# Patient Record
Sex: FEMALE | Race: OTHER | HISPANIC OR LATINO | Employment: OTHER | URBAN - METROPOLITAN AREA
[De-identification: names, ages, dates, MRNs, and addresses within clinical notes are randomized per-mention and may not be internally consistent; named-entity substitution may affect disease eponyms.]

---

## 2017-01-03 ENCOUNTER — APPOINTMENT (EMERGENCY)
Dept: RADIOLOGY | Facility: HOSPITAL | Age: 52
End: 2017-01-03
Payer: COMMERCIAL

## 2017-01-03 ENCOUNTER — HOSPITAL ENCOUNTER (EMERGENCY)
Facility: HOSPITAL | Age: 52
Discharge: HOME | End: 2017-01-03
Attending: EMERGENCY MEDICINE | Admitting: EMERGENCY MEDICINE
Payer: COMMERCIAL

## 2017-01-03 VITALS
DIASTOLIC BLOOD PRESSURE: 100 MMHG | SYSTOLIC BLOOD PRESSURE: 159 MMHG | OXYGEN SATURATION: 94 % | BODY MASS INDEX: 35.03 KG/M2 | HEART RATE: 90 BPM | TEMPERATURE: 97.8 F | RESPIRATION RATE: 18 BRPM | WEIGHT: 197.75 LBS

## 2017-01-03 DIAGNOSIS — S93.402A LEFT ANKLE SPRAIN: Primary | ICD-10-CM

## 2017-01-03 PROCEDURE — 73610 X-RAY EXAM OF ANKLE: CPT

## 2017-01-03 PROCEDURE — 99283 EMERGENCY DEPT VISIT LOW MDM: CPT

## 2017-01-03 RX ORDER — NAPROXEN 500 MG/1
500 TABLET ORAL 2 TIMES DAILY WITH MEALS
Qty: 60 TABLET | Refills: 0 | Status: SHIPPED | OUTPATIENT
Start: 2017-01-03 | End: 2017-10-03

## 2017-01-03 RX ORDER — ACETAMINOPHEN 325 MG/1
650 TABLET ORAL ONCE
Status: COMPLETED | OUTPATIENT
Start: 2017-01-03 | End: 2017-01-03

## 2017-01-03 RX ADMIN — ACETAMINOPHEN 650 MG: 325 TABLET ORAL at 07:29

## 2017-01-24 ENCOUNTER — GENERIC CONVERSION - ENCOUNTER (OUTPATIENT)
Dept: OTHER | Facility: OTHER | Age: 52
End: 2017-01-24

## 2017-07-25 ENCOUNTER — DOCTOR'S OFFICE (OUTPATIENT)
Dept: URBAN - METROPOLITAN AREA CLINIC 137 | Facility: CLINIC | Age: 52
Setting detail: OPHTHALMOLOGY
End: 2017-07-25
Payer: MEDICARE

## 2017-07-25 DIAGNOSIS — E11.3293: ICD-10-CM

## 2017-07-25 DIAGNOSIS — H52.4: ICD-10-CM

## 2017-07-25 DIAGNOSIS — H25.13: ICD-10-CM

## 2017-07-25 DIAGNOSIS — H04.123: ICD-10-CM

## 2017-07-25 PROCEDURE — 92004 COMPRE OPH EXAM NEW PT 1/>: CPT | Performed by: OPHTHALMOLOGY

## 2017-07-25 PROCEDURE — 92015 DETERMINE REFRACTIVE STATE: CPT | Performed by: OPHTHALMOLOGY

## 2017-07-25 ASSESSMENT — REFRACTION_OUTSIDERX
OD_VA2: 20/20(J1+)
OS_VA3: 20/
OD_CYLINDER: +1.25
OD_VA3: 20/
OU_VA: 20/
OD_SPHERE: +1.25
OD_AXIS: 002
OS_ADD: +2.25
OS_VA2: 20/20(J1+)
OS_VA1: 20/40-2
OS_CYLINDER: +0.50
OS_AXIS: 175
OS_SPHERE: +1.50
OD_ADD: +2.25
OD_VA1: 20/40

## 2017-07-25 ASSESSMENT — REFRACTION_MANIFEST
OU_VA: 20/
OS_VA1: 20/
OD_VA1: 20/
OS_VA2: 20/
OD_VA3: 20/
OD_VA1: 20/
OS_VA1: 20/
OD_VA3: 20/
OU_VA: 20/
OS_VA3: 20/
OS_VA3: 20/
OD_VA2: 20/
OD_VA2: 20/
OS_VA2: 20/

## 2017-07-25 ASSESSMENT — REFRACTION_AUTOREFRACTION
OD_SPHERE: +1.25
OD_CYLINDER: +1.25
OS_SPHERE: +1.75
OS_CYLINDER: +0.75
OD_AXIS: 004
OS_AXIS: 172

## 2017-07-25 ASSESSMENT — KERATOMETRY
OD_AXISANGLE_DEGREES: 015
OS_K2POWER_DIOPTERS: 44.50
OD_K1POWER_DIOPTERS: 43.75
OD_K2POWER_DIOPTERS: 44.00
OS_AXISANGLE_DEGREES: 161
OS_K1POWER_DIOPTERS: 44.25

## 2017-07-25 ASSESSMENT — REFRACTION_CURRENTRX
OD_VPRISM_DIRECTION: SV
OS_OVR_VA: 20/
OD_OVR_VA: 20/
OD_SPHERE: +3.25
OD_OVR_VA: 20/
OS_OVR_VA: 20/
OS_SPHERE: +3.25
OD_OVR_VA: 20/
OS_VPRISM_DIRECTION: SV
OS_OVR_VA: 20/
OS_CYLINDER: SPH
OD_CYLINDER: SPH

## 2017-07-25 ASSESSMENT — VISUAL ACUITY
OD_BCVA: 20/80
OS_BCVA: 20/70

## 2017-07-25 ASSESSMENT — SPHEQUIV_DERIVED
OD_SPHEQUIV: 1.875
OS_SPHEQUIV: 2.125

## 2017-07-25 ASSESSMENT — CONFRONTATIONAL VISUAL FIELD TEST (CVF)
OD_FINDINGS: FULL
OS_FINDINGS: FULL

## 2017-07-25 ASSESSMENT — AXIALLENGTH_DERIVED
OD_AL: 22.7545
OS_AL: 22.496

## 2017-08-11 ENCOUNTER — DOCTOR'S OFFICE (OUTPATIENT)
Dept: URBAN - METROPOLITAN AREA CLINIC 137 | Facility: CLINIC | Age: 52
Setting detail: OPHTHALMOLOGY
End: 2017-08-11
Payer: MEDICARE

## 2017-08-11 DIAGNOSIS — H04.123: ICD-10-CM

## 2017-08-11 DIAGNOSIS — H25.13: ICD-10-CM

## 2017-08-11 DIAGNOSIS — E11.3293: ICD-10-CM

## 2017-08-11 PROBLEM — H52.4 PRESBYOPIA: Status: ACTIVE | Noted: 2017-07-25

## 2017-08-11 PROBLEM — H01.004 BLEPHARITIS; LEFT UPPER LID: Status: ACTIVE | Noted: 2017-08-11

## 2017-08-11 PROCEDURE — 92134 CPTRZ OPH DX IMG PST SGM RTA: CPT | Performed by: OPHTHALMOLOGY

## 2017-08-11 PROCEDURE — 92014 COMPRE OPH EXAM EST PT 1/>: CPT | Performed by: OPHTHALMOLOGY

## 2017-08-11 ASSESSMENT — REFRACTION_CURRENTRX
OS_CYLINDER: SPH
OD_OVR_VA: 20/
OS_SPHERE: +3.25
OS_OVR_VA: 20/
OS_OVR_VA: 20/
OD_OVR_VA: 20/
OD_OVR_VA: 20/
OD_VPRISM_DIRECTION: SV
OD_CYLINDER: SPH
OS_VPRISM_DIRECTION: SV
OD_SPHERE: +3.25
OS_OVR_VA: 20/

## 2017-08-11 ASSESSMENT — REFRACTION_MANIFEST
OD_VA1: 20/
OS_VA3: 20/
OD_VA1: 20/
OU_VA: 20/
OU_VA: 20/
OS_VA2: 20/
OS_VA3: 20/
OD_VA3: 20/
OD_VA3: 20/
OS_VA1: 20/
OS_VA2: 20/
OS_VA1: 20/
OD_VA2: 20/
OD_VA2: 20/

## 2017-08-11 ASSESSMENT — REFRACTION_OUTSIDERX
OS_ADD: +2.25
OS_VA2: 20/20(J1+)
OS_CYLINDER: +0.50
OD_VA1: 20/40
OD_VA3: 20/
OD_AXIS: 002
OU_VA: 20/
OS_VA3: 20/
OD_CYLINDER: +1.25
OD_SPHERE: +1.25
OS_SPHERE: +1.50
OD_ADD: +2.25
OS_VA1: 20/40-2
OS_AXIS: 175
OD_VA2: 20/20(J1+)

## 2017-08-11 ASSESSMENT — LID EXAM ASSESSMENTS
OS_BLEPHARITIS: LUL 2+
OD_BLEPHARITIS: 1+

## 2017-08-11 ASSESSMENT — CONFRONTATIONAL VISUAL FIELD TEST (CVF)
OS_FINDINGS: FULL
OD_FINDINGS: FULL

## 2017-08-11 ASSESSMENT — REFRACTION_AUTOREFRACTION
OS_AXIS: 172
OD_SPHERE: +1.25
OS_CYLINDER: +0.75
OD_AXIS: 004
OS_SPHERE: +1.75
OD_CYLINDER: +1.25

## 2017-08-11 ASSESSMENT — SPHEQUIV_DERIVED
OS_SPHEQUIV: 2.125
OD_SPHEQUIV: 1.875

## 2017-08-11 ASSESSMENT — VISUAL ACUITY
OD_BCVA: 20/80
OS_BCVA: 20/70

## 2017-10-03 ENCOUNTER — APPOINTMENT (EMERGENCY)
Dept: RADIOLOGY | Facility: HOSPITAL | Age: 52
DRG: 092 | End: 2017-10-03
Payer: COMMERCIAL

## 2017-10-03 ENCOUNTER — HOSPITAL ENCOUNTER (INPATIENT)
Facility: HOSPITAL | Age: 52
LOS: 6 days | Discharge: NURSING, REHAB OR LONG-TERM CARE | DRG: 092 | End: 2017-10-09
Attending: EMERGENCY MEDICINE | Admitting: ANESTHESIOLOGY
Payer: COMMERCIAL

## 2017-10-03 ENCOUNTER — APPOINTMENT (INPATIENT)
Dept: RADIOLOGY | Facility: HOSPITAL | Age: 52
DRG: 092 | End: 2017-10-03
Payer: COMMERCIAL

## 2017-10-03 ENCOUNTER — APPOINTMENT (INPATIENT)
Dept: NON INVASIVE DIAGNOSTICS | Facility: HOSPITAL | Age: 52
DRG: 092 | End: 2017-10-03
Payer: COMMERCIAL

## 2017-10-03 DIAGNOSIS — I63.9 STROKE (HCC): Primary | ICD-10-CM

## 2017-10-03 PROBLEM — E78.5 HYPERLIPIDEMIA: Chronic | Status: ACTIVE | Noted: 2017-10-03

## 2017-10-03 PROBLEM — R29.810 FACIAL DROOP: Status: ACTIVE | Noted: 2017-10-03

## 2017-10-03 PROBLEM — E11.9 DIABETES MELLITUS (HCC): Chronic | Status: ACTIVE | Noted: 2017-10-03

## 2017-10-03 PROBLEM — R29.898 LEFT LEG WEAKNESS: Status: ACTIVE | Noted: 2017-10-03

## 2017-10-03 PROBLEM — I10 HYPERTENSION: Chronic | Status: ACTIVE | Noted: 2017-10-03

## 2017-10-03 PROBLEM — N17.9 AKI (ACUTE KIDNEY INJURY) (HCC): Status: ACTIVE | Noted: 2017-10-03

## 2017-10-03 PROBLEM — R29.898 LEFT ARM WEAKNESS: Status: ACTIVE | Noted: 2017-10-03

## 2017-10-03 PROBLEM — I16.0 HYPERTENSIVE URGENCY: Status: ACTIVE | Noted: 2017-10-03

## 2017-10-03 LAB
ALBUMIN SERPL BCP-MCNC: 3.2 G/DL (ref 3.5–5)
ALP SERPL-CCNC: 140 U/L (ref 46–116)
ALT SERPL W P-5'-P-CCNC: 63 U/L (ref 12–78)
ANION GAP SERPL CALCULATED.3IONS-SCNC: 11 MMOL/L (ref 4–13)
APTT PPP: 24 SECONDS (ref 24–33)
AST SERPL W P-5'-P-CCNC: 39 U/L (ref 5–45)
BACTERIA UR QL AUTO: ABNORMAL /HPF
BASOPHILS # BLD AUTO: 0 THOUSANDS/ΜL (ref 0–0.1)
BASOPHILS NFR BLD AUTO: 0 % (ref 0–1)
BILIRUB SERPL-MCNC: 0.3 MG/DL (ref 0.2–1)
BILIRUB UR QL STRIP: NEGATIVE
BUN SERPL-MCNC: 24 MG/DL (ref 5–25)
CALCIUM SERPL-MCNC: 9.7 MG/DL (ref 8.3–10.1)
CHLORIDE SERPL-SCNC: 100 MMOL/L (ref 100–108)
CLARITY UR: CLEAR
CO2 SERPL-SCNC: 24 MMOL/L (ref 21–32)
COLOR UR: YELLOW
CREAT SERPL-MCNC: 1.45 MG/DL (ref 0.6–1.3)
EOSINOPHIL # BLD AUTO: 0.1 THOUSAND/ΜL (ref 0–0.61)
EOSINOPHIL NFR BLD AUTO: 1 % (ref 0–6)
ERYTHROCYTE [DISTWIDTH] IN BLOOD BY AUTOMATED COUNT: 13.2 % (ref 11.6–15.1)
GFR SERPL CREATININE-BSD FRML MDRD: 41 ML/MIN/1.73SQ M
GLUCOSE SERPL-MCNC: 153 MG/DL (ref 65–140)
GLUCOSE SERPL-MCNC: 163 MG/DL (ref 65–140)
GLUCOSE SERPL-MCNC: 182 MG/DL (ref 65–140)
GLUCOSE SERPL-MCNC: 307 MG/DL (ref 65–140)
GLUCOSE SERPL-MCNC: 361 MG/DL (ref 65–140)
GLUCOSE UR STRIP-MCNC: ABNORMAL MG/DL
HCT VFR BLD AUTO: 41.9 % (ref 37–47)
HGB BLD-MCNC: 14 G/DL (ref 12–16)
HGB UR QL STRIP.AUTO: ABNORMAL
INR PPP: 0.9 (ref 0.86–1.16)
KETONES UR STRIP-MCNC: NEGATIVE MG/DL
LEUKOCYTE ESTERASE UR QL STRIP: ABNORMAL
LYMPHOCYTES # BLD AUTO: 2.2 THOUSANDS/ΜL (ref 0.6–4.47)
LYMPHOCYTES NFR BLD AUTO: 28 % (ref 14–44)
MAGNESIUM SERPL-MCNC: 1.7 MG/DL (ref 1.6–2.6)
MCH RBC QN AUTO: 29.4 PG (ref 27–31)
MCHC RBC AUTO-ENTMCNC: 33.4 G/DL (ref 31.4–37.4)
MCV RBC AUTO: 88 FL (ref 82–98)
MONOCYTES # BLD AUTO: 0.5 THOUSAND/ΜL (ref 0.17–1.22)
MONOCYTES NFR BLD AUTO: 6 % (ref 4–12)
NEUTROPHILS # BLD AUTO: 5 THOUSANDS/ΜL (ref 1.85–7.62)
NEUTS SEG NFR BLD AUTO: 64 % (ref 43–75)
NITRITE UR QL STRIP: NEGATIVE
NON-SQ EPI CELLS URNS QL MICRO: ABNORMAL /HPF
NRBC BLD AUTO-RTO: 0 /100 WBCS
PH UR STRIP.AUTO: 5 [PH] (ref 5–9)
PLATELET # BLD AUTO: 229 THOUSANDS/UL (ref 130–400)
PMV BLD AUTO: 8.3 FL (ref 8.9–12.7)
POTASSIUM SERPL-SCNC: 4.3 MMOL/L (ref 3.5–5.3)
PROT SERPL-MCNC: 7.2 G/DL (ref 6.4–8.2)
PROT UR STRIP-MCNC: ABNORMAL MG/DL
PROTHROMBIN TIME: 9.4 SECONDS (ref 9.4–11.7)
RBC # BLD AUTO: 4.78 MILLION/UL (ref 4.2–5.4)
RBC #/AREA URNS AUTO: ABNORMAL /HPF
SODIUM SERPL-SCNC: 135 MMOL/L (ref 136–145)
SP GR UR STRIP.AUTO: 1.01 (ref 1–1.03)
TROPONIN I SERPL-MCNC: <0.02 NG/ML
UROBILINOGEN UR QL STRIP.AUTO: 0.2 E.U./DL
WBC # BLD AUTO: 7.8 THOUSAND/UL (ref 4.8–10.8)
WBC #/AREA URNS AUTO: ABNORMAL /HPF

## 2017-10-03 PROCEDURE — 84484 ASSAY OF TROPONIN QUANT: CPT | Performed by: EMERGENCY MEDICINE

## 2017-10-03 PROCEDURE — 71010 HB CHEST X-RAY 1 VIEW FRONTAL (PORTABLE): CPT

## 2017-10-03 PROCEDURE — 96375 TX/PRO/DX INJ NEW DRUG ADDON: CPT

## 2017-10-03 PROCEDURE — 80053 COMPREHEN METABOLIC PANEL: CPT | Performed by: EMERGENCY MEDICINE

## 2017-10-03 PROCEDURE — 70498 CT ANGIOGRAPHY NECK: CPT

## 2017-10-03 PROCEDURE — 36415 COLL VENOUS BLD VENIPUNCTURE: CPT | Performed by: EMERGENCY MEDICINE

## 2017-10-03 PROCEDURE — 87081 CULTURE SCREEN ONLY: CPT | Performed by: ANESTHESIOLOGY

## 2017-10-03 PROCEDURE — 85610 PROTHROMBIN TIME: CPT | Performed by: EMERGENCY MEDICINE

## 2017-10-03 PROCEDURE — 85025 COMPLETE CBC W/AUTO DIFF WBC: CPT | Performed by: EMERGENCY MEDICINE

## 2017-10-03 PROCEDURE — 70450 CT HEAD/BRAIN W/O DYE: CPT

## 2017-10-03 PROCEDURE — 93306 TTE W/DOPPLER COMPLETE: CPT

## 2017-10-03 PROCEDURE — 70496 CT ANGIOGRAPHY HEAD: CPT

## 2017-10-03 PROCEDURE — 81001 URINALYSIS AUTO W/SCOPE: CPT | Performed by: EMERGENCY MEDICINE

## 2017-10-03 PROCEDURE — 3E03317 INTRODUCTION OF OTHER THROMBOLYTIC INTO PERIPHERAL VEIN, PERCUTANEOUS APPROACH: ICD-10-PCS | Performed by: EMERGENCY MEDICINE

## 2017-10-03 PROCEDURE — 96374 THER/PROPH/DIAG INJ IV PUSH: CPT

## 2017-10-03 PROCEDURE — 82948 REAGENT STRIP/BLOOD GLUCOSE: CPT

## 2017-10-03 PROCEDURE — 85730 THROMBOPLASTIN TIME PARTIAL: CPT | Performed by: EMERGENCY MEDICINE

## 2017-10-03 PROCEDURE — 83735 ASSAY OF MAGNESIUM: CPT | Performed by: EMERGENCY MEDICINE

## 2017-10-03 PROCEDURE — 93005 ELECTROCARDIOGRAM TRACING: CPT | Performed by: EMERGENCY MEDICINE

## 2017-10-03 PROCEDURE — 99291 CRITICAL CARE FIRST HOUR: CPT

## 2017-10-03 RX ORDER — PROMETHAZINE HYDROCHLORIDE 25 MG/ML
25 INJECTION, SOLUTION INTRAMUSCULAR; INTRAVENOUS EVERY 6 HOURS PRN
Status: DISCONTINUED | OUTPATIENT
Start: 2017-10-03 | End: 2017-10-09 | Stop reason: HOSPADM

## 2017-10-03 RX ORDER — BUTALBITAL, ACETAMINOPHEN AND CAFFEINE 50; 325; 40 MG/1; MG/1; MG/1
1 TABLET ORAL ONCE
Status: DISCONTINUED | OUTPATIENT
Start: 2017-10-03 | End: 2017-10-03

## 2017-10-03 RX ORDER — MORPHINE SULFATE 2 MG/ML
1 INJECTION, SOLUTION INTRAMUSCULAR; INTRAVENOUS EVERY 4 HOURS PRN
Status: DISCONTINUED | OUTPATIENT
Start: 2017-10-03 | End: 2017-10-04

## 2017-10-03 RX ORDER — VALSARTAN AND HYDROCHLOROTHIAZIDE 320; 25 MG/1; MG/1
1 TABLET, FILM COATED ORAL DAILY
COMMUNITY
End: 2018-05-14

## 2017-10-03 RX ORDER — LABETALOL HYDROCHLORIDE 5 MG/ML
10 INJECTION, SOLUTION INTRAVENOUS ONCE
Status: COMPLETED | OUTPATIENT
Start: 2017-10-03 | End: 2017-10-03

## 2017-10-03 RX ORDER — TOPIRAMATE 25 MG/1
25 TABLET ORAL
Status: DISCONTINUED | OUTPATIENT
Start: 2017-10-03 | End: 2017-10-07

## 2017-10-03 RX ORDER — LABETALOL HYDROCHLORIDE 5 MG/ML
10 INJECTION, SOLUTION INTRAVENOUS EVERY 4 HOURS PRN
Status: DISCONTINUED | OUTPATIENT
Start: 2017-10-03 | End: 2017-10-09 | Stop reason: HOSPADM

## 2017-10-03 RX ORDER — DIPHENHYDRAMINE HYDROCHLORIDE 50 MG/ML
25 INJECTION INTRAMUSCULAR; INTRAVENOUS ONCE
Status: COMPLETED | OUTPATIENT
Start: 2017-10-03 | End: 2017-10-03

## 2017-10-03 RX ORDER — ATORVASTATIN CALCIUM 80 MG/1
80 TABLET, FILM COATED ORAL EVERY EVENING
Status: DISCONTINUED | OUTPATIENT
Start: 2017-10-04 | End: 2017-10-09 | Stop reason: HOSPADM

## 2017-10-03 RX ORDER — SODIUM CHLORIDE 9 MG/ML
100 INJECTION, SOLUTION INTRAVENOUS CONTINUOUS
Status: DISCONTINUED | OUTPATIENT
Start: 2017-10-03 | End: 2017-10-05

## 2017-10-03 RX ORDER — MAGNESIUM SULFATE 1 G/100ML
1 INJECTION INTRAVENOUS ONCE
Status: COMPLETED | OUTPATIENT
Start: 2017-10-03 | End: 2017-10-03

## 2017-10-03 RX ORDER — SODIUM CHLORIDE 9 MG/ML
100 INJECTION, SOLUTION INTRAVENOUS ONCE
Status: DISCONTINUED | OUTPATIENT
Start: 2017-10-03 | End: 2017-10-09 | Stop reason: HOSPADM

## 2017-10-03 RX ORDER — LANOLIN ALCOHOL/MO/W.PET/CERES
3 CREAM (GRAM) TOPICAL
Status: DISCONTINUED | OUTPATIENT
Start: 2017-10-03 | End: 2017-10-09 | Stop reason: HOSPADM

## 2017-10-03 RX ORDER — ONDANSETRON 2 MG/ML
4 INJECTION INTRAMUSCULAR; INTRAVENOUS ONCE
Status: COMPLETED | OUTPATIENT
Start: 2017-10-03 | End: 2017-10-03

## 2017-10-03 RX ORDER — BUTALBITAL, ACETAMINOPHEN AND CAFFEINE 50; 325; 40 MG/1; MG/1; MG/1
1 TABLET ORAL EVERY 6 HOURS PRN
Status: DISCONTINUED | OUTPATIENT
Start: 2017-10-03 | End: 2017-10-09 | Stop reason: HOSPADM

## 2017-10-03 RX ORDER — HYDRALAZINE HYDROCHLORIDE 20 MG/ML
10 INJECTION INTRAMUSCULAR; INTRAVENOUS ONCE
Status: DISCONTINUED | OUTPATIENT
Start: 2017-10-03 | End: 2017-10-03

## 2017-10-03 RX ORDER — CHLORHEXIDINE GLUCONATE 0.12 MG/ML
15 RINSE ORAL EVERY 12 HOURS SCHEDULED
Status: DISCONTINUED | OUTPATIENT
Start: 2017-10-03 | End: 2017-10-09 | Stop reason: HOSPADM

## 2017-10-03 RX ADMIN — MAGNESIUM SULFATE HEPTAHYDRATE 1 G: 1 INJECTION, SOLUTION INTRAVENOUS at 13:09

## 2017-10-03 RX ADMIN — CHLORHEXIDINE GLUCONATE 15 ML: 1.2 RINSE ORAL at 21:13

## 2017-10-03 RX ADMIN — LABETALOL 20 MG/4 ML (5 MG/ML) INTRAVENOUS SYRINGE 10 MG: at 10:40

## 2017-10-03 RX ADMIN — INSULIN LISPRO 10 UNITS: 100 INJECTION, SOLUTION INTRAVENOUS; SUBCUTANEOUS at 11:49

## 2017-10-03 RX ADMIN — IOHEXOL 85 ML: 350 INJECTION, SOLUTION INTRAVENOUS at 10:31

## 2017-10-03 RX ADMIN — CHLORHEXIDINE GLUCONATE 15 ML: 1.2 RINSE ORAL at 15:00

## 2017-10-03 RX ADMIN — INSULIN LISPRO 1 UNITS: 100 INJECTION, SOLUTION INTRAVENOUS; SUBCUTANEOUS at 23:23

## 2017-10-03 RX ADMIN — ALTEPLASE 7.7 MG: KIT at 11:28

## 2017-10-03 RX ADMIN — INSULIN LISPRO 1 UNITS: 100 INJECTION, SOLUTION INTRAVENOUS; SUBCUTANEOUS at 18:19

## 2017-10-03 RX ADMIN — BUTALBITAL, ACETAMINOPHEN, AND CAFFEINE 1 TABLET: 50; 325; 40 TABLET ORAL at 21:13

## 2017-10-03 RX ADMIN — SODIUM CHLORIDE 5 MG/HR: 0.9 INJECTION, SOLUTION INTRAVENOUS at 11:17

## 2017-10-03 RX ADMIN — LABETALOL 20 MG/4 ML (5 MG/ML) INTRAVENOUS SYRINGE 10 MG: at 10:53

## 2017-10-03 RX ADMIN — SODIUM CHLORIDE 100 ML/HR: 0.9 INJECTION, SOLUTION INTRAVENOUS at 14:54

## 2017-10-03 RX ADMIN — MORPHINE SULFATE 1 MG: 2 INJECTION, SOLUTION INTRAMUSCULAR; INTRAVENOUS at 20:36

## 2017-10-03 RX ADMIN — SODIUM CHLORIDE 1000 ML: 0.9 INJECTION, SOLUTION INTRAVENOUS at 10:50

## 2017-10-03 RX ADMIN — TOPIRAMATE 25 MG: 25 TABLET, FILM COATED ORAL at 21:12

## 2017-10-03 RX ADMIN — INSULIN LISPRO 1 UNITS: 100 INJECTION, SOLUTION INTRAVENOUS; SUBCUTANEOUS at 15:39

## 2017-10-03 RX ADMIN — ONDANSETRON 4 MG: 2 INJECTION INTRAMUSCULAR; INTRAVENOUS at 10:40

## 2017-10-03 RX ADMIN — ALTEPLASE 69.1 MG: KIT at 11:31

## 2017-10-03 RX ADMIN — DIPHENHYDRAMINE HYDROCHLORIDE 25 MG: 50 INJECTION, SOLUTION INTRAMUSCULAR; INTRAVENOUS at 13:12

## 2017-10-03 RX ADMIN — MORPHINE SULFATE 1 MG: 2 INJECTION, SOLUTION INTRAMUSCULAR; INTRAVENOUS at 16:35

## 2017-10-03 RX ADMIN — MELATONIN TAB 3 MG 3 MG: 3 TAB at 21:58

## 2017-10-03 RX ADMIN — LABETALOL 20 MG/4 ML (5 MG/ML) INTRAVENOUS SYRINGE 10 MG: at 21:48

## 2017-10-03 NOTE — ED PROVIDER NOTES
History  Chief Complaint   Patient presents with    CVA/TIA-like Symptoms     c/o headache that started last night   states at 0800 this morning began with numbness on her left face and tongue and left sided extremity weakness  50-year-old female presents with multiple neurological complaints involving a headache that started last night gradual onset  This morning prior to arrival 2 hours ago she started with left facial numbness and left upper and arm and lower leg weakness  She also has a facial droop on the left side  Denies any head trauma  Remote history of stroke  History of migraine headaches  Not on anticoagulation  History provided by:  Patient   used: No        Prior to Admission Medications   Prescriptions Last Dose Informant Patient Reported? Taking? Canagliflozin (INVOKANA PO)   Yes No   Sig: Take by mouth   atorvastatin (LIPITOR) 10 mg tablet   Yes Yes   Sig: Take 40 mg by mouth daily at bedtime     clonazePAM (KlonoPIN) 0 5 mg tablet   Yes Yes   Sig: Take 0 5 mg by mouth daily at bedtime as needed for anxiety or seizures     fenofibrate (TRICOR) 145 mg tablet   Yes No   Sig: Take 145 mg by mouth daily   insulin aspart (NovoLOG) 100 units/mL injection   Yes No   Sig: Inject 75 Units under the skin daily   metoprolol tartrate (LOPRESSOR) 50 mg tablet   Yes No   Sig: Take 25 mg by mouth     pioglitazone (ACTOS) 30 mg tablet   Yes No   Sig: Take 30 mg by mouth daily   valsartan-hydrochlorothiazide (DIOVAN-HCT) 320-25 MG per tablet   Yes Yes   Sig: Take 1 tablet by mouth daily      Facility-Administered Medications: None       Past Medical History:   Diagnosis Date    Diabetes mellitus     Hyperlipidemia     Hypertension     Stroke        Past Surgical History:   Procedure Laterality Date    HYSTERECTOMY         History reviewed  No pertinent family history  I have reviewed and agree with the history as documented      Social History   Substance Use Topics    Smoking status: Never Smoker    Smokeless tobacco: Not on file    Alcohol use No        Review of Systems   All other systems reviewed and are negative  Physical Exam  ED Triage Vitals   Temperature Pulse Respirations Blood Pressure SpO2   10/03/17 1200 10/03/17 1012 10/03/17 1012 10/03/17 1012 10/03/17 1012   98 5 °F (36 9 °C) (!) 108 (!) 28 (!) 212/112 95 %      Temp Source Heart Rate Source Patient Position - Orthostatic VS BP Location FiO2 (%)   10/03/17 1200 10/03/17 1012 10/03/17 1012 10/03/17 1012 --   Oral Monitor Lying Right arm       Pain Score       10/03/17 1012       4           Physical Exam   Constitutional: She is oriented to person, place, and time  She appears well-developed and well-nourished  HENT:   Head: Normocephalic and atraumatic  Eyes: EOM are normal  Pupils are equal, round, and reactive to light  Neck: Normal range of motion  Neck supple  Cardiovascular: Normal rate and regular rhythm  Pulmonary/Chest: Effort normal and breath sounds normal    Abdominal: Soft  Bowel sounds are normal    Musculoskeletal: Normal range of motion  Neurological: She is alert and oriented to person, place, and time  She displays normal reflexes  No cranial nerve deficit or sensory deficit  She exhibits abnormal muscle tone  Coordination normal    Patient has decreased  strength in the left upper extremity compared to right upper extremity    motor strength 3/5 left compared to the right lower extremity on the right side  Skin: Skin is warm and dry  Psychiatric: She has a normal mood and affect  Nursing note and vitals reviewed        ED Medications  Medications   alteplase (ACTIVASE) bolus 7 7 mg (7 7 mg Intravenous Given 10/3/17 1128)     Followed by   alteplase (ACTIVASE) infusion 69 1 mg (0 mg/kg × 85 3 kg Intravenous Stopped 10/3/17 1232)     Followed by   sodium chloride 0 9 % infusion (not administered)   niCARdipine (CARDENE) 25 mg (STANDARD CONCENTRATION) in sodium chloride 0 9% 250 mL (0 mg/hr Intravenous Stopped 10/3/17 1221)   chlorhexidine (PERIDEX) 0 12 % oral rinse 15 mL (15 mL Swish & Spit Given 10/3/17 1500)   atorvastatin (LIPITOR) tablet 80 mg (not administered)   promethazine (PHENERGAN) injection 25 mg (not administered)   insulin lispro (HumaLOG) 100 units/mL subcutaneous injection 1-6 Units (1 Units Subcutaneous Given 10/3/17 1539)   sodium chloride 0 9 % infusion (100 mL/hr Intravenous New Bag 10/3/17 1454)   influenza inactivated quadrivalent vaccine (FLULAVAL) IM injection 0 5 mL (not administered)   sodium chloride 0 9 % bolus 1,000 mL (0 mL Intravenous Stopped 10/3/17 1225)   labetalol (NORMODYNE) injection 10 mg (10 mg Intravenous Given 10/3/17 1040)   ondansetron (ZOFRAN) injection 4 mg (4 mg Intravenous Given 10/3/17 1040)   iohexol (OMNIPAQUE) 350 MG/ML injection (MULTI-DOSE) 85 mL (85 mL Intravenous Given 10/3/17 1031)   labetalol (NORMODYNE) injection 10 mg (10 mg Intravenous Given 10/3/17 1053)   insulin lispro (HumaLOG) 100 units/mL subcutaneous injection 10 Units (10 Units Subcutaneous Given 10/3/17 1149)   diphenhydrAMINE (BENADRYL) injection 25 mg (25 mg Intravenous Given 10/3/17 1312)   magnesium sulfate IVPB (premix) SOLN 1 g (0 g Intravenous Stopped 10/3/17 1400)       Diagnostic Studies  Labs Reviewed   CBC AND DIFFERENTIAL - Abnormal        Result Value Ref Range Status    MPV 8 3 (*) 8 9 - 12 7 fL Final    WBC 7 80  4 80 - 10 80 Thousand/uL Final    RBC 4 78  4 20 - 5 40 Million/uL Final    Hemoglobin 14 0  12 0 - 16 0 g/dL Final    Hematocrit 41 9  37 0 - 47 0 % Final    MCV 88  82 - 98 fL Final    MCH 29 4  27 0 - 31 0 pg Final    MCHC 33 4  31 4 - 37 4 g/dL Final    RDW 13 2  11 6 - 15 1 % Final    Platelets 243  358 - 400 Thousands/uL Final    nRBC 0  /100 WBCs Final    Neutrophils Relative 64  43 - 75 % Final    Lymphocytes Relative 28  14 - 44 % Final    Monocytes Relative 6  4 - 12 % Final    Eosinophils Relative 1  0 - 6 % Final    Basophils Relative 0  0 - 1 % Final    Neutrophils Absolute 5 00  1 85 - 7 62 Thousands/µL Final    Lymphocytes Absolute 2 20  0 60 - 4 47 Thousands/µL Final    Monocytes Absolute 0 50  0 17 - 1 22 Thousand/µL Final    Eosinophils Absolute 0 10  0 00 - 0 61 Thousand/µL Final    Basophils Absolute 0 00  0 00 - 0 10 Thousands/µL Final   COMPREHENSIVE METABOLIC PANEL - Abnormal     Sodium 135 (*) 136 - 145 mmol/L Final    Creatinine 1 45 (*) 0 60 - 1 30 mg/dL Final    Comment: Standardized to IDMS reference method    Glucose 361 (*) 65 - 140 mg/dL Final    Comment:   If the patient is fasting, the ADA then defines impaired fasting glucose as > 100 mg/dL and diabetes as > or equal to 123 mg/dL  Specimen collection should occur prior to Sulfasalazine administration due to the potential for falsely depressed results  Specimen collection should occur prior to Sulfapyridine administration due to the potential for falsely elevated results  Alkaline Phosphatase 140 (*) 46 - 116 U/L Final    Albumin 3 2 (*) 3 5 - 5 0 g/dL Final    Potassium 4 3  3 5 - 5 3 mmol/L Final    Chloride 100  100 - 108 mmol/L Final    CO2 24  21 - 32 mmol/L Final    Anion Gap 11  4 - 13 mmol/L Final    BUN 24  5 - 25 mg/dL Final    Calcium 9 7  8 3 - 10 1 mg/dL Final    AST 39  5 - 45 U/L Final    Comment:   Specimen collection should occur prior to Sulfasalazine administration due to the potential for falsely depressed results  ALT 63  12 - 78 U/L Final    Comment:   Specimen collection should occur prior to Sulfasalazine administration due to the potential for falsely depressed results       Total Protein 7 2  6 4 - 8 2 g/dL Final    Total Bilirubin 0 30  0 20 - 1 00 mg/dL Final    eGFR 41  ml/min/1 73sq m Final    Narrative:     National Kidney Disease Education Program recommendations are as follows:  GFR calculation is accurate only with a steady state creatinine  Chronic Kidney disease less than 60 ml/min/1 73 sq  meters  Kidney failure less than 15 ml/min/1 73 sq  meters  URINALYSIS WITH REFLEX TO MICROSCOPIC - Abnormal     Leukocytes, UA   (*) Negative Final    Value: Elevated glucose may cause decreased leukocyte values  See urine microscopic for [facility] result/    Protein,  (2+) (*) Negative mg/dl Final    Glucose, UA >=1000 (1%) (*) Negative mg/dl Final    Blood, UA Small (*) Negative Final    Color, UA Yellow   Final    Clarity, UA Clear   Final    Specific Savery, UA 1 010  1 000 - 1 030 Final    pH, UA 5 0  5 0 - 9 0 Final    Nitrite, UA Negative  Negative Final    Ketones, UA Negative  Negative mg/dl Final    Urobilinogen, UA 0 2  0 2, 1 0 E U /dl E U /dl Final    Bilirubin, UA Negative  Negative Final   URINE MICROSCOPIC - Abnormal     RBC, UA 1-2 (*) None Seen, 0-5 /hpf Final    WBC, UA 1-2 (*) None Seen, 0-5, 5-55, 5-65 /hpf Final    Epithelial Cells Moderate (*) None Seen, Occasional /hpf Final    Bacteria, UA None Seen  None Seen, Occasional /hpf Final   POCT GLUCOSE - Abnormal     POC Glucose 307 (*) 65 - 140 mg/dl Final    Comment: RN NotifiedThe result was performed at [address]: [facility] [place] [facility] [id number]   TROPONIN I - Normal    Troponin I <0 02  <=0 04 ng/mL Final    Comment: 3Autovalidation override    Narrative:     [employer] Chemistry analyzer 99% cutoff is > 0 04 ng/mL in network labs    o cTnI 99% cutoff is useful only when applied to patients in the clinical setting of myocardial ischemia  o cTnI 99% cutoff should be interpreted in the context of clinical history, ECG findings and possibly cardiac imaging to establish correct diagnosis  o cTnI 99% cutoff may be suggestive but clearly not indicative of a coronary event without the clinical setting of myocardial ischemia  MAGNESIUM - Normal    Magnesium 1 7  1 6 - 2 6 mg/dL Final   APTT - Normal    PTT 24  24 - 33 seconds Final    Narrative:      Therapeutic Heparin Range = 60-90 seconds   PROTIME-INR - Normal    Protime 9 4  9 4 - 11 7 seconds Final    INR 0 90  0 86 - 1 16 Final       XR chest 1 view portable   Final Result      Minimal atelectasis at the right lung base  Workstation performed: [id number]         CTA head and neck with and without contrast   Final Result      No large vessel flow restrictive disease within the head or neck  No arterial thrombosis  Possible 1 8 mm aneurysm/infundibulum arising from the supraclinoid left ICA  Nonemergent neurovascular consultation and follow-up CTA of the brain only, suggested in 6 months  ##neurovascularslh##neurovascularslh    ##sigslh##sigslh         Workstation performed: [id number]         CT stroke alert brain   Final Result      Normal study  No acute hemorrhage  No acute infarction  Findings were directly discussed with Nicolás Boles RN the head nurse in the emergency department will convey the information to Dr Segovia directly   on 10/3/2017 10:27 AM          Workstation performed: [id number]         MRI brain wo contrast    (Results Pending)       Procedures  Procedures      Phone Contacts  ED Phone Contact    ED Course  ED Course              NIH Stroke Scale    Flowsheet Row Most Recent Value   Level of Consciousness (1a )  0 Filed at: 10/03/2017 1330   LOC Questions (1b )  0 Filed at: 10/03/2017 1330   LOC Commands (1c )  0 Filed at: 10/03/2017 1330   Best Gaze (2 )  0 Filed at: 10/03/2017 1330   Visual (3 )  0 Filed at: 10/03/2017 1330   Facial Palsy (4 )  1 Filed at: 10/03/2017 1330   Motor Arm, Left (5a )  2 Filed at: 10/03/2017 1330   Motor Arm, Right (5b )  0 Filed at: 10/03/2017 1330   Motor Leg, Left (6a )  2 Filed at: 10/03/2017 1330   Motor Leg, Right (6b )  0 Filed at: 10/03/2017 1330   Limb Ataxia (7 )  0 Filed at: 10/03/2017 1330   Sensory (8 )  0 Filed at: 10/03/2017 1330   Best Language (9 )  0 Filed at: 10/03/2017 1330   Dysarthria (10 )  0 Filed at: 10/03/2017 1330   Extinction and Inattention (11 ) (Formerly Neglect)  0 Filed at: 10/03/2017 1330 Total  5 Filed at: 10/03/2017 1330                        MDM  Number of Diagnoses or Management Options  Stroke:   Diagnosis management comments: Acute CVA, intracranial hemorrhage, subdural hemorrhage, medical screening, atypical migraine    Tele neuro consulted  Stroke alert initiated  TPA administered  Patient admitted to the ICU  The Dr Mandi Menon also consulted the in-house neurologist who came and evaluated the patient  Her NIH stroke scale was 6  Amount and/or Complexity of Data Reviewed  Clinical lab tests: ordered and reviewed  Tests in the radiology section of CPT®: ordered and reviewed  Tests in the medicine section of CPT®: ordered and reviewed    Patient Progress  Patient progress: stable    The patient presented with a condition in which there was a high probability of imminent or life-threatening deterioration, and critical care services (excluding separately billable procedures) totalled  minutes  Disposition  Final diagnoses:   Stroke     ED Disposition     ED Disposition Condition Comment    Admit         Follow-up Information    None       Current Discharge Medication List      CONTINUE these medications which have NOT CHANGED    Details   atorvastatin (LIPITOR) 10 mg tablet Take 40 mg by mouth daily at bedtime        clonazePAM (KlonoPIN) 0 5 mg tablet Take 0 5 mg by mouth daily at bedtime as needed for anxiety or seizures        valsartan-hydrochlorothiazide (DIOVAN-HCT) 320-25 MG per tablet Take 1 tablet by mouth daily      Canagliflozin (INVOKANA PO) Take by mouth      fenofibrate (TRICOR) 145 mg tablet Take 145 mg by mouth daily      insulin aspart (NovoLOG) 100 units/mL injection Inject 75 Units under the skin daily      metoprolol tartrate (LOPRESSOR) 50 mg tablet Take 25 mg by mouth        pioglitazone (ACTOS) 30 mg tablet Take 30 mg by mouth daily           No discharge procedures on file      ED Provider  Electronically Signed by       Jon Rivera DO  10/03/17 [address]

## 2017-10-03 NOTE — CONSULTS
[facility]   Neurology Initial Consult    Bronwyn Guzman is a 47 y o  female  ICU 03/ICU 03          Information obtained from:   Chief Complaint   Patient presents with    CVA/TIA-like Symptoms     c/o headache that started last night   states at 0800 this morning began with numbness on her left face and tongue and left sided extremity weakness  Assessment/Plan:    TPA Decision: TPA given this admission    Reason for Consult / Principal Problem: left sided weakness   Hx and PE limited by: none   Patient last known well: 8am  Stroke alert called: 10:15am  Neurology time of arrival: tele Neuro per records at 10:50am  Consult seen by me at 12:30pm      1  Left sided weakness, s/p IV tPA at 11:29am on 10/3/17  2  Most probable complicated migraine  3  Hypertensive urgency  4  Uncontrolled DM  5  Hyperlipidemia  6  H/o migraines   7  Incidental left ICA 1 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 aneurysm    Patient was given tPA in ED after tele neuro consultation  Due to her multiple comorbidity, it was reasonable as stroke can't be excluded  The clinical history however does favor complex migraine  Admitted to ICU for close monitoring  Cont tele  Recommend BP control with systolic <958 to reduce risk of post tPA bleeding, use nicardipine drip and prn labetalol   lipitor 80mg  No antiplatelet, anticoagulation, iv, fowler, sub q dvt prophylaxis for next 24 hrs  Repeat CT brain in 24 hrs, if MRI brain is done tomm mroning, she doesn't need repeat CT brain  TTE w/ shunt  Recommend strict BG control  Speech and swallow evaluation  PT/OT  Please provide outpatient vascular neurosurgeon referral for left ica aneurysm    Will start her on topamax for migraine prevention     Discussed plan          HPI:  Bronwyn Guzman is a 48 yo F with PMH of DM II, HTN, HLD presents with headache and left sided weakness  Patient was getting migraine like headache last night  She woke up at 2 am took advil and went back to sleep   At 8am, she noticed left sided numbness, weakness and left facial droop  Patient's BP upon arrival was 227/105  Advised ED to use tele neuro because I was in clinic seeing patients  Patient was given multiple doses of labetalol, placed on nicardipine drip and with tele neuro guidance, she was given tPA bolus at 11:29  During encounter, patient and daughter  reported h/o migraines for many years and recently increase in frequency  She reports left sided throbbing headache with nausea and photo/phonophobia  She is not on any preventive medication           Past Medical History:   Diagnosis Date    Diabetes mellitus     Hyperlipidemia     Hypertension     Stroke        Past Surgical History:   Procedure Laterality Date    HYSTERECTOMY         No Known Allergies      Current Facility-Administered Medications:     [START ON 10/4/2017] atorvastatin (LIPITOR) tablet 80 mg, 80 mg, Oral, QPM, Mamie Barrier, CRNP    chlorhexidine (PERIDEX) 0 12 % oral rinse 15 mL, 15 mL, Swish & Spit, Q12H [address], Mamie Barrier, CRNP, 15 mL at 10/03/17 1500    influenza inactivated quadrivalent vaccine (FLULAVAL) IM injection 0 5 mL, 0 5 mL, Intramuscular, Prior to discharge, Moise Beltrán, DO    insulin lispro (HumaLOG) 100 units/mL subcutaneous injection 1-6 Units, 1-6 Units, Subcutaneous, Q6H [address], 1 Units at 10/03/17 1819 **AND** Fingerstick Glucose (POCT), , , Q6H, Mamie Barrier, CRNP    morphine injection 1 mg, 1 mg, Intravenous, Q4H PRN, Mamie Barrier, CRNP, 1 mg at 10/03/17 1635    niCARdipine (CARDENE) 25 mg (STANDARD CONCENTRATION) in sodium chloride 0 9% 250 mL, 1-15 mg/hr, Intravenous, Titrated, Nicole Anepu, DO, Stopped at 10/03/17 1221    promethazine (PHENERGAN) injection 25 mg, 25 mg, Intravenous, Q6H PRN, Nicole Anepu, DO    [COMPLETED] alteplase (ACTIVASE) bolus 7 7 mg, 0 09 mg/kg, Intravenous, Once, 7 7 mg at 10/03/17 1128 **FOLLOWED BY** [COMPLETED] alteplase (ACTIVASE) infusion 69 1 mg, 0 81 mg/kg, Intravenous, Once, Stopped at 10/03/17 1232 **FOLLOWED BY** sodium chloride 0 9 % infusion, 100 mL/hr, Intravenous, Once, Nicole Aminpu, DO    sodium chloride 0 9 % infusion, 100 mL/hr, Intravenous, Continuous, GONZALO Chino, Last Rate: 100 mL/hr at 10/03/17 1454, 100 mL/hr at 10/03/17 1454    Social History     Social History    Marital status: Single     Spouse name: N/A    Number of children: N/A    Years of education: N/A     Occupational History    Not on file  Social History Main Topics    Smoking status: Never Smoker    Smokeless tobacco: Not on file    Alcohol use No    Drug use: No    Sexual activity: Not on file     Other Topics Concern    Not on file     Social History Narrative    No narrative on file       History reviewed  No pertinent family history  Review of systems:  Please see HPI for positive symptoms  No fever, no chills, no weight change  Ocular: No drainage, no blurred vision  HEENT:  No sore throat, earache, or congestion  No neck pain  Headache+ COR:  No chest pain  No palpitations  Lungs:  no sob, wheezing,  GI:  no  nausea, no vomiting, no diarrhea, no constipation, no anorexia  :  No dysuria, frequency, or urgency  No hematuria  Musculoskeletal:  No joint pain or swelling or edema  Skin:  No rash or itching  Psychiatric:  no anxiety, no depression  Endocrine:  No polyuria or polydipsia  Physical examination:  Vitals:    10/03/17 1804   BP: (!) 172/107   Pulse: 90   Resp: (!) 32   Temp:    SpO2: 96%       GENERAL APPEARANCE:  The patient is alert, oriented  He is in no acute distress  HEENT:  Head is normocephalic  The sinuses are otherwise nontender  Pupils are equal and reactive  NECK:  Supple without lymphadenopathy  HEART:  Regular rate and rhythm  LUNGS:  clear to auscultation  No crackles or wheezes are heard  ABDOMEN:  Soft, nontender, nondistended with good bowel sounds heard  EXTREMITIES:  Without cyanosis, clubbing or edema  Mental status:   The patient is alert, attentive, and oriented  Speech is clear and fluent, good repetition, comprehension, and naming  she recalls 3/3 objects at 5 minutes  Cranial nerves:  CN II: Visual fields are full to confrontation  Fundoscopic exam is normal with sharp discs and no vascular changes    Pupils are 3 mm and reactive to light  CN III, IV, VI: At primary gaze, there is no eye deviation  CN V: Facial sensation is decreased to light touch on left side  Corneal responses are intact  CN VII:  Left side of face is asymmetric  CN VIII: Hearing is normal to rubbing fingers  CN IX, X: Palate elevates symmetrically  Phonation is normal   CN XI: Head turning and shoulder shrug are intact  CN XII: Tongue is midline with normal movements and no atrophy  Motor: There is no pronator drift of out-stretched arms  Muscle bulk and tone are normal      Muscle exam  Arm Right Left Leg Right Left   Deltoid 5/5 4/5 Iliopsoas 5/5 3/5   Biceps 5/5 4/5 Quads 5/5 4/5   Triceps 5/5 4/5 Hamstrings 5/5 4/5   Wrist Extension 5/5 5/5 Ankle Dorsi Flexion 5/5 4/5   Wrist Flexion 5/5 5/5 Ankle Plantar Flexion 5/5 4/5   Interossei 5/5 4/5 Ankle Eversion 5/5 4/5   APB 5/5 5/5 Ankle Inversion 5/5 4/5       Reflexes   RJ BJ TJ KJ AJ Plantars Herbert's   Right 2+ 2+ 2+ 2+ 2+ Downgoing Not present   Left 2+ 2+ 2+ 2+ 2+ Downgoing Not present     Sensory:  Decreased to light touch, pinprick over left arm and leg  Intact position sense, and vibration sense are intact in fingers and toes  Coordination:  Rapid alternating movements and fine finger movements are intact  There is no dysmetria on finger-to-nose and heel-knee-shin  There are no abnormal or extraneous movements  Romberg mariana  Gait/Stance:  Langley Mu due to weakness       NIH Stroke Scale    Flowsheet Row Most Recent Value   Level of Consciousness (1a )  0 Filed at: 10/03/2017 1330   LOC Questions (1b )  0 Filed at: 10/03/2017 1330   LOC Commands (1c )  0 Filed at: 10/03/2017 1330   Best Gaze (2 )  0 Filed at: 10/03/2017 1330   Visual (3 )  0 Filed at: 10/03/2017 1330   Facial Palsy (4 )  1 Filed at: 10/03/2017 1330   Motor Arm, Left (5a )  2 Filed at: 10/03/2017 1330   Motor Arm, Right (5b )  0 Filed at: 10/03/2017 1330   Motor Leg, Left (6a )  2 Filed at: 10/03/2017 1330   Motor Leg, Right (6b )  0 Filed at: 10/03/2017 1330   Limb Ataxia (7 )  0 Filed at: 10/03/2017 1330   Sensory (8 )  0 Filed at: 10/03/2017 1330   Best Language (9 )  0 Filed at: 10/03/2017 1330   Dysarthria (10 )  0 Filed at: 10/03/2017 1330   Extinction and Inattention (11 ) (Formerly Neglect)  0 Filed at: 10/03/2017 1330   Total  5 Filed at: 10/03/2017 1330            Lab Results   Component Value Date    WBC 7 80 10/03/2017    HGB 14 0 10/03/2017    HCT 41 9 10/03/2017    MCV 88 10/03/2017     10/03/2017     Lab Results   Component Value Date    HGBA1C 12 3 (H) 01/08/2014     Lab Results   Component Value Date    ALT 63 10/03/2017    AST 39 10/03/2017    ALKPHOS 140 (H) 10/03/2017    BILITOT 0 30 10/03/2017     Lab Results   Component Value Date    GLUCOSE 361 (H) 10/03/2017    CALCIUM 9 7 10/03/2017     (L) 10/03/2017    K 4 3 10/03/2017    CO2 24 10/03/2017     10/03/2017    BUN 24 10/03/2017    CREATININE 1 45 (H) 10/03/2017         Radiology          Review of reports and Independent Interpretation of images or specimens:  Cta Head And Neck With And Without Contrast    Result Date: 10/3/2017  No large vessel flow restrictive disease within the head or neck  No arterial thrombosis  Possible 1 8 mm aneurysm/infundibulum arising from the supraclinoid left ICA  Nonemergent neurovascular consultation and follow-up CTA of the brain only, suggested in 6 months  Xr Chest 1 View Portable    Result Date: 10/3/2017  Minimal atelectasis at the right lung base  Workstation performed: [id number]     Ct Stroke Alert Brain    Result Date: 10/3/2017  Normal study  No acute hemorrhage  No acute infarction  Thank you for this consult  Total time of encounter: 60 min   More than 50% of time was spent in counseling and coordination of care of patient  JOSE [facility]  [facility] [id number]  [address]

## 2017-10-03 NOTE — H&P
History and Physical - Critical Care   Joslyn Rinne 47 y o  female MRN: [id number]  Unit/Bed#: ED CT1 Encounter: [id number]    Reason for Admission / Chief Complaint: Left sided facial droop, left sided weakness, left arm and left leg weakness     History of Present Illness:  Joslyn Rinne is a 47 y o  female who presents to the ED this morning with c/o left sided facial droop, leg, and arm weakness  She c/o having a migraine-like headache last night, she woke up @ 0200 and took some Advil and went back to sleep  She then woke up at 0800 with the presenting symptoms of left facial droop and facial numbness, and left arm and left leg weakness  Patient presented to the ED with hypertensive urgency, /105  She received a total of 20mg of Labetalol and was started on a Cardene gtt  Tele neurologist was consulted and ordered tPA to be administered after BP was under 180/110  tPA bolus given @ 1129, followed by gtt  History obtained from the patient and patient's daughter who is at bedside  Past Medical History:  Past Medical History:   Diagnosis Date    Diabetes mellitus     Hyperlipidemia     Hypertension     Stroke        Past Surgical History:  Past Surgical History:   Procedure Laterality Date    HYSTERECTOMY         Past Family History:  History reviewed  No pertinent family history      Social History:  History   Smoking Status    Never Smoker   Smokeless Tobacco    Not on file      History   Alcohol Use No     History   Drug Use No     Marital Status: Single    Medications:  Current Facility-Administered Medications   Medication Dose Route Frequency    butalbital-acetaminophen-caffeine (FIORICET,ESGIC) -40 mg per tablet 1 tablet  1 tablet Oral Once    diphenhydrAMINE (BENADRYL) injection 25 mg  25 mg Intravenous Once    magnesium sulfate IVPB (premix) SOLN 1 g  1 g Intravenous Once    niCARdipine (CARDENE) 25 mg (STANDARD CONCENTRATION) in sodium chloride 0 9% 250 mL  1-15 mg/hr Intravenous Titrated    promethazine (PHENERGAN) injection 25 mg  25 mg Intravenous Q6H PRN    sodium chloride 0 9 % infusion  100 mL/hr Intravenous Once     Home medications:  Prior to Admission medications    Medication Sig Start Date End Date Taking?  Authorizing Provider   atorvastatin (LIPITOR) 10 mg tablet Take 40 mg by mouth daily at bedtime     Yes Historical Provider, MD   clonazePAM (KlonoPIN) 0 5 mg tablet Take 0 5 mg by mouth daily at bedtime as needed for anxiety or seizures     Yes Historical Provider, MD   valsartan-hydrochlorothiazide (DIOVAN-HCT) 320-25 MG per tablet Take 1 tablet by mouth daily   Yes Historical Provider, MD   Canagliflozin (INVOKANA PO) Take by mouth    Historical Provider, MD   fenofibrate (TRICOR) 145 mg tablet Take 145 mg by mouth daily    Historical Provider, MD   insulin aspart (NovoLOG) 100 units/mL injection Inject 75 Units under the skin daily    Historical Provider, MD   metoprolol tartrate (LOPRESSOR) 50 mg tablet Take 25 mg by mouth      Historical Provider, MD   pioglitazone (ACTOS) 30 mg tablet Take 30 mg by mouth daily    Historical Provider, MD   amLODIPine (NORVASC) 5 mg tablet Take 10 mg by mouth daily  10/3/17  Historical Provider, MD   cyclobenzaprine (FLEXERIL) 10 mg tablet Take 1 tablet by mouth 3 (three) times a day as needed for muscle spasms for up to 4 days 10/24/16 10/3/17  Lolis Pickard MD   HYDROcodone-acetaminophen ([address]) 5-325 mg per tablet Take 1 tablet by mouth every 6 (six) hours as needed for pain Max Daily Amount: 4 tablets 11/22/16 10/3/17  Shoshana Giordano DO   naproxen (NAPROSYN) 500 mg tablet Take 1 tablet by mouth 2 (two) times a day with meals for 4 days 10/24/16 10/3/17  Lolis Pickard MD   naproxen (NAPROSYN) 500 mg tablet Take 1 tablet by mouth 2 (two) times a day with meals for 30 days 1/3/17 10/3/17  Cas Gallardo DO   VALSARTAN PO Take by mouth  10/3/17  Historical Provider, MD     Allergies:  No Known Allergies    ROS: Review of Systems   HENT:        Facial numbness   Eyes: Negative  Respiratory: Negative  Cardiovascular: Negative  Gastrointestinal: Negative  Endocrine: Negative  Genitourinary: Negative  Musculoskeletal:        LUE and LLE weakness   Skin: Negative  Allergic/Immunologic: Negative  Neurological: Positive for tremors, facial asymmetry, weakness, numbness and headaches  Hematological: Negative  Psychiatric/Behavioral: Negative  Vitals:  Vitals:    10/03/17 1209 10/03/17 1215 10/03/17 1230 10/03/17 1244   BP: 144/76 140/73 139/79 137/76   Pulse:  88 88 88   Resp:  20 18 20   Temp:  98 °F (36 7 °C) 98 3 °F (36 8 °C) 97 9 °F (36 6 °C)   TempSrc:  Tympanic Tympanic Tympanic   SpO2:  95% 95% 94%   Weight:         Temperature:   Temp (24hrs), Av 2 °F (36 8 °C), Min:97 9 °F (36 6 °C), Max:98 5 °F (36 9 °C)    Current Temperature: 97 9 °F (36 6 °C)    Weights:   IBW: -92 5 kg  Body mass index is 33 3 kg/m²  Non-Invasive/Invasive Ventilation Settings:  Respiratory    Lab Data (Last 4 hours)    None         O2/Vent Data (Last 4 hours)    None              No results found for: PHART, GKT2KOQ, PO2ART, MXA7WWB, J3VRLUHI, BEART, SOURCE  SpO2: SpO2: 94 %, SpO2 Activity: SpO2 Activity: At Rest, SpO2 Device: O2 Device: None (Room air)     Physical Exam:  Physical Exam   Constitutional: She is oriented to person, place, and time  She appears well-developed and well-nourished  She appears distressed  HENT:   Head: Normocephalic and atraumatic  Left facial droop, left mouth droop    Eyes: Conjunctivae and EOM are normal  Pupils are equal, round, and reactive to light  Neck: Normal range of motion  Neck supple  Cardiovascular: Normal rate, regular rhythm, normal heart sounds and intact distal pulses  Pulmonary/Chest: Effort normal and breath sounds normal    Abdominal: Soft  Bowel sounds are normal    Neurological: She is alert and oriented to person, place, and time   She displays tremor  GCS eye subscore is 4  GCS verbal subscore is 5  GCS motor subscore is 6  Tremor LUE  Unable to lift left arm off the bed, very weak hand grasp  Unable to lift left leg off the bed, weak plantar flexion  Unable to bend left leg  Skin: Skin is warm and dry  Capillary refill takes less than 2 seconds  Psychiatric: She has a normal mood and affect  Her behavior is normal  Judgment and thought content normal        Labs:    Results from last 7 days  Lab Units 10/03/17  1025   WBC Thousand/uL 7 80   HEMOGLOBIN g/dL 14 0   HEMATOCRIT % 41 9   PLATELETS Thousands/uL 229   NEUTROS PCT % 64   MONOS PCT % 6        Results from last 7 days  Lab Units 10/03/17  1025   SODIUM mmol/L 135*   POTASSIUM mmol/L 4 3   CHLORIDE mmol/L 100   CO2 mmol/L 24   BUN mg/dL 24   CREATININE mg/dL 1 45*   CALCIUM mg/dL 9 7   TOTAL PROTEIN g/dL 7 2   BILIRUBIN TOTAL mg/dL 0 30   ALK PHOS U/L 140*   ALT U/L 63   AST U/L 39   GLUCOSE RANDOM mg/dL 361*       Results from last 7 days  Lab Units 10/03/17  1025   MAGNESIUM mg/dL 1 7            Results from last 7 days  Lab Units 10/03/17  1105   INR  0 90   PTT seconds 24           0  Lab Value Date/Time   TROPONINI <0 02 10/03/2017 1025   TROPONINI <0 02 11/28/2016 0650   TROPONINI <0 02 11/22/2016 1953   TROPONINI <0 02 10/24/2016 1125   TROPONINI <0 04 01/08/2014 0942   TROPONINI <0 04 01/08/2014 0035       Imaging: CXR: pending   CT: Normal study  No acute hemorrhage  No acute infarction  EKG: This was personally reviewed by myself      __________________________________________________________________    Assessment:   Principal Problem:    Acute CVA (cerebrovascular accident)  Active Problems:    Hypertensive urgency    CHRISTIANA (acute kidney injury)    Diabetes mellitus    Hypertension    Hyperlipidemia  Resolved Problems:    * No resolved hospital problems  *      Plan:    Neuro:   · Acute ischemic CVA s/p tPA administration  Neuro checks per procotol   Initiate stroke pathway  Obtain NIH stroke scale score  Will schedule MRI tomorrow  Consult neurology  CV:   · Hypertensive urgency on admission  Titrate Cardene gtt to keep sys > 160 less then <480, diastolic < 446  Prn Labetalol  Will check Echo and EKG  · Hyperlipidemia, check lipid panel in AM  Will start Lipitor 80mg daily  · Monitor rhythm on telemetry  Pulm:   · Continue nasal cannula to maintain O2 sat > 92%  · CXR ordered    GI:   · Prn Zofran for nausea  · NPO until bedside dysphagia performed     :  · Monitor strict I&O  · CHRISTIANA, trend creat, IVF hydration     FEN:   · NSS @ 100mL/hr   · Replete Mg to keep > 2    ID:   · No signs of infectious process    Heme:   · Monitor for signs and symptoms of bleeding post tPA   · No anticoagulation x 24 hours  · Coags WNL, trend   · Non-pharmacologic VTE prophylaxis: SCDs     Endo:   · Uncontrolled type II DM, monitor glucose q6h, initiate SSI Alg #1, check HgbA1c in AM     MSK/Skin:   · Monitor for changes in left sided weakness  · Consult PT/OT  · Frequent turn and repo to offload pressure points     Family:  · Family updated: yes, daughter at bedside  Disposition: Admit to ICU    Counseling / Coordination of Care  Total time spent today 45 minutes  Greater than 50% of total time was spent with the patient and / or family counseling and / or coordination of care  A description of the counseling / coordination of care: assessing patient, reviewing and ordering medication, reviewing labs, past medicial history, and hospital course    __________________________________________________________________    VTE Pharmacologic Prophylaxis: Reason for no pharmacologic prophylaxis s/p tPA  VTE Mechanical Prophylaxis: sequential compression device    Invasive lines and devices:   Invasive Devices     Peripheral Intravenous Line            Peripheral IV 10/03/17 Left Antecubital less than 1 day    Peripheral IV 10/03/17 Left Hand less than 1 day    Peripheral IV 10/03/17 Right Antecubital less than 1 day                Code Status: Level I Full Code  POA:  none  POLST:  none    Given critical illness, patient length of stay will require greater than two midnights      Signature: GONZALO Hamlin  Date: 10/3/2017

## 2017-10-04 ENCOUNTER — APPOINTMENT (INPATIENT)
Dept: PHYSICAL THERAPY | Facility: HOSPITAL | Age: 52
DRG: 092 | End: 2017-10-04
Payer: COMMERCIAL

## 2017-10-04 ENCOUNTER — APPOINTMENT (INPATIENT)
Dept: OCCUPATIONAL THERAPY | Facility: HOSPITAL | Age: 52
DRG: 092 | End: 2017-10-04
Payer: COMMERCIAL

## 2017-10-04 ENCOUNTER — APPOINTMENT (INPATIENT)
Dept: RADIOLOGY | Facility: HOSPITAL | Age: 52
DRG: 092 | End: 2017-10-04
Payer: COMMERCIAL

## 2017-10-04 PROBLEM — G43.909 MIGRAINE: Status: ACTIVE | Noted: 2017-10-04

## 2017-10-04 LAB
ANION GAP SERPL CALCULATED.3IONS-SCNC: 8 MMOL/L (ref 4–13)
BASOPHILS # BLD AUTO: 0 THOUSANDS/ΜL (ref 0–0.1)
BASOPHILS NFR BLD AUTO: 1 % (ref 0–1)
BUN SERPL-MCNC: 13 MG/DL (ref 5–25)
CALCIUM SERPL-MCNC: 8.9 MG/DL (ref 8.3–10.1)
CHLORIDE SERPL-SCNC: 105 MMOL/L (ref 100–108)
CHOLEST SERPL-MCNC: 275 MG/DL (ref 50–200)
CO2 SERPL-SCNC: 24 MMOL/L (ref 21–32)
CREAT SERPL-MCNC: 0.89 MG/DL (ref 0.6–1.3)
EOSINOPHIL # BLD AUTO: 0.1 THOUSAND/ΜL (ref 0–0.61)
EOSINOPHIL NFR BLD AUTO: 2 % (ref 0–6)
ERYTHROCYTE [DISTWIDTH] IN BLOOD BY AUTOMATED COUNT: 13.1 % (ref 11.6–15.1)
EST. AVERAGE GLUCOSE BLD GHB EST-MCNC: 209 MG/DL
GFR SERPL CREATININE-BSD FRML MDRD: 74 ML/MIN/1.73SQ M
GLUCOSE SERPL-MCNC: 115 MG/DL (ref 65–140)
GLUCOSE SERPL-MCNC: 145 MG/DL (ref 65–140)
GLUCOSE SERPL-MCNC: 178 MG/DL (ref 65–140)
GLUCOSE SERPL-MCNC: 184 MG/DL (ref 65–140)
GLUCOSE SERPL-MCNC: 191 MG/DL (ref 65–140)
HBA1C MFR BLD: 8.9 % (ref 4.2–6.3)
HCT VFR BLD AUTO: 37.6 % (ref 37–47)
HDLC SERPL-MCNC: 37 MG/DL (ref 40–60)
HGB BLD-MCNC: 12.5 G/DL (ref 12–16)
LDLC SERPL DIRECT ASSAY-MCNC: 122 MG/DL (ref 0–100)
LYMPHOCYTES # BLD AUTO: 2.1 THOUSANDS/ΜL (ref 0.6–4.47)
LYMPHOCYTES NFR BLD AUTO: 42 % (ref 14–44)
MAGNESIUM SERPL-MCNC: 1.9 MG/DL (ref 1.6–2.6)
MCH RBC QN AUTO: 29.3 PG (ref 27–31)
MCHC RBC AUTO-ENTMCNC: 33.1 G/DL (ref 31.4–37.4)
MCV RBC AUTO: 88 FL (ref 82–98)
MONOCYTES # BLD AUTO: 0.4 THOUSAND/ΜL (ref 0.17–1.22)
MONOCYTES NFR BLD AUTO: 8 % (ref 4–12)
NEUTROPHILS # BLD AUTO: 2.4 THOUSANDS/ΜL (ref 1.85–7.62)
NEUTS SEG NFR BLD AUTO: 49 % (ref 43–75)
NRBC BLD AUTO-RTO: 0 /100 WBCS
PHOSPHATE SERPL-MCNC: 2.7 MG/DL (ref 2.7–4.5)
PLATELET # BLD AUTO: 209 THOUSANDS/UL (ref 130–400)
PMV BLD AUTO: 8.3 FL (ref 8.9–12.7)
POTASSIUM SERPL-SCNC: 3.8 MMOL/L (ref 3.5–5.3)
RBC # BLD AUTO: 4.26 MILLION/UL (ref 4.2–5.4)
SODIUM SERPL-SCNC: 137 MMOL/L (ref 136–145)
TRIGL SERPL-MCNC: 571 MG/DL
WBC # BLD AUTO: 5 THOUSAND/UL (ref 4.8–10.8)

## 2017-10-04 PROCEDURE — G8987 SELF CARE CURRENT STATUS: HCPCS

## 2017-10-04 PROCEDURE — G8996 SWALLOW CURRENT STATUS: HCPCS

## 2017-10-04 PROCEDURE — 97167 OT EVAL HIGH COMPLEX 60 MIN: CPT

## 2017-10-04 PROCEDURE — 83036 HEMOGLOBIN GLYCOSYLATED A1C: CPT | Performed by: NURSE PRACTITIONER

## 2017-10-04 PROCEDURE — G8979 MOBILITY GOAL STATUS: HCPCS

## 2017-10-04 PROCEDURE — 80048 BASIC METABOLIC PNL TOTAL CA: CPT | Performed by: NURSE PRACTITIONER

## 2017-10-04 PROCEDURE — 80061 LIPID PANEL: CPT | Performed by: NURSE PRACTITIONER

## 2017-10-04 PROCEDURE — 85025 COMPLETE CBC W/AUTO DIFF WBC: CPT | Performed by: NURSE PRACTITIONER

## 2017-10-04 PROCEDURE — 83721 ASSAY OF BLOOD LIPOPROTEIN: CPT | Performed by: NURSE PRACTITIONER

## 2017-10-04 PROCEDURE — 92610 EVALUATE SWALLOWING FUNCTION: CPT

## 2017-10-04 PROCEDURE — 83735 ASSAY OF MAGNESIUM: CPT | Performed by: NURSE PRACTITIONER

## 2017-10-04 PROCEDURE — 97163 PT EVAL HIGH COMPLEX 45 MIN: CPT

## 2017-10-04 PROCEDURE — 82948 REAGENT STRIP/BLOOD GLUCOSE: CPT

## 2017-10-04 PROCEDURE — 84100 ASSAY OF PHOSPHORUS: CPT | Performed by: NURSE PRACTITIONER

## 2017-10-04 PROCEDURE — 97110 THERAPEUTIC EXERCISES: CPT

## 2017-10-04 PROCEDURE — G9163 LANG EXPRESS GOAL STATUS: HCPCS

## 2017-10-04 PROCEDURE — 92523 SPEECH SOUND LANG COMPREHEN: CPT

## 2017-10-04 PROCEDURE — 70551 MRI BRAIN STEM W/O DYE: CPT

## 2017-10-04 PROCEDURE — G8978 MOBILITY CURRENT STATUS: HCPCS

## 2017-10-04 PROCEDURE — G9162 LANG EXPRESS CURRENT STATUS: HCPCS

## 2017-10-04 PROCEDURE — G8997 SWALLOW GOAL STATUS: HCPCS

## 2017-10-04 PROCEDURE — G8988 SELF CARE GOAL STATUS: HCPCS

## 2017-10-04 RX ORDER — VALSARTAN 160 MG/1
320 TABLET ORAL DAILY
Status: DISCONTINUED | OUTPATIENT
Start: 2017-10-05 | End: 2017-10-09 | Stop reason: HOSPADM

## 2017-10-04 RX ORDER — HYDROCHLOROTHIAZIDE 25 MG/1
25 TABLET ORAL DAILY
Status: DISCONTINUED | OUTPATIENT
Start: 2017-10-05 | End: 2017-10-09 | Stop reason: HOSPADM

## 2017-10-04 RX ADMIN — CHLORHEXIDINE GLUCONATE 15 ML: 1.2 RINSE ORAL at 20:18

## 2017-10-04 RX ADMIN — SODIUM CHLORIDE 100 ML/HR: 0.9 INJECTION, SOLUTION INTRAVENOUS at 10:19

## 2017-10-04 RX ADMIN — TOPIRAMATE 25 MG: 25 TABLET, FILM COATED ORAL at 21:49

## 2017-10-04 RX ADMIN — INSULIN LISPRO 1 UNITS: 100 INJECTION, SOLUTION INTRAVENOUS; SUBCUTANEOUS at 11:58

## 2017-10-04 RX ADMIN — PROMETHAZINE HYDROCHLORIDE 25 MG: 25 INJECTION INTRAMUSCULAR; INTRAVENOUS at 12:45

## 2017-10-04 RX ADMIN — ATORVASTATIN CALCIUM 80 MG: 80 TABLET, FILM COATED ORAL at 18:02

## 2017-10-04 RX ADMIN — CHLORHEXIDINE GLUCONATE 15 ML: 1.2 RINSE ORAL at 09:44

## 2017-10-04 RX ADMIN — MELATONIN TAB 3 MG 3 MG: 3 TAB at 21:49

## 2017-10-04 RX ADMIN — METOPROLOL TARTRATE 25 MG: 25 TABLET ORAL at 20:18

## 2017-10-04 RX ADMIN — INSULIN LISPRO 1 UNITS: 100 INJECTION, SOLUTION INTRAVENOUS; SUBCUTANEOUS at 05:50

## 2017-10-04 RX ADMIN — MORPHINE SULFATE 1 MG: 2 INJECTION, SOLUTION INTRAMUSCULAR; INTRAVENOUS at 12:50

## 2017-10-04 RX ADMIN — LABETALOL 20 MG/4 ML (5 MG/ML) INTRAVENOUS SYRINGE 10 MG: at 03:46

## 2017-10-04 RX ADMIN — BUTALBITAL, ACETAMINOPHEN, AND CAFFEINE 1 TABLET: 50; 325; 40 TABLET ORAL at 09:43

## 2017-10-04 RX ADMIN — SODIUM CHLORIDE 100 ML/HR: 0.9 INJECTION, SOLUTION INTRAVENOUS at 00:24

## 2017-10-04 NOTE — PROGRESS NOTES
Daily Progress Note - Critical Care   Anh Sanchez 47 y o  female MRN: [id number]  Unit/Bed#: ICU 03 Encounter: [id number]    ______________________________________________________________________  Assessment:   Principal Problem:    Acute CVA (cerebrovascular accident)  Active Problems:    Hypertensive urgency    Diabetes mellitus ([facility] )    Hypertension    Hyperlipidemia    CHRISTIANA (acute kidney injury) ([facility] )  Resolved Problems:    * No resolved hospital problems  *    Plan:    Neuro:   · Acute Ischemic CVA s/p TPA @ 11:29 AM on 10/3/17  · Will get MRI today   · TTE w/ shunt   · Will get lipid panel and hemoglobin A1c   · Continue with Lipitor 80 mg   · Speech and swallow   · Neurology consulted   · Complex Migraine   · Continue with Topamax and Fioricet prn  · Incidental left ICA 1 8mm aneurysm   · 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   · Delirium: CAM ICU positive no   · Pain controlled with: Morphine   · Pain score: mild  · Regulate sleep/wake cycle    CV:   · Hypertensive urgency:  · Continue with labetalol prn to keep UAJ<165 and Diastolic <677  · Hyperlipidemia   · Continue with lipitor 80 mg  · Will get Lipid panel   · Rhythm: NSR  · Follow rhythm on telemetry    Pulm:   · No respiratory distress    GI:   · Phenergan prn for nausea   · Nutrition/diet plan: NPO until speech and swallow   · Stress ulcer prophylaxis: No prophylaxis needed  · Bowel regimen: none  · Last BM: yesterday     FEN:   · NS @ 100 cc/hr     · Electrolytes repleted: not applicable  · Goal: K >9 8, Mag >2 0, and Phos >3 0    :   · None     ID:   · No infectious source  · Trend temps and WBC count    Heme:   · Trend hgb and plts    Endo:   · Uncontrolled Type 2 DM  · Hgb A1c pending   · Continue with ISS algorithm 3   · Monitor glucose levels     Msk/Skin:  · Sensation intact in all extremities, movement of all four limbs, left hand grasp weak  · Mobility goal:   · PT consult: yes  · OT consult: yes  · Frequent turning and off-loading    Family:  · Family updated within 24 hours: yes   · Family meeting planned today: no     Lines:  · Peripheral Line     VTE Prophylaxis:  · Pharmacologic Prophylaxis: Reason for no pharmacologic prophylaxis Recent TPA  · Mechanical Prophylaxis: sequential compression device    Disposition: Transfer to general medical floor after MRI     Code Status: Level 1 - Full Code    Counseling / Coordination of Care  Total Critical Care time spent 31 minutes excluding procedures, teaching and family updates  ______________________________________________________________________    HPI/24hr events: Patient complained of a headache for which she received Fioricet and Topamax was added by neurology  Patient had elevated blood pressure twice with 207/105 and 223/113 that required IV labetalol  Improvement on movement and strength in the past 24 hours  ______________________________________________________________________    Physical Exam:   Physical Exam   Constitutional: She is oriented to person, place, and time  She appears well-developed and well-nourished  HENT:   Head: Normocephalic and atraumatic  Right Ear: External ear normal    Left Ear: External ear normal    Nose: Nose normal    Mouth/Throat: Oropharynx is clear and moist  No oropharyngeal exudate  Eyes: EOM are normal  Pupils are equal, round, and reactive to light  Right eye exhibits no discharge  Left eye exhibits no discharge  Neck: Normal range of motion  Cardiovascular: Normal rate, regular rhythm and normal heart sounds  Pulmonary/Chest: Effort normal and breath sounds normal  No respiratory distress  She has no wheezes  Abdominal: Soft  Bowel sounds are normal  She exhibits no distension  There is no tenderness  Musculoskeletal: She exhibits no edema  Passive movement intact all four extremities  Weak hand grasp on the left    Neurological: She is alert and oriented to person, place, and time     Unable to puff cheek on the left side   Droop of the corner of left mouth      Skin: Skin is warm and dry  Psychiatric: She has a normal mood and affect        ______________________________________________________________________  Vitals:    10/04/17 0430 10/04/17 0500 10/04/17 0530 10/04/17 0600   BP: (!) 179/95 170/93 160/93 168/93   Pulse: 77 77 75 76   Resp: (!) 23 20 18 18   Temp:       TempSrc:       SpO2: 98% 97% 97% 96%   Weight:       Height:                  Temperature:   Temp (24hrs), Av 2 °F (36 8 °C), Min:97 7 °F (36 5 °C), Max:98 8 °F (37 1 °C)    Current Temperature: 98 5 °F (36 9 °C)    Weights:   IBW: 50 1 kg    Body mass index is 34 11 kg/m²  Weight (last 2 days)     Date/Time   Weight    10/03/17 1330  84 6 (186 51)    10/03/17 1101  85 3 (188)              Hemodynamic Monitoring:  N/A       Non-Invasive/Invasive Ventilation Settings:  Respiratory    Lab Data (Last 4 hours)    None         O2/Vent Data (Last 4 hours)    None              No results found for: PHART, KJA5ALC, PO2ART, TLE1TMT, C8SBBHCF, BEART, SOURCE  SpO2: SpO2: 97 %, SpO2 Activity: SpO2 Activity: At Rest, SpO2 Device: O2 Device: Nasal cannula, Capnography: Capnography: No    Intake and Outputs:  I/O       10/02 0701 - 10/03 0700 10/03 0701 - 10/04 07    IV Piggyback  1169 1    Total Intake(mL/kg)  1169 1 (13 8)    Urine (mL/kg/hr)  2000    Total Output   2000    Net   -830 9                  Nutrition:        Diet Orders            Start     Ordered    10/03/17 2109  Diet NPO; Sips with meds  Diet effective now     Question Answer Comment   Diet Type NPO    NPO Except: Sips with meds    RD to adjust diet per protocol?  No        10/03/17 2108            Labs:     Results from last 7 days  Lab Units 10/03/17  1025   WBC Thousand/uL 7 80   HEMOGLOBIN g/dL 14 0   HEMATOCRIT % 41 9   PLATELETS Thousands/uL 229   NEUTROS PCT % 64   MONOS PCT % 6       Results from last 7 days  Lab Units 10/03/17  1025   SODIUM mmol/L 135*   POTASSIUM mmol/L 4 3 CHLORIDE mmol/L 100   CO2 mmol/L 24   BUN mg/dL 24   CREATININE mg/dL 1 45*   CALCIUM mg/dL 9 7   TOTAL PROTEIN g/dL 7 2   BILIRUBIN TOTAL mg/dL 0 30   ALK PHOS U/L 140*   ALT U/L 63   AST U/L 39   GLUCOSE RANDOM mg/dL 361*       Results from last 7 days  Lab Units 10/03/17  1025   MAGNESIUM mg/dL 1 7     No results found for: PHOS     Results from last 7 days  Lab Units 10/03/17  1105   INR  0 90   PTT seconds 24       0  Lab Value Date/Time   TROPONINI <0 02 10/03/2017 1025   TROPONINI <0 02 11/28/2016 0650   TROPONINI <0 02 11/22/2016 1953   TROPONINI <0 02 10/24/2016 1125   TROPONINI <0 04 01/08/2014 0942   TROPONINI <0 04 01/08/2014 0035         ABG:No results found for: PHART, RLQ4MZA, PO2ART, VUI3CDA, M2WTXPVD, BEART, SOURCE    Imaging: CXR: from yesterday I have personally reviewed pertinent reports  ECHO: pending  CT: from yesterday I have personally reviewed pertinent reports  EKG: reviewed    Micro:  Lab Results   Component Value Date    URINECX <10,000 cfu/ml Mixed Contaminants X2 11/22/2016    URINECX 10,000-19,000 cfu/ml Mixed Contaminants X4 10/24/2016    URINECX No Growth <1000 cfu/mL 04/09/2016       Allergies: No Known Allergies  Medications:   Scheduled Meds:  atorvastatin 80 mg Oral QPM   chlorhexidine 15 mL Swish [employer] Spit Q12H [address]   insulin lispro 1-6 Units Subcutaneous Q6H [address]   melatonin 3 mg Oral HS   sodium chloride 100 mL/hr Intravenous Once   topiramate 25 mg Oral HS     Continuous Infusions:  niCARdipine 1-15 mg/hr Last Rate: Stopped (10/03/17 1221)   sodium chloride 100 mL/hr Last Rate: 100 mL/hr (10/04/17 0024)     PRN Meds:    butalbital-acetaminophen-caffeine 1 tablet Q6H PRN   influenza vaccine 0 5 mL Prior to discharge   labetalol 10 mg Q4H PRN   morphine injection 1 mg Q4H PRN   promethazine 25 mg Q6H PRN       Invasive lines and devices:   Invasive Devices     Peripheral Intravenous Line            Peripheral IV 10/03/17 Left Antecubital less than 1 day    Peripheral IV 10/03/17 Right Antecubital less than 1 day                   SIGNATURE: Ivonne Morgan MD  DATE: October 4, 2017

## 2017-10-04 NOTE — NUTRITION
10/04/17 1507   Assessment   Timepoint Initial  (speech list)   Labs   List Completed Labs (-307, creat 1 45, , alk phos 140; meds: humalog, IVF)   Feeding Route   Swallow (ST following, recommending regular/thin liquids)   Adequacy of Intake   Nutrition Modality PO  (CCD2, Cardiac TLC 2 3g Na)   Estimated calorie intake compared to estimated need Diet advanced from NPO, no intakes to assess  Will monitor  Nutrition Prognosis   Potential Guarded   Nutrition Concerns (acute CVA, DM, CHRISTIANA)   Nutrition Precautions None   Nutrition Considerations (Education inappropriate at present, will provide as needed)   PES Statement   Problem Clinical   Biochemical (2) Altered nutrition-related laboratory values (specify) NC-2 2   Related to (DM)   As evidenced by: Abnormal lab (specify)  (elevated POCs)   Patient Nutrition Goals   Goal weight maintained;glucose in range;meet PO needs   Goal Status initiated   Timeframe to complete goal by next f/u   Recommendations/Interventions   Summary Diet advanced from NPO this morning, will monitor po intakes and provide diet education as needed at review dates     Interventions Diet: continued as ordered   Nutrition Recommendations Continue diet as ordered   Nutrition Complexity Risk   Nutrition complexity level Moderate risk   Nutrition review: 10/07/17  (po intakes)   Follow up date 10/11/17

## 2017-10-04 NOTE — SOCIAL WORK
SW met with pt to assess needs and discuss plans  Brief DASH discussion completed  Discussed goals of making sure pt's needs are met upon discharge, pt's preferences are taken into account, pt understands her health condition, medications and symptoms to watch for after returning home and pt is aware of any follow up appointments recommended by hospital physician  Pt lives at home with her daughter  Her daughter works nights  Prior to admission pt was independent and working  Acute rehab is being recommended  SW provided information about [id number]  Pt is agreeable with rehab placement and referral being made to [facility]  SW attempted to reach pt's daughter to discuss plans as well  No answer on cell and no voice mail set up  Will continue to try  Referral will be made to [facility]  Authorization will need to be obtained prior to transfer  SW will continue to follow to assist with planning as needed

## 2017-10-04 NOTE — CASE MANAGEMENT
Initial Clinical Review    Admission: Date/Time/Statement: 10/3/17 @ 1109     Orders Placed This Encounter   Procedures    Inpatient Admission (expected length of stay for this patient is greater than two midnights)     Standing Status:   Standing     Number of Occurrences:   1     Order Specific Question:   Admitting Physician     Answer:   Paulette Peacock     Order Specific Question:   Level of Care     Answer:   Critical Care [15]     Order Specific Question:   Estimated length of stay     Answer:   More than 2 Midnights     Order Specific Question:   Certification     Answer:   I certify that inpatient services are medically necessary for this patient for a duration of greater than two midnights  See H&P and MD Progress Notes for additional information about the patient's course of treatment  ED: Date/Time/Mode of Arrival:   ED Arrival Information     Expected Arrival Acuity Means of Arrival Escorted By Service Admission Type    - 10/3/2017 10:06 Immediate [facility]  [address] Emergency    Arrival Complaint    HIGH BP/HEADACHE          Chief Complaint:   Chief Complaint   Patient presents with    CVA/TIA-like Symptoms     c/o headache that started last night   states at 0800 this morning began with numbness on her left face and tongue and left sided extremity weakness  History of Illness: 68-year-old female who presents to the emergency department with left-sided facial droop leg and arm weakness  It appears that her symptoms began around 0800 this morning  Her ED department course was remarkable for hypertension for which she received labetalol and Cardene infusion was initiated  Non contrasted head scan reveals no hemorrhage  CT angiogram showed no arterial thrombosis or large vessel flow restrictive disease  It should be noted that an incidental finding of 1 8 mm aneurysm from the left supraclinoid ICA was identified    In consultation with telemetry based Neurology, the decision was made to initiate tPA after blood pressure was better controlled  It is tPA was initiated at 11:29 a m  [facility] ED Vital Signs:   ED Triage Vitals   Temperature Pulse Respirations Blood Pressure SpO2   10/03/17 1200 10/03/17 1012 10/03/17 1012 10/03/17 1012 10/03/17 1012   98 5 °F (36 9 °C) (!) 108 (!) 28 (!) 212/112 95 %      Temp Source Heart Rate Source Patient Position - Orthostatic VS BP Location FiO2 (%)   10/03/17 1200 10/03/17 1012 10/03/17 1012 10/03/17 1012 --   Oral Monitor Lying Right arm       Pain Score       10/03/17 1012       4        Wt Readings from Last 1 Encounters:   10/03/17 84 6 kg (186 lb 8 2 oz)       Vital Signs (abnormal): Abnormal Labs/Diagnostic Test Results:   SODIUM mmol/L 135*     CREATININE mg/dL 1 45*     ALK PHOS U/L 140*     GLUCOSE RANDOM mg/dL 361*     CTA HEAD NECK  No large vessel flow restrictive disease within the head or neck  No arterial thrombosis    Possible 1 8 mm aneurysm/infundibulum arising from the supraclinoid left ICA  Nonemergent neurovascular consultation and follow-up CTA of the brain only, suggested in 6 months  CT STROKE ALERT BRAIN  Normal study  No acute hemorrhage  No acute infarction   [facility]  LEFT VENTRICLE: Size was normal  Systolic function was normal by visual assessment  Ejection fraction was estimated in the range of 55 % to 60 %  There were no regional wall motion abnormalities  There was mild concentric hypertrophy  RIGHT VENTRICLE: The size was normal  Systolic function was normal  DOPPLER: Systolic pressure was within the normal range  LEFT ATRIUM: Size was normal  No thrombus was identified    RIGHT ATRIUM: Size was normal    AORTIC VALVE: The valve was trileaflet  Leaflets exhibited normal cuspal separation and sclerosis  DOPPLER: Transaortic velocity was within the normal range  There was no evidence for stenosis  There was no regurgitation    TRICUSPID VALVE: The valve structure was normal  There was normal leaflet separation  DOPPLER: The transtricuspid velocity was within the normal range  There was mild regurgitation  Pulmonary artery systolic pressure was within the normal  range  Estimated peak PA pressure was 33 mmHg    CXR  Minimal atelectasis at the right lung base   ED Treatment:   Medication Administration from 10/03/2017 1006 to 10/03/2017 1353       Date/Time Order Dose Route Action Action by Comments     10/03/2017 1225 sodium chloride 0 9 % bolus 1,000 mL 0 mL Intravenous Stopped Onelia Lance, BERENICE      10/03/2017 1050 sodium chloride 0 9 % bolus 1,000 mL 1,000 mL Intravenous New [facility], RN      10/03/2017 1040 labetalol (NORMODYNE) injection 10 mg 10 mg Intravenous Given Sonja Biswas, BERENICE      10/03/2017 1040 ondansetron (ZOFRAN) injection 4 mg 4 mg Intravenous Given Sonja Biswas, BERENICE      10/03/2017 1031 iohexol (OMNIPAQUE) 350 MG/ML injection (MULTI-DOSE) 85 mL 85 mL Intravenous Given Merlin Marnancy Sipel      10/03/2017 1053 labetalol (NORMODYNE) injection 10 mg 10 mg Intravenous Given Sonja Biswas, RN      10/03/2017 1128 alteplase (ACTIVASE) bolus 7 7 mg 7 7 mg Intravenous Given Onelia Lance, RN      10/03/2017 1232 alteplase (ACTIVASE) infusion 69 1 mg 0 mg/kg Intravenous Stopped Onelia Lance, RN      10/03/2017 1131 alteplase (ACTIVASE) infusion 69 1 mg 69 1 mg Intravenous New Bag Onelia Lance, RN      10/03/2017 1221 niCARdipine (CARDENE) 25 mg (STANDARD CONCENTRATION) in sodium chloride 0 9% 250 mL 0 mg/hr Intravenous Stopped Onelia Lance, RN      10/03/2017 1210 niCARdipine (CARDENE) 25 mg (STANDARD CONCENTRATION) in sodium chloride 0 9% 250 mL 1 5 mg/hr Intravenous Rate/Dose Change Onelia Lance, RN      10/03/2017 1200 niCARdipine (CARDENE) 25 mg (STANDARD CONCENTRATION) in sodium chloride 0 9% 250 mL 3 mg/hr Intravenous Rate/Dose Change Onelia Lance, RN      10/03/2017 1155 niCARdipine (CARDENE) 25 mg (STANDARD CONCENTRATION) in sodium chloride 0 9% 250 mL 6 mg/hr Intravenous Rate/Dose Change Onelia Lance, BERENICE      10/03/2017 1145 niCARdipine (CARDENE) 25 mg (STANDARD CONCENTRATION) in sodium chloride 0 9% 250 mL 8 mg/hr Intravenous Rate/Dose Change Onelia Lance, BERENICE      10/03/2017 1139 niCARdipine (CARDENE) 25 mg (STANDARD CONCENTRATION) in sodium chloride 0 9% 250 mL 10 mg/hr Intravenous Rate/Dose Change Onelia Lance, BERENICE      10/03/2017 1129 niCARdipine (CARDENE) 25 mg (STANDARD CONCENTRATION) in sodium chloride 0 9% 250 mL 12 mg/hr Intravenous Rate/Dose Change Onelia Lance, BERENICE      10/03/2017 1126 niCARdipine (CARDENE) 25 mg (STANDARD CONCENTRATION) in sodium chloride 0 9% 250 mL 10 mg/hr Intravenous Rate/Dose Change Onelia Lance, BERENICE      10/03/2017 1123 niCARdipine (CARDENE) 25 mg (STANDARD CONCENTRATION) in sodium chloride 0 9% 250 mL 8 mg/hr Intravenous Rate/Dose Change Onelia Lance, BERENICE      10/03/2017 1117 niCARdipine (CARDENE) 25 mg (STANDARD CONCENTRATION) in sodium chloride 0 9% 250 mL 5 mg/hr Intravenous New Bag Onelia Lance, BERENICE      10/03/2017 1151 hydrALAZINE (APRESOLINE) injection 10 mg 10 mg Intravenous Not Given Onelia Lance, BERENICE      10/03/2017 1149 insulin lispro (HumaLOG) 100 units/mL subcutaneous injection 10 Units 10 Units Subcutaneous Given Flores Cruz RN      10/03/2017 1312 diphenhydrAMINE (BENADRYL) injection 25 mg 25 mg Intravenous Given Onelia Lance, BERENICE      10/03/2017 1309 magnesium sulfate IVPB (premix) SOLN 1 g 1 g Intravenous New Bag Onelia Lance RN           Past Medical/Surgical History:    Active Ambulatory Problems     Diagnosis Date Noted    No Active Ambulatory Problems     Resolved Ambulatory Problems     Diagnosis Date Noted    No Resolved Ambulatory Problems     Past Medical History:   Diagnosis Date    [facility]     Hyperlipidemia     Hypertension     Stroke [facility])        Admitting Diagnosis: High blood pressure [I10]  Stroke ([facility] ) [I63 9]    Age/Sex: 47 y o  female    Assessment/Plan:   Acute CVA (cerebrovascular accident)  Active Problems:    Hypertensive urgency    CHRISTIANA (acute kidney injury)    Diabetes mellitus    Hypertension    Hyperlipidemia   Plan:  Neuro:   · Acute ischemic CVA s/p tPA administration  Neuro checks per procotol  Initiate stroke pathway  Obtain NIH stroke scale score  Will schedule MRI  Consult neurology  CV:   · Hypertensive urgency on admission  Titrate Cardene gtt to keep sys > 160 less then <700, diastolic < 384  Prn Labetalol  Will check Echo and EKG  · Hyperlipidemia, check lipid panel in AM  Will start Lipitor 80mg daily  · Monitor rhythm on telemetry     Pulm:   · Continue nasal cannula to maintain O2 sat > 92%  · CXR ordered   GI:   · Prn Zofran for nausea  · NPO until bedside dysphagia performed    :  · Monitor strict I&O  · CHRISTIANA, trend creat, IVF hydration   FEN:   · NSS @ 100mL/hr   · Replete Mg to keep > 2  ID:   · No signs of infectious process  Heme:   · Monitor for signs and symptoms of bleeding post tPA   · No anticoagulation x 24 hours  · Coags WNL, trend   · Non-pharmacologic VTE prophylaxis: SCDs    Endo:   · Uncontrolled type II DM, monitor glucose q6h, initiate SSI Alg #1, check HgbA1c in AM    MSK/Skin:   · Monitor for changes in left sided weakness  · Consult PT/OT  · Frequent turn and repo to offload pressure points   Family:  · Family updated: yes, daughter at bedside  Disposition: Admit to ICU    PER NEUROLOGY  1  Left sided weakness, s/p IV tPA at 11:29am on 10/3/17  2  Most probable complicated migraine  3  Hypertensive urgency  4  Uncontrolled DM  5  Hyperlipidemia  6  H/o migraines   7  Incidental left ICA 1 8mm aneurysm    Patient was given tPA in ED after tele neuro consultation  Due to her multiple comorbidity, it was reasonable as stroke can't be excluded  The clinical history however does favor complex migraine     Admitted to ICU for close monitoring  Cont tele  Recommend BP control with systolic <737 to reduce risk of post tPA bleeding, use nicardipine drip and prn labetalol   lipitor 80mg  No antiplatelet, anticoagulation, iv, fowler, sub q dvt prophylaxis for next 24 hrs  Repeat CT brain in 24 hrs, if MRI brain is done tomm davina, she doesn't need repeat CT brain  TTE w/ shunt  Recommend strict BG control  Speech and swallow evaluation  PT/OT  Please provide outpatient vascular neurosurgeon referral for left ica aneurysm   Will start her on topamax for migraine prevention         Admission Orders:  ICU  NEURO CHECKS Q4  RESPIRATORY PROTOCOL  OXYGEN NC  PTOT SPEECH  PHOS MG CBC DIFF BMP HGBA1C  MRI BRAIN  IS  DAILY WEIGHT I/O  CONSULT NEUROLOGY  Scheduled Meds:   atorvastatin 80 mg Oral QPM   chlorhexidine 15 mL Swish [employer] Spit Q12H [address]   insulin lispro 1-6 Units Subcutaneous Q6H [address]   melatonin 3 mg Oral HS   sodium chloride 100 mL/hr Intravenous Once   topiramate 25 mg Oral HS     Continuous Infusions:   niCARdipine 1-15 mg/hr Last Rate: Stopped (10/03/17 1221)   sodium chloride 100 mL/hr Last Rate: 100 mL/hr (10/04/17 0024)     PRN Meds: butalbital-acetaminophen-caffeine    influenza vaccine    labetalol    morphine injection    promethazine

## 2017-10-04 NOTE — SPEECH THERAPY NOTE
SLP  Speech Language Evaluation       10/04/17 1000   Patient Information   Current Medical 47year-old female admitted secondary to Left facial droop, Left arm and leg weakness  Cognition   Overall Cognitive Status [facility]   Arousal/ Participation Alert; Cooperative   Attention Within functional limits   Orientation Level Oriented x 4   Memory Within functional limits   Following Commands Follows all commands and directions without difficulty   Pain Assessment   Pain Assessment No/ denies pain   Pain Score No Pain   Speech/Swallow Mechanism Exam   Labial Symmetry Abnormal symmetry left (mild)   Labial Strength Reduced   Labial ROM Reduced Left   Labial Sensation Reduced   Facial Symmetry Left droop   Facial Strength Reduced   Facial ROM Reduced Left   Facial Sensation Reduced   Lingual Symmetry WFL   Lingual Strength WFL   Lingual ROM WFL   Lingual Sensation WFL   Velum WFL   Mandible WFL   Dentition Adequate   Volitional Cough Strong   Vocal Quality Clear   Volitional Swallow WFL   Respratory Status Nasal Cannula   Tracheostomy No   SpO2 98 %   Motor Speech Evaluation   Respiration/  Phonation WFL   Vocal Quality WFL   Dysarthria No   Intelligibility Intelligible   Apraxia None present   Auditory Comprehension   Word Level Comprehension WFL   Yes/No Questions WFL   Commands WFL   Comprehends Conversation Complex   Verbal Expression   Repetition No impairment   Automatic Speech WFL   Naming WFL   Narrative Speech WFL   Sentence Level No Impairment   Pragmatics [facility]   Summary   Speech/ Language Summary Speech Language skills are within normal limits  Mild Left facial weakness/ droop is present  Communication   Expression (FIM) 7 - Expresses complex/abstract ideas in a reasonable time w/o devices or helper  Goals   Goal 1 Increase Left facial strength and ROM via oral motor exercise program   1 week  Goal 2 LTG:  Oral motor skills to norm  1 week

## 2017-10-04 NOTE — OCCUPATIONAL THERAPY NOTE
OT EVALUATION     10/04/17 0925   Restrictions/Precautions   Other Precautions Fall Risk   Pain Assessment   Pain Assessment 0-10   Pain Score 5   Pain Location Head   Home Living   Type of Home Apartment   Home Layout Two level  ([address])   Home Equipment (none)   Additional Comments pt works as an    Prior Function   Level of [place] Independent with ADLs and functional mobility   Lives With Daughter   Receives Help From Family   Comments pts daugther present for session and providing home setup   (Simultaneous filing  User may not have seen previous data )   ADL   Eating Assistance 5  Supervision/Setup   Grooming Assistance 4  Minimal Assistance   UB Bathing Assistance 4  Minimal Assistance   LB Bathing Assistance 2  Maximal Assistance   UB Dressing Assistance 4  Minimal Assistance   LB Dressing Assistance 2  Maximal [address]  2  Maximal Assistance   Bed Mobility   Supine to Sit 4  Minimal assistance   Sit to Supine 4  Minimal assistance   Transfers   Sit to Stand 4  Minimal assistance   Additional items Assist x 2   Stand to Sit 4  Minimal assistance   Additional items Assist x 2   Functional Mobility   Functional Mobility 3  Moderate assistance   Additional Comments assist of 2 few steps to head of bed    Balance   Static Sitting Fair   Dynamic Sitting Fair   Static Standing Poor   Dynamic Standing Poor   Activity Tolerance   Activity Tolerance Patient limited by fatigue;Patient limited by pain   RUE Assessment   RUE Assessment WFL  (4/5 Simultaneous filing   User may not have seen previous data )   LUE Overall AROM   L Shoulder Flexion 90 degrees AROM, MMT 3+/5   L Elbow Flexion WFL, MMT 3+/5   L Mass Grasp WFL, MMT 3+/5   Hand Function   Gross Motor Coordination Impaired  (LUE/LLE)   Fine Motor Coordination Impaired  (LUE)   Sensation   Light Touch Severe deficits in the LUE  (facial numbness with facial droop on L)   Cognition   Overall Cognitive Status [facility] Arousal/Participation Cooperative; Alert   Attention Within functional limits   Orientation Level Oriented X4   Following Commands Follows all commands and directions without difficulty   Comments pt is very motivated    Assessment   Limitation Decreased ADL status; Decreased UE strength;Decreased UE ROM; Decreased Safe judgement during ADL;Decreased endurance;Decreased self-care trans;Decreased high-level ADLs; Decreased fine motor control  (decreased balance and mobility )   Prognosis Good   Assessment Patient evaluated by Occupational Therapy  Patient admitted with Acute CVA (cerebrovascular accident)  Patient presents with left sided facial droop, left UE/LE weakness, decreased left fine motor coordination and left UE numbness  The patients occupational profile, medical and therapy history includes a extensive additional review of physical, cognitive, or psychosocial history related to current functional performance  Comorbidities affecting functional mobility and ADLS include: CVA, diabetes and hypertension  Prior to admission, patient was independent with functional mobility without assistive device, independent with ADLS and independent with IADLS  The evaluation identifies the following performance deficits: weakness, decreased ROM, impaired balance, decreased endurance, decreased coordination, increased fall risk, new onset of impairment of functional mobility, decreased ADLS, decreased IADLS, pain, decreased activity tolerance, decreased safety awareness, impaired judgement and decreased strength, that result in activity limitations and/or participation restrictions  This evaluation requires clinical decision making of high complexity, because the patient presents with comorbidites that affect occupational performance and required significant modification of tasks or assistance with consideration of multiple treatment options    The Barthel Index was used as a functional outcome tool presenting with a score of 35, indicating marked limitations of functional mobility and ADLS  Patient will benefit from skilled Occupational Therapy services to address above deficits and facilitate a safe return to prior level of function  Goals   Patient Goals go home   STG Time Frame (1-7 days)   Short Term Goal  Patient will increase standing tolerance to 3 minutes during functional activity; Patient will increase bed mobility to supervision; Patient will increase functional mobility to and from bathroom with rolling walker with mod assist to increase performance with ADLS; Patient will tolerate 8 minutes of UE ROM/strengthening/fine motor coordination activities to increase general activity tolerance and performance in ADLS/IADLS; Patient will improve functional activity tolerance to 10 minutes of sustained functional tasks to increase participation in basic self-care and decrease assistance level  LTG Time Frame (8-14 days)   Long Term Goal Patient will increase standing tolerance to 6 minutes during functional activity; Patient will increase bed mobility to independent; Patient will increase functional mobility to and from bathroom with rolling walker with min assist to increase performance with ADLS; Patient will tolerate 12 minutes of UE ROM/strengthening/fine motor coordination activities to increase general activity tolerance and performance in ADLS/IADLS; Patient will improve functional activity tolerance to 20 minutes of sustained functional tasks to increase participation in basic self-care and decrease assistance level     Functional Transfer Goals   Pt Will Perform All Functional Transfers (STG mod assist LTG min assist )   ADL Goals   Pt Will Perform Eating (STG independent )   Pt Will Perform Grooming (STG supervision LTG independent )   Pt Will Perform Bathing (STG mod assist LTG Min assist)   Pt Will Perform UE Dressing (STG supervision LTG independent )   Pt Will Perform LE Dressing (STG mod assist LTG Min assist)   Pt Will Perform Toileting (STG mod assist LTG Min assist)   Plan   Treatment Interventions ADL retraining;Functional transfer training;UE strengthening/ROM; Endurance training;Patient/family training;Equipment evaluation/education; Activityengagement   OT Frequency 2-3x/wk   Recommendation   Discharge Recommendation Short Term Rehab  (acute rehab)   Barthel Index   Feeding 5   Bathing 0   Grooming Score 0   Dressing Score 0   Bladder Score 10   Bowels Score 10   Toilet Use Score 5   Transfers (Bed/Chair) Score 5   Mobility (Level Surface) Score 0   Stairs Score 0   Barthel Index Score 35   Modified [place] Scale   Modified [place] Scale 4

## 2017-10-04 NOTE — PLAN OF CARE
Problem: DISCHARGE PLANNING - CARE MANAGEMENT  Goal: Discharge to post-acute care or home with appropriate resources  INTERVENTIONS:  - Conduct assessment to determine patient/family and health care team treatment goals, and need for post-acute services based on payer coverage, community resources, and patient preferences, and barriers to discharge  - Address psychosocial, clinical, and financial barriers to discharge as identified in assessment in conjunction with the patient/family and health care team  - Arrange appropriate level of post-acute services according to patient's   needs and preference and payer coverage in collaboration with the physician and health care team  - Communicate with and update the patient/family, physician, and health care team regarding progress on the discharge plan  - Arrange short term rehab placement  - Arrange appropriate transportation to post-acute venues  Outcome: Progressing  Acute rehab referral made to [address] [place] [address]

## 2017-10-04 NOTE — SPEECH THERAPY NOTE
SLP  Bedside Swallow Evaluation       10/04/17 1000   Patient Information   Current Medical 47year-old female admitted secondary to Left facial droop, Left arm and leg weakness  Swallow Information   Current Risks for Dysphagia & Aspiration New Neuro event   Current Diet NPO x medications   Baseline Diet Regular; Thin liquids   Baseline Assessment   Behavior/Cognition Alert; Cooperative; Interactive   Speech/Language Status WFL   Patient Positioning Upright in bed   Swallow Mechanism Exam   Labial Symmetry Abnormal symmetry left (mild)   Labial Strength Reduced   Labial ROM Reduced left   Labial Sensation Reduced   Facial Symmetry Left droop (mild)   Facial Strength Reduced   Facial ROM Reduced left   Facial Sensation Reduced   Lingual Symmetry WFL   Lingual Strength WFL   Lingual ROM WFL   Lingual Sensation WFL   Velum WFL   Mandible WFL   Dentition Adequate   Volitional Cough Strong   Consistencies Assessed and Performance   Materials Administered Puree/Level 1; Mechanical Soft/Level 2; Soft/Level 3;Regular/Solid; Thin liquid   Oral Stage WFL   Pharyngeal Stage WFL   Pharyngeal Stage Comment No s/s of aspiration seen following all swallows of puree, solids, and thin liquid  Swallow Mechanics WFL   Esophageal Concerns No s/s reported   Strategies and Efficacy Small bites/ sips, slow pace   Summary   Swallow Summary Oral pharyngeal swallow skills are safe/ [facility] for regular solids and thin/ all liquids  Recommendations   Risk for Aspiration None   Recommendations Oral diet; Dysphagia treatment   Diet Solid Recommendation Regular consistency   Diet Liquid Recommendation Thin liquid   Recommended Form of Medications Whole with thin liquid   General Precautions Aspiration precautions; Upright as possible for all oral intakes; Remain upright for 45 minutes after meals; Assist with feeding   Compensatory Swallowing Strategies Alternate solids and liquids;  External pacing; Place food/ straw in on Right side  Small bites/ sips, slow pace   Further Evaluations Dietitian   Results Reviewed with Patient/ Family; RN; MD   Treatment Recommendations   Duration of treatment 1-3 times a week  Follow up treatments Oral motor exercises; Assure diet tolerance; Patient/ family education   Dysphagia Goals Patient will tolerate recommended diet without s/s of oral/ pharyngeal dysphagia or aspiration  LTG:  Safe swallow skills for nutrition needs  1 week     Speech Therapy Prognosis   Prognosis Good

## 2017-10-04 NOTE — PHYSICAL THERAPY NOTE
PT EVALUATION     10/04/17 0900   Pain Assessment   Pain Assessment No/denies pain   Home Living   Type of [address] Two level;Stairs to enter with rails  (30 stairs to enter)   Home Equipment (none)   Additional Comments patient independent and working prior to admission   Prior Function   Level of [place] Independent with ADLs and functional mobility   Lives With Daughter   ADL Assistance Independent   IADLs Independent   Vocational Full time employment  (patient works as )   Restrictions/Precautions   Other Precautions Fall Risk;Multiple lines; Bed Alarm; Chair Alarm  (ICU)   General   Additional Pertinent History chart reviewed,patient admitted with stroke, L weakness and facial droop and migraine   Family/Caregiver Present Yes  (daughter present)   Cognition   Overall Cognitive Status WFL   Arousal/Participation Cooperative   Attention Within functional limits   Following Commands Follows all commands and directions without difficulty   RLE Assessment   RLE Assessment (ROM WFL , strength 4+/5)   LLE Assessment   LLE Assessment (ROM WFL, strength hip/knee 3-/2+, ankle 2-/5dorsiflexion)   Coordination   Movements are Fluid and Coordinated 0   Light Touch   LLE Light Touch Impaired   Bed Mobility   Supine to Sit 4  Minimal assistance   Sit to Supine 4  Minimal assistance   Transfers   Sit to Stand 4  Minimal assistance   Additional items Assist x 2   Stand to Sit 4  Minimal assistance   Additional items Assist x 2   Ambulation/Elevation   Gait Assistance 3  Moderate assist   Additional items Assist x 2   Assistive Device (hand hold)   Distance 3 steps with assist to maintain L knee extension to prevent buckling, gait antalgic   Balance   Static Sitting Fair +   Dynamic Sitting Fair   Static Standing Poor   Dynamic Standing Poor   Ambulatory Poor   Activity Tolerance   Activity Tolerance Patient limited by fatigue;Treatment limited secondary to medical complications (Comment)  (limited by ICU constraints)   Nurse Made Aware yes   Assessment   Prognosis Good   Problem List Decreased strength;Decreased endurance; Impaired balance;Decreased mobility; Decreased coordination;Pain; Impaired tone   Assessment Patient seen for Physical Therapy evaluation  Patient admitted with Acute CVA (cerebrovascular accident)  Comorbidities affecting patient's physical performance include: HTN, ICA aneurysm, HTN  Personal factors affecting patient at time of initial evaluation include: lives in two story house, stairs to enter home, inability to ambulate household distances, inability to navigate community distances, inability to navigate level surfaces without external assistance, inability to perform dynamic tasks in community, inability to perform current job functions, inability to perform physical activity, inability to perform ADLS and inability to perform IADLS   Prior to admission, patient was independent with functional mobility without assistive device, independent with ADLS, independent with IADLS, living in a multi-level home, ambulating household distance, ambulating community distances and home with family assist   Please find objective findings from Physical Therapy assessment regarding body systems outlined above with impairments and limitations including weakness, impaired balance, decreased endurance, impaired coordination, gait deviations, pain, decreased activity tolerance, decreased functional mobility tolerance, altered sensation and fall risk  The Barthel Index was used as a functional outcome tool presenting with a score of 35 today indicating marked limitations of functional mobility and ADLS  Patient's clinical presentation is currently unstable/unpredictable as seen in patient's presentation of vital sign response, changing level of pain, increased fall risk, new onset of impairment of functional mobility, decreased endurance and new onset of weakness   Pt would benefit from continued Physical Therapy treatment to address deficits as defined above and maximize level of functional mobility  As demonstrated by objective findings, the assigned level of complexity for this evaluation is high  Goals   Patient Goals get stronger, be normal again   STG Expiration Date (1 to 7 days)   Short Term Goal #1 transfers and gait with hemiwalker/cane with mod assist of 1   Short Term Goal #2 gait endurance to 50 feet, strength LLE 3/3+   LTG Expiration Date (8 to 14 days)   Long Term Goal #1 transfers and gait with cane with min assist    Long Term Goal #2 gait endurance to 100 feet, reeducation of L extremities   Plan   Treatment/Interventions ADL retraining;Functional transfer training;LE strengthening/ROM; Elevations; Therapeutic exercise; Endurance training;Patient/family training;Equipment eval/education; Bed mobility;Gait training   PT Frequency Once a day   Recommendation   Recommendation (acute rehab)   Modified [place] Scale   Modified Molina Scale 4   Barthel Index   Feeding 5   Bathing 0   Grooming Score 0   Dressing Score 0   Bladder Score 10   Bowels Score 10   Toilet Use Score 5   Transfers (Bed/Chair) Score 5   Mobility (Level Surface) Score 0   Stairs Score 0   Barthel Index Score 35

## 2017-10-04 NOTE — PHYSICAL THERAPY NOTE
PT TREATMENT     10/04/17 0910   Pain Assessment   Pain Assessment No/denies pain   Restrictions/Precautions   Other Precautions Fall Risk; Chair Alarm; Bed Alarm   General   Chart Reviewed Yes   Family/Caregiver Present Yes   Cognition   Arousal/Participation Cooperative   Orientation Level Oriented X4   Subjective   Subjective patient tearful and concerned about weakness/medical issues   Transfers   Sit to Stand 4  Minimal assistance   Additional items Assist x 2   Stand to Sit 4  Minimal assistance   Additional items Assist x 2   Ambulation/Elevation   Gait Assistance 3  Moderate assist   Additional items Assist x 2   Assistive Device (hand hold)   Distance 5 feet at bedside due to ICU constraints, weakness  Patient with antalagic  gait and requiring assist to maintain knee extension on LLE to prevent knee buckling  Exercises   Heelslides Supine;5 reps;Bilateral   Hip Flexion Sitting;5 reps;Bilateral   Knee AROM Long Arc Quad Sitting;5 reps;Bilateral   Assessment   Assessment patient motivated and cooperative and will benefit from continued PT   Plan   Treatment/Interventions ADL retraining;Functional transfer training;LE strengthening/ROM; Therapeutic exercise; Endurance training;Patient/family training;Equipment eval/education; Bed mobility;Gait training;Elevations   PT Frequency Once a day   Recommendation   Recommendation (acute rehab)

## 2017-10-04 NOTE — PLAN OF CARE
Problem: SLP ADULT - SWALLOWING, IMPAIRED  Goal: Initial SLP swallow eval performed  Outcome: Completed Date Met: 10/04/17

## 2017-10-04 NOTE — PROGRESS NOTES
Progress Note - ICU Transfer to [place]/[facility]   [facility] 47 [facility]: [id number]  [facility]   Unit[facility]Bed#: [facility] 03 Encounter: [id number]    Code Status: Level 1 - Full Code  POA:    POLST:      Reason for ICU adm: stroke s/p tPa    Active problems:   Principal Problem:    Acute CVA (cerebrovascular accident)  Active Problems:    Hypertensive urgency    Diabetes mellitus ([facility] )    Hypertension    Hyperlipidemia    CHRISTIANA (acute kidney injury) ([facility] [address] )  Resolved Problems:    * No resolved hospital problems  *      Consultants: neurology    History of Present Illness: "62-year-old female who presents to the emergency department with left-sided facial droop leg and arm weakness  It appears that her symptoms began around 0800 this morning  Her ED department course was remarkable for hypertension for which she received labetalol and Cardene infusion was initiated  Non contrasted head scan reveals no hemorrhage  CT angiogram showed no arterial thrombosis or large vessel flow restrictive disease  It should be noted that an incidental finding of 1 8 mm aneurysm from the left supraclinoid ICA was identified  In consultation with telemetry based Neurology, the decision was made to initiate tPA after blood pressure was better controlled  It is tPA was initiated at 11:29 a m "3    Summary of clinical course: Admitted to ICU for observation post tPA  Did not have any complications, symptoms resolved  Seen by neurology who felt this may be a complex migraine       Recent or scheduled procedures: none    Outstanding/pending diagnostics: MRI brain pending       Mobilization Plan: Pt/OT    Nutrition Plan: regular diet       Specific Diagnosis Plan:    Acute left-sided stroke symptoms - MRI negative, likely Complex migraine  -normotension  -evaluate transthoracic echo and EKG  -increase statin from atorvastatin 10 mg to 80 mg   -neurology following - thought to likely be a complex migraine  - MRI report- no evidence of ischemia     Insulin requiring type 2 diabetes mellitus  -check glycated hemoglobin   -sliding scale insulin   -holding oral antidiabetic agents at this time which include Invokana, Actos  Home insulin includes Novolog  Incidentally identified 1 8 mm supraclinoid left ICA aneurysm  -outpatient referral to Neurosurgery    Spoke with Dr Angel Solis  regarding transfer  Portions of the record may have been created with voice recognition software  Occasional wrong word or "sound a like" substitutions may have occurred due to the inherent limitations of voice recognition software  Read the chart carefully and recognize, using context, where substitutions have occurred      Supa Mcleod PA-C

## 2017-10-04 NOTE — CONSULTS
Neurology Consult Follow Up      Flash Patel is a 47 y o  female  [facility]-*    [id number]        Assessment/Recommendations:    1  Left sided weakness, s/p IV tPA at 11:29am on 10/3/17  2  Most likely complicated migraine  3  Hypertensive urgency  4  Uncontrolled DM  5  Hyperlipidemia  6  H/o migraines   7  Incidental left ICA 1 8mm aneurysm     Patient is doing better  She developed no complications of tPA  There was no evidence of acute ischemia on MRI brain  Her sxs are most likely from complex migraine  Recommend BP control  Restart all of her antihypertensives  lipitor 80mg , normal LDL but elevated total and TG  TTE w/ shunt- no clear source of emboli   Recommend strict BG control - most recent HgbA1c 8 9  Speech and swallow evaluation as needed  PT/OT  **Please provide outpatient vascular neurosurgeon referral for left ica aneurysm follow up      Started  her on topamax for migraine prevention  D/c with 25mg nightly for 1 week, 50mg qhs for 2nd week, 75mg qhs for 3rd week and then 100mg nightly  F/u in clinic     Discussed plan with patient         Chief Complaint:  Headache, weakness   Subjective:   Patient feels a lot better today  She is still feeling weakness of left side  Her headache is better  Objective:  BP (!) 175/99   Pulse 80   Temp 98 5 °F (36 9 °C) (Tympanic)   Resp 18   Ht 5' 2" (1 575 m)   Wt 84 6 kg (186 lb 8 2 oz)   SpO2 94%   BMI 34 11 kg/m²     General: alert   Mental status: oriented x3  Attention: normal  Knowledge: fair  Language and Speech: normal  Cranial nerves: II-XII intact except asymmetry on left likely chronic, doesn't appear related to central process   Muscle tone: normal  Motor strength:  5/5 in RUE, RLE, 4+/5 in LUE, 4/5 in LLE  Sensory: normal to light touch, pin prick, vibration   Proprioception intact  Gait: unsteady  Coordination: finger to nose and heel to toe normal  Reflexes: 2+ throughout  except as noted      Labs:      Lab Results   Component Value Date    WBC 5 00 10/04/2017    HGB 12 5 10/04/2017    HCT 37 6 10/04/2017    MCV 88 10/04/2017     10/04/2017     Lab Results   Component Value Date    HGBA1C 8 9 (H) 10/04/2017     Lab Results   Component Value Date    ALT 63 10/03/2017    AST 39 10/03/2017    ALKPHOS 140 (H) 10/03/2017    BILITOT 0 30 10/03/2017     Lab Results   Component Value Date    GLUCOSE 145 (H) 10/04/2017    CALCIUM 8 9 10/04/2017     10/04/2017    K 3 8 10/04/2017    CO2 24 10/04/2017     10/04/2017    BUN 13 10/04/2017    CREATININE 0 89 10/04/2017         Review of reports and notes reveal:       Cta Head And Neck With And Without Contrast    Result Date: 10/3/2017  No large vessel flow restrictive disease within the head or neck  No arterial thrombosis  Possible 1 8 mm aneurysm/infundibulum arising from the supraclinoid left ICA  Nonemergent neurovascular consultation and follow-up CTA of the brain only, suggested in 6 months  Xr Chest 1 View Portable    Result Date: 10/3/2017  Minimal atelectasis at the right lung base  Workstation performed: [id number]     Mri Brain Wo Contrast    Result Date: 10/4/2017  Normal MRI of the brain, no acute disease specifically, no indication of acute ischemia  Workstation performed: [id number]     Ct Stroke Alert Brain    Result Date: 10/3/2017  Normal study  No acute hemorrhage  No acute infarction  Findings were directly discussed with Nikky Harper RN the head nurse in the emergency department will convey the information to Dr Segovia directly  on 10/3/2017 10:27 AM  Workstation performed: [id number]             Thank you for this consult      Total time of encounter:  30 min  More than 50% of the time was used in counseling and/or coordination of care  Extent of couseling and/or coordination of care        MD [facility]  [address] [place] [address],[address]  [place] [address]  [phone]

## 2017-10-05 ENCOUNTER — APPOINTMENT (INPATIENT)
Dept: PHYSICAL THERAPY | Facility: HOSPITAL | Age: 52
DRG: 092 | End: 2017-10-05
Payer: COMMERCIAL

## 2017-10-05 ENCOUNTER — APPOINTMENT (INPATIENT)
Dept: OCCUPATIONAL THERAPY | Facility: HOSPITAL | Age: 52
DRG: 092 | End: 2017-10-05
Payer: COMMERCIAL

## 2017-10-05 LAB
ALBUMIN SERPL BCP-MCNC: 2.7 G/DL (ref 3.5–5)
ALP SERPL-CCNC: 97 U/L (ref 46–116)
ALT SERPL W P-5'-P-CCNC: 46 U/L (ref 12–78)
ANION GAP SERPL CALCULATED.3IONS-SCNC: 9 MMOL/L (ref 4–13)
AST SERPL W P-5'-P-CCNC: 29 U/L (ref 5–45)
ATRIAL RATE: 94 BPM
BASOPHILS # BLD AUTO: 0 THOUSANDS/ΜL (ref 0–0.1)
BASOPHILS NFR BLD AUTO: 1 % (ref 0–1)
BILIRUB SERPL-MCNC: 0.3 MG/DL (ref 0.2–1)
BUN SERPL-MCNC: 14 MG/DL (ref 5–25)
CALCIUM SERPL-MCNC: 9.2 MG/DL (ref 8.3–10.1)
CHLORIDE SERPL-SCNC: 102 MMOL/L (ref 100–108)
CO2 SERPL-SCNC: 24 MMOL/L (ref 21–32)
CREAT SERPL-MCNC: 0.89 MG/DL (ref 0.6–1.3)
EOSINOPHIL # BLD AUTO: 0.1 THOUSAND/ΜL (ref 0–0.61)
EOSINOPHIL NFR BLD AUTO: 2 % (ref 0–6)
ERYTHROCYTE [DISTWIDTH] IN BLOOD BY AUTOMATED COUNT: 13.3 % (ref 11.6–15.1)
GFR SERPL CREATININE-BSD FRML MDRD: 74 ML/MIN/1.73SQ M
GLUCOSE SERPL-MCNC: 142 MG/DL (ref 65–140)
GLUCOSE SERPL-MCNC: 155 MG/DL (ref 65–140)
GLUCOSE SERPL-MCNC: 166 MG/DL (ref 65–140)
GLUCOSE SERPL-MCNC: 233 MG/DL (ref 65–140)
GLUCOSE SERPL-MCNC: 314 MG/DL (ref 65–140)
HCT VFR BLD AUTO: 38.3 % (ref 37–47)
HGB BLD-MCNC: 12.9 G/DL (ref 12–16)
LYMPHOCYTES # BLD AUTO: 2.1 THOUSANDS/ΜL (ref 0.6–4.47)
LYMPHOCYTES NFR BLD AUTO: 41 % (ref 14–44)
MAGNESIUM SERPL-MCNC: 2 MG/DL (ref 1.6–2.6)
MCH RBC QN AUTO: 29.6 PG (ref 27–31)
MCHC RBC AUTO-ENTMCNC: 33.6 G/DL (ref 31.4–37.4)
MCV RBC AUTO: 88 FL (ref 82–98)
MONOCYTES # BLD AUTO: 0.4 THOUSAND/ΜL (ref 0.17–1.22)
MONOCYTES NFR BLD AUTO: 8 % (ref 4–12)
MRSA NOSE QL CULT: NORMAL
NEUTROPHILS # BLD AUTO: 2.5 THOUSANDS/ΜL (ref 1.85–7.62)
NEUTS SEG NFR BLD AUTO: 49 % (ref 43–75)
NRBC BLD AUTO-RTO: 0 /100 WBCS
P AXIS: 41 DEGREES
PLATELET # BLD AUTO: 216 THOUSANDS/UL (ref 130–400)
PMV BLD AUTO: 8.3 FL (ref 8.9–12.7)
POTASSIUM SERPL-SCNC: 3.8 MMOL/L (ref 3.5–5.3)
PR INTERVAL: 172 MS
PROT SERPL-MCNC: 6.4 G/DL (ref 6.4–8.2)
QRS AXIS: -5 DEGREES
QRSD INTERVAL: 74 MS
QT INTERVAL: 386 MS
QTC INTERVAL: 482 MS
RBC # BLD AUTO: 4.35 MILLION/UL (ref 4.2–5.4)
SODIUM SERPL-SCNC: 135 MMOL/L (ref 136–145)
T WAVE AXIS: 4 DEGREES
VENTRICULAR RATE: 94 BPM
WBC # BLD AUTO: 5.1 THOUSAND/UL (ref 4.8–10.8)

## 2017-10-05 PROCEDURE — 97110 THERAPEUTIC EXERCISES: CPT

## 2017-10-05 PROCEDURE — 90686 IIV4 VACC NO PRSV 0.5 ML IM: CPT | Performed by: INTERNAL MEDICINE

## 2017-10-05 PROCEDURE — 83735 ASSAY OF MAGNESIUM: CPT | Performed by: INTERNAL MEDICINE

## 2017-10-05 PROCEDURE — 85025 COMPLETE CBC W/AUTO DIFF WBC: CPT | Performed by: INTERNAL MEDICINE

## 2017-10-05 PROCEDURE — 82948 REAGENT STRIP/BLOOD GLUCOSE: CPT

## 2017-10-05 PROCEDURE — 97535 SELF CARE MNGMENT TRAINING: CPT

## 2017-10-05 PROCEDURE — 80053 COMPREHEN METABOLIC PANEL: CPT | Performed by: INTERNAL MEDICINE

## 2017-10-05 RX ORDER — PIOGLITAZONEHYDROCHLORIDE 30 MG/1
30 TABLET ORAL DAILY
Status: DISCONTINUED | OUTPATIENT
Start: 2017-10-06 | End: 2017-10-09 | Stop reason: HOSPADM

## 2017-10-05 RX ADMIN — CHLORHEXIDINE GLUCONATE 15 ML: 1.2 RINSE ORAL at 09:12

## 2017-10-05 RX ADMIN — TOPIRAMATE 25 MG: 25 TABLET, FILM COATED ORAL at 21:07

## 2017-10-05 RX ADMIN — METOPROLOL TARTRATE 25 MG: 25 TABLET ORAL at 09:12

## 2017-10-05 RX ADMIN — VALSARTAN 320 MG: 160 TABLET ORAL at 09:12

## 2017-10-05 RX ADMIN — INSULIN LISPRO 1 UNITS: 100 INJECTION, SOLUTION INTRAVENOUS; SUBCUTANEOUS at 00:20

## 2017-10-05 RX ADMIN — MELATONIN TAB 3 MG 3 MG: 3 TAB at 21:07

## 2017-10-05 RX ADMIN — INSULIN LISPRO 3 UNITS: 100 INJECTION, SOLUTION INTRAVENOUS; SUBCUTANEOUS at 12:08

## 2017-10-05 RX ADMIN — INSULIN LISPRO 3 UNITS: 100 INJECTION, SOLUTION INTRAVENOUS; SUBCUTANEOUS at 18:14

## 2017-10-05 RX ADMIN — BUTALBITAL, ACETAMINOPHEN, AND CAFFEINE 1 TABLET: 50; 325; 40 TABLET ORAL at 21:12

## 2017-10-05 RX ADMIN — HYDROCHLOROTHIAZIDE 25 MG: 25 TABLET ORAL at 09:12

## 2017-10-05 RX ADMIN — METOPROLOL TARTRATE 25 MG: 25 TABLET ORAL at 21:07

## 2017-10-05 RX ADMIN — INFLUENZA VIRUS VACCINE 0.5 ML: 15; 15; 15; 15 SUSPENSION INTRAMUSCULAR at 13:14

## 2017-10-05 RX ADMIN — CHLORHEXIDINE GLUCONATE 15 ML: 1.2 RINSE ORAL at 21:07

## 2017-10-05 RX ADMIN — ATORVASTATIN CALCIUM 80 MG: 80 TABLET, FILM COATED ORAL at 18:12

## 2017-10-05 NOTE — OCCUPATIONAL THERAPY NOTE
OT TREATMENT       10/05/17 0955   Restrictions/Precautions   Other Precautions Chair Alarm; Fall Risk   Pain Assessment   Pain Assessment Roman-Baker FACES   Roman-Baker FACES Pain Rating 4   Pain Location Shoulder   Pain Orientation Bilateral  (pt had shoulder problems prior to CVA)   ADL   Eating Assistance 5  Supervision/Setup   Grooming Assistance 4  Minimal Assistance   UB Bathing Assistance 4  Minimal Assistance   LB Bathing Assistance 3  Moderate Assistance   UB Dressing Assistance 4  Minimal Assistance   LB Dressing Assistance (mod/min assist )   LB Dressing Comments pt doffed and donned right sock seated on edge of bed with min assist    Toileting Assistance  3  Moderate Assistance   Functional Standing Tolerance   Activity standing at sink to wash hands (mod assist of 1 and stand by assist of another)  pt is unsteady and fatigues easily   Bed Mobility   Supine to Sit 5  Supervision   Transfers   Sit to Stand 3  Moderate assistance   Stand to Sit 3  Moderate assistance   Functional Mobility   Functional Mobility 3  Moderate assistance   Additional Comments assist of 1; 10 feet with cane in right hand   ROM- Right Upper Extremities   R Shoulder AROM; Flexion; Horizontal ABduction  (chest press)   R Elbow AROM;Elbow flexion;Elbow extension   R Hand AROM; Thumb; Index finger; Long finger;Ring finger;Little finger   R Weight/Reps/Sets 2 x 10 reps seated in chair    ROM - Left Upper Extremities    L Shoulder AROM; Flexion; Horizontal ABduction  (chest press)   L Elbow AROM;Elbow flexion;Elbow extension   L Hand AROM; Thumb; Index finger; Long finger;Ring finger;Little finger   L Weight/Reps/Sets 2 x 10 reps seated in chair    Assessment   Assessment Patients function is improving  Patient fatigued with activity requiring several rest breaks  Patient requires mod assist for transfers and functional mobility  Patient requires mod assist for LE ADLS and min assist for UE ADLS    Patient states feeling much better today and is motivated to return home independent  Plan   Treatment Interventions ADL retraining;Functional transfer training;UE strengthening/ROM; Endurance training;Patient/family training;Equipment evaluation/education; Activityengagement   OT Frequency 2-3x/wk   Recommendation   Discharge Recommendation Short Term Rehab  (acute rehab)

## 2017-10-05 NOTE — PLAN OF CARE
Problem: Prexisting or High Potential for Compromised Skin Integrity  Goal: Skin integrity is maintained or improved  INTERVENTIONS:  - Identify patients at risk for skin breakdown  - Assess and monitor skin integrity  - Assess and monitor nutrition and hydration status  - Monitor labs (i e  albumin)  - Assess for incontinence   - Turn and reposition patient  - Assist with mobility/ambulation  - Relieve pressure over bony prominences  - Avoid friction and shearing  - Provide appropriate hygiene as needed including keeping skin clean and dry  - Evaluate need for skin moisturizer/barrier cream  - Collaborate with interdisciplinary team (i e  Nutrition, Rehabilitation, etc )   - Patient/family teaching   Outcome: Progressing      Problem: Potential for Falls  Goal: Patient will remain free of falls  INTERVENTIONS:  - Assess patient frequently for physical needs  -  Identify cognitive and physical deficits and behaviors that affect risk of falls  -  Gem fall precautions as indicated by assessment   - Educate patient/family on patient safety including physical limitations  - Instruct patient to call for assistance with activity based on assessment  - Modify environment to reduce risk of injury  - Consider OT/PT consult to assist with strengthening/mobility   Outcome: Progressing      Problem: CARDIOVASCULAR - ADULT  Goal: Maintains optimal cardiac output and hemodynamic stability  INTERVENTIONS:  - Monitor I/O, vital signs and rhythm  - Monitor for S/S and trends of decreased cardiac output i e  bleeding, hypotension  - Administer and titrate ordered vasoactive medications to optimize hemodynamic stability  - Assess quality of pulses, skin color and temperature  - Assess for signs of decreased coronary artery perfusion - ex   Angina  - Instruct patient to report change in severity of symptoms   Outcome: Progressing    Goal: Absence of cardiac dysrhythmias or at baseline rhythm  INTERVENTIONS:  - Continuous cardiac monitoring, monitor vital signs, obtain 12 lead EKG if indicated  - Administer antiarrhythmic and heart rate control medications as ordered  - Monitor electrolytes and administer replacement therapy as ordered   Outcome: Progressing      Problem: GASTROINTESTINAL - ADULT  Goal: Minimal or absence of nausea and/or vomiting  INTERVENTIONS:  - Administer IV fluids as ordered to ensure adequate hydration  - Maintain NPO status until nausea and vomiting are resolved  - Nasogastric tube as ordered  - Administer ordered antiemetic medications as needed  - Provide nonpharmacologic comfort measures as appropriate  - Advance diet as tolerated, if ordered  - Nutrition services referral to assist patient with adequate nutrition and appropriate food choices   Outcome: Progressing    Goal: Maintains or returns to baseline bowel function  INTERVENTIONS:  - Assess bowel function  - Encourage oral fluids to ensure adequate hydration  - Administer IV fluids as ordered to ensure adequate hydration  - Administer ordered medications as needed  - Encourage mobilization and activity  - Nutrition services referral to assist patient with appropriate food choices   Outcome: Progressing    Goal: Maintains adequate nutritional intake  INTERVENTIONS:  - Monitor percentage of each meal consumed  - Identify factors contributing to decreased intake, treat as appropriate  - Assist with meals as needed  - Monitor I&O, WT and lab values  - Obtain nutrition services referral as needed   Outcome: Progressing    Goal: Establish and maintain optimal ostomy function  INTERVENTIONS:  - Assess bowel function  - Encourage oral fluids to ensure adequate hydration  - Administer IV fluids as ordered to ensure adequate hydration  - Administer ordered medications as needed  - Encourage mobilization and activity  - Nutrition services referral to assist patient with appropriate food choices  - Assess stoma site   Outcome: Progressing      Problem: METABOLIC, FLUID AND ELECTROLYTES - ADULT  Goal: Electrolytes maintained within normal limits  INTERVENTIONS:  - Monitor labs and assess patient for signs and symptoms of electrolyte imbalances  - Administer electrolyte replacement as ordered  - Monitor response to electrolyte replacements, including repeat lab results as appropriate  - Instruct patient on fluid and nutrition as appropriate   Outcome: Progressing    Goal: Fluid balance maintained  INTERVENTIONS:  - Monitor labs and assess for signs and symptoms of volume excess or deficit  - Monitor I/O and WT  - Instruct patient on fluid and nutrition as appropriate   Outcome: Progressing    Goal: Glucose maintained within target range  INTERVENTIONS:  - Monitor Blood Glucose as ordered  - Assess for signs and symptoms of hyperglycemia and hypoglycemia  - Administer ordered medications to maintain glucose within target range  - Assess nutritional intake and initiate nutrition service referral as needed   Outcome: Progressing      Problem: Nutrition/Hydration-ADULT  Goal: Nutrient/Hydration intake appropriate for improving, restoring or maintaining nutritional needs  Monitor and assess patient's nutrition/hydration status for malnutrition (ex- brittle hair, bruises, dry skin, pale skin and conjunctiva, muscle wasting, smooth red tongue, and disorientation)  Collaborate with interdisciplinary team and initiate plan and interventions as ordered  Monitor patient's weight and dietary intake as ordered or per policy  Utilize nutrition screening tool and intervene per policy  Determine patient's food preferences and provide high-protein, high-caloric foods as appropriate       INTERVENTIONS:  - Monitor oral intake, urinary output, labs, and treatment plans  - Assess nutrition and hydration status and recommend course of action  - Evaluate amount of meals eaten  - Assist patient with eating if necessary   - Allow adequate time for meals  - Recommend/ encourage appropriate diets, oral nutritional supplements, and vitamin/mineral supplements  - Assess need for intravenous fluids  - Provide specific nutrition/hydration education as appropriate  - Include patient/family/caregiver in decisions related to nutrition   Outcome: Progressing      Problem: DISCHARGE PLANNING - CARE MANAGEMENT  Goal: Discharge to post-acute care or home with appropriate resources  INTERVENTIONS:  - Conduct assessment to determine patient/family and health care team treatment goals, and need for post-acute services based on payer coverage, community resources, and patient preferences, and barriers to discharge  - Address psychosocial, clinical, and financial barriers to discharge as identified in assessment in conjunction with the patient/family and health care team  - Arrange appropriate level of post-acute services according to patient's   needs and preference and payer coverage in collaboration with the physician and health care team  - Communicate with and update the patient/family, physician, and health care team regarding progress on the discharge plan  - Arrange short term rehab placement  - Arrange appropriate transportation to post-acute venues   Outcome: Progressing

## 2017-10-05 NOTE — PLAN OF CARE
Problem: OCCUPATIONAL THERAPY ADULT  Goal: Performs self-care activities at highest level of function for planned discharge setting  See evaluation for individualized goals  Outcome: Progressing  Limitation: Decreased ADL status, Decreased UE strength, Decreased UE ROM, Decreased Safe judgement during ADL, Decreased endurance, Decreased self-care trans, Decreased high-level ADLs, Decreased fine motor control (decreased balance and mobility )  Prognosis: Good  Assessment: Patients function is improving  Patient fatigued with activity requiring several rest breaks  Patient requires mod assist for transfers and functional mobility  Patient requires mod assist for LE ADLS and min assist for UE ADLS  Patient states feeling much better today and is motivated to return home independent         Discharge Recommendation: Short Term Rehab

## 2017-10-05 NOTE — SOCIAL WORK
SW following to assist with DCP  Acute rehab is being recommended  Referral was made to [facility]  Pt has been accepted by Graciela Ordaz  However insurance authorization is needed prior to transfer  Lydia Brandt has started the the insurance authorization request process  Lydia Brandt will call SW when authorization is obtained and transfer can occur  SW met with pt and daughter, Macho Rivera, to review plan  Also reviewed transportation options with daughter  Daughter planning to transport pt by car to rehab when discharged  Dr Neha Hoffmann also aware of plan  SW will call daughter when discharge and transfer is planned  SW will continue to follow to coordinate transfer when ready

## 2017-10-05 NOTE — PROGRESS NOTES
[facility] Progress Note  Patient: Robbie Ford 47 y o  female   MRN: [id number]  PCP: Robbie Ford MD  Unit/Bed#: [address] [place] [address] Encounter: [id number]  Date Of Visit: 10/05/17    Problem List:    Principal Problem:    Acute CVA (cerebrovascular accident) [facility])  Active Problems:    Hypertensive urgency    Diabetes mellitus ([facility] [address] )    Hypertension    Hyperlipidemia    CHRISTIANA (acute kidney injury) ([facility] )    Migraine      Assessment & Plan:  58-year-old female present to the emergency room with left-sided facial droop and left leg are numb weakness  Patient noted to have elevated blood pressure and received IV labetalol and Cardene infusion was started  Patient was also given IV tPA for possible CVA  Patient had MRI of the brain and CTA of the head and neck which were negative  Patient is being treated for complicated migraine  1  Complicated migraine-patient started on Topamax 25 milligram weekly which can be increased by 25 milligram every week to a total dose of 100 milligram nightly  2  Left-sided weakness status post IV tPA-patient has significant improvement of symptoms  Continue PT/OT  Await placement in rehabilitation  Continue Lipitor 80 milligram p o  daily  MRI of the brain was normal   CT of the head and neck showed no arterial thrombus or restrictive disease  3  Left ICA of 1 8 mm [id number] with Neurosurgery as outpatient  4  Dyslipidemia-continue Lipitor  5  Hypertension-blood pressure better controlled with restarting hydrochlorothiazide 25 milligram p  o  daily and metoprolol 5 milligram p o  b i d  and Diovan 320 milligram p o  Daily  6  Diabetes mellitus type 2-patient's hemoglobin A1c is 8 9  Continue Humalog sliding scale with Accu-Cheks q a c  and HS  Patient was restarted back on actos  Patient needs better diabetes control        VTE Pharmacologic Prophylaxis:   Pharmacologic: Pharmacologic VTE Prophylaxis contraindicated due to recent tPA  Mechanical VTE Prophylaxis in Place: Yes    Patient Centered Rounds: I have performed bedside rounds with nursing staff today  Discussions with Specialists or Other Care Team Provider: Yes    Education and Discussions with Family / Patient:Yes    Time Spent for Care: 30 minutes  More than 50% of total time spent on counseling and coordination of care as described above  Current Length of Stay: 2 day(s)    Current Patient Status: Inpatient     Discharge Plan: Acute rehab    Code Status: Level 1 - Full Code      Subjective:   Patient is feeling much better  Improving weakness on the left side  Patient also reports pleuritic pain in the upper retrosternal region especially with deep breathing  Objective:         Negative for Chest Pain, Palpitations, Shortness of Breath, Abdominal Pain, Nausea, Vomiting, Constipation, Diarrhea, Dizziness  All other 10 review of systems negative and without drastic interval changes from yesterday  Vitals:   Temp (24hrs), Av 4 °F (36 9 °C), Min:97 6 °F (36 4 °C), Max:99 °F (37 2 °C)    HR:  [71-80] 74  Resp:  [18] 18  BP: (139-177)/() 143/89  SpO2:  [93 %-96 %] 93 %  Body mass index is 34 11 kg/m²  Input and Output Summary (last 24 hours): Intake/Output Summary (Last 24 hours) at 10/05/17 1740  Last data filed at 10/05/17 1401   Gross per 24 hour   Intake                0 ml   Output              750 ml   Net             -750 ml       Physical Exam:     Physical Exam   Constitutional: No distress  HENT:   Head: Normocephalic and atraumatic  Nose: Nose normal    Eyes: Conjunctivae and EOM are normal  Pupils are equal, round, and reactive to light  Neck: Normal range of motion  Neck supple  No JVD present  Cardiovascular: Normal rate, regular rhythm and normal heart sounds  Exam reveals no gallop and no friction rub  No murmur heard  Pulmonary/Chest: Effort normal and breath sounds normal  No respiratory distress  She has no wheezes  She has no rales  She exhibits no tenderness  Abdominal: Soft  Bowel sounds are normal  She exhibits no distension  There is no tenderness  There is no rebound and no guarding  Musculoskeletal: She exhibits no edema  Neurological: She is alert  No cranial nerve deficit  Left facial droop  Left upper extremity strength is 5/5  Left lower extremity strength is 4/5   Skin: Skin is warm and dry  No rash noted  Psychiatric: She has a normal mood and affect  Additional Data:     Labs:      Results from last 7 days  Lab Units 10/05/17  0457   WBC Thousand/uL 5 10   HEMOGLOBIN g/dL 12 9   HEMATOCRIT % 38 3   PLATELETS Thousands/uL 216   NEUTROS PCT % 49   LYMPHS PCT % 41   MONOS PCT % 8   EOS PCT % 2       Results from last 7 days  Lab Units 10/05/17  0457   SODIUM mmol/L 135*   POTASSIUM mmol/L 3 8   CHLORIDE mmol/L 102   CO2 mmol/L 24   BUN mg/dL 14   CREATININE mg/dL 0 89   CALCIUM mg/dL 9 2   TOTAL PROTEIN g/dL 6 4   BILIRUBIN TOTAL mg/dL 0 30   ALK PHOS U/L 97   ALT U/L 46   AST U/L 29   GLUCOSE RANDOM mg/dL 155*       Results from last 7 days  Lab Units 10/03/17  1105   INR  0 90       * I Have Reviewed All Lab Data Listed Above  * Additional Pertinent Lab Tests Reviewed: [facility]    Imaging:  Cta Head And Neck With And Without Contrast    Result Date: 10/3/2017  Narrative: CTA NECK AND BRAIN WITH AND WITHOUT CONTRAST INDICATION:      History taken directly from the electronic ordering system  COMPARISON:   None  TECHNIQUE:  Routine CT imaging of the Brain without contrast   Post contrast imaging was performed after administration of iodinated contrast through the neck and brain  Post contrast axial 0 625 mm images timed to opacify the arterial system  3D rendering was performed on an independent workstation  MIP reconstructions performed  Coronal reconstructions were performed of the noncontrast portion of the brain    Radiation dose length product (DLP) for this visit:  370 88 mGy-cm   This examination, like all CT scans performed in the [facility], was performed utilizing techniques to minimize radiation dose exposure, including the use of iterative  reconstruction and automated exposure control  IV Contrast:  85 mL of iohexol (OMNIPAQUE)     IMAGE QUALITY:   Diagnostic FINDINGS: CERVICAL VASCULATURE AORTIC ARCH AND GREAT VESSELS:  Normal aortic arch and great vessel origins  Normal visualized subclavian vessels  RIGHT VERTEBRAL ARTERY CERVICAL SEGMENT:  Normal origin  The vessel is normal in caliber throughout the neck  LEFT VERTEBRAL ARTERY CERVICAL SEGMENT:  Normal origin  The vessel is normal in caliber throughout the neck  RIGHT EXTRACRANIAL CAROTID SEGMENT:  Normal caliber common carotid artery  Normal bifurcation and cervical internal carotid artery  No stenosis or dissection  LEFT EXTRACRANIAL CAROTID SEGMENT:  Normal caliber common carotid artery  Normal bifurcation and cervical internal carotid artery  No stenosis or dissection  NASCET criteria was used to determine the degree of internal carotid artery diameter stenosis  INTRACRANIAL VASCULATURE INTERNAL CAROTID ARTERIES:  Normal enhancement of the intracranial portions of the internal carotid arteries  Normal ophthalmic artery origins  Normal ICA terminus  1 8 mm windsock extension of contrast extending posterolaterally from the posterior wall of the supraclinoid ICA, near origin of posterior communicating and anterior coronal arteries  This could represent a tiny infundibulum or aneurysm  Repeat CTA of the brain only is suggested in 6 months to confirm this  Nonurgent Neurovascular consultation also suggested  ANTERIOR CIRCULATION:  Symmetric A1 segments and anterior cerebral arteries with normal enhancement  Normal anterior communicating artery   MIDDLE CEREBRAL ARTERY CIRCULATION:  M1 segment and middle cerebral artery branches demonstrate normal enhancement bilaterally  DISTAL VERTEBRAL ARTERIES:  Normal distal vertebral arteries  Posterior inferior cerebellar artery origins are normal  Normal vertebral basilar junction  BASILAR ARTERY:  Basilar artery is normal in caliber  Normal superior cerebellar arteries  POSTERIOR CEREBRAL ARTERIES: Both posterior cerebral arteries arises from the basilar tip  Both arteries demonstrate normal flow-related enhancement  Right P-comm is patent  DURAL VENOUS SINUSES:  Normal  NON VASCULAR ANATOMY BONY STRUCTURES:  No acute osseous abnormality  SOFT TISSUES OF THE NECK:  Normal         THORACIC INLET:  Unremarkable  Impression: No large vessel flow restrictive disease within the head or neck  No arterial thrombosis  Possible 1 8 mm aneurysm/infundibulum arising from the supraclinoid left ICA  Nonemergent neurovascular consultation and follow-up CTA of the brain only, suggested in 6 months  ##neurovascularslh##neurovascularslh ##sigslh##sigslh Workstation performed: [id number]     Xr Chest 1 View Portable    Result Date: 10/3/2017  Narrative: CHEST INDICATION:  Stroke alert COMPARISON:  11/22/2016 VIEWS:   AP frontal IMAGES:  1 FINDINGS:     Cardiomediastinal silhouette appears unremarkable  Suboptimal inspiration  Intimal atelectasis at the right lung base  No pneumothorax or pleural effusion  No evidence of heart failure  Visualized osseous structures appear within normal limits for the patient's age  Impression: Minimal atelectasis at the right lung base  Workstation performed: [id number]     Mri Brain Wo Contrast    Result Date: 10/4/2017  Narrative: MRI BRAIN WITHOUT CONTRAST INDICATION:  CVA COMPARISON:   CTA performed one day earlier TECHNIQUE:  Sagittal T1, axial T2, axial FLAIR, axial T1, axial [place] and axial diffusion imaging  IMAGE QUALITY:  Diagnostic  FINDINGS: BRAIN PARENCHYMA:  There is no discrete mass, mass effect or midline shift  No abnormal white matter signal identified   Brainstem and cerebellum demonstrate normal signal  There is no intracranial hemorrhage  There is no evidence of acute infarction and  diffusion imaging is unremarkable  VENTRICLES:  The ventricles are normal in size and contour  SELLA AND PITUITARY GLAND:  Partially empty sella ORBITS:  Normal  PARANASAL SINUSES:  Minor right mastoid fluid VASCULATURE:  Evaluation of the major intracranial vasculature demonstrates appropriate flow voids  CALVARIUM AND SKULL BASE:  Normal  EXTRACRANIAL SOFT TISSUES:  Normal      Impression: Normal MRI of the brain, no acute disease specifically, no indication of acute ischemia  Workstation performed: [id number]     Ct Stroke Alert Brain    Result Date: 10/3/2017  Narrative: CT BRAIN - STROKE ALERT PROTOCOL INDICATION: Stroke alert COMPARISON:  None  TECHNIQUE:  CT examination of the brain was performed  In addition to axial images, coronal reformatted images were created and submitted for interpretation  Radiation dose length product (DLP) for this visit:  1087 3 mGy-cm   This examination, like all CT scans performed in the [facility], was performed utilizing techniques to minimize radiation dose exposure, including the use of iterative  reconstruction and automated exposure control  IMAGE QUALITY:  Diagnostic  FINDINGS:  PARENCHYMA:  No intracranial mass, mass effect or midline shift  No acute intracranial hemorrhage  No CT signs of acute infarction  VENTRICLES AND EXTRA-AXIAL SPACES:  Normal for patient's age  VISUALIZED ORBITS AND PARANASAL SINUSES:  Unremarkable  CALVARIUM AND EXTRACRANIAL SOFT TISSUES:   Normal      Impression: Normal study  No acute hemorrhage  No acute infarction  Findings were directly discussed with Marguerite Martinez RN the head nurse in the emergency department will convey the information to Dr Segovia directly   on 10/3/2017 10:27 AM  Workstation performed: [id number]     Imaging Reports Reviewed by myself    Cultures:   Blood Culture: No results found for: BLOODCX  Urine Culture:   Lab Results   Component Value Date    URINECX <10,000 cfu/ml Mixed Contaminants X2 11/22/2016    URINECX 10,000-19,000 cfu/ml Mixed Contaminants X4 10/24/2016    URINECX No Growth <1000 cfu/mL 04/09/2016     Sputum Culture: No components found for: SPUTUMCX  Wound Culture: No results found for: WOUNDCULT    Last 24 Hours Medication List:     atorvastatin 80 mg Oral QPM   chlorhexidine 15 mL Swish [employer] Spit Q12H [address]   hydrochlorothiazide 25 mg Oral Daily   insulin lispro 1-6 Units Subcutaneous Q6H [address]   melatonin 3 mg Oral HS   metoprolol tartrate 25 mg Oral Q12H [address]   sodium chloride 100 mL/hr Intravenous Once   topiramate 25 mg Oral HS   valsartan 320 mg Oral Daily        Today, Patient Was Seen By: Félix Allen MD    ** Please Note: Dragon 360 Dictation voice to text software may have been used in the creation of this document   **

## 2017-10-05 NOTE — PHYSICAL THERAPY NOTE
PT TREATMENT     10/05/17 9289   Pain Assessment   Pain Assessment Roman-Baker FACES   Roman-Baker FACES Pain Rating 6  (LS area at times and chronic as per patient)   Restrictions/Precautions   Other Precautions Chair Alarm; Bed Alarm; Fall Risk   General   Chart Reviewed Yes   Family/Caregiver Present Yes  (daughter present)   Cognition   Arousal/Participation Cooperative   Subjective   Subjective patient reports feeling stronger today but with continued numbness   Transfers   Sit to Stand 4  Minimal assistance   Additional items Assist x 1   Stand to Sit 4  Minimal assistance   Additional items Assist x 1   Ambulation/Elevation   Gait Assistance 3  Moderate assist   Additional items Assist x 1   Assistive Device Straight cane   Distance 25 feet with change in direction, verbal and tactile cuing for weight shifting and proper cane use and gait patterning  Exercises   Hip Flexion Sitting;20 reps;Bilateral   Knee AROM Long Arc Quad Sitting;20 reps;Bilateral   Ankle Pumps Sitting;20 reps;Bilateral   Balance training  standing and sitting on bed edge with manual resistance for strengthening/reeducation of trunk musculature as well as standing balance activity with mod assist of one and standby of another for marching heel/toe raises  All exercise completed with alternating activity for coordination   Assessment   Assessment patient cooperative and motivated and demonstrating improving functional mobility  Patient continues to require mod assist for safe gait and will benefit from acute rehab setting  Plan   Treatment/Interventions ADL retraining;Functional transfer training;LE strengthening/ROM; Elevations; Therapeutic exercise; Endurance training;Patient/family training;Equipment eval/education; Bed mobility;Gait training   PT Frequency (3-5x/week)   Recommendation   Recommendation (acute rehab)

## 2017-10-06 ENCOUNTER — APPOINTMENT (INPATIENT)
Dept: OCCUPATIONAL THERAPY | Facility: HOSPITAL | Age: 52
DRG: 092 | End: 2017-10-06
Payer: COMMERCIAL

## 2017-10-06 ENCOUNTER — APPOINTMENT (INPATIENT)
Dept: PHYSICAL THERAPY | Facility: HOSPITAL | Age: 52
DRG: 092 | End: 2017-10-06
Payer: COMMERCIAL

## 2017-10-06 ENCOUNTER — APPOINTMENT (INPATIENT)
Dept: RADIOLOGY | Facility: HOSPITAL | Age: 52
DRG: 092 | End: 2017-10-06
Payer: COMMERCIAL

## 2017-10-06 LAB
ANION GAP SERPL CALCULATED.3IONS-SCNC: 8 MMOL/L (ref 4–13)
BUN SERPL-MCNC: 24 MG/DL (ref 5–25)
CALCIUM SERPL-MCNC: 9.1 MG/DL (ref 8.3–10.1)
CHLORIDE SERPL-SCNC: 99 MMOL/L (ref 100–108)
CO2 SERPL-SCNC: 25 MMOL/L (ref 21–32)
CREAT SERPL-MCNC: 1.1 MG/DL (ref 0.6–1.3)
GFR SERPL CREATININE-BSD FRML MDRD: 57 ML/MIN/1.73SQ M
GLUCOSE SERPL-MCNC: 168 MG/DL (ref 65–140)
GLUCOSE SERPL-MCNC: 213 MG/DL (ref 65–140)
GLUCOSE SERPL-MCNC: 243 MG/DL (ref 65–140)
GLUCOSE SERPL-MCNC: 284 MG/DL (ref 65–140)
GLUCOSE SERPL-MCNC: 328 MG/DL (ref 65–140)
POTASSIUM SERPL-SCNC: 3.8 MMOL/L (ref 3.5–5.3)
SODIUM SERPL-SCNC: 132 MMOL/L (ref 136–145)

## 2017-10-06 PROCEDURE — 80048 BASIC METABOLIC PNL TOTAL CA: CPT | Performed by: INTERNAL MEDICINE

## 2017-10-06 PROCEDURE — 97110 THERAPEUTIC EXERCISES: CPT

## 2017-10-06 PROCEDURE — 70450 CT HEAD/BRAIN W/O DYE: CPT

## 2017-10-06 PROCEDURE — 82948 REAGENT STRIP/BLOOD GLUCOSE: CPT

## 2017-10-06 PROCEDURE — 97535 SELF CARE MNGMENT TRAINING: CPT

## 2017-10-06 RX ORDER — KETOROLAC TROMETHAMINE 30 MG/ML
15 INJECTION, SOLUTION INTRAMUSCULAR; INTRAVENOUS EVERY 6 HOURS PRN
Status: DISPENSED | OUTPATIENT
Start: 2017-10-06 | End: 2017-10-08

## 2017-10-06 RX ORDER — CYCLOBENZAPRINE HCL 10 MG
10 TABLET ORAL 3 TIMES DAILY PRN
Status: DISCONTINUED | OUTPATIENT
Start: 2017-10-06 | End: 2017-10-09 | Stop reason: HOSPADM

## 2017-10-06 RX ADMIN — METOPROLOL TARTRATE 25 MG: 25 TABLET ORAL at 09:00

## 2017-10-06 RX ADMIN — VALSARTAN 320 MG: 160 TABLET ORAL at 09:00

## 2017-10-06 RX ADMIN — INSULIN LISPRO 1 UNITS: 100 INJECTION, SOLUTION INTRAVENOUS; SUBCUTANEOUS at 17:41

## 2017-10-06 RX ADMIN — INSULIN LISPRO 10 UNITS: 100 INJECTION, SOLUTION INTRAVENOUS; SUBCUTANEOUS at 17:43

## 2017-10-06 RX ADMIN — KETOROLAC TROMETHAMINE 15 MG: 30 INJECTION, SOLUTION INTRAMUSCULAR at 20:53

## 2017-10-06 RX ADMIN — INSULIN LISPRO 2 UNITS: 100 INJECTION, SOLUTION INTRAVENOUS; SUBCUTANEOUS at 06:29

## 2017-10-06 RX ADMIN — MELATONIN TAB 3 MG 3 MG: 3 TAB at 21:35

## 2017-10-06 RX ADMIN — METOPROLOL TARTRATE 25 MG: 25 TABLET ORAL at 21:35

## 2017-10-06 RX ADMIN — HYDROCHLOROTHIAZIDE 25 MG: 25 TABLET ORAL at 09:00

## 2017-10-06 RX ADMIN — KETOROLAC TROMETHAMINE 15 MG: 30 INJECTION, SOLUTION INTRAMUSCULAR at 12:21

## 2017-10-06 RX ADMIN — CYCLOBENZAPRINE HYDROCHLORIDE 10 MG: 10 TABLET, FILM COATED ORAL at 12:20

## 2017-10-06 RX ADMIN — CHLORHEXIDINE GLUCONATE 15 ML: 1.2 RINSE ORAL at 21:35

## 2017-10-06 RX ADMIN — INSULIN LISPRO 10 UNITS: 100 INJECTION, SOLUTION INTRAVENOUS; SUBCUTANEOUS at 14:32

## 2017-10-06 RX ADMIN — CHLORHEXIDINE GLUCONATE 15 ML: 1.2 RINSE ORAL at 09:00

## 2017-10-06 RX ADMIN — INSULIN LISPRO 4 UNITS: 100 INJECTION, SOLUTION INTRAVENOUS; SUBCUTANEOUS at 12:23

## 2017-10-06 RX ADMIN — TOPIRAMATE 25 MG: 25 TABLET, FILM COATED ORAL at 21:35

## 2017-10-06 RX ADMIN — PIOGLITAZONE 30 MG: 30 TABLET ORAL at 08:58

## 2017-10-06 RX ADMIN — INSULIN LISPRO 3 UNITS: 100 INJECTION, SOLUTION INTRAVENOUS; SUBCUTANEOUS at 00:18

## 2017-10-06 RX ADMIN — ATORVASTATIN CALCIUM 80 MG: 80 TABLET, FILM COATED ORAL at 17:40

## 2017-10-06 NOTE — PLAN OF CARE
Problem: PHYSICAL THERAPY ADULT  Goal: Performs mobility at highest level of function for planned discharge setting  See evaluation for individualized goals  Outcome: Progressing  Prognosis: Good  Problem List: Decreased strength, Decreased endurance, Impaired balance, Decreased mobility, Decreased coordination, Pain, Impaired tone  Assessment: PT spoke with Dr Sanchez Perez prior to PT treatment and ok for PT to see pt  When amb with SPC, pt requiring mod A, holding cane in right hand, and pt gripping PT's arm with left UE - pt very anxious/fearful with amb, hence holding PT's arm  Pt then amb a 2nd time with RW with min A  Pt felt more comfortable with RW vs SPC for amb  Pt will cont to benefit from skilled PT services  Cont gait training with both RW and SPC  Recommendation:  (Acute rehab)          See flowsheet documentation for full assessment

## 2017-10-06 NOTE — SOCIAL WORK
SW following to assist with DCP  Spoke with [facility] about potential bed availability tomorrow when Peer to Peer Review is completed  Per [facility] admissions there is no guarantee that there will be a bed available over the weekend  Acute rehab referral was then placed to [facility]  However pt was not accepted  SW spoke with pt's daughter about situation  Pt has been accepted by two subacute rehab facilities, [facility] and [address] [place] [address]  Daughter agreeable with having pt transferred to subacute rehab once authorized  At this time time daughter is leaning towards [facility]  Contacted [facility] number ([phone]) to begin process for subacute authorization request   Once [facility]  calls SW clinical will need to be faxed  SW will continue to follow to assist with planning as needed

## 2017-10-06 NOTE — PLAN OF CARE
Problem: Nutrition/Hydration-ADULT  Goal: Nutrient/Hydration intake appropriate for improving, restoring or maintaining nutritional needs  Monitor and assess patient's nutrition/hydration status for malnutrition (ex- brittle hair, bruises, dry skin, pale skin and conjunctiva, muscle wasting, smooth red tongue, and disorientation)  Collaborate with interdisciplinary team and initiate plan and interventions as ordered  Monitor patient's weight and dietary intake as ordered or per policy  Utilize nutrition screening tool and intervene per policy  Determine patient's food preferences and provide high-protein, high-caloric foods as appropriate       INTERVENTIONS:  - Monitor oral intake, urinary output, labs, and treatment plans  - Assess nutrition and hydration status and recommend course of action  - Evaluate amount of meals eaten  - Assist patient with eating if necessary   - Allow adequate time for meals  - Recommend/ encourage appropriate diets, oral nutritional supplements, and vitamin/mineral supplements  - Assess need for intravenous fluids  - Provide specific nutrition/hydration education as appropriate  - Include patient/family/caregiver in decisions related to nutrition   Outcome: Progressing

## 2017-10-06 NOTE — SOCIAL WORK
SW following to assist with DCP  Acute rehab is being recommended  [facility]  Early today [employer] denied acute rehab but offered Peer to Peer Review  Dr Neha Hoffmann has called number given for Peer to Peer Review and left message  SW updated pt's daughter on situation  Assured her staff was still advocating for acute rehab with [employer]  However if denial upheld subacute rehab could be arranged  Discussed area subacute facilities  Family lives in [place] area  Daughter requested referrals be made to facilities in [place]  Referrals have been made to [address], [address] at [place]  SW will continue to follow and assist with planning as needed

## 2017-10-06 NOTE — PROGRESS NOTES
[facility] Progress Note  Patient: Michelle Morris 47 y o  female   MRN: [id number]  PCP: Michelle Morris MD  Unit/Bed#: [address] [place] [address] Encounter: [id number]  Date Of Visit: 10/06/17    Problem List:    Principal Problem:    Acute CVA (cerebrovascular accident) [facility])  Active Problems:    Hypertensive urgency    Diabetes mellitus ([facility] [address] )    Hypertension    Hyperlipidemia    [facility]ST[facility]NA (acute kidney [facility]) ([facility] )    Migraine      Assessment & Plan:  50-year-old female present to the emergency room with left-sided facial droop and left leg are numb weakness  Patient noted to have elevated blood pressure and received IV labetalol and Cardene infusion was started  Patient was also given IV tPA for possible CVA  Patient had MRI of the brain and CTA of the head and neck which were negative  Patient is being treated for complicated migraine  1  Complicated migraine-patient started on Topamax 25 milligram weekly, which will be increased to 50 milligrams in week 2, start 5 milligrams in week 3, 100 milligrams in week for and continue 100 milligram every night  2  Left-sided weakness status post IV tPA-patient has significant improvement of symptoms  Continue PT/OT  Await placement in rehabilitation  Continue Lipitor 80 milligram p o  daily  MRI of the brain was normal   CT of the head and neck showed no arterial thrombus or restrictive disease  Echo showed EF of 20 60%, no regional wall motion abnormalities  3  Left ICA of 1 8 mm [id number] with Neurosurgery as outpatient  4  Dyslipidemia-continue Lipitor  5  Hypertension-blood pressure better controlled with restarting hydrochlorothiazide 25 milligram p  o  daily and metoprolol 5 milligram p o  b i d  and Diovan 320 milligram p o  Daily  6  Diabetes mellitus type 2-patient's hemoglobin A1c is 8 9  Continue Humalog sliding scale with Accu-Cheks q a c  and HS  Patient's blood sugars still running high    Will start NovoLog 10 units subcutaneously with each meal   7  Left-sided body pain and stiffness-neurological exam is not focal   Will get CT scan of the brain  8  Near syncope-likely vasovagal due to pain  VTE Pharmacologic Prophylaxis:   Pharmacologic: Pharmacologic VTE Prophylaxis contraindicated due to recent tPA  Mechanical VTE Prophylaxis in Place: Yes    Patient Centered Rounds: I have performed bedside rounds with nursing staff today  Discussions with Specialists or Other Care Team Provider: Yes    Education and Discussions with Family / Patient:Yes    Time Spent for Care: 30 minutes  More than 50% of total time spent on counseling and coordination of care as described above  Current Length of Stay: 3 day(s)    Current Patient Status: Inpatient     Discharge Plan: Acute rehab    Code Status: Level 1 - Full Code      Subjective:   Patient had an episode of dizziness while she was sitting in the bathroom on the toilet seat  Patient also complains of pain in her left upper extremity and left leg since last night which is constant in nature and throbbing  Patient reported her weakness is slightly better and patient tolerated physical therapy well  Objective:         Negative for Chest Pain, Palpitations, Shortness of Breath, Abdominal Pain, Nausea, Vomiting, Constipation, Diarrhea, Dizziness  All other 10 review of systems negative and without drastic interval changes from yesterday  Vitals:   Temp (24hrs), Av 4 °F (36 9 °C), Min:98 3 °F (36 8 °C), Max:98 9 °F (37 2 °C)    HR:  [67-80] 69  Resp:  [18] 18  BP: (132-156)/(77-89) 147/77  SpO2:  [93 %-97 %] 95 %  Body mass index is 32 78 kg/m²  Input and Output Summary (last 24 hours): Intake/Output Summary (Last 24 hours) at 10/06/17 1320  Last data filed at 10/06/17 1000   Gross per 24 hour   Intake                0 ml   Output              600 ml   Net             -600 ml       Physical Exam:     Physical Exam   Constitutional: No distress     HENT: Head: Normocephalic and atraumatic  Nose: Nose normal    Eyes: Conjunctivae and EOM are normal  Pupils are equal, round, and reactive to light  Neck: Normal range of motion  Neck supple  No JVD present  Cardiovascular: Normal rate, regular rhythm and normal heart sounds  Exam reveals no gallop and no friction rub  No murmur heard  Pulmonary/Chest: Effort normal and breath sounds normal  No respiratory distress  She has no wheezes  She has no rales  She exhibits no tenderness  Abdominal: Soft  Bowel sounds are normal  She exhibits no distension  There is no tenderness  There is no rebound and no guarding  Musculoskeletal: She exhibits no edema  Neurological: She is alert  No cranial nerve deficit  Left facial droop  Left upper extremity strength is 5/5  Left lower extremity strength is 4/5  Patient also noted to have stiffness with flexion extension of the left lower extremity and left upper extremity    Skin: Skin is warm and dry  No rash noted  Psychiatric: She has a normal mood and affect  Additional Data:     Labs:      Results from last 7 days  Lab Units 10/05/17  0457   WBC Thousand/uL 5 10   HEMOGLOBIN g/dL 12 9   HEMATOCRIT % 38 3   PLATELETS Thousands/uL 216   NEUTROS PCT % 49   LYMPHS PCT % 41   MONOS PCT % 8   EOS PCT % 2       Results from last 7 days  Lab Units 10/06/17  1050 10/05/17  0457   SODIUM mmol/L 132* 135*   POTASSIUM mmol/L 3 8 3 8   CHLORIDE mmol/L 99* 102   CO2 mmol/L 25 24   BUN mg/dL 24 14   CREATININE mg/dL 1 10 0 89   CALCIUM mg/dL 9 1 9 2   TOTAL PROTEIN g/dL  --  6 4   BILIRUBIN TOTAL mg/dL  --  0 30   ALK PHOS U/L  --  97   ALT U/L  --  46   AST U/L  --  29   GLUCOSE RANDOM mg/dL 328* 155*       Results from last 7 days  Lab Units 10/03/17  1105   INR  0 90       * I Have Reviewed All Lab Data Listed Above  * Additional Pertinent Lab Tests Reviewed:  [facility] Admission Reviewed    Imaging:  Cta Head And Neck With And Without Contrast    Result Date: 10/3/2017  Narrative: CTA NECK AND BRAIN WITH AND WITHOUT CONTRAST INDICATION:      History taken directly from the electronic ordering system  COMPARISON:   None  TECHNIQUE:  Routine CT imaging of the Brain without contrast   Post contrast imaging was performed after administration of iodinated contrast through the neck and brain  Post contrast axial 0 625 mm images timed to opacify the arterial system  3D rendering was performed on an independent workstation  MIP reconstructions performed  Coronal reconstructions were performed of the noncontrast portion of the brain  Radiation dose length product (DLP) for this visit:  370 88 mGy-cm   This examination, like all CT scans performed in the [facility], was performed utilizing techniques to minimize radiation dose exposure, including the use of iterative  reconstruction and automated exposure control  IV Contrast:  85 mL of iohexol (OMNIPAQUE)     IMAGE QUALITY:   Diagnostic FINDINGS: CERVICAL VASCULATURE AORTIC ARCH AND GREAT VESSELS:  Normal aortic arch and great vessel origins  Normal visualized subclavian vessels  RIGHT VERTEBRAL ARTERY CERVICAL SEGMENT:  Normal origin  The vessel is normal in caliber throughout the neck  LEFT VERTEBRAL ARTERY CERVICAL SEGMENT:  Normal origin  The vessel is normal in caliber throughout the neck  RIGHT EXTRACRANIAL CAROTID SEGMENT:  Normal caliber common carotid artery  Normal bifurcation and cervical internal carotid artery  No stenosis or dissection  LEFT EXTRACRANIAL CAROTID SEGMENT:  Normal caliber common carotid artery  Normal bifurcation and cervical internal carotid artery  No stenosis or dissection  NASCET criteria was used to determine the degree of internal carotid artery diameter stenosis  INTRACRANIAL VASCULATURE INTERNAL CAROTID ARTERIES:  Normal enhancement of the intracranial portions of the internal carotid arteries    Normal ophthalmic artery origins  Normal ICA terminus  1 8 mm windsock extension of contrast extending posterolaterally from the posterior wall of the supraclinoid ICA, near origin of posterior communicating and anterior coronal arteries  This could represent a tiny infundibulum or aneurysm  Repeat CTA of the brain only is suggested in 6 months to confirm this  Nonurgent Neurovascular consultation also suggested  ANTERIOR CIRCULATION:  Symmetric A1 segments and anterior cerebral arteries with normal enhancement  Normal anterior communicating artery  MIDDLE CEREBRAL ARTERY CIRCULATION:  M1 segment and middle cerebral artery branches demonstrate normal enhancement bilaterally  DISTAL VERTEBRAL ARTERIES:  Normal distal vertebral arteries  Posterior inferior cerebellar artery origins are normal  Normal vertebral basilar junction  BASILAR ARTERY:  Basilar artery is normal in caliber  Normal superior cerebellar arteries  POSTERIOR CEREBRAL ARTERIES: Both posterior cerebral arteries arises from the basilar tip  Both arteries demonstrate normal flow-related enhancement  Right P-comm is patent  DURAL VENOUS SINUSES:  Normal  NON VASCULAR ANATOMY BONY STRUCTURES:  No acute osseous abnormality  SOFT TISSUES OF THE NECK:  Normal         THORACIC INLET:  Unremarkable  Impression: No large vessel flow restrictive disease within the head or neck  No arterial thrombosis  Possible 1 8 mm aneurysm/infundibulum arising from the supraclinoid left ICA  Nonemergent neurovascular consultation and follow-up CTA of the brain only, suggested in 6 months  ##neurovascularslh##neurovascularslh ##sigslh##sigslh Workstation performed: [id number]     Xr Chest 1 View Portable    Result Date: 10/3/2017  Narrative: CHEST INDICATION:  Stroke alert COMPARISON:  11/22/2016 VIEWS:   AP frontal IMAGES:  1 FINDINGS:     Cardiomediastinal silhouette appears unremarkable  Suboptimal inspiration  Intimal atelectasis at the right lung base    No pneumothorax or pleural effusion  No evidence of heart failure  Visualized osseous structures appear within normal limits for the patient's age  Impression: Minimal atelectasis at the right lung base  Workstation performed: [id number]     Mri Brain Wo Contrast    Result Date: 10/4/2017  Narrative: MRI BRAIN WITHOUT CONTRAST INDICATION:  CVA COMPARISON:   CTA performed one day earlier TECHNIQUE:  Sagittal T1, axial T2, axial FLAIR, axial T1, axial [place] and axial diffusion imaging  IMAGE QUALITY:  Diagnostic  FINDINGS: BRAIN PARENCHYMA:  There is no discrete mass, mass effect or midline shift  No abnormal white matter signal identified  Brainstem and cerebellum demonstrate normal signal  There is no intracranial hemorrhage  There is no evidence of acute infarction and  diffusion imaging is unremarkable  VENTRICLES:  The ventricles are normal in size and contour  SELLA AND PITUITARY GLAND:  Partially empty sella ORBITS:  Normal  PARANASAL SINUSES:  Minor right mastoid fluid VASCULATURE:  Evaluation of the major intracranial vasculature demonstrates appropriate flow voids  CALVARIUM AND SKULL BASE:  Normal  EXTRACRANIAL SOFT TISSUES:  Normal      Impression: Normal MRI of the brain, no acute disease specifically, no indication of acute ischemia  Workstation performed: [id number]     Ct Stroke Alert Brain    Result Date: 10/3/2017  Narrative: CT BRAIN - STROKE ALERT PROTOCOL INDICATION: Stroke alert COMPARISON:  None  TECHNIQUE:  CT examination of the brain was performed  In addition to axial images, coronal reformatted images were created and submitted for interpretation  Radiation dose length product (DLP) for this visit:  1087 3 mGy-cm   This examination, like all CT scans performed in the [facility], was performed utilizing techniques to minimize radiation dose exposure, including the use of iterative  reconstruction and automated exposure control  IMAGE QUALITY:  Diagnostic   FINDINGS:  PARENCHYMA: No intracranial mass, mass effect or midline shift  No acute intracranial hemorrhage  No CT signs of acute infarction  VENTRICLES AND EXTRA-AXIAL SPACES:  Normal for patient's age  VISUALIZED ORBITS AND PARANASAL SINUSES:  Unremarkable  CALVARIUM AND EXTRACRANIAL SOFT TISSUES:   Normal      Impression: Normal study  No acute hemorrhage  No acute infarction  Findings were directly discussed with Kayley Bah RN the head nurse in the emergency department will convey the information to Dr Segovia directly  on 10/3/2017 10:27 AM  Workstation performed: [id number]     Imaging Reports Reviewed by myself    Cultures:   Blood Culture: No results found for: BLOODCX  Urine Culture:   Lab Results   Component Value Date    URINECX <10,000 cfu/ml Mixed Contaminants X2 11/22/2016    URINECX 10,000-19,000 cfu/ml Mixed Contaminants X4 10/24/2016    URINECX No Growth <1000 cfu/mL 04/09/2016     Sputum Culture: No components found for: SPUTUMCX  Wound Culture: No results found for: WOUNDCULT    Last 24 Hours Medication List:     atorvastatin 80 mg Oral QPM   chlorhexidine 15 mL Swish [employer] Spit Q12H [address]   hydrochlorothiazide 25 mg Oral Daily   insulin lispro 1-6 Units Subcutaneous Q6H [address]   insulin lispro 10 Units Subcutaneous TID With Meals   melatonin 3 mg Oral HS   metoprolol tartrate 25 mg Oral Q12H BRINA   pioglitazone 30 mg Oral Daily   sodium chloride 100 mL/hr Intravenous Once   topiramate 25 mg Oral HS   valsartan 320 mg Oral Daily        Today, Patient Was Seen By: Rufina George MD    ** Please Note: Dragon 360 Dictation voice to text software may have been used in the creation of this document   **

## 2017-10-06 NOTE — PLAN OF CARE
CARDIOVASCULAR - ADULT     Maintains optimal cardiac output and hemodynamic stability Progressing     Absence of cardiac dysrhythmias or at baseline rhythm Progressing        DISCHARGE PLANNING - CARE MANAGEMENT     Discharge to post-acute care or home with appropriate resources Progressing        GASTROINTESTINAL - ADULT     Minimal or absence of nausea and/or vomiting Progressing     Maintains or returns to baseline bowel function Progressing     Maintains adequate nutritional intake Progressing     Establish and maintain optimal ostomy function Progressing        METABOLIC, FLUID AND ELECTROLYTES - ADULT     Electrolytes maintained within normal limits Progressing     Fluid balance maintained Progressing     Glucose maintained within target range Progressing        Nutrition/Hydration-ADULT     Nutrient/Hydration intake appropriate for improving, restoring or maintaining nutritional needs Progressing        Potential for Falls     Patient will remain free of falls Progressing        Prexisting or High Potential for Compromised Skin Integrity     Skin integrity is maintained or improved Progressing

## 2017-10-06 NOTE — SOCIAL WORK
SW following to assist with DCP  Acute rehab placement planned at [facility]  SW spoke with Lulú in [facility] admissions  Quiñonez shaw still has not called with authorization yet  [facility] will call once authorization is received  Pt's daughter planning to transport pt by car when discharged  SW will continue to follow to coordinate transfer when ready

## 2017-10-06 NOTE — PHYSICAL THERAPY NOTE
PT TREATMENT     10/06/17 1200   Pain Assessment   Pain Assessment 0-10   Pain Score 7   Pain Type Acute pain  (with mov't only)   Pain Location Shoulder;Hip;Knee   Pain Orientation Left   Restrictions/Precautions   Other Precautions Fall Risk;Bed Alarm; Chair Alarm  (left sided weakness)   General   Chart Reviewed Yes   Family/Caregiver Present Yes  (pt's dtr)   Subjective   Subjective "I'm not comfortable with my left side"   Bed Mobility   Supine to Sit 5  Supervision   Transfers   Sit to Stand 4  Minimal assistance   Stand to Sit 4  Minimal assistance   Ambulation/Elevation   Gait Assistance 3  Moderate assist   Assistive Device SPC   Distance 25 feet   Additional items (min A x 25 feet with RW)   Assessment   Assessment PT spoke with Dr Sanchez Perez prior to PT treatment and ok for PT to see pt  When amb with SPC, pt requiring mod A, holding cane in right hand, and pt gripping PT's arm with left UE - pt very anxious/fearful with amb, hence holding PT's arm  Pt then amb a 2nd time with RW with min A  Pt felt more comfortable with RW vs SPC for amb  Pt will cont to benefit from skilled PT services  Cont gait training with both RW and SPC  Plan   Treatment/Interventions ADL retraining;Functional transfer training;LE strengthening/ROM; Elevations; Therapeutic exercise; Endurance training;Patient/family training;Bed mobility;Gait training   Recommendation   Recommendation (Acute rehab)   Equipment Recommended (SPC vs RW)

## 2017-10-06 NOTE — OCCUPATIONAL THERAPY NOTE
OT TREATMENT       10/06/17 1015   Restrictions/Precautions   Other Precautions Fall Risk;Pain  (left sided weakness)   Pain Assessment   Pain Assessment 0-10   Pain Score 7   Pain Location Arm;Back;Leg   Pain Orientation Left   ADL   Grooming Comments pt stood at sink to brush teeth with min assist (pt became lightheaded, OT lifted pt to a chair with max assist, pt then returned to bed with stand pivot transfer with max assist, sit to supine mod assist)  Pts daughter assisted with getting a chair and nurse to assist)   Toileting Comments toilet transfer min assist, hygiene independent seated for urination    Functional Standing Tolerance   Activity standing at sink to wash hands and brush teeth min assist    Bed Mobility   Supine to Sit 5  Supervision   Sit to Supine 3  Moderate assistance   Transfers   Sit to Stand 4  Minimal assistance   Stand to Sit 4  Minimal assistance   Functional Mobility   Functional Mobility 4  Minimal assistance   Additional Comments 10 feet to bathroom with verbal cues and SPC   Assessment   Assessment Nurse, aide present when transferring pt back to bed and Dr Enma Castelan present when pt returned to bed  All aware of episode  Pt awake throughout and stating she was feeling lightheaded  Pt reports feeling much better when returned to bed  Plan   Treatment Interventions ADL retraining;Functional transfer training;UE strengthening/ROM; Endurance training;Patient/family training;Equipment evaluation/education; Activityengagement; Fine motor coordination activities   Recommendation   Discharge Recommendation Short Term Rehab  (acute rehab)

## 2017-10-07 LAB
25(OH)D3 SERPL-MCNC: 9.3 NG/ML (ref 30–100)
GLUCOSE SERPL-MCNC: 117 MG/DL (ref 65–140)
GLUCOSE SERPL-MCNC: 141 MG/DL (ref 65–140)
GLUCOSE SERPL-MCNC: 182 MG/DL (ref 65–140)
GLUCOSE SERPL-MCNC: 239 MG/DL (ref 65–140)
GLUCOSE SERPL-MCNC: 277 MG/DL (ref 65–140)
HCYS SERPL-SCNC: 8.5 UMOL/L (ref 3.7–11.2)
MAGNESIUM SERPL-MCNC: 2 MG/DL (ref 1.6–2.6)
VIT B12 SERPL-MCNC: 1214 PG/ML (ref 100–900)

## 2017-10-07 PROCEDURE — 82306 VITAMIN D 25 HYDROXY: CPT | Performed by: PSYCHIATRY & NEUROLOGY

## 2017-10-07 PROCEDURE — 83735 ASSAY OF MAGNESIUM: CPT | Performed by: INTERNAL MEDICINE

## 2017-10-07 PROCEDURE — 85670 THROMBIN TIME PLASMA: CPT | Performed by: PSYCHIATRY & NEUROLOGY

## 2017-10-07 PROCEDURE — 85732 THROMBOPLASTIN TIME PARTIAL: CPT | Performed by: PSYCHIATRY & NEUROLOGY

## 2017-10-07 PROCEDURE — 82948 REAGENT STRIP/BLOOD GLUCOSE: CPT

## 2017-10-07 PROCEDURE — 85300 ANTITHROMBIN III ACTIVITY: CPT | Performed by: PSYCHIATRY & NEUROLOGY

## 2017-10-07 PROCEDURE — 85613 RUSSELL VIPER VENOM DILUTED: CPT | Performed by: PSYCHIATRY & NEUROLOGY

## 2017-10-07 PROCEDURE — 83090 ASSAY OF HOMOCYSTEINE: CPT | Performed by: PSYCHIATRY & NEUROLOGY

## 2017-10-07 PROCEDURE — 86147 CARDIOLIPIN ANTIBODY EA IG: CPT | Performed by: PSYCHIATRY & NEUROLOGY

## 2017-10-07 PROCEDURE — 85705 THROMBOPLASTIN INHIBITION: CPT | Performed by: PSYCHIATRY & NEUROLOGY

## 2017-10-07 PROCEDURE — 86618 LYME DISEASE ANTIBODY: CPT | Performed by: PSYCHIATRY & NEUROLOGY

## 2017-10-07 PROCEDURE — 82607 VITAMIN B-12: CPT | Performed by: PSYCHIATRY & NEUROLOGY

## 2017-10-07 PROCEDURE — 86038 ANTINUCLEAR ANTIBODIES: CPT | Performed by: PSYCHIATRY & NEUROLOGY

## 2017-10-07 RX ORDER — DIPHENHYDRAMINE HYDROCHLORIDE 50 MG/ML
25 INJECTION INTRAMUSCULAR; INTRAVENOUS EVERY 8 HOURS SCHEDULED
Status: COMPLETED | OUTPATIENT
Start: 2017-10-07 | End: 2017-10-09

## 2017-10-07 RX ORDER — MAGNESIUM SULFATE 1 G/100ML
1 INJECTION INTRAVENOUS 2 TIMES DAILY
Status: COMPLETED | OUTPATIENT
Start: 2017-10-07 | End: 2017-10-09

## 2017-10-07 RX ORDER — TOPIRAMATE 25 MG/1
50 TABLET ORAL
Status: DISCONTINUED | OUTPATIENT
Start: 2017-10-07 | End: 2017-10-09 | Stop reason: HOSPADM

## 2017-10-07 RX ORDER — PROCHLORPERAZINE MALEATE 5 MG/1
10 TABLET ORAL EVERY 8 HOURS SCHEDULED
Status: COMPLETED | OUTPATIENT
Start: 2017-10-07 | End: 2017-10-09

## 2017-10-07 RX ORDER — KETOROLAC TROMETHAMINE 30 MG/ML
30 INJECTION, SOLUTION INTRAMUSCULAR; INTRAVENOUS EVERY 8 HOURS SCHEDULED
Status: COMPLETED | OUTPATIENT
Start: 2017-10-07 | End: 2017-10-09

## 2017-10-07 RX ORDER — GABAPENTIN 300 MG/1
300 CAPSULE ORAL
Status: DISCONTINUED | OUTPATIENT
Start: 2017-10-07 | End: 2017-10-09 | Stop reason: HOSPADM

## 2017-10-07 RX ADMIN — KETOROLAC TROMETHAMINE 30 MG: 30 INJECTION, SOLUTION INTRAMUSCULAR at 21:25

## 2017-10-07 RX ADMIN — INSULIN LISPRO 10 UNITS: 100 INJECTION, SOLUTION INTRAVENOUS; SUBCUTANEOUS at 12:30

## 2017-10-07 RX ADMIN — ATORVASTATIN CALCIUM 80 MG: 80 TABLET, FILM COATED ORAL at 18:15

## 2017-10-07 RX ADMIN — MAGNESIUM SULFATE HEPTAHYDRATE 1 G: 1 INJECTION, SOLUTION INTRAVENOUS at 18:51

## 2017-10-07 RX ADMIN — MELATONIN TAB 3 MG 3 MG: 3 TAB at 21:25

## 2017-10-07 RX ADMIN — INSULIN LISPRO 10 UNITS: 100 INJECTION, SOLUTION INTRAVENOUS; SUBCUTANEOUS at 09:20

## 2017-10-07 RX ADMIN — BUTALBITAL, ACETAMINOPHEN, AND CAFFEINE 1 TABLET: 50; 325; 40 TABLET ORAL at 07:14

## 2017-10-07 RX ADMIN — KETOROLAC TROMETHAMINE 15 MG: 30 INJECTION, SOLUTION INTRAMUSCULAR at 09:40

## 2017-10-07 RX ADMIN — TOPIRAMATE 50 MG: 25 TABLET, FILM COATED ORAL at 21:23

## 2017-10-07 RX ADMIN — VALSARTAN 320 MG: 160 TABLET ORAL at 09:19

## 2017-10-07 RX ADMIN — CHLORHEXIDINE GLUCONATE 15 ML: 1.2 RINSE ORAL at 09:19

## 2017-10-07 RX ADMIN — PROCHLORPERAZINE MALEATE 10 MG: 5 TABLET, FILM COATED ORAL at 14:50

## 2017-10-07 RX ADMIN — DIPHENHYDRAMINE HYDROCHLORIDE 25 MG: 50 INJECTION, SOLUTION INTRAMUSCULAR; INTRAVENOUS at 14:50

## 2017-10-07 RX ADMIN — HYDROCHLOROTHIAZIDE 25 MG: 25 TABLET ORAL at 09:19

## 2017-10-07 RX ADMIN — INSULIN LISPRO 10 UNITS: 100 INJECTION, SOLUTION INTRAVENOUS; SUBCUTANEOUS at 18:16

## 2017-10-07 RX ADMIN — KETOROLAC TROMETHAMINE 30 MG: 30 INJECTION, SOLUTION INTRAMUSCULAR at 14:50

## 2017-10-07 RX ADMIN — INSULIN LISPRO 4 UNITS: 100 INJECTION, SOLUTION INTRAVENOUS; SUBCUTANEOUS at 12:31

## 2017-10-07 RX ADMIN — GABAPENTIN 300 MG: 300 CAPSULE ORAL at 21:26

## 2017-10-07 RX ADMIN — METOPROLOL TARTRATE 25 MG: 25 TABLET ORAL at 09:19

## 2017-10-07 RX ADMIN — PROCHLORPERAZINE MALEATE 10 MG: 5 TABLET, FILM COATED ORAL at 21:26

## 2017-10-07 RX ADMIN — DIPHENHYDRAMINE HYDROCHLORIDE 25 MG: 50 INJECTION, SOLUTION INTRAMUSCULAR; INTRAVENOUS at 21:25

## 2017-10-07 RX ADMIN — PIOGLITAZONE 30 MG: 30 TABLET ORAL at 09:39

## 2017-10-07 RX ADMIN — METOPROLOL TARTRATE 25 MG: 25 TABLET ORAL at 21:25

## 2017-10-07 RX ADMIN — CYCLOBENZAPRINE HYDROCHLORIDE 10 MG: 10 TABLET, FILM COATED ORAL at 09:40

## 2017-10-07 RX ADMIN — CHLORHEXIDINE GLUCONATE 15 ML: 1.2 RINSE ORAL at 21:22

## 2017-10-07 NOTE — SOCIAL WORK
CM SPOKE WITH KAREN THIS AM   NO RETURN CALL RECEIVED FROM [employer]  SW TO CONTINUE TO ATTEMPT OFF HOURS AUTHORIZATION THROUGH [employer] TODAY  CM SPOKE WITH GEOVANNA, CHRISTINER   CONFIRMED THAT THEY DO WANT PT TO GO TO Critical access hospital  ADVISED THAT WE CONTINUE TO WORK WITH INSURANCE RE: Carol Pearson INFORMATION  CM WILL CONTACT GEOVANNA WHEN MORE INFORMATION IS NOTED  CM SPOKE WITH KAREN THIS EVENING, NOTE THAT NO RESPONSES FROM [employer] AT ALL TODAY, KAREN MAKING A COUPLE OF CALLS THROUGHOUT THE DAY  WILL CONTINUE TO WORK TOWARD AUTHORIZATION, HOWEVER, AT THIS POINT DOES NOT SEEM LIKELY OVER THE WEEKEND  CM CALLED AND UPDATED GEOVANNA TO THE STATUS  ADVISED WILL CALL WHEN MORE INFORMATION IS RECEIVED

## 2017-10-07 NOTE — CONSULTS
Neurology Consult Follow Up      Casie Tello is a 47 y o  female  Jos[place] 34-*    [id number]        Assessment/Recommendations:    1  Left sided paresthesia and weakness, s/p IV tPA at 11:29am on 10/3/17  2  Probable complex migraine vs conversion disorder   3  Hemisensory syndrome  4  Hypertension  5  DM  6  Hyperlipidemia  7  H/o migraines   8  Incidental left ICA 1 8mm aneurysm     Patient has persistent symptoms since arrival  For past 2 days, she reports pain on her left side  Her symptoms are described as splint in the middle of body  With involvement of face,pathology has to be in brain  Spinal cord pathology wouldn't present this way  Studies done on similar patient presentations in literature have failed to find structural pathology in these patients except  in 1-3%  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 however she had no evidence of stroke on MRI  Will exclude any autoimmune/infectious etiologies by checking KENYATTA, Lyme, hypercoag labs  Will place her on scheduled migraine cocktail to treat her headache  Will place her on neurontin 300mg qhs  Psychological factors may be contributing to her symptoms as well  Ordered consult for psych  Will monitor until Monday and decide whether we need to repeat image her with MRI  Increasing topamax to 50mg nightly for headache prevention  Increase it weekly by 25mg to goal dose of 100mg nightly  lipitor 80mg for now , normal LDL but elevated total and TG  TTE w/ shunt- no clear source of emboli   Recommend strict BG control - most recent HgbA1c 8 9  Speech and swallow evaluation as needed  PT/OT  **Please provide outpatient vascular neurosurgeon referral for left ica aneurysm follow up   Discussed plan with patient and primary team at length           Chief Complaint:  Left sided pain and weakness   Subjective:   Patient says she's feeling better but still feels pain on her left side as if split in middle from face down all the way to foot  2 days ago she started feeling pain  She says she feels achy pain on left side as if bone pain  She has been under a lot of stress at home  Her headache still persists  Whenever she get pain shot, her sxs are better  Objective:  /91   Pulse 71   Temp 98 4 °F (36 9 °C) (Oral)   Resp 18   Ht 5' 2" (1 575 m)   Wt 82 kg (180 lb 12 4 oz)   SpO2 96%   BMI 33 06 kg/m²     General: alert   Mental status: oriented x3  Attention: normal  Knowledge: fair  Language and Speech: normal  Cranial nerves: II-XII intact except left facial droop is still persistent  She has left eyelid droop but thinks it's chronic  Left facial numbness  Able to make wrinkles on left side of forehead  Muscle tone: normal  Motor strength:  5/5 on right side, 4/5 on left side   Sensory: decreased to light touch, pin prick over left arm, leg, whole left side of body  Pain in left side of body  Gait: needs walker   Coordination: finger to nose and heel to toe normal  Reflexes: 2+ throughout  except as noted    NIHSS:    1a Level of Consciousness: 0 = Alert   1b  LOC Questions: 0 = Answers both correctly   1c  LOC Commands: 0 = Obeys both correctly   2  Best Gaze: 0 = Normal   3  Visual: 0 = No visual field loss   4  Facial Palsy: 1=Minor paralysis (flattened nasolabial fold, asymmetric on smiling)   5a  Motor Right Arm: 0=No drift, limb holds 90 (or 45) degrees for full 10 seconds   5b  Motor Left Arm: 1=Drift, limb holds 90 (or 45) degrees but drifts down before full 10 seconds: does not hit bed   6a  Motor Right Le=No drift, limb holds 90 (or 45) degrees for full 10 seconds   6b  Motor Left Le=No drift, limb holds 90 (or 45) degrees for full 10 seconds   7  Limb Ataxia:  0=Absent   8  Sensory: 1=Mild to moderate sensory loss; patient feels pinprick is less sharp or is dull on the affected side; there is a loss of superficial pain with pinprick but patient is aware She is being touched   9   Best Language:  0=No aphasia, normal   10  Dysarthria: 0=Normal articulation   11  Extinction and Inattention (formerly Neglect): 0=No abnormality   Total Score: 3                       Labs:    Lab Results   Component Value Date    WBC 5 10 10/05/2017    HGB 12 9 10/05/2017    HCT 38 3 10/05/2017    MCV 88 10/05/2017     10/05/2017     Lab Results   Component Value Date    HGBA1C 8 9 (H) 10/04/2017     Lab Results   Component Value Date    ALT 46 10/05/2017    AST 29 10/05/2017    ALKPHOS 97 10/05/2017    BILITOT 0 30 10/05/2017     Lab Results   Component Value Date    GLUCOSE 328 (H) 10/06/2017    CALCIUM 9 1 10/06/2017     (L) 10/06/2017    K 3 8 10/06/2017    CO2 25 10/06/2017    CL 99 (L) 10/06/2017    BUN 24 10/06/2017    CREATININE 1 10 10/06/2017         Review of reports and notes reveal:         Cta Head And Neck With And Without Contrast    Result Date: 10/3/2017  No large vessel flow restrictive disease within the head or neck  No arterial thrombosis  Possible 1 8 mm aneurysm/infundibulum arising from the supraclinoid left ICA  Nonemergent neurovascular consultation and follow-up CTA of the brain only, suggested in 6 months  ##neurovascularslh##neurovascularslh ##sigslh##sigslh Workstation performed: [id number]     Xr Chest 1 View Portable    Result Date: 10/3/2017  Minimal atelectasis at the right lung base  Workstation performed: [id number]     Ct Head Wo Contrast    Result Date: 10/6/2017  No acute intracranial abnormality  Workstation performed: [id number]     Mri Brain Wo Contrast    Result Date: 10/4/2017  Normal MRI of the brain, no acute disease specifically, no indication of acute ischemia  Workstation performed: [id number]     Ct Stroke Alert Brain    Result Date: 10/3/2017  Normal study  No acute hemorrhage  No acute infarction  Findings were directly discussed with Kaylyn Brady RN the head nurse in the emergency department will convey the information to Dr Segovia directly   on 10/3/2017 10:27 AM  Workstation performed: [id number]           Thank you for this consult      Total time of encounter:  40 min  More than 50% of the time was used in counseling and/or coordination of care  Extent of couseling and/or coordination of care        Tolu Gray MD  [facility]  [address]  [place] [address]  [phone]

## 2017-10-07 NOTE — PROGRESS NOTES
[facility] Progress Note  Patient: Delvin Flores 47 y o  female   MRN: [id number]  PCP: Delvin Flores MD  Unit/Bed#: [address] [place] [address] Encounter: [id number]  Date Of Visit: 10/07/17    Problem List:    Principal Problem:    Acute CVA (cerebrovascular accident) [facility])  Active Problems:    Hypertensive urgency    Diabetes mellitus ([facility] [address] )    Hypertension    Hyperlipidemia    [facility]) [facility]    Migraine      Assessment & Plan:  71-year-old female present to the emergency room with left-sided facial droop and left leg are numb weakness  Patient noted to have elevated blood pressure and received IV labetalol and Cardene infusion was started  Patient was also given IV tPA for possible CVA  Patient had MRI of the brain and CTA of the head and neck which were negative  Patient is being treated for complicated migraine  1  Complicated migraine-patient started on Topamax 25 milligram weekly, which will be increased to 50 milligrams in week 2, start 5 milligrams in week 3, 100 milligrams in week for and continue 100 milligram every night  The patient will also be started on migraine cocktail with Toradol, Benadryl and Reglan 8 hours  2  Left-sided weakness status post IV tPA-patient has significant improvement of symptoms  Continue PT/OT  Await placement in rehabilitation  Continue Lipitor 80 milligram p o  daily  MRI of the brain was normal   CT of the head and neck showed no arterial thrombus or restrictive disease  Echo showed EF of 20 60%, no regional wall motion abnormalities  3  Left-sided body pain likely secondary to fred sensory syndrome-rule out autoimmune/infectious etiology is by checking KENYATTA, hypercoagulable panel and Lyme titers  Patient will be placed on scheduled migraine cocktail with Toradol, Reglan and Benadryl  Patient was also started on Neurontin 300 milligram p o  q h s     Patient's repeat CT scan of the brain was normal   If patient has persistent symptoms we will consider MRI of the brain on 2017   4  Left ICA of 1 8 mm [id number]-rhafbe-[id number] with Neurosurgery as outpatient  5  Dyslipidemia-continue Lipitor  6  Hypertension-blood pressure controlled with  hydrochlorothiazide 25 milligram p  o  daily and metoprolol 5 milligram p o  b i d  and Diovan 320 milligram p o  Daily  7  Diabetes mellitus type 2-patient's hemoglobin A1c is 8 9  Continue Humalog sliding scale with Accu-Cheks q a c  and HS  Patient's blood sugars better with NovoLog 10 units subcutaneously with each meal   8  Near syncope-likely vasovagal due to pain  VTE Pharmacologic Prophylaxis:   Pharmacologic: Pharmacologic VTE Prophylaxis contraindicated due to recent tPA  Mechanical VTE Prophylaxis in Place: Yes    Patient Centered Rounds: I have performed bedside rounds with nursing staff today  Discussions with Specialists or Other Care Team Provider: Yes Dr Bruna Torres    Education and Discussions with Family / Patient:Yes    Time Spent for Care: 30 minutes  More than 50% of total time spent on counseling and coordination of care as described above  Current Length of Stay: 4 day(s)    Current Patient Status: Inpatient     Discharge Plan: Acute rehab    Code Status: Level 1 - Full Code      Subjective:   Patient still complaining of pain on the left side of her body  Patient's reports the pain as achy pain as if it is in the bone  Objective:         Negative for Chest Pain, Palpitations, Shortness of Breath, Abdominal Pain, Nausea, Vomiting, Constipation, Diarrhea, Dizziness  All other 10 review of systems negative and without drastic interval changes from yesterday  Vitals:   Temp (24hrs), Av 4 °F (36 9 °C), Min:98 °F (36 7 °C), Max:98 6 °F (37 °C)    HR:  [65-82] 68  Resp:  [18] 18  BP: (134-160)/(85-91) 160/89  SpO2:  [96 %-98 %] 98 %  Body mass index is 33 06 kg/m²  Input and Output Summary (last 24 hours):        Intake/Output Summary (Last 24 hours) at 10/07/17 1647  Last data filed at 10/06/17 2155   Gross per 24 hour   Intake              360 ml   Output              300 ml   Net               60 ml       Physical Exam:     Physical Exam   Constitutional: No distress  HENT:   Head: Normocephalic and atraumatic  Nose: Nose normal    Eyes: Conjunctivae and EOM are normal  Pupils are equal, round, and reactive to light  Neck: Normal range of motion  Neck supple  No JVD present  Cardiovascular: Normal rate, regular rhythm and normal heart sounds  Exam reveals no gallop and no friction rub  No murmur heard  Pulmonary/Chest: Effort normal and breath sounds normal  No respiratory distress  She has no wheezes  She has no rales  She exhibits no tenderness  Abdominal: Soft  Bowel sounds are normal  She exhibits no distension  There is no tenderness  There is no rebound and no guarding  Musculoskeletal: She exhibits no edema  Neurological: She is alert  No cranial nerve deficit  Left facial droop  Strength is 5/ 5 in all extremity   Skin: Skin is warm and dry  No rash noted  Psychiatric: She has a normal mood and affect  Additional Data:     Labs:      Results from last 7 days  Lab Units 10/05/17  0457   WBC Thousand/uL 5 10   HEMOGLOBIN g/dL 12 9   HEMATOCRIT % 38 3   PLATELETS Thousands/uL 216   NEUTROS PCT % 49   LYMPHS PCT % 41   MONOS PCT % 8   EOS PCT % 2       Results from last 7 days  Lab Units 10/06/17  1050 10/05/17  0457   SODIUM mmol/L 132* 135*   POTASSIUM mmol/L 3 8 3 8   CHLORIDE mmol/L 99* 102   CO2 mmol/L 25 24   BUN mg/dL 24 14   CREATININE mg/dL 1 10 0 89   CALCIUM mg/dL 9 1 9 2   TOTAL PROTEIN g/dL  --  6 4   BILIRUBIN TOTAL mg/dL  --  0 30   ALK PHOS U/L  --  97   ALT U/L  --  46   AST U/L  --  29   GLUCOSE RANDOM mg/dL 328* 155*       Results from last 7 days  Lab Units 10/03/17  1105   INR  0 90       * I Have Reviewed All Lab Data Listed Above  * Additional Pertinent Lab Tests Reviewed:  Daniele Andrade Admission Reviewed    Imaging:  Cta Head And Neck With And Without Contrast    Result Date: 10/3/2017  Narrative: CTA NECK AND BRAIN WITH AND WITHOUT CONTRAST INDICATION:      History taken directly from the electronic ordering system  COMPARISON:   None  TECHNIQUE:  Routine CT imaging of the Brain without contrast   Post contrast imaging was performed after administration of iodinated contrast through the neck and brain  Post contrast axial 0 625 mm images timed to opacify the arterial system  3D rendering was performed on an independent workstation  MIP reconstructions performed  Coronal reconstructions were performed of the noncontrast portion of the brain  Radiation dose length product (DLP) for this visit:  370 88 mGy-cm   This examination, like all CT scans performed in the [facility], was performed utilizing techniques to minimize radiation dose exposure, including the use of iterative  reconstruction and automated exposure control  IV Contrast:  85 mL of iohexol (OMNIPAQUE)     IMAGE QUALITY:   Diagnostic FINDINGS: CERVICAL VASCULATURE AORTIC ARCH AND GREAT VESSELS:  Normal aortic arch and great vessel origins  Normal visualized subclavian vessels  RIGHT VERTEBRAL ARTERY CERVICAL SEGMENT:  Normal origin  The vessel is normal in caliber throughout the neck  LEFT VERTEBRAL ARTERY CERVICAL SEGMENT:  Normal origin  The vessel is normal in caliber throughout the neck  RIGHT EXTRACRANIAL CAROTID SEGMENT:  Normal caliber common carotid artery  Normal bifurcation and cervical internal carotid artery  No stenosis or dissection  LEFT EXTRACRANIAL CAROTID SEGMENT:  Normal caliber common carotid artery  Normal bifurcation and cervical internal carotid artery  No stenosis or dissection  NASCET criteria was used to determine the degree of internal carotid artery diameter stenosis   INTRACRANIAL VASCULATURE INTERNAL CAROTID ARTERIES:  Normal enhancement of the intracranial portions of the internal carotid arteries  Normal ophthalmic artery origins  Normal ICA terminus  1 8 mm windsock extension of contrast extending posterolaterally from the posterior wall of the supraclinoid ICA, near origin of posterior communicating and anterior coronal arteries  This could represent a tiny infundibulum or aneurysm  Repeat CTA of the brain only is suggested in 6 months to confirm this  Nonurgent Neurovascular consultation also suggested  ANTERIOR CIRCULATION:  Symmetric A1 segments and anterior cerebral arteries with normal enhancement  Normal anterior communicating artery  MIDDLE CEREBRAL ARTERY CIRCULATION:  M1 segment and middle cerebral artery branches demonstrate normal enhancement bilaterally  DISTAL VERTEBRAL ARTERIES:  Normal distal vertebral arteries  Posterior inferior cerebellar artery origins are normal  Normal vertebral basilar junction  BASILAR ARTERY:  Basilar artery is normal in caliber  Normal superior cerebellar arteries  POSTERIOR CEREBRAL ARTERIES: Both posterior cerebral arteries arises from the basilar tip  Both arteries demonstrate normal flow-related enhancement  Right P-comm is patent  DURAL VENOUS SINUSES:  Normal  NON VASCULAR ANATOMY BONY STRUCTURES:  No acute osseous abnormality  SOFT TISSUES OF THE NECK:  Normal         THORACIC INLET:  Unremarkable  Impression: No large vessel flow restrictive disease within the head or neck  No arterial thrombosis  Possible 1 8 mm aneurysm/infundibulum arising from the supraclinoid left ICA  Nonemergent neurovascular consultation and follow-up CTA of the brain only, suggested in 6 months  ##neurovascularslh##neurovascularslh ##sigslh##sigslh Workstation performed: [id number]     Xr Chest 1 View Portable    Result Date: 10/3/2017  Narrative: CHEST INDICATION:  Stroke alert COMPARISON:  11/22/2016 VIEWS:   AP frontal IMAGES:  1 FINDINGS:     Cardiomediastinal silhouette appears unremarkable  Suboptimal inspiration  Intimal atelectasis at the right lung base  No pneumothorax or pleural effusion  No evidence of heart failure  Visualized osseous structures appear within normal limits for the patient's age  Impression: Minimal atelectasis at the right lung base  Workstation performed: [id number]     Mri Brain Wo Contrast    Result Date: 10/4/2017  Narrative: MRI BRAIN WITHOUT CONTRAST INDICATION:  CVA COMPARISON:   CTA performed one day earlier TECHNIQUE:  Sagittal T1, axial T2, axial FLAIR, axial T1, axial [place] and axial diffusion imaging  IMAGE QUALITY:  Diagnostic  FINDINGS: BRAIN PARENCHYMA:  There is no discrete mass, mass effect or midline shift  No abnormal white matter signal identified  Brainstem and cerebellum demonstrate normal signal  There is no intracranial hemorrhage  There is no evidence of acute infarction and  diffusion imaging is unremarkable  VENTRICLES:  The ventricles are normal in size and contour  SELLA AND PITUITARY GLAND:  Partially empty sella ORBITS:  Normal  PARANASAL SINUSES:  Minor right mastoid fluid VASCULATURE:  Evaluation of the major intracranial vasculature demonstrates appropriate flow voids  CALVARIUM AND SKULL BASE:  Normal  EXTRACRANIAL SOFT TISSUES:  Normal      Impression: Normal MRI of the brain, no acute disease specifically, no indication of acute ischemia  Workstation performed: [id number]     Ct Stroke Alert Brain    Result Date: 10/3/2017  Narrative: CT BRAIN - STROKE ALERT PROTOCOL INDICATION: Stroke alert COMPARISON:  None  TECHNIQUE:  CT examination of the brain was performed  In addition to axial images, coronal reformatted images were created and submitted for interpretation  Radiation dose length product (DLP) for this visit:  1087 3 mGy-cm     This examination, like all CT scans performed in the [facility], was performed utilizing techniques to minimize radiation dose exposure, including the use of iterative  reconstruction and automated exposure control  IMAGE QUALITY:  Diagnostic  FINDINGS:  PARENCHYMA:  No intracranial mass, mass effect or midline shift  No acute intracranial hemorrhage  No CT signs of acute infarction  VENTRICLES AND EXTRA-AXIAL SPACES:  Normal for patient's age  VISUALIZED ORBITS AND PARANASAL SINUSES:  Unremarkable  CALVARIUM AND EXTRACRANIAL SOFT TISSUES:   Normal      Impression: Normal study  No acute hemorrhage  No acute infarction  Findings were directly discussed with Xavier Mehta RN the head nurse in the emergency department will convey the information to Dr Segovia directly  on 10/3/2017 10:27 AM  Workstation performed: [id number]     Imaging Reports Reviewed by myself    Cultures:   Blood Culture: No results found for: BLOODCX  Urine Culture:   Lab Results   Component Value Date    URINECX <10,000 cfu/ml Mixed Contaminants X2 11/22/2016    URINECX 10,000-19,000 cfu/ml Mixed Contaminants X4 10/24/2016    URINECX No Growth <1000 cfu/mL 04/09/2016     Sputum Culture: No components found for: SPUTUMCX  Wound Culture: No results found for: WOUNDCULT    Last 24 Hours Medication List:     atorvastatin 80 mg Oral QPM   chlorhexidine 15 mL Swish & Spit Q12H [address]   diphenhydrAMINE 25 mg Intravenous Q8H [address]   gabapentin 300 mg Oral HS   hydrochlorothiazide 25 mg Oral Daily   insulin lispro 1-6 Units Subcutaneous Q6H [address]   insulin lispro 10 Units Subcutaneous TID With Meals   ketorolac 30 mg Intravenous Q8H [address]   magnesium sulfate 1 g Intravenous BID   melatonin 3 mg Oral HS   metoprolol tartrate 25 mg Oral Q12H BRINA   pioglitazone 30 mg Oral Daily   prochlorperazine 10 mg Oral Q8H [address]   sodium chloride 100 mL/hr Intravenous Once   topiramate 50 mg Oral HS   valsartan 320 mg Oral Daily        Today, Patient Was Seen By: Johanny Mcginnis MD    ** Please Note: Dragon 360 Dictation voice to text software may have been used in the creation of this document   **

## 2017-10-08 LAB
CRP SERPL QL: <3 MG/L
DEPRECATED AT III PPP: 110 % OF NORMAL (ref 92–136)
ERYTHROCYTE [SEDIMENTATION RATE] IN BLOOD: 22 MM/HOUR (ref 2–25)
GLUCOSE SERPL-MCNC: 185 MG/DL (ref 65–140)
GLUCOSE SERPL-MCNC: 240 MG/DL (ref 65–140)
GLUCOSE SERPL-MCNC: 246 MG/DL (ref 65–140)
GLUCOSE SERPL-MCNC: 248 MG/DL (ref 65–140)

## 2017-10-08 PROCEDURE — 85652 RBC SED RATE AUTOMATED: CPT | Performed by: INTERNAL MEDICINE

## 2017-10-08 PROCEDURE — 86140 C-REACTIVE PROTEIN: CPT | Performed by: INTERNAL MEDICINE

## 2017-10-08 PROCEDURE — 82948 REAGENT STRIP/BLOOD GLUCOSE: CPT

## 2017-10-08 RX ORDER — CLONAZEPAM 0.5 MG/1
0.5 TABLET ORAL 3 TIMES DAILY
Status: DISCONTINUED | OUTPATIENT
Start: 2017-10-08 | End: 2017-10-09 | Stop reason: HOSPADM

## 2017-10-08 RX ORDER — ESCITALOPRAM OXALATE 10 MG/1
10 TABLET ORAL DAILY
Status: DISCONTINUED | OUTPATIENT
Start: 2017-10-08 | End: 2017-10-09 | Stop reason: HOSPADM

## 2017-10-08 RX ORDER — CLONAZEPAM 0.5 MG/1
0.5 TABLET ORAL 2 TIMES DAILY PRN
Status: DISCONTINUED | OUTPATIENT
Start: 2017-10-08 | End: 2017-10-09 | Stop reason: HOSPADM

## 2017-10-08 RX ADMIN — CLONAZEPAM 0.5 MG: 0.5 TABLET ORAL at 16:36

## 2017-10-08 RX ADMIN — PROCHLORPERAZINE MALEATE 10 MG: 5 TABLET, FILM COATED ORAL at 21:05

## 2017-10-08 RX ADMIN — CLONAZEPAM 0.5 MG: 0.5 TABLET ORAL at 21:02

## 2017-10-08 RX ADMIN — INSULIN LISPRO 10 UNITS: 100 INJECTION, SOLUTION INTRAVENOUS; SUBCUTANEOUS at 08:46

## 2017-10-08 RX ADMIN — INSULIN LISPRO 2 UNITS: 100 INJECTION, SOLUTION INTRAVENOUS; SUBCUTANEOUS at 21:14

## 2017-10-08 RX ADMIN — CHLORHEXIDINE GLUCONATE 15 ML: 1.2 RINSE ORAL at 21:01

## 2017-10-08 RX ADMIN — METOPROLOL TARTRATE 25 MG: 25 TABLET ORAL at 08:40

## 2017-10-08 RX ADMIN — HYDROCHLOROTHIAZIDE 25 MG: 25 TABLET ORAL at 08:41

## 2017-10-08 RX ADMIN — DIPHENHYDRAMINE HYDROCHLORIDE 25 MG: 50 INJECTION, SOLUTION INTRAMUSCULAR; INTRAVENOUS at 21:04

## 2017-10-08 RX ADMIN — MAGNESIUM SULFATE HEPTAHYDRATE 1 G: 1 INJECTION, SOLUTION INTRAVENOUS at 17:46

## 2017-10-08 RX ADMIN — INSULIN LISPRO 10 UNITS: 100 INJECTION, SOLUTION INTRAVENOUS; SUBCUTANEOUS at 12:05

## 2017-10-08 RX ADMIN — PIOGLITAZONE 30 MG: 30 TABLET ORAL at 08:41

## 2017-10-08 RX ADMIN — KETOROLAC TROMETHAMINE 30 MG: 30 INJECTION, SOLUTION INTRAMUSCULAR at 14:40

## 2017-10-08 RX ADMIN — INSULIN LISPRO 10 UNITS: 100 INJECTION, SOLUTION INTRAVENOUS; SUBCUTANEOUS at 16:37

## 2017-10-08 RX ADMIN — TOPIRAMATE 50 MG: 25 TABLET, FILM COATED ORAL at 21:04

## 2017-10-08 RX ADMIN — ATORVASTATIN CALCIUM 80 MG: 80 TABLET, FILM COATED ORAL at 17:46

## 2017-10-08 RX ADMIN — DIPHENHYDRAMINE HYDROCHLORIDE 25 MG: 50 INJECTION, SOLUTION INTRAMUSCULAR; INTRAVENOUS at 05:31

## 2017-10-08 RX ADMIN — PROCHLORPERAZINE MALEATE 10 MG: 5 TABLET, FILM COATED ORAL at 05:31

## 2017-10-08 RX ADMIN — PROCHLORPERAZINE MALEATE 10 MG: 5 TABLET, FILM COATED ORAL at 14:24

## 2017-10-08 RX ADMIN — KETOROLAC TROMETHAMINE 30 MG: 30 INJECTION, SOLUTION INTRAMUSCULAR at 21:10

## 2017-10-08 RX ADMIN — INSULIN LISPRO 2 UNITS: 100 INJECTION, SOLUTION INTRAVENOUS; SUBCUTANEOUS at 16:38

## 2017-10-08 RX ADMIN — METOPROLOL TARTRATE 25 MG: 25 TABLET ORAL at 21:03

## 2017-10-08 RX ADMIN — MELATONIN TAB 3 MG 3 MG: 3 TAB at 21:04

## 2017-10-08 RX ADMIN — CHLORHEXIDINE GLUCONATE 15 ML: 1.2 RINSE ORAL at 08:40

## 2017-10-08 RX ADMIN — GABAPENTIN 300 MG: 300 CAPSULE ORAL at 21:03

## 2017-10-08 RX ADMIN — DIPHENHYDRAMINE HYDROCHLORIDE 25 MG: 50 INJECTION, SOLUTION INTRAMUSCULAR; INTRAVENOUS at 14:24

## 2017-10-08 RX ADMIN — VALSARTAN 320 MG: 160 TABLET ORAL at 08:40

## 2017-10-08 RX ADMIN — ESCITALOPRAM OXALATE 10 MG: 10 TABLET ORAL at 16:35

## 2017-10-08 RX ADMIN — BUTALBITAL, ACETAMINOPHEN, AND CAFFEINE 1 TABLET: 50; 325; 40 TABLET ORAL at 08:44

## 2017-10-08 RX ADMIN — KETOROLAC TROMETHAMINE 30 MG: 30 INJECTION, SOLUTION INTRAMUSCULAR at 05:30

## 2017-10-08 RX ADMIN — MAGNESIUM SULFATE HEPTAHYDRATE 1 G: 1 INJECTION, SOLUTION INTRAVENOUS at 08:47

## 2017-10-08 RX ADMIN — INSULIN LISPRO 2 UNITS: 100 INJECTION, SOLUTION INTRAVENOUS; SUBCUTANEOUS at 12:05

## 2017-10-08 RX ADMIN — INSULIN LISPRO 1 UNITS: 100 INJECTION, SOLUTION INTRAVENOUS; SUBCUTANEOUS at 08:45

## 2017-10-08 NOTE — PROGRESS NOTES
[facility] Internal Medicine Progress Note  Patient: Flash Patel 47 y o  female   MRN: [id number]  PCP: Flash Patel MD  Unit/Bed#: [address] [place] [address] Encounter: [id number]  Date Of Visit: 10/08/17    Problem List:    Principal Problem:    Acute CVA (cerebrovascular accident) [facility])  Active Problems:    Hypertensive urgency    Diabetes mellitus ([facility] [address] )    Hypertension    Hyperlipidemia    CHRISTIANA (acute kidney injury) ([facility] )    Migraine      Assessment & Plan:  25-year-old female present to the emergency room with left-sided facial droop and left leg are numb weakness  Patient noted to have elevated blood pressure and received IV labetalol and Cardene infusion was started  Patient was also given IV tPA for possible CVA  Patient had MRI of the brain and CTA of the head and neck which were negative  Patient is being treated for complicated migraine  1  Complicated migraine-patient started on Topamax 25 milligram weekly, which will be increased to 50 milligrams in week 2, 75 milligrams in week 3, 100 milligrams in week 4 and continue 100 milligram every night  The patient will also be started on migraine cocktail with Toradol, Benadryl and Reglan 8 hours  2  Left-sided weakness status post IV tPA-patient has significant improvement of symptoms  Continue PT/OT  Await placement in rehabilitation  Continue Lipitor 80 milligram p o  daily  MRI of the brain was normal   CT of the head and neck showed no arterial thrombus or restrictive disease  Echo showed EF of 20 60%, no regional wall motion abnormalities  3  Left-sided body pain likely secondary to fred sensory syndrome-rule out autoimmune/infectious etiology is by checking KENYATTA, hypercoagulable panel and Lyme titers  Patient is on migraine cocktail with Toradol, Reglan and Benadryl  Patient was also started on Neurontin 300 milligram p o  q h s     Patient's repeat CT scan of the brain was normal   If patient has persistent symptoms we will consider MRI of the brain on 2017   4  Left ICA of 1 8 mm [id number]-[id number] with Neurosurgery as outpatient  5  Dyslipidemia-continue Lipitor  6  Hypertension-blood pressure controlled with  hydrochlorothiazide 25 milligram p  o  daily and metoprolol 5 milligram p o  b i d  and Diovan 320 milligram p o  Daily  7  Diabetes mellitus type 2-patient's hemoglobin A1c is 8 9  Continue Humalog sliding scale with Accu-Cheks q a c  and HS  Patient's blood sugars better with NovoLog 10 units subcutaneously with each meal   8  Near syncope-likely vasovagal due to pain  VTE Pharmacologic Prophylaxis:   Pharmacologic: Pharmacologic VTE Prophylaxis contraindicated due to recent tPA  Mechanical VTE Prophylaxis in Place: Yes    Patient Centered Rounds: I have performed bedside rounds with nursing staff today  Discussions with Specialists or Other Care Team Provider: Yes Dr Owen Eye    Education and Discussions with Family / Patient:Yes    Time Spent for Care: 30 minutes  More than 50% of total time spent on counseling and coordination of care as described above  Current Length of Stay: 5 day(s)    Current Patient Status: Inpatient     Discharge Plan: STR    Code Status: Level 1 - Full Code      Subjective:   Patient has improved pain with a continuous pain medication  The patient does complain of headache on and off especially on the left side  Objective:         Negative for Chest Pain, Palpitations, Shortness of Breath, Abdominal Pain, Nausea, Vomiting, Constipation, Diarrhea, Dizziness  All other 10 review of systems negative and without drastic interval changes from yesterday  Vitals:   Temp (24hrs), Av 9 °F (36 6 °C), Min:97 3 °F (36 3 °C), Max:98 6 °F (37 °C)    HR:  [60-68] 60  Resp:  [18-20] 20  BP: (134-165)/(82-89) 140/87  SpO2:  [94 %-98 %] 97 %  Body mass index is 33 23 kg/m²  Input and Output Summary (last 24 hours):        Intake/Output Summary (Last 24 hours) at 10/08/17 0719  Last data filed at 10/07/17 2050   Gross per 24 hour   Intake              360 ml   Output                0 ml   Net              360 ml       Physical Exam:     Physical Exam   Constitutional: No distress  HENT:   Head: Normocephalic and atraumatic  Nose: Nose normal    Eyes: Conjunctivae and EOM are normal  Pupils are equal, round, and reactive to light  Neck: Normal range of motion  Neck supple  No JVD present  Cardiovascular: Normal rate, regular rhythm and normal heart sounds  Exam reveals no gallop and no friction rub  No murmur heard  Pulmonary/Chest: Effort normal and breath sounds normal  No respiratory distress  She has no wheezes  She has no rales  She exhibits no tenderness  Abdominal: Soft  Bowel sounds are normal  She exhibits no distension  There is no tenderness  There is no rebound and no guarding  Musculoskeletal: She exhibits no edema  Neurological: She is alert  No cranial nerve deficit  Mild left facial droop  Skin: Skin is warm and dry  No rash noted  Psychiatric: She has a normal mood and affect  Additional Data:     Labs:      Results from last 7 days  Lab Units 10/05/17  0457   WBC Thousand/uL 5 10   HEMOGLOBIN g/dL 12 9   HEMATOCRIT % 38 3   PLATELETS Thousands/uL 216   NEUTROS PCT % 49   LYMPHS PCT % 41   MONOS PCT % 8   EOS PCT % 2       Results from last 7 days  Lab Units 10/06/17  1050 10/05/17  0457   SODIUM mmol/L 132* 135*   POTASSIUM mmol/L 3 8 3 8   CHLORIDE mmol/L 99* 102   CO2 mmol/L 25 24   BUN mg/dL 24 14   CREATININE mg/dL 1 10 0 89   CALCIUM mg/dL 9 1 9 2   TOTAL PROTEIN g/dL  --  6 4   BILIRUBIN TOTAL mg/dL  --  0 30   ALK PHOS U/L  --  97   ALT U/L  --  46   AST U/L  --  29   GLUCOSE RANDOM mg/dL 328* 155*       Results from last 7 days  Lab Units 10/03/17  1105   INR  0 90       * I Have Reviewed All Lab Data Listed Above  * Additional Pertinent Lab Tests Reviewed:  Daniele 65 Admission Reviewed    Imaging:  Cta Head And Neck With And Without Contrast    Result Date: 10/3/2017  Narrative: CTA NECK AND BRAIN WITH AND WITHOUT CONTRAST INDICATION:      History taken directly from the electronic ordering system  COMPARISON:   None  TECHNIQUE:  Routine CT imaging of the Brain without contrast   Post contrast imaging was performed after administration of iodinated contrast through the neck and brain  Post contrast axial 0 625 mm images timed to opacify the arterial system  3D rendering was performed on an independent workstation  MIP reconstructions performed  Coronal reconstructions were performed of the noncontrast portion of the brain  Radiation dose length product (DLP) for this visit:  370 88 mGy-cm   This examination, like all CT scans performed in the [facility], was performed utilizing techniques to minimize radiation dose exposure, including the use of iterative  reconstruction and automated exposure control  IV Contrast:  85 mL of iohexol (OMNIPAQUE)     IMAGE QUALITY:   Diagnostic FINDINGS: CERVICAL VASCULATURE AORTIC ARCH AND GREAT VESSELS:  Normal aortic arch and great vessel origins  Normal visualized subclavian vessels  RIGHT VERTEBRAL ARTERY CERVICAL SEGMENT:  Normal origin  The vessel is normal in caliber throughout the neck  LEFT VERTEBRAL ARTERY CERVICAL SEGMENT:  Normal origin  The vessel is normal in caliber throughout the neck  RIGHT EXTRACRANIAL CAROTID SEGMENT:  Normal caliber common carotid artery  Normal bifurcation and cervical internal carotid artery  No stenosis or dissection  LEFT EXTRACRANIAL CAROTID SEGMENT:  Normal caliber common carotid artery  Normal bifurcation and cervical internal carotid artery  No stenosis or dissection  NASCET criteria was used to determine the degree of internal carotid artery diameter stenosis  INTRACRANIAL VASCULATURE INTERNAL CAROTID ARTERIES:  Normal enhancement of the intracranial portions of the internal carotid arteries    Normal ophthalmic artery origins  Normal ICA terminus  1 8 mm windsock extension of contrast extending posterolaterally from the posterior wall of the supraclinoid ICA, near origin of posterior communicating and anterior coronal arteries  This could represent a tiny infundibulum or aneurysm  Repeat CTA of the brain only is suggested in 6 months to confirm this  Nonurgent Neurovascular consultation also suggested  ANTERIOR CIRCULATION:  Symmetric A1 segments and anterior cerebral arteries with normal enhancement  Normal anterior communicating artery  MIDDLE CEREBRAL ARTERY CIRCULATION:  M1 segment and middle cerebral artery branches demonstrate normal enhancement bilaterally  DISTAL VERTEBRAL ARTERIES:  Normal distal vertebral arteries  Posterior inferior cerebellar artery origins are normal  Normal vertebral basilar junction  BASILAR ARTERY:  Basilar artery is normal in caliber  Normal superior cerebellar arteries  POSTERIOR CEREBRAL ARTERIES: Both posterior cerebral arteries arises from the basilar tip  Both arteries demonstrate normal flow-related enhancement  Right P-comm is patent  DURAL VENOUS SINUSES:  Normal  NON VASCULAR ANATOMY BONY STRUCTURES:  No acute osseous abnormality  SOFT TISSUES OF THE NECK:  Normal         THORACIC INLET:  Unremarkable  Impression: No large vessel flow restrictive disease within the head or neck  No arterial thrombosis  Possible 1 8 mm aneurysm/infundibulum arising from the supraclinoid left ICA  Nonemergent neurovascular consultation and follow-up CTA of the brain only, suggested in 6 months  ##neurovascularslh##neurovascularslh ##sigslh##sigslh Workstation performed: [id number]     Xr Chest 1 View Portable    Result Date: 10/3/2017  Narrative: CHEST INDICATION:  Stroke alert COMPARISON:  11/22/2016 VIEWS:   AP frontal IMAGES:  1 FINDINGS:     Cardiomediastinal silhouette appears unremarkable  Suboptimal inspiration  Intimal atelectasis at the right lung base    No pneumothorax or pleural effusion  No evidence of heart failure  Visualized osseous structures appear within normal limits for the patient's age  Impression: Minimal atelectasis at the right lung base  Workstation performed: [id number]     Mri Brain Wo Contrast    Result Date: 10/4/2017  Narrative: MRI BRAIN WITHOUT CONTRAST INDICATION:  CVA COMPARISON:   CTA performed one day earlier TECHNIQUE:  Sagittal T1, axial T2, axial FLAIR, axial T1, axial Marlow and axial diffusion imaging  IMAGE QUALITY:  Diagnostic  FINDINGS: BRAIN PARENCHYMA:  There is no discrete mass, mass effect or midline shift  No abnormal white matter signal identified  Brainstem and cerebellum demonstrate normal signal  There is no intracranial hemorrhage  There is no evidence of acute infarction and  diffusion imaging is unremarkable  VENTRICLES:  The ventricles are normal in size and contour  SELLA AND PITUITARY GLAND:  Partially empty sella ORBITS:  Normal  PARANASAL SINUSES:  Minor right mastoid fluid VASCULATURE:  Evaluation of the major intracranial vasculature demonstrates appropriate flow voids  CALVARIUM AND SKULL BASE:  Normal  EXTRACRANIAL SOFT TISSUES:  Normal      Impression: Normal MRI of the brain, no acute disease specifically, no indication of acute ischemia  Workstation performed: [id number]     Ct Stroke Alert Brain    Result Date: 10/3/2017  Narrative: CT BRAIN - STROKE ALERT PROTOCOL INDICATION: Stroke alert COMPARISON:  None  TECHNIQUE:  CT examination of the brain was performed  In addition to axial images, coronal reformatted images were created and submitted for interpretation  Radiation dose length product (DLP) for this visit:  1087 3 mGy-cm   This examination, like all CT scans performed in the [facility], was performed utilizing techniques to minimize radiation dose exposure, including the use of iterative  reconstruction and automated exposure control  IMAGE QUALITY:  Diagnostic   FINDINGS:  PARENCHYMA: No intracranial mass, mass effect or midline shift  No acute intracranial hemorrhage  No CT signs of acute infarction  VENTRICLES AND EXTRA-AXIAL SPACES:  Normal for patient's age  VISUALIZED ORBITS AND PARANASAL SINUSES:  Unremarkable  CALVARIUM AND EXTRACRANIAL SOFT TISSUES:   Normal      Impression: Normal study  No acute hemorrhage  No acute infarction  Findings were directly discussed with J Luis Ferreira RN the head nurse in the emergency department will convey the information to Dr Segovia directly  on 10/3/2017 10:27 AM  Workstation performed: [id number]     Imaging Reports Reviewed by myself    Cultures:   Blood Culture: No results found for: BLOODCX  Urine Culture:   Lab Results   Component Value Date    URINECX <10,000 cfu/ml Mixed Contaminants X2 11/22/2016    URINECX 10,000-19,000 cfu/ml Mixed Contaminants X4 10/24/2016    URINECX No Growth <1000 cfu/mL 04/09/2016     Sputum Culture: No components found for: SPUTUMCX  Wound Culture: No results found for: WOUNDCULT    Last 24 Hours Medication List:     atorvastatin 80 mg Oral QPM   chlorhexidine 15 mL Swish & Spit Q12H [address]   diphenhydrAMINE 25 mg Intravenous Q8H [address]   gabapentin 300 mg Oral HS   hydrochlorothiazide 25 mg Oral Daily   insulin lispro 1-5 Units Subcutaneous TID AC   insulin lispro 1-5 Units Subcutaneous HS   insulin lispro 10 Units Subcutaneous TID With Meals   ketorolac 30 mg Intravenous Q8H [address]   magnesium sulfate 1 g Intravenous BID   melatonin 3 mg Oral HS   metoprolol tartrate 25 mg Oral Q12H BRINA   pioglitazone 30 mg Oral Daily   prochlorperazine 10 mg Oral Q8H [address]   sodium chloride 100 mL/hr Intravenous Once   topiramate 50 mg Oral HS   valsartan 320 mg Oral Daily        Today, Patient Was Seen By: Kalyn Ibarra MD    ** Please Note: Dragon 360 Dictation voice to text software may have been used in the creation of this document   **

## 2017-10-08 NOTE — CONSULTS
Consultation - Ivy Savery 47 y o  female MRN: [id number]    Unit/Bed#: [address] [place] [address] Encounter: [id number]      Identifying Data:  47years old  female is admitted at this hospital on October 3, 2017 after the stroke  Psychiatric consultation is asked for depression anxiety and insomnia  Patient examined spoke with the nurse and spoke to his daughter at bedside who lives with the patient  Patient is a poor [place] speaking and she is from [facility] [place] this [place] since 25 years  Patient admits being anxious nervous ongoing chronic insomnia and depression she is stressed about not getting over time at work anymore she is facing financial problems and she worries a lot about it she admits being severely depressed and she has been taking Klonopin from the family physician patient and daughter both agreed to take anti depression and anxiety medications patient is also requesting to give her something for sleep  Patient is suffering from multiple chronic medical problems acute CVA hypertension diabetes mellitus high cholesterol acute kidney injury hysterectomy  Patient is  daughter lives with her patient denies smoking denies abusing alcohol or drugs patient has no history of drug and alcohol abuse patient is working as a  since 11 years at the same company  Currently patient looks anxious uptight and depressed patient is  she has no boyfriend patient has 3 sons and 2 daughters    Chief Complaints:  Depression anxiety and insomnia  Family History: History reviewed  No pertinent family history  Mom had depression    Legal History:  None      Mental Status Exam:  47years old  female flat withdrawn anxious uptight resting in the bed left-sided weakness she gets overwhelmed and emotional during the session she admits being severely anxious depressed and stressed she worries about everything mainly not getting over time and financial problems she worries about the children  Memory intact  The poor English but she is able to express herself and daughter helps during the session  Patient denies auditory or visual hallucinations  Patient denies suicidal or homicidal ideations  No paranoia and no delusion elucidated not agitated poor concentration poor sleep insight and judgments are adequate        History of Present Illness     HPI: Callie Harper is a 47y o  year old female who presents with stroke    Consults      Historical Information   Past Psychiatric History:  Patient gives a long history of anxiety and chronic insomnia she has been getting Klonopin from the family physician but she has not seen a psychiatrist no psychiatric admissions no suicide attempts currently patient is taking Klonopin at home but she is not on any antidepressant medications patient has no history of drug and alcohol abuse no legal problems in the past   Past Medical History:   Diagnosis Date    [facility]     Hyperlipidemia     Hypertension     Stroke [facility])      Past Surgical History:   Procedure Laterality Date    HYSTERECTOMY       Social History   History   Alcohol Use No     History   Drug Use No     History   Smoking Status    Never Smoker   Smokeless Tobacco    Not on file       Meds/Allergies   current meds:   Current Facility-Administered Medications   Medication Dose Route Frequency    atorvastatin (LIPITOR) tablet 80 mg  80 mg Oral QPM    butalbital-acetaminophen-caffeine (FIORICET,ESGIC) -40 mg per tablet 1 tablet  1 tablet Oral Q6H PRN    chlorhexidine (PERIDEX) 0 12 % oral rinse 15 mL  15 mL Swish & Spit Q12H [facility]    cyclobenzaprine (FLEXERIL) tablet 10 mg  10 mg Oral TID PRN    diphenhydrAMINE (BENADRYL) injection 25 mg  25 mg Intravenous Q8H [facility]    gabapentin (NEURONTIN) capsule 300 mg  300 mg Oral HS    hydrochlorothiazide (HYDRODIURIL) tablet 25 mg  25 mg Oral Daily    insulin lispro (HumaLOG) 100 units/mL subcutaneous injection 1-5 Units  1-5 Units Subcutaneous TID AC    insulin lispro (HumaLOG) 100 units/mL subcutaneous injection 1-5 Units  1-5 Units Subcutaneous HS    insulin lispro (HumaLOG) 100 units/mL subcutaneous injection 10 Units  10 Units Subcutaneous TID With Meals    ketorolac (TORADOL) 30 mg/mL injection 30 mg  30 mg Intravenous Q8H [address]    labetalol (NORMODYNE) injection 10 mg  10 mg Intravenous Q4H PRN    magnesium sulfate IVPB (premix) SOLN 1 g  1 g Intravenous BID    melatonin tablet 3 mg  3 mg Oral HS    metoprolol tartrate (LOPRESSOR) tablet 25 mg  25 mg Oral Q12H [address]    pioglitazone (ACTOS) tablet 30 mg  30 mg Oral Daily    prochlorperazine (COMPAZINE) tablet 10 mg  10 mg Oral Q8H [address]    promethazine (PHENERGAN) injection 25 mg  25 mg Intravenous Q6H PRN    sodium chloride 0 9 % infusion  100 mL/hr Intravenous Once    topiramate (TOPAMAX) tablet 50 mg  50 mg Oral HS    valsartan (DIOVAN) tablet 320 mg  320 mg Oral Daily    and PTA meds:    Prescriptions Prior to Admission   Medication    atorvastatin (LIPITOR) 10 mg tablet    clonazePAM (KlonoPIN) 0 5 mg tablet    valsartan-hydrochlorothiazide (DIOVAN-HCT) 320-25 MG per tablet    Canagliflozin (INVOKANA PO)    fenofibrate (TRICOR) 145 mg tablet    insulin aspart (NovoLOG) 100 units/mL injection    metoprolol tartrate (LOPRESSOR) 50 mg tablet    pioglitazone (ACTOS) 30 mg tablet     No Known Allergies    Objective   Vitals: Blood pressure 140/87, pulse 60, temperature 98 °F (36 7 °C), temperature source Tympanic, resp  rate 20, height 5' 2" (1 575 m), weight 82 4 kg (181 lb 10 5 oz), SpO2 97 %, not currently breastfeeding        Routine Lab Results:   Admission on 10/03/2017   Component Date Value Ref Range Status    WBC 10/03/2017 7 80  4 80 - 10 80 Thousand/uL Final    RBC 10/03/2017 4 78  4 20 - 5 40 Million/uL Final    Hemoglobin 10/03/2017 14 0  12 0 - 16 0 g/dL Final    Hematocrit 10/03/2017 41 9  37 0 - 47 0 % Final    MCV 10/03/2017 88  82 - 98 fL Final    MCH 10/03/2017 29 4  27 0 - 31 0 pg Final    MCHC 10/03/2017 33 4  31 4 - 37 4 g/dL Final    RDW 10/03/2017 13 2  11 6 - 15 1 % Final    MPV 10/03/2017 8 3* 8 9 - 12 7 fL Final    Platelets 20/91/0126 229  130 - 400 Thousands/uL Final    nRBC 10/03/2017 0  /100 WBCs Final    Neutrophils Relative 10/03/2017 64  43 - 75 % Final    Lymphocytes Relative 10/03/2017 28  14 - 44 % Final    Monocytes Relative 10/03/2017 6  4 - 12 % Final    Eosinophils Relative 10/03/2017 1  0 - 6 % Final    Basophils Relative 10/03/2017 0  0 - 1 % Final    Neutrophils Absolute 10/03/2017 5 00  1 85 - 7 62 Thousands/µL Final    Lymphocytes Absolute 10/03/2017 2 20  0 60 - 4 47 Thousands/µL Final    Monocytes Absolute 10/03/2017 0 50  0 17 - 1 22 Thousand/µL Final    Eosinophils Absolute 10/03/2017 0 10  0 00 - 0 61 Thousand/µL Final    Basophils Absolute 10/03/2017 0 00  0 00 - 0 10 Thousands/µL Final    Sodium 10/03/2017 135* 136 - 145 mmol/L Final    Potassium 10/03/2017 4 3  3 5 - 5 3 mmol/L Final    Chloride 10/03/2017 100  100 - 108 mmol/L Final    CO2 10/03/2017 24  21 - 32 mmol/L Final    Anion Gap 10/03/2017 11  4 - 13 mmol/L Final    BUN 10/03/2017 24  5 - 25 mg/dL Final    Creatinine 10/03/2017 1 45* 0 60 - 1 30 mg/dL Final    Glucose 10/03/2017 361* 65 - 140 mg/dL Final    Calcium 10/03/2017 9 7  8 3 - 10 1 mg/dL Final    AST 10/03/2017 39  5 - 45 U/L Final    ALT 10/03/2017 63  12 - 78 U/L Final    Alkaline Phosphatase 10/03/2017 140* 46 - 116 U/L Final    Total Protein 10/03/2017 7 2  6 4 - 8 2 g/dL Final    Albumin 10/03/2017 3 2* 3 5 - 5 0 g/dL Final    Total Bilirubin 10/03/2017 0 30  0 20 - 1 00 mg/dL Final    eGFR 10/03/2017 41  ml/min/1 73sq m Final    Ventricular Rate 10/05/2017 94  BPM Final    Atrial Rate 10/05/2017 94  BPM Final    UT Interval 10/05/2017 172  ms Final    QRSD Interval 10/05/2017 74  ms Final    QT Interval 10/05/2017 386  ms Final    QTC Interval 10/05/2017 482  ms Final    P Axis 10/05/2017 41  degrees Final    QRS Axis 10/05/2017 -5  degrees Final    T Wave Angelica 10/05/2017 4  degrees Final    Troponin I 10/03/2017 <0 02  <=0 04 ng/mL Final    Magnesium 10/03/2017 1 7  1 6 - 2 6 mg/dL Final    Color, UA 10/03/2017 Yellow   Final    Clarity, UA 10/03/2017 Clear   Final    Specific Gravity, UA 10/03/2017 1 010  1 000 - 1 030 Final    pH, UA 10/03/2017 5 0  5 0 - 9 0 Final    Leukocytes, UA 10/03/2017 Elevated glucose may cause decreased leukocyte values   See urine microscopic for [facility] result/* Negative Final    Nitrite, UA 10/03/2017 Negative  Negative Final    Protein, UA 10/03/2017 100 (2+)* Negative mg/dl Final    Glucose, UA 10/03/2017 >=1000 (1%)* Negative mg/dl Final    Ketones, UA 10/03/2017 Negative  Negative mg/dl Final    Urobilinogen, UA 10/03/2017 0 2  0 2, 1 0 E U /dl E U /dl Final    Bilirubin, UA 10/03/2017 Negative  Negative Final    Blood, UA 10/03/2017 Small* Negative Final    POC Glucose 10/03/2017 307* 65 - 140 mg/dl Final    PTT 10/03/2017 24  24 - 33 seconds Final    Protime 10/03/2017 9 4  9 4 - 11 7 seconds Final    INR 10/03/2017 0 90  0 86 - 1 16 Final    RBC, UA 10/03/2017 1-2* None Seen, 0-5 /hpf Final    WBC, UA 10/03/2017 1-2* None Seen, 0-5, 5-55, 5-65 /hpf Final    Epithelial Cells 10/03/2017 Moderate* None Seen, Occasional /hpf Final    Bacteria, UA 10/03/2017 None Seen  None Seen, Occasional /hpf Final    MRSA Culture Only 10/05/2017 No Methicillin Resistant Staphlyococcus aureus (MRSA) isolated   Final    POC Glucose 10/03/2017 153* 65 - 140 mg/dl Final    POC Glucose 10/03/2017 182* 65 - 140 mg/dl Final    POC Glucose 10/03/2017 163* 65 - 140 mg/dl Final    Hemoglobin A1C 10/04/2017 8 9* 4 2 - 6 3 % Final    EAG 10/04/2017 209  mg/dl Final    WBC 10/04/2017 5 00  4 80 - 10 80 Thousand/uL Final    RBC 10/04/2017 4 26  4 20 - 5 40 Million/uL Final    Hemoglobin 10/04/2017 12 5  12 0 - 16 0 g/dL Final    Hematocrit 10/04/2017 37 6  37 0 - 47 0 % Final    MCV 10/04/2017 88  82 - 98 fL Final    MCH 10/04/2017 29 3  27 0 - 31 0 pg Final    MCHC 10/04/2017 33 1  31 4 - 37 4 g/dL Final    RDW 10/04/2017 13 1  11 6 - 15 1 % Final    MPV 10/04/2017 8 3* 8 9 - 12 7 fL Final    Platelets 90/47/0876 209  130 - 400 Thousands/uL Final    nRBC 10/04/2017 0  /100 WBCs Final    Neutrophils Relative 10/04/2017 49  43 - 75 % Final    Lymphocytes Relative 10/04/2017 42  14 - 44 % Final    Monocytes Relative 10/04/2017 8  4 - 12 % Final    Eosinophils Relative 10/04/2017 2  0 - 6 % Final    Basophils Relative 10/04/2017 1  0 - 1 % Final    Neutrophils Absolute 10/04/2017 2 40  1 85 - 7 62 Thousands/µL Final    Lymphocytes Absolute 10/04/2017 2 10  0 60 - 4 47 Thousands/µL Final    Monocytes Absolute 10/04/2017 0 40  0 17 - 1 22 Thousand/µL Final    Eosinophils Absolute 10/04/2017 0 10  0 00 - 0 61 Thousand/µL Final    Basophils Absolute 10/04/2017 0 00  0 00 - 0 10 Thousands/µL Final    Sodium 10/04/2017 137  136 - 145 mmol/L Final    Potassium 10/04/2017 3 8  3 5 - 5 3 mmol/L Final    Chloride 10/04/2017 105  100 - 108 mmol/L Final    CO2 10/04/2017 24  21 - 32 mmol/L Final    Anion Gap 10/04/2017 8  4 - 13 mmol/L Final    BUN 10/04/2017 13  5 - 25 mg/dL Final    Creatinine 10/04/2017 0 89  0 60 - 1 30 mg/dL Final    Glucose 10/04/2017 145* 65 - 140 mg/dL Final    Calcium 10/04/2017 8 9  8 3 - 10 1 mg/dL Final    eGFR 10/04/2017 74  ml/min/1 73sq m Final    Magnesium 10/04/2017 1 9  1 6 - 2 6 mg/dL Final    Phosphorus 10/04/2017 2 7  2 7 - 4 5 mg/dL Final    Cholesterol 10/04/2017 275* 50 - 200 mg/dL Final    Triglycerides 10/04/2017 571* <=150 mg/dL Final    HDL, Direct 10/04/2017 37* 40 - 60 mg/dL Final    LDL Calculated 10/04/2017   0 - 100 mg/dL Final    Calculated LDL invalid, triglycerides >400 mg/dl    POC Glucose 10/04/2017 178* 65 - 140 mg/dl Final    POC Glucose 10/04/2017 184* 65 - 140 mg/dl Final    LDL Direct 10/04/2017 122* 0 - 100 mg/dl Final    POC Glucose 10/04/2017 115  65 - 140 mg/dl Final    POC Glucose 10/04/2017 191* 65 - 140 mg/dl Final    POC Glucose 10/05/2017 166* 65 - 140 mg/dl Final    WBC 10/05/2017 5 10  4 80 - 10 80 Thousand/uL Final    RBC 10/05/2017 4 35  4 20 - 5 40 Million/uL Final    Hemoglobin 10/05/2017 12 9  12 0 - 16 0 g/dL Final    Hematocrit 10/05/2017 38 3  37 0 - 47 0 % Final    MCV 10/05/2017 88  82 - 98 fL Final    MCH 10/05/2017 29 6  27 0 - 31 0 pg Final    MCHC 10/05/2017 33 6  31 4 - 37 4 g/dL Final    RDW 10/05/2017 13 3  11 6 - 15 1 % Final    MPV 10/05/2017 8 3* 8 9 - 12 7 fL Final    Platelets 48/13/2506 216  130 - 400 Thousands/uL Final    nRBC 10/05/2017 0  /100 WBCs Final    Neutrophils Relative 10/05/2017 49  43 - 75 % Final    Lymphocytes Relative 10/05/2017 41  14 - 44 % Final    Monocytes Relative 10/05/2017 8  4 - 12 % Final    Eosinophils Relative 10/05/2017 2  0 - 6 % Final    Basophils Relative 10/05/2017 1  0 - 1 % Final    Neutrophils Absolute 10/05/2017 2 50  1 85 - 7 62 Thousands/µL Final    Lymphocytes Absolute 10/05/2017 2 10  0 60 - 4 47 Thousands/µL Final    Monocytes Absolute 10/05/2017 0 40  0 17 - 1 22 Thousand/µL Final    Eosinophils Absolute 10/05/2017 0 10  0 00 - 0 61 Thousand/µL Final    Basophils Absolute 10/05/2017 0 00  0 00 - 0 10 Thousands/µL Final    Sodium 10/05/2017 135* 136 - 145 mmol/L Final    Potassium 10/05/2017 3 8  3 5 - 5 3 mmol/L Final    Chloride 10/05/2017 102  100 - 108 mmol/L Final    CO2 10/05/2017 24  21 - 32 mmol/L Final    Anion Gap 10/05/2017 9  4 - 13 mmol/L Final    BUN 10/05/2017 14  5 - 25 mg/dL Final    Creatinine 10/05/2017 0 89  0 60 - 1 30 mg/dL Final    Glucose 10/05/2017 155* 65 - 140 mg/dL Final    Calcium 10/05/2017 9 2  8 3 - 10 1 mg/dL Final    AST 10/05/2017 29  5 - 45 U/L Final    ALT 10/05/2017 46  12 - 78 U/L Final    Alkaline Phosphatase 10/05/2017 97  46 - 116 U/L Final    Total Protein 10/05/2017 6 4  6 4 - 8 2 g/dL Final    Albumin 10/05/2017 2 7* 3 5 - 5 0 g/dL Final    Total Bilirubin 10/05/2017 0 30  0 20 - 1 00 mg/dL Final    eGFR 10/05/2017 74  ml/min/1 73sq m Final    Magnesium 10/05/2017 2 0  1 6 - 2 6 mg/dL Final    POC Glucose 10/05/2017 142* 65 - 140 mg/dl Final    POC Glucose 10/05/2017 233* 65 - 140 mg/dl Final    POC Glucose 10/05/2017 314* 65 - 140 mg/dl Final    POC Glucose 10/06/2017 243* 65 - 140 mg/dl Final    POC Glucose 10/06/2017 213* 65 - 140 mg/dl Final    Sodium 10/06/2017 132* 136 - 145 mmol/L Final    Potassium 10/06/2017 3 8  3 5 - 5 3 mmol/L Final    Chloride 10/06/2017 99* 100 - 108 mmol/L Final    CO2 10/06/2017 25  21 - 32 mmol/L Final    Anion Gap 10/06/2017 8  4 - 13 mmol/L Final    BUN 10/06/2017 24  5 - 25 mg/dL Final    Creatinine 10/06/2017 1 10  0 60 - 1 30 mg/dL Final    Glucose 10/06/2017 328* 65 - 140 mg/dL Final    Calcium 10/06/2017 9 1  8 3 - 10 1 mg/dL Final    eGFR 10/06/2017 57  ml/min/1 73sq m Final    POC Glucose 10/06/2017 284* 65 - 140 mg/dl Final    POC Glucose 10/06/2017 168* 65 - 140 mg/dl Final    POC Glucose 10/07/2017 182* 65 - 140 mg/dl Final    Magnesium 10/07/2017 2 0  1 6 - 2 6 mg/dL Final    POC Glucose 10/07/2017 239* 65 - 140 mg/dl Final    POC Glucose 10/07/2017 277* 65 - 140 mg/dl Final    Homocyst(e)ine, P/S 10/07/2017 8 5  3 7 - 11 2 umol/L Final    AntiThrombIN III Activity 10/08/2017 110  92 - 136 % of Normal Final    Vitamin B-12 10/07/2017 1214* 100 - 900 pg/mL Final    Vit D, 25-Hydroxy 10/07/2017 9 3* 30 0 - 100 0 ng/mL Final    POC Glucose 10/07/2017 141* 65 - 140 mg/dl Final    POC Glucose 10/07/2017 117  65 - 140 mg/dl Final    POC Glucose 10/08/2017 185* 65 - 140 mg/dl Final    POC Glucose 10/08/2017 240* 65 - 140 mg/dl Final         Diagnosis:  Major depression recurrent    Adjustment disorder with emotional features  Anxiety  Insomnia    Plan:  Lexapro 10 mg daily  Klonopin 0 5 mg p  o  t i d   Klonopin 0 5 mg p o  q 12 hours p r n  for anxiety  Psychotherapy  Psychiatric follow-up recommended after the discharge  I will follow up during the hospital stay  Thank you very much      Carlo Rubio MD

## 2017-10-08 NOTE — PLAN OF CARE
GASTROINTESTINAL - ADULT     Establish and maintain optimal ostomy function Completed          CARDIOVASCULAR - ADULT     Maintains optimal cardiac output and hemodynamic stability Progressing     Absence of cardiac dysrhythmias or at baseline rhythm Progressing        DISCHARGE PLANNING - CARE MANAGEMENT     Discharge to post-acute care or home with appropriate resources Progressing        GASTROINTESTINAL - ADULT     Minimal or absence of nausea and/or vomiting Progressing     Maintains or returns to baseline bowel function Progressing     Maintains adequate nutritional intake Progressing        METABOLIC, FLUID AND ELECTROLYTES - ADULT     Electrolytes maintained within normal limits Progressing     Fluid balance maintained Progressing     Glucose maintained within target range Progressing        Nutrition/Hydration-ADULT     Nutrient/Hydration intake appropriate for improving, restoring or maintaining nutritional needs Progressing        Potential for Falls     Patient will remain free of falls Progressing        Prexisting or High Potential for Compromised Skin Integrity     Skin integrity is maintained or improved Progressing

## 2017-10-08 NOTE — PLAN OF CARE
CARDIOVASCULAR - ADULT     Maintains optimal cardiac output and hemodynamic stability Progressing     Absence of cardiac dysrhythmias or at baseline rhythm Progressing        DISCHARGE PLANNING - CARE MANAGEMENT     Discharge to post-acute care or home with appropriate resources Progressing        GASTROINTESTINAL - ADULT     Minimal or absence of nausea and/or vomiting Progressing     Maintains or returns to baseline bowel function Progressing     Maintains adequate nutritional intake Progressing        METABOLIC, FLUID AND ELECTROLYTES - ADULT     Electrolytes maintained within normal limits Progressing     Fluid balance maintained Progressing     Glucose maintained within target range Progressing        Nutrition/Hydration-ADULT     Nutrient/Hydration intake appropriate for improving, restoring or maintaining nutritional needs Progressing        Potential for Falls     Patient will remain free of falls Progressing        Prexisting or High Potential for Compromised Skin Integrity     Skin integrity is maintained or improved Progressing

## 2017-10-09 ENCOUNTER — APPOINTMENT (INPATIENT)
Dept: PHYSICAL THERAPY | Facility: HOSPITAL | Age: 52
DRG: 092 | End: 2017-10-09
Payer: COMMERCIAL

## 2017-10-09 ENCOUNTER — APPOINTMENT (INPATIENT)
Dept: OCCUPATIONAL THERAPY | Facility: HOSPITAL | Age: 52
DRG: 092 | End: 2017-10-09
Payer: COMMERCIAL

## 2017-10-09 VITALS
BODY MASS INDEX: 33.05 KG/M2 | DIASTOLIC BLOOD PRESSURE: 90 MMHG | HEART RATE: 63 BPM | HEIGHT: 62 IN | WEIGHT: 179.6 LBS | SYSTOLIC BLOOD PRESSURE: 146 MMHG | TEMPERATURE: 97.1 F | RESPIRATION RATE: 16 BRPM | OXYGEN SATURATION: 97 %

## 2017-10-09 PROBLEM — I63.9 ACUTE CVA (CEREBROVASCULAR ACCIDENT) (HCC): Status: RESOLVED | Noted: 2017-10-03 | Resolved: 2017-10-09

## 2017-10-09 LAB
B BURGDOR IGG SER IA-ACNC: 0.08
B BURGDOR IGM SER IA-ACNC: 0.19
GLUCOSE SERPL-MCNC: 228 MG/DL (ref 65–140)
GLUCOSE SERPL-MCNC: 280 MG/DL (ref 65–140)
RYE IGE QN: NEGATIVE

## 2017-10-09 PROCEDURE — 97110 THERAPEUTIC EXERCISES: CPT

## 2017-10-09 PROCEDURE — 97535 SELF CARE MNGMENT TRAINING: CPT

## 2017-10-09 PROCEDURE — 82948 REAGENT STRIP/BLOOD GLUCOSE: CPT

## 2017-10-09 RX ORDER — GABAPENTIN 300 MG/1
300 CAPSULE ORAL
Refills: 0
Start: 2017-10-09 | End: 2018-05-14

## 2017-10-09 RX ORDER — MELATONIN
1000 DAILY
Refills: 0
Start: 2017-10-09 | End: 2019-06-26 | Stop reason: HOSPADM

## 2017-10-09 RX ORDER — BUTALBITAL, ACETAMINOPHEN AND CAFFEINE 50; 325; 40 MG/1; MG/1; MG/1
1 TABLET ORAL EVERY 6 HOURS PRN
Qty: 30 TABLET | Refills: 0 | Status: SHIPPED | OUTPATIENT
Start: 2017-10-09 | End: 2018-09-24 | Stop reason: HOSPADM

## 2017-10-09 RX ORDER — ATORVASTATIN CALCIUM 80 MG/1
80 TABLET, FILM COATED ORAL EVERY EVENING
Refills: 0 | Status: ON HOLD
Start: 2017-10-09 | End: 2018-09-24

## 2017-10-09 RX ORDER — TOPIRAMATE 50 MG/1
50 TABLET, FILM COATED ORAL
Refills: 0 | Status: ON HOLD
Start: 2017-10-09 | End: 2018-09-24

## 2017-10-09 RX ORDER — ESCITALOPRAM OXALATE 10 MG/1
10 TABLET ORAL DAILY
Refills: 0 | Status: ON HOLD
Start: 2017-10-10 | End: 2018-09-24

## 2017-10-09 RX ADMIN — CLONAZEPAM 0.5 MG: 0.5 TABLET ORAL at 08:47

## 2017-10-09 RX ADMIN — DIPHENHYDRAMINE HYDROCHLORIDE 25 MG: 50 INJECTION, SOLUTION INTRAMUSCULAR; INTRAVENOUS at 05:19

## 2017-10-09 RX ADMIN — VALSARTAN 320 MG: 160 TABLET ORAL at 08:47

## 2017-10-09 RX ADMIN — INSULIN LISPRO 2 UNITS: 100 INJECTION, SOLUTION INTRAVENOUS; SUBCUTANEOUS at 12:12

## 2017-10-09 RX ADMIN — INSULIN LISPRO 10 UNITS: 100 INJECTION, SOLUTION INTRAVENOUS; SUBCUTANEOUS at 12:13

## 2017-10-09 RX ADMIN — METOPROLOL TARTRATE 25 MG: 25 TABLET ORAL at 08:47

## 2017-10-09 RX ADMIN — PIOGLITAZONE 30 MG: 30 TABLET ORAL at 08:48

## 2017-10-09 RX ADMIN — KETOROLAC TROMETHAMINE 30 MG: 30 INJECTION, SOLUTION INTRAMUSCULAR at 05:18

## 2017-10-09 RX ADMIN — ESCITALOPRAM OXALATE 10 MG: 10 TABLET ORAL at 08:55

## 2017-10-09 RX ADMIN — MAGNESIUM SULFATE HEPTAHYDRATE 1 G: 1 INJECTION, SOLUTION INTRAVENOUS at 08:43

## 2017-10-09 RX ADMIN — INSULIN LISPRO 2 UNITS: 100 INJECTION, SOLUTION INTRAVENOUS; SUBCUTANEOUS at 08:44

## 2017-10-09 RX ADMIN — PROCHLORPERAZINE MALEATE 10 MG: 5 TABLET, FILM COATED ORAL at 05:18

## 2017-10-09 RX ADMIN — INSULIN LISPRO 10 UNITS: 100 INJECTION, SOLUTION INTRAVENOUS; SUBCUTANEOUS at 08:44

## 2017-10-09 RX ADMIN — CHLORHEXIDINE GLUCONATE 15 ML: 1.2 RINSE ORAL at 08:48

## 2017-10-09 RX ADMIN — HYDROCHLOROTHIAZIDE 25 MG: 25 TABLET ORAL at 08:47

## 2017-10-09 NOTE — PROGRESS NOTES
Patient is alert awake cooperative sitting in a chair communicates in poor English but she was able to express her feelings and she is happy with the medications she is much calmer with anxiety as I started her on Lexapro and Klonopin  Patient worries about a lot she is not getting over time she has a financial issues and she worries about her children and now she worries about her health etc   Patient is depressed but not suicidal   No evidence of psychosis no hallucinations  Nurse reports that she is doing all right  No behavior problems reported or noted  Patient denies feeling suicidal   I will continue her medications and follow-up  Therapy done with good response  Thank you very much

## 2017-10-09 NOTE — SPEECH THERAPY NOTE
SLP  Missed Visit Note    9:50 AM  Swallow Therapy attempted  Patient sleeping soundly, did not rouse  Will continue to follow

## 2017-10-09 NOTE — NURSING NOTE
IV access discontinued  Report called into [facility] facility, given to Rd, all questions answered  Pt  Left 4N stable, by w/c, all personal belongings at side

## 2017-10-09 NOTE — PLAN OF CARE
Problem: PHYSICAL THERAPY ADULT  Goal: Performs mobility at highest level of function for planned discharge setting  See evaluation for individualized goals  Outcome: Progressing  Prognosis: Good  Problem List: Decreased strength, Decreased endurance, Impaired balance, Decreased mobility, Decreased coordination, Pain, Impaired tone  Assessment: Pt is showing steady improvement with amb with both the RW and SPC  Pt prefers the RW, "it makes me comfortable " With both devices, pt is noted with left knee buckling with stepping RLE;at times, pt has difficulty following cues to maintain left knee straight with stepping of RLE and becomes anxious  Pt will cont to benefit from skilled PT services  Recommendation:  (Acute Rehab recommended)          See flowsheet documentation for full assessment

## 2017-10-09 NOTE — DISCHARGE SUMMARY
Discharge Summary - [facility] Internal Medicine    Patient Information: Amber Roche 47 y o  female MRN: [id number]  Unit/Bed#: [address] [place] [address] Encounter: [id number]    Discharging Physician / Practitioner: Goldie Gustafson MD  PCP: Amber Roche MD  Admission Date: 10/3/2017  Discharge Date: 10/09/17    Reason for Admission: CVA/TIA-like Symptoms (c/o headache that started last night   states at 0800 this morning began with numbness on her left face and tongue and left sided extremity weakness )      Discharge Diagnoses:     Principal Problem (Resolved):    Acute CVA (cerebrovascular accident) [facility])  Active Problems:    Hypertensive urgency    Diabetes mellitus ([facility] )    Hypertension    Hyperlipidemia    CHRISTIANA (acute kidney injury) ([facility] )    Migraine  1  Complicated migraine  2  Possible left fred sensory syndrome  3  Left-sided weakness status post IV tPA-Acute CVA ruled out  4  Left ICA aneurysm  5  Hypertension  6  Diabetes mellitus type 2  7  Hyperlipidemia  8  Major depression  9  Adjustment disorder with emotional features  10  Anxiety  11  Insomnia    Consultations During Hospital Stay:  Onelia Delgado, Dr Radha Mansfield  Procedures Performed:     ·     Imaging while in hospital:    Cta Head And Neck With And Without Contrast    Result Date: 10/3/2017  Narrative: CTA NECK AND BRAIN WITH AND WITHOUT CONTRAST INDICATION:      History taken directly from the electronic ordering system  COMPARISON:   None  TECHNIQUE:  Routine CT imaging of the Brain without contrast   Post contrast imaging was performed after administration of iodinated contrast through the neck and brain  Post contrast axial 0 625 mm images timed to opacify the arterial system  3D rendering was performed on an independent workstation  MIP reconstructions performed   Coronal reconstructions were performed of the noncontrast portion of the brain  Radiation dose length product (DLP) for this visit:  370 88 mGy-cm   This examination, like all CT scans performed in the [facility], was performed utilizing techniques to minimize radiation dose exposure, including the use of iterative  reconstruction and automated exposure control  IV Contrast:  85 mL of iohexol (OMNIPAQUE)     IMAGE QUALITY:   Diagnostic FINDINGS: CERVICAL VASCULATURE AORTIC ARCH AND GREAT VESSELS:  Normal aortic arch and great vessel origins  Normal visualized subclavian vessels  RIGHT VERTEBRAL ARTERY CERVICAL SEGMENT:  Normal origin  The vessel is normal in caliber throughout the neck  LEFT VERTEBRAL ARTERY CERVICAL SEGMENT:  Normal origin  The vessel is normal in caliber throughout the neck  RIGHT EXTRACRANIAL CAROTID SEGMENT:  Normal caliber common carotid artery  Normal bifurcation and cervical internal carotid artery  No stenosis or dissection  LEFT EXTRACRANIAL CAROTID SEGMENT:  Normal caliber common carotid artery  Normal bifurcation and cervical internal carotid artery  No stenosis or dissection  NASCET criteria was used to determine the degree of internal carotid artery diameter stenosis  INTRACRANIAL VASCULATURE INTERNAL CAROTID ARTERIES:  Normal enhancement of the intracranial portions of the internal carotid arteries  Normal ophthalmic artery origins  Normal ICA terminus  1 8 mm windsock extension of contrast extending posterolaterally from the posterior wall of the supraclinoid ICA, near origin of posterior communicating and anterior coronal arteries  This could represent a tiny infundibulum or aneurysm  Repeat CTA of the brain only is suggested in 6 months to confirm this  Nonurgent Neurovascular consultation also suggested  ANTERIOR CIRCULATION:  Symmetric A1 segments and anterior cerebral arteries with normal enhancement  Normal anterior communicating artery   MIDDLE CEREBRAL ARTERY CIRCULATION:  M1 segment and middle cerebral artery branches demonstrate normal enhancement bilaterally  DISTAL VERTEBRAL ARTERIES:  Normal distal vertebral arteries  Posterior inferior cerebellar artery origins are normal  Normal vertebral basilar junction  BASILAR ARTERY:  Basilar artery is normal in caliber  Normal superior cerebellar arteries  POSTERIOR CEREBRAL ARTERIES: Both posterior cerebral arteries arises from the basilar tip  Both arteries demonstrate normal flow-related enhancement  Right P-comm is patent  DURAL VENOUS SINUSES:  Normal  NON VASCULAR ANATOMY BONY STRUCTURES:  No acute osseous abnormality  SOFT TISSUES OF THE NECK:  Normal         THORACIC INLET:  Unremarkable  Impression: No large vessel flow restrictive disease within the head or neck  No arterial thrombosis  Possible 1 8 mm aneurysm/infundibulum arising from the supraclinoid left ICA  Nonemergent neurovascular consultation and follow-up CTA of the brain only, suggested in 6 months  ##neurovascularslh##neurovascularslh ##sigslh##sigslh Workstation performed: [id number]     Xr Chest 1 View Portable    Result Date: 10/3/2017  Narrative: CHEST INDICATION:  Stroke alert COMPARISON:  11/22/2016 VIEWS:   AP frontal IMAGES:  1 FINDINGS:     Cardiomediastinal silhouette appears unremarkable  Suboptimal inspiration  Intimal atelectasis at the right lung base  No pneumothorax or pleural effusion  No evidence of heart failure  Visualized osseous structures appear within normal limits for the patient's age  Impression: Minimal atelectasis at the right lung base  Workstation performed: [id number]     Ct Head Wo Contrast    Result Date: 10/6/2017  Narrative: CT BRAIN - WITHOUT CONTRAST INDICATION:  Headache COMPARISON:  October 4, 2017 TECHNIQUE:  CT examination of the brain was performed  In addition to axial images, coronal reformatted images were created and submitted for interpretation  Radiation dose length product (DLP) for this visit:  1160 44 mGy-cm     This examination, like all CT scans performed in the [facility], was performed utilizing techniques to minimize radiation dose exposure, including the use of iterative reconstruction and automated exposure control  IMAGE QUALITY:  Diagnostic  FINDINGS:  PARENCHYMA:  No intracranial mass, mass effect or midline shift  No CT signs of acute infarction  There is no parenchymal hemorrhage  Basal ganglia calcifications  VENTRICLES AND EXTRA-AXIAL SPACES:  Normal for patient's age  VISUALIZED ORBITS AND PARANASAL SINUSES:  Unremarkable  CALVARIUM AND EXTRACRANIAL SOFT TISSUES:   Normal      Impression: No acute intracranial abnormality  Workstation performed: [id number]     Mri Brain Wo Contrast    Result Date: 10/4/2017  Narrative: MRI BRAIN WITHOUT CONTRAST INDICATION:  CVA COMPARISON:   CTA performed one day earlier TECHNIQUE:  Sagittal T1, axial T2, axial FLAIR, axial T1, axial [place] and axial diffusion imaging  IMAGE QUALITY:  Diagnostic  FINDINGS: BRAIN PARENCHYMA:  There is no discrete mass, mass effect or midline shift  No abnormal white matter signal identified  Brainstem and cerebellum demonstrate normal signal  There is no intracranial hemorrhage  There is no evidence of acute infarction and  diffusion imaging is unremarkable  VENTRICLES:  The ventricles are normal in size and contour  SELLA AND PITUITARY GLAND:  Partially empty sella ORBITS:  Normal  PARANASAL SINUSES:  Minor right mastoid fluid VASCULATURE:  Evaluation of the major intracranial vasculature demonstrates appropriate flow voids  CALVARIUM AND SKULL BASE:  Normal  EXTRACRANIAL SOFT TISSUES:  Normal      Impression: Normal MRI of the brain, no acute disease specifically, no indication of acute ischemia  Workstation performed: [id number]     Ct Stroke Alert Brain    Result Date: 10/3/2017  Narrative: CT BRAIN - STROKE ALERT PROTOCOL INDICATION: Stroke alert COMPARISON:  None   TECHNIQUE:  CT examination of the brain was performed  In addition to axial images, coronal reformatted images were created and submitted for interpretation  Radiation dose length product (DLP) for this visit:  1087 3 mGy-cm   This examination, like all CT scans performed in the [facility], was performed utilizing techniques to minimize radiation dose exposure, including the use of iterative  reconstruction and automated exposure control  IMAGE QUALITY:  Diagnostic  FINDINGS:  PARENCHYMA:  No intracranial mass, mass effect or midline shift  No acute intracranial hemorrhage  No CT signs of acute infarction  VENTRICLES AND EXTRA-AXIAL SPACES:  Normal for patient's age  VISUALIZED ORBITS AND PARANASAL SINUSES:  Unremarkable  CALVARIUM AND EXTRACRANIAL SOFT TISSUES:   Normal      Impression: Normal study  No acute hemorrhage  No acute infarction  Findings were directly discussed with Caitlin Hutchins RN the head nurse in the emergency department will convey the information to Dr Segovia directly  on 10/3/2017 10:27 AM  Workstation performed: [id number]       Test Results Pending at Discharge (will require follow up):   · As per After Visit Summary     Outpatient Tests Requested:  · Follow-up with Neurosurgery in 2 weeks  Follow up with Dr Sherman Sharpe in 2 weeks  Complications:  None    Hospital Course:     Trudy Johnson is a 47 y o  female patient who originally presented to the hospital on 10/3/2017 due to left facial droop and left upper extremity and lower extremity weakness  Patient was noted to have elevated blood pressure of 227/105 in the emergency room  After blood pressure was controlled patient was given IV tPA and was admitted to ICU for observation  Later patient underwent MRI of the brain which showed no evidence of acute stroke  CT of the head and neck showed no large vessel restrictive disease or arterial thrombus, 1 8 millimeter aneurysm arising from the supraclinoid left ICA  Patient was started on Topamax  The patient also an echo with bubble study which did not show any left right shunt  Patient continued to have physical therapy during the hospital stay and was improving slowly  Patient continued to complain of headache and also complained of right-sided body pain for which she was started on migraine cocktail with Toradol, Benadryl and Compazine  The patient had hypercoagulable profile, Lyme status and KENYATTA ordered which are pending  The patient had resolution of symptoms and patient remained in a stable condition  Patient was also seen by Psychiatry during the hospital stay and was started on Lexapro and Klonopin  Patient will be discharged to short-term rehabilitation and aspirin, Lipitor and Topamax dose of which will be increased by 25 milligrams every week to a total dose of 100 milligram q h s  Condition at Discharge: stable     Discharge Day Visit / Exam:     Subjective:      Vitals: Blood Pressure: 146/90 (10/09/17 0743)  Pulse: 63 (10/09/17 0743)  Temperature: (!) 97 1 °F (36 2 °C) (10/09/17 0743)  Temp Source: Tympanic (10/09/17 0743)  Respirations: 16 (10/09/17 0743)  Height: 5' 2" (157 5 cm) (10/03/17 1330)  Weight - Scale: 81 5 kg (179 lb 9 6 oz) (without the SCD machine) (10/09/17 0531)  SpO2: 97 % (10/09/17 0743)  Exam:   Physical Exam   Constitutional: No distress  HENT:   Head: Normocephalic and atraumatic  Nose: Nose normal    Eyes: Conjunctivae and EOM are normal  Pupils are equal, round, and reactive to light  Neck: Normal range of motion  Neck supple  No JVD present  Cardiovascular: Normal rate, regular rhythm and normal heart sounds  Exam reveals no gallop and no friction rub  No murmur heard  Pulmonary/Chest: Effort normal and breath sounds normal  No respiratory distress  She has no wheezes  She has no rales  She exhibits no tenderness  Abdominal: Soft  Bowel sounds are normal  She exhibits no distension  There is no tenderness  There is no rebound and no guarding  Musculoskeletal: She exhibits no edema  Neurological: She is alert  No cranial nerve deficit  Left facial droop   Skin: Skin is warm and dry  No rash noted  Psychiatric: She has a normal mood and affect  Discharge instructions/Information to patient and family:(Discharge Medications and Follow up):   See after visit summary for information provided to patient and family  Provisions for Follow-Up Care:  See after visit summary for information related to follow-up care and any pertinent home health orders  Disposition: Short-term rehab at [place] [address] Readmission:  No     Discharge Statement:  I spent 40 minutes discharging the patient  This time was spent on the day of discharge  I had direct contact with the patient on the day of discharge  Greater than 50% of the total time was spent examining patient, answering all patient questions, arranging and discussing plan of care with patient as well as directly providing post-discharge instructions  Additional time then spent on discharge activities  Discharge Medications:  See after visit summary for reconciled discharge medications provided to patient and family  ** Please Note: Dragon 360 Dictation voice to text software may have been used in the creation of this document   **

## 2017-10-09 NOTE — SOCIAL WORK
Discharge ordered  Pt will be transferred to [facility] for subacute rehab  Authorization for 3 days subacute rehab ([id number]) was received from [employer]  Pt's daughter will be transporting pt by car  Unit, [facility] and pt's daughter Riki ) aware of plan

## 2017-10-09 NOTE — SOCIAL WORK
SW following to assist with DCP  Subacute rehab at [facility] is planned once authorization is received from [employer]ring  SW placed spoke with [employer] representatives twice over weekend inquiring about authorization request   Each call ended with expectation of Akin  would be following up with SW with fax number for clinical to be faxed to for review  No follow up call from [employer] Herring was received over weekend  SW placed calls to [employer] number this morning to inquire about request   Per [employer] representative the case is still pending  SW then left message for Akin Hoover  on file to [facility], inquiring about same  SW will continue to follow to assist with transfer once authorization is received

## 2017-10-09 NOTE — CASE MANAGEMENT
Continued Stay Review    Date: 10/7/17    Vitals:   Temp (24hrs), Av 4 °F (36 9 °C), Min:98 °F (36 7 °C), Max:98 6 °F (37 °C)     HR:  [65-82] 68  Resp:  [18] 18  BP: (134-160)/(85-91) 160/89  SpO2:  [96 %-98 %] 98 %  Body mass index is 33 06 kg/m²  TELE MON  NEURO CHECKS  LYME ANTIBODY PROFILE WITH WB KENYATTA SCREEN  CARDIOLIPIN ANTIBODY LUPUS ANTICOAGULATION   HOMOCYSTEINE SERUM ANTITHROMBIN III VIT B12  VIT [id number]  Last 24 Hours Medication List:      atorvastatin 80 mg Oral QPM   chlorhexidine 15 mL Swish & Spit Q12H [address]   diphenhydrAMINE 25 mg Intravenous Q8H [address]   gabapentin 300 mg Oral HS   hydrochlorothiazide 25 mg Oral Daily   insulin lispro 1-6 Units Subcutaneous Q6H [address]   insulin lispro 10 Units Subcutaneous TID With Meals   ketorolac 30 mg Intravenous Q8H [address]   magnesium sulfate 1 g Intravenous BID   melatonin 3 mg Oral HS   metoprolol tartrate 25 mg Oral Q12H BRINA   pioglitazone 30 mg Oral Daily   prochlorperazine 10 mg Oral Q8H [address]   sodium chloride 100 mL/hr Intravenous Once   topiramate 50 mg Oral HS   valsartan 320 mg Oral Daily      Abnormal Labs/Diagnostic Results:      Age/Sex: 47 y o  female     PER NEUROLOGY  Assessment/Recommendations:     1  Left sided paresthesia and weakness, s/p IV tPA at 11:29am on 10/3/17  2  Probable complex migraine vs conversion disorder   3  Hemisensory syndrome  4  Hypertension  5  DM  6  Hyperlipidemia  7  H/o migraines   8  Incidental left ICA 1 8mm aneurysm     Patient has persistent symptoms since arrival  For past 2 days, she reports pain on her left side  Her symptoms are described as splint in the middle of body  With involvement of face,pathology has to be in brain  Spinal cord pathology wouldn't present this way  Studies done on similar patient presentations in literature have failed to find structural pathology in these patients except  in 1-3%  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 however she had no evidence of stroke on MRI   Will exclude any autoimmune/infectious etiologies by checking KENYATTA, Lyme, hypercoag labs  Will place her on scheduled migraine cocktail to treat her headache  Will place her on neurontin 300mg qhs  Psychological factors may be contributing to her symptoms as well  Ordered consult for psych  Will monitor until Monday and decide whether we need to repeat image her with MRI  Increasing topamax to 50mg nightly for headache prevention  Increase it weekly by 25mg to goal dose of 100mg nightly  lipitor 80mg for now , normal LDL but elevated total and TG  TTE w/ shunt- no clear source of emboli   Recommend strict BG control - most recent HgbA1c 8 9  Speech and swallow evaluation as needed  PT/OT  **Please provide outpatient vascular neurosurgeon referral for left ica aneurysm follow up   Discussed plan with patient and primary team at length        PER MD  Assessment & Plan:  34-year-old female present to the emergency room with left-sided facial droop and left leg are numb weakness  Patient noted to have elevated blood pressure and received IV labetalol and Cardene infusion was started  Patient was also given IV tPA for possible CVA  Patient had MRI of the brain and CTA of the head and neck which were negative  Patient is being treated for complicated migraine       1  Complicated migraine-patient started on Topamax 25 milligram weekly, which will be increased to 50 milligrams in week 2, start 5 milligrams in week 3, 100 milligrams in week for and continue 100 milligram every night  The patient will also be started on migraine cocktail with Toradol, Benadryl and Reglan 8 hours  2  Left-sided weakness status post IV tPA-patient has significant improvement of symptoms  Continue PT/OT  Await placement in rehabilitation  Continue Lipitor 80 milligram p o  daily  MRI of the brain was normal   CT of the head and neck showed no arterial thrombus or restrictive disease   Echo showed EF of 20 60%, no regional wall motion abnormalities  3  Left-sided body pain likely secondary to fred sensory syndrome-rule out autoimmune/infectious etiology is by checking KENYATTA, hypercoagulable panel and Lyme titers  Patient will be placed on scheduled migraine cocktail with Toradol, Reglan and Benadryl  Patient was also started on Neurontin 300 milligram p o  q h s     Patient's repeat CT scan of the brain was normal   If patient has persistent symptoms we will consider MRI of the brain on October 9, 2017   4  Left ICA of 1 8 mm cyxjiryo-pztklz-sk with Neurosurgery as outpatient  5  Dyslipidemia-continue Lipitor  6  Hypertension-blood pressure controlled with  hydrochlorothiazide 25 milligram p  o  daily and metoprolol 5 milligram p o  b i d  and Diovan 320 milligram p o  Daily  7  Diabetes mellitus type 2-patient's hemoglobin A1c is 8 9  Continue Humalog sliding scale with Accu-Cheks q a c  and HS  Patient's blood sugars better with NovoLog 10 units subcutaneously with each meal   8  Near syncope-likely vasovagal due to pain

## 2017-10-09 NOTE — CONSULTS
Neurology Consult Follow Up      Flash Patel is a 47 y o  female  [address] 34-*    [id number]        Assessment/Recommendations:    1  Left sided paresthesia and weakness, s/p IV tPA at 11:29am on 10/3/17  2  Probable complex migraine vs conversion disorder   3  Hemisensory syndrome  4  Hypertension  5  DM  6  Hyperlipidemia  7  H/o migraines   8  Incidental left ICA 1 8mm aneurysm     Clinically stable  Her symptoms are described as split in the middle of body  With involvement of face,pathology has to be in brain  Her MRI brain was negative for any acute process  Spinal cord pathology wouldn't present this way  Studies done on similar patient presentations in literature have failed to find structural pathology in these patients except  in 1-3%  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 however she had no evidence of stroke on MRI  We had ordered some labs to r/o  autoimmune/infectious etiologies  Her Lyme, Vit B12, KENYATTA are normal  Vit D level is low at 9  Started on supplements  Cardiolipin, lupus anticoagulant ab pending  Cont  neurontin 300mg qhs for pain pain management  Psychological factors may be contributing to her symptoms as well  Ordered consult for psych  She doesn't need any further imaging at this time  Cont topamax 50mg nightly for 2 weeks  Goal is 100mg nightly  lipitor 80mg for now , normal LDL but elevated total and TG  TTE w/ shunt- no clear source of emboli   Recommend strict BG control - most recent HgbA1c 8 9  Speech and swallow evaluation as needed  PT/OT- rehab   placed outpatient vascular neurosurgeon referral for left ica aneurysm follow up   Discussed plan with patient and primary team at length                Chief Complaint:  Weakness   Subjective:   Patient says her headache is much better but she still feels weak on left side  No development of new sx       Objective:  /90   Pulse 63   Temp (!) 97 1 °F (36 2 °C) (Tympanic)   Resp 16   Ht 5' 2" (1 575 m)   Wt 81 5 kg (179 lb 9 6 oz) Comment: without the SCD machine  SpO2 97%   BMI 32 85 kg/m²     General: alert   Mental status: oriented x3  Attention: normal  Knowledge: fair  Language and Speech: normal  Cranial nerves: II-XII intact except left facial droop is still persistent  She has left eyelid droop but thinks it's chronic  Patient has h/o left bell's palsy from few years ago   Muscle tone: normal  Motor strength:  5/5 on right side, 4/5 on left side   Sensory: decreased to light touch, pin prick over left arm, leg, whole left side of body  Pain in left side of body  Gait: needs walker   Coordination: finger to nose and heel to toe normal  Reflexes: 2+ throughout  except as noted    Labs:      Lab Results   Component Value Date    WBC 5 10 10/05/2017    HGB 12 9 10/05/2017    HCT 38 3 10/05/2017    MCV 88 10/05/2017     10/05/2017     Lab Results   Component Value Date    HGBA1C 8 9 (H) 10/04/2017     Lab Results   Component Value Date    ALT 46 10/05/2017    AST 29 10/05/2017    ALKPHOS 97 10/05/2017    BILITOT 0 30 10/05/2017     Lab Results   Component Value Date    GLUCOSE 328 (H) 10/06/2017    CALCIUM 9 1 10/06/2017     (L) 10/06/2017    K 3 8 10/06/2017    CO2 25 10/06/2017    CL 99 (L) 10/06/2017    BUN 24 10/06/2017    CREATININE 1 10 10/06/2017         Review of reports and notes reveal:       Cta Head And Neck With And Without Contrast    Result Date: 10/3/2017  No large vessel flow restrictive disease within the head or neck  No arterial thrombosis  Possible 1 8 mm aneurysm/infundibulum arising from the supraclinoid left ICA  Nonemergent neurovascular consultation and follow-up CTA of the brain only, suggested in 6 months  ##neurovascularslh##neurovascularslh ##sigslh##sigslh Workstation performed: [id number]     Xr Chest 1 View Portable    Result Date: 10/3/2017  Minimal atelectasis at the right lung base   Workstation performed: [id number]     Ct Head Wo Contrast    Result Date: 10/6/2017  No acute intracranial abnormality  Workstation performed: [id number]     Mri Brain Wo Contrast    Result Date: 10/4/2017  Normal MRI of the brain, no acute disease specifically, no indication of acute ischemia  Workstation performed: [id number]     Ct Stroke Alert Brain    Result Date: 10/3/2017  Normal study  No acute hemorrhage  No acute infarction  Findings were directly discussed with Irving Urbina RN the head nurse in the emergency department will convey the information to Dr Segovia directly  on 10/3/2017 10:27 AM  Workstation performed: [id number]             Thank you for this consult      Total time of encounter:  35 min  More than 50% of the time was used in counseling and/or coordination of care  Extent of couseling and/or coordination of care        Marti Power MD  [facility]  [facility] [address]  [phone]

## 2017-10-09 NOTE — OCCUPATIONAL THERAPY NOTE
OT TREATMENT       10/09/17 1120   Restrictions/Precautions   Other Precautions Fall Risk; Chair Alarm; Bed Alarm;Cognitive   Pain Assessment   Pain Assessment No/denies pain   ADL   Where Assessed Chair   Grooming Assistance 4  Minimal Assistance   UB Bathing Assistance 4  Minimal Assistance   ROM - Left Upper Extremities    L Shoulder AAROM; Flexion; Extension;ABduction;Horizontal ABduction   L Elbow AAROM;Elbow flexion;Elbow extension   L Wrist AAROM;Wrist flexion;Wrist extension   L Hand AAROM; Thumb; Index finger; Long finger;Ring finger;Little finger   L Position Seated   Equipment (Manual resistance)   L Weight/Reps/Sets 10 reps x 2    Neuromuscular Education   Comments PNF contract/relax technique and NDT facilitation techniques to LUE   Coordination   Gross Motor mod impaired   Fine Motor mod impaired    Cognition   Arousal/Participation Alert; Cooperative   Attention Attends with cues to redirect   Orientation Level Oriented X4   Memory Within functional limits   Following Commands Follows multistep commands with increased time or repetition   Comments Pt anxious and tearful at times when discussing her functional return thus far  Emotional support provided  Perception   Perception Yes   Position in Space impaired   Spatial Orientation impaired   Activity Tolerance   Activity Tolerance Patient limited by fatigue   Medical Staff Made Aware yes   Assessment   Assessment Pt motivated to participate in all therapeutic activities  Expressed frustration at lack of functional abilities, making her easily tearful  At times she presents with motor planning/spatial relations deficits and also difficulty following multi-step directions without cues  At end of session, pt set-up in chair with functional grooming activity to be completed on her own to facilitate improved LUE GMC/FMC and strength  Nurse made aware  Pt is an excellent rehab candidate     Plan   Treatment Interventions ADL retraining;Visual perceptual retraining;Functional transfer training;UE strengthening/ROM; Endurance training;Cognitive reorientation;Patient/family training;Equipment evaluation/education; Neuromuscular reeducation; Fine motor coordination activities; Compensatory technique education; Activityengagement   OT Frequency 2-3x/wk   Recommendation   Discharge Recommendation Short Term Rehab     Patient left OOB in chair with all needs within reach, tab alarm in place

## 2017-10-10 LAB
CARDIOLIPIN IGA SER IA-ACNC: <9 APL U/ML (ref 0–11)
CARDIOLIPIN IGG SER IA-ACNC: <9 GPL U/ML (ref 0–14)
CARDIOLIPIN IGM SER IA-ACNC: <9 MPL U/ML (ref 0–12)

## 2017-10-10 NOTE — CASE MANAGEMENT
Shameka Wahl MD Physician Signed Internal Medicine  Discharge Summaries Date of Service: 10/9/2017  2:48 PM         []Hide copied text  []Hover for attribution information  Discharge Summary - [facility] Internal Medicine     Patient Information: Emanuel Vázquez 47 y o  female MRN: [id number]  Unit/Bed#: [address] [place] [address] Encounter: [id number]     Discharging Physician / Practitioner: Shameka Wahl MD  PCP: Emanuel Vázquez MD  Admission Date: 10/3/2017  Discharge Date: 10/09/17     Reason for Admission: CVA/TIA-like Symptoms (c/o headache that started last night   states at 0800 this morning began with numbness on her left face and tongue and left sided extremity weakness )        Discharge Diagnoses:      Principal Problem (Resolved):    Acute CVA (cerebrovascular accident) [facility])  Active Problems:    Hypertensive urgency    Diabetes mellitus ([facility] [address] )    Hypertension    Hyperlipidemia    CHRISTIANA (acute kidney injury) ([facility] [address] )    Migraine  1  Complicated migraine  2  Possible left fred sensory syndrome  3  Left-sided weakness status post IV tPA  4  Left ICA aneurysm  5  Hypertension  6  Diabetes mellitus type 2  7  Hyperlipidemia  8  Major depression  9  Adjustment disorder with emotional features  10  Anxiety  11  Insomnia     Consultations During Hospital Stay:  Yas Mace, Dr Yesica Stahl  Procedures Performed:      ·       Imaging while in hospital:     Cta Head And Neck With And Without Contrast     Result Date: 10/3/2017  Narrative: CTA NECK AND BRAIN WITH AND WITHOUT CONTRAST INDICATION:      History taken directly from the electronic ordering system  COMPARISON:   None  TECHNIQUE:  Routine CT imaging of the Brain without contrast   Post contrast imaging was performed after administration of iodinated contrast through the neck and brain   Post contrast axial 0 625 mm images timed to opacify the arterial system  3D rendering was performed on an independent workstation  MIP reconstructions performed  Coronal reconstructions were performed of the noncontrast portion of the brain  Radiation dose length product (DLP) for this visit:  370 88 mGy-cm   This examination, like all CT scans performed in the [facility], was performed utilizing techniques to minimize radiation dose exposure, including the use of iterative  reconstruction and automated exposure control  IV Contrast:  85 mL of iohexol (OMNIPAQUE)     IMAGE QUALITY:   Diagnostic FINDINGS: CERVICAL VASCULATURE AORTIC ARCH AND GREAT VESSELS:  Normal aortic arch and great vessel origins  Normal visualized subclavian vessels  RIGHT VERTEBRAL ARTERY CERVICAL SEGMENT:  Normal origin  The vessel is normal in caliber throughout the neck  LEFT VERTEBRAL ARTERY CERVICAL SEGMENT:  Normal origin  The vessel is normal in caliber throughout the neck  RIGHT EXTRACRANIAL CAROTID SEGMENT:  Normal caliber common carotid artery  Normal bifurcation and cervical internal carotid artery  No stenosis or dissection  LEFT EXTRACRANIAL CAROTID SEGMENT:  Normal caliber common carotid artery  Normal bifurcation and cervical internal carotid artery  No stenosis or dissection  NASCET criteria was used to determine the degree of internal carotid artery diameter stenosis  INTRACRANIAL VASCULATURE INTERNAL CAROTID ARTERIES:  Normal enhancement of the intracranial portions of the internal carotid arteries  Normal ophthalmic artery origins  Normal ICA terminus  1 8 mm windsock extension of contrast extending posterolaterally from the posterior wall of the supraclinoid ICA, near origin of posterior communicating and anterior coronal arteries  This could represent a tiny infundibulum or aneurysm  Repeat CTA of the brain only is suggested in 6 months to confirm this  Nonurgent Neurovascular consultation also suggested   ANTERIOR CIRCULATION:  Symmetric A1 segments and anterior cerebral arteries with normal enhancement  Normal anterior communicating artery  MIDDLE CEREBRAL ARTERY CIRCULATION:  M1 segment and middle cerebral artery branches demonstrate normal enhancement bilaterally  DISTAL VERTEBRAL ARTERIES:  Normal distal vertebral arteries  Posterior inferior cerebellar artery origins are normal  Normal vertebral basilar junction  BASILAR ARTERY:  Basilar artery is normal in caliber  Normal superior cerebellar arteries  POSTERIOR CEREBRAL ARTERIES: Both posterior cerebral arteries arises from the basilar tip  Both arteries demonstrate normal flow-related enhancement  Right P-comm is patent  DURAL VENOUS SINUSES:  Normal  NON VASCULAR ANATOMY BONY STRUCTURES:  No acute osseous abnormality  SOFT TISSUES OF THE NECK:  Normal         THORACIC INLET:  Unremarkable       Impression: No large vessel flow restrictive disease within the head or neck  No arterial thrombosis  Possible 1 8 mm aneurysm/infundibulum arising from the supraclinoid left ICA  Nonemergent neurovascular consultation and follow-up CTA of the brain only, suggested in 6 months  ##neurovascularslh##neurovascularslh ##sigslh##sigslh Workstation performed: [id number]      Xr Chest 1 View Portable     Result Date: 10/3/2017  Narrative: CHEST INDICATION:  Stroke alert COMPARISON:  11/22/2016 VIEWS:   AP frontal IMAGES:  1 FINDINGS:     Cardiomediastinal silhouette appears unremarkable  Suboptimal inspiration  Intimal atelectasis at the right lung base  No pneumothorax or pleural effusion  No evidence of heart failure  Visualized osseous structures appear within normal limits for the patient's age       Impression: Minimal atelectasis at the right lung base  Workstation performed: [id number]      Ct Head Wo Contrast     Result Date: 10/6/2017  Narrative: CT BRAIN - WITHOUT CONTRAST INDICATION:  Headache COMPARISON:  October 4, 2017 TECHNIQUE:  CT examination of the brain was performed    In addition to axial images, coronal reformatted images were created and submitted for interpretation  Radiation dose length product (DLP) for this visit:  1160 44 mGy-cm   This examination, like all CT scans performed in the [facility], was performed utilizing techniques to minimize radiation dose exposure, including the use of iterative reconstruction and automated exposure control  IMAGE QUALITY:  Diagnostic  FINDINGS:  PARENCHYMA:  No intracranial mass, mass effect or midline shift  No CT signs of acute infarction  There is no parenchymal hemorrhage  Basal ganglia calcifications  VENTRICLES AND EXTRA-AXIAL SPACES:  Normal for patient's age  VISUALIZED ORBITS AND PARANASAL SINUSES:  Unremarkable  CALVARIUM AND EXTRACRANIAL SOFT TISSUES:   Normal       Impression: No acute intracranial abnormality  Workstation performed: [id number]      Mri Brain Wo Contrast     Result Date: 10/4/2017  Narrative: MRI BRAIN WITHOUT CONTRAST INDICATION:  CVA COMPARISON:   CTA performed one day earlier TECHNIQUE:  Sagittal T1, axial T2, axial FLAIR, axial T1, axial [place] and axial diffusion imaging  IMAGE QUALITY:  Diagnostic  FINDINGS: BRAIN PARENCHYMA:  There is no discrete mass, mass effect or midline shift  No abnormal white matter signal identified  Brainstem and cerebellum demonstrate normal signal  There is no intracranial hemorrhage  There is no evidence of acute infarction and  diffusion imaging is unremarkable  VENTRICLES:  The ventricles are normal in size and contour  SELLA AND PITUITARY GLAND:  Partially empty sella ORBITS:  Normal  PARANASAL SINUSES:  Minor right mastoid fluid VASCULATURE:  Evaluation of the major intracranial vasculature demonstrates appropriate flow voids  CALVARIUM AND SKULL BASE:  Normal  EXTRACRANIAL SOFT TISSUES:  Normal       Impression: Normal MRI of the brain, no acute disease specifically, no indication of acute ischemia   Workstation performed: [id number]      Ct Stroke Alert Brain     Result Date: 10/3/2017  Narrative: CT BRAIN - STROKE ALERT PROTOCOL INDICATION: Stroke alert COMPARISON:  None  TECHNIQUE:  CT examination of the brain was performed  In addition to axial images, coronal reformatted images were created and submitted for interpretation  Radiation dose length product (DLP) for this visit:  1087 3 mGy-cm   This examination, like all CT scans performed in the [facility], was performed utilizing techniques to minimize radiation dose exposure, including the use of iterative  reconstruction and automated exposure control  IMAGE QUALITY:  Diagnostic  FINDINGS:  PARENCHYMA:  No intracranial mass, mass effect or midline shift  No acute intracranial hemorrhage  No CT signs of acute infarction  VENTRICLES AND EXTRA-AXIAL SPACES:  Normal for patient's age  VISUALIZED ORBITS AND PARANASAL SINUSES:  Unremarkable  CALVARIUM AND EXTRACRANIAL SOFT TISSUES:   Normal       Impression: Normal study  No acute hemorrhage  No acute infarction  Findings were directly discussed with Felipa Boudreaux RN the head nurse in the emergency department will convey the information to Dr Segovia directly  on 10/3/2017 10:27 AM  Workstation performed: [id number]         Test Results Pending at Discharge (will require follow up):   · As per After Visit Summary     Outpatient Tests Requested:  · Follow-up with Neurosurgery in 2 weeks  Follow up with Dr Akin Vazquez in 2 weeks      Complications:  None     Hospital Course:      Cathy Smith is a 47 y o  female patient who originally presented to the hospital on 10/3/2017 due to left facial droop and left upper extremity and lower extremity weakness  Patient was noted to have elevated blood pressure of 227/105 in the emergency room  After blood pressure was controlled patient was given IV tPA and was admitted to ICU for observation  Later patient underwent MRI of the brain which showed no evidence of acute stroke    CT of the head and neck showed no large vessel restrictive disease or arterial thrombus, 1 8 millimeter aneurysm arising from the supraclinoid left ICA  Patient was started on Topamax  The patient also an echo with bubble study which did not show any left right shunt  Patient continued to have physical therapy during the hospital stay and was improving slowly  Patient continued to complain of headache and also complained of right-sided body pain for which she was started on migraine cocktail with Toradol, Benadryl and Compazine  The patient had hypercoagulable profile, Lyme status and KENYATTA ordered which are pending  The patient had resolution of symptoms and patient remained in a stable condition  Patient was also seen by Psychiatry during the hospital stay and was started on Lexapro and Klonopin  Patient will be discharged to short-term rehabilitation and aspirin, Lipitor and Topamax dose of which will be increased by 25 milligrams every week to a total dose of 100 milligram q h s      Condition at Discharge: stable      Discharge Day Visit / Exam:      Subjective:       Vitals: Blood Pressure: 146/90 (10/09/17 0743)  Pulse: 63 (10/09/17 0743)  Temperature: (!) 97 1 °F (36 2 °C) (10/09/17 0743)  Temp Source: Tympanic (10/09/17 0743)  Respirations: 16 (10/09/17 0743)  Height: 5' 2" (157 5 cm) (10/03/17 1330)  Weight - Scale: 81 5 kg (179 lb 9 6 oz) (without the SCD machine) (10/09/17 0531)  SpO2: 97 % (10/09/17 0743)  Exam:   Physical Exam   Constitutional: No distress  HENT:   Head: Normocephalic and atraumatic  Nose: Nose normal    Eyes: Conjunctivae and EOM are normal  Pupils are equal, round, and reactive to light  Neck: Normal range of motion  Neck supple  No JVD present  Cardiovascular: Normal rate, regular rhythm and normal heart sounds  Exam reveals no gallop and no friction rub  No murmur heard  Pulmonary/Chest: Effort normal and breath sounds normal  No respiratory distress  She has no wheezes   She has no rales  She exhibits no tenderness  Abdominal: Soft  Bowel sounds are normal  She exhibits no distension  There is no tenderness  There is no rebound and no guarding  Musculoskeletal: She exhibits no edema  Neurological: She is alert  No cranial nerve deficit  Left facial droop   Skin: Skin is warm and dry  No rash noted  Psychiatric: She has a normal mood and affect          Discharge instructions/Information to patient and family:(Discharge Medications and Follow up):   See after visit summary for information provided to patient and family        Provisions for Follow-Up Care:  See after visit summary for information related to follow-up care and any pertinent home health orders        Disposition: Short-term rehab at [address] [place] [address] Readmission:  No     Discharge Statement:  I spent 40 minutes discharging the patient  This time was spent on the day of discharge  I had direct contact with the patient on the day of discharge  Greater than 50% of the total time was spent examining patient, answering all patient questions, arranging and discussing plan of care with patient as well as directly providing post-discharge instructions  Additional time then spent on discharge activities      Discharge Medications:  See after visit summary for reconciled discharge medications provided to patient and family        ** Please Note: Dragon 360 Dictation voice to text software may have been used in the creation of this document   **

## 2017-10-12 LAB
APTT SCREEN TO CONFIRM RATIO: 1.15 RATIO (ref 0–1.4)
CONFIRM APTT/NORMAL: 40.3 SEC (ref 0–55)
DRVVT IMM 1:2 NP PPP: 39.8 SEC (ref 0–47)
LA PPP-IMP: ABNORMAL
SCREEN APTT: 40.5 SEC (ref 0–51.9)
SCREEN DRVVT: 47.9 SEC (ref 0–47)
THROMBIN TIME: 20 SEC (ref 0–23)

## 2017-10-25 ENCOUNTER — TRANSCRIBE ORDERS (OUTPATIENT)
Dept: ADMINISTRATIVE | Facility: HOSPITAL | Age: 52
End: 2017-10-25

## 2017-10-25 ENCOUNTER — ALLSCRIPTS OFFICE VISIT (OUTPATIENT)
Dept: OTHER | Facility: OTHER | Age: 52
End: 2017-10-25

## 2017-10-25 DIAGNOSIS — I72.0: Primary | ICD-10-CM

## 2017-10-25 DIAGNOSIS — I72.0 CAROTID ARTERY ANEURYSM (HCC): ICD-10-CM

## 2017-10-25 DIAGNOSIS — R53.1 WEAKNESS: ICD-10-CM

## 2017-10-26 NOTE — CONSULTS
Assessment  1  History of stroke (V12 54) (Z86 73)   2  Carotid aneurysm, left (442 81) (I72 0)    Plan  Carotid aneurysm, left, Diabetes Mellitus    · Follow-up visit in 6 months Evaluation and Treatment  Follow-up  Status: Hold For -  Scheduling  Requested for: [id number]   Ordered; For: Carotid aneurysm, left, Diabetes Mellitus; Ordered By: Vanessa Gold Performed:  Due: 98RVR8629   · CTA HEAD W WO CONTRAST; Status:Need Information - Financial Authorization; Requested for:Before next appointment;    Perform:[facility]; Ordered; For:Carotid aneurysm, left, Diabetes Mellitus; Ordered By:Mikhail Kowalski;   · *1 - [facility] Co-Management  treated for stroke as inpatient, needs fu  no  findings on mri, left ica infindibulum vs aneurysm <2mm  Status: Active  Requested for:  [id number]   Ordered; For: Carotid aneurysm, left, Diabetes Mellitus; Ordered By: Vanessa Gold Performed:  Due: 32GSZ3953  Care Summary provided  : Yes   · 2 Roel Cosme MD, Stephania Hernadez (Psychiatry) Co-Management  seen as inpt  has  hallucinations  Status: Hold For - Scheduling  Requested for: 52HID5903   Ordered; For: Carotid aneurysm, left, Diabetes Mellitus; Ordered By: Vanessa Gold Performed:  Due: 24FCS0341  Care Summary provided  : Yes    Discussion/Summary    This is a 69-year-old female who originally presented to [facility] as a stroke  MRI was non revealing for a causative lesion  However he CTA was performed that revealed a 2 mm left ICA infundibulum versus aneurysm  Unfortunately in rehab the patient has continued to have a decline both and function and with her depression  She endorses hallucinations at this time  She denies any suicidal ideation or homicidal ideation  I have asked her to follow up with her PCP  She will also require follow up with Neurology and Psychiatry for these reasons  Regarding her aneurysm versus infundibulum, we discussed the natural history of aneurysms and aneurysmal subarachnoid hemorrhage   We discussed the difference between aneurysm infundibulum  We discussed the natural history of aneurysms related to size and location  Given her size of her lesion I will follow this up with a CTA in 6 months  If at that time she has improved and would like to rule out any aneurysm we can consider a diagnostic cerebral arteriogram at that time  Her and her daughter in agreement with this plan  They agree to follow up with Psychiatry and Neurology  They will schedule follow-up appointment with her PCP in the very near future  Chief Complaint  Patient presents in office today for consult for poss 1 8mm aneurysm found @ [facility] 10/3/17  History of Present Illness  The patient is being seen for a consultation regarding a cerebral aneurysm  Symptoms:  diplopia,-- visual disturbance,-- facial pain,-- facial weakness,-- dizziness-- and-- losing focus, memory loss, but-- no syncope--    The patient presents with complaints of headache (Patient states almost everyday she has a headache, it radiates from the side to the back,)  The patient presents with complaints of extraocular weakness (on left side)   The patient presents with complaints of left upper and left lower extremitiy extremity weakness  Ms Sonny Martin A 58-year-old [facility] [place] and [place] speaking female who presented in early October to the hospital with symptoms concerning for stroke  She was given tPA, however, MRI and CT findings did not reveal any significant stroke  During her hospitalization here there was concern for significant min of anxiety and depression and she was seen by Psychiatry  He CTA was done which was revealing for a left ICA 1 8 mm aneurysm versus infundibulum  For this reason she presents to clinic  In clinic the patient endorses in inability to think clearly  She states when she is doing crossword puzzles she often has hallucinations of a different place intermittently hears voices   She is unable to tell me what the voices say  She states that this has happened on more than 1 occasion  She denies any suicidal ideation  She denies any intent of hurting others  She becomes very tearful when discussing this  addition to these symptoms, she continues to endorse left fred numbness including her face arm torso and leg  She continues to have left-sided weakness in her face and arm as well  She walks with a walker  She is in a rehab facility at this time  In addition she complains of headache, and dizziness  Her past medical history is significant for anxiety, depression, stroke, seizure, hypertension, and hyperlipidemia  She denies any past surgical history  She denies a history of smoking, drug use, or alcohol use  She has no family history of aneurysm or subarachnoid hemorrhage  She has no family history of unexplained death  She currently lives in a in a rehab facility  Review of Systems    Constitutional: feeling poorly  Eyes: eye pain-- and-- eyesight problems, but-- as noted in HPI    ENT: earache,-- hearing loss-- and-- left side, but-- as noted in HPI  Cardiovascular: No complaints of slow heart rate, no fast heart rate, no chest pain, no palpitations, no leg claudication, no lower extremity edema  Respiratory: No complaints of shortness of breath, no wheezing, no cough, no SOB on exertion, no orthopnea, no PND  Gastrointestinal: No complaints of abdominal pain, no constipation, no nausea or vomiting, no diarrhea, no bloody stools  Genitourinary: No complaints of dysuria, no incontinence, no pelvic pain, no dysmenorrhea, no vaginal discharge or bleeding  Musculoskeletal: No complaints of arthralgias, no myalgias, no joint swelling or stiffness, no limb pain or swelling  Neurological: headache,-- confusion,-- dizziness,-- limb weakness-- and-- difficulty walking, but-- as noted in HPI     Psychiatric: anxiety,-- personality change,-- depression-- and-- patient is having trouble with focusing, she states she is hearing voices and her daughter notices a change in personality  she states she was more active before and is having trouble with everyday activities  she states that when she hears voices it is almost as if she is in a different place, she loses focus and feels like she isn't where she is   , but-- as noted in HPI  Endocrine: no proptosis,-- no muscle weakness,-- no hot flashes-- and-- no deepening of the voice  feelings of weakness   Hematologic/Lymphatic: No complaints of swollen glands, no swollen glands in the neck, does not bleed easily, does not bruise easily  Active Problems  1  Carotid aneurysm, left (442 81) (I72 0)   2  Diabetes Mellitus (250 00)   3  Need for revaccination (V05 9) (Z23)    Past Medical History  1  History of Anxiety (300 00) (F41 9)   2  History of depression (V11 8) (Z86 59)   3  History of hyperlipidemia (V12 29) (Z86 39)   4  History of hypertension (V12 59) (Z86 79)   5  History of neuropathy (V12 49) (Z86 69)   6  History of seizure (V12 49) (Z87 898)   7  History of stroke (V12 54) (Z86 73)   8  History of syncope (V15 89) (Z87 898)   9  History of Migraines (346 90) (G43 909)    The active problems and past medical history were reviewed and updated today  Surgical History    The surgical history was reviewed and updated today  Family History  Mother    1  No pertinent family history    The family history was reviewed and updated today  Social History   ·    · Employed in , ,  position   · Never a smoker   · No alcohol use  The social history was reviewed and updated today  Current Meds   1  Acetaminophen 325 MG CAPS; TAKE 2 CAPSULE Every 6 hours; Therapy: (Recorded:25Oct2017) to Recorded   2  Atorvastatin Calcium 80 MG Oral Tablet; TAKE 1 TABLET AT BEDTIME; Therapy: (Recorded:25Oct2017) to Recorded   3  Dulcolax 10 MG Rectal Suppository; Therapy: (RIEQFHCY:86BLA3753) to Recorded   4   Escitalopram Oxalate 10 MG Oral Tablet; Therapy: (LQFIRFCO:74AEL2183) to Recorded   5  Fenofibrate 145 MG Oral Tablet; Therapy: ([id number]) to Recorded   6  Fioricet -40 MG TABS; Therapy: (IOMDBVKR:21CFF6401) to Recorded   7  Fleet Enema 7-19 GM/118ML Rectal Enema; Therapy: (YTYHSYUF:07MNN2100) to Recorded   8  Gabapentin 300 MG TABS; TAKE 1 TABLET Bedtime; Therapy: (ZHLQGJJQ:02AWH9923) to Recorded   9  HumaLOG 100 UNIT/ML Subcutaneous Solution; INJECT 12 UNIT Before meals; Therapy: (Recorded:2017) to Recorded   10  Invokana 100 MG Oral Tablet; Take 1 tablet daily; Therapy: (RHAYFSKE:94ERP6393) to Recorded   11  Levemir FlexPen 100 UNIT/ML SOLN;    Therapy: (Recorded:12Zgr0771) to Recorded   12  Lopressor 50 MG Oral Tablet; take 0 5 tablet daily; Therapy: (IKJJTFAM:97PWJ1254) to Recorded   13  MetFORMIN HCl - 1000 MG Oral Tablet; Therapy: (Ivonne Buck) to Recorded   14  Milk of Magnesia 400 MG/5ML Oral Suspension; Therapy: (WFSVJMSP:71JFY7547) to Recorded   15  Pioglitazone HCl - 30 MG Oral Tablet; TAKE 1 TABLET ONCE DAILY; Therapy: (HRFALFTJ:84EWD8174) to Recorded   16  Topiramate 100 MG Oral Tablet; TAKE 1 TABLET AT BEDTIME; Therapy: (Recorded:2017) to Recorded   17  Valsartan-Hydrochlorothiazide 320-12 5 MG Oral Tablet; TAKE 1 TABLET ONCE DAILY; Therapy: (Recorded:2017) to Recorded    The medication list was reviewed and updated today  Vitals  Vital Signs    Recorded: 45EDD3537 03:17PM   Temperature 97 5 F, Tympanic   Heart Rate 69   Respiration 16   Systolic 973, RUE, Sitting   Diastolic 80, RUE, Sitting   Height 5 ft 2 in   Weight 183 lb 6 oz   BMI Calculated 33 54   BSA Calculated 1 84     Physical Exam   (The patient is well appearing  She appears tearful at times  She is in no acute distress  Her pupils are equal round and reactive to light  She has some left with mild incomplete lateral gaze   Otherwise her pupils are equal round and reactive to light  She has some slight left-sided ptosis in facial droop  She complains of left facial numbness  And diminished sensation to light touch  She has significant drift with her left arm  She is unable to move her finger to her nose with her eyes closed  This is only on the left-hand side  She is able to ambulate with a walker  Her left lower extremity is diffusely 4 hours  Her abdomen is soft and nontender  She has 2+ radial pulses)  Future Appointments    Date/Time Provider Specialty Site   11/16/2017 04:15 PM JOSE Wen   Neurology NEUROLOGY [facility]   Electronically signed by : JOSE Mancia ; Oct 25 2017  3:41PM EST                       (Author)

## 2017-10-27 PROBLEM — I63.9 STROKE (HCC): Status: RESOLVED | Noted: 2017-10-03 | Resolved: 2017-10-27

## 2017-10-27 PROBLEM — R53.1 LEFT-SIDED WEAKNESS: Status: ACTIVE | Noted: 2017-10-27

## 2017-11-15 ENCOUNTER — ALLSCRIPTS OFFICE VISIT (OUTPATIENT)
Dept: OTHER | Facility: OTHER | Age: 52
End: 2017-11-15

## 2017-11-15 ENCOUNTER — HOSPITAL ENCOUNTER (EMERGENCY)
Facility: HOSPITAL | Age: 52
Discharge: HOME | End: 2017-11-15
Attending: EMERGENCY MEDICINE | Admitting: EMERGENCY MEDICINE
Payer: COMMERCIAL

## 2017-11-15 ENCOUNTER — APPOINTMENT (EMERGENCY)
Dept: RADIOLOGY | Facility: HOSPITAL | Age: 52
End: 2017-11-15
Payer: COMMERCIAL

## 2017-11-15 VITALS
DIASTOLIC BLOOD PRESSURE: 88 MMHG | WEIGHT: 184 LBS | OXYGEN SATURATION: 95 % | TEMPERATURE: 98.4 F | SYSTOLIC BLOOD PRESSURE: 168 MMHG | BODY MASS INDEX: 33.86 KG/M2 | HEART RATE: 84 BPM | HEIGHT: 62 IN | RESPIRATION RATE: 18 BRPM

## 2017-11-15 DIAGNOSIS — R51.9 HEADACHE: Primary | ICD-10-CM

## 2017-11-15 LAB
ANION GAP SERPL CALCULATED.3IONS-SCNC: 10 MMOL/L (ref 4–13)
BUN SERPL-MCNC: 27 MG/DL (ref 5–25)
CALCIUM SERPL-MCNC: 9.7 MG/DL (ref 8.3–10.1)
CHLORIDE SERPL-SCNC: 106 MMOL/L (ref 100–108)
CO2 SERPL-SCNC: 24 MMOL/L (ref 21–32)
CREAT SERPL-MCNC: 1.21 MG/DL (ref 0.6–1.3)
GFR SERPL CREATININE-BSD FRML MDRD: 51 ML/MIN/1.73SQ M
GLUCOSE SERPL-MCNC: 164 MG/DL (ref 65–140)
POTASSIUM SERPL-SCNC: 3.8 MMOL/L (ref 3.5–5.3)
SODIUM SERPL-SCNC: 140 MMOL/L (ref 136–145)

## 2017-11-15 PROCEDURE — 70450 CT HEAD/BRAIN W/O DYE: CPT

## 2017-11-15 PROCEDURE — 99284 EMERGENCY DEPT VISIT MOD MDM: CPT

## 2017-11-15 PROCEDURE — 80048 BASIC METABOLIC PNL TOTAL CA: CPT | Performed by: EMERGENCY MEDICINE

## 2017-11-15 PROCEDURE — 36415 COLL VENOUS BLD VENIPUNCTURE: CPT | Performed by: EMERGENCY MEDICINE

## 2017-11-15 RX ORDER — ACETAMINOPHEN 325 MG/1
650 TABLET ORAL ONCE
Status: COMPLETED | OUTPATIENT
Start: 2017-11-15 | End: 2017-11-15

## 2017-11-15 RX ADMIN — ACETAMINOPHEN 650 MG: 325 TABLET, FILM COATED ORAL at 18:48

## 2017-11-15 NOTE — ED PROVIDER NOTES
History  Chief Complaint   Patient presents with    Headache     Pt reports a "burning" frontal headache  Pt has a hx of diabetes and HTN  47year old female comes to the ED with chief complaint of headache  Patient states that she was at her neurologist office and then had onset of her headache  Patient also had elevated blood pressure at that time to which the neurologist told her to come to the ED for further management  Patient hasn't taken any medication for pain  Patient states that the headache is bifrontal, constant, and burning in nature  Of note, patient does have residual neurological deficits from his previous two stroke, those being left facial droop and left upper and left lower extremity weakness  Patient otherwise denies fever, chest pain, shortness of breath, nausea, vomiting, abdominal pain, worsening weakness in her extremities, dysuria, constipation, diarrhea  MDM: I will order ct head to rule out SAH, I will also order bmp to assess renal function as possible cause of patient's blood pressure  I will treat patient's pain with tylenol  Prior to Admission Medications   Prescriptions Last Dose Informant Patient Reported? Taking?    Canagliflozin (INVOKANA PO)   Yes No   Sig: Take by mouth   atorvastatin (LIPITOR) 80 mg tablet   No No   Sig: Take 1 tablet by mouth every evening   butalbital-acetaminophen-caffeine (FIORICET,ESGIC) -40 mg per tablet   No No   Sig: Take 1 tablet by mouth every 6 (six) hours as needed for headaches   cholecalciferol (VITAMIN D3) 1,000 units tablet   No No   Sig: Take 1 tablet by mouth daily   clonazePAM (KlonoPIN) 0 5 mg tablet   Yes No   Sig: Take 0 5 mg by mouth daily at bedtime as needed for anxiety or seizures     escitalopram (LEXAPRO) 10 mg tablet   No No   Sig: Take 1 tablet by mouth daily   fenofibrate (TRICOR) 145 mg tablet   Yes No   Sig: Take 145 mg by mouth daily   gabapentin (NEURONTIN) 300 mg capsule   No No   Sig: Take 1 capsule by mouth daily at bedtime   insulin lispro (HumaLOG) 100 units/mL injection   No No   Sig: Inject 12 Units under the skin 3 (three) times a day with meals   metoprolol tartrate (LOPRESSOR) 50 mg tablet   Yes No   Sig: Take 25 mg by mouth     pioglitazone (ACTOS) 30 mg tablet   Yes No   Sig: Take 30 mg by mouth daily   topiramate (TOPAMAX) 50 MG tablet   No No   Sig: Take 1 tablet by mouth daily at bedtime   valsartan-hydrochlorothiazide (DIOVAN-HCT) 320-25 MG per tablet   Yes No   Sig: Take 1 tablet by mouth daily      Facility-Administered Medications: None       Past Medical History:   Diagnosis Date    Diabetes mellitus ([facility] [address] )     Hyperlipidemia     Hypertension     Stroke [facility])        Past Surgical History:   Procedure Laterality Date    HYSTERECTOMY         History reviewed  No pertinent family history  I have reviewed and agree with the history as documented  Social History   Substance Use Topics    Smoking status: Never Smoker    Smokeless tobacco: Never Used    Alcohol use No        Review of Systems   Constitutional: Negative for appetite change, chills, diaphoresis, fatigue and fever  HENT: Negative for congestion, ear discharge, ear pain, hearing loss, postnasal drip, rhinorrhea, sneezing and sore throat  Eyes: Negative for pain, discharge and redness  Respiratory: Negative for choking, chest tightness, shortness of breath, wheezing and stridor  Cardiovascular: Negative for chest pain and palpitations  Gastrointestinal: Negative for abdominal distention, abdominal pain, blood in stool, constipation, diarrhea, nausea and vomiting  Genitourinary: Negative for decreased urine volume, difficulty urinating, dysuria, flank pain, frequency and hematuria  Musculoskeletal: Negative for arthralgias, gait problem, joint swelling and neck pain  Skin: Negative for color change, pallor and rash     Allergic/Immunologic: Negative for environmental allergies, food allergies and immunocompromised state    Neurological: Positive for headaches  Negative for dizziness, seizures, weakness, light-headedness and numbness  Hematological: Negative for adenopathy  Does not bruise/bleed easily  Psychiatric/Behavioral: Negative for agitation and behavioral problems  Physical Exam  ED Triage Vitals   Temperature Pulse Respirations Blood Pressure SpO2   11/15/17 1743 11/15/17 1743 11/15/17 1743 11/15/17 1743 11/15/17 1743   98 4 °F (36 9 °C) 80 18 (!) 182/99 95 %      Temp Source Heart Rate Source Patient Position - Orthostatic VS BP Location FiO2 (%)   11/15/17 1743 -- -- -- --   Oral          Pain Score       11/15/17 1740       8           Orthostatic Vital Signs  Vitals:    11/15/17 1743 11/15/17 1849   BP: (!) 182/99 168/88   Pulse: 80 84       Physical Exam   Constitutional: She is oriented to person, place, and time  She appears well-developed and well-nourished  HENT:   Head: Normocephalic and atraumatic  Nose: Nose normal    Mouth/Throat: Oropharynx is clear and moist    Eyes: Conjunctivae and EOM are normal  Pupils are equal, round, and reactive to light  Neck: Normal range of motion  Neck supple  Cardiovascular: Normal rate, regular rhythm and normal heart sounds  Exam reveals no gallop and no friction rub  No murmur heard  Pulmonary/Chest: Effort normal and breath sounds normal    Abdominal: Soft  Bowel sounds are normal  She exhibits no distension  There is no tenderness  There is no rebound and no guarding  Musculoskeletal: Normal range of motion  Neurological: She is alert and oriented to person, place, and time  She has normal reflexes  A sensory deficit is present  No cranial nerve deficit  She displays a negative Romberg sign  Left sided sensory deficit   Residual left sided weakness    Skin: Skin is warm and dry  No erythema  No pallor  Psychiatric: She has a normal mood and affect  Her behavior is normal    Nursing note and vitals reviewed        ED Medications  Medications   acetaminophen (TYLENOL) tablet 650 mg (650 mg Oral Given 11/15/17 1848)       Diagnostic Studies  Results Reviewed     Procedure Component Value Units Date/Time    Basic metabolic panel [[id number]]  (Abnormal) Collected:  11/15/17 1848    Lab Status:  Final result Specimen:  Blood from Arm, Right Updated:  11/15/17 1922     Sodium 140 mmol/L      Potassium 3 8 mmol/L      Chloride 106 mmol/L      CO2 24 mmol/L      Anion Gap 10 mmol/L      BUN 27 (H) mg/dL      Creatinine 1 21 mg/dL      Glucose 164 (H) mg/dL      Calcium 9 7 mg/dL      eGFR 51 ml/min/1 73sq m     Narrative:         National Kidney Disease Education Program recommendations are as follows:  GFR calculation is accurate only with a steady state creatinine  Chronic Kidney disease less than 60 ml/min/1 73 sq  meters  Kidney failure less than 15 ml/min/1 73 sq  meters  CT head without contrast   Final Result by Carlos Rinaldi MD (11/15 2003)      No acute intracranial abnormality  Workstation performed: [id number]               Procedures  Procedures      Phone Consults  ED Phone Contact    ED Course  ED Course as of Nov 16 1406   Wed Nov 15, 2017   1954 Patient states headache feels better                                MDM  CritCare Time    Disposition  Final diagnoses:   Headache     Time reflects when diagnosis was documented in both MDM as applicable and the Disposition within this note     Time User Action Codes Description Comment    11/15/2017  8:06 PM Elkin Roberson Add [R51] Headache       ED Disposition     ED Disposition Condition Comment    Discharge  Sherron Solomon Wei discharge to home/self care      Condition at discharge: Stable        Follow-up Information     Follow up With Specialties Details Why Contact Info    Lali Burroughs MD  In 3 days  1946 [facility]  [address]  [phone]          Discharge Medication List as of 11/15/2017  8:07 PM      CONTINUE these medications which have NOT CHANGED    Details   atorvastatin (LIPITOR) 80 mg tablet Take 1 tablet by mouth every evening, Starting Mon 10/9/2017, No Print      butalbital-acetaminophen-caffeine (FIORICET,ESGIC) -40 mg per tablet Take 1 tablet by mouth every 6 (six) hours as needed for headaches, Starting Mon 10/9/2017, Print      Canagliflozin (INVOKANA PO) Take by mouth, Until Discontinued, Historical Med      cholecalciferol (VITAMIN D3) 1,000 units tablet Take 1 tablet by mouth daily, Starting Mon 10/9/2017, No Print      clonazePAM (KlonoPIN) 0 5 mg tablet Take 0 5 mg by mouth daily at bedtime as needed for anxiety or seizures  , Historical Med      escitalopram (LEXAPRO) 10 mg tablet Take 1 tablet by mouth daily, Starting Tue 10/10/2017, No Print      fenofibrate (TRICOR) 145 mg tablet Take 145 mg by mouth daily, Until Discontinued, Historical Med      gabapentin (NEURONTIN) 300 mg capsule Take 1 capsule by mouth daily at bedtime, Starting Mon 10/9/2017, No Print      insulin lispro (HumaLOG) 100 units/mL injection Inject 12 Units under the skin 3 (three) times a day with meals, Starting Mon 10/9/2017, No Print      metoprolol tartrate (LOPRESSOR) 50 mg tablet Take 25 mg by mouth  , Until Discontinued, Historical Med      pioglitazone (ACTOS) 30 mg tablet Take 30 mg by mouth daily, Until Discontinued, Historical Med      topiramate (TOPAMAX) 50 MG tablet Take 1 tablet by mouth daily at bedtime, Starting Mon 10/9/2017, No Print      valsartan-hydrochlorothiazide (DIOVAN-HCT) 320-25 MG per tablet Take 1 tablet by mouth daily, Historical Med           No discharge procedures on file  ED Provider  Attending physically available and evaluated [address]  I managed the patient along with the ED Attending      Electronically Signed by         Aniceto Lopez MD  Resident  11/16/17 [id number]

## 2017-11-16 NOTE — DISCHARGE INSTRUCTIONS
Sigue con tu doctor primario en 1-3 chavez  Si, los sintomas se pongan peor, regrese al departmento de emergencia de inmediato  Dolor de funmilayo severo, cuidados ambulatorios   INFORMACIÓN GENERAL:   Un dolor de funmilayo severo  es un dolor o incomodidad que empieza de repente y empeora rápidamente  Se desconoce la causa de un dolor de funmilayo severo o isabella  Es posible que sea provocado por estrés, fatiga, hormonas, alimentos o traumas  Los siguientes son los síntomas más comunes de un isabella dolor de funmilayo:   · Fiebre    · Presión en los senos paranasales (sinusitis)    · Pérdida de la memoria    · Náuseas o vómito    · Problemas con la visión, rosales ojos rojos, lagrimeo, pérdida de la visión o dolor con la yahaira brillante    · Rigidez del isreal    · Sensibilidad del área de la funmilayo y el isreal    · Mayor dificultad de la acostumbrada para permanecer despierto o para estar alerta que     · Debilidad o falta de energía  Busque atención inmediata al presentar los siguientes síntomas:   · Dolor isabella    · Un dolor de funmilayo que ocurre después de un golpe a la funmilayo, chai caída o un trauma     · Confusión o estar olvidadizo    · Entumecimiento a un costado de la hamzah o del cuerpo  El tratamiento para un dolor de funmilayo isabella  puede incluir medicamento para tratar el dolor  También puede necesitar terapia de retro-alimentiación (biofeedback) o terapia de comportamiento cognitivo  Consulte con means proveedor de [employer] y otros tratamientos para un dolor de funmilayo isabella  La forma de lidiar con los síntomas:   · Aplique calor  en means funmilayo stefanie 20 a 30 minutos cada 2 horas por los [employer] indicaron  El calor ayuda a disminuir el dolor y los espasmos musculares  Se puede alternar entre calor y frío  · Aplique hielo  en means funmilayo por 15 a 20 minutos cada hora según las indicaciones  Use chai compresa de hielo o coloque hielo triturado en chai bolsa plástica y cúbrala con chai toalla   El hielo disminuye el dolor  · Relaje james músculos  Acuéstese en chai posición cómoda y cierre los ojos  Relaje james músculos despacio  Empiece con los dedos de james pies y vaya subiendo por kelsey cuerpo  · Mantenga un registro de los elisabet de [place]  Imelda Fraise y terminó kelsey dolor de [place]  Incluya los síntomas y lo que estaba haciendo cuando el dolor de [place]  Escriba en el registro lo que usted comió o bebió stefanie las 24 horas antes de que empezara kelsey dolor de [place]  Marcine Levy dolor y [employer]  Registre lo que hizo para tratar kelsey dolor de funmilayo y si funciono o no  Acuda a kelsey gilberto de control con kelsey proveedor de [place] según le indicaron:  Lleve kelsey registro de los elisabet de funmilayo cuando acuda a la gilberto con kelsey proveedor de [place]  Escriba las preguntas que tenga para que no se le olvide hacerlas stefanie las citas médicas  ACUERDOS SOBRE KELSEY CUIDADO:   Usted tiene el derecho de participar en la planificación de kelsey cuidado  Aprenda todo lo que pueda sobre kelsey condición y rosales darle tratamiento  Discuta con james médicos james opciones de tratamiento para juntos decidir el cuidado que usted quiere recibir  Usted siempre tiene el derecho a rechazar kelsey tratamiento  Esta información es sólo para uso en educación  Kelsey intención no es darle un consejo médico sobre enfermedades o tratamientos  Colsulte con kelsey Tarun Parul farmacéutico antes de seguir cualquier régimen médico para saber si es seguro y efectivo para usted  © 2014 [address] is for End User's use only and may not be sold, redistributed or otherwise used for commercial purposes  All illustrations and images included in CareNotes® are the copyrighted property of A D A M , Inc  or [employer]

## 2017-11-16 NOTE — ED ATTENDING ATTESTATION
Brynn Callahan MD, saw and evaluated the patient  I have discussed the patient with the resident/non-physician practitioner and agree with the resident's/non-physician practitioner's findings, Plan of Care, and MDM as documented in the resident's/non-physician practitioner's note, except where noted  All available labs and Radiology studies were reviewed  At this point I agree with the current assessment done in the Emergency Department  I have conducted an independent evaluation of this patient including a focused history and a physical exam     13-year-old female, history of either stroke without any radiologic evidence of pathology versus complex migraine with residual deficit, presenting to the emergency department from the neurologist's office for evaluation of headache and hypertension  Patient has a history of known ICA aneurysm  Patient reports that she had a scheduled appointment with her neurologist, had developed a small headache on the way there, and the headache got worse while she was at the neurologist's office  The patient rates the headache as an 8/10, reports that there has of flushing/burning sensation in her face  No change in vision, difficulty speaking or swallowing, nausea or vomiting  Ten systems are reviewed and are negative except as noted  The patient is resting comfortably on a stretcher in no acute respiratory distress  The patient appears nontoxic  HEENT reveals moist mucous membranes  Head is normocephalic and atraumatic  Conjunctiva and sclera are normal  Neck is nontender and supple with full range of motion to flexion, extension, lateral rotation  No meningismus appreciated  No masses are appreciated  Lungs are clear to auscultation bilaterally without any wheezes, rales or rhonchi  Heart is regular rate and rhythm without any murmurs, rubs or gallops  Abdomen is soft and nontender without any rebound or guarding   Extremities appear grossly normal without any significant arthropathy  Patient is awake, alert, and oriented x3  The patient has normal interaction  Cranial nerves 2-12 are intact  No nystagmus is appreciated on extraocular muscle exam   Motor is 5 out of 5 right upper lower extremity  4+ out of 5 weakness on the left upper extremity, however the exam is not consistent when it is repeated on multiple occasions  4/5 weakness on the left lower extremity  Patient is unable to cooperate with tests of finger-to-nose or heel to shin  Assessment and plan:  58-year-old female, presenting to the emergency department for evaluation of headache  Given the history of ICA aneurysm, patient will undergo CT for subarachnoid hemorrhage  Patient is presenting within the 1st 6 hours, and I have a low pretest probability that this is a subarachnoid hemorrhage given the description of the headache as gradual in onset and not the worst headache of her life  CT will still be performed given the history of ICA aneurysm  Patient will be medicated with Tylenol for headache  During the time that the patient was being evaluated in the emergency department initial presenting hypertension started to slowly resolve  Labs Reviewed   BASIC METABOLIC PANEL - Abnormal        Result Value Ref Range Status    BUN 27 (*) 5 - 25 mg/dL Final    Glucose 164 (*) 65 - 140 mg/dL Final    Comment:   If the patient is fasting, the ADA then defines impaired fasting glucose as > 100 mg/dL and diabetes as > or equal to 123 mg/dL  Specimen collection should occur prior to Sulfasalazine administration due to the potential for falsely depressed results  Specimen collection should occur prior to Sulfapyridine administration due to the potential for falsely elevated results  Sodium 140  136 - 145 mmol/L Final    Potassium 3 8  3 5 - 5 3 mmol/L Final    Comment: Slightly Hemolyzed;  Results May be Affected    Chloride 106  100 - 108 mmol/L Final    CO2 24  21 - 32 mmol/L Final    Anion Gap 10  4 - 13 mmol/L Final    Creatinine 1 21  0 60 - 1 30 mg/dL Final    Comment: Standardized to IDMS reference method    Calcium 9 7  8 3 - 10 1 mg/dL Final    eGFR 51  ml/min/1 73sq m Final    Narrative:     National Kidney Disease Education Program recommendations are as follows:  GFR calculation is accurate only with a steady state creatinine  Chronic Kidney disease less than 60 ml/min/1 73 sq  meters  Kidney failure less than 15 ml/min/1 73 sq  meters  CT head without contrast   Final Result      No acute intracranial abnormality           Workstation performed: [id number]

## 2017-11-16 NOTE — PROGRESS NOTES
Assessment    1  Carotid aneurysm, left (442 81) (I72 0)   2  Weakness of left side of body (728 87) (R53 1)   3  Chronic fatigue syndrome (780 71) (R53 82)    Plan  Chronic fatigue syndrome    · *1 -  CLINIC RHEUMATOLOGY Co-Management  *  Status: Hold For - Scheduling Requested for: [id number]   Ordered;Chronic fatigue syndrome; Ordered By: Mark Abbott Performed:  Due: [id number]  Care Summary provided  : Yes  Weakness of left side of body    · * MRI CERVICAL SPINE W WO CONTRAST; Status:Need Information - FinancialAuthorization; Requested for:[id number];    Perform:[facility] Radiology; [facility]:[id number]; Ordered;of left side of body; Ordered By:Katja Juan;   · Follow-up visit in 2 months Evaluation and Treatment  Follow-up  Status: Hold For -Scheduling  Requested for: [id number]   Ordered;Weakness of left side of body; Ordered By: Mark Abbott Performed:  Due: [id number]    Discussion/Summary  21-year-old  female who is here today in Neurology Clinic as a hospital discharge follow-up  He presented to [place] [address] with left-sided symptoms and received tPA although MRI was negative  Rest of the workup was unremarkable  She was found to have a ICA aneurysm for which she saw a neuro surgery outpatient for an evaluation  -Obtain MRI C-spine with and without contrast to see if she has left sided weakness as a result of possible cervical radiculopathy For secondary stroke prevention, she cannot tolerate aspirin due to ulcer, since no evidence of stroke on examination with an inconsistent examination, will not treat with plavix since suspicion for stroke is low  Continue with other medications  Continue  with BP control, goal < 130/80, her BP is 190/108, and repeat BP is 237/110, i will send her to the ED and she has a headache, and feeling lightheadeness, and she will need to [facility] in the hospital for possible hypertensive urgency/emergency   Continue to monitor DM and appropriate Euglycemic control  Patient/Guardian does understand that if they have any new stroke like symptoms such as facial droop on one side, weakness/paralysis on either side, speech trouble, numbness on one side, balance issues, any vision changes, or any new headache, to call 9-1-1 immediately or to proceed to the nearest ER immediately  Patient/guardian was counseled regarding instructions for management  Possible side effects of new medications were reviewed with the patient/guardian today  Treatment plan was reviewed with the patient/guardian  The patient/guardian understands and agrees with the plan  Chief Complaint  Chief Complaint Free Text Note Form: Patient present for hospital follow-up regarding carotid aneurysm      History of Present Illness  HPI:    This is a 47year old  female who presented to the the clinic for a recent hospital discharge follow-up  He was admitted at the hospital on 10/03 for left-sided weakness and numbness  Within the window for tPA surgery she received tPA as part of the workup she had an MRI brain which did not show any evidence of an acute infarct  CTA head and neck showed a ICA aneurysm  Echo was done which showed a normal EF without any abnormalities  LINDA was done which was also negative  Hypercoagulable workup was also done which essentially was negative  Vitamin-D was 9 and she was started on supplements for that  SHe was seen in our neurosurgery clinic for the ICA aneurysm which will be observed her Neurosurgery  She is here today in clinic  She has been feeling depressed, and feels like she wants to work and she is still having left sided weakness, and was getting threapy for 1 month but had to stop due to insurance reasons and is looking for cheaper more affordable insurance  She says that her memory has been declining  She was prescribed aspirin at the time of discharge but had to stop taking it due to ulcer in her stomach    She has diabetes, hypercholesterolemia, Hypertension  She is seeing pychiatrist       Review of Systems  Neurological ROS:  Constitutional: no fever, no chills, no recent weight gain, no recent weight loss, no complaints of feeling tired, no changes in appetite  HEENT: blurred vision,-- dryness of the eyes,-- sore throat-- and-- dysphagia  Cardiovascular: chest pain or pressure  Respiratory: unusual or persistant cough  Gastrointestinal:  no nausea, no vomiting, no diarrhea, no abdominal pain, no changes in bowel habits, no melena, no loss of bowel control  Genitourinary:  no incontinence, no feelings of urinary urgency, no increase in frequency, no urinary hesitancy, no dysuria, no hematuria  Musculoskeletal: arthralgias,-- head/neck/back pain-- and-- pain while walking  Integumentary  no masses, no rash, no skin lesions, no livedo reticularis  Psychiatric: anxiety,-- depression-- and-- mood swings  Endocrine  no unusual weight loss or gain, no excessive urination, no excessive thirst, no hair loss or gain, no hot or cold intolerance, no menstrual period change or irregularity, no loss of sexual ability or drive, no erection difficulty, no nipple discharge  Hematologic/Lymphatic:  no unusual bleeding, no tendency for easy bruising, no clotting skin or lumps  Neurological General: headache-- and-- trouble falling asleep  Neurological Mental Status: confusion-- and-- memory problems  Neurological Cranial Nerves: vertigo or dizziness  Neurological Motor findings include:  no tremor, no twitching, no cramping(pre/post exercise), no atrophy  Neurological Coordination: balance difficulties  Neurological Sensory: numbness  Neurological Gait: difficulty walking  ROS Reviewed:   ROS reviewed  Active Problems  1  Carotid aneurysm, left (442 81) (I72 0)   2  Diabetes Mellitus (250 00)   3  Need for revaccination (V05 9) (Z23)    Past Medical History  1  History of Anxiety (300 00) (F41 9)   2   History of depression (V11 8) (Z86 59)   3  History of hyperlipidemia (V12 29) (Z86 39)   4  History of hypertension (V12 59) (Z86 79)   5  History of neuropathy (V12 49) (Z86 69)   6  History of seizure (V12 49) (Z87 898)   7  History of stroke (V12 54) (Z86 73)   8  History of syncope (V15 89) (Z87 898)   9  History of Migraines (346 90) (G43 909)  Active Problems And Past Medical History Reviewed: The active problems and past medical history were reviewed and updated today  Surgical History  Surgical History Reviewed: The surgical history was reviewed and updated today  Family History  Mother    1  No pertinent family history  Family History Reviewed: The family history was reviewed and updated today  Social History     ·    · Employed in , ,  position   · Never a smoker   · No alcohol use  Social History Reviewed: The social history was reviewed and updated today  The social history was reviewed and is unchanged  Current Meds   1  Acetaminophen 325 MG CAPS; TAKE 2 CAPSULE Every 6 hours; Therapy: (Recorded:2017) to Recorded   2  Atorvastatin Calcium 80 MG Oral Tablet; TAKE 1 TABLET AT BEDTIME; Therapy: (Recorded:2017) to Recorded   3  Dulcolax 10 MG Rectal Suppository; Therapy: (ZPHCJYSA:[id number]) to Recorded   4  Escitalopram Oxalate 10 MG Oral Tablet; Take 1 tablet daily; Therapy: (Recorded:77Jdg8711) to Recorded   5  Fenofibrate 145 MG Oral Tablet; TAKE 1 TABLET DAILY; Therapy: (Recorded:42Uav3326) to Recorded   6  Fioricet -40 MG TABS; TAKE 1 TABLET 3 TIMES DAILY AS NEEDED FOR HEADACHE; Therapy: (Recorded:96Czz5702) to Recorded   7  Fleet Enema 7-19 GM/118ML Rectal Enema; Therapy: (JTVYQXIS:22EYN0725) to Recorded   8  Gabapentin 300 MG TABS; TAKE 1 TABLET Bedtime; Therapy: (LORENA:25ICS5791) to Recorded   9  HumaLOG 100 UNIT/ML Subcutaneous Solution; INJECT 12 UNIT Before meals; Therapy: (Recorded:64Wjy5228) to Recorded   10   Invokana 100 MG Oral Tablet; Take 1 tablet daily; Therapy: (BHYTCJHA:20YQW0417) to Recorded   11  Levemir FlexPen 100 UNIT/ML SOLN;  Therapy: (Recorded:35Dnf1771) to Recorded   12  Lopressor 50 MG Oral Tablet; take 0 5 tablet daily; Therapy: (QUTXLQRK:72OKZ6072) to Recorded   13  MetFORMIN HCl - 1000 MG Oral Tablet; TAKE 1 TABLET DAILY WITH FOOD; Therapy: (Recorded:15Nov2017) to Recorded   14  Milk of Magnesia 400 MG/5ML Oral Suspension; USE AS DIRECTED; Therapy: (Recorded:15Nov2017) to Recorded   15  Pioglitazone HCl - 30 MG Oral Tablet; TAKE 1 TABLET ONCE DAILY; Therapy: (IOXDEMAI:65UJZ2952) to Recorded   16  Topiramate 100 MG Oral Tablet; TAKE 1 TABLET AT BEDTIME; Therapy: (Recorded:25Oct2017) to Recorded   17  Valsartan-Hydrochlorothiazide 320-12 5 MG Oral Tablet; TAKE 1 TABLET ONCE DAILY; Therapy: (Recorded:25Oct2017) to Recorded    Allergies    1  No Known Drug Allergies    Vitals  Signs   Recorded: 05EYY2136 04:38PM   Heart Rate: 97  Respiration: 16  Systolic: 859, RUE, Sitting  Diastolic: 342, RUE, Sitting  Height: 5 ft 2 in  Weight: 184 lb 3 oz  BMI Calculated: 33 69  BSA Calculated: 1 85    Physical Exam  General: Patient is not in any acute distress  Mental status: Alert, awake oriented x 3 and follows commands Skin: no lesions Neurologic Examination:  Speech: Speech is fluent, reading, writing, naming, repetition and comprehension intact  Cranial Nerves: Pupils equal round reactive to light and extraocular movements intact  Visual fields full to confrontation  Facial sensation intact to soft touch in V1, V2 and V3 distributions  left facial droop noted, but got worse during examination of her motor examination  Tongue midline, able to move side to side  Shoulder shrug strong  Motor: decreased strength on the left, but poor effort, but was seeing her hold her cane with full strength using her left hand Sensory: mildine splitting to pinprick, vibration throughout the body    Cerebellar: mildly ataxic on the left Reflexes: 2+ in all 4 extremities, downgoing toes bilaterally Gait: inconsistent gait, bearing weight on the left at times but was limping      Results/Data  Diagnostic Studies Reviewed:  CT Scan Review CTA Head 10/3/17large vessel flow restrictive disease within the head or neck  No arterial thrombosis  1 8 mm aneurysm/infundibulum arising from the supraclinoid left ICA  Nonemergent neurovascular consultation and follow-up CTA of the brain only, suggested in 6 months  MRI Review MRI Brain 10/4/17MRI of the brain, no acute disease specifically, no indication of acute ischemia  Diagnostic Review Lipid Panel 10/4/[id number] A1C 8 9  Future Appointments    Date/Time Provider Specialty Site   04/25/2018 03:00 PM JOSE Hardin  Neurosurgery [facility] NEUROSURGICAL [facility]   11/16/2017 04:15 PM JOSE Geiger   Neurology NEUROLOGY [facility]   Electronically signed by : Parker Batres MD; Nov 15 2017  5:01PM EST                       (Author)

## 2017-11-27 ENCOUNTER — TRANSCRIBE ORDERS (OUTPATIENT)
Dept: ADMINISTRATIVE | Facility: HOSPITAL | Age: 52
End: 2017-11-27

## 2017-11-27 DIAGNOSIS — R53.1 ASTHENIA: Primary | ICD-10-CM

## 2017-11-28 ENCOUNTER — HOSPITAL ENCOUNTER (EMERGENCY)
Facility: HOSPITAL | Age: 52
Discharge: HOME | End: 2017-11-28
Attending: EMERGENCY MEDICINE | Admitting: EMERGENCY MEDICINE
Payer: COMMERCIAL

## 2017-11-28 VITALS
DIASTOLIC BLOOD PRESSURE: 104 MMHG | HEART RATE: 82 BPM | OXYGEN SATURATION: 91 % | TEMPERATURE: 98.3 F | BODY MASS INDEX: 33.95 KG/M2 | SYSTOLIC BLOOD PRESSURE: 181 MMHG | WEIGHT: 185.63 LBS | RESPIRATION RATE: 16 BRPM

## 2017-11-28 DIAGNOSIS — H66.90 ACUTE OTITIS MEDIA: Primary | ICD-10-CM

## 2017-11-28 DIAGNOSIS — R51.9 HEADACHE: ICD-10-CM

## 2017-11-28 PROCEDURE — 99283 EMERGENCY DEPT VISIT LOW MDM: CPT

## 2017-11-28 RX ORDER — AMOXICILLIN AND CLAVULANATE POTASSIUM 875; 125 MG/1; MG/1
1 TABLET, FILM COATED ORAL ONCE
Status: COMPLETED | OUTPATIENT
Start: 2017-11-28 | End: 2017-11-28

## 2017-11-28 RX ORDER — AMOXICILLIN AND CLAVULANATE POTASSIUM 875; 125 MG/1; MG/1
1 TABLET, FILM COATED ORAL EVERY 12 HOURS
Qty: 14 TABLET | Refills: 0 | Status: SHIPPED | OUTPATIENT
Start: 2017-11-28 | End: 2017-12-05

## 2017-11-28 RX ORDER — ACETAMINOPHEN 325 MG/1
975 TABLET ORAL ONCE
Status: COMPLETED | OUTPATIENT
Start: 2017-11-28 | End: 2017-11-28

## 2017-11-28 RX ADMIN — AMOXICILLIN AND CLAVULANATE POTASSIUM 1 TABLET: 875; 125 TABLET, FILM COATED ORAL at 18:01

## 2017-11-28 RX ADMIN — ACETAMINOPHEN 975 MG: 325 TABLET ORAL at 18:01

## 2017-11-28 NOTE — DISCHARGE INSTRUCTIONS
Dolor de funmilayo severo, cuidados ambulatorios   INFORMACIÓN GENERAL:   Un dolor de funmilayo severo  es un dolor o incomodidad que empieza de repente y empeora rápidamente  Se desconoce la causa de un dolor de funmilayo severo o isabella  Es posible que sea provocado por estrés, fatiga, hormonas, alimentos o traumas  Los siguientes son los síntomas más comunes de un isabella dolor de funmilayo:   · Fiebre    · Presión en los senos paranasales (sinusitis)    · Pérdida de la memoria    · Náuseas o vómito    · Problemas con la visión, rosales ojos rojos, lagrimeo, pérdida de la visión o dolor con la yahaira brillante    · Rigidez del isreal    · Sensibilidad del área de la funmilayo y el isreal    · Mayor dificultad de la acostumbrada para permanecer despierto o para estar alerta que     · Debilidad o falta de energía  Busque atención inmediata al presentar los siguientes síntomas:   · Dolor isabella    · Un dolor de funmilayo que ocurre después de un golpe a la funmilayo, chai caída o un trauma     · Confusión o estar olvidadizo    · Entumecimiento a un costado de la hamzah o del cuerpo  El tratamiento para un dolor de funmilayo isabella  puede incluir medicamento para tratar el dolor  También puede necesitar terapia de retro-alimentiación (biofeedback) o terapia de comportamiento cognitivo  Consulte con means proveedor de [employer] y otros tratamientos para un dolor de funmilayo isabella  La forma de lidiar con los síntomas:   · Aplique calor  en means funmilayo stefanie 20 a 30 minutos cada 2 horas por los [employer] indicaron  El calor ayuda a disminuir el dolor y los espasmos musculares  Se puede alternar entre calor y frío  · Aplique hielo  en means funmilayo por 15 a 20 minutos cada hora según las indicaciones  Use chai compresa de hielo o coloque hielo triturado en chai bolsa plástica y cúbrala con chai toalla  El hielo disminuye el dolor  · Relaje james músculos  Acuéstese en chai posición cómoda y cierre los ojos  Relaje james músculos despacio   Empiece con los dedos de james pies y [place]a Soda subiendo por means cuerpo  · Mantenga un registro de los elisabet de [place]  Larey Narrow y terminó means dolor de [place]  Incluya los síntomas y lo que estaba haciendo cuando el dolor de [place]  Escriba en el registro lo que usted comió o bebió stefanie las 24 horas antes de que empezara means dolor de [place]  Alveda Murtaza dolor y [employer]  Registre lo que hizo para tratar means dolor de funmilayo y si funciono o no  Acuda a means gilberto de control con means proveedor de [place] según le indicaron:  Lleve means registro de los elisabet de funmilayo cuando acuda a la gilberto con means proveedor de [place]  Escriba las preguntas que tenga para que no se le olvide hacerlas stefanie las citas médicas  ACUERDOS SOBRE MEANS CUIDADO:   Usted tiene el derecho de participar en la planificación de means cuidado  Aprenda todo lo que pueda sobre means condición y rosales darle tratamiento  Discuta con james médicos james opciones de tratamiento para juntos decidir el cuidado que usted quiere recibir  Usted siempre tiene el derecho a rechazar means tratamiento  Esta información es sólo para uso en educación  Means intención no es darle un consejo médico sobre enfermedades o tratamientos  Colsulte con means [place] farmacéutico antes de seguir cualquier régimen médico para saber si es seguro y efectivo para usted  © 2014 [address] is for End User's use only and may not be sold, redistributed or otherwise used for commercial purposes  All illustrations and images included in CareNotes® are the copyrighted property of A D A M , Inc  or [facility]  Otitis Media   LO QUE NECESITA SABER:   La otitis media es chai infección en el oído  INSTRUCCIONES SOBRE EL CLAY HOSPITALARIA:   Medicamentos:  · El ibuprofeno o el acetaminofeno  ayuda a disminuir el dolor y la Celeocłsin  Están disponibles sin receta médica  Consulte con means médico cuál medicamento es el adecuado para usted   Pregunte la cantidad y la frecuencia con que debe tomarlos  Estos medicamentos pueden provocar sangrado estomacal si no se tiffani correctamente  El ibuprofeno puede provocar daño al [place] Natter  No tome ibuprofeno si usted tiene enfermedad de los riñones, Stefan o si es alérgico a la aspirina  El acetaminofeno puede dañar el hígado  No ingiera alcohol si christina acetaminofeno  · Gotas para los oídos  ayudan a tratar el dolor de oído  · Antibióticos  ayudan a tratar la infección bacterial que provocó la infección de oído  · [place] james medicamentos rosales se le haya indicado  Consulte con means médico si usted ann que means medicamento no le está ayudando o si presenta efectos secundarios  Infórmele si es alérgico a cualquier medicamento  Mantenga chai lista actualizada de los [employer], las vitaminas y los productos herbales que christina  Incluya los siguientes datos de los medicamentos: cantidad, frecuencia y motivo de administración  Traiga con usted la lista o los envases de la píldoras a james citas de seguimiento  Lleve la lista de los medicamentos con usted en paddy de chai emergencia  Calor o hielo:   · El calor  puede usarse para disminuir means dolor  Coloque un pañuelo húmedo y tibio en el oído  Aplíquelo por 15 a 20 minutos, de 3 a 4 veces al día  · El hielo  ayuda a disminuir la inflamación y el dolor  Use chai bolsa con hielo o ponga hielo triturado en chai bolsa de plástico  [place] la bolsa con chai toalla y colóquela en el oído por 15 a 20 minutos, de 3 a 4 veces por 2 días  Prevenga la otitis media:   · Lávese las lisa frecuentemente  Utilice agua y Eduar  [employer] las lisa después de usar el baño, cambiarle el pañal a un leticia o estornudar  Lávese las lisa antes de comer o preparar alimentos  · Aléjese de las personas enfermas  Algunos microbios se propagan fácil y rápidamente con el contacto  Regreso a la escuela o al [place]:  Charlette Roche podría regresar al [place] o a la escuela cuando desaparezca la fikvngre     Diane Ward a james consultas de control con murillo médico según le indicaron  Anote james preguntas para que se acuerde de hacerlas stefanie james visitas  Pregúntele a murillo Mila Dawn vitaminas y minerales son adecuados para usted  · El dolor del oído empeora o no desaparece, incluso después del [place]  · La pare exterior del oído está brandon o inflamada  · Tiene vómitos o diarrea  · Tiene líquido saliendo del oído  · Usted tiene preguntas o inquietudes acerca de murillo condición o cuidado  Regrese a la tierney de emergencias si:   · Usted sufre chai convulsión  · Tiene fiebre y rigidez en el isreal  © 2017 [address] Information is for End User's use only and may not be sold, redistributed or otherwise used for commercial purposes  All illustrations and images included in CareNotes® are the copyrighted property of A D A M , Inc  or [place]  Esta información es sólo para uso en educación  Murillo intención no es darle un consejo médico sobre enfermedades o tratamientos  Colsulte con murillo Liliana Every farmacéutico antes de seguir cualquier régimen médico para saber si es seguro y efectivo para usted

## 2017-11-29 ENCOUNTER — TRANSCRIBE ORDERS (OUTPATIENT)
Dept: ADMINISTRATIVE | Facility: HOSPITAL | Age: 52
End: 2017-11-29

## 2017-11-29 DIAGNOSIS — R53.1 WEAKNESS: Primary | ICD-10-CM

## 2017-12-21 DIAGNOSIS — R53.1 WEAKNESS: ICD-10-CM

## 2017-12-21 DIAGNOSIS — M54.2 CERVICALGIA: ICD-10-CM

## 2017-12-24 ENCOUNTER — HOSPITAL ENCOUNTER (OUTPATIENT)
Dept: MRI IMAGING | Facility: HOSPITAL | Age: 52
Discharge: HOME | End: 2017-12-26
Attending: PSYCHIATRY & NEUROLOGY
Payer: COMMERCIAL

## 2018-01-09 ENCOUNTER — TRANSCRIBE ORDERS (OUTPATIENT)
Dept: ADMINISTRATIVE | Facility: HOSPITAL | Age: 53
End: 2018-01-09

## 2018-01-09 ENCOUNTER — GENERIC CONVERSION - ENCOUNTER (OUTPATIENT)
Dept: OTHER | Facility: OTHER | Age: 53
End: 2018-01-09

## 2018-01-09 DIAGNOSIS — I72.0 ANEURYSM OF ARTERY OF NECK (HCC): ICD-10-CM

## 2018-01-09 DIAGNOSIS — R53.1 ASTHENIA: Primary | ICD-10-CM

## 2018-01-10 ENCOUNTER — APPOINTMENT (OUTPATIENT)
Dept: LAB | Facility: CLINIC | Age: 53
End: 2018-01-10
Payer: COMMERCIAL

## 2018-01-10 DIAGNOSIS — I72.0 ANEURYSM OF ARTERY OF NECK (HCC): ICD-10-CM

## 2018-01-10 LAB
BUN SERPL-MCNC: 42 MG/DL (ref 5–25)
CREAT SERPL-MCNC: 1.7 MG/DL (ref 0.6–1.3)
GFR SERPL CREATININE-BSD FRML MDRD: 34 ML/MIN/1.73SQ M

## 2018-01-10 PROCEDURE — 82565 ASSAY OF CREATININE: CPT

## 2018-01-10 PROCEDURE — 36415 COLL VENOUS BLD VENIPUNCTURE: CPT

## 2018-01-10 PROCEDURE — 84520 ASSAY OF UREA NITROGEN: CPT

## 2018-01-13 VITALS
TEMPERATURE: 97.5 F | SYSTOLIC BLOOD PRESSURE: 147 MMHG | DIASTOLIC BLOOD PRESSURE: 80 MMHG | RESPIRATION RATE: 16 BRPM | WEIGHT: 183.38 LBS | HEIGHT: 62 IN | HEART RATE: 69 BPM | BODY MASS INDEX: 33.75 KG/M2

## 2018-01-14 VITALS
SYSTOLIC BLOOD PRESSURE: 237 MMHG | WEIGHT: 184.19 LBS | BODY MASS INDEX: 33.9 KG/M2 | HEART RATE: 90 BPM | DIASTOLIC BLOOD PRESSURE: 109 MMHG | RESPIRATION RATE: 16 BRPM | HEIGHT: 62 IN

## 2018-01-15 ENCOUNTER — HOSPITAL ENCOUNTER (OUTPATIENT)
Dept: RADIOLOGY | Facility: HOSPITAL | Age: 53
Discharge: HOME | End: 2018-01-15
Payer: COMMERCIAL

## 2018-01-15 ENCOUNTER — TRANSCRIBE ORDERS (OUTPATIENT)
Dept: ADMINISTRATIVE | Facility: HOSPITAL | Age: 53
End: 2018-01-15

## 2018-01-15 DIAGNOSIS — M54.2 CERVICALGIA: ICD-10-CM

## 2018-01-15 DIAGNOSIS — R53.1 WEAKNESS: ICD-10-CM

## 2018-01-15 PROCEDURE — 72040 X-RAY EXAM NECK SPINE 2-3 VW: CPT

## 2018-02-15 ENCOUNTER — HOSPITAL ENCOUNTER (OUTPATIENT)
Dept: NEUROLOGY | Facility: AMBULATORY SURGERY CENTER | Age: 53
Discharge: HOME | End: 2018-02-15
Payer: COMMERCIAL

## 2018-02-15 DIAGNOSIS — R53.1 LEFT-SIDED WEAKNESS: ICD-10-CM

## 2018-02-15 DIAGNOSIS — R53.1 ASTHENIA: ICD-10-CM

## 2018-02-15 PROCEDURE — 95909 NRV CNDJ TST 5-6 STUDIES: CPT | Performed by: PSYCHIATRY & NEUROLOGY

## 2018-02-15 PROCEDURE — 95886 MUSC TEST DONE W/N TEST COMP: CPT | Performed by: PSYCHIATRY & NEUROLOGY

## 2018-03-07 NOTE — PROGRESS NOTES
History of Present Illness    Revaccination   Vaccine Information: Vaccine(s) Given (names): Tenivac  Vaccine(s) Given (names): Hep B  Unable to reach by phone  Pt called (attempt 1): [id number] JT  Pt called (attempt 2): [id number] JT  Pt called (attempt 3): [id number] JT  Letter Sent (Regular and Certified): [id number]   Revaccination Completed: [id number]  Active Problems    1  Diabetes Mellitus (250 00)    Immunizations  Hepatitis B --- Isabel Grow: Permanently Deferred: Parent/Guardian Refuses, pt did not call back for  rvac, [id number]   Tdap --- Isabel Grow: Permanently Deferred: Parent/Guardian Refuses, pt did not call office back for  rvac, [id number]   Tubersol 5 UNIT/0 1ML Intradermal Solution --- Isabel Grow: 03-Aug-2015     Current Meds   1  Atorvastatin Calcium 20 MG Oral Tablet   2   Levemir FlexPen 100 UNIT/ML SOLN   3  MetFORMIN HCl - 1000 MG Oral Tablet    Signatures   Electronically signed by : Hali Sanchez OM; Feb 13 2017  5:14PM EST                       (Author)

## 2018-04-25 ENCOUNTER — TELEPHONE (OUTPATIENT)
Dept: NEUROSURGERY | Facility: CLINIC | Age: 53
End: 2018-04-25

## 2018-04-25 NOTE — TELEPHONE ENCOUNTER
04/25/2018-CALLED PT (Silver Santos HELPED TRANSLATE SINCE PRIMARY LANGUAGE IS Vincentian), PT THOUGHT WE DO NOT PAR WITH HER INSURANCE  PT GIVEN TAX ID#/[employer]# AND WAS TOLD TO CALL HER INSURANCE COMPANY AND THEN CALL OUR OFFICE TO ADVISE

## 2018-05-14 ENCOUNTER — HOSPITAL ENCOUNTER (EMERGENCY)
Facility: HOSPITAL | Age: 53
Discharge: HOME | End: 2018-05-14
Attending: EMERGENCY MEDICINE | Admitting: EMERGENCY MEDICINE
Payer: MEDICARE

## 2018-05-14 VITALS
HEART RATE: 94 BPM | RESPIRATION RATE: 16 BRPM | WEIGHT: 185.63 LBS | DIASTOLIC BLOOD PRESSURE: 89 MMHG | BODY MASS INDEX: 33.95 KG/M2 | SYSTOLIC BLOOD PRESSURE: 174 MMHG | OXYGEN SATURATION: 94 % | TEMPERATURE: 98.5 F

## 2018-05-14 DIAGNOSIS — S05.02XA ABRASION OF LEFT CORNEA, INITIAL ENCOUNTER: Primary | ICD-10-CM

## 2018-05-14 DIAGNOSIS — H01.006 BLEPHARITIS OF LEFT EYELID: ICD-10-CM

## 2018-05-14 DIAGNOSIS — I10 HYPERTENSION: Chronic | ICD-10-CM

## 2018-05-14 DIAGNOSIS — G47.9 SLEEP DISORDER: ICD-10-CM

## 2018-05-14 DIAGNOSIS — E11.9 DIABETES MELLITUS (HCC): Chronic | ICD-10-CM

## 2018-05-14 PROCEDURE — 99283 EMERGENCY DEPT VISIT LOW MDM: CPT

## 2018-05-14 RX ORDER — VALSARTAN AND HYDROCHLOROTHIAZIDE 320; 25 MG/1; MG/1
1 TABLET, FILM COATED ORAL DAILY
Qty: 30 TABLET | Refills: 0 | Status: SHIPPED | OUTPATIENT
Start: 2018-05-14 | End: 2018-09-24 | Stop reason: HOSPADM

## 2018-05-14 RX ORDER — PROPARACAINE HYDROCHLORIDE 5 MG/ML
2 SOLUTION/ DROPS OPHTHALMIC ONCE
Status: COMPLETED | OUTPATIENT
Start: 2018-05-14 | End: 2018-05-14

## 2018-05-14 RX ORDER — GABAPENTIN 300 MG/1
300 CAPSULE ORAL
Qty: 30 CAPSULE | Refills: 0 | Status: ON HOLD | OUTPATIENT
Start: 2018-05-14 | End: 2018-09-24

## 2018-05-14 RX ORDER — CLONAZEPAM 0.5 MG/1
0.5 TABLET ORAL
Qty: 30 TABLET | Refills: 0 | Status: SHIPPED | OUTPATIENT
Start: 2018-05-14 | End: 2018-11-22 | Stop reason: ALTCHOICE

## 2018-05-14 RX ADMIN — PROPARACAINE HYDROCHLORIDE 2 DROP: 5 SOLUTION/ DROPS OPHTHALMIC at 19:35

## 2018-05-14 RX ADMIN — FLUORESCEIN SODIUM 1 STRIP: 0.6 STRIP OPHTHALMIC at 19:35

## 2018-05-14 RX ADMIN — BACITRACIN ZINC AND POLYMYXIN B SULFATE: 500; 10000 OINTMENT OPHTHALMIC at 20:52

## 2018-05-14 NOTE — ED PROVIDER NOTES
History  Chief Complaint   Patient presents with    Eye Problem     Pt has been having eye pain and swelling  Pt states that she also had a headache  History provided by:  Patient and relative   used: Yes     79-year-old female with a history of migraine headaches presents with typical left-sided headache associated with some swelling of the orbital soft tissues which is also typical   She has been rubbing her eye lot and has developed inflammation along the lower lid, some pain  Notes that his been tearing but she denies any visual changes  States that her headache has been present for 3 days  Reports that no medications ever help and she usually just waits it out  Had previously seen an eye doctor and was told she had glaucoma but had not been able to follow-up due to change in insurance  She has some swelling of the upper and lower lids on the left with inflammation along the lower lid  No discharge  Reactive pupils  Plan fluorescein stain and intra-ocular pressure  Prior to Admission Medications   Prescriptions Last Dose Informant Patient Reported? Taking?    Canagliflozin (INVOKANA PO)   Yes Yes   Sig: Take 300 mg by mouth daily     acetaminophen (TYLENOL) 500 MG chewable tablet   No Yes   Sig: Chew 1 tablet every 6 (six) hours as needed for mild pain   atorvastatin (LIPITOR) 80 mg tablet   No Yes   Sig: Take 1 tablet by mouth every evening   butalbital-acetaminophen-caffeine (FIORICET,ESGIC) -40 mg per tablet   No Yes   Sig: Take 1 tablet by mouth every 6 (six) hours as needed for headaches   cholecalciferol (VITAMIN D3) 1,000 units tablet   No Yes   Sig: Take 1 tablet by mouth daily   clonazePAM (KlonoPIN) 0 5 mg tablet   Yes Yes   Sig: Take 0 5 mg by mouth daily at bedtime as needed for anxiety or seizures     escitalopram (LEXAPRO) 10 mg tablet   No Yes   Sig: Take 1 tablet by mouth daily   fenofibrate (TRICOR) 145 mg tablet   Yes Yes   Sig: Take 145 mg by mouth daily   gabapentin (NEURONTIN) 300 mg capsule   No Yes   Sig: Take 1 capsule by mouth daily at bedtime   insulin lispro (HumaLOG) 100 units/mL injection   No Yes   Sig: Inject 12 Units under the skin 3 (three) times a day with meals   metoprolol tartrate (LOPRESSOR) 50 mg tablet   Yes Yes   Sig: Take 25 mg by mouth     pioglitazone (ACTOS) 30 mg tablet   Yes Yes   Sig: Take 30 mg by mouth daily   topiramate (TOPAMAX) 50 MG tablet   No Yes   Sig: Take 1 tablet by mouth daily at bedtime   valsartan-hydrochlorothiazide (DIOVAN-HCT) 320-25 MG per tablet   Yes Yes   Sig: Take 1 tablet by mouth daily      Facility-Administered Medications: None       Past Medical History:   Diagnosis Date    Diabetes mellitus ([facility] [address] )     Hyperlipidemia     Hypertension     Stroke [facility])        Past Surgical History:   Procedure Laterality Date    HYSTERECTOMY         History reviewed  No pertinent family history  I have reviewed and agree with the history as documented  Social History   Substance Use Topics    Smoking status: Never Smoker    Smokeless tobacco: Never Used    Alcohol use No        Review of Systems   Constitutional: Negative for activity change and appetite change  Eyes: Positive for pain  Negative for photophobia, redness and visual disturbance  Respiratory: Negative for chest tightness and shortness of breath  Gastrointestinal: Negative for nausea and vomiting  Musculoskeletal: Negative for back pain and neck pain  Skin: Negative for color change and wound  Neurological: Negative for dizziness, weakness and headaches  All other systems reviewed and are negative        Physical Exam  ED Triage Vitals   Temperature Pulse Respirations Blood Pressure SpO2   05/14/18 1844 05/14/18 1844 05/14/18 1844 05/14/18 1844 05/14/18 1844   98 5 °F (36 9 °C) 94 16 (!) 174/89 94 %      Temp Source Heart Rate Source Patient Position - Orthostatic VS BP Location FiO2 (%)   05/14/18 1844 05/14/18 1844 05/14/18 1844 05/14/18 1844 --   Oral Monitor Sitting Left arm       Pain Score       05/14/18 1934       8           Orthostatic Vital Signs  Vitals:    05/14/18 1844   BP: (!) 174/89   Pulse: 94   Patient Position - Orthostatic VS: Sitting       Physical Exam   Constitutional: She is oriented to person, place, and time  She appears well-developed and well-nourished  No distress  HENT:   Head: Normocephalic and atraumatic  Eyes: EOM are normal  Pupils are equal, round, and reactive to light  Edema of the upper and lower lids on the left eye  Inflammation along the left lower lid  Cardiovascular: Normal rate and regular rhythm  Pulmonary/Chest: Effort normal    Neurological: She is alert and oriented to person, place, and time  No cranial nerve deficit  Coordination normal    Skin: Skin is warm and dry  No rash noted  Psychiatric: She has a normal mood and affect  Her behavior is normal    Nursing note and vitals reviewed  ED Medications  Medications   fluorescein sodium sterile ophthalmic strip 1 strip (1 strip Left Eye Given 5/14/18 1935)   proparacaine (ALCAINE) 0 5 % ophthalmic solution 2 drop (2 drops Left Eye Given 5/14/18 1935)   bacitracin-polymyxin b (POLYSPORIN) ophthalmic ointment ( Left Eye Given 5/14/18 2052)       Diagnostic Studies  Results Reviewed     None                 No orders to display              Procedures  Procedures       Phone Contacts  ED Phone Contact    ED Course                               MDM  Number of Diagnoses or Management Options  Abrasion of left cornea, initial encounter:   Blepharitis of left eyelid:   Diabetes mellitus ([address] ): Hypertension:   Sleep disorder:   Diagnosis management comments: 50-year-old female presented with typical migraine headache with left orbital edema which is usual   Had been rubbing her eye a lot with some discomfort with left lower lid inflammation and small corneal abrasion on fluorescein stain  Given antibiotic ointment    Follow up with eye doctor  Also refilled patient's chronic medications as requested  Patient Progress  Patient progress: improved    CritCare Time    Disposition  Final diagnoses:   Abrasion of left cornea, initial encounter   Blepharitis of left eyelid   Sleep disorder   Diabetes mellitus ([address] )   Hypertension     Time reflects when diagnosis was documented in both MDM as applicable and the Disposition within this note     Time User Action Codes Description Comment    5/14/2018  8:15 PM Bertin Sessions Add [S05  02XA] Abrasion of left cornea, initial encounter     5/14/2018  8:15 PM Antwan Schmitz Add [T50 167] Blepharitis of left eyelid     5/14/2018  8:20 PM Bertin Sessions Add [G47 9] Sleep disorder     5/14/2018  8:20 PM Bertin Sessions Add [E11 9] Diabetes mellitus ([facility] [address] )     5/14/2018  8:20 PM Nadir, [address] Hypertension       ED Disposition     ED Disposition Condition Comment    Discharge  Sherron Carbajal Wei discharge to home/self care      Condition at discharge: Good        Follow-up Information     Follow up With Specialties Details Why Contact Info Additional Information    Your eye doctor         [facility] Emergency [facility]  If symptoms worsen [address] [facility] [address],  [address], [place], [place], [address]        Discharge Medication List as of 5/14/2018  8:17 PM      CONTINUE these medications which have NOT CHANGED    Details   acetaminophen (TYLENOL) 500 MG chewable tablet Chew 1 tablet every 6 (six) hours as needed for mild pain, Starting Wed 11/15/2017, Print      atorvastatin (LIPITOR) 80 mg tablet Take 1 tablet by mouth every evening, Starting Mon 10/9/2017, No Print      butalbital-acetaminophen-caffeine (FIORICET,ESGIC) -40 mg per tablet Take 1 tablet by mouth every 6 (six) hours as needed for headaches, Starting Mon 10/9/2017, Print      cholecalciferol (VITAMIN D3) 1,000 units tablet Take 1 tablet by mouth daily, Starting Mon 10/9/2017, No Print      escitalopram (LEXAPRO) 10 mg tablet Take 1 tablet by mouth daily, Starting Tue 10/10/2017, No Print      fenofibrate (TRICOR) 145 mg tablet Take 145 mg by mouth daily, Until Discontinued, Historical Med      insulin lispro (HumaLOG) 100 units/mL injection Inject 12 Units under the skin 3 (three) times a day with meals, Starting Mon 10/9/2017, No Print      pioglitazone (ACTOS) 30 mg tablet Take 30 mg by mouth daily, Until Discontinued, Historical Med      topiramate (TOPAMAX) 50 MG tablet Take 1 tablet by mouth daily at bedtime, Starting Mon 10/9/2017, No Print      Canagliflozin (INVOKANA PO) Take 300 mg by mouth daily  , Historical Med      clonazePAM (KlonoPIN) 0 5 mg tablet Take 0 5 mg by mouth daily at bedtime as needed for anxiety or seizures  , Historical Med      gabapentin (NEURONTIN) 300 mg capsule Take 1 capsule by mouth daily at bedtime, Starting Mon 10/9/2017, No Print      metoprolol tartrate (LOPRESSOR) 50 mg tablet Take 25 mg by mouth  , Until Discontinued, Historical Med      valsartan-hydrochlorothiazide (DIOVAN-HCT) 320-25 MG per tablet Take 1 tablet by mouth daily, Historical Med           No discharge procedures on file      ED Provider  Electronically Signed by           Suzie Plata MD  05/21/18 1599

## 2018-05-15 NOTE — DISCHARGE INSTRUCTIONS
Abrasión corneal   LO QUE NECESITA SABER:   Chai abrasión corneal es chai lesión, rosales un rasguño, en la córnea del albert  La córnea es la capa transparente que cubre la parte frontal de means albert  Si el rasguño es pequeño, puede sanar en 1 a 2 días  Es posible que si el rasguño es más aneesh demore más tiempo en sanar  INSTRUCCIONES SOBRE EL CLAY HOSPITALARIA:   Pregúntele a means [employer] vitaminas y minerales son adecuados para usted  · Means dolor [address]  · Usted tiene secreción amarilla o shree saliendo de means albert  · Usted tiene preguntas o inquietudes acerca de means condición o cuidado  Medicamentos:   · Medicamentos,  podrían administrarse en gotas o ungüento para ayudar a prevenir chai infección en el albert  También es probable que le den gotas para los ojos con el propósito de disminuir el dolor  Pregunte cómo pushpa estos medicamentos de chai forma carson  · [place] james medicamentos rosales se le haya indicado  Consulte con means médico si usted ann que means medicamento no le está ayudando o si presenta efectos secundarios  Infórmele si es alérgico a cualquier medicamento  Mantenga chai lista actualizada de los [place], las vitaminas y los productos herbales que christina  Incluya los siguientes datos de los medicamentos: cantidad, frecuencia y motivo de administración  Traiga con usted la lista o los envases de la píldoras a james citas de seguimiento  Lleve la lista de los medicamentos con usted en paddy de chai emergencia  Acuda a james consultas de control con means médico según le indicaron  Anote james preguntas para que se acuerde de hacerlas stefanie james visitas  Cuidados personales:   · No se toque ni frote means albert  · Pregúntele a means médico cuándo puede volver a james [employer]  · Pregúntele a means médico cuándo puede usar james lentes de contacto  · Use anteojos cuando esté expuesto a la yahaira brillante y [employer] james ojos se sientan mejor    Ayude a [address] corneales:   · Si james ojos se sienten secos o irritados, retire los lentes de contacto de james ojos  · [employer] las lisa si necesita tocarse los ojos o el dimitri  · Córtele las uñas a murillo leticia para que no se pueda rasguñar el albert  · Use anteojos protectores cuando trabaja con químicos, cline, polvo o metal      · Use anteojos protectores cuando practica deportes  · No use james lentes de contacto por más tiempo del debido  · No  use lentes de contacto de color o lentes con formas  Estos lentes pueden causarle lesiones en james ojos y [employer] pérdida de la vista  · No use maquillaje con escarcha  La escarcha se puede meter en los ojos y por debajo de los lentes de [place]  · No duerma con james lentes de contacto puestos  © 2017 [facility] Information is for End User's use only and may not be sold, redistributed or otherwise used for commercial purposes  All illustrations and images included in CareNotes® are the copyrighted property of A D A M , Inc  or [place]  Esta información es sólo para uso en educación  Murillo intención no es darle un consejo médico sobre enfermedades o tratamientos  Colsulte con murillo [employer] farmacéutico antes de seguir cualquier régimen médico para saber si es seguro y efectivo para usted  Blefaritis   LO QUE NECESITA SABER:   La blefaritis es chai inflamación en martha o ambos párpados  Es posible que afecte james párpados, pestañas, las glándulas sebáceas del párpado o la parte pamela de james ojos  INSTRUCCIONES SOBRE EL CLAY HOSPITALARIA:   Medicamentos:   · Medicamentos,  pueden contribuir a aliviar el dolor y la inflamación o tratar chai infección  · [place] james medicamentos rosales se le haya indicado  Consulte con murillo médico si usted ann que murillo medicamento no le está ayudando o si presenta efectos secundarios  Infórmele si es alérgico a cualquier medicamento   Mantenga chai lista actualizada de los JOSE VAZQUEZ  [employer], las vitaminas y los productos herbales que christina  Incluya los siguientes datos de los medicamentos: cantidad, frecuencia y motivo de administración  Traiga con usted la lista o los envases de la píldoras a annetta citas de seguimiento  Lleve la lista de los medicamentos con usted en paddy de chai emergencia  Acuda a annetta consultas de control con means médico según le indicaron  Anote annetta preguntas para que se acuerde de hacerlas stefanie annetta visitas  Cuidado del párpado:  Lávese siempre las lisa con agua y jabón antes y después de atender annetta ojos:  · Use lágrimas artificiales  dos veces por día si siente sequedad en los ojos  · Aplique chai compresa tibia  stefanie 10 minutos chai vez al día para desprender las costras y [address] y el ardor  · [place] cuidadosamente el párpado superior e inferior  con 2 o 3 gotas de champú de beramírez disuelto en ½ taza de agua tibia 2 veces al día  [place] ayudará a destapar las glándulas sebáceas y eliminar la pus u otro material que esté adherido al párpado  · De un masaje haciendo pequeños círculos en means párpado superior e inferior stefanie 5 segundos  para destapar las glándulas sebáceas y aliviar la inflamación  · No use lentes de contacto ni maquillaje de ojos  hasta que means albert se sane  Pregúntele a means [place] vitaminas y minerales son adecuados para usted  · Means visión cambia  · Annetta signos y [address], [address]   · Annetta signos y síntomas regresan  · Usted tiene un bulto en el párpado  · Usted tiene chai llaga llena de pus en el párpado  · Usted tiene preguntas o inquietudes acerca de means condición o cuidado  Regrese a la tierney de emergencias si:   · Usted tiene dolor intenso  · Usted tiene pérdida de visión  © 2017 [facility] Information is for End User's use only and may not be sold, redistributed or otherwise used for commercial purposes   All illustrations and images included in CareNotes® are the copyrighted property of A D A JOSE , [place] An  Esta información es sólo para uso en educación  Means intención no es darle un consejo médico sobre enfermedades o tratamientos  Colsulte con means [place] farmacéutico antes de seguir cualquier régimen médico para saber si es seguro y efectivo para usted

## 2018-05-17 ENCOUNTER — APPOINTMENT (EMERGENCY)
Dept: CT IMAGING | Facility: HOSPITAL | Age: 53
End: 2018-05-17
Payer: MEDICARE

## 2018-05-17 ENCOUNTER — HOSPITAL ENCOUNTER (EMERGENCY)
Facility: HOSPITAL | Age: 53
Discharge: HOME | End: 2018-05-17
Attending: EMERGENCY MEDICINE
Payer: MEDICARE

## 2018-05-17 ENCOUNTER — APPOINTMENT (EMERGENCY)
Dept: RADIOLOGY | Facility: HOSPITAL | Age: 53
End: 2018-05-17
Payer: MEDICARE

## 2018-05-17 VITALS
WEIGHT: 191.36 LBS | HEART RATE: 76 BPM | TEMPERATURE: 98.6 F | DIASTOLIC BLOOD PRESSURE: 73 MMHG | BODY MASS INDEX: 35 KG/M2 | OXYGEN SATURATION: 91 % | RESPIRATION RATE: 20 BRPM | SYSTOLIC BLOOD PRESSURE: 164 MMHG

## 2018-05-17 DIAGNOSIS — R11.2 NAUSEA AND VOMITING: ICD-10-CM

## 2018-05-17 DIAGNOSIS — I10 HYPERTENSION: ICD-10-CM

## 2018-05-17 DIAGNOSIS — G43.909 MIGRAINE HEADACHE: Primary | ICD-10-CM

## 2018-05-17 LAB
ALBUMIN SERPL BCP-MCNC: 3.1 G/DL (ref 3.5–5)
ALP SERPL-CCNC: 122 U/L (ref 46–116)
ALT SERPL W P-5'-P-CCNC: 52 U/L (ref 12–78)
ANION GAP SERPL CALCULATED.3IONS-SCNC: 10 MMOL/L (ref 4–13)
AST SERPL W P-5'-P-CCNC: 32 U/L (ref 5–45)
ATRIAL RATE: 78 BPM
BASOPHILS # BLD AUTO: 0.05 THOUSANDS/ΜL (ref 0–0.1)
BASOPHILS NFR BLD AUTO: 1 % (ref 0–1)
BILIRUB SERPL-MCNC: 0.3 MG/DL (ref 0.2–1)
BUN SERPL-MCNC: 18 MG/DL (ref 5–25)
CALCIUM SERPL-MCNC: 8.9 MG/DL (ref 8.3–10.1)
CHLORIDE SERPL-SCNC: 107 MMOL/L (ref 100–108)
CO2 SERPL-SCNC: 24 MMOL/L (ref 21–32)
CREAT SERPL-MCNC: 1 MG/DL (ref 0.6–1.3)
EOSINOPHIL # BLD AUTO: 0.17 THOUSAND/ΜL (ref 0–0.61)
EOSINOPHIL NFR BLD AUTO: 2 % (ref 0–6)
ERYTHROCYTE [DISTWIDTH] IN BLOOD BY AUTOMATED COUNT: 12.6 % (ref 11.6–15.1)
GFR SERPL CREATININE-BSD FRML MDRD: 64 ML/MIN/1.73SQ M
GLUCOSE SERPL-MCNC: 158 MG/DL (ref 65–140)
HCT VFR BLD AUTO: 37.6 % (ref 34.8–46.1)
HGB BLD-MCNC: 13.2 G/DL (ref 11.5–15.4)
LYMPHOCYTES # BLD AUTO: 2.8 THOUSANDS/ΜL (ref 0.6–4.47)
LYMPHOCYTES NFR BLD AUTO: 40 % (ref 14–44)
MCH RBC QN AUTO: 29.3 PG (ref 26.8–34.3)
MCHC RBC AUTO-ENTMCNC: 35.1 G/DL (ref 31.4–37.4)
MCV RBC AUTO: 84 FL (ref 82–98)
MONOCYTES # BLD AUTO: 0.45 THOUSAND/ΜL (ref 0.17–1.22)
MONOCYTES NFR BLD AUTO: 6 % (ref 4–12)
NEUTROPHILS # BLD AUTO: 3.53 THOUSANDS/ΜL (ref 1.85–7.62)
NEUTS SEG NFR BLD AUTO: 51 % (ref 43–75)
P AXIS: 59 DEGREES
PLATELET # BLD AUTO: 249 THOUSANDS/UL (ref 149–390)
PMV BLD AUTO: 9.9 FL (ref 8.9–12.7)
POTASSIUM SERPL-SCNC: 3.5 MMOL/L (ref 3.5–5.3)
PR INTERVAL: 166 MS
PROT SERPL-MCNC: 7 G/DL (ref 6.4–8.2)
QRS AXIS: -24 DEGREES
QRSD INTERVAL: 82 MS
QT INTERVAL: 408 MS
QTC INTERVAL: 465 MS
RBC # BLD AUTO: 4.5 MILLION/UL (ref 3.81–5.12)
SODIUM SERPL-SCNC: 141 MMOL/L (ref 136–145)
T WAVE AXIS: 30 DEGREES
VENTRICULAR RATE: 78 BPM
WBC # BLD AUTO: 7 THOUSAND/UL (ref 4.31–10.16)

## 2018-05-17 PROCEDURE — 71046 X-RAY EXAM CHEST 2 VIEWS: CPT

## 2018-05-17 PROCEDURE — 80053 COMPREHEN METABOLIC PANEL: CPT | Performed by: EMERGENCY MEDICINE

## 2018-05-17 PROCEDURE — 99284 EMERGENCY DEPT VISIT MOD MDM: CPT

## 2018-05-17 PROCEDURE — 93010 ELECTROCARDIOGRAM REPORT: CPT | Performed by: INTERNAL MEDICINE

## 2018-05-17 PROCEDURE — 96365 THER/PROPH/DIAG IV INF INIT: CPT

## 2018-05-17 PROCEDURE — 96375 TX/PRO/DX INJ NEW DRUG ADDON: CPT

## 2018-05-17 PROCEDURE — 36415 COLL VENOUS BLD VENIPUNCTURE: CPT | Performed by: EMERGENCY MEDICINE

## 2018-05-17 PROCEDURE — 96367 TX/PROPH/DG ADDL SEQ IV INF: CPT

## 2018-05-17 PROCEDURE — 70450 CT HEAD/BRAIN W/O DYE: CPT

## 2018-05-17 PROCEDURE — 85025 COMPLETE CBC W/AUTO DIFF WBC: CPT | Performed by: EMERGENCY MEDICINE

## 2018-05-17 PROCEDURE — 93005 ELECTROCARDIOGRAM TRACING: CPT

## 2018-05-17 RX ORDER — MAGNESIUM SULFATE HEPTAHYDRATE 40 MG/ML
2 INJECTION, SOLUTION INTRAVENOUS ONCE
Status: COMPLETED | OUTPATIENT
Start: 2018-05-17 | End: 2018-05-17

## 2018-05-17 RX ORDER — LABETALOL HYDROCHLORIDE 5 MG/ML
10 INJECTION, SOLUTION INTRAVENOUS ONCE
Status: COMPLETED | OUTPATIENT
Start: 2018-05-17 | End: 2018-05-17

## 2018-05-17 RX ORDER — DIPHENHYDRAMINE HYDROCHLORIDE 50 MG/ML
25 INJECTION INTRAMUSCULAR; INTRAVENOUS ONCE
Status: COMPLETED | OUTPATIENT
Start: 2018-05-17 | End: 2018-05-17

## 2018-05-17 RX ORDER — METOCLOPRAMIDE HYDROCHLORIDE 5 MG/ML
10 INJECTION INTRAMUSCULAR; INTRAVENOUS ONCE
Status: COMPLETED | OUTPATIENT
Start: 2018-05-17 | End: 2018-05-17

## 2018-05-17 RX ORDER — METOCLOPRAMIDE 10 MG/1
10 TABLET ORAL 4 TIMES DAILY
Qty: 28 TABLET | Refills: 0 | Status: SHIPPED | OUTPATIENT
Start: 2018-05-17 | End: 2018-05-24

## 2018-05-17 RX ADMIN — SODIUM CHLORIDE 500 MG: 0.9 INJECTION, SOLUTION INTRAVENOUS at 04:45

## 2018-05-17 RX ADMIN — LABETALOL 20 MG/4 ML (5 MG/ML) INTRAVENOUS SYRINGE 10 MG: at 04:29

## 2018-05-17 RX ADMIN — DIPHENHYDRAMINE HYDROCHLORIDE 25 MG: 50 INJECTION, SOLUTION INTRAMUSCULAR; INTRAVENOUS at 04:32

## 2018-05-17 RX ADMIN — MAGNESIUM SULFATE HEPTAHYDRATE 2 G: 40 INJECTION, SOLUTION INTRAVENOUS at 05:18

## 2018-05-17 RX ADMIN — METOCLOPRAMIDE 10 MG: 5 INJECTION, SOLUTION INTRAMUSCULAR; INTRAVENOUS at 04:36

## 2018-05-17 NOTE — ED NOTES
Discharged with instructions  Verbalized understanding  No distress at this time       Karl Bennett RN  05/17/18 9853

## 2018-05-17 NOTE — DISCHARGE INSTRUCTIONS
Náuseas y vómitos agudos   LO QUE NECESITA SABER:   Las náuseas y los vómitos agudos comienzan de forma repentina, [place] rápidamente y matthews un período breve de Sarah  INSTRUCCIONES SOBRE EL CLAY HOSPITALARIA:   Regrese a la tierney de emergencias si:   · Usted nota elder en murillo vómito o en annetta evacuaciones  · Usted siente un dolor súbito e intenso en el pecho y la parte superior de murillo abdomen después de tener vómitos deann o tratar de vomitar  · Usted tiene el isreal y el pecho inflamados  · [employer], tiene frío, sed y sequedad en los ojos y la boca  · Usted está orinando muy poco o nada en absoluto  · Usted tiene debilidad muscular, calambres en las piernas y dificultad para respirar  · Murillo corazón late más rápido que de costumbre  · Usted continúa vomitando por más de 48 horas  Pregúntele a murillo Claryce Levans vitaminas y minerales son adecuados para usted  · Usted tiene arcadas secas (vómitos sin que salga nada) frecuentes  · Annetta náusea y vómitos no mejoran ni desaparecen después de usar el medicamento  · Usted tiene preguntas o inquietudes acerca de murillo condición o tratamiento  Medicamentos:  Es posible que usted necesite alguno de los siguientes:  · Medicamentos,  se puede administrar para calmarle el estómago y detener annetta vómitos  Usted también puede necesitar medicamentos para ayudarlo a sentirse más relajado o para detener las náuseas y los vómitos causados por el mareo por movimiento  · Se usan los estimulantes gastrointestinales  para ayudar a vaciar murillo estómago y los intestinos  [place] puede ayudar para reducir las náuseas y el vómito  · [place] annetta medicamentos rosales se le haya indicado  Consulte con murillo médico si usted ann que murillo medicamento no le está ayudando o si presenta efectos secundarios  Infórmele si es alérgico a cualquier medicamento  Mantenga chai lista actualizada de los [employer], las vitaminas y los productos herbales que christina   [place] siguientes datos de los medicamentos: cantidad, frecuencia y motivo de administración  Traiga con usted la lista o los envases de la píldoras a annetta citas de seguimiento  Lleve la lista de los medicamentos con usted en paddy de moriah emergencia  Evite o controle las náuseas y los vómitos agudos:   · No consuma alcohol  El alcohol podría causarle malestar o irritación estomacal  Moriah cantidad elevada de alcohol también puede causar náuseas y vómitos agudos  · Controle el estrés  Los elisabet de [place] que son el resultado de tensión nerviosa pueden causar náuseas y vómitos  Busque la manera de relajarse y controlar el estrés  Descanse y [employer]  · [employer] líquidos rosales se le indique  Los vómitos pueden llevar a la deshidratación  Es importante beber más líquidos para ayudar a reemplazar los fluidos corporales perdidos  Pregunte a means médico sobre la cantidad de líquido que necesita pushpa todos los días y cuáles le recomienda  Means médico podría recomendarle que tome moriah solución de rehidratación oral (SRO)  Las soluciones de rehidratación oral contienen la cantidad [facility] de [place], sales y azúcar que necesita para restituir los líquidos que perdió means organismo  Pregunte qué tipo de solución de rehidratación oral debe usar, qué cantidad debe pushpa y dónde puede obtenerla  · Ingiera comidas más pequeñas, más a menudo  Coma pequeñas cantidades de comida cada 2 o 3 horas, incluso si no tiene hambre  Annetta náuseas podrían disminuir si tiene comida en el estómago  · Hable con means médico antes de pushpa medicamentos de [facility]  Estos medicamentos pueden causar problemas serios si los [place] junto con ciertos medicamentos o si tiene determinadas condiciones médicas  Podrían tener problemas si [place] moriah dosis demasiado mónica o los [place] stefanie más tiempo de lo indicado  Siga las indicaciones de la etiqueta al pie de la [place]  Acuda a annetta consultas de control con means médico según le indicaron    Anote las preguntas que tenga para no olvidarse de Medialets las visitas de seguimiento  © 2017 [facility] [employer] Information is for End User's use only and may not be sold, redistributed or otherwise used for commercial purposes  All illustrations and images included in CareNotes® are the copyrighted property of A D A M , Inc  or Brant An  Esta información es sólo para uso en educación  Murillo intención no es darle un consejo médico sobre enfermedades o tratamientos  Colsulte con murillo [place] farmacéutico antes de seguir cualquier régimen médico para saber si es seguro y efectivo para usted  Hipertensión   LO QUE NECESITA SABER:   La hipertensión es la presión arterial clay  La presión arterial es la fuerza que ejerce la elder contra las mercer de las arterias  La presión arterial normal debería estar a menos de 120/80  La pre-hipertensión estaría entre 120/80 y 139/ 80  La presión arterial clay estaría a 140/90 o más clay  La hipertensión causa que murillo presión arterial se eleve tanto que murillo corazón se ve forzado a trabajar [employer] de lo normal  [place] puede dañar murillo corazón  Puede controlar la hipertensión con un estilo de ady saludable o con medicamentos  La presión [place] a proteger james órganos rosales murillo corazón, pulmones, cerebro, y riñones  INSTRUCCIONES SOBRE EL CLAY HOSPITALARIA:   Llame al 911 en paddy de presentar lo siguiente:   · Usted tiene malestar en el pecho que se siente rosales estrujamiento, presión, [place] o dolor  · Usted se siente confundido o tiene dificultad para hablar  · Repentinamente se siente aturdido o con dificultad para respirar  · Usted tiene dolor o [employer] espalda, [place], [place], abdomen o Rhonda Gonzales  Regrese a la tierney de emergencias si:   · Usted tiene un isabella dolor de funmilayo o pérdida de la visión  · Usted tiene debilidad en un brazo o en chai pierna  Pregúntele a murillo Shabnam Byes vitaminas y minerales son adecuados para usted  · Usted se siente mareado, confundido, somnoliento o rosales si se fuera a desmayar  · Usted se ha tomado means medicamento para la presión arterial sushil means presión arterial todavía está más mónica de lo que le indicó means médico     · Usted tiene preguntas o inquietudes acerca de means condición o cuidado  Medicamentos:  Es posible que usted necesite alguno de los siguientes:  · Medicamento  podría usarse para ayudar a disminuir la presión arterial  Es posible que necesite más de un tipo de [place]  [place] el medicamento exactamente rosales indicado  · Diuréticos  ayudan a eliminar el exceso de líquido que se acumula en el organismo  [place] contribuirá a bajar means presión arterial  Es posible que orine más seguido mientras christina owen medicamento  · Los medicamentos para el colesterol  ayudan a bajar los niveles de [place]  Un nivel bajo de colesterol ayuda a prevenir enfermedades cardíacas y facilita el control de la presión arterial      · [place] james medicamentos rosales se le haya indicado  Consulte con means médico si usted ann que means medicamento no le está ayudando o si presenta efectos secundarios  Infórmele si es alérgico a cualquier medicamento  Mantenga chai lista actualizada de los [place], las vitaminas y los productos herbales que christina  Incluya los siguientes datos de los medicamentos: cantidad, frecuencia y motivo de administración  Traiga con usted la lista o los envases de la píldoras a james citas de seguimiento  Lleve la lista de los medicamentos con usted en paddy de chai emergencia  Acuda a james consultas de control con means médico según le indicaron  Usted tendrá que regresar para que le revisen la presión arterial y para que le tim otras pruebas de laboratorio  Anote james preguntas para que se acuerde de hacerlas stefanie james visitas  M[place] means hipertensión:  Hable con means médico sobre las siguientes recomendaciones y otras formas de controlar la hipertensión:  · Revise means presión arterial en casa  Siéntese y descanse por 5 minutos antes de tomarse la presión arterial  Extienda means brazo y apóyelo en chai superficie plana  Means brazo debe estar a la misma altura que means corazón  Siga las instrucciones que vienen con el monitor para la presión arterial o tensiómetro  Si es posible tome por lo menos 2 lecturas de la presión cada vez  Tómese la presión arterial por lo [place] al día a la misma hora todos los días, chai en la mañana y la otra en la noche  Mantenga un registro de las lecturas de means presión arterial y llévelo consigo a james consultas  Pregúntele a means médico cuál debe ser means presión arterial            · Limite el sodio (la sal) rosales se le haya indicado  Demasiado sodio puede afectar el equilibrio de líquidos  Revise las etiquetas para buscar alimentos bajos en sodio o sin sal agregada  Algunos alimentos bajos en sodio utilizan sales de potasio para añadir sabor  Demasiado potasio también puede causar problemas de [place]  Means médico le dirá qué cantidad de sodio y potasio es [place]on para el consumo en un día  Él puede recomendarle que limite el sodio a 2,300 mg al día  · Siga el plan de comidas recomendado por means médico   Un dietista o médico puede darle más información sobre planes de bajo contenido de sodio o el plan de alimentación DASH (enfoques dietéticos para detener la hipertensión)  El plan DASH es bajo en sodio, grasas saturadas y grasa total  Es alto en potasio, calcio y [place]  · Ejercítese para mantener un peso saludable  Realice actividad física por lo menos 30 minutos al día, la mayoría de los días de la [place] ayudará a bajar means presión arterial  Pregunte a means médico acerca del mejor plan de ejercicio para usted  · [address] [place] [address] estrés  [place] podría ayudarlo a bajar means presión arterial  Aprenda sobre formas de relajarse, rosales respiración profunda o escuchar música  · Limite el consumo de alcohol    Las mujeres deberían limitar el consumo de alcohol a 1 bebida por día  Los hombres deberían limitar el consumo de alcohol a 2 tragos al día  Un trago equivale a 12 onzas de cerveza, 5 onzas de vino o 1 onza y ½ de licor  · No fume  La nicotina y otros químicos en los cigarrillos y cigarros pueden aumentar murillo presión arterial y también pueden provocar daño al pulmón  Pida información a murillo médico si usted actualmente fuma y necesita ayuda para dejar de fumar  Los cigarrillos electrónicos o tabaco sin humo todavía contienen nicotina  Consulte con murillo médico antes de [employer]  · Controle cualquier otra condición médica que usted tenga  Algunas condiciones médicas rosales la diabetes pueden aumentar murillo riesgo de hipertensión  [address] murillo médico y tómese james medicamentos según dichas instrucciones  © 2017 [facility] Information is for End User's use only and may not be sold, redistributed or otherwise used for commercial purposes  All illustrations and images included in CareNotes® are the copyrighted property of A D A M , Inc  or Brant An  Esta información es sólo para uso en educación  Murillo intención no es darle un consejo médico sobre enfermedades o tratamientos  Colsulte con murillo Bary Katerina farmacéutico antes de seguir cualquier régimen médico para saber si es seguro y efectivo para usted  Migraña   LO QUE NECESITA SABER:   [place] es un dolor de funmilayo intenso  El dolor puede ser tan severo que interfiere con james actividades cotidianas  Per[place] puede durar desde pocas horas hasta varios días  La causa exacta de la migraña no es conocida  INSTRUCCIONES SOBRE EL CLAY HOSPITALARIA:   Regrese a la tierney de emergencias si:   · Usted tiene dolor de funmilayo que parece ser diferente o mucho peor que murillo migraña habitual     · Usted tiene un dolor de funmilayo severo con fiebre o rigidez en el isreal       · Usted tiene nuevos problemas con el habla, la visión, el equilibrio o el movimiento  · Usted siente que se va a desmayar, se siente confundido o sufre chai convulsión  Comuníquese con means médico o neurólogo si:   · Means migraña interfiere con james actividades cotidianas  · James medicamentos o tratamientos giuseppe de funcionar  · Usted tiene preguntas o inquietudes acerca de means condición o cuidado  Medicamentos:  Usted podría  necesitar alguno de los siguientes  [place] medicamento tan pronto rosales sienta que le comienza Julio  · Un medicamento con receta para el dolor  podrían ser Griselda Angst  No espere a que el dolor sea muy intenso para pushpa el medicamento  · Medicamentos para la migraña  se [place] para evitar chai migraña o detenerla chai vez que comience  · Los medicamentos contra las náuseas  pueden darse para calmar means estómago y ayudarle a prevenir los vómitos  [place] medicamento también puede aliviar el dolor  · [place] james medicamentos rosales se le haya indicado  Consulte con means médico si usted ann que means medicamento no le está ayudando o si presenta efectos secundarios  Infórmele si es alérgico a cualquier medicamento  Mantenga chai lista actualizada de los [place], las vitaminas y los productos herbales que christina  Incluya los siguientes datos de los medicamentos: cantidad, frecuencia y motivo de administración  Traiga con usted la lista o los envases de la píldoras a james citas de seguimiento  Lleve la lista de los medicamentos con usted en paddy de chai emergencia  [place] de means síntomas:   · Repose en chai habitación oscura y [place]  [place] ayudará a disminuir el dolor  El dormir también podría ayudarlo a aliviar means dolor  · Aplique hielo para reducir el dolor  Use un paquete de hielo o ponga hielo molido dentro de [employer]  Cubra el paquete de hielo con chai toalla y colóqueselo en la funmilayo  Aplique hielo stefanie 15 a 20 minutos cada hora  · Aplique calor para disminuir el dolor y los espasmos musculares    Utilice chai toalla Ferd Vick con [place], chai almohada térmica o tome un baño de marcus con agua tibia  Aplique la compresa caliente sobre el área por 20 a 30 minutos cada 2 horas  Usted puede alternar el calor y el hielo  · Mantenga un registro de las migrañas  Escriba cuándo comienzan y terminan james migrañas  Osiel Velez y [place] haciendo cuando comenzó Julio  Registre lo que comió y lo que tomó las 24 horas antes de que comenzó means migraña  Mantenga un registro de lo que hizo para tratar means migraña y si funcionó  Traiga el registro de las migrañas con usted a las citas con means médico   Programe chai gilberto con means médico o means neurólogo rosales se le indique:  Lleve means registro de migrañas con usted  Anote james preguntas para que se acuerde de hacerlas stefanie james visitas  Evite otra migraña:   · No fume  La nicotina y otras sustancias químicas en los cigarrillos y puros pueden desencadenar chai [place]  Pida información a means médico si usted actualmente fuma y necesita ayuda para dejar de fumar  Los cigarrillos electrónicos o tabaco sin humo todavía contienen nicotina  Consulte con means médico antes de [employer]  · No consuma alcohol  El alcohol puede provocar migraña  También puede impedir que [employer] medicamentos para la migraña  · Ejercítese regularmente  El ejercicio puede ayudar a evitar migrañas  Consulte con means médico acerca de cuál es el mejor régimen de ejercicio para usted  Trate de hacer unos 30 minutos de ejercicio todos los días que pueda  · Controle el estrés  El estrés podría provocar migraña  Aprenda nuevas maneras de relajarse rosales la respiración profunda  · Establezca un horario para dormir  Acuéstese y levántese a la misma hora cada día  No pravin televisión inmediatamente antes de acostarse  · Coma james comidas regularmente    Incluya alimentos saludables rosales la fruta, verduras, panes de grano entero, productos lácteos bajos en grasa, frijoles, carne [place] y pescado  No consuma alimentos o bebidas que puedan desencadenar james migrañas  © 2017 [address] Information is for End User's use only and may not be sold, redistributed or otherwise used for commercial purposes  All illustrations and images included in CareNotes® are the copyrighted property of A ALEC A M , Inc  or Brant An  Esta información es sólo para uso en educación  Murillo intención no es darle un consejo médico sobre enfermedades o tratamientos  Colsulte con murillo Mana Lechuga farmacéutico antes de seguir cualquier régimen médico para saber si es seguro y efectivo para usted

## 2018-05-17 NOTE — ED PROVIDER NOTES
History  Chief Complaint   Patient presents with    Headache     Pt presents to ED c/o headache x 2 days  Pt has hx of migraines  Pt states she took her migraine medication with no relief  Patient is a 47year old female with worsening diffuse posterior headache since yesterday  (+) N/V  (+) photophobia  Took her BP meds and migraine medications without relief  No trauma  No fever  No SAH in family  Was last seen in this ED on 5/14/18 for headache and corneal abrasion  [facility] website checked on this patient and last Rx filled was on 4/17/18 for clonazepam for 30 day supply  Did not drive here  Has a h/o migraine headaches  Has had hysterectomy  Patient required my urgent attention  History provided by:  Patient and relative (daughter)  Headache   Associated symptoms: nausea, photophobia and vomiting    Associated symptoms: no fever        Prior to Admission Medications   Prescriptions Last Dose Informant Patient Reported? Taking?    Canagliflozin (INVOKANA) 300 MG TABS   No No   Sig: Take 1 tablet (300 mg total) by mouth daily   acetaminophen (TYLENOL) 500 MG chewable tablet   No No   Sig: Chew 1 tablet every 6 (six) hours as needed for mild pain   atorvastatin (LIPITOR) 80 mg tablet   No No   Sig: Take 1 tablet by mouth every evening   butalbital-acetaminophen-caffeine (FIORICET,ESGIC) -40 mg per tablet   No No   Sig: Take 1 tablet by mouth every 6 (six) hours as needed for headaches   cholecalciferol (VITAMIN D3) 1,000 units tablet   No No   Sig: Take 1 tablet by mouth daily   clonazePAM (KlonoPIN) 0 5 mg tablet   No No   Sig: Take 1 tablet (0 5 mg total) by mouth daily at bedtime as needed for anxiety   escitalopram (LEXAPRO) 10 mg tablet   No No   Sig: Take 1 tablet by mouth daily   fenofibrate (TRICOR) 145 mg tablet   Yes No   Sig: Take 145 mg by mouth daily   gabapentin (NEURONTIN) 300 mg capsule   No No   Sig: Take 1 capsule (300 mg total) by mouth daily at bedtime   insulin lispro (HumaLOG) 100 units/mL injection   No No   Sig: Inject 12 Units under the skin 3 (three) times a day with meals   metoprolol tartrate (LOPRESSOR) 25 mg tablet   No No   Sig: Take 1 tablet (25 mg total) by mouth daily   pioglitazone (ACTOS) 30 mg tablet   Yes No   Sig: Take 30 mg by mouth daily   topiramate (TOPAMAX) 50 MG tablet   No No   Sig: Take 1 tablet by mouth daily at bedtime   valsartan-hydrochlorothiazide (DIOVAN-HCT) 320-25 MG per tablet   No No   Sig: Take 1 tablet by mouth daily      Facility-Administered Medications: None       Past Medical History:   Diagnosis Date    Diabetes mellitus ([facility] [address] )     Hyperlipidemia     Hypertension     Stroke [facility])        Past Surgical History:   Procedure Laterality Date    HYSTERECTOMY         History reviewed  No pertinent family history  I have reviewed and agree with the history as documented  Social History   Substance Use Topics    Smoking status: Never Smoker    Smokeless tobacco: Never Used    Alcohol use No        Review of Systems   Constitutional: Negative for fever  Eyes: Positive for photophobia  Gastrointestinal: Positive for nausea and vomiting  Neurological: Positive for headaches  All other systems reviewed and are negative  Physical Exam  ED Triage Vitals [05/17/18 0335]   Temperature Pulse Respirations Blood Pressure SpO2   98 6 °F (37 °C) 83 20 (!) 198/114 96 %      Temp Source Heart Rate Source Patient Position - Orthostatic VS BP Location FiO2 (%)   Oral Monitor Sitting Right arm --      Pain Score       Worst Possible Pain           Orthostatic Vital Signs  Vitals:    05/17/18 0445 05/17/18 0500 05/17/18 0515 05/17/18 0627   BP: 163/81 167/80 165/80 169/82   Pulse: 72 74 74 77   Patient Position - Orthostatic VS:    Lying       Physical Exam   Constitutional: She is oriented to person, place, and time  She appears well-developed and well-nourished  She appears distressed (moderate)  Tearful      HENT:   Head: Normocephalic and atraumatic  Mouth/Throat: Oropharynx is clear and moist    Eyes: EOM are normal  Pupils are equal, round, and reactive to light  No scleral icterus  Neck: Normal range of motion  Neck supple  Cardiovascular: Normal rate, regular rhythm and normal heart sounds  No murmur heard  Pulmonary/Chest: Effort normal and breath sounds normal  No stridor  No respiratory distress  Abdominal: Soft  Bowel sounds are normal  There is no tenderness  Musculoskeletal: She exhibits no deformity  Neurological: She is alert and oriented to person, place, and time  No cranial nerve deficit  No focal deficits  Skin: Skin is warm and dry  No rash noted  Psychiatric:   Anxious and tearful  Nursing note and vitals reviewed        ED Medications  Medications   diphenhydrAMINE (BENADRYL) injection 25 mg (25 mg Intravenous Given 5/17/18 0432)   metoclopramide (REGLAN) injection 10 mg (10 mg Intravenous Given 5/17/18 0436)   methylPREDNISolone sodium succinate (Solu-MEDROL) 500 mg in sodium chloride 0 9 % 250 mL IVPB (0 mg Intravenous Stopped 5/17/18 0515)   magnesium sulfate 2 g/50 mL IVPB (premix) 2 g (0 g Intravenous Stopped 5/17/18 0627)   labetalol (NORMODYNE) injection 10 mg (10 mg Intravenous Given 5/17/18 0429)       Diagnostic Studies  Results Reviewed     Procedure Component Value Units Date/Time    Comprehensive metabolic panel [[id number]]  (Abnormal) Collected:  05/17/18 0420    Lab Status:  Final result Specimen:  Blood from Hand, Right Updated:  05/17/18 0450     Sodium 141 mmol/L      Potassium 3 5 mmol/L      Chloride 107 mmol/L      CO2 24 mmol/L      Anion Gap 10 mmol/L      BUN 18 mg/dL      Creatinine 1 00 mg/dL      Glucose 158 (H) mg/dL      Calcium 8 9 mg/dL      AST 32 U/L      ALT 52 U/L      Alkaline Phosphatase 122 (H) U/L      Total Protein 7 0 g/dL      Albumin 3 1 (L) g/dL      Total Bilirubin 0 30 mg/dL      eGFR 64 ml/min/1 73sq m     Narrative:         National Kidney Disease Education Program recommendations are as follows:  GFR calculation is accurate only with a steady state creatinine  Chronic Kidney disease less than 60 ml/min/1 73 sq  meters  Kidney failure less than 15 ml/min/1 73 sq  meters  CBC and differential [[id number]]  (Normal) Collected:  05/17/18 0420    Lab Status:  Final result Specimen:  Blood from Hand, Right Updated:  05/17/18 0431     WBC 7 00 Thousand/uL      RBC 4 50 Million/uL      Hemoglobin 13 2 g/dL      Hematocrit 37 6 %      MCV 84 fL      MCH 29 3 pg      MCHC 35 1 g/dL      RDW 12 6 %      MPV 9 9 fL      Platelets 142 Thousands/uL      Neutrophils Relative 51 %      Lymphocytes Relative 40 %      Monocytes Relative 6 %      Eosinophils Relative 2 %      Basophils Relative 1 %      Neutrophils Absolute 3 53 Thousands/µL      Lymphocytes Absolute 2 80 Thousands/µL      Monocytes Absolute 0 45 Thousand/µL      Eosinophils Absolute 0 17 Thousand/µL      Basophils Absolute 0 05 Thousands/µL                  CT head wo contrast   ED Interpretation by Isaiah Cheatham MD (05/17 4652)   COMPARISON:   CT HEAD WO CONTRAST 2017-11-15 19:15   FINDINGS:   Brain: Mild age-related chronic microvascular changes are noted within the white matter  No   hemorrhage  Ventricles: The ventricles and sulci are mildly prominent compatible with age-appropriate atrophy  Bones/joints: Unremarkable  No acute fracture  Soft tissues: Unremarkable  Sinuses: Unremarkable as visualized  No acute sinusitis  Mastoid air cells: Unremarkable as visualized  No mastoid effusion  IMPRESSION:   Mild age-appropriate atrophy and chronic microvascular change  Thank you for allowing us to participate in the care of your patient  Dictated and Authenticated by: Cheli Kraft MD   05/17/2018 4:53 AM [place] [facility] Time ([address])      Final Result by Audley Najjar, MD (05/17 3073)      Normal examination          Findings are consistent with the preliminary report from [facility] which was provided shortly after completion of the exam                      Workstation performed: [id number]         XR chest 2 views   ED Interpretation by Yogi Abreu MD (05/17 0502)   RLL atx, poor inspiratory effort, elevated R hemidiaphragm read by me  Procedures  ECG 12 Lead Documentation  Date/Time: 5/17/2018 4:31 AM  Performed by: Oliverio Blas  Authorized by: Oliverio Blas     Indications / Diagnosis:  HTN  ECG reviewed by me, the ED Provider: yes    Patient location:  ED  Previous ECG:     Previous ECG:  Compared to current    Comparison ECG info:  10/3/17    Similarity:  Changes noted (No prolonged QT  now)  Rate:     ECG rate:  78    ECG rate assessment: normal    Rhythm:     Rhythm: sinus rhythm    Ectopy:     Ectopy: none    QRS:     QRS axis:  Normal    QRS intervals:  Normal  Conduction:     Conduction: normal    ST segments:     ST segments:  Normal  T waves:     T waves: normal    Other findings:     Other findings: LVH             Phone Contacts  ED Phone Contact    ED Course  ED Course as of May 17 0647   Thu May 17, 2018   0510 Labs and CT d/w patient and daughter  Patient feeling better  2997 Patient resting comfortably prior to discharge  MDM  Number of Diagnoses or Management Options  Diagnosis management comments: DDx including but not limited to: tension headache, cluster headache, migraine; doubt ICH, SAH, tumor, meningitis, temporal arteritis, carbon monoxide poisoning, zoster, sinusitis  Differential diagnosis including but not limited to: hypertension, hypertensive urgency, hypertensive crisis, end organ damage, renal failure, metabolic abnormality; doubt ACS, MI; doubt pheochromocytoma          Amount and/or Complexity of Data Reviewed  Clinical lab tests: ordered and reviewed  Tests in the radiology section of CPT®: ordered and reviewed  Decide to obtain previous medical records or to obtain history from someone other than the patient: yes  Obtain history from someone other than the patient: yes  Review and summarize past medical records: yes  Independent visualization of images, tracings, or specimens: yes      The patient presented with a condition in which there was a high probability of imminent or life-threatening deterioration, and critical care services (excluding separately billable procedures) totalled 30-74 minutes  Disposition  Final diagnoses:   Migraine headache   Nausea and vomiting   Hypertension     Time reflects when diagnosis was documented in both MDM as applicable and the Disposition within this note     Time User Action Codes Description Comment    5/17/2018  6:45 AM [facility] Add [G43 909] Migraine headache     5/17/2018  6:45 AM [place] Add [R11 2] Nausea and vomiting     5/17/2018  6:45 AM Patel, Serena [address]  Hypertension       ED Disposition     ED Disposition Condition Comment    Discharge  [facility] Wei discharge to home/self care  Condition at discharge: Stable        Follow-up Information     Follow up With Specialties Details Why Linda Santana MD  Call in 1 day Return sooner if increased pain, fever, vomiting, difficulty breathing  [place] [address]  [phone]          Patient's Medications   Discharge Prescriptions    METOCLOPRAMIDE (REGLAN) 10 MG TABLET    Take 1 tablet (10 mg total) by mouth 4 (four) times a day for 7 days As needed for nausea       Start Date: 5/17/2018 End Date: 5/24/2018       Order Dose: 10 mg       Quantity: 28 tablet    Refills: 0     No discharge procedures on file      ED Provider  Electronically Signed by           Fabrice Olmos MD  05/17/18 5674

## 2018-07-31 ENCOUNTER — HOSPITAL ENCOUNTER (EMERGENCY)
Facility: HOSPITAL | Age: 53
Discharge: HOME | End: 2018-07-31
Attending: EMERGENCY MEDICINE | Admitting: EMERGENCY MEDICINE
Payer: COMMERCIAL

## 2018-07-31 VITALS
SYSTOLIC BLOOD PRESSURE: 181 MMHG | TEMPERATURE: 98.7 F | OXYGEN SATURATION: 91 % | HEART RATE: 74 BPM | RESPIRATION RATE: 22 BRPM | WEIGHT: 192.8 LBS | BODY MASS INDEX: 35.26 KG/M2 | DIASTOLIC BLOOD PRESSURE: 87 MMHG

## 2018-07-31 DIAGNOSIS — G43.909 MIGRAINE HEADACHE: Primary | ICD-10-CM

## 2018-07-31 PROCEDURE — 99283 EMERGENCY DEPT VISIT LOW MDM: CPT

## 2018-07-31 PROCEDURE — 96375 TX/PRO/DX INJ NEW DRUG ADDON: CPT

## 2018-07-31 PROCEDURE — 96365 THER/PROPH/DIAG IV INF INIT: CPT

## 2018-07-31 RX ORDER — METOCLOPRAMIDE HYDROCHLORIDE 5 MG/ML
10 INJECTION INTRAMUSCULAR; INTRAVENOUS ONCE
Status: COMPLETED | OUTPATIENT
Start: 2018-07-31 | End: 2018-07-31

## 2018-07-31 RX ORDER — DIPHENHYDRAMINE HYDROCHLORIDE 50 MG/ML
25 INJECTION INTRAMUSCULAR; INTRAVENOUS ONCE
Status: COMPLETED | OUTPATIENT
Start: 2018-07-31 | End: 2018-07-31

## 2018-07-31 RX ORDER — MAGNESIUM SULFATE HEPTAHYDRATE 40 MG/ML
2 INJECTION, SOLUTION INTRAVENOUS ONCE
Status: COMPLETED | OUTPATIENT
Start: 2018-07-31 | End: 2018-07-31

## 2018-07-31 RX ORDER — BUTALBITAL, ACETAMINOPHEN AND CAFFEINE 50; 325; 40 MG/1; MG/1; MG/1
1 TABLET ORAL EVERY 4 HOURS PRN
Qty: 15 TABLET | Refills: 0 | Status: SHIPPED | OUTPATIENT
Start: 2018-07-31 | End: 2018-09-21

## 2018-07-31 RX ORDER — KETOROLAC TROMETHAMINE 30 MG/ML
30 INJECTION, SOLUTION INTRAMUSCULAR; INTRAVENOUS ONCE
Status: COMPLETED | OUTPATIENT
Start: 2018-07-31 | End: 2018-07-31

## 2018-07-31 RX ADMIN — METOCLOPRAMIDE 10 MG: 5 INJECTION, SOLUTION INTRAMUSCULAR; INTRAVENOUS at 14:40

## 2018-07-31 RX ADMIN — KETOROLAC TROMETHAMINE 30 MG: 30 INJECTION, SOLUTION INTRAMUSCULAR at 14:40

## 2018-07-31 RX ADMIN — MAGNESIUM SULFATE HEPTAHYDRATE 2 G: 40 INJECTION, SOLUTION INTRAVENOUS at 14:41

## 2018-07-31 RX ADMIN — DIPHENHYDRAMINE HYDROCHLORIDE 25 MG: 50 INJECTION, SOLUTION INTRAMUSCULAR; INTRAVENOUS at 14:40

## 2018-07-31 NOTE — ED PROVIDER NOTES
History  Chief Complaint   Patient presents with    Headache     Headache everyday for the past week  Pt complains of swelling in her face  58-year-old female with past medical history significant for chronic migraine headaches presents with chief complaint of a migraine headache for every day this past week which has been interfering with her sleep  Patient reports that she has taken several over-the-counter medications but that she does not have any medications for her migraines at her prescription  Review of patient's medication list does reveal Topamax but it sounds like she does not actually have that  No fevers or chills  Patient also reports that she has some eye pain and swelling that started on the left and now ball the right  On exam there may be some very mild edema around her eyes but there is no erythema, conjunctival injection or discharge  History provided by:  Patient   used: No    Headache   Pain location:  Generalized  Quality:  Dull  Severity currently:  6/10  Severity at highest:  10/10  Onset quality:  Gradual  Duration:  1 week  Timing:  Constant  Chronicity:  Recurrent  Similar to prior headaches: yes    Relieved by:  Nothing  Worsened by:  Light and activity  Ineffective treatments:  Acetaminophen, aspirin, NSAIDs and resting in a darkened room  Associated symptoms: eye pain and nausea    Associated symptoms: no abdominal pain, no diarrhea, no facial pain, no fever, no neck pain, no swollen glands, no visual change, no vomiting and no weakness        Prior to Admission Medications   Prescriptions Last Dose Informant Patient Reported? Taking?    Canagliflozin (INVOKANA) 300 MG TABS   No Yes   Sig: Take 1 tablet (300 mg total) by mouth daily   acetaminophen (TYLENOL) 500 MG chewable tablet   No Yes   Sig: Chew 1 tablet every 6 (six) hours as needed for mild pain   atorvastatin (LIPITOR) 80 mg tablet   No Yes   Sig: Take 1 tablet by mouth every evening butalbital-acetaminophen-caffeine (FIORICET,ESGIC) -40 mg per tablet   No Yes   Sig: Take 1 tablet by mouth every 6 (six) hours as needed for headaches   cholecalciferol (VITAMIN D3) 1,000 units tablet   No Yes   Sig: Take 1 tablet by mouth daily   clonazePAM (KlonoPIN) 0 5 mg tablet   No Yes   Sig: Take 1 tablet (0 5 mg total) by mouth daily at bedtime as needed for anxiety   escitalopram (LEXAPRO) 10 mg tablet   No Yes   Sig: Take 1 tablet by mouth daily   fenofibrate (TRICOR) 145 mg tablet   Yes Yes   Sig: Take 145 mg by mouth daily   gabapentin (NEURONTIN) 300 mg capsule   No Yes   Sig: Take 1 capsule (300 mg total) by mouth daily at bedtime   insulin lispro (HumaLOG) 100 units/mL injection   No Yes   Sig: Inject 12 Units under the skin 3 (three) times a day with meals   metoprolol tartrate (LOPRESSOR) 25 mg tablet   No Yes   Sig: Take 1 tablet (25 mg total) by mouth daily   pioglitazone (ACTOS) 30 mg tablet   Yes Yes   Sig: Take 30 mg by mouth daily   topiramate (TOPAMAX) 50 MG tablet   No Yes   Sig: Take 1 tablet by mouth daily at bedtime   valsartan-hydrochlorothiazide (DIOVAN-HCT) 320-25 MG per tablet   No Yes   Sig: Take 1 tablet by mouth daily      Facility-Administered Medications: None       Past Medical History:   Diagnosis Date    [facility] mellitus ([facility] [address] )     Hyperlipidemia     Hypertension     [facility])        Past Surgical History:   Procedure Laterality Date    CHOLECYSTECTOMY      HYSTERECTOMY         History reviewed  No pertinent family history  I have reviewed and agree with the history as documented  Social History   Substance Use Topics    Smoking status: Never Smoker    Smokeless tobacco: Never Used    Alcohol use No        Review of Systems   Constitutional: Negative for chills, diaphoresis and fever  HENT: Positive for facial swelling  Eyes: Positive for pain  Respiratory: Negative for shortness of breath      Cardiovascular: Negative for chest pain and palpitations  Gastrointestinal: Positive for nausea  Negative for abdominal pain, diarrhea and vomiting  Genitourinary: Negative for dysuria and frequency  Musculoskeletal: Negative for neck pain  Skin: Negative for rash  Neurological: Positive for headaches  Negative for weakness  All other systems reviewed and are negative  Physical Exam  Physical Exam   Constitutional: She is oriented to person, place, and time  She appears well-developed and well-nourished  She appears distressed  HENT:   Head: Normocephalic and atraumatic  Eyes: Conjunctivae and EOM are normal  Pupils are equal, round, and reactive to light  Right eye exhibits no discharge  Left eye exhibits no discharge  Very mild facial edema below bilateral eyes, no conjunctival injection, no discharge, no erythema    Neck: Normal range of motion  No JVD present  Cardiovascular: Normal rate, regular rhythm, normal heart sounds and intact distal pulses  Exam reveals no gallop and no friction rub  No murmur heard  Pulmonary/Chest: Effort normal and breath sounds normal  No respiratory distress  She has no wheezes  She has no rales  She exhibits no tenderness  Musculoskeletal: Normal range of motion  She exhibits no tenderness  Neurological: She is alert and oriented to person, place, and time  No cranial nerve deficit  She exhibits normal muscle tone  Skin: Skin is warm and dry  Psychiatric: She has a normal mood and affect  Her behavior is normal  Judgment and thought content normal    Nursing note and vitals reviewed        Vital Signs  ED Triage Vitals [07/31/18 1328]   Temperature Pulse Respirations Blood Pressure SpO2   98 7 °F (37 1 °C) 86 22 116/63 96 %      Temp Source Heart Rate Source Patient Position - Orthostatic VS BP Location FiO2 (%)   Oral Monitor Sitting Left arm --      Pain Score       8           Vitals:    07/31/18 1328 07/31/18 1545   BP: 116/63 (!) 181/87   Pulse: 86 75   Patient Position - Orthostatic VS: Sitting Lying       Visual Acuity      ED Medications  Medications   diphenhydrAMINE (BENADRYL) injection 25 mg (25 mg Intravenous Given 7/31/18 1440)   metoclopramide (REGLAN) injection 10 mg (10 mg Intravenous Given 7/31/18 1440)   ketorolac (TORADOL) injection 30 mg (30 mg Intravenous Given 7/31/18 1440)   magnesium sulfate 2 g/50 mL IVPB (premix) 2 g (0 g Intravenous Stopped 7/31/18 1544)       Diagnostic Studies  Results Reviewed     None                 No orders to display              Procedures  Procedures       Phone Contacts  ED Phone Contact    ED Course                               MDM  Number of Diagnoses or Management Options  Migraine headache: new and does not require workup  Diagnosis management comments: 46 y o  female presents with chief complaint of a migraine headache  This headache is similar to prior headaches and was gradual in onset  No fevers, chills, meningismus  No confusion or focal neurological deficits  Will treat with migraine cocktail - adjusted for patient's allergies and prior effective treatments  Risk of Complications, Morbidity, and/or Mortality  Management options: high    Patient Progress  Patient progress: stable    CritCare Time    Disposition  Final diagnoses:   Migraine headache - acute     Time reflects when diagnosis was documented in both MDM as applicable and the Disposition within this note     Time User Action Codes Description Comment    7/31/2018  4:05 PM Agnieszka Hurley Add [G43 909] Migraine headache     7/31/2018  4:05 PM Jean Carlos LOPEZ Modify [G43 909] Migraine headache acute      ED Disposition     ED Disposition Condition Comment    Discharge  [place] [facility] Wei discharge to home/self care      Condition at discharge: Good        Follow-up Information     Follow up With Specialties Details Why Contact Info    Senait Victor MD Internal Medicine Schedule an appointment as soon as possible for a visit As needed [address]  [facility]  [phone]            Patient's Medications   Discharge Prescriptions    BUTALBITAL-ACETAMINOPHEN-CAFFEINE (FIORICET,ESGIC) -40 MG PER TABLET    Take 1 tablet by mouth every 4 (four) hours as needed for headaches       Start Date: 7/31/2018 End Date: --       Order Dose: 1 tablet       Quantity: 15 tablet    Refills: 0     No discharge procedures on file      ED Provider  Electronically Signed by           Richard Luz MD  07/31/18 0129

## 2018-07-31 NOTE — DISCHARGE INSTRUCTIONS
Migraña   LO QUE NECESITA SABER:   Michae Neigh es un dolor de funmilayo intenso  El dolor puede ser tan severo que interfiere con annetta actividades cotidianas  Michae Neigh puede durar desde pocas horas hasta varios días  La causa exacta de la migraña no es conocida  INSTRUCCIONES SOBRE EL [facility]:   Busque atención médica de inmediato si:   · Usted tiene dolor de funmilayo que parece ser diferente o mucho peor que means migraña habitual     · Usted tiene un dolor de funmilayo severo con fiebre o rigidez en el isreal  · Usted tiene nuevos problemas con el habla, la visión, el equilibrio o el [place]  · Usted siente que se va a desmayar, se siente confundido o sufre chai convulsión  Comuníquese con means médico o neurólogo si:   · Means migraña interfiere con annetta actividades cotidianas  · Annetta medicamentos o tratamientos giuseppe de funcionar  · Usted tiene preguntas o inquietudes acerca de means condición o cuidado  Medicamentos:  Odell medicamento tan pronto rosales sienta que le comienza Michae Neigh  · Un medicamento con receta para el dolor  podrían ser Beata Ramandeep  No espere a que el dolor sea muy intenso para pushpa el medicamento  · Medicamentos para la migraña  se [place] para evitar chai migraña o detenerla chai vez que comience  · Los medicamentos contra las náuseas  pueden darse para calmar means estómago y ayudarle a prevenir los vómitos  Marilyn medicamento también puede aliviar el dolor  · Odell annetta medicamentos rosales se le haya indicado  Llame a means médico si usted piensa que el medicamento no está ayudando o si tiene efectos secundarios  Infórmele si es alérgico a cualquier medicamento  Mantenga chai lista actualizada de los [place], las vitaminas y los productos herbales que christina  Incluya los siguientes datos de los medicamentos: cantidad, frecuencia y motivo de administración  Traiga con usted la lista o los envases de la píldoras a annetta citas de seguimiento   Lleve la lista de los medicamentos con usted en paddy de chai emergencia  El [place] de means síntomas:   · Repose en chai habitación oscura y Eduar  [place] ayudará a disminuir el dolor  El dormir también podría ayudarlo a aliviar means dolor  · Aplique hielo para reducir el dolor  Use un paquete de hielo o ponga hielo molido dentro de [employer]  Cubra el paquete con hielo con chai toalla y colóquela en means funmilayo donde siente el dolor por 15 a 20 minutos cada hora  · Aplique calor para disminuir el dolor y los espasmos musculares  Utilice chai toalla pequeña empapada con [place], chai almohada térmica o tome un baño de marcus con agua tibia  Aplique la compresa caliente sobre el área por 20 a 30 minutos cada 2 horas  Usted puede alternar el calor y el hielo  · Mantenga un registro de las migrañas  Escriba cuándo comienzan y terminan james migrañas  Anayeli Grise y [place] haciendo cuando comenzó Julio  Registre lo que comió y lo que tomó las 24 horas antes de que comenzó means migraña  Mantenga un registro de lo que hizo para tratar means migraña y si funcionó  Programe chai gilberto con means médico o means neurólogo rosales se le indique:  Lleve james registros de migrañas con usted cada vez que visite a means médico  Anote james preguntas para que se acuerde de hacerlas stefanie james visitas  Evite otra migraña:   · No fume  La nicotina y otros químicos en los cigarrillos y cigarros pueden desencadenar chai [place] y [place] provocar daño al pulmón  Pida información a means médico si usted actualmente fuma y necesita ayuda para dejar de fumar  Los cigarrillos electrónicos o tabaco sin humo todavía contienen nicotina  Consulte con means médico antes de [employer]  · No tome alcohol  El alcohol puede provocar migraña  También es posible que interfiera con los medicamentos utilizados para tratar means migraña  · Ejercítese regularmente  El ejercicio puede ayudar a evitar migrañas   Consulte con means médico acerca de cuál es el mejor Rupali Loots de ejercicio para usted  · Controle el estrés  El estrés podría provocar migraña  Aprenda nuevas maneras de relajarse rosales la respiración profunda  · Establezca un horario para dormir  Acuéstese y levántese a la misma hora cada día  · Coma james comidas regularmente  Incluya alimentos [employer] fruta, verduras, panes de grano entero, productos lácteos bajos en grasa, frijoles, carne magra y pescado  No consuma alimentos o bebidas que puedan desencadenar james migrañas  © 2016 [address] is for End User's use only and may not be sold, redistributed or otherwise used for commercial purposes  All illustrations and images included in CareNotes® are the copyrighted property of A D A M , Inc  or Brant An  Esta información es sólo para uso en educación  Means intención no es darle un consejo médico sobre enfermedades o tratamientos  Colsulte con means Edy [place] farmacéutico antes de seguir cualquier régimen médico para saber si es seguro y efectivo para usted

## 2018-09-21 ENCOUNTER — HOSPITAL ENCOUNTER (INPATIENT)
Facility: HOSPITAL | Age: 53
LOS: 3 days | Discharge: HOME | DRG: 113 | End: 2018-09-24
Attending: EMERGENCY MEDICINE | Admitting: INTERNAL MEDICINE
Payer: COMMERCIAL

## 2018-09-21 ENCOUNTER — APPOINTMENT (EMERGENCY)
Dept: CT IMAGING | Facility: HOSPITAL | Age: 53
DRG: 113 | End: 2018-09-21
Payer: COMMERCIAL

## 2018-09-21 ENCOUNTER — APPOINTMENT (INPATIENT)
Dept: RADIOLOGY | Facility: HOSPITAL | Age: 53
DRG: 113 | End: 2018-09-21
Payer: COMMERCIAL

## 2018-09-21 DIAGNOSIS — I10 HYPERTENSION: ICD-10-CM

## 2018-09-21 DIAGNOSIS — E78.5 HYPERLIPIDEMIA, UNSPECIFIED HYPERLIPIDEMIA TYPE: Chronic | ICD-10-CM

## 2018-09-21 DIAGNOSIS — E11.65 TYPE 2 DIABETES MELLITUS WITH HYPERGLYCEMIA, WITH LONG-TERM CURRENT USE OF INSULIN (HCC): Chronic | ICD-10-CM

## 2018-09-21 DIAGNOSIS — J01.00 ACUTE NON-RECURRENT MAXILLARY SINUSITIS: ICD-10-CM

## 2018-09-21 DIAGNOSIS — G47.9 SLEEP DISORDER: ICD-10-CM

## 2018-09-21 DIAGNOSIS — Z79.4 TYPE 2 DIABETES MELLITUS WITH HYPERGLYCEMIA, WITH LONG-TERM CURRENT USE OF INSULIN (HCC): Chronic | ICD-10-CM

## 2018-09-21 DIAGNOSIS — G43.909 MIGRAINE: ICD-10-CM

## 2018-09-21 DIAGNOSIS — R73.9 HYPERGLYCEMIA: Primary | ICD-10-CM

## 2018-09-21 DIAGNOSIS — R51.9 HEADACHE: ICD-10-CM

## 2018-09-21 DIAGNOSIS — F32.A DEPRESSION, UNSPECIFIED DEPRESSION TYPE: ICD-10-CM

## 2018-09-21 PROBLEM — E66.09 OBESITY DUE TO EXCESS CALORIES: Status: ACTIVE | Noted: 2018-09-21

## 2018-09-21 PROBLEM — Z86.73 H/O: CVA (CEREBROVASCULAR ACCIDENT): Status: ACTIVE | Noted: 2018-09-21

## 2018-09-21 PROBLEM — J06.9 VIRAL URI: Status: ACTIVE | Noted: 2018-09-21

## 2018-09-21 LAB
ACETONE SERPL-MCNC: NEGATIVE MG/DL
ANION GAP SERPL CALCULATED.3IONS-SCNC: 10 MMOL/L (ref 4–13)
ATRIAL RATE: 81 BPM
BACTERIA UR QL AUTO: ABNORMAL /HPF
BASE EX.OXY STD BLDV CALC-SCNC: 85.6 % (ref 60–80)
BASE EXCESS BLDV CALC-SCNC: -2.6 MMOL/L
BASOPHILS # BLD AUTO: 0.05 THOUSANDS/ΜL (ref 0–0.1)
BASOPHILS NFR BLD AUTO: 1 % (ref 0–1)
BILIRUB UR QL STRIP: NEGATIVE
BUN SERPL-MCNC: 11 MG/DL (ref 5–25)
CALCIUM SERPL-MCNC: 8 MG/DL (ref 8.3–10.1)
CHLORIDE SERPL-SCNC: 101 MMOL/L (ref 100–108)
CLARITY UR: CLEAR
CO2 SERPL-SCNC: 25 MMOL/L (ref 21–32)
COLOR UR: YELLOW
CREAT SERPL-MCNC: 1.08 MG/DL (ref 0.6–1.3)
EOSINOPHIL # BLD AUTO: 0.1 THOUSAND/ΜL (ref 0–0.61)
EOSINOPHIL NFR BLD AUTO: 2 % (ref 0–6)
ERYTHROCYTE [DISTWIDTH] IN BLOOD BY AUTOMATED COUNT: 13 % (ref 11.6–15.1)
GFR SERPL CREATININE-BSD FRML MDRD: 58 ML/MIN/1.73SQ M
GLUCOSE SERPL-MCNC: 118 MG/DL (ref 65–140)
GLUCOSE SERPL-MCNC: 186 MG/DL (ref 65–140)
GLUCOSE SERPL-MCNC: 227 MG/DL (ref 65–140)
GLUCOSE SERPL-MCNC: 309 MG/DL (ref 65–140)
GLUCOSE SERPL-MCNC: 419 MG/DL (ref 65–140)
GLUCOSE SERPL-MCNC: 462 MG/DL
GLUCOSE SERPL-MCNC: 462 MG/DL (ref 65–140)
GLUCOSE SERPL-MCNC: 510 MG/DL (ref 65–140)
GLUCOSE SERPL-MCNC: >500 MG/DL (ref 65–140)
GLUCOSE UR STRIP-MCNC: ABNORMAL MG/DL
HCO3 BLDV-SCNC: 21.8 MMOL/L (ref 24–30)
HCT VFR BLD AUTO: 39.4 % (ref 34.8–46.1)
HGB BLD-MCNC: 13.8 G/DL (ref 11.5–15.4)
HGB UR QL STRIP.AUTO: ABNORMAL
IMM GRANULOCYTES # BLD AUTO: 0.03 THOUSAND/UL (ref 0–0.2)
IMM GRANULOCYTES NFR BLD AUTO: 1 % (ref 0–2)
KETONES UR STRIP-MCNC: NEGATIVE MG/DL
LEUKOCYTE ESTERASE UR QL STRIP: ABNORMAL
LYMPHOCYTES # BLD AUTO: 1.7 THOUSANDS/ΜL (ref 0.6–4.47)
LYMPHOCYTES NFR BLD AUTO: 27 % (ref 14–44)
MCH RBC QN AUTO: 29.8 PG (ref 26.8–34.3)
MCHC RBC AUTO-ENTMCNC: 35 G/DL (ref 31.4–37.4)
MCV RBC AUTO: 85 FL (ref 82–98)
MONOCYTES # BLD AUTO: 0.49 THOUSAND/ΜL (ref 0.17–1.22)
MONOCYTES NFR BLD AUTO: 8 % (ref 4–12)
NEUTROPHILS # BLD AUTO: 3.9 THOUSANDS/ΜL (ref 1.85–7.62)
NEUTS SEG NFR BLD AUTO: 61 % (ref 43–75)
NITRITE UR QL STRIP: NEGATIVE
NON-SQ EPI CELLS URNS QL MICRO: ABNORMAL /HPF
NRBC BLD AUTO-RTO: 0 /100 WBCS
NT-PROBNP SERPL-MCNC: 286 PG/ML
O2 CT BLDV-SCNC: 15.4 ML/DL
P AXIS: 51 DEGREES
PCO2 BLDV: 36.2 MM HG (ref 42–50)
PH BLDV: 7.4 [PH] (ref 7.3–7.4)
PH UR STRIP.AUTO: 6 [PH] (ref 4.5–8)
PLATELET # BLD AUTO: 226 THOUSANDS/UL (ref 149–390)
PLATELET # BLD AUTO: 236 THOUSANDS/UL (ref 149–390)
PMV BLD AUTO: 10 FL (ref 8.9–12.7)
PMV BLD AUTO: 10.3 FL (ref 8.9–12.7)
PO2 BLDV: 50.6 MM HG (ref 35–45)
POTASSIUM SERPL-SCNC: 3.9 MMOL/L (ref 3.5–5.3)
POTASSIUM SERPL-SCNC: 3.9 MMOL/L (ref 3.5–5.3)
PR INTERVAL: 158 MS
PROT UR STRIP-MCNC: >=300 MG/DL
QRS AXIS: -17 DEGREES
QRSD INTERVAL: 74 MS
QT INTERVAL: 386 MS
QTC INTERVAL: 448 MS
RBC # BLD AUTO: 4.63 MILLION/UL (ref 3.81–5.12)
RBC #/AREA URNS AUTO: ABNORMAL /HPF
SODIUM SERPL-SCNC: 136 MMOL/L (ref 136–145)
SP GR UR STRIP.AUTO: 1.02 (ref 1–1.03)
T WAVE AXIS: 16 DEGREES
TSH SERPL DL<=0.05 MIU/L-ACNC: 3.24 UIU/ML (ref 0.36–3.74)
UROBILINOGEN UR QL STRIP.AUTO: 0.2 E.U./DL
VENTRICULAR RATE: 81 BPM
WBC # BLD AUTO: 6.27 THOUSAND/UL (ref 4.31–10.16)
WBC #/AREA URNS AUTO: ABNORMAL /HPF

## 2018-09-21 PROCEDURE — 84132 ASSAY OF SERUM POTASSIUM: CPT | Performed by: PHYSICIAN ASSISTANT

## 2018-09-21 PROCEDURE — 93005 ELECTROCARDIOGRAM TRACING: CPT

## 2018-09-21 PROCEDURE — 71046 X-RAY EXAM CHEST 2 VIEWS: CPT

## 2018-09-21 PROCEDURE — 82009 KETONE BODYS QUAL: CPT | Performed by: PHYSICIAN ASSISTANT

## 2018-09-21 PROCEDURE — 80048 BASIC METABOLIC PNL TOTAL CA: CPT | Performed by: PHYSICIAN ASSISTANT

## 2018-09-21 PROCEDURE — 85049 AUTOMATED PLATELET COUNT: CPT | Performed by: INTERNAL MEDICINE

## 2018-09-21 PROCEDURE — 83880 ASSAY OF NATRIURETIC PEPTIDE: CPT | Performed by: PHYSICIAN ASSISTANT

## 2018-09-21 PROCEDURE — 85025 COMPLETE CBC W/AUTO DIFF WBC: CPT | Performed by: PHYSICIAN ASSISTANT

## 2018-09-21 PROCEDURE — 87631 RESP VIRUS 3-5 TARGETS: CPT | Performed by: INTERNAL MEDICINE

## 2018-09-21 PROCEDURE — 81001 URINALYSIS AUTO W/SCOPE: CPT | Performed by: PHYSICIAN ASSISTANT

## 2018-09-21 PROCEDURE — 99285 EMERGENCY DEPT VISIT HI MDM: CPT

## 2018-09-21 PROCEDURE — 96360 HYDRATION IV INFUSION INIT: CPT

## 2018-09-21 PROCEDURE — 82805 BLOOD GASES W/O2 SATURATION: CPT | Performed by: PHYSICIAN ASSISTANT

## 2018-09-21 PROCEDURE — 96361 HYDRATE IV INFUSION ADD-ON: CPT

## 2018-09-21 PROCEDURE — 82948 REAGENT STRIP/BLOOD GLUCOSE: CPT

## 2018-09-21 PROCEDURE — 70450 CT HEAD/BRAIN W/O DYE: CPT

## 2018-09-21 PROCEDURE — 36415 COLL VENOUS BLD VENIPUNCTURE: CPT | Performed by: PHYSICIAN ASSISTANT

## 2018-09-21 PROCEDURE — 93010 ELECTROCARDIOGRAM REPORT: CPT | Performed by: INTERNAL MEDICINE

## 2018-09-21 PROCEDURE — 99223 1ST HOSP IP/OBS HIGH 75: CPT | Performed by: INTERNAL MEDICINE

## 2018-09-21 PROCEDURE — 84443 ASSAY THYROID STIM HORMONE: CPT | Performed by: INTERNAL MEDICINE

## 2018-09-21 RX ORDER — CLONAZEPAM 0.5 MG/1
0.5 TABLET ORAL
Status: DISCONTINUED | OUTPATIENT
Start: 2018-09-21 | End: 2018-09-25 | Stop reason: HOSPADM

## 2018-09-21 RX ORDER — LORATADINE 10 MG/1
10 TABLET ORAL DAILY
Status: DISCONTINUED | OUTPATIENT
Start: 2018-09-22 | End: 2018-09-25 | Stop reason: HOSPADM

## 2018-09-21 RX ORDER — SODIUM CHLORIDE 9 MG/ML
1000 INJECTION, SOLUTION INTRAVENOUS ONCE
Status: DISCONTINUED | OUTPATIENT
Start: 2018-09-21 | End: 2018-09-21

## 2018-09-21 RX ORDER — TOPIRAMATE 25 MG/1
50 TABLET ORAL
Status: DISCONTINUED | OUTPATIENT
Start: 2018-09-21 | End: 2018-09-25 | Stop reason: HOSPADM

## 2018-09-21 RX ORDER — FENOFIBRATE 145 MG/1
145 TABLET, COATED ORAL DAILY
Status: DISCONTINUED | OUTPATIENT
Start: 2018-09-22 | End: 2018-09-25 | Stop reason: HOSPADM

## 2018-09-21 RX ORDER — ACETAMINOPHEN 325 MG/1
650 TABLET ORAL EVERY 6 HOURS PRN
Status: DISCONTINUED | OUTPATIENT
Start: 2018-09-21 | End: 2018-09-25 | Stop reason: HOSPADM

## 2018-09-21 RX ORDER — LABETALOL HYDROCHLORIDE 5 MG/ML
10 INJECTION, SOLUTION INTRAVENOUS EVERY 6 HOURS PRN
Status: DISCONTINUED | OUTPATIENT
Start: 2018-09-21 | End: 2018-09-25 | Stop reason: HOSPADM

## 2018-09-21 RX ORDER — SODIUM CHLORIDE 9 MG/ML
1000 INJECTION, SOLUTION INTRAVENOUS CONTINUOUS
Status: DISCONTINUED | OUTPATIENT
Start: 2018-09-21 | End: 2018-09-21

## 2018-09-21 RX ORDER — ACETAMINOPHEN 325 MG/1
975 TABLET ORAL ONCE
Status: COMPLETED | OUTPATIENT
Start: 2018-09-21 | End: 2018-09-21

## 2018-09-21 RX ORDER — AMOXICILLIN AND CLAVULANATE POTASSIUM 875; 125 MG/1; MG/1
1 TABLET, FILM COATED ORAL ONCE
Status: COMPLETED | OUTPATIENT
Start: 2018-09-21 | End: 2018-09-21

## 2018-09-21 RX ORDER — GABAPENTIN 300 MG/1
300 CAPSULE ORAL
Status: DISCONTINUED | OUTPATIENT
Start: 2018-09-21 | End: 2018-09-25 | Stop reason: HOSPADM

## 2018-09-21 RX ORDER — ASPIRIN 81 MG/1
81 TABLET ORAL DAILY
Status: DISCONTINUED | OUTPATIENT
Start: 2018-09-22 | End: 2018-09-23

## 2018-09-21 RX ORDER — ATORVASTATIN CALCIUM 40 MG/1
80 TABLET, FILM COATED ORAL EVERY EVENING
Status: DISCONTINUED | OUTPATIENT
Start: 2018-09-21 | End: 2018-09-25 | Stop reason: HOSPADM

## 2018-09-21 RX ORDER — ONDANSETRON 2 MG/ML
4 INJECTION INTRAMUSCULAR; INTRAVENOUS EVERY 6 HOURS PRN
Status: DISCONTINUED | OUTPATIENT
Start: 2018-09-21 | End: 2018-09-25 | Stop reason: HOSPADM

## 2018-09-21 RX ORDER — MELATONIN
1000 DAILY
Status: DISCONTINUED | OUTPATIENT
Start: 2018-09-22 | End: 2018-09-25 | Stop reason: HOSPADM

## 2018-09-21 RX ORDER — ESCITALOPRAM OXALATE 10 MG/1
10 TABLET ORAL DAILY
Status: DISCONTINUED | OUTPATIENT
Start: 2018-09-22 | End: 2018-09-25 | Stop reason: HOSPADM

## 2018-09-21 RX ORDER — BUTALBITAL, ACETAMINOPHEN AND CAFFEINE 50; 325; 40 MG/1; MG/1; MG/1
1 TABLET ORAL EVERY 6 HOURS PRN
Status: DISCONTINUED | OUTPATIENT
Start: 2018-09-21 | End: 2018-09-25 | Stop reason: HOSPADM

## 2018-09-21 RX ADMIN — GABAPENTIN 300 MG: 300 CAPSULE ORAL at 22:18

## 2018-09-21 RX ADMIN — SODIUM CHLORIDE 1000 ML/HR: 0.9 INJECTION, SOLUTION INTRAVENOUS at 12:15

## 2018-09-21 RX ADMIN — ACETAMINOPHEN 650 MG: 325 TABLET ORAL at 16:43

## 2018-09-21 RX ADMIN — SODIUM CHLORIDE 1.5 UNITS/HR: 9 INJECTION, SOLUTION INTRAVENOUS at 20:23

## 2018-09-21 RX ADMIN — DESMOPRESSIN ACETATE 80 MG: 0.2 TABLET ORAL at 18:23

## 2018-09-21 RX ADMIN — ACETAMINOPHEN 975 MG: 325 TABLET, FILM COATED ORAL at 10:31

## 2018-09-21 RX ADMIN — TOPIRAMATE 50 MG: 25 TABLET, FILM COATED ORAL at 22:19

## 2018-09-21 RX ADMIN — SODIUM CHLORIDE 1000 ML: 0.9 INJECTION, SOLUTION INTRAVENOUS at 10:41

## 2018-09-21 RX ADMIN — AMOXICILLIN AND CLAVULANATE POTASSIUM 1 TABLET: 875; 125 TABLET, FILM COATED ORAL at 13:52

## 2018-09-21 RX ADMIN — BUTALBITAL, ACETAMINOPHEN AND CAFFEINE 1 TABLET: 50; 325; 40 TABLET ORAL at 22:28

## 2018-09-21 RX ADMIN — SODIUM CHLORIDE 9 UNITS/HR: 9 INJECTION, SOLUTION INTRAVENOUS at 14:07

## 2018-09-21 RX ADMIN — METOPROLOL TARTRATE 25 MG: 25 TABLET ORAL at 12:45

## 2018-09-21 RX ADMIN — CLONAZEPAM 0.5 MG: 0.5 TABLET ORAL at 22:29

## 2018-09-21 NOTE — ED PROVIDER NOTES
History  Chief Complaint   Patient presents with    Flu Symptoms     Pt reports beginning two days ago with body aches, sore throat, HA and fever  Pt reports highest fever of 102  Pt reports last dose of tylenol at 0600 today  Patient is a 58-year-old female past medical history of hypertension, diabetes, stroke presents emergency department for evaluation of flu-like symptoms  Patient states that for last 2 days she has been experiencing fully symptoms of body aches, facial pain and fullness  Patient states that she has been off of her medication for last 2 weeks secondary to insurance difficulties  States that he has a history of stroke and left-sided remain deficits  She states that her symptoms of facial fullness have been going on for last 2 days as well as body aches and fevers  She states she has a fever of 102  She states that her diabetes poorly controlled as she has been off her insulin for last 2 weeks  She also reports be off of her hypertension medications for last 2 weeks as well  She states that she is unable to obtain her medication secondary to having no insurance  No difficulty breathing  No nausea vomiting  No constipation or bloody stools  Prior to Admission Medications   Prescriptions Last Dose Informant Patient Reported? Taking?    Insulin Glargine (TOUJEO SOLOSTAR) 300 units/mL CONCETRATED U-300 injection pen   Yes Yes   Si Units daily at bedtime   acetaminophen (TYLENOL) 500 MG chewable tablet   No Yes   Sig: Chew 1 tablet every 6 (six) hours as needed for mild pain   atorvastatin (LIPITOR) 80 mg tablet   No Yes   Sig: Take 1 tablet by mouth every evening   butalbital-acetaminophen-caffeine (FIORICET,ESGIC) -40 mg per tablet   No Yes   Sig: Take 1 tablet by mouth every 6 (six) hours as needed for headaches   cholecalciferol (VITAMIN D3) 1,000 units tablet   No Yes   Sig: Take 1 tablet by mouth daily   clonazePAM (KlonoPIN) 0 5 mg tablet   No Yes   Sig: Take 1 tablet (0 5 mg total) by mouth daily at bedtime as needed for anxiety   escitalopram (LEXAPRO) 10 mg tablet   No Yes   Sig: Take 1 tablet by mouth daily   fenofibrate (TRICOR) 145 mg tablet   Yes Yes   Sig: Take 145 mg by mouth daily   gabapentin (NEURONTIN) 300 mg capsule   No Yes   Sig: Take 1 capsule (300 mg total) by mouth daily at bedtime   insulin lispro (HumaLOG) 100 units/mL injection   No Yes   Sig: Inject 12 Units under the skin 3 (three) times a day with meals   Patient taking differently: Inject 38 Units under the skin 3 (three) times a day with meals     metoprolol tartrate (LOPRESSOR) 25 mg tablet   No Yes   Sig: Take 1 tablet (25 mg total) by mouth daily   pioglitazone (ACTOS) 30 mg tablet   Yes Yes   Sig: Take 30 mg by mouth daily   topiramate (TOPAMAX) 50 MG tablet   No Yes   Sig: Take 1 tablet by mouth daily at bedtime   valsartan-hydrochlorothiazide (DIOVAN-HCT) 320-25 MG per tablet   No Yes   Sig: Take 1 tablet by mouth daily      Facility-Administered Medications: None       Past Medical History:   Diagnosis Date    Diabetes mellitus ([facility] [address] )     Hyperlipidemia     Hypertension        Past Surgical History:   Procedure Laterality Date    CHOLECYSTECTOMY      HYSTERECTOMY         History reviewed  No pertinent family history  I have reviewed and agree with the history as documented  Social History   Substance Use Topics    Smoking status: Never Smoker    Smokeless tobacco: Never Used    Alcohol use No        Review of Systems   Constitutional: Positive for activity change and fatigue  Negative for appetite change, chills and fever  HENT: Positive for congestion, sinus pain, sinus pressure and sore throat  Negative for ear pain, mouth sores and trouble swallowing  Eyes: Negative for photophobia and visual disturbance  Respiratory: Negative for cough, chest tightness, shortness of breath and wheezing  Cardiovascular: Positive for leg swelling (Chronic)   Negative for chest pain and palpitations  Gastrointestinal: Negative for abdominal pain, constipation, diarrhea, nausea and vomiting  Genitourinary: Negative for decreased urine volume, difficulty urinating, dysuria, genital sores and hematuria  Musculoskeletal: Negative for arthralgias, myalgias, neck pain and neck stiffness  Skin: Negative for rash and wound  Neurological: Positive for headaches  Negative for dizziness, syncope, weakness, light-headedness and numbness  Hematological: Negative for adenopathy  All other systems reviewed and are negative  Physical Exam  Physical Exam   Constitutional: She is oriented to person, place, and time  She appears well-developed and well-nourished  No distress  HENT:   Head: Normocephalic and atraumatic  Right Ear: External ear normal    Left Ear: External ear normal    Nose: Nose normal    Mouth/Throat: Oropharynx is clear and moist    Nasal tubinates pink and swollen with TTP of left maxillary and frontal sinuses  BL TMs clear  Eyes: Conjunctivae and EOM are normal  Pupils are equal, round, and reactive to light  No scleral icterus  Neck: Normal range of motion  Neck supple  Cardiovascular: Normal rate, regular rhythm, normal heart sounds and intact distal pulses  Exam reveals no gallop and no friction rub  No murmur heard  Pulmonary/Chest: Effort normal and breath sounds normal  No respiratory distress  She has no wheezes  Abdominal: Soft  She exhibits no distension  There is no tenderness  There is no guarding  Musculoskeletal: Normal range of motion  She exhibits no edema, tenderness or deformity  Lymphadenopathy:     She has no cervical adenopathy  Neurological: She is alert and oriented to person, place, and time  No cranial nerve deficit or sensory deficit  She exhibits normal muscle tone  Skin: Skin is warm and dry  Capillary refill takes less than 2 seconds  No rash noted  She is not diaphoretic  No erythema     Nonpitting edema of bilateral lower extremities   Nursing note and vitals reviewed        Vital Signs  ED Triage Vitals   Temperature Pulse Respirations Blood Pressure SpO2   09/21/18 1007 09/21/18 1007 09/21/18 1007 09/21/18 1007 09/21/18 1007   98 6 °F (37 °C) 101 18 (!) 222/108 94 %      Temp Source Heart Rate Source Patient Position - Orthostatic VS BP Location FiO2 (%)   09/21/18 1007 09/21/18 1007 09/21/18 1007 09/21/18 1007 --   Oral Monitor Sitting Right arm       Pain Score       09/21/18 1618       7           Vitals:    09/23/18 2230 09/24/18 0700 09/24/18 1300 09/24/18 1508   BP: (!) 173/88 170/80 170/65 140/78   Pulse: 79   73   Patient Position - Orthostatic VS: Lying Lying Sitting        Visual Acuity      ED Medications  Medications   sodium chloride 0 9 % bolus 1,000 mL (0 mL Intravenous Stopped 9/21/18 1211)   acetaminophen (TYLENOL) tablet 975 mg (975 mg Oral Given 9/21/18 1031)   metoprolol tartrate (LOPRESSOR) tablet 25 mg (25 mg Oral Given 9/21/18 1245)   amoxicillin-clavulanate (AUGMENTIN) 875-125 mg per tablet 1 tablet (1 tablet Oral Given 9/21/18 1352)   potassium chloride (K-DUR,KLOR-CON) CR tablet 40 mEq (40 mEq Oral Given 9/22/18 1236)   valproate (DEPACON) 1,000 mg in sodium chloride 0 9 % 50 mL for headache (0 mg Intravenous Stopped 9/24/18 1601)   magnesium sulfate 2 g/50 mL IVPB (premix) 2 g (0 g Intravenous Stopped 9/24/18 1602)   metoprolol tartrate (LOPRESSOR) tablet 25 mg (25 mg Oral Given 9/24/18 1412)       Diagnostic Studies  Results Reviewed     Procedure Component Value Units Date/Time    Fingerstick Glucose (POCT) [[id number]]  (Abnormal) Collected:  09/21/18 1353    Lab Status:  Final result Updated:  09/21/18 1354     POC Glucose 419 (H) mg/dl     BNP [[id number]]  (Abnormal) Collected:  09/21/18 1036    Lab Status:  Final result Specimen:  Blood from Arm, Right Updated:  09/21/18 1331     NT-proBNP 286 (H) pg/mL     Blood gas, venous [[id number]]  (Abnormal) Collected:  09/21/18 1250    Lab Status:  Final result Specimen:  Blood from Arm, Right Updated:  09/21/18 1255     pH, Shivam 7 397     pCO2, Shivam 36 2 (L) mm Hg      pO2, Shivam 50 6 (H) mm Hg      HCO3, Shivam 21 8 (L) mmol/L      Base Excess, Shivam -2 6 mmol/L      O2 Content, Shivam 15 4 ml/dL      O2 HGB, VENOUS 85 6 (H) %     Narrative: Therapeutic levels (1 mg/mL and 2 mg/mL) of hydroxocobalamin may interfere with the fCOHb and fMetHb where it may cause lower than expected values    Fingerstick Glucose (POCT) [[id number]]  (Abnormal) Collected:  09/21/18 1247    Lab Status:  Final result Updated:  09/21/18 1251     POC Glucose 462 (H) mg/dl     Fingerstick Glucose (POCT) [[id number]]  (Normal) Resulted:  09/21/18 1248    Lab Status:  Final result Updated:  09/21/18 1250     POC Glucose 462 mg/dl     Acetone [[id number]]  (Normal) Collected:  09/21/18 1036    Lab Status:  Final result Specimen:  Blood from Arm, Right Updated:  09/21/18 1117     Acetone, Bld Negative    Urine Microscopic [[id number]]  (Abnormal) Collected:  09/21/18 1039    Lab Status:  Final result Specimen:  Urine from Urine, Clean Catch Updated:  09/21/18 1112     RBC, UA 0-1 (A) /hpf      WBC, UA 0-5 /hpf      Epithelial Cells Occasional /hpf      Bacteria, UA Occasional /hpf     Basic metabolic panel [[id number]]  (Abnormal) Collected:  09/21/18 1036    Lab Status:  Final result Specimen:  Blood from Arm, Right Updated:  09/21/18 1109     Sodium 136 mmol/L      Potassium 3 9 mmol/L      Chloride 101 mmol/L      CO2 25 mmol/L      ANION GAP 10 mmol/L      BUN 11 mg/dL      Creatinine 1 08 mg/dL      Glucose 510 (HH) mg/dL      Calcium 8 0 (L) mg/dL      eGFR 58 ml/min/1 73sq m     Narrative:         National Kidney Disease Education Program recommendations are as follows:  GFR calculation is accurate only with a steady state creatinine  Chronic Kidney disease less than 60 ml/min/1 73 sq  meters  Kidney failure less than 15 ml/min/1 73 sq  meters      UA w Reflex to Microscopic [[id number]]  (Abnormal) Collected:  09/21/18 1039    Lab Status:  Final result Specimen:  Urine from Urine, Clean Catch Updated:  09/21/18 1046     Color, UA Yellow     Clarity, UA Clear     Specific [place], UA 1 020     pH, UA 6 0     Leukocytes, UA Elevated glucose may cause decreased leukocyte values  See urine microscopic for [facility] result/ (A)     Nitrite, UA Negative     Protein, UA >=300 (A) mg/dl      Glucose, UA >=1000 (1%) (A) mg/dl      Ketones, UA Negative mg/dl      Urobilinogen, UA 0 2 E U /dl      Bilirubin, UA Negative     Blood, UA Moderate (A)    CBC and differential [[id number]] Collected:  09/21/18 1036    Lab Status:  Final result Specimen:  Blood from Arm, Right Updated:  09/21/18 1046     WBC 6 27 Thousand/uL      RBC 4 63 Million/uL      Hemoglobin 13 8 g/dL      Hematocrit 39 4 %      MCV 85 fL      MCH 29 8 pg      MCHC 35 0 g/dL      RDW 13 0 %      MPV 10 3 fL      Platelets 206 Thousands/uL      nRBC 0 /100 WBCs      Neutrophils Relative 61 %      Immat GRANS % 1 %      Lymphocytes Relative 27 %      Monocytes Relative 8 %      Eosinophils Relative 2 %      Basophils Relative 1 %      Neutrophils Absolute 3 90 Thousands/µL      Immature Grans Absolute 0 03 Thousand/uL      Lymphocytes Absolute 1 70 Thousands/µL      Monocytes Absolute 0 49 Thousand/µL      Eosinophils Absolute 0 10 Thousand/µL      Basophils Absolute 0 05 Thousands/µL     Fingerstick Glucose (POCT) [[id number]]  (Abnormal) Collected:  09/21/18 1042    Lab Status:  Final result Updated:  09/21/18 1044     POC Glucose >500 (HH) mg/dl                  MRI brain wo contrast   Final Result by Jane Puga DO (09/23 1511)   1  Unremarkable unenhanced MRI of the brain  2   Moderate pansinusitis, similar to the most recent CT scan, however has progressed from the prior MRI        Workstation performed: [id number]         XR chest pa & lateral   Final Result by Jane Puga DO (09/22 1551)   No active pulmonary disease on examination which is somewhat limited secondary to low lung volumes  Workstation performed: [id number]         CT head without contrast   Final Result by Evangelina Fernández MD (09/21 0018)      No acute intracranial abnormality  Similar to previous CT study            Workstation performed: [id number]                    Procedures  Procedures       Phone Contacts  ED Phone Contact    ED Course                               MDM  Number of Diagnoses or Management Options  Acute non-recurrent maxillary sinusitis: new and requires workup  Hyperglycemia: new and requires workup  Hypertension: new and requires workup  Diagnosis management comments: ddx to include but not limited to: sinusitis, CHRISTIANA, DKA, HHS    Patient is a a 54-year-old female with history of uncontrolled diabetes and hypertension presents to the emergency department for evaluation of generalized fatigue, sinus pain pressure, and uncontrolled chronic conditions  Patient states that she has been having symptoms for last 2 days of fatigue, sinus pressure, aside headaches  Reports that her blood sugar has been poorly controlled for 2 weeks as well as hypertension for 2 weeks  Patient reports mild dizziness/lightheadedness  No new recurrent stroke symptoms  Patient states that she does have chronic left-sided deficits  Plan will Dewayne get baseline labs, DKA workup if hyperglycemic, I will consider admission for social [facility] if there are signs of uncontrolled chronic conditions the patient will not be able to manage outpatient as she does not currently have insurance         Amount and/or Complexity of Data Reviewed  Clinical lab tests: ordered and reviewed  Tests in the radiology section of CPT®: ordered and reviewed    Risk of Complications, Morbidity, and/or Mortality  Presenting problems: moderate  Diagnostic procedures: moderate  Management options: moderate    Patient Progress  Patient progress: stable    CritCare Time    Disposition  Final diagnoses: Hyperglycemia   Hypertension   Acute non-recurrent maxillary sinusitis     Time reflects when diagnosis was documented in both MDM as applicable and the Disposition within this note     Time User Action Codes Description Comment    9/21/2018  1:37 PM Reina Aviles Add [R73 9] Hyperglycemia     9/21/2018  1:37 PM Ade Joshua Add [I10] Hypertension     9/21/2018  1:37 PM Ade Joshua Add [J01 00] Acute non-recurrent maxillary sinusitis     9/23/2018  8:45 AM Taos Cokoie Add [R51] Headache     9/24/2018  3:57 PM Theodore Cookie Add [G47 9] Sleep disorder     9/24/2018  4:08 PM Taos Cookie Add [F32 9] Depression, unspecified depression type     9/24/2018  4:08 PM Theodore Cookie Add [E78 5] Hyperlipidemia, unspecified hyperlipidemia type     9/24/2018  4:08 PM Theodore Cookie Add [P34 660] Migraine     9/24/2018  5:03 PM Taos Cookie Add [E11 65,  Z79 4] Type 2 diabetes mellitus with hyperglycemia, with long-term current use of insulin [facility])       ED Disposition     ED Disposition Condition Comment    Admit  Case was discussed with Maynor Gilman and the patient's admission status was agreed to be Admission Status: inpatient status to the service of Dr Maynor Gilman             Follow-up Information     Follow up With Specialties Details [facility]  Follow up Your appointment is at Tuesday October 23 at 2:20pm, please arrive 30 minutes early and bring with you: a picture ID, proof of income, and discharge instructions Stiven DIANA  [phone]    Phone: [phone]          Discharge Medication List as of 9/24/2018  6:27 PM      START taking these medications    Details   hydrALAZINE (APRESOLINE) 25 mg tablet Take 1 tablet (25 mg total) by mouth 3 (three) times a day, Starting Mon 9/24/2018, Print      hydrochlorothiazide (HYDRODIURIL) 25 mg tablet Take 1 tablet (25 mg total) by mouth daily, Starting Mon 9/24/2018, Print      insulin aspart protamine-insulin aspart (NovoLOG 70/30) 100 units/mL injection Inject 45 Units under the skin daily before breakfast and 35 units before dinner, Starting Mon 9/24/2018, Print      lisinopril (ZESTRIL) 20 mg tablet Take 2 tablets (40 mg total) by mouth daily, Starting Mon 9/24/2018, Print         CONTINUE these medications which have CHANGED    Details   atorvastatin (LIPITOR) 40 mg tablet Take 2 tablets (80 mg total) by mouth every evening, Starting Mon 9/24/2018, Print      escitalopram (LEXAPRO) 10 mg tablet Take 1 tablet (10 mg total) by mouth daily, Starting Mon 9/24/2018, Print      fenofibrate (TRICOR) 145 mg tablet Take 1 tablet (145 mg total) by mouth daily, Starting Mon 9/24/2018, Print      gabapentin (NEURONTIN) 300 mg capsule Take 1 capsule (300 mg total) by mouth daily at bedtime, Starting Mon 9/24/2018, Print      metoprolol tartrate (LOPRESSOR) 50 mg tablet Take 1 tablet (50 mg total) by mouth every 12 (twelve) hours, Starting Mon 9/24/2018, Print      topiramate (TOPAMAX) 50 MG tablet Take 1 tablet (50 mg total) by mouth daily at bedtime, Starting Mon 9/24/2018, Print         CONTINUE these medications which have NOT CHANGED    Details   acetaminophen (TYLENOL) 500 MG chewable tablet Chew 1 tablet every 6 (six) hours as needed for mild pain, Starting Wed 11/15/2017, Print      cholecalciferol (VITAMIN D3) 1,000 units tablet Take 1 tablet by mouth daily, Starting Mon 10/9/2017, No Print      clonazePAM (KlonoPIN) 0 5 mg tablet Take 1 tablet (0 5 mg total) by mouth daily at bedtime as needed for anxiety, Starting Mon 5/14/2018, Print         STOP taking these medications       butalbital-acetaminophen-caffeine (FIORICET,ESGIC) -40 mg per tablet Comments:   Reason for Stopping:         Insulin Glargine (TOUJEO SOLOSTAR) 300 units/mL CONCETRATED U-300 injection pen Comments:   Reason for Stopping:         insulin lispro (HumaLOG) 100 units/mL injection Comments:   Reason for Stopping: pioglitazone (ACTOS) 30 mg tablet Comments:   Reason for Stopping:         valsartan-hydrochlorothiazide (DIOVAN-HCT) 320-25 MG per tablet Comments:   Reason for Stopping:         insulin glargine (LANTUS SOLOSTAR) 100 units/mL injection pen Comments:   Reason for Stopping:               Outpatient Discharge Orders  Discharge Diet     Activity as tolerated         ED Provider  Electronically Signed by           Cuong Pacheco PA-C  09/30/18 5535

## 2018-09-21 NOTE — ASSESSMENT & PLAN NOTE
Patient complained of 2 days history of headache fever up to 102 in cold-like symptoms  Positive sick contacts at home  Denies any recent travel  Follow up on influenza PCR  Afebrile in the emergency room

## 2018-09-21 NOTE — ASSESSMENT & PLAN NOTE
Patient presented with headache, blurred vision on markedly elevated blood pressure 211/104 on presentation to the emergency room  Has not been taking any of her medications for 4 weeks  Bedside opthalmoscopic  examination negative for papilledema  Resume outpatient antihypertensive  Controlled Reduction of blood pressure  Patient has history of stroke and left-sided hemiparesis last year  Continue with aspirin and statin therapy  Also complains of chronic headache and has been on migraine prophylaxis for that in the past  Follow up on CT head    Continue with serial neuro exam

## 2018-09-21 NOTE — ASSESSMENT & PLAN NOTE
She had Cerebral  infarction  With left-sided hemiparesis 1 year back    Has been very noncompliant with medications and has had poorly controlled blood pressure and diabetes

## 2018-09-21 NOTE — ASSESSMENT & PLAN NOTE
Lab Results   Component Value Date    HGBA1C 8 9 (H) 10/04/2017       Recent Labs      09/21/18   1042  09/21/18   1247  09/21/18   1248  09/21/18   1353   POCGLU  >500*  462*  462  419*       Blood Sugar Average: Last 72 hrs:  (P) 335 94   Patient presents with markedly elevated blood glucose level secondary to lack of taking insulin for last many weeks  Denies nausea vomiting or abdominal discomfort  No acute acidoses  Start insulin GTT  Once blood sugars are consistently stable will start her on long-acting and pre meal insulin

## 2018-09-22 LAB
ALBUMIN SERPL BCP-MCNC: 1.8 G/DL (ref 3.5–5)
ALP SERPL-CCNC: 127 U/L (ref 46–116)
ALT SERPL W P-5'-P-CCNC: 38 U/L (ref 12–78)
ANION GAP SERPL CALCULATED.3IONS-SCNC: 7 MMOL/L (ref 4–13)
AST SERPL W P-5'-P-CCNC: 33 U/L (ref 5–45)
BILIRUB SERPL-MCNC: 0.3 MG/DL (ref 0.2–1)
BUN SERPL-MCNC: 13 MG/DL (ref 5–25)
CALCIUM SERPL-MCNC: 8 MG/DL (ref 8.3–10.1)
CHLORIDE SERPL-SCNC: 107 MMOL/L (ref 100–108)
CO2 SERPL-SCNC: 24 MMOL/L (ref 21–32)
CREAT SERPL-MCNC: 0.86 MG/DL (ref 0.6–1.3)
FLUAV AG SPEC QL: NORMAL
FLUBV AG SPEC QL: NORMAL
GFR SERPL CREATININE-BSD FRML MDRD: 77 ML/MIN/1.73SQ M
GLUCOSE SERPL-MCNC: 113 MG/DL (ref 65–140)
GLUCOSE SERPL-MCNC: 114 MG/DL (ref 65–140)
GLUCOSE SERPL-MCNC: 115 MG/DL (ref 65–140)
GLUCOSE SERPL-MCNC: 127 MG/DL (ref 65–140)
GLUCOSE SERPL-MCNC: 131 MG/DL (ref 65–140)
GLUCOSE SERPL-MCNC: 147 MG/DL (ref 65–140)
GLUCOSE SERPL-MCNC: 150 MG/DL (ref 65–140)
GLUCOSE SERPL-MCNC: 180 MG/DL (ref 65–140)
GLUCOSE SERPL-MCNC: 215 MG/DL (ref 65–140)
GLUCOSE SERPL-MCNC: 236 MG/DL (ref 65–140)
POTASSIUM SERPL-SCNC: 3.3 MMOL/L (ref 3.5–5.3)
PROT SERPL-MCNC: 5.2 G/DL (ref 6.4–8.2)
RSV B RNA SPEC QL NAA+PROBE: NORMAL
SODIUM SERPL-SCNC: 138 MMOL/L (ref 136–145)

## 2018-09-22 PROCEDURE — 80053 COMPREHEN METABOLIC PANEL: CPT | Performed by: INTERNAL MEDICINE

## 2018-09-22 PROCEDURE — 82948 REAGENT STRIP/BLOOD GLUCOSE: CPT

## 2018-09-22 PROCEDURE — 99232 SBSQ HOSP IP/OBS MODERATE 35: CPT | Performed by: INTERNAL MEDICINE

## 2018-09-22 RX ORDER — FLUTICASONE PROPIONATE 50 MCG
1 SPRAY, SUSPENSION (ML) NASAL 2 TIMES DAILY
Status: DISCONTINUED | OUTPATIENT
Start: 2018-09-22 | End: 2018-09-25 | Stop reason: HOSPADM

## 2018-09-22 RX ORDER — INSULIN GLARGINE 100 [IU]/ML
20 INJECTION, SOLUTION SUBCUTANEOUS EVERY MORNING
Status: DISCONTINUED | OUTPATIENT
Start: 2018-09-22 | End: 2018-09-25 | Stop reason: HOSPADM

## 2018-09-22 RX ORDER — INSULIN GLARGINE 100 [IU]/ML
35 INJECTION, SOLUTION SUBCUTANEOUS
Status: DISCONTINUED | OUTPATIENT
Start: 2018-09-22 | End: 2018-09-25 | Stop reason: HOSPADM

## 2018-09-22 RX ORDER — BENZONATATE 100 MG/1
100 CAPSULE ORAL 3 TIMES DAILY PRN
Status: DISCONTINUED | OUTPATIENT
Start: 2018-09-22 | End: 2018-09-25 | Stop reason: HOSPADM

## 2018-09-22 RX ORDER — POTASSIUM CHLORIDE 20 MEQ/1
40 TABLET, EXTENDED RELEASE ORAL ONCE
Status: COMPLETED | OUTPATIENT
Start: 2018-09-22 | End: 2018-09-22

## 2018-09-22 RX ADMIN — BUTALBITAL, ACETAMINOPHEN AND CAFFEINE 1 TABLET: 50; 325; 40 TABLET ORAL at 04:25

## 2018-09-22 RX ADMIN — INSULIN LISPRO 20 UNITS: 100 INJECTION, SOLUTION INTRAVENOUS; SUBCUTANEOUS at 16:59

## 2018-09-22 RX ADMIN — METOPROLOL TARTRATE 25 MG: 25 TABLET, FILM COATED ORAL at 08:48

## 2018-09-22 RX ADMIN — SODIUM CHLORIDE 6 UNITS/HR: 9 INJECTION, SOLUTION INTRAVENOUS at 01:03

## 2018-09-22 RX ADMIN — ESCITALOPRAM OXALATE 10 MG: 10 TABLET, FILM COATED ORAL at 08:47

## 2018-09-22 RX ADMIN — INSULIN GLARGINE 35 UNITS: 100 INJECTION, SOLUTION SUBCUTANEOUS at 21:41

## 2018-09-22 RX ADMIN — INSULIN LISPRO 20 UNITS: 100 INJECTION, SOLUTION INTRAVENOUS; SUBCUTANEOUS at 12:36

## 2018-09-22 RX ADMIN — CHOLECALCIFEROL TAB 25 MCG (1000 UNIT) 1000 UNITS: 25 TAB at 08:46

## 2018-09-22 RX ADMIN — DESMOPRESSIN ACETATE 80 MG: 0.2 TABLET ORAL at 16:57

## 2018-09-22 RX ADMIN — BENZONATATE 100 MG: 100 CAPSULE ORAL at 12:41

## 2018-09-22 RX ADMIN — HYDROCHLOROTHIAZIDE: 12.5 TABLET ORAL at 08:48

## 2018-09-22 RX ADMIN — BUTALBITAL, ACETAMINOPHEN AND CAFFEINE 1 TABLET: 50; 325; 40 TABLET ORAL at 15:19

## 2018-09-22 RX ADMIN — ASPIRIN 81 MG: 81 TABLET, COATED ORAL at 08:45

## 2018-09-22 RX ADMIN — FLUTICASONE PROPIONATE 1 SPRAY: 50 SPRAY, METERED NASAL at 18:34

## 2018-09-22 RX ADMIN — TOPIRAMATE 50 MG: 25 TABLET, FILM COATED ORAL at 21:39

## 2018-09-22 RX ADMIN — ACETAMINOPHEN 650 MG: 325 TABLET ORAL at 08:45

## 2018-09-22 RX ADMIN — INSULIN GLARGINE 20 UNITS: 100 INJECTION, SOLUTION SUBCUTANEOUS at 12:33

## 2018-09-22 RX ADMIN — CLONAZEPAM 0.5 MG: 0.5 TABLET ORAL at 21:42

## 2018-09-22 RX ADMIN — POTASSIUM CHLORIDE 40 MEQ: 1500 TABLET, EXTENDED RELEASE ORAL at 12:36

## 2018-09-22 RX ADMIN — INSULIN LISPRO 1 UNITS: 100 INJECTION, SOLUTION INTRAVENOUS; SUBCUTANEOUS at 21:41

## 2018-09-22 RX ADMIN — BENZONATATE 100 MG: 100 CAPSULE ORAL at 18:34

## 2018-09-22 RX ADMIN — ENOXAPARIN SODIUM 40 MG: 40 INJECTION SUBCUTANEOUS at 08:46

## 2018-09-22 RX ADMIN — LORATADINE 10 MG: 10 TABLET ORAL at 08:47

## 2018-09-22 RX ADMIN — GABAPENTIN 300 MG: 300 CAPSULE ORAL at 21:40

## 2018-09-22 RX ADMIN — FENOFIBRATE 145 MG: 145 TABLET ORAL at 08:47

## 2018-09-22 NOTE — ASSESSMENT & PLAN NOTE
Patient complained of 2 days history of headache fever up to 102 in cold-like symptoms  Positive sick contacts at home  Denies any recent travel  Influenza PCR negative  Remains afebrile  Continue with Flonase for postnasal drip

## 2018-09-22 NOTE — ASSESSMENT & PLAN NOTE
Patient presented with headache, blurred vision on markedly elevated blood pressure 211/104 on presentation to the emergency room  Has not been taking any of her medications for 4 weeks  Bedside opthalmoscopic  examination negative for papilledema  Resumed outpatient antihypertensive  Controlled Reduction of blood pressure  Patient has history of stroke and left-sided hemiparesis last year  Continue with aspirin and statin therapy  Continues to complain of intermittent headache  In view of markedly elevated blood pressure and ongoing headache will get MRI of the brain to rule out any possibility of stroke  Patient does not have any focal neurological deficits  Follow up on echocardiogram to rule out hypertensive heart disease

## 2018-09-22 NOTE — ASSESSMENT & PLAN NOTE
She had Cerebral  infarction  With left-sided hemiparesis 1 year back    Has been very noncompliant with medications and has had poorly controlled blood pressure and diabetes  MRI of the brain ordered

## 2018-09-22 NOTE — ASSESSMENT & PLAN NOTE
Lab Results   Component Value Date    HGBA1C 8 9 (H) 10/04/2017       Recent Labs      09/22/18   0408  09/22/18   0604  09/22/18   0829  09/22/18   1018   POCGLU  115  131  215*  236*       Blood Sugar Average: Last 72 hrs:  (P) 833 [id number]   Patient presents with markedly elevated blood glucose level secondary to lack of taking insulin for last many weeks  Denies nausea vomiting or abdominal discomfort  Patient remained on algorithm 3 insulin GTT overnight  Blood sugars have improved  Will schedule long-acting insulin pre meal insulin with sliding scale coverage and discontinue insulin GT T  Continue to follow blood sugars closely  Nutrition evaluation for diabetic diet

## 2018-09-22 NOTE — PROGRESS NOTES
Progress Note - Senait Victor 1965, 46 y o  female MRN: [id number]    Unit/Bed#: -01 Encounter: [id number]    Primary Care Provider: Senait Victor MD   Date and time admitted to hospital: 9/21/2018 10:05 AM        Type 2 diabetes mellitus with hyperglycemia, with long-term current use of insulin [facility])   Assessment & Plan    Lab Results   Component Value Date    HGBA1C 8 9 (H) 10/04/2017       Recent Labs      09/22/18   0408  09/22/18   0604  09/22/18   0829  09/22/18   1018   POCGLU  115  131  215*  236*       Blood Sugar Average: Last 72 hrs:  (P) 131 [id number]   Patient presents with markedly elevated blood glucose level secondary to lack of taking insulin for last many weeks  Denies nausea vomiting or abdominal discomfort  Patient remained on algorithm 3 insulin GTT overnight  Blood sugars have improved  Will schedule long-acting insulin pre meal insulin with sliding scale coverage and discontinue insulin GT T  Continue to follow blood sugars closely  Nutrition evaluation for diabetic diet  Obesity due to excess calories   Assessment & Plan    Therapeutic lifestyle modification  H/O: CVA (cerebrovascular accident)   Assessment & Plan    She had Cerebral  infarction  With left-sided hemiparesis 1 year back  Has been very noncompliant with medications and has had poorly controlled blood pressure and diabetes  MRI of the brain ordered        Hyperlipidemia   Assessment & Plan    Continue with outpatient statin therapy  Hypertensive urgency   Assessment & Plan    Patient presented with headache, blurred vision on markedly elevated blood pressure 211/104 on presentation to the emergency room  Has not been taking any of her medications for 4 weeks  Bedside opthalmoscopic  examination negative for papilledema  Resumed outpatient antihypertensive  Controlled Reduction of blood pressure    Patient has history of stroke and left-sided hemiparesis last year   Continue with aspirin and statin therapy  Continues to complain of intermittent headache  In view of markedly elevated blood pressure and ongoing headache will get MRI of the brain to rule out any possibility of stroke  Patient does not have any focal neurological deficits  Follow up on echocardiogram to rule out hypertensive heart disease  * Viral URI   Assessment & Plan    Patient complained of 2 days history of headache fever up to 102 in cold-like symptoms  Positive sick contacts at home  Denies any recent travel  Influenza PCR negative  Remains afebrile  Continue with Flonase for postnasal drip  VTE Pharmacologic Prophylaxis:   Pharmacologic: Enoxaparin (Lovenox)  Mechanical VTE Prophylaxis in Place: No    Patient Centered Rounds: I have performed bedside rounds with nursing staff today  Discussions with Specialists or Other Care Team Provider:  Nursing    Education and Discussions with Family / Patient:  Discussed with patient/message left for daughter  Time Spent for Care: 30 minutes  More than 50% of total time spent on counseling and coordination of care as described above  Current Length of Stay: 1 day(s)    Current Patient Status: Inpatient   Certification Statement: The patient will continue to require additional inpatient hospital stay due to Ongoing blood glucose control    Discharge Plan:  Currently not medically stable for discharge  Code Status: Level 1 - Full Code      Subjective:   Patient seen and examined  Complains of intermittent headache  Denies any blurring of vision numbness or weakness of extremity  Objective:     Vitals:   Temp (24hrs), Av 5 °F (36 9 °C), Min:98 3 °F (36 8 °C), Max:98 7 °F (37 1 °C)    HR:  [78-91] 91  Resp:  [18-20] 18  BP: (160-219)/() 184/97  SpO2:  [92 %-95 %] 93 %  Body mass index is 36 09 kg/m²  Input and Output Summary (last 24 hours):        Intake/Output Summary (Last 24 hours) at 18 1155  Last data filed at 09/21/18 1755   Gross per 24 hour   Intake              180 ml   Output                0 ml   Net              180 ml       Physical Exam:     Physical Exam   Constitutional: She is oriented to person, place, and time  No distress  HENT:   Head: Normocephalic and atraumatic  Eyes: Conjunctivae are normal  Pupils are equal, round, and reactive to light  Neck: Neck supple  Cardiovascular: Normal rate and regular rhythm  Pulmonary/Chest: Effort normal and breath sounds normal    Abdominal: Soft  Bowel sounds are normal    Musculoskeletal: She exhibits no edema  Neurological: She is alert and oriented to person, place, and time  Skin: Skin is warm  She is not diaphoretic  Additional Data:     Labs:      Results from last 7 days  Lab Units 09/21/18  1719 09/21/18  1036   WBC Thousand/uL  --  6 27   HEMOGLOBIN g/dL  --  13 8   HEMATOCRIT %  --  39 4   PLATELETS Thousands/uL 236 226   NEUTROS PCT %  --  61   LYMPHS PCT %  --  27   MONOS PCT %  --  8   EOS PCT %  --  2       Results from last 7 days  Lab Units 09/22/18  0656   SODIUM mmol/L 138   POTASSIUM mmol/L 3 3*   CHLORIDE mmol/L 107   CO2 mmol/L 24   BUN mg/dL 13   CREATININE mg/dL 0 86   CALCIUM mg/dL 8 0*   ALK PHOS U/L 127*   ALT U/L 38   AST U/L 33           Results from last 7 days  Lab Units 09/22/18  1018 09/22/18  0829 09/22/18  0604 09/22/18  0408 09/22/18  0202 09/22/18  0004 09/21/18  2214 09/21/18  2021 09/21/18  1809 09/21/18  1557 09/21/18  1353 09/21/18  1248   POC GLUCOSE mg/dl 236* 215* 131 115 114 180* 186* 118 309* 227* 419* 462             * I Have Reviewed All Lab Data Listed Above  * Additional Pertinent Lab Tests Reviewed:  Daniele 65 Admission Reviewed    Imaging:    Imaging Reports Reviewed Today Include: NA      Recent Cultures (last 7 days):       Results from last 7 days  Lab Units 09/21/18  1815   INFLUENZA A PCR  None Detected   INFLUENZA B PCR  None Detected   RSV PCR  None Detected Last 24 Hours Medication List:     Current Facility-Administered Medications:  acetaminophen 650 mg Oral Q6H PRN Jyotsna Tello MD   aspirin 81 mg Oral Daily Jyotsna Tello MD   atorvastatin 80 mg Oral QPM Jyotsna Tello MD   benzonatate 100 mg Oral TID PRN Jyotsna Tello MD   butalbital-acetaminophen-caffeine 1 tablet Oral Q6H PRN Jyotsna Tello MD   cholecalciferol 1,000 Units Oral Daily Jyotsna Tello MD   clonazePAM 0 5 mg Oral HS PRN Jyotsna Tello MD   enoxaparin 40 mg Subcutaneous Daily Jyotsna Tello MD   escitalopram 10 mg Oral Daily Jyotsna Tello MD   fenofibrate 145 mg Oral Daily Jyotsna Tello MD   fluticasone 1 spray Each Nare BID Jyotsna Tello MD   gabapentin 300 mg Oral HS Jyotsna Tello MD   insulin glargine 20 Units Subcutaneous QAM Jyotsna Tello MD   insulin glargine 35 Units Subcutaneous HS Jyotsna Tello MD   insulin lispro 1-5 Units Subcutaneous HS Jyotsna Tello MD   insulin lispro 1-6 Units Subcutaneous TID Maria C Watson MD   insulin lispro 20 Units Subcutaneous TID With Meals Jyotsna Tello MD   labetalol 10 mg Intravenous Q6H PRN Jyotsna Tello MD   loratadine 10 mg Oral Daily Jyotsna Tello MD   losartan potassium-hydrochlorothiazide (HYZAAR 100/12  5) combo dose  Oral Daily Jyotsna Tello MD   metoprolol tartrate 25 mg Oral Daily Jyotsna Tello MD   ondansetron 4 mg Intravenous Q6H PRN Jyotsna Tello MD   potassium chloride 40 mEq Oral Once Jyotsna Tello MD   topiramate 50 mg Oral HS Jyotsna Tello MD        Today, Patient Was Seen By: Jyotsna Tello MD    ** Please Note: Dictation voice to text software may have been used in the creation of this document   **

## 2018-09-22 NOTE — PLAN OF CARE
Problem: Potential for Falls  Goal: Patient will remain free of falls  INTERVENTIONS:  - Assess patient frequently for physical needs  -  Identify cognitive and physical deficits and behaviors that affect risk of falls    -  [place] fall precautions as indicated by assessment   - Educate patient/family on patient safety including physical limitations  - Instruct patient to call for assistance with activity based on assessment  - Modify environment to reduce risk of injury  - Consider OT/PT consult to assist with strengthening/mobility    Outcome: Progressing      Problem: PAIN - ADULT  Goal: Verbalizes/displays adequate comfort level or baseline comfort level  Interventions:  - Encourage patient to monitor pain and request assistance  - Assess pain using appropriate pain scale  - Administer analgesics based on type and severity of pain and evaluate response  - Implement non-pharmacological measures as appropriate and evaluate response  - Consider cultural and social influences on pain and pain management  - Notify physician/advanced practitioner if interventions unsuccessful or patient reports new pain   Outcome: Progressing      Problem: INFECTION - ADULT  Goal: Absence or prevention of progression during hospitalization  INTERVENTIONS:  - Assess and monitor for signs and symptoms of infection  - Monitor lab/diagnostic results  - Monitor all insertion sites, i e  indwelling lines, tubes, and drains  - Monitor endotracheal (as able) and nasal secretions for changes in amount and color  - [place] appropriate cooling/warming therapies per order  - Administer medications as ordered  - Instruct and encourage patient and family to use good hand hygiene technique  - Identify and instruct in appropriate isolation precautions for identified infection/condition   Outcome: Progressing    Goal: Absence of fever/infection during neutropenic period  INTERVENTIONS:  - Monitor WBC  - Implement neutropenic guidelines   Outcome: Progressing      Problem: SAFETY ADULT  Goal: Maintain or return to baseline ADL function  INTERVENTIONS:  -  Assess patient's ability to carry out ADLs; assess patient's baseline for ADL function and identify physical deficits which impact ability to perform ADLs (bathing, care of mouth/teeth, toileting, grooming, dressing, etc )  - Assess/evaluate cause of self-care deficits   - Assess range of motion  - Assess patient's mobility; develop plan if impaired  - Assess patient's need for assistive devices and provide as appropriate  - Encourage maximum independence but intervene and supervise when necessary  ¯ Involve family in performance of ADLs  ¯ Assess for home care needs following discharge   ¯ Request OT consult to assist with ADL evaluation and planning for discharge  ¯ Provide patient education as appropriate   Outcome: Progressing    Goal: Maintain or return mobility status to optimal level  INTERVENTIONS:  - Assess patient's baseline mobility status (ambulation, transfers, stairs, etc )    - Identify cognitive and physical deficits and behaviors that affect mobility  - Identify mobility aids required to assist with transfers and/or ambulation (gait belt, sit-to-stand, lift, walker, cane, etc )  - [place] fall precautions as indicated by assessment  - Record patient progress and toleration of activity level on Mobility SBAR; progress patient to next Phase/Stage  - Instruct patient to call for assistance with activity based on assessment  - Request Rehabilitation consult to assist with strengthening/weightbearing, etc    Outcome: Progressing    Goal: Patient will remain free of falls  INTERVENTIONS:  - Assess patient frequently for physical needs  -  Identify cognitive and physical deficits and behaviors that affect risk of falls    -  [place] fall precautions as indicated by assessment   - Educate patient/family on patient safety including physical limitations  - Instruct patient to call for assistance with activity based on assessment  - Modify environment to reduce risk of injury  - Consider OT/PT consult to assist with strengthening/mobility    Outcome: Progressing      Problem: DISCHARGE PLANNING  Goal: Discharge to home or other facility with appropriate resources  INTERVENTIONS:  - Identify barriers to discharge w/patient and caregiver  - Arrange for needed discharge resources and transportation as appropriate  - Identify discharge learning needs (meds, wound care, etc )  - Arrange for interpretive services to assist at discharge as needed  - Refer to Case Management Department for coordinating discharge planning if the patient needs post-hospital services based on physician/advanced practitioner order or complex needs related to functional status, cognitive ability, or social support system   Outcome: Progressing      Problem: Knowledge Deficit  Goal: Patient/family/caregiver demonstrates understanding of disease process, treatment plan, medications, and discharge instructions  Complete learning assessment and assess knowledge base  Interventions:  - Provide teaching at level of understanding  - Provide teaching via preferred learning methods   Outcome: Progressing      Problem: CARDIOVASCULAR - ADULT  Goal: Maintains optimal cardiac output and hemodynamic stability  INTERVENTIONS:  - Monitor I/O, vital signs and rhythm  - Monitor for S/S and trends of decreased cardiac output i e  bleeding, hypotension  - Administer and titrate ordered vasoactive medications to optimize hemodynamic stability  - Assess quality of pulses, skin color and temperature  - Assess for signs of decreased coronary artery perfusion - ex   Angina  - Instruct patient to report change in severity of symptoms   Outcome: Progressing    Goal: Absence of cardiac dysrhythmias or at baseline rhythm  INTERVENTIONS:  - Continuous cardiac monitoring, monitor vital signs, obtain 12 lead EKG if indicated  - Administer antiarrhythmic and heart rate control medications as ordered  - Monitor electrolytes and administer replacement therapy as ordered   Outcome: Progressing      Problem: METABOLIC, FLUID AND ELECTROLYTES - ADULT  Goal: Glucose maintained within target range  INTERVENTIONS:  - Monitor Blood Glucose as ordered  - Assess for signs and symptoms of hyperglycemia and hypoglycemia  - Administer ordered medications to maintain glucose within target range  - Assess nutritional intake and initiate nutrition service referral as needed   Outcome: Progressing

## 2018-09-22 NOTE — PHYSICIAN ADVISOR
Current patient class: Inpatient  The patient is currently on Hospital Day: 2 at 1200 [facility]      The patient was admitted to the hospital at [phone] on 9/21/18 for the following diagnosis:  Hypertension [I10]  Hyperglycemia [R73 9]  Fever [R50 9]  Acute non-recurrent maxillary sinusitis [J01 00]       There is documentation in the medical record of an expected length of stay of at least 2 midnights  The patient is therefore expected to satisfy the 2 midnight benchmark and given the 2 midnight presumption is appropriate for INPATIENT ADMISSION  Given this expectation of a satisfying stay, CMS instructs us that the patient is most often appropriate for inpatient admission under part A provided medical necessity is documented in the chart  After review of the relevant documentation, labs, vital signs and test results, the patient is appropriate for INPATIENT ADMISSION  Admission to the hospital as an inpatient is a complex decision making process which requires the practitioner to consider the patients presenting complaint, history and physical examination and all relevant testing  With this in mind, in this case, the patient was deemed appropriate for INPATIENT ADMISSION  After review of the documentation and testing available at the time of the admission I concur with this clinical determination of medical necessity  Rationale is as follows: The patient is a 46 yrs old Female who presented to the ED at 9/21/2018 10:05 AM with a chief complaint of Flu Symptoms (Pt reports beginning two days ago with body aches, sore throat, HA and fever  Pt reports highest fever of 102  Pt reports last dose of tylenol at 0600 today  )     Given the need for further hospitalization, and along with the documentation of medical necessity present in the chart, the patient is appropriate for inpatient admission    The patient is expected to satisfy the 2 midnight benchmark, and will require further acute medical care  The patient does have comorbid conditions which increases the risk for significant adverse outcome  Given this the patient is appropriate for inpatient admission  The patients vitals on arrival were ED Triage Vitals   Temperature Pulse Respirations Blood Pressure SpO2   09/21/18 1007 09/21/18 1007 09/21/18 1007 09/21/18 1007 09/21/18 1007   98 6 °F (37 °C) 101 18 (!) 222/108 94 %      Temp Source Heart Rate Source Patient Position - Orthostatic VS BP Location FiO2 (%)   09/21/18 1007 09/21/18 1007 09/21/18 1007 09/21/18 1007 --   Oral Monitor Sitting Right arm       Pain Score       09/21/18 1618       7           Past Medical History:   Diagnosis Date    Diabetes mellitus ([facility] [address] )     Hyperlipidemia     Hypertension     Stroke [facility])      Past Surgical History:   Procedure Laterality Date    CHOLECYSTECTOMY      HYSTERECTOMY             Consults have been placed to:   IP CONSULT TO CASE MANAGEMENT    Vitals:    09/21/18 1530 09/21/18 2215 09/22/18 0709 09/22/18 1507   BP: 160/98 (!) 184/97  (!) 178/81   BP Location: Left arm Left arm  Right leg   Pulse: 78 81 91 81   Resp: 20 20 18 18   Temp: 98 7 °F (37 1 °C) 98 6 °F (37 °C) 98 3 °F (36 8 °C) 98 1 °F (36 7 °C)   TempSrc: Oral Oral Oral Oral   SpO2: 92% 93% 93% 97%   Weight: 89 5 kg (197 lb 5 oz)      Height: 5' 2" (1 575 m)          Most recent labs:    Recent Labs      09/21/18   1036  09/21/18   1719   09/22/18   0656   WBC  6 27   --    --    --    HGB  13 8   --    --    --    HCT  39 4   --    --    --    PLT  226  236   --    --    K  3 9   --    < >  3 3*   NA  136   --    --   138   CALCIUM  8 0*   --    --   8 0*   BUN  11   --    --   13   CREATININE  1 08   --    --   0 86   AST   --    --    --   33   ALT   --    --    --   38   ALKPHOS   --    --    --   127*    < > = values in this interval not displayed         Scheduled Meds:  Current Facility-Administered Medications:  acetaminophen 650 mg Oral Q6H PRN Doris Arie, MD aspirin 81 mg Oral Daily Doris Sargent MD   atorvastatin 80 mg Oral QPM Doris Sargent MD   benzonatate 100 mg Oral TID PRN Doris Sargent MD   butalbital-acetaminophen-caffeine 1 tablet Oral Q6H PRN Doris Sargent MD   cholecalciferol 1,000 Units Oral Daily Doris Sargent MD   clonazePAM 0 5 mg Oral HS PRN Doris Sargent MD   enoxaparin 40 mg Subcutaneous Daily Doris Sargent MD   escitalopram 10 mg Oral Daily Doris Sargent, MD   fenofibrate 145 mg Oral Daily Doris Sargent MD   fluticasone 1 spray Each Nare BID Doris Sargent MD   gabapentin 300 mg Oral HS Doris Sargent MD   insulin glargine 20 Units Subcutaneous QAM Doris Sargent MD   insulin glargine 35 Units Subcutaneous HS Doris Sargent MD   insulin lispro 1-5 Units Subcutaneous HS Doris Sargent MD   insulin lispro 1-6 Units Subcutaneous TID Navid Vale MD   insulin lispro 20 Units Subcutaneous TID With Meals Doris Sargent MD   labetalol 10 mg Intravenous Q6H PRN Doris Sargent MD   loratadine 10 mg Oral Daily Doris Sargent MD   losartan potassium-hydrochlorothiazide (HYZAAR 100/12  5) combo dose  Oral Daily Doris Sargent MD   metoprolol tartrate 25 mg Oral Daily Doris Sargent MD   ondansetron 4 mg Intravenous Q6H PRN Doris Sargent MD   topiramate 50 mg Oral HS Doris Sargent MD     Continuous Infusions:   PRN Meds:   acetaminophen    benzonatate    butalbital-acetaminophen-caffeine    clonazePAM    labetalol    ondansetron    Surgical procedures (if appropriate):

## 2018-09-22 NOTE — CASE MANAGEMENT
Initial Clinical Review    Admission: Date/Time/Statement: 9/21/18 @ 1352     Orders Placed This Encounter   Procedures    Inpatient Admission (expected length of stay for this patient is greater than two midnights)     Standing Status:   Standing     Number of Occurrences:   1     Order Specific Question:   Admitting Physician     Answer:   Jeff Fang [[id number]]     Order Specific Question:   Level of Care     Answer:   Med Surg [[id number]]     Order Specific Question:   Estimated length of stay     Answer:   More than 2 Midnights     Order Specific Question:   Certification     Answer:   I certify that inpatient services are medically necessary for this patient for a duration of greater than two midnights  See H&P and MD Progress Notes for additional information about the patient's course of treatment  ED: Date/Time/Mode of Arrival:   ED Arrival Information     Expected Arrival Acuity Means of Arrival Escorted By Service Admission Type    - 9/21/2018 09:56 Urgent Walk-In Self General Medicine Urgent    Arrival Complaint    fever          Chief Complaint:   Chief Complaint   Patient presents with    Flu Symptoms     Pt reports beginning two days ago with body aches, sore throat, HA and fever  Pt reports highest fever of 102  Pt reports last dose of tylenol at 0600 today  History of Illness: Mic Sutton is a 46 y o  female who presents with 2 days history of fever headache and cold-like symptoms  He states that her daughter and son-in-law at home with similar symptoms  She denies any recent travel history  He stated that her temperature was 102° at home yesterday  She denied any cough ,postnasal drip, diarrhea or abdominal discomfort  She had 3 episodes of nonbilious vomiting yesterday  Patient states that for the last 3-4 weeks he has not been taking any of her medications secondary to running out of insurance and her medications    She has also not been able to see her primary care physician  She has a reported history of noncompliance with the medications in the past   She also had cerebral infarct resulting in left-sided hemiparesis approximately a year back      ED Vital Signs:   ED Triage Vitals   Temperature Pulse Respirations Blood Pressure SpO2   09/21/18 1007 09/21/18 1007 09/21/18 1007 09/21/18 1007 09/21/18 1007   98 6 °F (37 °C) 101 18 (!) 222/108 94 % RA sat       Temp Source Heart Rate Source Patient Position - Orthostatic VS BP Location FiO2 (%)   09/21/18 1007 09/21/18 1007 09/21/18 1007 09/21/18 1007 --   Oral Monitor Sitting Right arm       Pain Score       09/21/18 1618       7        Wt Readings from Last 1 Encounters:   09/21/18 89 5 kg (197 lb 5 oz)       Vital Signs (abnormal):  BP  Range 222/108-160/98    Abnormal Labs/Diagnostic Test Results:   VBG 7 397/36 2/50 6/21 8 - 9/21 @ 12:50    Glucose 9/21 -510- >[phone]    BMP- CBC - normal     9/22 -- K 3 3 - Joe 830 - Alk Phos 127 - T Protein 5 2 - Albumin 1 8    CXR - 9/22 - am - no acute  Ct Head 9/21 - no acute     MRI Brain - pending    ECHO       ED Treatment:   Medication Administration from 09/21/2018 0956 to 09/21/2018 1510       Date/Time Order Dose Route Action     09/21/2018 1041 sodium chloride 0 9 % bolus 1,000 mL 1,000 mL Intravenous New Bag     09/21/2018 1031 acetaminophen (TYLENOL) tablet 975 mg 975 mg Oral Given     09/21/2018 1215 sodium chloride 0 9 % infusion 1,000 mL/hr Intravenous New Bag     09/21/2018 1245 metoprolol tartrate (LOPRESSOR) tablet 25 mg 25 mg Oral Given     09/21/2018 1352 amoxicillin-clavulanate (AUGMENTIN) 875-125 mg per tablet 1 tablet 1 tablet Oral Given     09/21/2018 1407 insulin regular (HumuLIN R,NovoLIN R) 1 Units/mL in sodium chloride 0 9 % 100 mL infusion 9 Units/hr Intravenous New Bag       Past Medical/Surgical History:   Diagnosis    Diabetes mellitus ([address] )    Hyperlipidemia    Hypertension    Stroke [facility])       Admitting Diagnosis: Hypertension [I10]  Hyperglycemia [R73 9]  Fever [R50 9]  Acute non-recurrent maxillary sinusitis [J01 00]    Age/Sex: 46 y o  female    Assessment/Plan:   Type 2 diabetes mellitus with hyperglycemia, with long-term current use of insulin [facility])   Assessment & Plan             Lab Results   Component Value Date     HGBA1C 8 9 (H) 10/04/2017                Recent Labs      09/21/18   1042  09/21/18   1247  09/21/18   1248  09/21/18   1353   POCGLU  >500*  462*  462  419*         Blood Sugar Average: Last 72 hrs:  (P) 335 75   Patient presents with markedly elevated blood glucose level secondary to lack of taking insulin for last many weeks  Denies nausea vomiting or abdominal discomfort  No acute acidoses  Start insulin GTT  Once blood sugars are consistently stable will start her on long-acting and pre meal insulin              Obesity due to excess calories   Assessment & Plan     Therapeutic lifestyle modification           H/O: CVA (cerebrovascular accident)   Assessment & Plan     She had Cerebral  infarction  With left-sided hemiparesis 1 year back  Has been very noncompliant with medications and has had poorly controlled blood pressure and diabetes          Hyperlipidemia   Assessment & Plan     Continue with outpatient statin therapy           Hypertensive urgency   Assessment & Plan     Patient presented with headache, blurred vision on markedly elevated blood pressure 211/104 on presentation to the emergency room  Has not been taking any of her medications for 4 weeks  Bedside opthalmoscopic  examination negative for papilledema  Resume outpatient antihypertensive  Controlled Reduction of blood pressure  Patient has history of stroke and left-sided hemiparesis last year  Continue with aspirin and statin therapy  Also complains of chronic headache and has been on migraine prophylaxis for that in the past  Follow up on CT head    Continue with serial neuro exam          * Viral URI   Assessment & Plan     Patient complained of 2 days history of headache fever up to 102 in cold-like symptoms  Positive sick contacts at home  Denies any recent travel  Follow up on influenza PCR  Afebrile in the emergency room                  Admission Orders:  Scheduled Meds:   Current Facility-Administered Medications:  acetaminophen 650 mg Oral Q6H PRN 9/21 x 1  9/22 x 1    aspirin 81 mg Oral Daily    atorvastatin 80 mg Oral QPM    benzonatate 100 mg Oral TID PRN 9/22 x 1    butalbital-acetaminophen-caffeine 1 tablet Oral Q6H PRN 9/21 x 1  9/22 x 2    cholecalciferol 1,000 Units Oral Daily    clonazePAM 0 5 mg Oral HS PRN 9/21 x 1    enoxaparin 40 mg Subcutaneous Daily    escitalopram 10 mg Oral Daily    fenofibrate 145 mg Oral Daily    fluticasone 1 spray Each Nare BID    gabapentin 300 mg Oral HS    insulin glargine 20 Units Subcutaneous QAM    insulin glargine 35 Units Subcutaneous HS    insulin lispro 1-5 Units Subcutaneous HS    insulin lispro 1-6 Units Subcutaneous TID AC    insulin lispro 20 Units Subcutaneous TID With Meals    labetalol 10 mg Intravenous Q6H PRN    loratadine 10 mg Oral Daily    losartan potassium-hydrochlorothiazide (HYZAAR 100/12  5) combo dose  Oral Daily    metoprolol tartrate 25 mg Oral Daily    ondansetron 4 mg Intravenous Q6H PRN    topiramate 50 mg Oral HS        Insulin Gtt - 9/21 - 1407 - d/c'd on 9/22 @ 11:50      Nursing orders - VS q 4- Activity as tolerated - Up with assistance - Hypoglycemia - treatment -  Diet cons carb - 2 3 gm NA

## 2018-09-22 NOTE — PLAN OF CARE
CARDIOVASCULAR - ADULT     Maintains optimal cardiac output and hemodynamic stability Progressing     Absence of cardiac dysrhythmias or at baseline rhythm Progressing        DISCHARGE PLANNING     Discharge to home or other facility with appropriate resources Progressing        INFECTION - ADULT     Absence or prevention of progression during hospitalization Progressing     Absence of fever/infection during neutropenic period Progressing        Knowledge Deficit     Patient/family/caregiver demonstrates understanding of disease process, treatment plan, medications, and discharge instructions Progressing        METABOLIC, FLUID AND ELECTROLYTES - ADULT     Glucose maintained within target range Progressing        PAIN - ADULT     Verbalizes/displays adequate comfort level or baseline comfort level Progressing        Potential for Falls     Patient will remain free of falls Progressing        SAFETY ADULT     Maintain or return to baseline ADL function Progressing     Maintain or return mobility status to optimal level Progressing     Patient will remain free of falls Progressing

## 2018-09-23 ENCOUNTER — APPOINTMENT (INPATIENT)
Dept: NON INVASIVE DIAGNOSTICS | Facility: HOSPITAL | Age: 53
DRG: 113 | End: 2018-09-23
Payer: COMMERCIAL

## 2018-09-23 ENCOUNTER — APPOINTMENT (INPATIENT)
Dept: MRI IMAGING | Facility: HOSPITAL | Age: 53
DRG: 113 | End: 2018-09-23
Payer: COMMERCIAL

## 2018-09-23 PROBLEM — R51.9 HEADACHE: Status: ACTIVE | Noted: 2018-09-23

## 2018-09-23 LAB
ANION GAP SERPL CALCULATED.3IONS-SCNC: 6 MMOL/L (ref 4–13)
BUN SERPL-MCNC: 11 MG/DL (ref 5–25)
CALCIUM SERPL-MCNC: 8.4 MG/DL (ref 8.3–10.1)
CHLORIDE SERPL-SCNC: 105 MMOL/L (ref 100–108)
CO2 SERPL-SCNC: 24 MMOL/L (ref 21–32)
CREAT SERPL-MCNC: 0.93 MG/DL (ref 0.6–1.3)
GFR SERPL CREATININE-BSD FRML MDRD: 70 ML/MIN/1.73SQ M
GLUCOSE SERPL-MCNC: 129 MG/DL (ref 65–140)
GLUCOSE SERPL-MCNC: 143 MG/DL (ref 65–140)
GLUCOSE SERPL-MCNC: 158 MG/DL (ref 65–140)
GLUCOSE SERPL-MCNC: 185 MG/DL (ref 65–140)
GLUCOSE SERPL-MCNC: 318 MG/DL (ref 65–140)
POTASSIUM SERPL-SCNC: 3.6 MMOL/L (ref 3.5–5.3)
SODIUM SERPL-SCNC: 135 MMOL/L (ref 136–145)
TSH SERPL DL<=0.05 MIU/L-ACNC: 3.48 UIU/ML (ref 0.36–3.74)

## 2018-09-23 PROCEDURE — 70551 MRI BRAIN STEM W/O DYE: CPT

## 2018-09-23 PROCEDURE — 99255 IP/OBS CONSLTJ NEW/EST HI 80: CPT | Performed by: PSYCHIATRY & NEUROLOGY

## 2018-09-23 PROCEDURE — 80048 BASIC METABOLIC PNL TOTAL CA: CPT | Performed by: INTERNAL MEDICINE

## 2018-09-23 PROCEDURE — 82948 REAGENT STRIP/BLOOD GLUCOSE: CPT

## 2018-09-23 PROCEDURE — 93306 TTE W/DOPPLER COMPLETE: CPT

## 2018-09-23 PROCEDURE — 84443 ASSAY THYROID STIM HORMONE: CPT | Performed by: INTERNAL MEDICINE

## 2018-09-23 PROCEDURE — 99232 SBSQ HOSP IP/OBS MODERATE 35: CPT | Performed by: INTERNAL MEDICINE

## 2018-09-23 RX ORDER — AMLODIPINE BESYLATE 10 MG/1
10 TABLET ORAL DAILY
Status: DISCONTINUED | OUTPATIENT
Start: 2018-09-23 | End: 2018-09-25 | Stop reason: HOSPADM

## 2018-09-23 RX ORDER — MAGNESIUM SULFATE HEPTAHYDRATE 40 MG/ML
2 INJECTION, SOLUTION INTRAVENOUS ONCE
Status: COMPLETED | OUTPATIENT
Start: 2018-09-23 | End: 2018-09-24

## 2018-09-23 RX ADMIN — INSULIN LISPRO 1 UNITS: 100 INJECTION, SOLUTION INTRAVENOUS; SUBCUTANEOUS at 08:34

## 2018-09-23 RX ADMIN — VALPROATE SODIUM 1000 MG: 100 INJECTION, SOLUTION INTRAVENOUS at 16:38

## 2018-09-23 RX ADMIN — ASPIRIN 81 MG: 81 TABLET, COATED ORAL at 08:33

## 2018-09-23 RX ADMIN — GABAPENTIN 300 MG: 300 CAPSULE ORAL at 21:37

## 2018-09-23 RX ADMIN — HYDROCHLOROTHIAZIDE: 12.5 TABLET ORAL at 08:33

## 2018-09-23 RX ADMIN — INSULIN GLARGINE 20 UNITS: 100 INJECTION, SOLUTION SUBCUTANEOUS at 08:33

## 2018-09-23 RX ADMIN — DESMOPRESSIN ACETATE 80 MG: 0.2 TABLET ORAL at 17:52

## 2018-09-23 RX ADMIN — BUTALBITAL, ACETAMINOPHEN AND CAFFEINE 1 TABLET: 50; 325; 40 TABLET ORAL at 15:32

## 2018-09-23 RX ADMIN — FLUTICASONE PROPIONATE 1 SPRAY: 50 SPRAY, METERED NASAL at 08:34

## 2018-09-23 RX ADMIN — FLUTICASONE PROPIONATE 1 SPRAY: 50 SPRAY, METERED NASAL at 17:51

## 2018-09-23 RX ADMIN — AMLODIPINE BESYLATE 10 MG: 10 TABLET ORAL at 09:23

## 2018-09-23 RX ADMIN — ESCITALOPRAM OXALATE 10 MG: 10 TABLET, FILM COATED ORAL at 08:33

## 2018-09-23 RX ADMIN — LORATADINE 10 MG: 10 TABLET ORAL at 08:33

## 2018-09-23 RX ADMIN — ENOXAPARIN SODIUM 40 MG: 40 INJECTION SUBCUTANEOUS at 08:33

## 2018-09-23 RX ADMIN — METOPROLOL TARTRATE 25 MG: 25 TABLET ORAL at 21:37

## 2018-09-23 RX ADMIN — INSULIN LISPRO 1 UNITS: 100 INJECTION, SOLUTION INTRAVENOUS; SUBCUTANEOUS at 21:47

## 2018-09-23 RX ADMIN — MAGNESIUM SULFATE HEPTAHYDRATE 2 G: 40 INJECTION, SOLUTION INTRAVENOUS at 16:38

## 2018-09-23 RX ADMIN — INSULIN LISPRO 20 UNITS: 100 INJECTION, SOLUTION INTRAVENOUS; SUBCUTANEOUS at 12:16

## 2018-09-23 RX ADMIN — FENOFIBRATE 145 MG: 145 TABLET ORAL at 08:33

## 2018-09-23 RX ADMIN — TOPIRAMATE 50 MG: 25 TABLET, FILM COATED ORAL at 21:37

## 2018-09-23 RX ADMIN — METOPROLOL TARTRATE 25 MG: 25 TABLET, FILM COATED ORAL at 08:33

## 2018-09-23 RX ADMIN — INSULIN LISPRO 5 UNITS: 100 INJECTION, SOLUTION INTRAVENOUS; SUBCUTANEOUS at 12:16

## 2018-09-23 RX ADMIN — CHOLECALCIFEROL TAB 25 MCG (1000 UNIT) 1000 UNITS: 25 TAB at 08:33

## 2018-09-23 RX ADMIN — INSULIN LISPRO 20 UNITS: 100 INJECTION, SOLUTION INTRAVENOUS; SUBCUTANEOUS at 08:34

## 2018-09-23 RX ADMIN — CLONAZEPAM 0.5 MG: 0.5 TABLET ORAL at 21:38

## 2018-09-23 RX ADMIN — INSULIN GLARGINE 35 UNITS: 100 INJECTION, SOLUTION SUBCUTANEOUS at 22:06

## 2018-09-23 RX ADMIN — INSULIN LISPRO 20 UNITS: 100 INJECTION, SOLUTION INTRAVENOUS; SUBCUTANEOUS at 17:50

## 2018-09-23 RX ADMIN — BENZONATATE 100 MG: 100 CAPSULE ORAL at 08:40

## 2018-09-23 RX ADMIN — BUTALBITAL, ACETAMINOPHEN AND CAFFEINE 1 TABLET: 50; 325; 40 TABLET ORAL at 06:54

## 2018-09-23 NOTE — CONSULTS
Consultation - Neurology   Callie Harper 46 y o  female MRN: [id number]  Unit/Bed#: -01 Encounter: [id number]      Assessment/Plan   Assessment/Plan:   3 46year old female admitted with complaints of fever, headache and cold-like symptoms   - Patient complains of significant chronic, daily headache which varies in intensity  She also describes occasional associated nausea, photophobia and phonophobia  Description of headaches appear to have some migrainous features, but suspect that underlying uncontrolled diabetes and hypertension are significant contributors to headache  Viral illness may also be contributing to headache  Do not suspect CNS infection at this time, patient remains afebrile in the hospital and also does not have leukocytosis  Influenza and RSV PCR negative  - Will give Depacon 1 g for headache at this time  - Reviewed results of MRI brain with patient and her daughter at bedside, no evidence of acute ischemia and no evidence of chronic ischemia either  Left-sided weakness noted on exam is not secondary to organic cause  - Do not need to continue on antiplatelet from neurology standpoint given low suspicion for vascular etiology of symptoms     - Topamax 50 mg QHS ordered for headache prevention, but unsure if patient will be able to continue based on insurance limitations  - Patient can follow-up with headache center as an outpatient but would recommend management of chronic medical conditions as this will likely help with headache control  2  DM2   - No recent hemoglobin A1c, but with elevated POC glucoses this admission     - Continue with insulin as per primary team      3  HTN    - Patient continues with elevated blood pressures  Would recommend continue antihypertensive regimen as per medicine team      4   HLD   - No recent lipid panel     - Continue on atorvastatin 80 mg QPM      History of Present Illness     Reason for Consult / Principal Problem: Headache    HPI: Emanuel Vázquez is a 46 y o   female with PMH of HTN, HLD, DM2, headache, prior admission for L sided weakness and negative MRI brain imaging who presented to the hospital  with complaints of fever, cold like symptoms and headache  Per discussion with the patient and her daughter at bedside, the patient has chronic daily headaches which vary in intensity  She complains of associated intermittent nausea, photophobia, phonophobia and notes that the intensity of the headaches can vary at times  She states that at this point her headache worsened several days ago and that is what prompted her to come to the hospital   She reportedly ran out of medications several weeks ago and has not been taking any of her medications due to this fact  She states that she had a stroke several years ago with resulting left-sided hemiparesis  Please see below description of prior records, patient underwent imaging at that time and was negative for stroke  It was felt that symptoms were secondary to either complicated migraine or conversion disorder  Inpatient consult to Neurology  Consult performed by: Roxana Cason ordered by: Althea Morton          Review of Systems   Constitutional: Positive for fatigue and fever  HENT: Negative for trouble swallowing  Eyes: Positive for photophobia  Respiratory: Negative for shortness of breath  Cardiovascular: Negative for chest pain  Gastrointestinal: Positive for nausea  Negative for abdominal pain  Genitourinary: Negative for difficulty urinating  Musculoskeletal: Positive for neck pain  Negative for back pain  Neurological: Positive for weakness and headaches  Psychiatric/Behavioral: Negative for confusion         Historical Information   Past Medical History:   Diagnosis Date    Diabetes mellitus ([facility] [address] )     Hyperlipidemia     Hypertension      Past Surgical History:   Procedure Laterality Date    CHOLECYSTECTOMY      HYSTERECTOMY Social History   History   Alcohol Use No     History   Drug Use No     History   Smoking Status    Never Smoker   Smokeless Tobacco    Never Used     Family History: History reviewed  No pertinent family history  Review of previous medical records was completed  Patient was seen in the hospital in October 2017 as a stroke alert  She presented at that time complaining of headache the night prior to admission and then on the morning of admission developed numbness in the left side of her face and tongue and left-sided extremity weakness  At that time differential included stroke versus complicated migraine but given her multiple medical comorbidities could not exclude stroke and thus IV tPA was administered  MRI brain was completed which not show evidence of acute ischemia  Diagnosis at discharge was likely complex migraine versus conversion disorder  She followed up with vascular Neurology team as an outpatient  At that visit she was noted to have hypertensive urgency and was directed to go to the emergency department for evaluation  It was felt that time that stroke was a less likely diagnosis      Meds/Allergies   Scheduled Meds:  Current Facility-Administered Medications:  acetaminophen 650 mg Oral Q6H PRN Toni Chang MD   amLODIPine 10 mg Oral Daily Keith Montes MD   atorvastatin 80 mg Oral QPM Toni Chang MD   benzonatate 100 mg Oral TID PRN Toni Chang MD   butalbital-acetaminophen-caffeine 1 tablet Oral Q6H PRN Toni Chang MD   cholecalciferol 1,000 Units Oral Daily Toni Chang MD   clonazePAM 0 5 mg Oral HS PRN Toni Chang MD   enoxaparin 40 mg Subcutaneous Daily Toni Chang MD   escitalopram 10 mg Oral Daily Toni Chang MD   fenofibrate 145 mg Oral Daily Toni Chang MD   fluticasone 1 spray Each Nare BID Toni Chang MD   gabapentin 300 mg Oral HS Toni Chang MD   insulin glargine 20 Units Subcutaneous QAM Toni Chang MD   insulin glargine 35 Units Subcutaneous HS Abi Rausch MD   insulin lispro 1-5 Units Subcutaneous HS Abi Rausch MD   insulin lispro 1-6 Units Subcutaneous TID Damaris Wright MD   insulin lispro 20 Units Subcutaneous TID With Meals Abi Rausch MD   labetalol 10 mg Intravenous Q6H PRN Abi Rausch MD   loratadine 10 mg Oral Daily Abi Rausch MD   losartan potassium-hydrochlorothiazide (HYZAAR 100/12  5) combo dose  Oral Daily Abi Rausch MD   metoprolol tartrate 25 mg Oral Q12H Abrahan Loza MD   ondansetron 4 mg Intravenous Q6H PRN Abi Rausch MD   topiramate 50 mg Oral HS Abi Rausch MD     Continuous Infusions:   PRN Meds:   acetaminophen    benzonatate    butalbital-acetaminophen-caffeine    clonazePAM    labetalol    ondansetron      No Known Allergies    Objective   Vitals:Blood pressure (!) 179/98, pulse 81, temperature 98 4 °F (36 9 °C), temperature source Oral, resp  rate 18, height 5' 2" (1 575 m), weight 89 5 kg (197 lb 5 oz), SpO2 98 %, not currently breastfeeding  ,Body mass index is 36 09 kg/m²  Intake/Output Summary (Last 24 hours) at 09/23/18 1334  Last data filed at 09/23/18 0945   Gross per 24 hour   Intake              240 ml   Output                0 ml   Net              240 ml       Invasive Devices: Invasive Devices     Peripheral Intravenous Line            Peripheral IV 09/21/18 Right Antecubital 2 days                Physical Exam   Constitutional: She appears well-developed and well-nourished  No distress  HENT:   Head: Normocephalic and atraumatic  Eyes: Conjunctivae and EOM are normal  Pupils are equal, round, and reactive to light  No scleral icterus  Neck: Normal range of motion  Neck supple  Neurological: She has a normal Finger-Nose-Finger Test    Reflex Scores:       Bicep reflexes are 2+ on the right side and 2+ on the left side  Brachioradialis reflexes are 2+ on the right side and 2+ on the left side         Patellar reflexes are 2+ on the right side and 2+ on the left side  Achilles reflexes are 1+ on the right side and 1+ on the left side  Skin: She is not diaphoretic  Neurologic Exam     Cranial Nerves     CN II   Right visual field deficit: none  Left visual field deficit: none     CN III, IV, VI   Pupils are equal, round, and reactive to light  Extraocular motions are normal    Right pupil: Shape: regular  Reactivity: brisk  Consensual response: intact  Left pupil: Shape: regular  Reactivity: brisk  Consensual response: intact  Nystagmus: none   Diplopia: none  Ophthalmoparesis: none  Upgaze: normal  Downgaze: normal  Conjugate gaze: present    CN V   Right facial sensation deficit: none  Left facial sensation deficit: Notes subjective numbness L side of face  CN VII   Right facial weakness: none  Left facial weakness: Forced smile showed decreased L lower facial movement but at rest there is relative decreased R vs L nasolabial fold  CN VIII   Hearing: intact    CN IX, X   Palate: symmetric    CN XI   Right sternocleidomastoid strength: normal  Left sternocleidomastoid strength: During SCM testing, patient anticipated testing and turned her head opposite direction prior to even being touched  CN XII   Tongue: not atrophic  Fasciculations: absent  Tongue deviation: none    Motor Exam   Muscle bulk: normal  Overall muscle tone: normal  Right arm tone: normal  Left arm tone: normal  Right arm pronator drift: absent  Left arm pronator drift: absent  Right leg tone: normal  Left leg tone: normalSeverely limited motor exam on the left side secondary to limited participation and effort  Motor strength intact on the right upper and lower extremities  Sensory Exam   Patient notes diminished sensation on the left-hand side to crude touch and temperature       Gait, Coordination, and Reflexes     Coordination   Finger to nose coordination: normal    Tremor   Resting tremor: absent    Reflexes   Right brachioradialis: 2+  Left brachioradialis: 2+  Right biceps: 2+  Left biceps: 2+  Right patellar: 2+  Left patellar: 2+  Right achilles: 1+  Left achilles: 1+  Right plantar: normal  Left plantar: normalGait deferred  Lab Results:   Recent Results (from the past 24 hour(s))   Fingerstick Glucose (POCT)    Collection Time: 09/22/18  3:52 PM   Result Value Ref Range    POC Glucose 127 65 - 140 mg/dl   Fingerstick Glucose (POCT)    Collection Time: 09/22/18  9:02 PM   Result Value Ref Range    POC Glucose 150 (H) 65 - 140 mg/dl   Basic metabolic panel    Collection Time: 09/23/18  5:09 AM   Result Value Ref Range    Sodium 135 (L) 136 - 145 mmol/L    Potassium 3 6 3 5 - 5 3 mmol/L    Chloride 105 100 - 108 mmol/L    CO2 24 21 - 32 mmol/L    ANION GAP 6 4 - 13 mmol/L    BUN 11 5 - 25 mg/dL    Creatinine 0 93 0 60 - 1 30 mg/dL    Glucose 143 (H) 65 - 140 mg/dL    Calcium 8 4 8 3 - 10 1 mg/dL    eGFR 70 ml/min/1 73sq m   TSH, 3rd generation    Collection Time: 09/23/18  5:13 AM   Result Value Ref Range    TSH 3RD GENERATON 3 481 0 358 - 3 740 uIU/mL   Fingerstick Glucose (POCT)    Collection Time: 09/23/18  6:51 AM   Result Value Ref Range    POC Glucose 158 (H) 65 - 140 mg/dl   Fingerstick Glucose (POCT)    Collection Time: 09/23/18 10:57 AM   Result Value Ref Range    POC Glucose 318 (H) 65 - 140 mg/dl     Imaging Studies: I have personally reviewed pertinent reports  and I have personally reviewed pertinent films in PACS  CTH- No acute intracranial abnormality  MRI brain without contrast reviewed personally, no evidence of acute ischemia or mass lesion  Await formal radiology read       VTE Prophylaxis: Enoxaparin (Lovenox)

## 2018-09-24 VITALS
HEART RATE: 73 BPM | WEIGHT: 197.31 LBS | DIASTOLIC BLOOD PRESSURE: 78 MMHG | OXYGEN SATURATION: 95 % | BODY MASS INDEX: 36.31 KG/M2 | SYSTOLIC BLOOD PRESSURE: 140 MMHG | TEMPERATURE: 98.2 F | HEIGHT: 62 IN | RESPIRATION RATE: 18 BRPM

## 2018-09-24 PROBLEM — I16.0 HYPERTENSIVE URGENCY: Status: RESOLVED | Noted: 2017-10-03 | Resolved: 2018-09-24

## 2018-09-24 LAB
GLUCOSE SERPL-MCNC: 110 MG/DL (ref 65–140)
GLUCOSE SERPL-MCNC: 114 MG/DL (ref 65–140)
GLUCOSE SERPL-MCNC: 144 MG/DL (ref 65–140)

## 2018-09-24 PROCEDURE — 82948 REAGENT STRIP/BLOOD GLUCOSE: CPT

## 2018-09-24 PROCEDURE — 93306 TTE W/DOPPLER COMPLETE: CPT | Performed by: INTERNAL MEDICINE

## 2018-09-24 PROCEDURE — 99239 HOSP IP/OBS DSCHRG MGMT >30: CPT | Performed by: INTERNAL MEDICINE

## 2018-09-24 RX ORDER — LISINOPRIL 20 MG/1
40 TABLET ORAL DAILY
Qty: 60 TABLET | Refills: 0 | Status: SHIPPED | OUTPATIENT
Start: 2018-09-24 | End: 2018-11-22 | Stop reason: ALTCHOICE

## 2018-09-24 RX ORDER — HYDRALAZINE HYDROCHLORIDE 25 MG/1
25 TABLET, FILM COATED ORAL 3 TIMES DAILY
Qty: 90 TABLET | Refills: 0 | Status: ON HOLD | OUTPATIENT
Start: 2018-09-24 | End: 2018-11-28

## 2018-09-24 RX ORDER — HYDROCHLOROTHIAZIDE 25 MG/1
25 TABLET ORAL DAILY
Qty: 30 TABLET | Refills: 0 | Status: SHIPPED | OUTPATIENT
Start: 2018-09-24 | End: 2018-11-22 | Stop reason: ALTCHOICE

## 2018-09-24 RX ORDER — HYDRALAZINE HYDROCHLORIDE 25 MG/1
25 TABLET, FILM COATED ORAL EVERY 8 HOURS SCHEDULED
Status: DISCONTINUED | OUTPATIENT
Start: 2018-09-24 | End: 2018-09-25 | Stop reason: HOSPADM

## 2018-09-24 RX ORDER — ATORVASTATIN CALCIUM 40 MG/1
80 TABLET, FILM COATED ORAL EVERY EVENING
Qty: 60 TABLET | Refills: 0 | Status: ON HOLD | OUTPATIENT
Start: 2018-09-24 | End: 2019-06-26 | Stop reason: SDUPTHER

## 2018-09-24 RX ORDER — METOPROLOL TARTRATE 50 MG/1
50 TABLET, FILM COATED ORAL EVERY 12 HOURS SCHEDULED
Qty: 60 TABLET | Refills: 0 | Status: SHIPPED | OUTPATIENT
Start: 2018-09-24 | End: 2018-11-28 | Stop reason: HOSPADM

## 2018-09-24 RX ORDER — FENOFIBRATE 145 MG/1
145 TABLET, COATED ORAL DAILY
Qty: 30 TABLET | Refills: 0 | Status: SHIPPED | OUTPATIENT
Start: 2018-09-24 | End: 2019-06-26 | Stop reason: HOSPADM

## 2018-09-24 RX ORDER — GABAPENTIN 300 MG/1
300 CAPSULE ORAL
Qty: 30 CAPSULE | Refills: 0 | Status: SHIPPED | OUTPATIENT
Start: 2018-09-24 | End: 2019-06-26 | Stop reason: HOSPADM

## 2018-09-24 RX ORDER — INSULIN ASPART 100 [IU]/ML
45 INJECTION, SUSPENSION SUBCUTANEOUS DAILY
Qty: 10 ML | Refills: 0 | Status: SHIPPED | OUTPATIENT
Start: 2018-09-24 | End: 2018-11-22 | Stop reason: ALTCHOICE

## 2018-09-24 RX ORDER — TOPIRAMATE 50 MG/1
50 TABLET, FILM COATED ORAL
Qty: 30 TABLET | Refills: 0 | Status: SHIPPED | OUTPATIENT
Start: 2018-09-24 | End: 2019-06-26 | Stop reason: HOSPADM

## 2018-09-24 RX ORDER — ESCITALOPRAM OXALATE 10 MG/1
10 TABLET ORAL DAILY
Qty: 30 TABLET | Refills: 0 | Status: SHIPPED | OUTPATIENT
Start: 2018-09-24 | End: 2018-11-22 | Stop reason: ALTCHOICE

## 2018-09-24 RX ADMIN — ENOXAPARIN SODIUM 40 MG: 40 INJECTION SUBCUTANEOUS at 08:25

## 2018-09-24 RX ADMIN — HYDRALAZINE HYDROCHLORIDE 25 MG: 25 TABLET ORAL at 14:12

## 2018-09-24 RX ADMIN — ACETAMINOPHEN 650 MG: 325 TABLET ORAL at 08:38

## 2018-09-24 RX ADMIN — METOPROLOL TARTRATE 25 MG: 25 TABLET ORAL at 14:12

## 2018-09-24 RX ADMIN — FLUTICASONE PROPIONATE 1 SPRAY: 50 SPRAY, METERED NASAL at 08:31

## 2018-09-24 RX ADMIN — INSULIN LISPRO 20 UNITS: 100 INJECTION, SOLUTION INTRAVENOUS; SUBCUTANEOUS at 13:19

## 2018-09-24 RX ADMIN — ACETAMINOPHEN 650 MG: 325 TABLET ORAL at 16:21

## 2018-09-24 RX ADMIN — HYDROCHLOROTHIAZIDE: 12.5 TABLET ORAL at 08:26

## 2018-09-24 RX ADMIN — AMLODIPINE BESYLATE 10 MG: 10 TABLET ORAL at 08:27

## 2018-09-24 RX ADMIN — ESCITALOPRAM OXALATE 10 MG: 10 TABLET, FILM COATED ORAL at 08:26

## 2018-09-24 RX ADMIN — CHOLECALCIFEROL TAB 25 MCG (1000 UNIT) 1000 UNITS: 25 TAB at 08:26

## 2018-09-24 RX ADMIN — METOPROLOL TARTRATE 25 MG: 25 TABLET ORAL at 08:27

## 2018-09-24 RX ADMIN — INSULIN GLARGINE 20 UNITS: 100 INJECTION, SOLUTION SUBCUTANEOUS at 08:26

## 2018-09-24 RX ADMIN — LORATADINE 10 MG: 10 TABLET ORAL at 08:26

## 2018-09-24 RX ADMIN — INSULIN LISPRO 20 UNITS: 100 INJECTION, SOLUTION INTRAVENOUS; SUBCUTANEOUS at 08:30

## 2018-09-24 RX ADMIN — INSULIN LISPRO 20 UNITS: 100 INJECTION, SOLUTION INTRAVENOUS; SUBCUTANEOUS at 18:42

## 2018-09-24 RX ADMIN — FENOFIBRATE 145 MG: 145 TABLET ORAL at 08:26

## 2018-09-24 NOTE — ASSESSMENT & PLAN NOTE
· BP improving gradually  · Amlodipine 10 mg added (part of home regimen), dose of metoprolol increased to 25 mg BID  · Ct Losartan/HCTZ(100/12 5 mg) daily,  · Follow up on echocardiogram to rule out hypertensive heart disease    · Confirm cost of meds on 9/24 and possibly change to cheaper  Alternatives if unable to afford

## 2018-09-24 NOTE — PLAN OF CARE
Problem: DISCHARGE PLANNING - CARE MANAGEMENT  Goal: Discharge to post-acute care or home with appropriate resources  INTERVENTIONS:  - Conduct assessment to determine patient/family and health care team treatment goals, and need for post-acute services based on payer coverage, community resources, and patient preferences, and barriers to discharge  - Address psychosocial, clinical, and financial barriers to discharge as identified in assessment in conjunction with the patient/family and health care team  - Arrange appropriate level of post-acute services according to patients   needs and preference and payer coverage in collaboration with the physician and health care team  - Communicate with and update the patient/family, physician, and health care team regarding progress on the discharge plan  - Arrange appropriate transportation to post-acute venues  Outcome: Completed Date Met: 09/24/18  CM met with Pt at bedside  Pt was unable to afford medications which is what caused her to be hospitalized  Pt is MA Pending  Dr Kelly Miller was able to change some medications to medications that can be filled on the $4 [facility] list, Pt is able to afford those medications  Pt will also fill insulin at [facility] as it is $25, nurse Lida Salomon will review education with Pt and Pt's daughter prior to discharge  Pt is unable to afford other medications, per Willow Calvert at [facility] [address] is 100% eligible for assistance  CM sent medications that are not on the $4 list to [facility] for indigent, indigent meds were filled for topamax, tricor, lexapro, and neurontin  CM picked up meds at [facility] and delivered to Pt's room  Pt states she can no longer go to her PCP because she doesn't have insurance  Pt states she needs a doctor close by as her daughter normally transports her but is busy with work and school   CM arranged appointment for Pt at [facility] in [place] on Tuesday October 23 at 2:20pm as that is the next available appointments  Pt agreeable to going there and time  Pt will discharge home today

## 2018-09-24 NOTE — ASSESSMENT & PLAN NOTE
Lab Results   Component Value Date    HGBA1C 8 9 (H) 10/04/2017       Recent Labs      09/23/18   0651  09/23/18   1057  09/23/18   1546  09/23/18 2052   POCGLU  158*  318*  129  185*       Blood Sugar Average: Last 72 hrs:  (P) 99 [id number]   Patient presents with markedly elevated blood glucose level secondary to lack of taking insulin for last many weeks  Blood sugars acceptable on current regimen    To confirm cost of insulin on 9/24 and change to 70/30 if unable to afford Glargine/Humalog

## 2018-09-24 NOTE — PROGRESS NOTES
Progress Note - Casie Tello 1965, 46 y o  female MRN: [id number]    Unit/Bed#: -01 Encounter: [id number]    Primary Care Provider: Casie Tello MD   Date and time admitted to hospital: 9/21/2018 10:05 AM        * Type 2 diabetes mellitus with hyperglycemia, with long-term current use of insulin [facility])   Assessment & Plan    Lab Results   Component Value Date    HGBA1C 8 9 (H) 10/04/2017       Recent Labs      09/23/18   0651  09/23/18   1057  09/23/18   1546  09/23/18   2052   POCGLU  158*  318*  129  185*       Blood Sugar Average: Last 72 hrs:  (P) 190 [id number]   Patient presents with markedly elevated blood glucose level secondary to lack of taking insulin for last many weeks  Blood sugars acceptable on current regimen  To confirm cost of insulin on 9/24 and change to 70/30 if unable to afford Glargine/Humalog        Hypertensive urgency   Assessment & Plan    · BP improving gradually  · Amlodipine 10 mg added (part of home regimen), dose of metoprolol increased to 25 mg BID  · Ct Losartan/HCTZ(100/12 5 mg) daily,  · Follow up on echocardiogram to rule out hypertensive heart disease  · Confirm cost of meds on 9/24 and possibly change to cheaper  Alternatives if unable to afford          Headache   Assessment & Plan    · C/w migraine triggered by viral illness  · Also needs good control of HTN and diabetes to prevent recurrence  · Neurology input appreciated  · Trial of Depacon, IV magnesium  · Ct Topamax for prophylaxis        H/O: CVA (cerebrovascular accident)   Assessment & Plan    · Ct ASA, statins        Hyperlipidemia   Assessment & Plan    Continue with outpatient statin therapy  Obesity due to excess calories   Assessment & Plan    Therapeutic lifestyle modification  Viral URI   Assessment & Plan    Patient complained of 2 days history of headache fever up to 102 in cold-like symptoms  Positive sick contacts at home  Denies any recent travel    Influenza PCR negative  Remains afebrile  Continue with Flonase for postnasal drip  VTE Pharmacologic Prophylaxis:   Pharmacologic: Enoxaparin (Lovenox)  Mechanical VTE Prophylaxis in Place: Yes    Patient Centered Rounds: I have performed bedside rounds with nursing staff today  Education and Discussions with Family / Patient: Discussed with daughter on the phone    Time Spent for Care: 20 minutes  More than 50% of total time spent on counseling and coordination of care as described above  Current Length of Stay: 2 day(s)    Current Patient Status: Inpatient   Certification Statement: The patient will continue to require additional inpatient hospital stay due to headache    Code Status: Level 1 - Full Code    Subjective:   Headache persisting  No nausea or vomiting  Objective:     Vitals:   Temp (24hrs), Av 5 °F (36 9 °C), Min:98 4 °F (36 9 °C), Max:98 8 °F (37 1 °C)    HR:  [81-89] 86  Resp:  [18] 18  BP: (146-179)/() 162/86  SpO2:  [97 %-98 %] 98 %  Body mass index is 36 09 kg/m²  Physical Exam:     Physical Exam   Constitutional: She is oriented to person, place, and time  HENT:   Head: Atraumatic  Eyes: EOM are normal    Neck: Neck supple  No JVD present  No tracheal deviation present  No thyromegaly present  Cardiovascular: Normal rate, regular rhythm and normal heart sounds  Pulmonary/Chest: Effort normal and breath sounds normal  No respiratory distress  She has no wheezes  She has no rales  Abdominal: Soft  Bowel sounds are normal  She exhibits no distension  There is no tenderness  There is no rebound  Musculoskeletal: She exhibits no edema  Neurological: She is alert and oriented to person, place, and time  Skin: Skin is warm and dry  Psychiatric: She has a normal mood and affect         Additional Data:     Labs:      Results from last 7 days  Lab Units 18  1719 18  1036   WBC Thousand/uL  --  6 27   HEMOGLOBIN g/dL  --  13 8   HEMATOCRIT %  -- 39 4   PLATELETS Thousands/uL 236 226   NEUTROS PCT %  --  61   LYMPHS PCT %  --  27   MONOS PCT %  --  8   EOS PCT %  --  2       Results from last 7 days  Lab Units 09/23/18  0509 09/22/18  0656   SODIUM mmol/L 135* 138   POTASSIUM mmol/L 3 6 3 3*   CHLORIDE mmol/L 105 107   CO2 mmol/L 24 24   BUN mg/dL 11 13   CREATININE mg/dL 0 93 0 86   CALCIUM mg/dL 8 4 8 0*   ALK PHOS U/L  --  127*   ALT U/L  --  38   AST U/L  --  33           Results from last 7 days  Lab Units 09/23/18  2052 09/23/18  1546 09/23/18  1057 09/23/18  0651 09/22/18  2102 09/22/18  1552 09/22/18  1202 09/22/18  1018 09/22/18  0829 09/22/18  0604 09/22/18  0408 09/22/18  0202   POC GLUCOSE mg/dl 185* 129 318* 158* 150* 127 147* 236* 215* 131 115 114       * I Have Reviewed All Lab Data Listed Above  * Additional Pertinent Lab Tests Reviewed:  [facility]    Recent Cultures (last 7 days):       Results from last 7 days  Lab Units 09/21/18  1815   INFLUENZA A PCR  None Detected   INFLUENZA B PCR  None Detected   RSV PCR  None Detected       Last 24 Hours Medication List:     Current Facility-Administered Medications:  acetaminophen 650 mg Oral Q6H PRN Rolo Castellano MD   amLODIPine 10 mg Oral Daily Nereida Fernandez MD   atorvastatin 80 mg Oral QPM Rolo Castellano MD   benzonatate 100 mg Oral TID PRN Rolo Castellano MD   butalbital-acetaminophen-caffeine 1 tablet Oral Q6H PRN Rolo Castellano MD   cholecalciferol 1,000 Units Oral Daily Rolo Castellano MD   clonazePAM 0 5 mg Oral HS PRN Rolo Castellano MD   enoxaparin 40 mg Subcutaneous Daily Rolo Castellano MD   escitalopram 10 mg Oral Daily Rolo Castellano MD   fenofibrate 145 mg Oral Daily Rolo Castellano MD   fluticasone 1 spray Each Nare BID Rolo Castellano MD   gabapentin 300 mg Oral HS Rolo Castellano MD   insulin glargine 20 Units Subcutaneous QAM Rolo Castellano MD   insulin glargine 35 Units Subcutaneous HS Rolo Castellano MD   insulin lispro 1-5 Units Subcutaneous HS Nj Crocker MD   insulin lispro 1-6 Units Subcutaneous TID Alicia Aquino MD   insulin lispro 20 Units Subcutaneous TID With Meals Nj Crocker MD   labetalol 10 mg Intravenous Q6H PRN Nj Crocker MD   loratadine 10 mg Oral Daily Nj Crocker MD   losartan potassium-hydrochlorothiazide (HYZAAR 100/12  5) combo dose  Oral Daily Nj Crocker MD   metoprolol tartrate 25 mg Oral Q12H Kirsten Casas MD   ondansetron 4 mg Intravenous Q6H PRN Nj Crocker MD   topiramate 50 mg Oral HS Nj Crocker MD        Today, Patient Was Seen By: Renato Grimes MD    ** Please Note: Dictation voice to text software may have been used in the creation of this document   **

## 2018-09-24 NOTE — ASSESSMENT & PLAN NOTE
· C/w migraine triggered by viral illness  · Also needs good control of HTN and diabetes to prevent recurrence  · Neurology input appreciated  · Trial of Depacon, IV magnesium  · Ct Topamax for prophylaxis

## 2018-09-25 NOTE — PROGRESS NOTES
Progress Note - Annabel Meza 1965, 46 y o  female MRN: [id number]    Unit/Bed#: -01 Encounter: [id number]    Primary Care Provider: Annabel Meza MD   Date and time admitted to hospital: 9/21/2018 10:05 AM        * Type 2 diabetes mellitus with hyperglycemia, with long-term current use of insulin [facility])   Assessment & Plan    Lab Results   Component Value Date    HGBA1C 8 9 (H) 10/04/2017       Recent Labs      09/23/18   2052  09/24/18   0740  09/24/18   1137  09/24/18   1607   POCGLU  185*  114  144*  110       Blood Sugar Average: Last 72 hrs:  (P) 039 5701   Patient presents with markedly elevated blood glucose level secondary to lack of taking insulin for last many weeks  Blood sugars acceptable on current regimen  Unable to afford Lantus/Humalog  Agreeable to use insulin with vial  Discharge on Novolog 70/30 - 45 units before breakfast and 35 units before dinner  Monitor blood sugars at home and contact PCP with results to adjust insulin dose        Hypertension   Assessment & Plan    · BP acceptable at present  · Discharge on meds from [address] [place] [address] 4$ list  · Lisinopril 40 mg daily, HCTZ 25 mg daily, Hydralazine 25 mg TID, Metoprolol tartrate 50 mg BID        Headache   Assessment & Plan    · C/w migraine triggered by viral illness  · Also needs good control of HTN and diabetes to prevent recurrence  · Neurology input appreciated  · Trial of Depacon, IV magnesium on 9/24 - headache improved  · Ct Topamax for prophylaxis - 1 month free med provided        H/O: CVA (cerebrovascular accident)   Assessment & Plan    · Ct ASA, statins        Hyperlipidemia   Assessment & Plan    Discharge on Atorvastatin 40 mg 2 tabs daily (available on [facility] 4$ list)        Obesity due to excess calories   Assessment & Plan    Therapeutic lifestyle modification  Viral URI   Assessment & Plan    Patient complained of 2 days history of headache fever up to 102 in cold-like symptoms    Positive sick contacts at home  Denies any recent travel  Influenza PCR negative  Remains afebrile  Symptoms resolved            VTE Pharmacologic Prophylaxis:   Pharmacologic: Enoxaparin (Lovenox)  Mechanical VTE Prophylaxis in Place: Yes    Patient Centered Rounds: I have performed bedside rounds with nursing staff today  Education and Discussions with Family / Patient: Called daughter - left a message    Time Spent for Care: 20 minutes  More than 50% of total time spent on counseling and coordination of care as described above  Current Length of Stay: 3 day(s)    Current Patient Status: Inpatient     Discharge Plan: Discharge home today    Subjective:   Headache improved  No fever  Feels better  Objective:     Vitals:   Temp (24hrs), Av 1 °F (36 7 °C), Min:98 °F (36 7 °C), Max:98 2 °F (36 8 °C)    HR:  [73] 73  Resp:  [16-18] 18  BP: (140-170)/(65-78) 140/78  SpO2:  [95 %] 95 %  Body mass index is 36 09 kg/m²  Physical Exam:     Physical Exam   Constitutional: She is oriented to person, place, and time  HENT:   Head: Atraumatic  Eyes: EOM are normal    Neck: Neck supple  No JVD present  No tracheal deviation present  No thyromegaly present  Cardiovascular: Normal rate, regular rhythm and normal heart sounds  Pulmonary/Chest: Effort normal and breath sounds normal  No respiratory distress  She has no wheezes  She has no rales  Abdominal: Soft  Bowel sounds are normal  She exhibits no distension  There is no tenderness  There is no rebound  Musculoskeletal: She exhibits no edema  Neurological: She is alert and oriented to person, place, and time  Skin: Skin is warm and dry  Psychiatric: She has a normal mood and affect         Additional Data:     Labs:      Results from last 7 days  Lab Units 18  1719 18  1036   WBC Thousand/uL  --  6 27   HEMOGLOBIN g/dL  --  13 8   HEMATOCRIT %  --  39 4   PLATELETS Thousands/uL 236 226   NEUTROS PCT %  --  61   LYMPHS PCT %  --  27 MONOS PCT %  --  8   EOS PCT %  --  2       Results from last 7 days  Lab Units 09/23/18  0509 09/22/18  0656   SODIUM mmol/L 135* 138   POTASSIUM mmol/L 3 6 3 3*   CHLORIDE mmol/L 105 107   CO2 mmol/L 24 24   BUN mg/dL 11 13   CREATININE mg/dL 0 93 0 86   CALCIUM mg/dL 8 4 8 0*   ALK PHOS U/L  --  127*   ALT U/L  --  38   AST U/L  --  33           Results from last 7 days  Lab Units 09/24/18  1607 09/24/18  1137 09/24/18  0740 09/23/18  2052 09/23/18  1546 09/23/18  1057 09/23/18  0651 09/22/18  2102 09/22/18  1552 09/22/18  1202 09/22/18  1018 09/22/18  0829   POC GLUCOSE mg/dl 110 144* 114 185* 129 318* 158* 150* 127 147* 236* 215*             * I Have Reviewed All Lab Data Listed Above  * Additional Pertinent Lab Tests Reviewed: Daniele 65 Admission Reviewed      Results from last 7 days  Lab Units 09/21/18  1815   INFLUENZA A PCR  None Detected   INFLUENZA B PCR  None Detected   RSV PCR  None Detected        Today, Patient Was Seen By: Edison Holcomb MD    ** Please Note: Dictation voice to text software may have been used in the creation of this document   **

## 2018-09-25 NOTE — ASSESSMENT & PLAN NOTE
Lab Results   Component Value Date    HGBA1C 8 9 (H) 10/04/2017       Recent Labs      09/23/18   2052  09/24/18   0740  09/24/18   1137  09/24/18   1607   POCGLU  185*  114  144*  110       Blood Sugar Average: Last 72 hrs:  (P) 745 8097   Patient presents with markedly elevated blood glucose level secondary to lack of taking insulin for last many weeks  Blood sugars acceptable on current regimen    Unable to afford Lantus/Humalog  Agreeable to use insulin with vial  Discharge on Novolog 70/30 - 45 units before breakfast and 35 units before dinner  Monitor blood sugars at home and contact PCP with results to adjust insulin dose

## 2018-09-25 NOTE — ASSESSMENT & PLAN NOTE
Patient complained of 2 days history of headache fever up to 102 in cold-like symptoms  Positive sick contacts at home  Denies any recent travel  Influenza PCR negative  Remains afebrile      Symptoms resolved

## 2018-09-25 NOTE — ASSESSMENT & PLAN NOTE
· C/w migraine triggered by viral illness  · Also needs good control of HTN and diabetes to prevent recurrence  · Neurology input appreciated  · Trial of Depacon, IV magnesium on 9/24 - headache improved  · Ct Topamax for prophylaxis - 1 month free med provided

## 2018-09-25 NOTE — DISCHARGE SUMMARY
Discharge Summary - [facility] Internal Medicine    Patient Information: Casie Tello 46 y o  female MRN: [id number]  Unit/Bed#: -01 Encounter: [id number]    Discharging Physician / Practitioner: Chandra Nelson MD  PCP: Casie Tello MD  Admission Date: 9/21/2018  Discharge Date: 9/24/2018    Principal discharge diagnoses:  1  Type 2 diabetes with hyperglycemia  2  Hypertensive urgency  3  Viral upper respiratory tract infection  4  Migraine    Secondary diagnoses:  1  History of CVA  2  History of migraine  3  Hyperlipidemia      Consultations During Hospital Stay:  Neurology    Procedures Performed:   1  CT head - no acute abnormality  2  Chest Xray - no acute cardiopulmonary disease  3  MRI brain - no acute pathology  4  Echocardiogram -  LEFT VENTRICLE:  Systolic function was normal  Ejection fraction was estimated to be 65 %  There were no regional wall motion abnormalities  There was mild concentric hypertrophy  Doppler parameters were consistent with abnormal left ventricular relaxation (grade 1 diastolic dysfunction)    MITRAL VALVE:  There was mild annular calcification  There was trace regurgitation    TRICUSPID VALVE:  There was trace regurgitation      Hospital Course:     Casie Tello is a 46 y o  female patient who originally presented to the hospital on 9/21/2018 fever and flu like symptoms  Influenza PCR was negative  There was no evidence of bacterial infection  She has a history of hypertension and Type 2 diabetes and had stopped taking her home medications including insulin 3-4 weeks prior to presentation due to lack of insurance  She also stated that she would not be able to follow-up with her primary care physician and endocrinologist without insurance  She was noted to have severe hyperglycemia and a BP of 211/104 on presentation  She was begun on an insulin infusion and later transitioned to Lantus and Humalog with good control of her blood sugars   Since she could not afford her medications she was discharged on anti-hypertensives which she could obtain from [facility] from the 4$ list Her insulin was also changed to Novolog 70/30 on discharge which she could obtain at a reduced rate from [facility]  Her blood sugars and BP were controlled at the time of discharge  Her migraine recurred likely in the setting of her viral infection  She was given a trial of Depacon infusion and Magnesium  Her headache improved  She has been advised compliance with her medications  Follow-up was setup for her with a [address] and she was given 1 month free medications for those not available in the [facility] 4$ list  These can gradually be transitioned to affordable medications when she follows up with her primary care physician  She was discharged on Metoprolol tartrate 50 mg BID, Lisinopril 40 mg daily, HCTZ 25 mg daily, Hydralazine 25 mg TID and Novolog 70/30 45 units before breakfast and 35 units before dinner  Condition at Discharge: stable     Discharge Day Visit / Exam:     * Please refer to separate progress for these details *    Discharge instructions/Information to patient and family:   See after visit summary for information provided to patient and family  Provisions for Follow-Up Care:  See after visit summary for information related to follow-up care and any pertinent home health orders  Disposition: Home    Planned Readmission: No    Discharge Statement:  I spent 40 minutes discharging the patient  This time was spent on the day of discharge  I had direct contact with the patient on the day of discharge  Greater than 50% of the total time was spent examining patient, answering all patient questions, arranging and discussing plan of care with patient as well as directly providing post-discharge instructions  Additional time then spent on discharge activities      Discharge Medications:  See after visit summary for reconciled discharge medications provided to patient and family  ** Please Note: Dragon 360 Dictation voice to text software may have been used in the creation of this document   **

## 2018-09-25 NOTE — ASSESSMENT & PLAN NOTE
· BP acceptable at present  · Discharge on meds from [facility] 4$ list  · Lisinopril 40 mg daily, HCTZ 25 mg daily, Hydralazine 25 mg TID, Metoprolol tartrate 50 mg BID

## 2018-11-16 NOTE — UTILIZATION REVIEW
URGENT/EMERGENT  INPATIENT/SPU AUTHORIZATION REQUEST    Date: 11/16/18            # Pages in this Request:     X New Request   Additional Information for PA#:     Office Contact Name:  Verena Marin Title: Utilization Review, Raquel Nurse     Phone: [phone]  Ext  Availability (Date/Time): Wednesday - Friday 8 am- 4 pm    X Inpatient Review  SPU Review        Current       X Late Pick-up   · How your facility was first notified of the Late Pick-up:  Paths Letter   · When your facility was first notified of the Late Pick-up (date): 11/13/2018         RECIPIENT INFORMATION    Recipient ID#: [id number]   Recipient Name: Eli Ordonez  YOB: 1965  46 y o  Recipient Alias:     Gender:   Male X Female Medicaid Eligibility ([address] [place] [address]): INSURANCE INFORMATION    (All other private or governmental health insurance benefits must be utilized prior to billing the [place] Program)    Was this admission the result of an MVA, other accident, assault, injury, fall, gunshot, bite etc ? Yes X No                   If yes, provide a brief description of the incident  Does the recipient have other insurance coverage? Yes X No        Insurance Company Name/Policy #      Did that insurance pay on this claim? Yes  No        Did that insurance deny this claim? Yes  No    If yes, reason for denial:      Does the recipient have Medicare? Yes X No        Did Medicare exhaust prior to this admission? Yes  No            Did Medicare partially pay this claim? Yes  No        Did that insurance deny this claim? Yes  No    If yes, reason for denial:          Was the recipient a prisoner at the time of admission? Yes X No            PROVIDER INFORMATION    [facility]  [address] Provider ID#: [id number]    [address] [place] [address] Physician Name: Magali Santa;  Internal Medicine     [facility] Provider ID#: [id number]        ADMISSION INFORMATION    Type of Admission: (please choose one)    X ED      Direct    If yes, from where? Transfer    If yes, transferring hospital (inpatient, rehab, or psych) Provider Name/Provider ID#: Admission Floor or Unit Type:  Med Surg - Te[facility]     Dates/Times:        ED Date/Time: 9/21/2018 10:05 AM        Observation Date/Time:         Admission Date/Time: 9/21/18 1352        Discharge or Transfer Date/Time: 9/24/2018  8:00 PM        DIAGNOSIS/PROCEDURE CODES    Primary Diagnosis Code/Primary Diagnosis Code description:   J06 9   Acute upper respiratory infection, unspecified    Additional Diagnosis Code(s) and Description(s)-(up to three additional codes):   E11 65 Type 2 diabetes mellitus with hyperglycemia   G43 909  Migraine , unspecified , not intractable, without status  migranosus    I16 0 Hypertensive urgency     ( Code Order may be changed )     Procedure Code (one) and description:         CLINICAL INFORMATION - 2 [facility]    Is there a prior admission with a discharge date within 30 days of the date of this admission? X No (Proceed to the next section - "Clinical Information - General Review Checklist:)      Yes (Provide the following information)     Prior admission dates:    MA Prior Authorization Number:        Review Outcome:     Diagnosis Code(s)/Description:    Procedure Code/Description:    Findings:    Treatment:    Condition on Discharge:   Vitals:    Labs:   Imaging:   Medications: Follow-up Instructions:    Disposition:        CLINICAL INFORMATION - GENERAL REVIEW CHECKLIST    EMERGENCY DEPARTMENT: (Proceed to "ADMISSION" if Direct Admission)    Presenting Signs/Symptoms:   Meri Carvajal is a 46 y  o  female who presents with 2 days history of fever headache and cold-like symptoms   He states that her daughter and son-in-law at home with similar symptoms  Marika Harris denies any recent travel history  Chon Ulrich stated that her temperature was 102° at home yesterday  Marika Harris denied any cough ,postnasal drip, diarrhea or abdominal discomfort   She had 3 episodes of nonbilious vomiting yesterday   Patient states that for the last 3-4 weeks he has not been taking any of her medications secondary to running out of insurance and her medications  Vicenta Layton has also not been able to see her primary care physician  Vicenta Layton has a reported history of noncompliance with the medications in the past   She also had cerebral infarct resulting in left-sided hemiparesis approximately a year back        Medication/treatment prior to arrival in the ED:  None     Past Medical History:   Diagnosis    Diabetes mellitus ([facility] [address] )    Hyperlipidemia    Hypertension       Clinical Exam: Nasal tubinates pink and swollen with TTP of left maxillary and frontal sinuses  BL TMs clear  Nonpitting edema of bilateral lower extremities       Initial Vital Signs: (Temp, Pulse, Resp, and BP)   ED Triage Vitals   Temperature Pulse Respirations Blood Pressure SpO2   09/21/18 1007 09/21/18 1007 09/21/18 1007 09/21/18 1007 09/21/18 1007   98 6 °F (37 °C) 101 18 (!) 222/108 94 %      Temp Source Heart Rate Source Patient Position - Orthostatic VS BP Location FiO2 (%)   09/21/18 1007 09/21/18 1007 09/21/18 [facility] 09/21/18 [facility] --   Oral Monitor Sitting Right arm       Pain Score       09/21/18 1618       7           Pertinent Repeat Vital Signs: (include times they were obtained)BP  Range 222/108-160/98    Pertinent Sustained Findings: (include times they were obtained)    Weight in Kilograms:  Wt Readings from Last 1 Encounters:   09/21/18 89 5 kg (197 lb 5 oz)       Pertinent Labs (results):VBG 7 397/36 2/50 6/21 8 - 9/21 @ 12:50     Glucose 9/21 -510- >[phone]    BMP- CBC - normal   Urine - Protein > 300 - Glucose > 1000 - Blood - moderate -      9/22 -- K 3 3 - Joe 830 - Alk Phos 127 - T Protein 5 2 - Albumin 1 8    Radiology (results):CXR - 9/22 - am - no acute  Ct Head 9/21 - no acute      MRI Brain - pending     ECHO     EKG (results):      Other tests (results):    Tests pending final results:    Treatment in the ED:   Medication Administration from 09/21/2018 0956 to 09/21/2018 1510       Date/Time Order Dose Route Action     09/21/2018 1041 sodium chloride 0 9 % bolus 1,000 mL 1,000 mL Intravenous New Bag     09/21/2018 1031 acetaminophen (TYLENOL) tablet 975 mg 975 mg Oral Given     09/21/2018 1215 sodium chloride 0 9 % infusion 1,000 mL/hr Intravenous New Bag     09/21/2018 1245 metoprolol tartrate (LOPRESSOR) tablet 25 mg 25 mg Oral Given     09/21/2018 1352 amoxicillin-clavulanate (AUGMENTIN) 875-125 mg per tablet 1 tablet 1 tablet Oral Given     09/21/2018 1407 insulin regular (HumuLIN R,NovoLIN R) 1 Units/mL in sodium chloride 0 9 % 100 mL infusion 9 Units/hr Intravenous New Bag      Other treatments:       Change in condition while in the ED:       Response to ED Treatment:           OBSERVATION: (Proceed to "ADMISSION" if Direct Admission)    Orders written during the observation period  Meds Name, dose, route, time, how may doses given:  PRN Meds Name, dose, route, time, how many doses given within the first 24 hrs :  IVs Type, rate, and total amt  ordered/given:  Labs, imaging, other:  Consults and findings:    Test Results during the observation period  Pertinent Lab tests (dates/results):  Culture results (blood, urine, spinal, wound, respiratory, etc ):  Imaging tests (dates/results):  EKG (dates/results):   Other test (dates/results):  Tests pending (dates/results):    Surgical or Invasive Procedures during the observation period  Name of surgery/procedure:  Date & Time:  Patient Response:  Post-operative orders:  Operative Report/Findings:    Response to Treatment, Major Change in Condition, Major Charge in Treatment during the observation period          ADMISSION:    DIRECT Admissions Only:    · Presenting Signs/Symptoms:   ·   · Medication/treatment prior to arrival:  ·   · Past Medical History:  ·   · Clinical Exam on admission:  ·   · Vital Signs on admission: (Temp, Pulse, Resp, and BP)  ·   · Weight in kilograms:     ALL Admissions:    Admission Orders and Other Orders written within the first 24 hrs after admission  Meds Name, dose, route, time, how may doses given:  acetaminophen 650 mg Oral Q6H PRN 9/21 x 1  9/22 x 1    aspirin 81 mg Oral Daily     atorvastatin 80 mg Oral QPM     benzonatate 100 mg Oral TID PRN 9/22 x 1    butalbital-acetaminophen-caffeine 1 tablet Oral Q6H PRN 9/21 x 1  9/22 x 2    cholecalciferol 1,000 Units Oral Daily     clonazePAM 0 5 mg Oral HS PRN 9/21 x 1    enoxaparin 40 mg Subcutaneous Daily     escitalopram 10 mg Oral Daily     fenofibrate 145 mg Oral Daily     fluticasone 1 spray Each Nare BID     gabapentin 300 mg Oral HS     insulin glargine 20 Units Subcutaneous QAM     insulin glargine 35 Units Subcutaneous HS     insulin lispro 1-5 Units Subcutaneous HS     insulin lispro 1-6 Units Subcutaneous TID AC     insulin lispro 20 Units Subcutaneous TID With Meals     labetalol 10 mg Intravenous Q6H PRN     loratadine 10 mg Oral Daily     losartan potassium-hydrochlorothiazide (HYZAAR 100/12  5) combo dose   Oral Daily     metoprolol tartrate 25 mg Oral Daily     ondansetron 4 mg Intravenous Q6H PRN     topiramate 50 mg Oral HS           Insulin Gtt - 9/21 - 1407 - d/c'd on 9/22 @ 11:50       PRN Meds Name, dose, route, time, how many doses given within the first 24 hrs :  IVs Type, rate, and total amt  ordered/given:  Labs, imaging, other:  Nursing orders - VS q 4- Activity as tolerated - Up with assistance - Hypoglycemia - treatment -  Diet cons carb - 2 3 gm NA     Consults and findings: Neurology - 9/23 -  Headache features are consistent with migrainous physiology, possibly triggered/propagated on this occasion by unspecified viral illness  I think her headache syndrome though is likely to remain refractory, until she gets better control over her hypertension and diabetes    This is obviously made more difficult by her lack of insurance and subsequent lack of compliance  Additionally though, she has significant nonphysiologic features on current exam, as well as during her previous admission for stroke mimic  This suggests some degree of psychological contribution to her symptoms  We will give a trial of Depacon infusion with IV magnesium  Try to avoid steroids given her poorly-controlled sugars and blood pressures  Topamax has been restarted for prophylaxis, though it is unlikely she will be able to continue it without insurance coverage  (Was not taking prior to admission)  Test Results after admission  Pertinent Lab tests (dates/results):  Culture results (blood, urine, spinal, wound, respiratory, etc ):  Imaging tests (dates/results):  EKG (dates/results): Other test (dates/results):  Tests pending (dates/results):    Surgical or Invasive Procedures  Name of surgery/procedure:  Date & Time:  Patient Response:  Post-operative orders:  Operative Report/Findings:    Response to Treatment, Major Change in Condition, Major Charge in Treatment anytime during admission  Kathlen Litten is a 46 y o  female patient who originally presented to the hospital on 9/21/2018 fever and flu like symptoms  Influenza PCR was negative  There was no evidence of bacterial infection  She has a history of hypertension and Type 2 diabetes and had stopped taking her home medications including insulin 3-4 weeks prior to presentation due to lack of insurance  She also stated that she would not be able to follow-up with her primary care physician and endocrinologist without insurance  She was noted to have severe hyperglycemia and a BP of 211/104 on presentation  She was begun on an insulin infusion and later transitioned to Lantus and Humalog with good control of her blood sugars   Since she could not afford her medications she was discharged on anti-hypertensives which she could obtain from [facility] from the 4$ list Her insulin was also changed to Novolog 70/30 on discharge which she could obtain at a reduced rate from [facility]  Her blood sugars and BP were controlled at the time of discharge  Her migraine recurred likely in the setting of her viral infection  She was given a trial of Depacon infusion and Magnesium  Her headache improved  She has been advised compliance with her medications  Follow-up was setup for her with a [address] and she was given 1 month free medications for those not available in the [facility] 4$ list  These can gradually be transitioned to affordable medications when she follows up with her primary care physician  She was discharged on Metoprolol tartrate 50 mg BID, Lisinopril 40 mg daily, HCTZ 25 mg daily, Hydralazine 25 mg TID and Novolog 70/30 45 units before breakfast and 35 units before dinner  Disposition on Discharge  Home, Rehab, SNF, LTC, Shelter, etc : Home/Self Care    Cease to Breathe (CTB)  If a patient expires during an admission, in addition to the above information, please include:    Summary/timeline of the patient's decline in condition:    Medications and treatment:    Patient response to treatment:    Date and time patient ceased to breathe:        Is there a Readmission that follows this admission? Yes X No    If yes, reason for denial:          InterQual Review    InterQual Criteria Met:  Yes X No  N/A        Please include the InterQual Review, InterQual year/version used, and the criteria selected:   Created Using Review Status Review Entered   [place] [employer]® In Primary 9/22/2018 15:24      Criteria Set Name - Subset   LOC:Acute Adult-Diabetes Mellitus      Criteria Review   REVIEW SUMMARY     Patient: Oh Leon  Review Number: [id number]  Review Status:  In Primary  Criteria Status: Not Met     Condition Specific: Yes        OUTCOMES  Outcome Type: Primary           REVIEW DETAILS     Product: Bernard Huizar Adult  Subset: Diabetes Mellitus      (Symptom or finding within 24h)    (Excludes PO medications unless noted)          Select Day, One:              [ ] Episode Day 1, One:                  [ ] ACUTE, One:                      [ ] Diabetes mellitus and blood sugar > 500 mg/dL(27 8 mmol/L) without ketonuria or ketonemia, Both:                          [ ] Intervention, All:                              [X] Blood sugar monitoring at least 4x/24h                              [X] Diabetes education                              [X] Insulin, One:                                  [X] Continuous     Version: InterQual® 2017 2  [employer]  © 2017 [employer] and/or one of its [place]  All Rights Reserved  CPT only © 2016 [employer]  All Rights Reserved  PLEASE SUBMIT THE COMPLETED FORM TO THE DEPARTMENT OF HUMAN SERVICES - DIVISION OF CLINICAL  REVIEW VIA FAX -[phone] or VIA E-MAIL TO [employer]    Signature: Bernard Pichardo Date:  11/16/18    Confidentiality Notice: The documents accompanying this telecopy may contain confidential information belonging to the sender  The information is intended only for the use of the individual named above  If you are not the intended recipient, you are hereby notified  That any disclosure, copying, distribution or taking of any telecopy is strictly prohibited

## 2018-11-22 ENCOUNTER — APPOINTMENT (EMERGENCY)
Dept: RADIOLOGY | Facility: HOSPITAL | Age: 53
DRG: 469 | End: 2018-11-22
Payer: COMMERCIAL

## 2018-11-22 ENCOUNTER — APPOINTMENT (EMERGENCY)
Dept: CT IMAGING | Facility: HOSPITAL | Age: 53
DRG: 469 | End: 2018-11-22
Payer: COMMERCIAL

## 2018-11-22 ENCOUNTER — HOSPITAL ENCOUNTER (INPATIENT)
Facility: HOSPITAL | Age: 53
LOS: 6 days | Discharge: HOME | DRG: 469 | End: 2018-11-28
Attending: EMERGENCY MEDICINE | Admitting: HOSPITALIST
Payer: COMMERCIAL

## 2018-11-22 DIAGNOSIS — R51.9 HEADACHE: ICD-10-CM

## 2018-11-22 DIAGNOSIS — I16.1 HYPERTENSIVE EMERGENCY: ICD-10-CM

## 2018-11-22 DIAGNOSIS — I16.0 HYPERTENSIVE URGENCY: Primary | ICD-10-CM

## 2018-11-22 DIAGNOSIS — R05.9 COUGH: ICD-10-CM

## 2018-11-22 DIAGNOSIS — N17.9 ACUTE KIDNEY INJURY (HCC): ICD-10-CM

## 2018-11-22 DIAGNOSIS — N17.9 AKI (ACUTE KIDNEY INJURY) (HCC): ICD-10-CM

## 2018-11-22 DIAGNOSIS — R05.3 PERSISTENT COUGH FOR 3 WEEKS OR LONGER: ICD-10-CM

## 2018-11-22 DIAGNOSIS — I10 HYPERTENSION: ICD-10-CM

## 2018-11-22 DIAGNOSIS — R73.9 ACUTE HYPERGLYCEMIA: ICD-10-CM

## 2018-11-22 LAB
ALBUMIN SERPL BCP-MCNC: 2.1 G/DL (ref 3.5–5)
ALP SERPL-CCNC: 126 U/L (ref 46–116)
ALT SERPL W P-5'-P-CCNC: 34 U/L (ref 12–78)
ANION GAP SERPL CALCULATED.3IONS-SCNC: 10 MMOL/L (ref 4–13)
APTT PPP: 27 SECONDS (ref 26–38)
AST SERPL W P-5'-P-CCNC: 48 U/L (ref 5–45)
ATRIAL RATE: 83 BPM
BACTERIA UR QL AUTO: ABNORMAL /HPF
BACTERIA UR QL AUTO: ABNORMAL /HPF
BASOPHILS # BLD AUTO: 0.04 THOUSANDS/ΜL (ref 0–0.1)
BASOPHILS NFR BLD AUTO: 1 % (ref 0–1)
BILIRUB SERPL-MCNC: 0.4 MG/DL (ref 0.2–1)
BILIRUB UR QL STRIP: NEGATIVE
BILIRUB UR QL STRIP: NEGATIVE
BUN SERPL-MCNC: 19 MG/DL (ref 5–25)
C3 SERPL-MCNC: 112 MG/DL (ref 90–180)
C4 SERPL-MCNC: 30 MG/DL (ref 10–40)
CALCIUM SERPL-MCNC: 8.5 MG/DL (ref 8.3–10.1)
CHLORIDE SERPL-SCNC: 106 MMOL/L (ref 100–108)
CLARITY UR: CLEAR
CLARITY UR: CLEAR
CO2 SERPL-SCNC: 21 MMOL/L (ref 21–32)
COARSE GRAN CASTS URNS QL MICRO: ABNORMAL /LPF
COLOR UR: YELLOW
COLOR UR: YELLOW
CREAT SERPL-MCNC: 1.3 MG/DL (ref 0.6–1.3)
CREAT UR-MCNC: 116 MG/DL
EOSINOPHIL # BLD AUTO: 0.1 THOUSAND/ΜL (ref 0–0.61)
EOSINOPHIL NFR BLD AUTO: 2 % (ref 0–6)
ERYTHROCYTE [DISTWIDTH] IN BLOOD BY AUTOMATED COUNT: 12.3 % (ref 11.6–15.1)
FINE GRAN CASTS URNS QL MICRO: ABNORMAL /LPF
GFR SERPL CREATININE-BSD FRML MDRD: 47 ML/MIN/1.73SQ M
GLUCOSE SERPL-MCNC: 289 MG/DL (ref 65–140)
GLUCOSE SERPL-MCNC: 356 MG/DL (ref 65–140)
GLUCOSE SERPL-MCNC: 377 MG/DL (ref 65–140)
GLUCOSE UR STRIP-MCNC: ABNORMAL MG/DL
GLUCOSE UR STRIP-MCNC: ABNORMAL MG/DL
HCT VFR BLD AUTO: 34.9 % (ref 34.8–46.1)
HGB BLD-MCNC: 12.2 G/DL (ref 11.5–15.4)
HGB UR QL STRIP.AUTO: ABNORMAL
HGB UR QL STRIP.AUTO: ABNORMAL
HYALINE CASTS #/AREA URNS LPF: ABNORMAL /LPF
HYALINE CASTS #/AREA URNS LPF: ABNORMAL /LPF
IMM GRANULOCYTES # BLD AUTO: 0.03 THOUSAND/UL (ref 0–0.2)
IMM GRANULOCYTES NFR BLD AUTO: 1 % (ref 0–2)
INR PPP: 0.81 (ref 0.86–1.17)
KETONES UR STRIP-MCNC: NEGATIVE MG/DL
KETONES UR STRIP-MCNC: NEGATIVE MG/DL
LEUKOCYTE ESTERASE UR QL STRIP: NEGATIVE
LEUKOCYTE ESTERASE UR QL STRIP: NEGATIVE
LIPASE SERPL-CCNC: 262 U/L (ref 73–393)
LYMPHOCYTES # BLD AUTO: 1.86 THOUSANDS/ΜL (ref 0.6–4.47)
LYMPHOCYTES NFR BLD AUTO: 32 % (ref 14–44)
MCH RBC QN AUTO: 29.7 PG (ref 26.8–34.3)
MCHC RBC AUTO-ENTMCNC: 35 G/DL (ref 31.4–37.4)
MCV RBC AUTO: 85 FL (ref 82–98)
MONOCYTES # BLD AUTO: 0.4 THOUSAND/ΜL (ref 0.17–1.22)
MONOCYTES NFR BLD AUTO: 7 % (ref 4–12)
NEUTROPHILS # BLD AUTO: 3.44 THOUSANDS/ΜL (ref 1.85–7.62)
NEUTS SEG NFR BLD AUTO: 57 % (ref 43–75)
NITRITE UR QL STRIP: NEGATIVE
NITRITE UR QL STRIP: NEGATIVE
NON-SQ EPI CELLS URNS QL MICRO: ABNORMAL /HPF
NON-SQ EPI CELLS URNS QL MICRO: ABNORMAL /HPF
NRBC BLD AUTO-RTO: 0 /100 WBCS
NT-PROBNP SERPL-MCNC: 1434 PG/ML
OTHER STN SPEC: ABNORMAL
P AXIS: 52 DEGREES
PH UR STRIP.AUTO: 5.5 [PH] (ref 4.5–8)
PH UR STRIP.AUTO: 6 [PH] (ref 4.5–8)
PLATELET # BLD AUTO: 239 THOUSANDS/UL (ref 149–390)
PLATELET # BLD AUTO: 263 THOUSANDS/UL (ref 149–390)
PMV BLD AUTO: 10.5 FL (ref 8.9–12.7)
PMV BLD AUTO: 9.7 FL (ref 8.9–12.7)
POTASSIUM SERPL-SCNC: 4.5 MMOL/L (ref 3.5–5.3)
PR INTERVAL: 156 MS
PROT SERPL-MCNC: 6.1 G/DL (ref 6.4–8.2)
PROT UR STRIP-MCNC: >=300 MG/DL
PROT UR STRIP-MCNC: >=300 MG/DL
PROT UR-MCNC: 1508 MG/DL
PROT/CREAT UR: 13 MG/G{CREAT} (ref 0–0.1)
PROTHROMBIN TIME: 11 SECONDS (ref 11.8–14.2)
QRS AXIS: -17 DEGREES
QRSD INTERVAL: 72 MS
QT INTERVAL: 394 MS
QTC INTERVAL: 462 MS
RBC # BLD AUTO: 4.11 MILLION/UL (ref 3.81–5.12)
RBC #/AREA URNS AUTO: ABNORMAL /HPF
RBC #/AREA URNS AUTO: ABNORMAL /HPF
SODIUM SERPL-SCNC: 137 MMOL/L (ref 136–145)
SP GR UR STRIP.AUTO: 1.02 (ref 1–1.03)
SP GR UR STRIP.AUTO: >=1.03 (ref 1–1.03)
T WAVE AXIS: 14 DEGREES
TROPONIN I SERPL-MCNC: 0.02 NG/ML
TSH SERPL DL<=0.05 MIU/L-ACNC: 5.9 UIU/ML (ref 0.36–3.74)
UROBILINOGEN UR QL STRIP.AUTO: 0.2 E.U./DL
UROBILINOGEN UR QL STRIP.AUTO: 0.2 E.U./DL
VENTRICULAR RATE: 83 BPM
WBC # BLD AUTO: 5.87 THOUSAND/UL (ref 4.31–10.16)
WBC #/AREA URNS AUTO: ABNORMAL /HPF
WBC #/AREA URNS AUTO: ABNORMAL /HPF

## 2018-11-22 PROCEDURE — 83880 ASSAY OF NATRIURETIC PEPTIDE: CPT | Performed by: EMERGENCY MEDICINE

## 2018-11-22 PROCEDURE — 93010 ELECTROCARDIOGRAM REPORT: CPT | Performed by: INTERNAL MEDICINE

## 2018-11-22 PROCEDURE — 85025 COMPLETE CBC W/AUTO DIFF WBC: CPT | Performed by: EMERGENCY MEDICINE

## 2018-11-22 PROCEDURE — 86255 FLUORESCENT ANTIBODY SCREEN: CPT | Performed by: HOSPITALIST

## 2018-11-22 PROCEDURE — 84165 PROTEIN E-PHORESIS SERUM: CPT | Performed by: PATHOLOGY

## 2018-11-22 PROCEDURE — 84166 PROTEIN E-PHORESIS/URINE/CSF: CPT | Performed by: HOSPITALIST

## 2018-11-22 PROCEDURE — 87086 URINE CULTURE/COLONY COUNT: CPT | Performed by: INTERNAL MEDICINE

## 2018-11-22 PROCEDURE — 99223 1ST HOSP IP/OBS HIGH 75: CPT | Performed by: HOSPITALIST

## 2018-11-22 PROCEDURE — 84156 ASSAY OF PROTEIN URINE: CPT | Performed by: HOSPITALIST

## 2018-11-22 PROCEDURE — 96376 TX/PRO/DX INJ SAME DRUG ADON: CPT

## 2018-11-22 PROCEDURE — 87147 CULTURE TYPE IMMUNOLOGIC: CPT | Performed by: INTERNAL MEDICINE

## 2018-11-22 PROCEDURE — 85730 THROMBOPLASTIN TIME PARTIAL: CPT | Performed by: EMERGENCY MEDICINE

## 2018-11-22 PROCEDURE — 86038 ANTINUCLEAR ANTIBODIES: CPT | Performed by: HOSPITALIST

## 2018-11-22 PROCEDURE — 82570 ASSAY OF URINE CREATININE: CPT | Performed by: HOSPITALIST

## 2018-11-22 PROCEDURE — 82948 REAGENT STRIP/BLOOD GLUCOSE: CPT

## 2018-11-22 PROCEDURE — 86162 COMPLEMENT TOTAL (CH50): CPT | Performed by: HOSPITALIST

## 2018-11-22 PROCEDURE — 70450 CT HEAD/BRAIN W/O DYE: CPT

## 2018-11-22 PROCEDURE — 85610 PROTHROMBIN TIME: CPT | Performed by: EMERGENCY MEDICINE

## 2018-11-22 PROCEDURE — 81001 URINALYSIS AUTO W/SCOPE: CPT | Performed by: HOSPITALIST

## 2018-11-22 PROCEDURE — 83690 ASSAY OF LIPASE: CPT | Performed by: EMERGENCY MEDICINE

## 2018-11-22 PROCEDURE — 71046 X-RAY EXAM CHEST 2 VIEWS: CPT

## 2018-11-22 PROCEDURE — 86160 COMPLEMENT ANTIGEN: CPT | Performed by: HOSPITALIST

## 2018-11-22 PROCEDURE — 36415 COLL VENOUS BLD VENIPUNCTURE: CPT | Performed by: EMERGENCY MEDICINE

## 2018-11-22 PROCEDURE — 86225 DNA ANTIBODY NATIVE: CPT | Performed by: HOSPITALIST

## 2018-11-22 PROCEDURE — 93005 ELECTROCARDIOGRAM TRACING: CPT

## 2018-11-22 PROCEDURE — 85049 AUTOMATED PLATELET COUNT: CPT | Performed by: HOSPITALIST

## 2018-11-22 PROCEDURE — 84165 PROTEIN E-PHORESIS SERUM: CPT | Performed by: HOSPITALIST

## 2018-11-22 PROCEDURE — 96375 TX/PRO/DX INJ NEW DRUG ADDON: CPT

## 2018-11-22 PROCEDURE — 80053 COMPREHEN METABOLIC PANEL: CPT | Performed by: EMERGENCY MEDICINE

## 2018-11-22 PROCEDURE — 81001 URINALYSIS AUTO W/SCOPE: CPT

## 2018-11-22 PROCEDURE — 84484 ASSAY OF TROPONIN QUANT: CPT | Performed by: EMERGENCY MEDICINE

## 2018-11-22 PROCEDURE — 84166 PROTEIN E-PHORESIS/URINE/CSF: CPT | Performed by: PATHOLOGY

## 2018-11-22 PROCEDURE — 84443 ASSAY THYROID STIM HORMONE: CPT | Performed by: HOSPITALIST

## 2018-11-22 PROCEDURE — 96374 THER/PROPH/DIAG INJ IV PUSH: CPT

## 2018-11-22 PROCEDURE — 99285 EMERGENCY DEPT VISIT HI MDM: CPT

## 2018-11-22 RX ORDER — AMLODIPINE BESYLATE 5 MG/1
5 TABLET ORAL DAILY
COMMUNITY
End: 2018-11-28 | Stop reason: HOSPADM

## 2018-11-22 RX ORDER — FLUOXETINE 20 MG/1
20 TABLET, FILM COATED ORAL DAILY
COMMUNITY
End: 2019-06-26 | Stop reason: HOSPADM

## 2018-11-22 RX ORDER — HYDRALAZINE HYDROCHLORIDE 20 MG/ML
10 INJECTION INTRAMUSCULAR; INTRAVENOUS EVERY 6 HOURS PRN
Status: DISCONTINUED | OUTPATIENT
Start: 2018-11-22 | End: 2018-11-24

## 2018-11-22 RX ORDER — INSULIN GLARGINE 100 [IU]/ML
40 INJECTION, SOLUTION SUBCUTANEOUS
Status: DISCONTINUED | OUTPATIENT
Start: 2018-11-22 | End: 2018-11-23

## 2018-11-22 RX ORDER — LISINOPRIL AND HYDROCHLOROTHIAZIDE 20; 12.5 MG/1; MG/1
1 TABLET ORAL 2 TIMES DAILY
COMMUNITY
End: 2018-11-28 | Stop reason: HOSPADM

## 2018-11-22 RX ORDER — ATORVASTATIN CALCIUM 40 MG/1
80 TABLET, FILM COATED ORAL EVERY EVENING
Status: DISCONTINUED | OUTPATIENT
Start: 2018-11-22 | End: 2018-11-28 | Stop reason: HOSPADM

## 2018-11-22 RX ORDER — LABETALOL HYDROCHLORIDE 5 MG/ML
10 INJECTION, SOLUTION INTRAVENOUS ONCE
Status: COMPLETED | OUTPATIENT
Start: 2018-11-22 | End: 2018-11-22

## 2018-11-22 RX ORDER — FENOFIBRATE 145 MG/1
145 TABLET, COATED ORAL DAILY
Status: DISCONTINUED | OUTPATIENT
Start: 2018-11-22 | End: 2018-11-23

## 2018-11-22 RX ORDER — HEPARIN SODIUM 5000 [USP'U]/ML
5000 INJECTION, SOLUTION INTRAVENOUS; SUBCUTANEOUS EVERY 8 HOURS SCHEDULED
Status: DISCONTINUED | OUTPATIENT
Start: 2018-11-22 | End: 2018-11-28 | Stop reason: HOSPADM

## 2018-11-22 RX ORDER — ONDANSETRON 2 MG/ML
4 INJECTION INTRAMUSCULAR; INTRAVENOUS EVERY 6 HOURS PRN
Status: DISCONTINUED | OUTPATIENT
Start: 2018-11-22 | End: 2018-11-28 | Stop reason: HOSPADM

## 2018-11-22 RX ORDER — ACETAMINOPHEN 325 MG/1
650 TABLET ORAL EVERY 6 HOURS PRN
Status: DISCONTINUED | OUTPATIENT
Start: 2018-11-22 | End: 2018-11-28 | Stop reason: HOSPADM

## 2018-11-22 RX ORDER — FEXOFENADINE HCL 180 MG/1
180 TABLET ORAL DAILY
COMMUNITY

## 2018-11-22 RX ORDER — INSULIN GLARGINE 100 [IU]/ML
45 INJECTION, SOLUTION SUBCUTANEOUS
COMMUNITY
End: 2019-06-26 | Stop reason: HOSPADM

## 2018-11-22 RX ORDER — BUTALBITAL, ACETAMINOPHEN AND CAFFEINE 50; 325; 40 MG/1; MG/1; MG/1
1 TABLET ORAL EVERY 4 HOURS PRN
Status: DISCONTINUED | OUTPATIENT
Start: 2018-11-22 | End: 2018-11-28 | Stop reason: HOSPADM

## 2018-11-22 RX ORDER — FUROSEMIDE 10 MG/ML
20 INJECTION INTRAMUSCULAR; INTRAVENOUS ONCE
Status: COMPLETED | OUTPATIENT
Start: 2018-11-22 | End: 2018-11-22

## 2018-11-22 RX ORDER — HYDRALAZINE HYDROCHLORIDE 25 MG/1
25 TABLET, FILM COATED ORAL EVERY 8 HOURS SCHEDULED
Status: DISCONTINUED | OUTPATIENT
Start: 2018-11-22 | End: 2018-11-24

## 2018-11-22 RX ADMIN — INSULIN LISPRO 10 UNITS: 100 INJECTION, SOLUTION INTRAVENOUS; SUBCUTANEOUS at 23:34

## 2018-11-22 RX ADMIN — LABETALOL 20 MG/4 ML (5 MG/ML) INTRAVENOUS SYRINGE 10 MG: at 11:48

## 2018-11-22 RX ADMIN — SODIUM CHLORIDE 10 MG/HR: 0.9 INJECTION, SOLUTION INTRAVENOUS at 20:53

## 2018-11-22 RX ADMIN — FUROSEMIDE 20 MG: 10 INJECTION, SOLUTION INTRAMUSCULAR; INTRAVENOUS at 12:44

## 2018-11-22 RX ADMIN — HYDRALAZINE HYDROCHLORIDE 25 MG: 25 TABLET ORAL at 23:27

## 2018-11-22 RX ADMIN — INSULIN LISPRO 6 UNITS: 100 INJECTION, SOLUTION INTRAVENOUS; SUBCUTANEOUS at 16:52

## 2018-11-22 RX ADMIN — ACETAMINOPHEN 650 MG: 325 TABLET ORAL at 23:27

## 2018-11-22 RX ADMIN — ACETAMINOPHEN 650 MG: 325 TABLET ORAL at 15:19

## 2018-11-22 RX ADMIN — LABETALOL 20 MG/4 ML (5 MG/ML) INTRAVENOUS SYRINGE 10 MG: at 12:48

## 2018-11-22 RX ADMIN — HEPARIN SODIUM 5000 UNITS: 5000 INJECTION, SOLUTION INTRAVENOUS; SUBCUTANEOUS at 23:29

## 2018-11-22 RX ADMIN — SODIUM CHLORIDE 2.5 MG/HR: 0.9 INJECTION, SOLUTION INTRAVENOUS at 14:43

## 2018-11-22 RX ADMIN — INSULIN GLARGINE 40 UNITS: 100 INJECTION, SOLUTION SUBCUTANEOUS at 23:31

## 2018-11-22 RX ADMIN — ATORVASTATIN CALCIUM 80 MG: 40 TABLET, FILM COATED ORAL at 17:45

## 2018-11-22 RX ADMIN — SODIUM CHLORIDE 15 MG/HR: 0.9 INJECTION, SOLUTION INTRAVENOUS at 18:12

## 2018-11-22 RX ADMIN — INSULIN LISPRO 10 UNITS: 100 INJECTION, SOLUTION INTRAVENOUS; SUBCUTANEOUS at 16:52

## 2018-11-22 NOTE — ASSESSMENT & PLAN NOTE
· Suspect this is related to severely elevated BPs  · Monitor improvement with better BP control   · APAP as needed

## 2018-11-22 NOTE — H&P
H&P- Vanessa Siemens 1965, 46 y o  female MRN: [id number]    Unit/Bed#:  Encounter: [id number]    Primary Care Provider: Chrissie Siemens, MD   Date and time admitted to hospital: 11/22/2018 10:42 AM    * Hypertensive emergency   Assessment & Plan    · POA e/b CHRISTIANA with proteinuria,  · /110 on arrival  · Admit SD2  · Started on nicardipine  · Goal SBP 190s; will continue further titration over next 24-48hours   · Rest of plan below       CHRISTIANA (acute kidney injury) ([facility] )   Assessment & Plan    · Cr 1 3 from 0 9 in September  · UA with 3+protein, moderate blood, hyaline casts  · Case discussed with nephrology; appreciate input  · Repeat UA with microscopy    · Check US kidneys and bladder  · KENYATTA, ANCA, antiDSDNA, C3/C4, complement total  · SPEP/UPEP  · Pr/cr ratio   · Follow BMP      Headache   Assessment & Plan    · Suspect this is related to severely elevated BPs  · Monitor improvement with better BP control   · APAP as needed      Cough   Assessment & Plan    Chronic for at least 1 month   May be due to ACEi intolerance  CXR with some crowding of the vessels; no obvious interstitial edema  Received lasix x1 in ED for BP and ?volume overload   Will hold medication for now given nicardipine gtt       Type 2 diabetes mellitus with hyperglycemia, with long-term current use of insulin ([place] [id number] )   Assessment & Plan    Lab Results   Component Value Date    HGBA1C 8 9 (H) 10/04/2017       No results for input(s): POCGLU in the last 72 hours  Blood Sugar Average: Last 72 hrs:  poorly controlled; may be due to lack of insurance   Will resume lantus and novolog similar to previous admission  Ultimately may need to switch to 70/30 if cost of meds remains an issue   ISS and accuchecks        VTE Prophylaxis: Heparin  / sequential compression device   Code Status: FULL   POLST: POLST form is not discussed and not completed at this time      Anticipated Length of Stay:  Patient will be admitted on an Inpatient basis with an anticipated length of stay of  > 2 midnights  Justification for Hospital Stay: hypertensive emergency     Total Time for Visit, including Counseling / Coordination of Care: 1 hour  Greater than 50% of this total time spent on direct patient counseling and coordination of care  Chief Complaint:   Headache, cough     History of Present Illness:    Karmen Dawson is a 46 y o  female history of poorly controlled hypertension, diabetes who presents with several complaints including headache  Portions of the history provided by patient's daughter as patient is primarily [place]-speaking  For the last few days patient has had poor urine output with difficulty urinating  She also noticed blood in her urine and the urine has a lot of foam   During this time, pt also complained of severe headache  No visual changes  Over the last month, pt's daughter has noticed increased facial swelling as well as swelling in her mothers hands and feet  Pt was seen in [facility] ED yesterday with same complaints  Had CT abd to assess for kidney stone which was negative so patient was discharged  BP at that time were 163/121  Pt also complaints of chronic cough; non productive  +nausea and diarrhea  No chest pain currently  Denies SOB  Review of Systems:    Review of Systems   Constitutional: Negative for chills and fever  HENT: Positive for facial swelling  Respiratory: Positive for cough  Negative for chest tightness and wheezing  Cardiovascular: Positive for leg swelling  Negative for chest pain and palpitations  Gastrointestinal: Positive for diarrhea and nausea  Genitourinary: Positive for decreased urine volume, difficulty urinating, flank pain and hematuria  Musculoskeletal: Positive for back pain  Neurological: Positive for weakness and headaches  Negative for dizziness, syncope, light-headedness and numbness  All other systems reviewed and are negative        Past Medical and Surgical History:     Past Medical History:   Diagnosis Date    Diabetes mellitus ([facility] )     Hyperlipidemia     Hypertension        Past Surgical History:   Procedure Laterality Date    CHOLECYSTECTOMY      HYSTERECTOMY         Meds/Allergies:    Prior to Admission medications    Medication Sig Start Date End Date Taking?  Authorizing Provider   acetaminophen (TYLENOL) 500 MG chewable tablet Chew 1 tablet every 6 (six) hours as needed for mild pain 11/15/17  Yes Jonatan Sullivan MD   amLODIPine (NORVASC) 5 mg tablet Take 5 mg by mouth daily   Yes Historical Provider, MD   atorvastatin (LIPITOR) 40 mg tablet Take 2 tablets (80 mg total) by mouth every evening 9/24/18  Yes Debbie Ybarra MD   cholecalciferol (VITAMIN D3) 1,000 units tablet Take 1 tablet by mouth daily 10/9/17  Yes Oliver Garcia MD   fenofibrate (TRICOR) 145 mg tablet Take 1 tablet (145 mg total) by mouth daily 9/24/18  Yes Debbie Ybarra MD   fexofenadine (ALLEGRA) 180 MG tablet Take 180 mg by mouth daily   Yes Historical Provider, MD   FLUoxetine (PROzac) 20 MG tablet Take 20 mg by mouth daily   Yes Historical Provider, MD   gabapentin (NEURONTIN) 300 mg capsule Take 1 capsule (300 mg total) by mouth daily at bedtime  Patient taking differently: Take 600 mg by mouth 3 (three) times a day   9/24/18  Yes Debbie Ybarra MD   hydrALAZINE (APRESOLINE) 25 mg tablet Take 1 tablet (25 mg total) by mouth 3 (three) times a day 9/24/18  Yes Debbie Ybarra MD   insulin aspart (NovoLOG) 100 units/mL injection Inject 15 Units under the skin 3 (three) times a day before meals     Yes Historical Provider, MD   insulin glargine (LANTUS) 100 units/mL subcutaneous injection Inject 45 Units under the skin daily at bedtime     Yes Historical Provider, MD   lisinopril-hydrochlorothiazide (PRINZIDE,ZESTORETIC) 20-12 5 MG per tablet Take 1 tablet by mouth 2 (two) times a day   Yes Historical Provider, MD   metoprolol tartrate (LOPRESSOR) 50 mg tablet Take 1 tablet (50 mg total) by mouth every 12 (twelve) hours 9/24/18  Yes Alexis Bhandari MD   topiramate (TOPAMAX) 50 MG tablet Take 1 tablet (50 mg total) by mouth daily at bedtime  Patient taking differently: Take 50 mg by mouth daily at bedtime   9/24/18  Yes Alexis Bhandari MD   clonazePAM (KlonoPIN) 0 5 mg tablet Take 1 tablet (0 5 mg total) by mouth daily at bedtime as needed for anxiety 5/14/18 11/22/18  Justin Kebede MD   escitalopram (LEXAPRO) 10 mg tablet Take 1 tablet (10 mg total) by mouth daily 9/24/18 11/22/18  Alexis Bhandari MD   hydrochlorothiazide (HYDRODIURIL) 25 mg tablet Take 1 tablet (25 mg total) by mouth daily 9/24/18 11/22/18  Alxeis Bhandari MD   insulin aspart protamine-insulin aspart (NovoLOG 70/30) 100 units/mL injection Inject 45 Units under the skin daily before breakfast and 35 units before dinner 9/24/18 11/22/18  Alexis Bhandari MD   lisinopril (ZESTRIL) 20 mg tablet Take 2 tablets (40 mg total) by mouth daily  Patient taking differently: Take 20 mg by mouth 2 (two) times a day   9/24/18 11/22/18  Alexis Bhandari MD     I have reviewed home medications with patient family member  Allergies: Allergies   Allergen Reactions    Lisinopril Swelling and Cough       Social History:     Marital Status:    Occupation:   Patient Pre-hospital Living Situation: home with daughter  Patient Pre-hospital Level of Mobility: ambulates without assistance    Patient Pre-hospital Diet Restrictions: diabetic   Substance Use History:   History   Alcohol Use No     History   Smoking Status    Never Smoker   Smokeless Tobacco    Never Used     History   Drug Use No       Family History:    History reviewed  No pertinent family history      Physical Exam:     Vitals:   Blood Pressure: (!) 195/88 (11/22/18 1542)  Pulse: 79 (11/22/18 1515)  Temperature: 98 8 °F (37 1 °C) (11/22/18 1540)  Temp Source: Oral (11/22/18 1540)  Respirations: 16 (11/22/18 1515)  Height: 5' 2" (157 5 cm) (11/22/18 1504)  Weight - Scale: 98 1 kg (216 lb 4 3 oz) (11/22/18 1504)  SpO2: 94 % (11/22/18 1515)    Physical Exam   Constitutional: She is oriented to person, place, and time  No distress  HENT:   Head: Normocephalic and atraumatic  Mouth/Throat: No oropharyngeal exudate  Cardiovascular: Normal rate and regular rhythm  No murmur heard  Pulmonary/Chest: Effort normal and breath sounds normal  No respiratory distress  She has no wheezes  She has no rales  Abdominal: Soft  Bowel sounds are normal  She exhibits no distension  There is tenderness (diffuse, but overall mild )  There is no rebound  Musculoskeletal: She exhibits edema (1+ ankles b/l )  Neurological: She is alert and oriented to person, place, and time  Skin: Skin is warm and dry  No rash noted  She is not diaphoretic  No erythema  Psychiatric: She has a normal mood and affect  Her behavior is normal  Thought content normal    Nursing note and vitals reviewed  Additional Data:     Lab Results: I have personally reviewed pertinent reports  Results from last 7 days  Lab Units 11/22/18  1149   WBC Thousand/uL 5 87   HEMOGLOBIN g/dL 12 2   HEMATOCRIT % 34 9   PLATELETS Thousands/uL 263   NEUTROS PCT % 57   LYMPHS PCT % 32   MONOS PCT % 7   EOS PCT % 2       Results from last 7 days  Lab Units 11/22/18  1149   POTASSIUM mmol/L 4 5   CHLORIDE mmol/L 106   CO2 mmol/L 21   BUN mg/dL 19   CREATININE mg/dL 1 30   CALCIUM mg/dL 8 5   ALK PHOS U/L 126*   ALT U/L 34   AST U/L 48*       Results from last 7 days  Lab Units 11/22/18  1149   INR  0 81*       Imaging: I have personally reviewed pertinent reports  Xr Chest 2 Views    Result Date: 11/22/2018  Narrative: CHEST INDICATION:   cough  COMPARISON:  9/21/2018  EXAM PERFORMED/VIEWS:  XR CHEST PA & LATERAL FINDINGS: Cardiomediastinal silhouette appears unremarkable  Lung markings are crowded secondary to low lung volumes    Within limitations of this examination there is no focal airspace opacity to suggest pneumonia  No pneumothorax or pleural effusion  Osseous structures appear within normal limits for patient age  Impression: No active pulmonary disease on examination which is somewhat limited secondary to low lung volumes  Workstation performed: [id number]     Ct Head Without Contrast    Result Date: 11/22/2018  Narrative: CT BRAIN - WITHOUT CONTRAST INDICATION:   HTN, HA  COMPARISON:  September 21, 2018 TECHNIQUE:  CT examination of the brain was performed  In addition to axial images, coronal 2D reformatted images were created and submitted for interpretation  Radiation dose length product (DLP) for this visit:  966 mGy-cm   This examination, like all CT scans performed in the [facility], was performed utilizing techniques to minimize radiation dose exposure, including the use of iterative reconstruction and automated exposure control  IMAGE QUALITY:  Diagnostic  FINDINGS: PARENCHYMA:  No intracranial mass, mass effect or midline shift  No CT signs of acute infarction  No acute parenchymal hemorrhage  VENTRICLES AND EXTRA-AXIAL SPACES:  Normal for the patient's age  VISUALIZED ORBITS AND PARANASAL SINUSES:  No acute abnormality involving the orbits  Mild scattered sinus mucosal thickening is noted  No fluid levels are seen  CALVARIUM AND EXTRACRANIAL SOFT TISSUES:  Normal      Impression: No acute intracranial abnormality  Workstation performed: [id number]     EKG, Pathology, and Other Studies Reviewed on Admission:   · EKG: NSR 83 with LVH     Allscripts / Epic Records Reviewed: Yes     ** Please Note: This note has been constructed using a voice recognition system   **

## 2018-11-22 NOTE — ED PROVIDER NOTES
History  Chief Complaint   Patient presents with    Cough     pts daughter reports cough for 1 month and seen at [facility] the other day because of cough, N/V/D  told she had blood in her urine but did not do any further testing per daughter  78-year-old female, predominantly Occitan-speaking, presents with her daughter for evaluation of a constellation of symptoms  Patient has been experiencing severe headache described as pounding  She has been having a persistent cough for over 5 weeks  She has tried over-the-counter medications without relief  Patient has been having nausea, vomiting, and frequent loose watery nonbloody diarrhea  Patient is diabetic and sugars are poorly controlled  She recently switched PCPs and her blood pressure medications were changed  It is unclear of her persistent cough is related to ACE-inhibitor use  Patient's daughter states that she has not had a chest x-ray or a workup for her cough over the past several weeks  Patient has been having intermittent fevers  She has generalized malaise and weakness  Patient's daughter reports that the patient has been making less urine than normal   They tried giving her a herbal supplement to increased urinary output  Patient has also become edematous and patient's daughter reports that her hands feet and face have become swollen          History provided by:  Patient, relative and medical records   used: Yes    Hypertension   Severity:  Severe  Onset quality:  Unable to specify  Timing:  Unable to specify  Progression:  Worsening  Chronicity:  Chronic  Context: medication change and OTC medication used    Relieved by:  Ca channel blockers and ACE inhibitors  Worsened by:  Nothing  Associated symptoms: abdominal pain, fever, nausea, peripheral edema and vomiting    Associated symptoms: no syncope    Risk factors: diabetes and prior stroke        Prior to Admission Medications   Prescriptions Last Dose Informant Patient Reported? Taking?    FLUoxetine (PROzac) 20 MG tablet   Yes Yes   Sig: Take 20 mg by mouth daily   acetaminophen (TYLENOL) 500 MG chewable tablet   No Yes   Sig: Chew 1 tablet every 6 (six) hours as needed for mild pain   amLODIPine (NORVASC) 5 mg tablet   Yes Yes   Sig: Take 5 mg by mouth daily   atorvastatin (LIPITOR) 40 mg tablet   No Yes   Sig: Take 2 tablets (80 mg total) by mouth every evening   cholecalciferol (VITAMIN D3) 1,000 units tablet   No Yes   Sig: Take 1 tablet by mouth daily   fenofibrate (TRICOR) 145 mg tablet   No Yes   Sig: Take 1 tablet (145 mg total) by mouth daily   fexofenadine (ALLEGRA) 180 MG tablet   Yes Yes   Sig: Take 180 mg by mouth daily   gabapentin (NEURONTIN) 300 mg capsule   No Yes   Sig: Take 1 capsule (300 mg total) by mouth daily at bedtime   Patient taking differently: Take 600 mg by mouth 3 (three) times a day     hydrALAZINE (APRESOLINE) 25 mg tablet   No Yes   Sig: Take 1 tablet (25 mg total) by mouth 3 (three) times a day   insulin aspart (NovoLOG) 100 units/mL injection   Yes Yes   Sig: Inject 15 Units under the skin 3 (three) times a day before meals     insulin glargine (LANTUS) 100 units/mL subcutaneous injection   Yes Yes   Sig: Inject 45 Units under the skin daily at bedtime     lisinopril-hydrochlorothiazide (PRINZIDE,ZESTORETIC) 20-12 5 MG per tablet   Yes Yes   Sig: Take 1 tablet by mouth 2 (two) times a day   metoprolol tartrate (LOPRESSOR) 50 mg tablet   No Yes   Sig: Take 1 tablet (50 mg total) by mouth every 12 (twelve) hours   topiramate (TOPAMAX) 50 MG tablet   No Yes   Sig: Take 1 tablet (50 mg total) by mouth daily at bedtime   Patient taking differently: Take 50 mg by mouth daily at bedtime        Facility-Administered Medications: None       Past Medical History:   Diagnosis Date    Diabetes mellitus ([address] )     Hyperlipidemia     Hypertension        Past Surgical History:   Procedure Laterality Date    CHOLECYSTECTOMY      HYSTERECTOMY         History reviewed  No pertinent family history  I have reviewed and agree with the history as documented  Social History   Substance Use Topics    Smoking status: Never Smoker    Smokeless tobacco: Never Used    Alcohol use No        Review of Systems   Constitutional: Positive for fever  Cardiovascular: Positive for leg swelling  Negative for syncope  Gastrointestinal: Positive for abdominal pain, nausea and vomiting  Genitourinary: Negative for dysuria  Neurological: Negative for tremors  Physical Exam  Physical Exam   Constitutional: She is oriented to person, place, and time  She appears well-developed and well-nourished  HENT:   Head: Normocephalic  Nose: Nose normal    Mouth/Throat: Oropharynx is clear and moist  No oropharyngeal exudate  Eyes: Pupils are equal, round, and reactive to light  Conjunctivae and EOM are normal    Fundoscopic exam:       The right eye shows no papilledema  The left eye shows no papilledema  Neck: Normal range of motion  Neck supple  Cardiovascular: Normal rate, regular rhythm, normal heart sounds and intact distal pulses  Pulmonary/Chest: Effort normal  She has decreased breath sounds in the right lower field and the left lower field  Dry frequent cough   Abdominal: Soft  She exhibits no distension  Bowel sounds are increased  There is tenderness  There is no rebound and no guarding  Musculoskeletal: Normal range of motion  She exhibits edema  She exhibits no tenderness or deformity  Lymphadenopathy:     She has no cervical adenopathy  Neurological: She is alert and oriented to person, place, and time  She has normal strength and normal reflexes  No cranial nerve deficit or sensory deficit  She exhibits normal muscle tone  Coordination and gait normal    Skin: Skin is warm, dry and intact  No rash noted  Psychiatric: She has a normal mood and affect   Her behavior is normal  Judgment and thought content normal    Nursing note and vitals reviewed        Vital Signs  ED Triage Vitals   Temperature Pulse Respirations Blood Pressure SpO2   11/22/18 1109 11/22/18 1046 11/22/18 1046 11/22/18 1046 11/22/18 1046   98 5 °F (36 9 °C) 89 20 (!) 262/122 96 %      Temp Source Heart Rate Source Patient Position - Orthostatic VS BP Location FiO2 (%)   11/22/18 1046 11/22/18 1046 11/22/18 1150 11/22/18 1150 --   Oral Monitor Lying Right arm       Pain Score       11/22/18 1046       Worst Possible Pain           Vitals:    11/23/18 0900 11/23/18 1000 11/23/18 1100 11/23/18 1509   BP:  163/77 157/74 147/73   Pulse: 103 (!) 108 91 75   Patient Position - Orthostatic VS:   Sitting Lying       Visual Acuity      ED Medications  Medications   niCARdipine (CARDENE) 25 mg (STANDARD CONCENTRATION) in sodium chloride 0 9% 250 mL (5 mg/hr Intravenous New Bag 11/23/18 1523)   atorvastatin (LIPITOR) tablet 80 mg (80 mg Oral Given 11/22/18 1745)   butalbital-acetaminophen-caffeine (FIORICET,ESGIC) -40 mg per tablet 1 tablet (1 tablet Oral Given 11/23/18 1417)   ondansetron (ZOFRAN) injection 4 mg (not administered)   acetaminophen (TYLENOL) tablet 650 mg (650 mg Oral Given 11/23/18 0550)   insulin lispro (HumaLOG) 100 units/mL subcutaneous injection 2-12 Units (10 Units Subcutaneous Given 11/23/18 1115)   insulin lispro (HumaLOG) 100 units/mL subcutaneous injection 2-12 Units (10 Units Subcutaneous Given 11/22/18 2334)   heparin (porcine) subcutaneous injection 5,000 Units (5,000 Units Subcutaneous Given 11/23/18 1415)   hydrALAZINE (APRESOLINE) tablet 25 mg (25 mg Oral Given 11/23/18 1416)   hydrALAZINE (APRESOLINE) injection 10 mg (10 mg Intravenous Not Given 11/23/18 0342)   insulin glargine (LANTUS) subcutaneous injection 50 Units 0 5 mL (not administered)   insulin lispro (HumaLOG) 100 units/mL subcutaneous injection 12 Units (12 Units Subcutaneous Given 11/23/18 1419)   calcium carbonate (TUMS) chewable tablet 500 mg (not administered)   labetalol (NORMODYNE) tablet 200 mg (200 mg Oral Given 11/23/18 1415)   famotidine (PEPCID) tablet 20 mg (20 mg Oral Given 11/23/18 1423)   labetalol (NORMODYNE) injection 10 mg (10 mg Intravenous Given 11/22/18 1148)   furosemide (LASIX) injection 20 mg (20 mg Intravenous Given 11/22/18 1244)   labetalol (NORMODYNE) injection 10 mg (10 mg Intravenous Given 11/22/18 1248)       Diagnostic Studies  Results Reviewed     Procedure Component Value Units Date/Time    Protein electrophoresis, serum [[id number]]  (Abnormal) Collected:  11/22/18 1420    Lab Status:  Final result Specimen:  Blood from Arm, Right Updated:  11/23/18 1349     A/G Ratio 1 28     Albumin Electrophoresis 56 2 %      Albumin CONC 2 81 (L) g/dl      Alpha 1 5 4 (H) %      ALPHA 1 CONC 0 27 g/dL      Alpha 2 15 2 (H) %      ALPHA 2 CONC 0 76 g/dL      Beta-1 6 4 %      BETA 1 CONC 0 32 (L) g/dL      Beta-2 6 6 %      BETA 2 CONC 0 33 g/dL      Gamma Globulin 10 2 (L) %      GAMMA CONC 0 51 g/dL      SPEP Interpretation The serum total protein and albumin are decreased  The serum protein electrophoresis shows a decreased beta-1 region  No monoclonal bands noted   Reviewed by: Alexx Mason DO **Electronic Signature**     Total Protein 5 0 (L) g/dL     Comprehensive metabolic panel [[id number]]  (Abnormal) Collected:  11/23/18 0439    Lab Status:  Final result Specimen:  Blood from Arm, Right Updated:  11/23/18 0518     Sodium 137 mmol/L      Potassium 3 6 mmol/L      Chloride 105 mmol/L      CO2 20 (L) mmol/L      ANION GAP 12 mmol/L      BUN 26 (H) mg/dL      Creatinine 2 08 (H) mg/dL      Glucose 290 (H) mg/dL      Calcium 8 5 mg/dL      AST 27 U/L      ALT 33 U/L      Alkaline Phosphatase 103 U/L      Total Protein 6 1 (L) g/dL      Albumin 2 3 (L) g/dL      Total Bilirubin 0 30 mg/dL      eGFR 26 ml/min/1 73sq m     Narrative:         National Kidney Disease Education Program recommendations are as follows:  GFR calculation is accurate only with a steady state creatinine  Chronic Kidney disease less than 60 ml/min/1 73 sq  meters  Kidney failure less than 15 ml/min/1 73 sq  meters  Urinalysis with microscopic [[id number]]  (Abnormal) Collected:  11/22/18 1940    Lab Status:  Final result Specimen:  Urine from Urine, Clean Catch Updated:  11/22/18 2035     Clarity, UA Clear     Color, UA Yellow     Specific Gravity, UA >=1 030     pH, UA 5 5     Glucose,  (1/2%) (A) mg/dl      Ketones, UA Negative mg/dl      Blood, UA Moderate (A)     Protein, UA >=300 (A) mg/dl      Nitrite, UA Negative     Bilirubin, UA Negative     Urobilinogen, UA 0 2 E U /dl      Leukocytes, UA Negative     WBC, UA 10-20 (A) /hpf      RBC, UA 4-10 (A) /hpf      Hyaline Casts, UA 4-10 (A) /lpf      Bacteria, UA Innumerable (A) /hpf      Fine granular casts 3-4 /lpf      COARSE GRANULAR CASTS 10-25 /lpf      OTHER OBSERVATIONS WBCs Clumped  Renal Epithelial Cells Present     Epithelial Cells Moderate (A) /hpf     Protein / creatinine ratio, urine [[id number]]  (Abnormal) Collected:  11/22/18 1413    Lab Status:  Final result Specimen:  Urine Updated:  11/22/18 1545     Creatinine, Ur 116 0 mg/dL      Protein Urine Random 1,508 mg/dL      Prot/Creat Ratio, Ur 13 00 (H)    C3 complement [[id number]]  (Normal) Collected:  11/22/18 1420    Lab Status:  Final result Specimen:  Blood from Arm, Right Updated:  11/22/18 1537     C3 Complement 112 0 mg/dL     C4 complement [[id number]]  (Normal) Collected:  11/22/18 1420    Lab Status:  Final result Specimen:  Blood from Arm, Right Updated:  11/22/18 1537     C4, COMPLEMENT 30 0 mg/dL     TSH, 3rd generation [[id number]]  (Abnormal) Collected:  11/22/18 1420    Lab Status:  Final result Specimen:  Blood from Arm, Right Updated:  11/22/18 1455     TSH 3RD GENERATON 5 899 (H) uIU/mL     Narrative:         Patients undergoing fluorescein dye angiography may retain small amounts of fluorescein in the body for 48-72 hours post procedure   Samples containing fluorescein can produce falsely depressed TSH values  If the patient had this procedure,a specimen should be resubmitted post fluorescein clearance  The recommended reference ranges for TSH during pregnancy are as follows:  First trimester 0 1 to 2 5 uIU/mL  Second trimester  0 2 to 3 0 uIU/mL  Third trimester 0 3 to 3 0 uIU/m      Complement, total [[id number]] Collected:  11/22/18 1420    Lab Status: In process Specimen:  Blood from Arm, Right Updated:  11/22/18 1428    Anti-neutrophilic cytoplasmic antibody [[id number]] Collected:  11/22/18 1420    Lab Status: In process Specimen:  Blood from Arm, Right Updated:  11/22/18 1428    KENYATTA Screen w/ Reflex to Titer/Pattern [[id number]] Collected:  11/22/18 1420    Lab Status: In process Specimen:  Blood from Arm, Right Updated:  11/22/18 1428    Protein electrophoresis, urine [[id number]] Collected:  11/22/18 1413    Lab Status:   In process Specimen:  Urine Updated:  11/22/18 1413    B-type natriuretic peptide [[id number]]  (Abnormal) Collected:  11/22/18 1244    Lab Status:  Final result Specimen:  Blood from Arm, Right Updated:  11/22/18 1317     NT-proBNP 1,434 (H) pg/mL     Comprehensive metabolic panel [[id number]]  (Abnormal) Collected:  11/22/18 1149    Lab Status:  Final result Specimen:  Blood from Arm, Right Updated:  11/22/18 1221     Sodium 137 mmol/L      Potassium 4 5 mmol/L      Chloride 106 mmol/L      CO2 21 mmol/L      ANION GAP 10 mmol/L      BUN 19 mg/dL      Creatinine 1 30 mg/dL      Glucose 356 (H) mg/dL      Calcium 8 5 mg/dL      AST 48 (H) U/L      ALT 34 U/L      Alkaline Phosphatase 126 (H) U/L      Total Protein 6 1 (L) g/dL      Albumin 2 1 (L) g/dL      Total Bilirubin 0 40 mg/dL      eGFR 47 ml/min/1 73sq m     Narrative:         National Kidney Disease Education Program recommendations are as follows:  GFR calculation is accurate only with a steady state creatinine  Chronic Kidney disease less than 60 ml/min/1 73 sq  meters  Kidney failure less than 15 ml/min/1 73 sq  meters      Lipase [[id number]]  (Normal) Collected:  11/22/18 1149    Lab Status:  Final result Specimen:  Blood from Arm, Right Updated:  11/22/18 1219     Lipase 262 u/L     Troponin I [[id number]]  (Normal) Collected:  11/22/18 1149    Lab Status:  Final result Specimen:  Blood from Arm, Right Updated:  11/22/18 1219     Troponin I 0 02 ng/mL     Urine Microscopic [[id number]]  (Abnormal) Collected:  11/22/18 1154    Lab Status:  Final result Specimen:  Urine from Urine, Clean Catch Updated:  11/22/18 1218     RBC, UA None Seen /hpf      WBC, UA 4-10 (A) /hpf      Epithelial Cells Occasional /hpf      Bacteria, UA Occasional /hpf      Hyaline Casts, UA 4-10 (A) /lpf     Protime-INR [[id number]]  (Abnormal) Collected:  11/22/18 1149    Lab Status:  Final result Specimen:  Blood from Arm, Right Updated:  11/22/18 1214     Protime 11 0 (L) seconds      INR 0 81 (L)    APTT [[id number]]  (Normal) Collected:  11/22/18 1149    Lab Status:  Final result Specimen:  Blood from Arm, Right Updated:  11/22/18 1214     PTT 27 seconds     CBC and differential [[id number]] Collected:  11/22/18 1149    Lab Status:  Final result Specimen:  Blood from Arm, Right Updated:  11/22/18 1201     WBC 5 87 Thousand/uL      RBC 4 11 Million/uL      Hemoglobin 12 2 g/dL      Hematocrit 34 9 %      MCV 85 fL      MCH 29 7 pg      MCHC 35 0 g/dL      RDW 12 3 %      MPV 10 5 fL      Platelets 004 Thousands/uL      nRBC 0 /100 WBCs      Neutrophils Relative 57 %      Immat GRANS % 1 %      Lymphocytes Relative 32 %      Monocytes Relative 7 %      Eosinophils Relative 2 %      Basophils Relative 1 %      Neutrophils Absolute 3 44 Thousands/µL      Immature Grans Absolute 0 03 Thousand/uL      Lymphocytes Absolute 1 86 Thousands/µL      Monocytes Absolute 0 40 Thousand/µL      Eosinophils Absolute 0 10 Thousand/µL      Basophils Absolute 0 04 Thousands/µL     ED Urine Macroscopic [[id number]]  (Abnormal) Collected: 11/22/18 1154    Lab Status:  Final result Specimen:  Urine Updated:  11/22/18 1158     Color, UA Yellow     Clarity, UA Clear     pH, UA 6 0     Leukocytes, UA Negative     Nitrite, UA Negative     Protein, UA >=300 (A) mg/dl      Glucose,  (1/2%) (A) mg/dl      Ketones, UA Negative mg/dl      Urobilinogen, UA 0 2 E U /dl      Bilirubin, UA Negative     Blood, UA Moderate (A)     Specific [place], UA 1 025    Narrative:       CLINITEK RESULT                 US kidney and bladder   Final Result by Maximiliano Munoz MD (11/23 1110)      No hydronephrosis  Stable left renal simple cyst       Incidentally noted small ascites  Workstation performed: [id number]         XR chest 2 views   Final Result by Maximiliano Munoz MD (11/22 124)      No active pulmonary disease on examination which is somewhat limited secondary to low lung volumes  Workstation performed: [id number]         CT head without contrast   Final Result by Jayant Samuels DO (11/22 1227)      No acute intracranial abnormality                    Workstation performed: [id number]                    Procedures  ECG 12 Lead Documentation  Date/Time: 11/22/2018 1:41 PM  Performed by: Ryann Rob  Authorized by: FAN BLANCHARD     Indications / Diagnosis:  Hypertension, palpitations  ECG reviewed by me, the ED Provider: yes    Patient location:  ED  Previous ECG:     Previous ECG:  Compared to current    Comparison ECG info:  September 2018    Similarity:  No change  Interpretation:     Interpretation: abnormal    Rate:     ECG rate:  Eighty-three    ECG rate assessment: normal    Rhythm:     Rhythm: sinus rhythm    Ectopy:     Ectopy: none    QRS:     QRS axis:  Normal  Conduction:     Conduction: normal    Other findings:     Other findings: LVH             Phone Contacts  ED Phone Contact    ED Course                               MDM  Number of Diagnoses or Management Options  Acute hyperglycemia: new and requires workup  Acute kidney injury [facility]): new and requires workup  Hypertensive urgency: new and requires workup  Persistent cough for 3 weeks or longer: new and requires workup     Amount and/or Complexity of Data Reviewed  Clinical lab tests: ordered and reviewed  Tests in the radiology section of CPT®: ordered and reviewed  Tests in the medicine section of CPT®: ordered and reviewed  Decide to obtain previous medical records or to obtain history from someone other than the patient: yes  Discuss the patient with other providers: yes  Independent visualization of images, tracings, or specimens: yes    Patient Progress  Patient progress: stable    The patient presented with a condition in which there was a high probability of imminent or life-threatening deterioration, and critical care services (excluding separately billable procedures) totalled 30-74 minutes  Disposition  Final diagnoses:   Hypertensive urgency   Acute hyperglycemia   Acute kidney injury ([facility] [address] )   Persistent cough for 3 weeks or longer     Time reflects when diagnosis was documented in both MDM as applicable and the Disposition within this note     Time User Action Codes Description Comment    11/22/2018  1:05 PM Shoshana Giordano Add [I16 0] Hypertensive urgency     11/22/2018  1:05 PM Shoshana Giordano Add [R73 9] Acute hyperglycemia     11/22/2018  1:06 PM Shoshana Giordano Add [N17 9] Acute kidney injury ([facility] [address] )     11/22/2018  1:06 PM Shoshana Giordano Add [R05] Persistent cough for 3 weeks or longer     11/22/2018  3:45 PM Dharmesh Annabella Add [I16 1] Hypertensive emergency     11/22/2018  3:45 PM Dharmesh Annabella Add [N17 9] CHRISTIANA (acute kidney injury) [facility])       ED Disposition     ED Disposition Condition Comment    Admit  Case was discussed with Dr Taylor Mccullough and the patient's admission status was agreed to be Admission Status: inpatient status to the service of Dr Taylor cMcullough           Follow-up Information    None         Current Discharge Medication List      CONTINUE these medications which have NOT CHANGED    Details   acetaminophen (TYLENOL) 500 MG chewable tablet Chew 1 tablet every 6 (six) hours as needed for mild pain  Qty: 30 tablet, Refills: 0      amLODIPine (NORVASC) 5 mg tablet Take 5 mg by mouth daily      atorvastatin (LIPITOR) 40 mg tablet Take 2 tablets (80 mg total) by mouth every evening  Qty: 60 tablet, Refills: 0    Associated Diagnoses: Hyperlipidemia, unspecified hyperlipidemia type      cholecalciferol (VITAMIN D3) 1,000 units tablet Take 1 tablet by mouth daily  Refills: 0      fenofibrate (TRICOR) 145 mg tablet Take 1 tablet (145 mg total) by mouth daily  Qty: 30 tablet, Refills: 0    Associated Diagnoses: Hyperlipidemia, unspecified hyperlipidemia type      fexofenadine (ALLEGRA) 180 MG tablet Take 180 mg by mouth daily      FLUoxetine (PROzac) 20 MG tablet Take 20 mg by mouth daily      gabapentin (NEURONTIN) 300 mg capsule Take 1 capsule (300 mg total) by mouth daily at bedtime  Qty: 30 capsule, Refills: 0    Associated Diagnoses: Sleep disorder      hydrALAZINE (APRESOLINE) 25 mg tablet Take 1 tablet (25 mg total) by mouth 3 (three) times a day  Qty: 90 tablet, Refills: 0    Associated Diagnoses: Hypertension      insulin aspart (NovoLOG) 100 units/mL injection Inject 15 Units under the skin 3 (three) times a day before meals        insulin glargine (LANTUS) 100 units/mL subcutaneous injection Inject 45 Units under the skin daily at bedtime        lisinopril-hydrochlorothiazide (PRINZIDE,ZESTORETIC) 20-12 5 MG per tablet Take 1 tablet by mouth 2 (two) times a day      metoprolol tartrate (LOPRESSOR) 50 mg tablet Take 1 tablet (50 mg total) by mouth every 12 (twelve) hours  Qty: 60 tablet, Refills: 0    Associated Diagnoses: Hypertension      topiramate (TOPAMAX) 50 MG tablet Take 1 tablet (50 mg total) by mouth daily at bedtime  Qty: 30 tablet, Refills: 0    Associated Diagnoses: Migraine           No discharge procedures on file      ED Provider  Electronically Signed by Shoshana Giordano,   11/23/18 1523

## 2018-11-22 NOTE — ASSESSMENT & PLAN NOTE
Chronic for at least 1 month   May be due to ACEi intolerance  CXR with some crowding of the vessels; no obvious interstitial edema  Received lasix x1 in ED for BP and ?volume overload   Will hold medication for now given nicardipine gtt

## 2018-11-22 NOTE — ASSESSMENT & PLAN NOTE
· Cr 1 3 from 0 9 in September  · UA with 3+protein, moderate blood, hyaline casts  · Case discussed with nephrology; appreciate input  · Repeat UA with microscopy    · Check US kidneys and bladder  · KENYATTA, ANCA, antiDSDNA, C3/C4, complement total  · SPEP/UPEP  · Pr/cr ratio   · Follow BMP

## 2018-11-22 NOTE — ASSESSMENT & PLAN NOTE
Lab Results   Component Value Date    HGBA1C 8 9 (H) 10/04/2017       No results for input(s): POCGLU in the last 72 hours      Blood Sugar Average: Last 72 hrs:  poorly controlled; may be due to lack of insurance   Will resume lantus and novolog similar to previous admission  Ultimately may need to switch to 70/30 if cost of meds remains an issue   ISS and accuchecks

## 2018-11-22 NOTE — ASSESSMENT & PLAN NOTE
· POA e/b CHRISTIANA with proteinuria,  · /110 on arrival  · Admit SD2  · Started on nicardipine  · Goal SBP 190s; will continue further titration over next 24-48hours   · Rest of plan below

## 2018-11-22 NOTE — PLAN OF CARE
Problem: Potential for Falls  Goal: Patient will remain free of falls  INTERVENTIONS:  - Assess patient frequently for physical needs  -  Identify cognitive and physical deficits and behaviors that affect risk of falls    -  [place] fall precautions as indicated by assessment   - Educate patient/family on patient safety including physical limitations  - Instruct patient to call for assistance with activity based on assessment  - Modify environment to reduce risk of injury  - Consider OT/PT consult to assist with strengthening/mobility   Outcome: Progressing      Problem: PAIN - ADULT  Goal: Verbalizes/displays adequate comfort level or baseline comfort level  Interventions:  - Encourage patient to monitor pain and request assistance  - Assess pain using appropriate pain scale  - Administer analgesics based on type and severity of pain and evaluate response  - Implement non-pharmacological measures as appropriate and evaluate response  - Consider cultural and social influences on pain and pain management  - Notify physician/advanced practitioner if interventions unsuccessful or patient reports new pain  Outcome: Progressing      Problem: INFECTION - ADULT  Goal: Absence or prevention of progression during hospitalization  INTERVENTIONS:  - Assess and monitor for signs and symptoms of infection  - Monitor lab/diagnostic results  - Monitor all insertion sites, i e  indwelling lines, tubes, and drains  - Monitor endotracheal (as able) and nasal secretions for changes in amount and color  - [place] appropriate cooling/warming therapies per order  - Administer medications as ordered  - Instruct and encourage patient and family to use good hand hygiene technique  - Identify and instruct in appropriate isolation precautions for identified infection/condition  Outcome: Progressing    Goal: Absence of fever/infection during neutropenic period  INTERVENTIONS:  - Monitor WBC  - Implement neutropenic guidelines  Outcome: Progressing      Problem: SAFETY ADULT  Goal: Maintain or return to baseline ADL function  INTERVENTIONS:  -  Assess patient's ability to carry out ADLs; assess patient's baseline for ADL function and identify physical deficits which impact ability to perform ADLs (bathing, care of mouth/teeth, toileting, grooming, dressing, etc )  - Assess/evaluate cause of self-care deficits   - Assess range of motion  - Assess patient's mobility; develop plan if impaired  - Assess patient's need for assistive devices and provide as appropriate  - Encourage maximum independence but intervene and supervise when necessary  ¯ Involve family in performance of ADLs  ¯ Assess for home care needs following discharge   ¯ Request OT consult to assist with ADL evaluation and planning for discharge  ¯ Provide patient education as appropriate  Outcome: Progressing    Goal: Maintain or return mobility status to optimal level  INTERVENTIONS:  - Assess patient's baseline mobility status (ambulation, transfers, stairs, etc )    - Identify cognitive and physical deficits and behaviors that affect mobility  - Identify mobility aids required to assist with transfers and/or ambulation (gait belt, sit-to-stand, lift, walker, cane, etc )  - [place] fall precautions as indicated by assessment  - Record patient progress and toleration of activity level on Mobility SBAR; progress patient to next Phase/Stage  - Instruct patient to call for assistance with activity based on assessment  - Request Rehabilitation consult to assist with strengthening/weightbearing, etc   Outcome: Progressing      Problem: DISCHARGE PLANNING  Goal: Discharge to home or other facility with appropriate resources  INTERVENTIONS:  - Identify barriers to discharge w/patient and caregiver  - Arrange for needed discharge resources and transportation as appropriate  - Identify discharge learning needs (meds, wound care, etc )  - Arrange for interpretive services to assist at discharge as needed  - Refer to Case Management Department for coordinating discharge planning if the patient needs post-hospital services based on physician/advanced practitioner order or complex needs related to functional status, cognitive ability, or social support system  Outcome: Progressing      Problem: Knowledge Deficit  Goal: Patient/family/caregiver demonstrates understanding of disease process, treatment plan, medications, and discharge instructions  Complete learning assessment and assess knowledge base    Interventions:  - Provide teaching at level of understanding  - Provide teaching via preferred learning methods  Outcome: Progressing

## 2018-11-23 ENCOUNTER — APPOINTMENT (INPATIENT)
Dept: ULTRASOUND IMAGING | Facility: HOSPITAL | Age: 53
DRG: 469 | End: 2018-11-23
Payer: COMMERCIAL

## 2018-11-23 LAB
ALBUMIN SERPL BCP-MCNC: 2.3 G/DL (ref 3.5–5)
ALBUMIN SERPL ELPH-MCNC: 2.81 G/DL (ref 3.5–5)
ALBUMIN SERPL ELPH-MCNC: 56.2 % (ref 52–65)
ALP SERPL-CCNC: 103 U/L (ref 46–116)
ALPHA1 GLOB SERPL ELPH-MCNC: 0.27 G/DL (ref 0.1–0.4)
ALPHA1 GLOB SERPL ELPH-MCNC: 5.4 % (ref 2.5–5)
ALPHA2 GLOB SERPL ELPH-MCNC: 0.76 G/DL (ref 0.4–1.2)
ALPHA2 GLOB SERPL ELPH-MCNC: 15.2 % (ref 7–13)
ALT SERPL W P-5'-P-CCNC: 33 U/L (ref 12–78)
ANION GAP SERPL CALCULATED.3IONS-SCNC: 12 MMOL/L (ref 4–13)
AST SERPL W P-5'-P-CCNC: 27 U/L (ref 5–45)
BETA GLOB ABNORMAL SERPL ELPH-MCNC: 0.32 G/DL (ref 0.4–0.8)
BETA1 GLOB SERPL ELPH-MCNC: 6.4 % (ref 5–13)
BETA2 GLOB SERPL ELPH-MCNC: 6.6 % (ref 2–8)
BETA2+GAMMA GLOB SERPL ELPH-MCNC: 0.33 G/DL (ref 0.2–0.5)
BILIRUB SERPL-MCNC: 0.3 MG/DL (ref 0.2–1)
BUN SERPL-MCNC: 26 MG/DL (ref 5–25)
CALCIUM SERPL-MCNC: 8.5 MG/DL (ref 8.3–10.1)
CHLORIDE SERPL-SCNC: 105 MMOL/L (ref 100–108)
CO2 SERPL-SCNC: 20 MMOL/L (ref 21–32)
CREAT SERPL-MCNC: 2.08 MG/DL (ref 0.6–1.3)
CREAT UR-MCNC: 282 MG/DL
GAMMA GLOB ABNORMAL SERPL ELPH-MCNC: 0.51 G/DL (ref 0.5–1.6)
GAMMA GLOB SERPL ELPH-MCNC: 10.2 % (ref 12–22)
GFR SERPL CREATININE-BSD FRML MDRD: 26 ML/MIN/1.73SQ M
GLUCOSE SERPL-MCNC: 165 MG/DL (ref 65–140)
GLUCOSE SERPL-MCNC: 173 MG/DL (ref 65–140)
GLUCOSE SERPL-MCNC: 264 MG/DL (ref 65–140)
GLUCOSE SERPL-MCNC: 290 MG/DL (ref 65–140)
GLUCOSE SERPL-MCNC: 355 MG/DL (ref 65–140)
IGG/ALB SER: 1.28 {RATIO} (ref 1.1–1.8)
POTASSIUM SERPL-SCNC: 3.6 MMOL/L (ref 3.5–5.3)
PROT PATTERN SERPL ELPH-IMP: ABNORMAL
PROT SERPL-MCNC: 5 G/DL (ref 6.4–8.2)
PROT SERPL-MCNC: 6.1 G/DL (ref 6.4–8.2)
SODIUM 24H UR-SCNC: 17 MOL/L
SODIUM SERPL-SCNC: 137 MMOL/L (ref 136–145)
UUN 24H UR-MCNC: 451 MG/DL

## 2018-11-23 PROCEDURE — 82948 REAGENT STRIP/BLOOD GLUCOSE: CPT

## 2018-11-23 PROCEDURE — 76770 US EXAM ABDO BACK WALL COMP: CPT

## 2018-11-23 PROCEDURE — 82570 ASSAY OF URINE CREATININE: CPT | Performed by: NURSE PRACTITIONER

## 2018-11-23 PROCEDURE — 99254 IP/OBS CNSLTJ NEW/EST MOD 60: CPT | Performed by: INTERNAL MEDICINE

## 2018-11-23 PROCEDURE — 99232 SBSQ HOSP IP/OBS MODERATE 35: CPT | Performed by: HOSPITALIST

## 2018-11-23 PROCEDURE — 84300 ASSAY OF URINE SODIUM: CPT | Performed by: NURSE PRACTITIONER

## 2018-11-23 PROCEDURE — 84540 ASSAY OF URINE/UREA-N: CPT | Performed by: NURSE PRACTITIONER

## 2018-11-23 PROCEDURE — 80053 COMPREHEN METABOLIC PANEL: CPT | Performed by: HOSPITALIST

## 2018-11-23 RX ORDER — FAMOTIDINE 20 MG/1
20 TABLET, FILM COATED ORAL DAILY
Status: DISCONTINUED | OUTPATIENT
Start: 2018-11-23 | End: 2018-11-28 | Stop reason: HOSPADM

## 2018-11-23 RX ORDER — CALCIUM CARBONATE 200(500)MG
500 TABLET,CHEWABLE ORAL 3 TIMES DAILY PRN
Status: DISCONTINUED | OUTPATIENT
Start: 2018-11-23 | End: 2018-11-28 | Stop reason: HOSPADM

## 2018-11-23 RX ORDER — INSULIN GLARGINE 100 [IU]/ML
50 INJECTION, SOLUTION SUBCUTANEOUS
Status: DISCONTINUED | OUTPATIENT
Start: 2018-11-23 | End: 2018-11-28 | Stop reason: HOSPADM

## 2018-11-23 RX ORDER — FAMOTIDINE 20 MG/1
20 TABLET, FILM COATED ORAL 2 TIMES DAILY
Status: DISCONTINUED | OUTPATIENT
Start: 2018-11-23 | End: 2018-11-23

## 2018-11-23 RX ORDER — LABETALOL 200 MG/1
200 TABLET, FILM COATED ORAL EVERY 12 HOURS SCHEDULED
Status: DISCONTINUED | OUTPATIENT
Start: 2018-11-23 | End: 2018-11-24

## 2018-11-23 RX ORDER — BENZONATATE 100 MG/1
200 CAPSULE ORAL 3 TIMES DAILY PRN
Status: DISCONTINUED | OUTPATIENT
Start: 2018-11-23 | End: 2018-11-28

## 2018-11-23 RX ADMIN — ATORVASTATIN CALCIUM 80 MG: 40 TABLET, FILM COATED ORAL at 17:03

## 2018-11-23 RX ADMIN — LABETALOL HYDROCHLORIDE 200 MG: 200 TABLET, FILM COATED ORAL at 20:40

## 2018-11-23 RX ADMIN — SODIUM CHLORIDE 7.5 MG/HR: 0.9 INJECTION, SOLUTION INTRAVENOUS at 04:21

## 2018-11-23 RX ADMIN — SODIUM CHLORIDE 5 MG/HR: 0.9 INJECTION, SOLUTION INTRAVENOUS at 22:08

## 2018-11-23 RX ADMIN — INSULIN LISPRO 2 UNITS: 100 INJECTION, SOLUTION INTRAVENOUS; SUBCUTANEOUS at 16:44

## 2018-11-23 RX ADMIN — HEPARIN SODIUM 5000 UNITS: 5000 INJECTION, SOLUTION INTRAVENOUS; SUBCUTANEOUS at 21:19

## 2018-11-23 RX ADMIN — HEPARIN SODIUM 5000 UNITS: 5000 INJECTION, SOLUTION INTRAVENOUS; SUBCUTANEOUS at 05:46

## 2018-11-23 RX ADMIN — SODIUM CHLORIDE 5 MG/HR: 0.9 INJECTION, SOLUTION INTRAVENOUS at 09:56

## 2018-11-23 RX ADMIN — FAMOTIDINE 20 MG: 20 TABLET ORAL at 14:23

## 2018-11-23 RX ADMIN — BUTALBITAL, ACETAMINOPHEN AND CAFFEINE 1 TABLET: 50; 325; 40 TABLET ORAL at 03:41

## 2018-11-23 RX ADMIN — HYDRALAZINE HYDROCHLORIDE 25 MG: 25 TABLET ORAL at 14:16

## 2018-11-23 RX ADMIN — INSULIN GLARGINE 50 UNITS: 100 INJECTION, SOLUTION SUBCUTANEOUS at 21:19

## 2018-11-23 RX ADMIN — INSULIN LISPRO 10 UNITS: 100 INJECTION, SOLUTION INTRAVENOUS; SUBCUTANEOUS at 11:15

## 2018-11-23 RX ADMIN — HEPARIN SODIUM 5000 UNITS: 5000 INJECTION, SOLUTION INTRAVENOUS; SUBCUTANEOUS at 14:15

## 2018-11-23 RX ADMIN — HYDRALAZINE HYDROCHLORIDE 25 MG: 25 TABLET ORAL at 21:01

## 2018-11-23 RX ADMIN — ACETAMINOPHEN 650 MG: 325 TABLET ORAL at 05:50

## 2018-11-23 RX ADMIN — INSULIN LISPRO 6 UNITS: 100 INJECTION, SOLUTION INTRAVENOUS; SUBCUTANEOUS at 07:40

## 2018-11-23 RX ADMIN — BENZONATATE 200 MG: 100 CAPSULE ORAL at 18:39

## 2018-11-23 RX ADMIN — INSULIN LISPRO 2 UNITS: 100 INJECTION, SOLUTION INTRAVENOUS; SUBCUTANEOUS at 21:20

## 2018-11-23 RX ADMIN — HYDRALAZINE HYDROCHLORIDE 25 MG: 25 TABLET ORAL at 05:46

## 2018-11-23 RX ADMIN — BUTALBITAL, ACETAMINOPHEN AND CAFFEINE 1 TABLET: 50; 325; 40 TABLET ORAL at 14:17

## 2018-11-23 RX ADMIN — SODIUM CHLORIDE 5 MG/HR: 0.9 INJECTION, SOLUTION INTRAVENOUS at 15:23

## 2018-11-23 RX ADMIN — LABETALOL HYDROCHLORIDE 200 MG: 200 TABLET, FILM COATED ORAL at 14:15

## 2018-11-23 RX ADMIN — ANTACID TABLETS 500 MG: 500 TABLET, CHEWABLE ORAL at 16:43

## 2018-11-23 NOTE — PLAN OF CARE
Problem: Potential for Falls  Goal: Patient will remain free of falls  INTERVENTIONS:  - Assess patient frequently for physical needs  -  Identify cognitive and physical deficits and behaviors that affect risk of falls    -  [place] fall precautions as indicated by assessment   - Educate patient/family on patient safety including physical limitations  - Instruct patient to call for assistance with activity based on assessment  - Modify environment to reduce risk of injury  - Consider OT/PT consult to assist with strengthening/mobility   Outcome: Progressing      Problem: PAIN - ADULT  Goal: Verbalizes/displays adequate comfort level or baseline comfort level  Interventions:  - Encourage patient to monitor pain and request assistance  - Assess pain using appropriate pain scale  - Administer analgesics based on type and severity of pain and evaluate response  - Implement non-pharmacological measures as appropriate and evaluate response  - Consider cultural and social influences on pain and pain management  - Notify physician/advanced practitioner if interventions unsuccessful or patient reports new pain   Outcome: Progressing      Problem: INFECTION - ADULT  Goal: Absence or prevention of progression during hospitalization  INTERVENTIONS:  - Assess and monitor for signs and symptoms of infection  - Monitor lab/diagnostic results  - Monitor all insertion sites, i e  indwelling lines, tubes, and drains  - Monitor endotracheal (as able) and nasal secretions for changes in amount and color  - [place] appropriate cooling/warming therapies per order  - Administer medications as ordered  - Instruct and encourage patient and family to use good hand hygiene technique  - Identify and instruct in appropriate isolation precautions for identified infection/condition   Outcome: Progressing    Goal: Absence of fever/infection during neutropenic period  INTERVENTIONS:  - Monitor WBC  - Implement neutropenic guidelines   Outcome: Progressing      Problem: SAFETY ADULT  Goal: Maintain or return to baseline ADL function  INTERVENTIONS:  -  Assess patient's ability to carry out ADLs; assess patient's baseline for ADL function and identify physical deficits which impact ability to perform ADLs (bathing, care of mouth/teeth, toileting, grooming, dressing, etc )  - Assess/evaluate cause of self-care deficits   - Assess range of motion  - Assess patient's mobility; develop plan if impaired  - Assess patient's need for assistive devices and provide as appropriate  - Encourage maximum independence but intervene and supervise when necessary  ¯ Involve family in performance of ADLs  ¯ Assess for home care needs following discharge   ¯ Request OT consult to assist with ADL evaluation and planning for discharge  ¯ Provide patient education as appropriate   Outcome: Progressing    Goal: Maintain or return mobility status to optimal level  INTERVENTIONS:  - Assess patient's baseline mobility status (ambulation, transfers, stairs, etc )    - Identify cognitive and physical deficits and behaviors that affect mobility  - Identify mobility aids required to assist with transfers and/or ambulation (gait belt, sit-to-stand, lift, walker, cane, etc )  - [place] fall precautions as indicated by assessment  - Record patient progress and toleration of activity level on Mobility SBAR; progress patient to next Phase/Stage  - Instruct patient to call for assistance with activity based on assessment  - Request Rehabilitation consult to assist with strengthening/weightbearing, etc    Outcome: Progressing      Problem: DISCHARGE PLANNING  Goal: Discharge to home or other facility with appropriate resources  INTERVENTIONS:  - Identify barriers to discharge w/patient and caregiver  - Arrange for needed discharge resources and transportation as appropriate  - Identify discharge learning needs (meds, wound care, etc )  - Arrange for interpretive services to assist at discharge as needed  - Refer to Case Management Department for coordinating discharge planning if the patient needs post-hospital services based on physician/advanced practitioner order or complex needs related to functional status, cognitive ability, or social support system   Outcome: Progressing      Problem: Knowledge Deficit  Goal: Patient/family/caregiver demonstrates understanding of disease process, treatment plan, medications, and discharge instructions  Complete learning assessment and assess knowledge base    Interventions:  - Provide teaching at level of understanding  - Provide teaching via preferred learning methods   Outcome: Progressing

## 2018-11-23 NOTE — ASSESSMENT & PLAN NOTE
· Cr 1 3 from 0 9 in September  · UA with 3+protein, moderate blood, hyaline casts  · Case discussed with nephrology; appreciate input  · Repeat UA with microscopy    · US kidneys and bladder: pending read   · KENYATTA, ANCA, antiDSDNA,  ·  C3/C4: wnl   · SPEP/UPEP  · Pr/cr ratio: 13   · Follow BMP

## 2018-11-23 NOTE — ASSESSMENT & PLAN NOTE
Chronic for at least 1 month   May be due to ACEi intolerance  CXR with some crowding of the vessels; no obvious interstitial edema  Will hold medication for now given nicardipine gtt

## 2018-11-23 NOTE — PROGRESS NOTES
Progress Note - Marj Reyes 1965, 46 y o  female MRN: [id number]    Unit/Bed#:  Encounter: [id number]    Primary Care Provider: Marj Reyes MD   Date and time admitted to hospital: 11/22/2018 10:42 AM    * Hypertensive emergency   Assessment & Plan    · POA e/b CHRISTIANA with proteinuria,  · /110 on arrival  · Started on nicardipine  · Goal SBP 190s; will continue further titration over next 24-48hours   · Improved overnight, but probably some overcorrection with BPs in 120s   · Rest of plan below       CHRISTIANA (acute kidney injury) ([facility] [address] )   Assessment & Plan    · Cr 1 3 from 0 9 in September  · UA with 3+protein, moderate blood, hyaline casts  · Case discussed with nephrology; appreciate input  · Repeat UA with microscopy    · US kidneys and bladder: pending read   · KENYATTA, ANCA, antiDSDNA,  ·  C3/C4: wnl   · SPEP/UPEP  · Pr/cr ratio: 13   · Follow BMP      Headache   Assessment & Plan    · Suspect this is related to severely elevated BPs  · Monitor improvement with better BP control   · APAP as needed      Cough   Assessment & Plan    Chronic for at least 1 month   May be due to ACEi intolerance  CXR with some crowding of the vessels; no obvious interstitial edema  Will hold medication for now given nicardipine gtt       Type 2 diabetes mellitus with hyperglycemia, with long-term current use of insulin [facility])   Assessment & Plan    Lab Results   Component Value Date    HGBA1C 8 9 (H) 10/04/2017       Recent Labs      11/22/18   1623  11/22/18   2052  11/23/18   0709   POCGLU  289*  377*  264*       Blood Sugar Average: Last 72 hrs:  (P) 310poorly controlled;    Will increase lantus and novolog given persistent elevations   Ultimately may need to switch to 70/30 if cost of meds remains an issue   ISS and accuchecks          VTE Pharmacologic Prophylaxis:   Pharmacologic: Heparin  Mechanical VTE Prophylaxis in Place: Yes    Patient Centered Rounds: I have performed bedside rounds with nursing staff today     Discussions with Specialists or Other Care Team Provider: nephrology     Education and Discussions with Family / Patient: patient     Time Spent for Care: 30 minutes  More than 50% of total time spent on counseling and coordination of care as described above  Current Length of Stay: 1 day(s)    Current Patient Status: Inpatient   Certification Statement: The patient will continue to require additional inpatient hospital stay due to nephrotic range proteinuria, CHRISTIANA     Discharge Plan / Estimated Discharge Date: TBD based on clinical course    Code Status: Level 1 - Full Code    Subjective:   Pt is feeling a little better today  Headache is improved  Still has dry cough that tends to exacerbate her HA  Objective:     Vitals:   Temp (24hrs), Av 4 °F (36 9 °C), Min:98 1 °F (36 7 °C), Max:98 8 °F (37 1 °C)    Temp:  [98 1 °F (36 7 °C)-98 8 °F (37 1 °C)] 98 2 °F (36 8 °C)  HR:  [] 91  Resp:  [16-44] 29  BP: (126-262)/() 164/90  SpO2:  [88 %-98 %] 95 %  Body mass index is 39 64 kg/m²  Input and Output Summary (last 24 hours): Intake/Output Summary (Last 24 hours) at 18 1021  Last data filed at 18 8466   Gross per 24 hour   Intake          1618 08 ml   Output              525 ml   Net          1093 08 ml       Physical Exam:     Physical Exam   Constitutional: She is oriented to person, place, and time  No distress  HENT:   Head: Normocephalic and atraumatic  Cardiovascular: Normal rate and regular rhythm  No murmur heard  Pulmonary/Chest: She has no wheezes  She has no rales  Abdominal: Soft  Bowel sounds are normal  She exhibits no distension  There is no tenderness  Musculoskeletal: She exhibits edema  Neurological: She is alert and oriented to person, place, and time  Skin: Skin is warm and dry  No rash noted  She is not diaphoretic  No erythema  No pallor  Psychiatric: She has a normal mood and affect   Her behavior is normal    Nursing note and vitals reviewed  Additional Data:     Labs:      Results from last 7 days  Lab Units 11/22/18  1754 11/22/18  1149   WBC Thousand/uL  --  5 87   HEMOGLOBIN g/dL  --  12 2   HEMATOCRIT %  --  34 9   PLATELETS Thousands/uL 239 263   NEUTROS PCT %  --  57   LYMPHS PCT %  --  32   MONOS PCT %  --  7   EOS PCT %  --  2       Results from last 7 days  Lab Units 11/23/18  0439   POTASSIUM mmol/L 3 6   CHLORIDE mmol/L 105   CO2 mmol/L 20*   BUN mg/dL 26*   CREATININE mg/dL 2 08*   CALCIUM mg/dL 8 5   ALK PHOS U/L 103   ALT U/L 33   AST U/L 27       Results from last 7 days  Lab Units 11/22/18  1149   INR  0 81*       * I Have Reviewed All Lab Data Listed Above  * Additional Pertinent Lab Tests Reviewed: All Labs Within Last 24 Hours Reviewed    Imaging:    Imaging Reports Reviewed Today Include:   Imaging Personally Reviewed by Myself Includes:      Recent Cultures (last 7 days):           Last 24 Hours Medication List:     Current Facility-Administered Medications:  acetaminophen 650 mg Oral Q6H PRN Ashley Kirk MD    atorvastatin 80 mg Oral QPM Ashley Kirk MD    butalbital-acetaminophen-caffeine 1 tablet Oral Q4H PRN Ashley Kirk MD    heparin (porcine) 5,000 Units Subcutaneous [facility] Ashley Kirk MD    hydrALAZINE 10 mg Intravenous Q6H PRN Elsie Joe PA-C    hydrALAZINE 25 mg Oral Q8H [address] Elsie Waddell PA-C    insulin glargine 50 Units Subcutaneous HS Ashley Kirk MD    insulin lispro 12 Units Subcutaneous TID With Meals Ashley Kirk MD    insulin lispro 2-12 Units Subcutaneous TID AC Ashley Kirk MD    insulin lispro 2-12 Units Subcutaneous HS Ashley Kirk MD    niCARdipine 1-15 mg/hr Intravenous Titrated Mack Zapata PA-C Last Rate: 5 mg/hr (11/23/18 0956)   ondansetron 4 mg Intravenous Q6H PRN Ashley Kirk MD         Today, Patient Was Seen By: Ashley Kirk MD    ** Please Note: This note has been constructed using a voice recognition system   **

## 2018-11-23 NOTE — CONSULTS
Inpatient Consultation - Nephrology   [facility] 46 y o  female MRN: [id number]  Unit/Bed#:  Encounter: [id number]    ASSESSMENT and PLAN:  1  Acute kidney injury:  Oliguric; urine output 425 (received 20 mg of Lasix IV yesterday)  Creatinine 1 3 on admission currently 2 08  · Underlying disease likely with long-term proteinuria and baseline creatinine 1-1 3 due to diabetic nephropathy, hypertensive nephrosclerosis, arteriolar nephrosclerosis; ? Analgesic nephropathy  · Concern due to nephrotic range proteinuria, microscopic hematuria  · ATN likely due to presence coarse and fine granular casts  Worsening renal function  There may be a prerenal cause such as intravascular volume depletion from 3rd spacing, chronic NSAID use, ACEi  Also concern for glomerular disease/nephrotic syndrome  Also assess for post renal-await results of renal ultrasound  (ascites on renal ultrasound therefore bladder scan will likely be unrevealing)  · Workup thus far:    · Complements normal, TSH elevated-5 899 (previously normal in September), platelets normal  · In progress:  Anti dsDNA antibody, SPEP, UPEP, ANCA, KENYATTA, complement total  · Obtain light chain ratio, hepatitis panel, anti-GBM  · Obtain urine lytes  · Urinalysis: Specific gravity greater than 1 030, greater than 300 protein, 4-10 RBCs, 10-20 WBCs, innumerable bacteria, 4-10 hyaline casts, 3-4 fine granular casts, 10-25 coarse granular casts  · Recent CT of the abdomen without contrast on 11/19 showed simple cyst left kidney, no stones, no hydronephrosis  · Renal ultrasound pending  · May need renal biopsy  · At this time:  · hold ACE-inhibitor, HCTZ   · Hold TriCor   · prevent hypotension, avoid nephrotoxic agents  · Check urine lytes to guide care  2   Hypertensive urgency:    · On nicardipine infusion, hydralazine 25 mg every 8 hours p o , received 1 dose of Lasix 20 mg IV on 11/22  · Blood pressure improving, currently 160/90  · Recommendations: Likely restart beta-blocker due to tachycardia  Hold ACE-inhibitor  Restart calcium channel blocker  · Goal blood pressure less than 130/80 due to proteinuria but at this juncture will allow a higher blood pressure for renal perfusion  3  Proteinuria:    · Urine protein creatinine ratio 13 g  Albumin level 2 3  · Nephrotic range proteinuria  · Likely due to longstanding, uncontrolled diabetes mellitus  Presence of RBCs in the urine may also indicate a nephritic type injury especially in the presence of uncontrolled hypertension  · Last lipid panel April 2018:  Cholesterol 263, triglycerides 318,   Continue Lipitor, Hold TriCor (history of significant hyperlipidemia:  Triglycerides were as high as 1,232 October 2016)  4  Anemia:  Hemoglobin 10 1 on 11/19  On admission hemoglobin 12 2    5  Low bicarbonate:  Likely due to renal insufficiency  6  Headache:    · Likely related to uncontrolled hypertension  CT of the head negative for acute intracranial abnormality  7  Cough:    · May be related to ACE-inhibitor  Chest x-ray-no active pulmonary disease  Low lung volumes      HISTORY OF PRESENT ILLNESS:  Requesting Physician: Kristi Flowers MD  Reason for Consult:  Acute kidney injury, hypertensive urgency    Luther Umanzor is a 46 y o  female with history of longstanding, uncontrolled diabetes mellitus and hypertension who was admitted to [facility] after presenting with headache  The patient reports greater than 20 year history of diabetes in greater than 25 year history of hypertension  Blood pressure 262/122 on admission  Patient had sought treatment the day before admission at [facility] [place] [place] AT THE [facility] ED with similar complaints  Blood pressure was 163/121 upon discharge  The patient was last seen at [facility] in September  She presented with flu-like symptoms, influenza was negative    She was treated for uncontrolled hypertension and diabetes and was discharged on the 4 $ [facility] medications with plans for follow-up with [facility] physicians  Antihypertensive regime at hospital discharge in September included metoprolol 50 mg b i d , lisinopril 40 mg daily, HCTZ 25 mg daily, hydralazine 25 mg t i d  The patient has poor medical management due to lack of insurance on an ongoing basis  In addition to concerns due to uncontrolled hypertension creatinine was 1 3 on admission and is currently 2 08 with an active urine sediment, fine and coarse granular casts and urine protein creatinine ratio of 13 g  When she was seen at [facility] on 11/19 creatinine was 1 35  I am unable to locate previous quantitation of proteinuria but urinalysis has been previously positive for protein as far back as September 2014  The patient was seen and examined  The patient reports that approximately 2 weeks ago she began having less urine output  She noticed 1 year ago that she began having very bubbly or foamy urine  She also is having burning with urination and noticed blood in her urine  She reports that she has had intermittent fever  She drinks large amounts of water and reported that her blood sugars at home were running between 180 and 250  She was also very concerned did increasing swelling of her body and her face  She began noticing that she was getting "puffy" in September when she was admitted  The swelling never went away  Lately the edema has worsened without significant weight change  She reports that after eating she gets a pain in her left epigastric region  She has been taking Excedrin on a daily basis for headache for approximately 4 weeks  She denies use of ibuprofen, Motrin, Aleve  She also reports that she had a dry, hacking cough for several weeks and notified her doctor  She was put on cough medicine but she does not know what it was called  Her doctor mention that it may be due to 1 of her medicines but she states that  the medicine was not stopped    A renal consultation is requested today for assistance in the management of hypertensive urgency, acute kidney injury  PAST MEDICAL HISTORY:  Past Medical History:   Diagnosis Date    Diabetes mellitus ([address] )     Hyperlipidemia     Hypertension        PAST SURGICAL HISTORY:  Past Surgical History:   Procedure Laterality Date    CHOLECYSTECTOMY      HYSTERECTOMY         ALLERGIES:  Allergies   Allergen Reactions    Lisinopril Swelling and Cough       SOCIAL HISTORY:  History   Alcohol Use No     History   Drug Use No     History   Smoking Status    Never Smoker   Smokeless Tobacco    Never Used       FAMILY HISTORY:  History reviewed  No pertinent family history      MEDICATIONS:    Current Facility-Administered Medications:     acetaminophen (TYLENOL) tablet 650 mg, 650 mg, Oral, Q6H PRN, Nilda Hernandez MD, 650 mg at 11/23/18 0550    atorvastatin (LIPITOR) tablet 80 mg, 80 mg, Oral, QPM, Nilda Hernandez MD, 80 mg at 11/22/18 1745    butalbital-acetaminophen-caffeine (FIORICET,ESGIC) -40 mg per tablet 1 tablet, 1 tablet, Oral, Q4H PRN, Nilda Hernandez MD, 1 tablet at 11/23/18 0341    fenofibrate (TRICOR) tablet 145 mg, 145 mg, Oral, Daily, Nilda Hernandez MD    heparin (porcine) subcutaneous injection 5,000 Units, 5,000 Units, Subcutaneous, Q8H [facility], 5,000 Units at 11/23/18 0546 **AND** Platelet count, , , Once, Nilda Hernandez MD    hydrALAZINE (APRESOLINE) injection 10 mg, 10 mg, Intravenous, Q6H PRN, Emerald Do PA-C    hydrALAZINE (APRESOLINE) tablet 25 mg, 25 mg, Oral, Q8H [facility], Elsie Joe PA-C, 25 mg at 11/23/18 0546    insulin glargine (LANTUS) subcutaneous injection 40 Units 0 4 mL, 40 Units, Subcutaneous, HS, Nilda Hernandez MD, 40 Units at 11/22/18 2331    insulin lispro (HumaLOG) 100 units/mL subcutaneous injection 10 Units, 10 Units, Subcutaneous, TID With Meals, Nilda Hernandez MD, 10 Units at 11/22/18 1652    insulin lispro (HumaLOG) 100 units/mL subcutaneous injection 2-12 Units, 2-12 Units, Subcutaneous, TID AC, 6 Units at 11/23/18 0740 **AND** Fingerstick Glucose (POCT), , , TID AC, Melba Martinez MD    insulin lispro (HumaLOG) 100 units/mL subcutaneous injection 2-12 Units, 2-12 Units, Subcutaneous, HS, Melba Martinez MD, 10 Units at 11/22/18 2334    niCARdipine (CARDENE) 25 mg (STANDARD CONCENTRATION) in sodium chloride 0 9% 250 mL, 1-15 mg/hr, Intravenous, Titrated, Elsie Joe PA-C, Last Rate: 50 mL/hr at 11/23/18 0734, 5 mg/hr at 11/23/18 0734    ondansetron (ZOFRAN) injection 4 mg, 4 mg, Intravenous, Q6H PRN, Melba Martinez MD    REVIEW OF SYSTEMS:  Constitutional:  Positive for fatigue, reports intermittent fever/hot feeling  Eyes: Negative for visual disturbance  Respiratory:  Positive for cough, no shortness of breath  Cardiovascular: Negative for chest pain, palpitations and leg swelling  Gastrointestinal: Negative for abdominal pain, constipation, diarrhea, nausea and vomiting  Genitourinary: No dysuria, hematuria  Endocrine: Negative for polyuria  Musculoskeletal: Negative for arthralgias, back pain and joint swelling  Skin: Negative for rash  Neurological: Negative for focal weakness, headaches, dizziness  Hematological: Negative for easy bruising or bleeding  Psychiatric/Behavioral: Negative for confusion and sleep disturbance  All the systems were reviewed and were negative except as documented on the HPI      PHYSICAL EXAM:  Current Weight: Weight - Scale: 98 3 kg (216 lb 11 4 oz)  First Weight: Weight - Scale: 90 8 kg (200 lb 2 8 oz)  Vitals:    11/23/18 0318 11/23/18 0546 11/23/18 0600 11/23/18 0700   BP: 156/85 126/86 126/86 166/74   BP Location:    Left arm   Pulse:   92 84   Resp:   (!) 28 21   Temp:    98 2 °F (36 8 °C)   TempSrc:    Oral   SpO2:    95%   Weight:   98 3 kg (216 lb 11 4 oz)    Height:           Intake/Output Summary (Last 24 hours) at 11/23/18 0759  Last data filed at 11/23/18 0422   Gross per 24 hour   Intake          1498 08 ml   Output              425 ml Net          1073 08 ml     Physical Exam   Constitutional: She is oriented to person, place, and time  She appears well-developed and well-nourished  No distress  HENT:   Head: Normocephalic and atraumatic  Mouth/Throat: Oropharynx is clear and moist  No oropharyngeal exudate  Eyes: Conjunctivae are normal  Right eye exhibits no discharge  Left eye exhibits no discharge  No scleral icterus  Neck: Neck supple  No JVD present  Cardiovascular: Regular rhythm  Tachycardic, no murmur rub or gallop noted   Pulmonary/Chest: Effort normal and breath sounds normal  No respiratory distress  She has no wheezes  She has no rales  Abdominal: Soft  Bowel sounds are normal  She exhibits distension  There is no tenderness (abdomen rounded and firm)  There is no rebound and no guarding  Musculoskeletal: She exhibits edema (Generalized edema including facial edema, periorbital edema)  Neurological: She is alert and oriented to person, place, and time  Skin: Skin is warm and dry  No rash noted  She is not diaphoretic  No erythema  No pallor  Psychiatric: She has a normal mood and affect   Her behavior is normal  Judgment and thought content normal        Invasive Devices:      Lab Results:     Results from last 7 days  Lab Units 11/23/18  0439 11/22/18  1754 11/22/18  1149   WBC Thousand/uL  --   --  5 87   HEMOGLOBIN g/dL  --   --  12 2   HEMATOCRIT %  --   --  34 9   PLATELETS Thousands/uL  --  239 263   POTASSIUM mmol/L 3 6  --  4 5   CHLORIDE mmol/L 105  --  106   CO2 mmol/L 20*  --  21   BUN mg/dL 26*  --  19   CREATININE mg/dL 2 08*  --  1 30   CALCIUM mg/dL 8 5  --  8 5   ALK PHOS U/L 103  --  126*   ALT U/L 33  --  34   AST U/L 27  --  48*     Other Studies:

## 2018-11-23 NOTE — UTILIZATION REVIEW
Initial Clinical Review    Admission: Date/Time/Statement: 11/22/18 @ 1316     Orders Placed This Encounter   Procedures    Inpatient Admission (expected length of stay for this patient is greater than two midnights)     Standing Status:   Standing     Number of Occurrences:   1     Order Specific Question:   Admitting Physician     Answer:   Geraldine Ray [[id number]]     Order Specific Question:   Level of Care     Answer:   Level 2 Stepdown / HOT [14]     Order Specific Question:   Estimated length of stay     Answer:   More than 2 Midnights     Order Specific Question:   Certification     Answer:   I certify that inpatient services are medically necessary for this patient for a duration of greater than two midnights  See H&P and MD Progress Notes for additional information about the patient's course of treatment  ED: Date/Time/Mode of Arrival:   ED Arrival Information     Expected Arrival Acuity Means of Arrival Escorted By Service Admission Type    - 11/22/2018 10:35 Emergent Walk-In Family Member General Medicine Urgent    Arrival Complaint    fever          Chief Complaint:   Chief Complaint   Patient presents with    Cough     pts daughter reports cough for 1 month and seen at [facility] the other day because of cough, N/V/D  told she had blood in her urine but did not do any further testing per daughter  History of Illness: 46 y o  female history of poorly controlled hypertension, diabetes who presents with several complaints including headache  Portions of the history provided by patient's daughter as patient is primarily French-speaking  For the last few days patient has had poor urine output with difficulty urinating  She also noticed blood in her urine and the urine has a lot of foam   During this time, pt also complained of severe headache       ED Vital Signs:   ED Triage Vitals   Temperature Pulse Respirations Blood Pressure SpO2   11/22/18 1109 11/22/18 1046 11/22/18 1046 11/22/18 1046 11/22/18 1046   98 5 °F (36 9 °C) 89 20 (!) 262/122 96 %      Temp Source Heart Rate Source Patient Position - Orthostatic VS BP Location FiO2 (%)   11/22/18 1046 11/22/18 1046 11/22/18 1150 11/22/18 1150 --   Oral Monitor Lying Right arm       Pain Score       11/22/18 1046       Worst Possible Pain        Wt Readings from Last 1 Encounters:   11/23/18 98 3 kg (216 lb 11 4 oz)       Vital Signs (abnormal):   Date and Time Temp Pulse SpO2 Resp BP   11/22/18 1344 -- 73 98 % 16  224/102   11/22/18 1247 -- 76 97 % 20  237/107   11/22/18 1150 -- 75 95 % 20  238/108     Abnormal Labs/Diagnostic Test Results: NT-pro BNP 1434, Glucose 356, AST 48, Alk Phos 126, 3rd Generation TSH 5 899    CXR: No active pulmonary disease on examination which is somewhat limited secondary to low lung volumes     Protein / creatinine ratio, urine [[id number]] (Abnormal)    11/22/2018   Lab Status: Final result Specimen: Urine    Creatinine, Ur 116 0 mg/dL    Protein Urine Random 1,508 mg/dL    Prot/Creat Ratio, Ur 13 00 (H) 0 00 - 0 10         Urinalysis with microscopic [[id number]] (Abnormal)   Lab Status: Final result    11/22/2018 Specimen: Urine from Urine, Clean Catch    Clarity, UA   Clear    Color, UA  Yellow    Specific Gravity, UA >=1 030 1 003 - 1 030    pH, UA 5 5 4 5 - 8 0    Glucose,  (1/2%) (A) Negative mg/dl    Ketones, UA Negative Negative mg/dl    Blood, UA Moderate (A) Negative    Protein, UA >=300 (A) Negative mg/dl    Nitrite, UA Negative Negative    Bilirubin, UA Negative Negative    Urobilinogen, UA 0 2 0 2, 1 0 E U /dl E U /dl    Leukocytes, UA Negative Negative    WBC, UA 10-20 (A) None Seen, 0-5, 5-55, 5-65 /hpf    RBC, UA 4-10 (A) None Seen, 0-5 /hpf    Hyaline Casts, UA 4-10 (A) (none) /lpf    Bacteria, UA Innumerable (A) None Seen, Occasional /hpf    Fine granular casts 3-4 /lpf    COARSE GRANULAR CASTS 10-25 /lpf    OTHER OBSERVATIONS  WBCs Clumped   Renal Epithelial Cells Present          ED Treatment: Medication Administration from 11/22/2018 1035 to 11/22/2018 1503       Date/Time Order Dose Route Action Action by Comments     11/22/2018 1148 labetalol (NORMODYNE) injection 10 mg 10 mg Intravenous Given Bita Javier RN      11/22/2018 1244 furosemide (LASIX) injection 20 mg 20 mg Intravenous Given Bita Javier RN      11/22/2018 1248 labetalol (NORMODYNE) injection 10 mg 10 mg Intravenous Given Bita Javier RN      11/22/2018 1443 niCARdipine (CARDENE) 25 mg (STANDARD CONCENTRATION) in sodium chloride 0 9% 250 mL 2 5 mg/hr Intravenous New Bag Bita Javier RN           Past Medical/Surgical History:   Diagnosis    Diabetes mellitus ([facility] )    Hyperlipidemia    Hypertension       Admitting Diagnosis: Fever [R50 9]  Acute kidney injury ([facility] ) [N17 9]  Hypertensive urgency [I16 0]  Acute hyperglycemia [R73 9]  Persistent cough for 3 weeks or longer [R05]    Age/Sex: 46 y o  female    Assessment/Plan:     * Hypertensive emergency   Assessment & Plan     · POA e/b CHRISTIANA with proteinuria,  · /110 on arrival  · Admit SD2  · Started on nicardipine  ? Goal SBP 190s; will continue further titration over next 24-48hours   · Rest of plan below        CHRISTIANA (acute kidney injury) ([facility] )   Assessment & Plan     · Cr 1 3 from 0 9 in September  · UA with 3+protein, moderate blood, hyaline casts  · Case discussed with nephrology; appreciate input  ? Repeat UA with microscopy    ? Check US kidneys and bladder  ? KENYATTA, ANCA, antiDSDNA, C3/C4, complement total  ? SPEP/UPEP  ?  Pr/cr ratio   · Follow BMP       Headache   Assessment & Plan     · Suspect this is related to severely elevated BPs  · Monitor improvement with better BP control   · APAP as needed       Cough   Assessment & Plan     Chronic for at least 1 month   May be due to ACEi intolerance  CXR with some crowding of the vessels; no obvious interstitial edema  Received lasix x1 in ED for BP and ?volume overload   Will hold medication for now given nicardipine gtt         Type 2 diabetes mellitus with hyperglycemia, with long-term current use of insulin (HCC)   Assessment & Plan             Lab Results   Component Value Date     HGBA1C 8 9 (H) 10/04/2017         No results for input(s): POCGLU in the last 72 hours      Blood Sugar Average: Last 72 hrs:  poorly controlled; may be due to lack of insurance   Will resume lantus and novolog similar to previous admission  Ultimately may need to switch to 70/30 if cost of meds remains an issue   ISS and accuchecks          VTE Prophylaxis: Heparin  / sequential compression device   Code Status: FULL   POLST: POLST form is not discussed and not completed at this time      Anticipated Length of Stay:  Patient will be admitted on an Inpatient basis with an anticipated length of stay of  > 2 midnights     Justification for Hospital Stay: hypertensive emergency          Admission Orders:  Inpatient/StepDown  Continuous Cardiac Monitoring  Serial Cardiac Enzymes q3h x 3  Consult Renal r/e CHRISTIANA, hypertension emergency   Bilateral Sequential Compression Device   Urine Culture Pending  Urine for Na, Urea   Renal Ultrasound  SSI  IV Cardene Drip to Titration    Scheduled Meds:   Current Facility-Administered Medications:  atorvastatin 80 mg Oral QPM   butalbital-acetaminophen-caffeine 1 tablet Oral Q4H PRN   heparin (porcine) 5,000 Units Subcutaneous Q8H [address]   hydrALAZINE 25 mg Oral Q8H [address]   insulin glargine 50 Units Subcutaneous HS   insulin lispro 12 Units Subcutaneous TID With Meals   insulin lispro 2-12 Units Subcutaneous TID AC   insulin lispro 2-12 Units Subcutaneous HS     PRN Meds:   acetaminophen    butalbital-acetaminophen-caffeine    hydrALAZINE    ondansetron

## 2018-11-23 NOTE — ASSESSMENT & PLAN NOTE
Lab Results   Component Value Date    HGBA1C 8 9 (H) 10/04/2017       Recent Labs      11/22/18   1623  11/22/18 2052 11/23/18   0709   POCGLU  289*  377*  264*       Blood Sugar Average: Last 72 hrs:  (P) 310poorly controlled;    Will increase lantus and novolog given persistent elevations   Ultimately may need to switch to 70/30 if cost of meds remains an issue   ISS and accuchecks

## 2018-11-23 NOTE — ASSESSMENT & PLAN NOTE
· POA e/b CHRISTIANA with proteinuria,  · /110 on arrival  · Started on nicardipine  · Goal SBP 190s; will continue further titration over next 24-48hours   · Improved overnight, but probably some overcorrection with BPs in 120s   · Rest of plan below

## 2018-11-24 PROBLEM — N39.0 UTI (URINARY TRACT INFECTION): Status: ACTIVE | Noted: 2018-11-24

## 2018-11-24 PROBLEM — R19.7 DIARRHEA: Status: ACTIVE | Noted: 2018-11-24

## 2018-11-24 PROBLEM — D64.9 ANEMIA: Status: ACTIVE | Noted: 2018-11-24

## 2018-11-24 LAB
ANION GAP SERPL CALCULATED.3IONS-SCNC: 7 MMOL/L (ref 4–13)
BACTERIA UR CULT: ABNORMAL
BASOPHILS # BLD AUTO: 0.06 THOUSANDS/ΜL (ref 0–0.1)
BASOPHILS NFR BLD AUTO: 1 % (ref 0–1)
BUN SERPL-MCNC: 27 MG/DL (ref 5–25)
CALCIUM SERPL-MCNC: 7.8 MG/DL (ref 8.3–10.1)
CHLORIDE SERPL-SCNC: 108 MMOL/L (ref 100–108)
CO2 SERPL-SCNC: 21 MMOL/L (ref 21–32)
CREAT SERPL-MCNC: 1.86 MG/DL (ref 0.6–1.3)
DSDNA AB SER-ACNC: 1 IU/ML (ref 0–9)
EOSINOPHIL # BLD AUTO: 0.2 THOUSAND/ΜL (ref 0–0.61)
EOSINOPHIL NFR BLD AUTO: 4 % (ref 0–6)
ERYTHROCYTE [DISTWIDTH] IN BLOOD BY AUTOMATED COUNT: 12.3 % (ref 11.6–15.1)
ERYTHROCYTE [DISTWIDTH] IN BLOOD BY AUTOMATED COUNT: 12.6 % (ref 11.6–15.1)
GFR SERPL CREATININE-BSD FRML MDRD: 30 ML/MIN/1.73SQ M
GLUCOSE SERPL-MCNC: 112 MG/DL (ref 65–140)
GLUCOSE SERPL-MCNC: 127 MG/DL (ref 65–140)
GLUCOSE SERPL-MCNC: 130 MG/DL (ref 65–140)
GLUCOSE SERPL-MCNC: 146 MG/DL (ref 65–140)
GLUCOSE SERPL-MCNC: 150 MG/DL (ref 65–140)
HBV CORE AB SER QL: NORMAL
HBV CORE IGM SER QL: NORMAL
HBV SURFACE AG SER QL: NORMAL
HCT VFR BLD AUTO: 29 % (ref 34.8–46.1)
HCT VFR BLD AUTO: 29.6 % (ref 34.8–46.1)
HCV AB SER QL: NORMAL
HGB BLD-MCNC: 10 G/DL (ref 11.5–15.4)
HGB BLD-MCNC: 9.7 G/DL (ref 11.5–15.4)
IMM GRANULOCYTES # BLD AUTO: 0.02 THOUSAND/UL (ref 0–0.2)
IMM GRANULOCYTES NFR BLD AUTO: 0 % (ref 0–2)
LYMPHOCYTES # BLD AUTO: 2.4 THOUSANDS/ΜL (ref 0.6–4.47)
LYMPHOCYTES NFR BLD AUTO: 42 % (ref 14–44)
MCH RBC QN AUTO: 29.4 PG (ref 26.8–34.3)
MCH RBC QN AUTO: 29.7 PG (ref 26.8–34.3)
MCHC RBC AUTO-ENTMCNC: 32.8 G/DL (ref 31.4–37.4)
MCHC RBC AUTO-ENTMCNC: 34.5 G/DL (ref 31.4–37.4)
MCV RBC AUTO: 86 FL (ref 82–98)
MCV RBC AUTO: 90 FL (ref 82–98)
MONOCYTES # BLD AUTO: 0.33 THOUSAND/ΜL (ref 0.17–1.22)
MONOCYTES NFR BLD AUTO: 6 % (ref 4–12)
NEUTROPHILS # BLD AUTO: 2.73 THOUSANDS/ΜL (ref 1.85–7.62)
NEUTS SEG NFR BLD AUTO: 47 % (ref 43–75)
NRBC BLD AUTO-RTO: 0 /100 WBCS
PLATELET # BLD AUTO: 239 THOUSANDS/UL (ref 149–390)
PLATELET # BLD AUTO: 243 THOUSANDS/UL (ref 149–390)
PMV BLD AUTO: 9.7 FL (ref 8.9–12.7)
PMV BLD AUTO: 9.9 FL (ref 8.9–12.7)
POTASSIUM SERPL-SCNC: 3.3 MMOL/L (ref 3.5–5.3)
RBC # BLD AUTO: 3.3 MILLION/UL (ref 3.81–5.12)
RBC # BLD AUTO: 3.37 MILLION/UL (ref 3.81–5.12)
SODIUM SERPL-SCNC: 136 MMOL/L (ref 136–145)
WBC # BLD AUTO: 4.69 THOUSAND/UL (ref 4.31–10.16)
WBC # BLD AUTO: 5.74 THOUSAND/UL (ref 4.31–10.16)

## 2018-11-24 PROCEDURE — 87340 HEPATITIS B SURFACE AG IA: CPT | Performed by: NURSE PRACTITIONER

## 2018-11-24 PROCEDURE — 87631 RESP VIRUS 3-5 TARGETS: CPT | Performed by: HOSPITALIST

## 2018-11-24 PROCEDURE — 86705 HEP B CORE ANTIBODY IGM: CPT | Performed by: NURSE PRACTITIONER

## 2018-11-24 PROCEDURE — 85027 COMPLETE CBC AUTOMATED: CPT | Performed by: INTERNAL MEDICINE

## 2018-11-24 PROCEDURE — 82948 REAGENT STRIP/BLOOD GLUCOSE: CPT

## 2018-11-24 PROCEDURE — 83883 ASSAY NEPHELOMETRY NOT SPEC: CPT | Performed by: NURSE PRACTITIONER

## 2018-11-24 PROCEDURE — 99232 SBSQ HOSP IP/OBS MODERATE 35: CPT | Performed by: INTERNAL MEDICINE

## 2018-11-24 PROCEDURE — 82088 ASSAY OF ALDOSTERONE: CPT | Performed by: INTERNAL MEDICINE

## 2018-11-24 PROCEDURE — 99232 SBSQ HOSP IP/OBS MODERATE 35: CPT | Performed by: HOSPITALIST

## 2018-11-24 PROCEDURE — 86704 HEP B CORE ANTIBODY TOTAL: CPT | Performed by: NURSE PRACTITIONER

## 2018-11-24 PROCEDURE — 85025 COMPLETE CBC W/AUTO DIFF WBC: CPT | Performed by: HOSPITALIST

## 2018-11-24 PROCEDURE — 80048 BASIC METABOLIC PNL TOTAL CA: CPT | Performed by: HOSPITALIST

## 2018-11-24 PROCEDURE — 84244 ASSAY OF RENIN: CPT | Performed by: INTERNAL MEDICINE

## 2018-11-24 PROCEDURE — 83835 ASSAY OF METANEPHRINES: CPT | Performed by: INTERNAL MEDICINE

## 2018-11-24 PROCEDURE — 86803 HEPATITIS C AB TEST: CPT | Performed by: NURSE PRACTITIONER

## 2018-11-24 PROCEDURE — 87493 C DIFF AMPLIFIED PROBE: CPT | Performed by: HOSPITALIST

## 2018-11-24 PROCEDURE — 83520 IMMUNOASSAY QUANT NOS NONAB: CPT | Performed by: NURSE PRACTITIONER

## 2018-11-24 RX ORDER — LABETALOL 100 MG/1
300 TABLET, FILM COATED ORAL EVERY 12 HOURS SCHEDULED
Status: DISCONTINUED | OUTPATIENT
Start: 2018-11-24 | End: 2018-11-28 | Stop reason: HOSPADM

## 2018-11-24 RX ORDER — HYDRALAZINE HYDROCHLORIDE 25 MG/1
50 TABLET, FILM COATED ORAL EVERY 8 HOURS SCHEDULED
Status: DISCONTINUED | OUTPATIENT
Start: 2018-11-24 | End: 2018-11-27

## 2018-11-24 RX ORDER — POTASSIUM CHLORIDE 20 MEQ/1
20 TABLET, EXTENDED RELEASE ORAL ONCE
Status: COMPLETED | OUTPATIENT
Start: 2018-11-24 | End: 2018-11-24

## 2018-11-24 RX ORDER — AMLODIPINE BESYLATE 5 MG/1
5 TABLET ORAL EVERY 12 HOURS
Status: DISCONTINUED | OUTPATIENT
Start: 2018-11-24 | End: 2018-11-27

## 2018-11-24 RX ORDER — DIPHENHYDRAMINE HCL 25 MG
25 TABLET ORAL
Status: DISCONTINUED | OUTPATIENT
Start: 2018-11-24 | End: 2018-11-28 | Stop reason: HOSPADM

## 2018-11-24 RX ORDER — HYDRALAZINE HYDROCHLORIDE 20 MG/ML
10 INJECTION INTRAMUSCULAR; INTRAVENOUS EVERY 8 HOURS PRN
Status: DISCONTINUED | OUTPATIENT
Start: 2018-11-24 | End: 2018-11-27

## 2018-11-24 RX ADMIN — BENZONATATE 200 MG: 100 CAPSULE ORAL at 07:43

## 2018-11-24 RX ADMIN — SODIUM CHLORIDE 7.5 MG/HR: 0.9 INJECTION, SOLUTION INTRAVENOUS at 07:10

## 2018-11-24 RX ADMIN — HYDRALAZINE HYDROCHLORIDE 10 MG: 20 INJECTION INTRAMUSCULAR; INTRAVENOUS at 15:29

## 2018-11-24 RX ADMIN — BENZONATATE 200 MG: 100 CAPSULE ORAL at 16:49

## 2018-11-24 RX ADMIN — HYDRALAZINE HYDROCHLORIDE 50 MG: 25 TABLET ORAL at 21:59

## 2018-11-24 RX ADMIN — SODIUM CHLORIDE 7.5 MG/HR: 0.9 INJECTION, SOLUTION INTRAVENOUS at 03:11

## 2018-11-24 RX ADMIN — HEPARIN SODIUM 5000 UNITS: 5000 INJECTION, SOLUTION INTRAVENOUS; SUBCUTANEOUS at 14:02

## 2018-11-24 RX ADMIN — HEPARIN SODIUM 5000 UNITS: 5000 INJECTION, SOLUTION INTRAVENOUS; SUBCUTANEOUS at 21:59

## 2018-11-24 RX ADMIN — ACETAMINOPHEN 650 MG: 325 TABLET ORAL at 04:45

## 2018-11-24 RX ADMIN — DIPHENHYDRAMINE HCL 25 MG: 25 TABLET ORAL at 23:17

## 2018-11-24 RX ADMIN — BENZONATATE 200 MG: 100 CAPSULE ORAL at 02:55

## 2018-11-24 RX ADMIN — BENZONATATE 200 MG: 100 CAPSULE ORAL at 22:11

## 2018-11-24 RX ADMIN — INSULIN GLARGINE 50 UNITS: 100 INJECTION, SOLUTION SUBCUTANEOUS at 21:59

## 2018-11-24 RX ADMIN — AMPICILLIN SODIUM 500 MG: 1 INJECTION, POWDER, FOR SOLUTION INTRAMUSCULAR; INTRAVENOUS at 23:21

## 2018-11-24 RX ADMIN — FAMOTIDINE 20 MG: 20 TABLET ORAL at 08:10

## 2018-11-24 RX ADMIN — AMPICILLIN SODIUM 500 MG: 1 INJECTION, POWDER, FOR SOLUTION INTRAMUSCULAR; INTRAVENOUS at 12:11

## 2018-11-24 RX ADMIN — AMLODIPINE BESYLATE 5 MG: 5 TABLET ORAL at 21:59

## 2018-11-24 RX ADMIN — INSULIN LISPRO 2 UNITS: 100 INJECTION, SOLUTION INTRAVENOUS; SUBCUTANEOUS at 11:36

## 2018-11-24 RX ADMIN — LABETALOL HYDROCHLORIDE 200 MG: 200 TABLET, FILM COATED ORAL at 08:11

## 2018-11-24 RX ADMIN — LABETALOL HYDROCHLORIDE 300 MG: 200 TABLET, FILM COATED ORAL at 21:59

## 2018-11-24 RX ADMIN — HYDRALAZINE HYDROCHLORIDE 25 MG: 25 TABLET ORAL at 14:05

## 2018-11-24 RX ADMIN — AMLODIPINE BESYLATE 5 MG: 5 TABLET ORAL at 09:16

## 2018-11-24 RX ADMIN — ACETAMINOPHEN 650 MG: 325 TABLET ORAL at 22:11

## 2018-11-24 RX ADMIN — ACETAMINOPHEN 650 MG: 325 TABLET ORAL at 16:53

## 2018-11-24 RX ADMIN — HYDRALAZINE HYDROCHLORIDE 25 MG: 25 TABLET ORAL at 06:41

## 2018-11-24 RX ADMIN — HEPARIN SODIUM 5000 UNITS: 5000 INJECTION, SOLUTION INTRAVENOUS; SUBCUTANEOUS at 06:41

## 2018-11-24 RX ADMIN — ATORVASTATIN CALCIUM 80 MG: 40 TABLET, FILM COATED ORAL at 17:22

## 2018-11-24 RX ADMIN — POTASSIUM CHLORIDE 20 MEQ: 1500 TABLET, EXTENDED RELEASE ORAL at 09:22

## 2018-11-24 NOTE — ASSESSMENT & PLAN NOTE
Lab Results   Component Value Date    HGBA1C 8 9 (H) 10/04/2017       Recent Labs      11/23/18   1106  11/23/18   1631  11/23/18   2057  11/24/18   0714   POCGLU  355*  165*  173*  112       Blood Sugar Average: Last 72 hrs:  (P) 247 [id number] controlled;    Will increase lantus and novolog given persistent elevations   Ultimately may need to switch to 70/30 if cost of meds remains an issue   ISS and accuchecks

## 2018-11-24 NOTE — PROGRESS NOTES
Progress Note - Umu Elroy 1965, 46 y o  female MRN: [id number]    Unit/Bed#:  Encounter: [id number]    Primary Care Provider: Umu Abbasi MD   Date and time admitted to hospital: 11/22/2018 10:42 AM    * Hypertensive emergency   Assessment & Plan    · POA e/b CHRISTIANA with proteinuria,  · /110 on arrival  · Started on nicardipine, now dc'ed   · Start amlodipine; continue to titrate hydralazine and labetalol as neede   · Consider adding diuretics   · Rest of plan below       CHRISTIANA (acute kidney injury) ([facility] [address] )   Assessment & Plan    · Cr 1 3 from 0 9 in September  · UA with 3+protein, moderate blood, hyaline casts  · Case discussed with nephrology; appreciate input  · US kidneys and bladder:  · No hydronephrosis  Stable left renal simple cyst   · Incidentally noted small ascites  · KENYATTA, ANCA, antiDSDNA,  ·  C3/C4: wnl   · SPEP: no monoclonal bands   · UPEP: pending   · AntiGBM: pending   · Pr/cr ratio: 13   · Follow BMP      Headache   Assessment & Plan    · Suspect this is related to severely elevated BPs; improving with BP control but still present   · APAP as needed      Cough   Assessment & Plan    Chronic for at least 1 month   May be due to ACEi intolerance  Check influenza/RSV          Type 2 diabetes mellitus with hyperglycemia, with long-term current use of insulin [facility])   Assessment & Plan    Lab Results   Component Value Date    HGBA1C 8 9 (H) 10/04/2017       Recent Labs      11/23/18   1106  11/23/18   1631  11/23/18   2057  11/24/18   0714   POCGLU  355*  165*  173*  112       Blood Sugar Average: Last 72 hrs:  (P) 247 [id number] controlled;    Will increase lantus and novolog given persistent elevations   Ultimately may need to switch to 70/30 if cost of meds remains an issue   ISS and accuchecks      Anemia   Assessment & Plan    Noted 10<--12 2  Check hgb in the evening   Will check FOBT     UTI (urinary tract infection)   Assessment & Plan    Ucx: > 100K GBS  intrinsically sensitive to penicillins  ampicillin 500mg q12 x 3 days      Diarrhea   Assessment & Plan    5 loose watery stools  R/o c diff  VTE Pharmacologic Prophylaxis:   Pharmacologic: Heparin  Mechanical VTE Prophylaxis in Place: Yes    Patient Centered Rounds: I have performed bedside rounds with nursing staff today  Discussions with Specialists or Other Care Team Provider: nephrology     Education and Discussions with Family / Patient: patient     Time Spent for Care: 30 minutes  More than 50% of total time spent on counseling and coordination of care as described above  Current Length of Stay: 2 day(s)    Current Patient Status: Inpatient   Certification Statement: The patient will continue to require additional inpatient hospital stay due to CHRISTIANA with nephrotic range proteinuria  Discharge Plan / Estimated Discharge Date: TBD based on clinical course    Code Status: Level 1 - Full Code    Subjective:   Pt is feeling better  Less HA  But having frequent diarrhea and pain after eating  No f/c  Cough is nonproductive     Objective:     Vitals:   Temp (24hrs), Av 1 °F (36 7 °C), Min:97 7 °F (36 5 °C), Max:98 2 °F (36 8 °C)    Temp:  [97 7 °F (36 5 °C)-98 2 °F (36 8 °C)] 97 7 °F (36 5 °C)  HR:  [75-88] 75  Resp:  [17-31] 17  BP: (122-200)/() 160/81  SpO2:  [93 %-98 %] 95 %  Body mass index is 39 64 kg/m²  Input and Output Summary (last 24 hours): Intake/Output Summary (Last 24 hours) at 18 1154  Last data filed at 18 6507   Gross per 24 hour   Intake          1587 92 ml   Output              100 ml   Net          1487 92 ml       Physical Exam:     Physical Exam   Constitutional: She is oriented to person, place, and time  No distress  HENT:   Head: Normocephalic and atraumatic  Cardiovascular: Normal rate and regular rhythm  No murmur heard  Pulmonary/Chest: Effort normal  No respiratory distress  She has no wheezes  She has no rales     Abdominal: Soft  Bowel sounds are normal  She exhibits no distension  There is no tenderness  Musculoskeletal: Normal range of motion  She exhibits edema  Neurological: She is alert and oriented to person, place, and time  Skin: Skin is warm and dry  She is not diaphoretic  No erythema  Psychiatric: She has a normal mood and affect  Her behavior is normal    Nursing note and vitals reviewed  Additional Data:     Labs:      Results from last 7 days  Lab Units 11/24/18  1000 11/24/18  0438   WBC Thousand/uL 4 69 5 74   HEMOGLOBIN g/dL 10 0* 9 7*   HEMATOCRIT % 29 0* 29 6*   PLATELETS Thousands/uL 243 239   NEUTROS PCT %  --  47   LYMPHS PCT %  --  42   MONOS PCT %  --  6   EOS PCT %  --  4       Results from last 7 days  Lab Units 11/24/18  0438 11/23/18  0439   POTASSIUM mmol/L 3 3* 3 6   CHLORIDE mmol/L 108 105   CO2 mmol/L 21 20*   BUN mg/dL 27* 26*   CREATININE mg/dL 1 86* 2 08*   CALCIUM mg/dL 7 8* 8 5   ALK PHOS U/L  --  103   ALT U/L  --  33   AST U/L  --  27       Results from last 7 days  Lab Units 11/22/18  1149   INR  0 81*       * I Have Reviewed All Lab Data Listed Above  * Additional Pertinent Lab Tests Reviewed:  All Labs Within Last 24 Hours Reviewed    Imaging:    Imaging Reports Reviewed Today Include:   Imaging Personally Reviewed by Myself Includes:      Recent Cultures (last 7 days):       Results from last 7 days  Lab Units 11/22/18  1940   URINE CULTURE  >100,000 cfu/ml Beta Hemolytic Streptococcus Group B*       Last 24 Hours Medication List:     Current Facility-Administered Medications:  acetaminophen 650 mg Oral Q6H PRN Sydnee Tran MD   amLODIPine 5 mg Oral Q12H Bandar Trevizo MD   ampicillin 500 mg Intravenous Q12H Sydnee Tran MD   atorvastatin 80 mg Oral QPM Sydnee Tran MD   benzonatate 200 mg Oral TID PRN Gerard Rodríguez PA-C   butalbital-acetaminophen-caffeine 1 tablet Oral Q4H PRN Sydnee Tran MD   calcium carbonate 500 mg Oral TID PRN Sydnee Tran MD   famotidine 20 mg Oral Daily Shazia Aguilar MD   heparin (porcine) 5,000 Units Subcutaneous [facility] hSazia [facility]ilar [facility]   hydrALAZINE 10 mg Intravenous Q8H PRN Wing Estrella MD   hydrALAZINE 25 mg Oral [facility] Elsie Waddell PA-C   insulin glargine 50 Units Subcutaneous HS Shazia Aguilar MD   insulin lispro 12 Units Subcutaneous TID With Meals Shazia Aguilar MD   insulin lispro 2-12 Units Subcutaneous TID Chace Cedeno MD   insulin lispro 2-12 Units Subcutaneous HS Shazia Agiular MD   labetalol 200 mg Oral Q12H [address] Wing Estrella MD   ondansetron 4 mg Intravenous Q6H PRN Shazia Aguilar MD        Today, Patient Was Seen By: Shazia Aguilar MD    ** Please Note: This note has been constructed using a voice recognition system   **

## 2018-11-24 NOTE — ASSESSMENT & PLAN NOTE
· Suspect this is related to severely elevated BPs; improving with BP control but still present   · APAP as needed

## 2018-11-24 NOTE — ASSESSMENT & PLAN NOTE
· Cr 1 3 from 0 9 in September  · UA with 3+protein, moderate blood, hyaline casts  · Case discussed with nephrology; appreciate input  · US kidneys and bladder:  · No hydronephrosis  Stable left renal simple cyst   · Incidentally noted small ascites    · KENYATTA, ANCA, antiDSDNA,  ·  C3/C4: wnl   · SPEP: no monoclonal bands   · UPEP: pending   · AntiGBM: pending   · Pr/cr ratio: 13   · Follow BMP

## 2018-11-24 NOTE — PROGRESS NOTES
NEPHROLOGY HOSPITAL PROGRESS NOTE   Elliott Alexandre 46 y o  female MRN: [id number]  Unit/Bed#:  Encounter: [id number]  Reason for Consult: HTN urgency    ASSESSMENT and PLAN:    48-year-old female with a past medical history of uncontrolled diabetes, uncontrolled hypertension, migraines, bleeding question of CVA in 2017 and was given tPA a with negative MRI, ICA infundibulum versus aneurysm, recent admission in September 2018 with flu-like symptoms, so also at that time had hypertensive urgency, uncontrolled sugar levels in the setting of not having insurance and no medications for 3 weeks, continued to have headaches, who is now presenting with worsening headaches, not feeling well, hypertensive urgency  Nephrology is consulted for concern for nephrotic syndrome      Patient states that her blood pressures at home can range from 180-190 for the past 10 years  Patient recalls having normal blood pressures approximately 3 weeks ago  In September did not have insurance and was not taking medications for 3 weeks  There are notes from the outpatient family physician was states that the patient has been noncompliant due to multiple issues including finances     1) hypertensive urgency     -platelets and hemoglobin are normal  -patient does have acute kidney injury - improving Cr today  -can hold cardene gtt  - start amlodipine  - continue labetalol and hydralazine  - next would titrate labetalol and hydralazine if needed  - after this will eventually add diuretics  -TSH 5 9-relatively unrevealing in the setting  - check renin - pending  - check raghav - pending  - check metanephrine - pending  - eventually check renal doplar studies     2) acute kidney injury-prior baseline 1-1 3  Patient has had uncontrolled hypertension, diabetes for many years  Patient was using daily NSAIDs due to migraines for the last 2 months  Has had proteinuria since 2014 on urinalysis       -patient likely has ATN in the setting of hypertensive urgency in ACE-inhibitor use and now slightly controlled blood pressures  Appears less likely TMA  Need to rule out other causes of nephrotic nephritic syndrome as outlined below     -renal ultrasound right kidney 13 2 cm, left kidney 12 5 cm, simple cyst in the left kidney  No hydronephrosis  -proteinuria-13 g on protein creatinine ratio  -has small amount of microscopic hematuria  -check urine culture - pending  - SPEP - no monoclonal bands  - UPEP - pending  - FLC - pending  -C3, C4 unrevealing  - KENYATTA - pending  - ANCA -   - anti DS DNA -   - anti GBM  - hep panel -   -the native disease is likely secondary to uncontrolled hypertension, diabetes, obesity  But given the significant level of proteinuria and uncontrolled hypertension, patient will likely need a biopsy early next week  -current etiology of rising creatinine today is likely due to blood pressures that are improved from yesterday  The goal blood pressures today should be 975-413 systolic  --> Cr today slightly improved  - I/O; avoid nephrotoxic agents  - no ACEi for now  - make NPO past midnight Sunday night for potential renal biopsy if BP remains stable on Monday     3) acid/base-bicarb stable     4) electrolytes-sodium stable    - K slightly low  - give 20 meq K one time     5) hyperlipidemia     -agree withholding TriCor  -cholesterol panel reviewed as below     6) anemia-     - repeat CBC stat  Unclear why Hb 9 7 from 12       7) HA - would consider Neurology consult if HA olsen not improve given sig HA history  8) diarrhea - new  Reviewed with Primary Team Attending who will evaluate further    9) cough - the consistency of cough does not appear solely ACEi related    - consider viral panel    SUBJECTIVE / INTERVAL HISTORY:    Pt with new diarrhea  Cough       OBJECTIVE:  Current Weight: Weight - Scale: 98 3 kg (216 lb 11 4 oz)  Vitals:    11/24/18 0500 11/24/18 0600 11/24/18 0700 11/24/18 0810   BP: 157/77 149/77 148/71 129/73   BP Location:   Left arm    Pulse: 87 82 83 80   Resp: (!) 31 (!) 26 (!) 24    Temp:   98 2 °F (36 8 °C)    TempSrc:   Oral    SpO2: 94% 97% 94%    Weight:       Height:           Intake/Output Summary (Last 24 hours) at 11/24/18 6263  Last data filed at 11/24/18 0800   Gross per 24 hour   Intake          1610 42 ml   Output              200 ml   Net          1410 42 ml     General: NAD  Skin: no rash  Eyes: anicteric sclera  ENT: moist mucous membrane  Neck: supple  Chest: CTA b/l  CVS: s1s2  Abdomen: soft, nontender  Extremities: trace edema LE  : no fowler  Neuro: AAOX3  Psych: normal affect      Medications:    Current Facility-Administered Medications:     acetaminophen (TYLENOL) tablet 650 mg, 650 mg, Oral, Q6H PRN, Cristian Joya MD, 650 mg at 11/24/18 0445    amLODIPine (NORVASC) tablet 5 mg, 5 mg, Oral, Q12H, Roberta Toney MD    atorvastatin (LIPITOR) tablet 80 mg, 80 mg, Oral, QPM, Cristian Joya MD, 80 mg at 11/23/18 1703    benzonatate (TESSALON PERLES) capsule 200 mg, 200 mg, Oral, TID PRN, Matt Cisneros PA-C, 200 mg at 11/24/18 0743    butalbital-acetaminophen-caffeine (FIORICET,ESGIC) -40 mg per tablet 1 tablet, 1 tablet, Oral, Q4H PRN, Cristian Joya MD, 1 tablet at 11/23/18 1417    calcium carbonate (TUMS) chewable tablet 500 mg, 500 mg, Oral, TID PRN, Cristian Joya MD, 500 mg at 11/23/18 1643    famotidine (PEPCID) tablet 20 mg, 20 mg, Oral, Daily, Cristian Joya MD, 20 mg at 11/24/18 0810    heparin (porcine) subcutaneous injection 5,000 Units, 5,000 Units, Subcutaneous, Q8H [address], 5,000 Units at 11/24/18 0641 **AND** Platelet count, , , Once, Cristian Joya MD    hydrALAZINE (APRESOLINE) injection 10 mg, 10 mg, Intravenous, Q8H PRN, Roberta Toney MD    hydrALAZINE (APRESOLINE) tablet 25 mg, 25 mg, Oral, Q8H [address], Elsie Joe PA-C, 25 mg at 11/24/18 0641    insulin glargine (LANTUS) subcutaneous injection 50 Units 0 5 mL, 50 Units, Subcutaneous, , Matilda Kelly Flex Cornell MD, 50 Units at 11/23/18 2119    insulin lispro (HumaLOG) 100 units/mL subcutaneous injection 12 Units, 12 Units, Subcutaneous, TID With Meals, Flip Villafana MD, 12 Units at 11/24/18 0811    insulin lispro (HumaLOG) 100 units/mL subcutaneous injection 2-12 Units, 2-12 Units, Subcutaneous, TID AC, 2 Units at 11/23/18 1644 **AND** Fingerstick Glucose (POCT), , , TID AC, Flip Villafana MD    insulin lispro (HumaLOG) 100 units/mL subcutaneous injection 2-12 Units, 2-12 Units, Subcutaneous, HS, Flip Villafana MD, 2 Units at 11/23/18 2120    labetalol (NORMODYNE) tablet 200 mg, 200 mg, Oral, Q12H [facility], Danielle Del Rosario MD, 200 mg at 11/24/18 0811    ondansetron (ZOFRAN) injection 4 mg, 4 mg, Intravenous, Q6H PRN, Flip Villafana MD    Laboratory Results:    Results from last 7 days  Lab Units 11/24/18  0438 11/23/18  0439 11/22/18  1754 11/22/18  1149   WBC Thousand/uL 5 74  --   --  5 87   HEMOGLOBIN g/dL 9 7*  --   --  12 2   HEMATOCRIT % 29 6*  --   --  34 9   PLATELETS Thousands/uL 239  --  239 263   POTASSIUM mmol/L 3 3* 3 6  --  4 5   CHLORIDE mmol/L 108 105  --  106   CO2 mmol/L 21 20*  --  21   BUN mg/dL 27* 26*  --  19   CREATININE mg/dL 1 86* 2 08*  --  1 30   CALCIUM mg/dL 7 8* 8 5  --  8 5

## 2018-11-24 NOTE — ASSESSMENT & PLAN NOTE
· POA e/b CHRISTIANA with proteinuria,  · /110 on arrival  · Started on nicardipine, now dc'ed   · Start amlodipine; continue to titrate hydralazine and labetalol as neede   · Consider adding diuretics   · Rest of plan below

## 2018-11-25 LAB
ANION GAP SERPL CALCULATED.3IONS-SCNC: 8 MMOL/L (ref 4–13)
BASOPHILS # BLD AUTO: 0.05 THOUSANDS/ΜL (ref 0–0.1)
BASOPHILS NFR BLD AUTO: 1 % (ref 0–1)
BUN SERPL-MCNC: 24 MG/DL (ref 5–25)
C DIFF TOX GENS STL QL NAA+PROBE: NORMAL
CALCIUM SERPL-MCNC: 8.3 MG/DL (ref 8.3–10.1)
CHLORIDE SERPL-SCNC: 111 MMOL/L (ref 100–108)
CO2 SERPL-SCNC: 21 MMOL/L (ref 21–32)
CREAT SERPL-MCNC: 1.39 MG/DL (ref 0.6–1.3)
EOSINOPHIL # BLD AUTO: 0.2 THOUSAND/ΜL (ref 0–0.61)
EOSINOPHIL NFR BLD AUTO: 4 % (ref 0–6)
ERYTHROCYTE [DISTWIDTH] IN BLOOD BY AUTOMATED COUNT: 12.6 % (ref 11.6–15.1)
FLUAV AG SPEC QL: NORMAL
FLUBV AG SPEC QL: NORMAL
GFR SERPL CREATININE-BSD FRML MDRD: 43 ML/MIN/1.73SQ M
GLUCOSE SERPL-MCNC: 109 MG/DL (ref 65–140)
GLUCOSE SERPL-MCNC: 121 MG/DL (ref 65–140)
GLUCOSE SERPL-MCNC: 125 MG/DL (ref 65–140)
GLUCOSE SERPL-MCNC: 252 MG/DL (ref 65–140)
GLUCOSE SERPL-MCNC: 99 MG/DL (ref 65–140)
HCT VFR BLD AUTO: 30.8 % (ref 34.8–46.1)
HGB BLD-MCNC: 10.4 G/DL (ref 11.5–15.4)
IMM GRANULOCYTES # BLD AUTO: 0.01 THOUSAND/UL (ref 0–0.2)
IMM GRANULOCYTES NFR BLD AUTO: 0 % (ref 0–2)
LYMPHOCYTES # BLD AUTO: 1.91 THOUSANDS/ΜL (ref 0.6–4.47)
LYMPHOCYTES NFR BLD AUTO: 40 % (ref 14–44)
MCH RBC QN AUTO: 29.3 PG (ref 26.8–34.3)
MCHC RBC AUTO-ENTMCNC: 33.8 G/DL (ref 31.4–37.4)
MCV RBC AUTO: 87 FL (ref 82–98)
MONOCYTES # BLD AUTO: 0.37 THOUSAND/ΜL (ref 0.17–1.22)
MONOCYTES NFR BLD AUTO: 8 % (ref 4–12)
NEUTROPHILS # BLD AUTO: 2.23 THOUSANDS/ΜL (ref 1.85–7.62)
NEUTS SEG NFR BLD AUTO: 47 % (ref 43–75)
NRBC BLD AUTO-RTO: 0 /100 WBCS
PLATELET # BLD AUTO: 261 THOUSANDS/UL (ref 149–390)
PMV BLD AUTO: 9.8 FL (ref 8.9–12.7)
POTASSIUM SERPL-SCNC: 3.7 MMOL/L (ref 3.5–5.3)
RBC # BLD AUTO: 3.55 MILLION/UL (ref 3.81–5.12)
RSV B RNA SPEC QL NAA+PROBE: NORMAL
SODIUM SERPL-SCNC: 140 MMOL/L (ref 136–145)
WBC # BLD AUTO: 4.77 THOUSAND/UL (ref 4.31–10.16)

## 2018-11-25 PROCEDURE — 99232 SBSQ HOSP IP/OBS MODERATE 35: CPT | Performed by: HOSPITALIST

## 2018-11-25 PROCEDURE — 99232 SBSQ HOSP IP/OBS MODERATE 35: CPT | Performed by: INTERNAL MEDICINE

## 2018-11-25 PROCEDURE — 85025 COMPLETE CBC W/AUTO DIFF WBC: CPT | Performed by: HOSPITALIST

## 2018-11-25 PROCEDURE — 82948 REAGENT STRIP/BLOOD GLUCOSE: CPT

## 2018-11-25 PROCEDURE — 80048 BASIC METABOLIC PNL TOTAL CA: CPT | Performed by: HOSPITALIST

## 2018-11-25 RX ORDER — FLUTICASONE PROPIONATE 50 MCG
1 SPRAY, SUSPENSION (ML) NASAL DAILY
Status: DISCONTINUED | OUTPATIENT
Start: 2018-11-25 | End: 2018-11-28 | Stop reason: HOSPADM

## 2018-11-25 RX ADMIN — BENZONATATE 200 MG: 100 CAPSULE ORAL at 13:52

## 2018-11-25 RX ADMIN — AMPICILLIN SODIUM 500 MG: 1 INJECTION, POWDER, FOR SOLUTION INTRAMUSCULAR; INTRAVENOUS at 22:49

## 2018-11-25 RX ADMIN — FAMOTIDINE 20 MG: 20 TABLET ORAL at 08:35

## 2018-11-25 RX ADMIN — HYDRALAZINE HYDROCHLORIDE 10 MG: 20 INJECTION INTRAMUSCULAR; INTRAVENOUS at 16:31

## 2018-11-25 RX ADMIN — LABETALOL HYDROCHLORIDE 300 MG: 200 TABLET, FILM COATED ORAL at 08:34

## 2018-11-25 RX ADMIN — BENZONATATE 200 MG: 100 CAPSULE ORAL at 08:34

## 2018-11-25 RX ADMIN — HEPARIN SODIUM 5000 UNITS: 5000 INJECTION, SOLUTION INTRAVENOUS; SUBCUTANEOUS at 05:01

## 2018-11-25 RX ADMIN — AMPICILLIN SODIUM 500 MG: 1 INJECTION, POWDER, FOR SOLUTION INTRAMUSCULAR; INTRAVENOUS at 11:26

## 2018-11-25 RX ADMIN — FLUTICASONE PROPIONATE 1 SPRAY: 50 SPRAY, METERED NASAL at 11:20

## 2018-11-25 RX ADMIN — AMLODIPINE BESYLATE 5 MG: 5 TABLET ORAL at 08:34

## 2018-11-25 RX ADMIN — ACETAMINOPHEN 650 MG: 325 TABLET ORAL at 13:52

## 2018-11-25 RX ADMIN — HYDRALAZINE HYDROCHLORIDE 50 MG: 25 TABLET ORAL at 05:01

## 2018-11-25 RX ADMIN — INSULIN LISPRO 6 UNITS: 100 INJECTION, SOLUTION INTRAVENOUS; SUBCUTANEOUS at 16:21

## 2018-11-25 RX ADMIN — HEPARIN SODIUM 5000 UNITS: 5000 INJECTION, SOLUTION INTRAVENOUS; SUBCUTANEOUS at 13:46

## 2018-11-25 RX ADMIN — INSULIN GLARGINE 50 UNITS: 100 INJECTION, SOLUTION SUBCUTANEOUS at 21:15

## 2018-11-25 RX ADMIN — LABETALOL HYDROCHLORIDE 300 MG: 200 TABLET, FILM COATED ORAL at 21:14

## 2018-11-25 RX ADMIN — ACETAMINOPHEN 650 MG: 325 TABLET ORAL at 21:14

## 2018-11-25 RX ADMIN — HYDRALAZINE HYDROCHLORIDE 50 MG: 25 TABLET ORAL at 13:46

## 2018-11-25 RX ADMIN — HEPARIN SODIUM 5000 UNITS: 5000 INJECTION, SOLUTION INTRAVENOUS; SUBCUTANEOUS at 21:15

## 2018-11-25 RX ADMIN — ATORVASTATIN CALCIUM 80 MG: 40 TABLET, FILM COATED ORAL at 17:18

## 2018-11-25 RX ADMIN — DIPHENHYDRAMINE HCL 25 MG: 25 TABLET ORAL at 21:22

## 2018-11-25 RX ADMIN — AMLODIPINE BESYLATE 5 MG: 5 TABLET ORAL at 19:35

## 2018-11-25 RX ADMIN — HYDRALAZINE HYDROCHLORIDE 50 MG: 25 TABLET ORAL at 21:14

## 2018-11-25 RX ADMIN — BENZONATATE 200 MG: 100 CAPSULE ORAL at 21:14

## 2018-11-25 NOTE — PROGRESS NOTES
Progress Note - Pamela Ruff 1965, 46 y o  female MRN: [id number]    Unit/Bed#:  Encounter: [id number]    Primary Care Provider: Pamela Ruff MD   Date and time admitted to hospital: 11/22/2018 10:42 AM    * Hypertensive emergency   Assessment & Plan    · POA e/b CHRISTIANA with proteinuria,  · /110 on arrival  · Started on nicardipine, now dc'ed   · Start amlodipine; increased hydralazine and labetalol last night given elevated pressures  · Consider adding diuretics   · Rest of plan below       CHRISTIANA (acute kidney injury) ([facility] [address] )   Assessment & Plan    · Cr 1 3 from 0 9 in September  · UA with 3+protein, moderate blood, hyaline casts  · Case discussed with nephrology; appreciate input  · US kidneys and bladder:  · No hydronephrosis  Stable left renal simple cyst   · Incidentally noted small ascites  · KENYATTA, ANCA  · AntiDSDNA: negative   ·  C3/C4: wnl   · SPEP: no monoclonal bands   · UPEP: pending   · AntiGBM: pending   · Pr/cr ratio: 13   · Follow BMP      Headache   Assessment & Plan    · Suspect this is related to severely elevated BPs; improving with BP control but still present   · APAP as needed      Cough   Assessment & Plan    Chronic for at least 1 month   consider ACEi intolerance vs viral URI vs post nasal drip   Start flonase   Cough suppressants as needed    Check influenza/RSV          Type 2 diabetes mellitus with hyperglycemia, with long-term current use of insulin [facility])   Assessment & Plan    Lab Results   Component Value Date    HGBA1C 8 9 (H) 10/04/2017       Recent Labs      11/24/18   1122  11/24/18   1627  11/24/18   2033  11/25/18   0701   POCGLU  150*  127  146*  99       Blood Sugar Average: Last 72 hrs:  (P) 205 [id number] controlled;    Will increase lantus and novolog given persistent elevations   Ultimately may need to switch to 70/30 if cost of meds remains an issue   ISS and accuchecks      Anemia   Assessment & Plan    Noted 10<--12 2  Check hgb in the evening   Will check FOBT     UTI (urinary tract infection)   Assessment & Plan    Ucx: > 100K GBS  intrinsically sensitive to penicillins  ampicillin 500mg q12 x 3 days      Diarrhea   Assessment & Plan    5 loose watery stools; no loose stool overnight, but notes some discomfort after eating   C diff pending          VTE Pharmacologic Prophylaxis:   Pharmacologic: Heparin  Mechanical VTE Prophylaxis in Place: Yes    Patient Centered Rounds: I have performed bedside rounds with nursing staff today  Discussions with Specialists or Other Care Team Provider: nephrology     Education and Discussions with Family / Patient: patient     Time Spent for Care: 30 minutes  More than 50% of total time spent on counseling and coordination of care as described above  Current Length of Stay: 3 day(s)    Current Patient Status: Inpatient   Certification Statement: The patient will continue to require additional inpatient hospital stay due to nephrotic syndrome c/b hypertension  Discharge Plan / Estimated Discharge Date: TBD based on clinical course    Code Status: Level 1 - Full Code    Subjective:   Pt is feeling "much better"  Headache is improved  Cough is main complaint  Keeps her up at night  Non productive  No wheeze  Objective:     Vitals:   Temp (24hrs), Av °F (36 7 °C), Min:97 7 °F (36 5 °C), Max:98 2 °F (36 8 °C)    Temp:  [97 7 °F (36 5 °C)-98 2 °F (36 8 °C)] 98 1 °F (36 7 °C)  HR:  [75-90] 81  Resp:  [16-24] 17  BP: (137-205)/() 175/83  SpO2:  [93 %-97 %] 97 %  Body mass index is 39 64 kg/m²  Input and Output Summary (last 24 hours): Intake/Output Summary (Last 24 hours) at 18 0946  Last data filed at 18 0801   Gross per 24 hour   Intake              100 ml   Output             2150 ml   Net            -2050 ml       Physical Exam:     Physical Exam   Constitutional: She is oriented to person, place, and time  No distress  HENT:   Head: Normocephalic and atraumatic  Cardiovascular: Normal rate and regular rhythm  No murmur heard  Pulmonary/Chest: Effort normal and breath sounds normal  No respiratory distress  She has no wheezes  She has no rales  Abdominal: Soft  Bowel sounds are normal  She exhibits no distension  There is no tenderness  Musculoskeletal: She exhibits edema  Neurological: She is alert and oriented to person, place, and time  Skin: Skin is warm and dry  No rash noted  She is not diaphoretic  No erythema  Psychiatric: She has a normal mood and affect  Her behavior is normal    Nursing note and vitals reviewed  Additional Data:     Labs:      Results from last 7 days  Lab Units 11/25/18  0501   WBC Thousand/uL 4 77   HEMOGLOBIN g/dL 10 4*   HEMATOCRIT % 30 8*   PLATELETS Thousands/uL 261   NEUTROS PCT % 47   LYMPHS PCT % 40   MONOS PCT % 8   EOS PCT % 4       Results from last 7 days  Lab Units 11/25/18  0501  11/23/18  0439   POTASSIUM mmol/L 3 7  < > 3 6   CHLORIDE mmol/L 111*  < > 105   CO2 mmol/L 21  < > 20*   BUN mg/dL 24  < > 26*   CREATININE mg/dL 1 39*  < > 2 08*   CALCIUM mg/dL 8 3  < > 8 5   ALK PHOS U/L  --   --  103   ALT U/L  --   --  33   AST U/L  --   --  27   < > = values in this interval not displayed  Results from last 7 days  Lab Units 11/22/18  1149   INR  0 81*       * I Have Reviewed All Lab Data Listed Above  * Additional Pertinent Lab Tests Reviewed:  All Labs Within Last 24 Hours Reviewed    Imaging:    Imaging Reports Reviewed Today Include:   Imaging Personally Reviewed by Myself Includes:      Recent Cultures (last 7 days):       Results from last 7 days  Lab Units 11/22/18  1940   URINE CULTURE  >100,000 cfu/ml Beta Hemolytic Streptococcus Group B*       Last 24 Hours Medication List:     Current Facility-Administered Medications:  acetaminophen 650 mg Oral Q6H PRN Imelda Hopkins MD    amLODIPine 5 mg Oral Q12H Anastasiya Ross MD    ampicillin 500 mg Intravenous Q12H Imelda Hopkins MD Last Rate: 500 mg (11/25/18 0400)   atorvastatin 80 mg Oral QPM Joann Ding MD    benzonatate 200 mg Oral TID PRN Alirio Mac PA-C    butalbital-acetaminophen-caffeine 1 tablet Oral Q4H PRN Joann Ding MD    calcium carbonate 500 mg Oral TID PRN Joann Ding MD    diphenhydrAMINE 25 mg Oral HS PRN Jonathan Hall PA-C    famotidine 20 mg Oral Daily Joann Ding MD    fluticasone 1 spray Each Nare Daily Joann Ding MD    heparin (porcine) 5,000 Units Subcutaneous [facility] Joann Ding MD    hydrALAZINE 10 mg Intravenous Q8H PRN Rashid Cosme MD    hydrALAZINE 50 mg Oral [facility] Joann Ding MD    insulin glargine 50 Units Subcutaneous HS Joann Ding MD    insulin lispro 12 Units Subcutaneous TID With Meals Joann Ding MD    insulin lispro 2-12 Units Subcutaneous TID Sha Cason MD    insulin lispro 2-12 Units Subcutaneous HS Joann Ding MD    labetalol 300 mg Oral Q12H [address] Joann Ding MD    ondansetron 4 mg Intravenous Q6H PRN Joann Ding MD         Today, Patient Was Seen By: Joann Ding MD    ** Please Note: This note has been constructed using a voice recognition system   **

## 2018-11-25 NOTE — ASSESSMENT & PLAN NOTE
Lab Results   Component Value Date    HGBA1C 8 9 (H) 10/04/2017       Recent Labs      11/24/18   1122  11/24/18   1627  11/24/18 2033  11/25/18   0701   POCGLU  150*  127  146*  99       Blood Sugar Average: Last 72 hrs:  (P) 205 [id number] controlled;    Will increase lantus and novolog given persistent elevations   Ultimately may need to switch to 70/30 if cost of meds remains an issue   ISS and accuchecks

## 2018-11-25 NOTE — ASSESSMENT & PLAN NOTE
· Cr 1 3 from 0 9 in September  · UA with 3+protein, moderate blood, hyaline casts  · Case discussed with nephrology; appreciate input  · US kidneys and bladder:  · No hydronephrosis  Stable left renal simple cyst   · Incidentally noted small ascites    · KENYATTA, ANCA  · AntiDSDNA: negative   ·  C3/C4: wnl   · SPEP: no monoclonal bands   · UPEP: pending   · AntiGBM: pending   · Pr/cr ratio: 13   · Follow BMP

## 2018-11-25 NOTE — ASSESSMENT & PLAN NOTE
· POA e/b CHRISTIANA with proteinuria,  · /110 on arrival  · Started on nicardipine, now dc'ed   · Start amlodipine; increased hydralazine and labetalol last night given elevated pressures  · Consider adding diuretics   · Rest of plan below

## 2018-11-25 NOTE — ASSESSMENT & PLAN NOTE
5 loose watery stools; no loose stool overnight, but notes some discomfort after eating   C diff pending

## 2018-11-25 NOTE — ASSESSMENT & PLAN NOTE
Chronic for at least 1 month   consider ACEi intolerance vs viral URI vs post nasal drip   Start flonase   Cough suppressants as needed    Check influenza/RSV

## 2018-11-25 NOTE — PROGRESS NOTES
NEPHROLOGY HOSPITAL PROGRESS NOTE   Garima Head 46 y o  female MRN: [id number]  Unit/Bed#:  Encounter: [id number]  Reason for Consult: HTN Urgency    ASSESSMENT and PLAN:    14-year-old female with a past medical history of uncontrolled diabetes, uncontrolled hypertension, migraines, bleeding question of CVA in 2017 and was given tPA a with negative MRI, ICA infundibulum versus aneurysm, recent admission in September 2018 with flu-like symptoms, so also at that time had hypertensive urgency, uncontrolled sugar levels in the setting of not having insurance and no medications for 3 weeks, continued to have headaches, who is now presenting with worsening headaches, not feeling well, hypertensive urgency   Nephrology is consulted for concern for nephrotic syndrome      Patient states that her blood pressures at home can range from 180-190 for the past 10 years  Vonnie Mccracken recalls having normal blood pressures approximately 3 weeks ago   In September did not have insurance and was not taking medications for 3 weeks  Obie Martin are notes from the outpatient family physician was states that the patient has been noncompliant due to multiple issues including finances     1) hypertensive urgency     -platelets and hemoglobin are normal  -patient does have acute kidney injury - improving Cr today  -cont to hold cardene gtt  - started amlodipine  - would monitor today with increase in hydralazine yest and starting amlodipine yest    - if BP remains above 170 syst tomorrow, will plan to start chlorthalidone  - continue labetalol and hydralazine  -TSH 5 9-relatively unrevealing in the setting  - check renin - pending  - check raghav - pending  - check metanephrine - pending  - eventually check renal doplar studies     2) acute kidney injury-prior baseline 1-1  3   Patient has had uncontrolled hypertension, diabetes for many years   Patient was using daily NSAIDs due to migraines for the last 2 months   Has had proteinuria since 2014 on urinalysis     -patient likely has ATN in the setting of hypertensive urgency in ACE-inhibitor use and now slightly controlled blood pressures   Appears less likely TMA   Need to rule out other causes of nephrotic nephritic syndrome as outlined below     -renal ultrasound right kidney 13 2 cm, left kidney 12 5 cm, simple cyst in the left kidney   No hydronephrosis  -proteinuria-13 g on protein creatinine ratio  -has small amount of microscopic hematuria  -check urine culture - positive  Beta hemolytic strep  Being treated per Primary Team  - SPEP - no monoclonal bands  - UPEP - pending  - FLC - pending  -C3, C4 unrevealing  - KENYATTA - pending  - ANCA -   - anti DS DNA -   - anti GBM  - hep panel -   -the native disease is likely secondary to uncontrolled hypertension, diabetes, obesity   But given the significant level of proteinuria and uncontrolled hypertension, patient will likely need a biopsy  - given improving renal function, may be able to plan biopsy non emergently as outpatient     3) acid/base-bicarb stable     4) electrolytes-sodium stable     - K stable     5) hyperlipidemia     -agree withholding TriCor  -cholesterol panel reviewed as below     6) anemia-     - repeat CBC stable     7) HA - would consider Neurology consult if HA olsen not improve given sig HA history     8) diarrhea - stool studies in progress     9) cough - the consistency of cough does not appear solely ACEi related     - consider viral panel    SUBJECTIVE / INTERVAL HISTORY:    Pt denies diarrhea  States cough has improved  HA improving       OBJECTIVE:  Current Weight: Weight - Scale: 98 3 kg (216 lb 11 4 oz)  Vitals:    11/24/18 1831 11/24/18 2300 11/25/18 0258 11/25/18 0656   BP: (!) 180/88 149/74 137/80 (!) 175/83   BP Location:  Left arm Left arm Left arm   Pulse:  79 77 81   Resp: 22 16 21 17   Temp:  98 2 °F (36 8 °C) 97 9 °F (36 6 °C) 98 1 °F (36 7 °C)   TempSrc:  Oral Oral Oral   SpO2:  95% 93% 97%   Weight: Height:           Intake/Output Summary (Last 24 hours) at 11/25/18 0831  Last data filed at 11/25/18 0400   Gross per 24 hour   Intake            197 5 ml   Output             1750 ml   Net          -1552 5 ml     General: NAD  Skin: no rash  Eyes: anicteric sclera  ENT: moist mucous membrane  Neck: supple  Chest: CTA b/l  CVS: s1s2  Abdomen: soft, nontender  Extremities: trace edema LE  : no fowler  Neuro: AAOX3  Psych: normal affect    Medications:    Current Facility-Administered Medications:     acetaminophen (TYLENOL) tablet 650 mg, 650 mg, Oral, Q6H PRN, Cristian Joya MD, 650 mg at 11/24/18 2211    amLODIPine (NORVASC) tablet 5 mg, 5 mg, Oral, Q12H, Roberta Toney MD, 5 mg at 11/24/18 2159    ampicillin (OMNIPEN) 500 mg in sodium chloride 0 9 % 50 mL IVPB, 500 mg, Intravenous, Q12H, Cristian Joya MD, Last Rate: 100 mL/hr at 11/25/18 0400, 500 mg at 11/25/18 0400    atorvastatin (LIPITOR) tablet 80 mg, 80 mg, Oral, QPM, Cristian Joya MD, 80 mg at 11/24/18 1722    benzonatate (TESSALON PERLES) capsule 200 mg, 200 mg, Oral, TID PRN, Matt Cisneros PA-C, 200 mg at 11/24/18 2211    butalbital-acetaminophen-caffeine (FIORICET,ESGIC) -40 mg per tablet 1 tablet, 1 tablet, Oral, Q4H PRN, Cristian Joya MD, 1 tablet at 11/23/18 1417    calcium carbonate (TUMS) chewable tablet 500 mg, 500 mg, Oral, TID PRN, Cristian Joya MD, 500 mg at 11/23/18 1643    diphenhydrAMINE (BENADRYL) tablet 25 mg, 25 mg, Oral, HS PRN, Jonathan Hall PA-C, 25 mg at 11/24/18 2317    famotidine (PEPCID) tablet 20 mg, 20 mg, Oral, Daily, Cristian Joya MD, 20 mg at 11/24/18 0810    heparin (porcine) subcutaneous injection 5,000 Units, 5,000 Units, Subcutaneous, Q8H [facility], 5,000 Units at 11/25/18 0501 **AND** Platelet count, , , Once, Cristian Joya MD    hydrALAZINE (APRESOLINE) injection 10 mg, 10 mg, Intravenous, Q8H PRN, Roberta Toney MD, 10 mg at 11/24/18 1529    hydrALAZINE (APRESOLINE) tablet 50 mg, 50 mg, Oral, Q8H [address], Kristi Flowers MD, 50 mg at 11/25/18 0501    insulin glargine (LANTUS) subcutaneous injection 50 Units 0 5 mL, 50 Units, Subcutaneous, HS, Kristi Flowers MD, 50 Units at 11/24/18 2159    insulin lispro (HumaLOG) 100 units/mL subcutaneous injection 12 Units, 12 Units, Subcutaneous, TID With Meals, Kristi Flowers MD, 12 Units at 11/24/18 1651    insulin lispro (HumaLOG) 100 units/mL subcutaneous injection 2-12 Units, 2-12 Units, Subcutaneous, TID AC, 2 Units at 11/24/18 1136 **AND** Fingerstick Glucose (POCT), , , TID AC, Kristi Flowers MD    insulin lispro (HumaLOG) 100 units/mL subcutaneous injection 2-12 Units, 2-12 Units, Subcutaneous, HS, Kristi Flowers MD, 2 Units at 11/23/18 2120    labetalol (NORMODYNE) tablet 300 mg, 300 mg, Oral, Q12H [address], Kristi Flowers MD, 300 mg at 11/24/18 2159    ondansetron [facility]) injection 4 mg, 4 mg, Intravenous, Q6H PRN, Kristi Flowers MD    Laboratory Results:    Results from last 7 days  Lab Units 11/25/18  0501 11/24/18  1000 11/24/18  0438 11/23/18  0439 11/22/18  1754 11/22/18  1149   WBC Thousand/uL 4 77 4 69 5 74  --   --  5 87   HEMOGLOBIN g/dL 10 4* 10 0* 9 7*  --   --  12 2   HEMATOCRIT % 30 8* 29 0* 29 6*  --   --  34 9   PLATELETS Thousands/uL 261 243 239  --  239 263   POTASSIUM mmol/L 3 7  --  3 3* 3 6  --  4 5   CHLORIDE mmol/L 111*  --  108 105  --  106   CO2 mmol/L 21  --  21 20*  --  21   BUN mg/dL 24  --  27* 26*  --  19   CREATININE mg/dL 1 39*  --  1 86* 2 08*  --  1 30   CALCIUM mg/dL 8 3  --  7 8* 8 5  --  8 5

## 2018-11-26 ENCOUNTER — APPOINTMENT (INPATIENT)
Dept: RADIOLOGY | Facility: HOSPITAL | Age: 53
DRG: 469 | End: 2018-11-26
Payer: COMMERCIAL

## 2018-11-26 PROBLEM — N18.30 STAGE 3 CHRONIC KIDNEY DISEASE (HCC): Status: ACTIVE | Noted: 2018-11-26

## 2018-11-26 PROBLEM — R80.9 NEPHROTIC RANGE PROTEINURIA: Status: ACTIVE | Noted: 2018-11-26

## 2018-11-26 LAB
ALBUMIN UR ELPH-MCNC: 76.1 %
ALPHA1 GLOB MFR UR ELPH: 4.6 %
ALPHA2 GLOB MFR UR ELPH: 4.8 %
ANION GAP SERPL CALCULATED.3IONS-SCNC: 8 MMOL/L (ref 4–13)
B-GLOBULIN MFR UR ELPH: 8.8 %
BASOPHILS # BLD AUTO: 0.05 THOUSANDS/ΜL (ref 0–0.1)
BASOPHILS NFR BLD AUTO: 1 % (ref 0–1)
BUN SERPL-MCNC: 23 MG/DL (ref 5–25)
CALCIUM SERPL-MCNC: 8.4 MG/DL (ref 8.3–10.1)
CHLORIDE SERPL-SCNC: 110 MMOL/L (ref 100–108)
CO2 SERPL-SCNC: 22 MMOL/L (ref 21–32)
CREAT SERPL-MCNC: 1.27 MG/DL (ref 0.6–1.3)
EOSINOPHIL # BLD AUTO: 0.16 THOUSAND/ΜL (ref 0–0.61)
EOSINOPHIL NFR BLD AUTO: 3 % (ref 0–6)
ERYTHROCYTE [DISTWIDTH] IN BLOOD BY AUTOMATED COUNT: 12.6 % (ref 11.6–15.1)
GAMMA GLOB MFR UR ELPH: 5.7 %
GFR SERPL CREATININE-BSD FRML MDRD: 48 ML/MIN/1.73SQ M
GLUCOSE SERPL-MCNC: 129 MG/DL (ref 65–140)
GLUCOSE SERPL-MCNC: 131 MG/DL (ref 65–140)
GLUCOSE SERPL-MCNC: 59 MG/DL (ref 65–140)
GLUCOSE SERPL-MCNC: 74 MG/DL (ref 65–140)
GLUCOSE SERPL-MCNC: 80 MG/DL (ref 65–140)
GLUCOSE SERPL-MCNC: 93 MG/DL (ref 65–140)
HCT VFR BLD AUTO: 30.3 % (ref 34.8–46.1)
HGB BLD-MCNC: 10.3 G/DL (ref 11.5–15.4)
IMM GRANULOCYTES # BLD AUTO: 0.02 THOUSAND/UL (ref 0–0.2)
IMM GRANULOCYTES NFR BLD AUTO: 0 % (ref 0–2)
LYMPHOCYTES # BLD AUTO: 1.85 THOUSANDS/ΜL (ref 0.6–4.47)
LYMPHOCYTES NFR BLD AUTO: 36 % (ref 14–44)
MCH RBC QN AUTO: 29.6 PG (ref 26.8–34.3)
MCHC RBC AUTO-ENTMCNC: 34 G/DL (ref 31.4–37.4)
MCV RBC AUTO: 87 FL (ref 82–98)
MONOCYTES # BLD AUTO: 0.37 THOUSAND/ΜL (ref 0.17–1.22)
MONOCYTES NFR BLD AUTO: 7 % (ref 4–12)
NEUTROPHILS # BLD AUTO: 2.69 THOUSANDS/ΜL (ref 1.85–7.62)
NEUTS SEG NFR BLD AUTO: 53 % (ref 43–75)
NRBC BLD AUTO-RTO: 0 /100 WBCS
PLATELET # BLD AUTO: 252 THOUSANDS/UL (ref 149–390)
PMV BLD AUTO: 9.7 FL (ref 8.9–12.7)
POTASSIUM SERPL-SCNC: 3.6 MMOL/L (ref 3.5–5.3)
PROT PATTERN UR ELPH-IMP: ABNORMAL
PROT UR-MCNC: 1487 MG/DL
RBC # BLD AUTO: 3.48 MILLION/UL (ref 3.81–5.12)
RYE IGE QN: NEGATIVE
SODIUM SERPL-SCNC: 140 MMOL/L (ref 136–145)
WBC # BLD AUTO: 5.14 THOUSAND/UL (ref 4.31–10.16)

## 2018-11-26 PROCEDURE — 80048 BASIC METABOLIC PNL TOTAL CA: CPT | Performed by: HOSPITALIST

## 2018-11-26 PROCEDURE — 85025 COMPLETE CBC W/AUTO DIFF WBC: CPT | Performed by: HOSPITALIST

## 2018-11-26 PROCEDURE — 82948 REAGENT STRIP/BLOOD GLUCOSE: CPT

## 2018-11-26 PROCEDURE — 99232 SBSQ HOSP IP/OBS MODERATE 35: CPT | Performed by: HOSPITALIST

## 2018-11-26 PROCEDURE — 71046 X-RAY EXAM CHEST 2 VIEWS: CPT

## 2018-11-26 PROCEDURE — 99232 SBSQ HOSP IP/OBS MODERATE 35: CPT | Performed by: INTERNAL MEDICINE

## 2018-11-26 RX ORDER — HYDRALAZINE HYDROCHLORIDE 20 MG/ML
10 INJECTION INTRAMUSCULAR; INTRAVENOUS ONCE
Status: COMPLETED | OUTPATIENT
Start: 2018-11-26 | End: 2018-11-26

## 2018-11-26 RX ORDER — GUAIFENESIN/DEXTROMETHORPHAN 100-10MG/5
10 SYRUP ORAL EVERY 4 HOURS PRN
Status: DISCONTINUED | OUTPATIENT
Start: 2018-11-26 | End: 2018-11-28 | Stop reason: HOSPADM

## 2018-11-26 RX ORDER — POTASSIUM CHLORIDE 20 MEQ/1
40 TABLET, EXTENDED RELEASE ORAL ONCE
Status: COMPLETED | OUTPATIENT
Start: 2018-11-26 | End: 2018-11-26

## 2018-11-26 RX ORDER — CHLORTHALIDONE 25 MG/1
25 TABLET ORAL DAILY
Status: DISCONTINUED | OUTPATIENT
Start: 2018-11-26 | End: 2018-11-27

## 2018-11-26 RX ADMIN — HEPARIN SODIUM 5000 UNITS: 5000 INJECTION, SOLUTION INTRAVENOUS; SUBCUTANEOUS at 14:00

## 2018-11-26 RX ADMIN — AMPICILLIN SODIUM 500 MG: 1 INJECTION, POWDER, FOR SOLUTION INTRAMUSCULAR; INTRAVENOUS at 11:44

## 2018-11-26 RX ADMIN — FAMOTIDINE 20 MG: 20 TABLET ORAL at 08:31

## 2018-11-26 RX ADMIN — HYDRALAZINE HYDROCHLORIDE 10 MG: 20 INJECTION INTRAMUSCULAR; INTRAVENOUS at 18:37

## 2018-11-26 RX ADMIN — ATORVASTATIN CALCIUM 80 MG: 40 TABLET, FILM COATED ORAL at 17:05

## 2018-11-26 RX ADMIN — GUAIFENESIN AND DEXTROMETHORPHAN 10 ML: 100; 10 SYRUP ORAL at 18:37

## 2018-11-26 RX ADMIN — AMLODIPINE BESYLATE 5 MG: 5 TABLET ORAL at 08:31

## 2018-11-26 RX ADMIN — HYDRALAZINE HYDROCHLORIDE 50 MG: 25 TABLET ORAL at 21:55

## 2018-11-26 RX ADMIN — LABETALOL HYDROCHLORIDE 300 MG: 200 TABLET, FILM COATED ORAL at 21:55

## 2018-11-26 RX ADMIN — HYDRALAZINE HYDROCHLORIDE 10 MG: 20 INJECTION INTRAMUSCULAR; INTRAVENOUS at 03:18

## 2018-11-26 RX ADMIN — FLUTICASONE PROPIONATE 1 SPRAY: 50 SPRAY, METERED NASAL at 08:32

## 2018-11-26 RX ADMIN — HYDRALAZINE HYDROCHLORIDE 10 MG: 20 INJECTION INTRAMUSCULAR; INTRAVENOUS at 17:05

## 2018-11-26 RX ADMIN — AMLODIPINE BESYLATE 5 MG: 5 TABLET ORAL at 21:55

## 2018-11-26 RX ADMIN — HYDRALAZINE HYDROCHLORIDE 50 MG: 25 TABLET ORAL at 14:00

## 2018-11-26 RX ADMIN — HEPARIN SODIUM 5000 UNITS: 5000 INJECTION, SOLUTION INTRAVENOUS; SUBCUTANEOUS at 21:55

## 2018-11-26 RX ADMIN — LABETALOL HYDROCHLORIDE 300 MG: 200 TABLET, FILM COATED ORAL at 08:31

## 2018-11-26 RX ADMIN — BUTALBITAL, ACETAMINOPHEN AND CAFFEINE 1 TABLET: 50; 325; 40 TABLET ORAL at 08:36

## 2018-11-26 RX ADMIN — ACETAMINOPHEN 650 MG: 325 TABLET ORAL at 05:05

## 2018-11-26 RX ADMIN — HYDRALAZINE HYDROCHLORIDE 50 MG: 25 TABLET ORAL at 05:00

## 2018-11-26 RX ADMIN — GUAIFENESIN AND DEXTROMETHORPHAN 10 ML: 100; 10 SYRUP ORAL at 11:49

## 2018-11-26 RX ADMIN — HEPARIN SODIUM 5000 UNITS: 5000 INJECTION, SOLUTION INTRAVENOUS; SUBCUTANEOUS at 05:05

## 2018-11-26 RX ADMIN — POTASSIUM CHLORIDE 40 MEQ: 1500 TABLET, EXTENDED RELEASE ORAL at 18:37

## 2018-11-26 RX ADMIN — ACETAMINOPHEN 650 MG: 325 TABLET ORAL at 18:40

## 2018-11-26 RX ADMIN — CHLORTHALIDONE 25 MG: 25 TABLET ORAL at 10:24

## 2018-11-26 NOTE — ASSESSMENT & PLAN NOTE
· Cr 1 27 today   · UA with 3+protein, moderate blood, hyaline casts  · Case discussed with nephrology; appreciate input  · US kidneys and bladder:  · No hydronephrosis  Stable left renal simple cyst   · Incidentally noted small ascites    · KENYATTA: negative   · ANCA: pending   · AntiDSDNA: negative   ·  C3/C4: wnl   · SPEP: no monoclonal bands   · UPEP: pending   · AntiGBM: pending   · Pr/cr ratio: 13   · Renin, raghav, plasma mets pending   · Follow BMP

## 2018-11-26 NOTE — UTILIZATION REVIEW
Continued Stay Review    Date: 11/26/2018    Vital Signs: BP (!) 205/98 (BP Location: Right arm)   Pulse 78   Temp 98 1 °F (36 7 °C) (Oral)   Resp 20   Ht 5' 2" (1 575 m)   Wt 98 3 kg (216 lb 11 4 oz)   SpO2 93%   BMI 39 64 kg/m²     Medications:   Scheduled Meds:   Current Facility-Administered Medications:  amLODIPine 5 mg Oral Q12H   ampicillin 500 mg Intravenous Q12H   atorvastatin 80 mg Oral QPM   chlorthalidone 25 mg Oral Daily   famotidine 20 mg Oral Daily   fluticasone 1 spray Each Nare Daily   heparin (porcine) 5,000 Units Subcutaneous Q8H [facility]   hydrALAZINE 50 mg Oral Q8H [address]   insulin glargine 50 Units Subcutaneous HS   insulin lispro 12 Units Subcutaneous TID With Meals   insulin lispro 2-12 Units Subcutaneous TID AC   insulin lispro 2-12 Units Subcutaneous HS   labetalol 300 mg Oral Q12H [address]     PRN Meds:   acetaminophen    benzonatate    butalbital-acetaminophen-caffeine    calcium carbonate    dextromethorphan-guaiFENesin    diphenhydrAMINE    hydrALAZINE    ondansetron    Abnormal Labs/Diagnostic Results: H/H 10 3/30 3, Cl 110    Age/Sex: 46 y o  female     Assessment/Plan:     * Hypertensive emergency   Assessment & Plan     · POA e/b CHRISTIANA with proteinuria,  · /110 on arrival  · Started on nicardipine, now dc'ed   ? Cont amlodipine, hydralazine and labetalol last night given elevated pressures  ? Chlorthalidone started today   · Rest of plan below        CHRISTIANA (acute kidney injury) ([facility] [address] )   Assessment & Plan     · Cr 1 27 today   · UA with 3+protein, moderate blood, hyaline casts  · Case discussed with nephrology; appreciate input  ? US kidneys and bladder:  § No hydronephrosis   Stable left renal simple cyst   § Incidentally noted small ascites  ? KENYATTA: negative   ? ANCA: pending   ? AntiDSDNA: negative   ? C3/C4: wnl   ? SPEP: no monoclonal bands   ? UPEP: pending   ? AntiGBM: pending   ? Pr/cr ratio: 13   ?  Renin, raghav, plasma mets pending   · Follow BMP       Headache Assessment & Plan     · Suspect this is related to severely elevated BPs; improved with better BP control  Still has intermittent HA   · APAP as needed       Cough   Assessment & Plan     Chronic for at least 1 month   consider ACEi intolerance vs viral URI vs post nasal drip   Start flonase   Cough suppressants as needed    Influenza/RSV: negative    CXR: with crowded vasculature;suboptimal inspiration; ?interstitial edema             Type 2 diabetes mellitus with hyperglycemia, with long-term current use of insulin [facility])   Assessment & Plan     Lab Results   Component Value Date     HGBA1C 8 9 (H) 10/04/2017                Recent Labs      11/25/18   1104  11/25/18   1607  11/25/18   2055  11/26/18   0713   POCGLU  109  252*  121  74         Blood Sugar Average: Last 72 hrs:  (P) 357 [id number] controlled; Will increase lantus and novolog given persistent elevations   Ultimately may need to switch to 70/30 if cost of meds remains an issue   ISS and accuchecks       Anemia   Assessment & Plan     Noted 10<--12 2  Stable at 10       UTI (urinary tract infection)   Assessment & Plan     Ucx: > 100K GBS  intrinsically sensitive to penicillins  ampicillin 500mg q12 x 3 days (day 2 of 3)       Diarrhea   Assessment & Plan     5 loose watery stools; no loose stool overnight, but notes some discomfort after eating   C diff: negative             VTE Pharmacologic Prophylaxis:   Pharmacologic: Heparin  Mechanical VTE Prophylaxis in Place:  Yes     Current Length of Stay: 4 day(s)     Current Patient Status: Inpatient   Certification Statement: The patient will continue to require additional inpatient hospital stay due to intitiation of diuretics in pt with nephrotic syndrome       Discharge Plan / Estimated Discharge Date: TBD based on clinical course

## 2018-11-26 NOTE — ASSESSMENT & PLAN NOTE
· Suspect this is related to severely elevated BPs; improved with better BP control   Still has intermittent HA   · APAP as needed

## 2018-11-26 NOTE — ASSESSMENT & PLAN NOTE
Lab Results   Component Value Date    HGBA1C 8 9 (H) 10/04/2017       Recent Labs      11/25/18   1104  11/25/18   1607  11/25/18   2055  11/26/18   0713   POCGLU  109  252*  121  74       Blood Sugar Average: Last 72 hrs:  (P) 816 [id number] controlled;    Will increase lantus and novolog given persistent elevations   Ultimately may need to switch to 70/30 if cost of meds remains an issue   ISS and accuchecks

## 2018-11-26 NOTE — ASSESSMENT & PLAN NOTE
5 loose watery stools; no loose stool overnight, but notes some discomfort after eating   C diff: negative

## 2018-11-26 NOTE — PROGRESS NOTES
NEPHROLOGY PROGRESS NOTE   He Greco 46 y o  female MRN: [id number]  Unit/Bed#:  Encounter: [id number]  Reason for Consult: HTN/CHRISTIANA    ASSESSMENT/PLAN:  1  Hypertensive Urgency- BP improving  - antihypertensive regimen includes amlodipine 5mg twice a day (started 11/24), hydralazine 50mg q8hr, labetalol 300mg twice a day, and chlorthalidone 25mg daily (started 11/26)  - off cardene drip  - low sodium diet  - workup pending: renin, aldosterone, metanephrines,   2  Edema- started on chlorthalidone  - will need to monitor potassium  3  Acute Kidney Injury- may be secondary to hypertensive urgency in the setting of an ACEI + NSAIDs  4  Chronic Kidney Disease stage III- Baseline creatinine is 1-1 3  Presumed etiology is hypertension and diabetes  Renal ultrasound without hydronephrosis or acute pathology  5  Proteinuria- with microscopic hematuria  - workup: SPEP (-), UPEP (_), FLC (_), C3/C4 (normal), KENYATTA (_), ANCA (_), antidsDNA (-), antiGBM (_), hepatitis (-)  - recheck UPC ratio once creatinine and hypertension controlled  6  Anemia- hgb stable in the 10s   - iron studies (_)    Disposition:  Start chlorthalidone today for edema    SUBJECTIVE:  Patient feeling puffy all over  States she is having trouble sleeping due to cough  States she is uncomfortable and feels her face and extremities are swollen      OBJECTIVE:  Current Weight: Weight - Scale: 98 3 kg (216 lb 11 4 oz)  Vitals:    11/26/18 0259 11/26/18 0700 11/26/18 0713 11/26/18 0835   BP: (!) 197/93 146/70 146/70 164/80   BP Location: Right arm Left arm     Pulse: 86 78  68   Resp: 20 16     Temp: 97 6 °F (36 4 °C) 98 4 °F (36 9 °C)     TempSrc: Oral Oral     SpO2: 97% 95%     Weight:       Height:           Intake/Output Summary (Last 24 hours) at 11/26/18 0854  Last data filed at 11/26/18 0601   Gross per 24 hour   Intake              680 ml   Output             2275 ml   Net            -1595 ml     General: NAD  Skin: no rash  HEENT: normocephalic  Neck: supple  Chest: fine basilar crackles  Heart: RRR  Abdomen: soft nt nd  Extremities: + bilateral edema  Neuro: alert awake  Psych: mood and affect appropriate    Medications:    Current Facility-Administered Medications:     acetaminophen (TYLENOL) tablet 650 mg, 650 mg, Oral, Q6H PRN, Misty Becker MD, 650 mg at 11/26/18 0505    amLODIPine (NORVASC) tablet 5 mg, 5 mg, Oral, Q12H, Deanne Raza MD, 5 mg at 11/26/18 0831    ampicillin (OMNIPEN) 500 mg in sodium chloride 0 9 % 50 mL IVPB, 500 mg, Intravenous, Q12H, Misty Becker MD, Stopped at 11/26/18 0400    atorvastatin (LIPITOR) tablet 80 mg, 80 mg, Oral, QPM, Misty Becker MD, 80 mg at 11/25/18 1718    benzonatate (TESSALON PERLES) capsule 200 mg, 200 mg, Oral, TID PRN, Penelope Stone PA-C, 200 mg at 11/25/18 2114    butalbital-acetaminophen-caffeine (FIORICET,ESGIC) -40 mg per tablet 1 tablet, 1 tablet, Oral, Q4H PRN, Misty Becker MD, 1 tablet at 11/26/18 0836    calcium carbonate (TUMS) chewable tablet 500 mg, 500 mg, Oral, TID PRN, Misty Becker MD, 500 mg at 11/23/18 1643    chlorthalidone tablet 25 mg, 25 mg, Oral, Daily, Kaylynn Serrano PA-C    diphenhydrAMINE (BENADRYL) tablet 25 mg, 25 mg, Oral, HS PRN, Jonathan Hall PA-C, 25 mg at 11/25/18 2122    famotidine (PEPCID) tablet 20 mg, 20 mg, Oral, Daily, Misty Becker MD, 20 mg at 11/26/18 0831    fluticasone (FLONASE) 50 mcg/act nasal spray 1 spray, 1 spray, Each Nare, Daily, Misty Becker MD, 1 spray at 11/26/18 1695    heparin (porcine) subcutaneous injection 5,000 Units, 5,000 Units, Subcutaneous, Q8H [address], 5,000 Units at 11/26/18 0505 **AND** Platelet count, , , Once, Misty Becker MD    hydrALAZINE (APRESOLINE) injection 10 mg, 10 mg, Intravenous, Q8H PRN, Deanne Raza MD, 10 mg at 11/26/18 0318    hydrALAZINE (APRESOLINE) tablet 50 mg, 50 mg, Oral, Q8H [address], Misty Becker MD, 50 mg at 11/26/18 0500    insulin glargine (LANTUS) subcutaneous injection 50 Units 0 5 mL, 50 Units, Subcutaneous, HS, Charmaine Frank MD, 50 Units at 11/25/18 2115    insulin lispro (HumaLOG) 100 units/mL subcutaneous injection 12 Units, 12 Units, Subcutaneous, TID With Meals, Charmaine Frank MD, 12 Units at 11/26/18 0832    insulin lispro (HumaLOG) 100 units/mL subcutaneous injection 2-12 Units, 2-12 Units, Subcutaneous, TID AC, 6 Units at 11/25/18 1621 **AND** Fingerstick Glucose (POCT), , , TID AC, Charmaine Frank MD    insulin lispro (HumaLOG) 100 units/mL subcutaneous injection 2-12 Units, 2-12 Units, Subcutaneous, HS, Charmaine Frank MD, 2 Units at 11/23/18 2120    labetalol (NORMODYNE) tablet 300 mg, 300 mg, Oral, Q12H [address], Charmaine Frank MD, 300 mg at 11/26/18 0831    ondansetron [facility]) injection 4 mg, 4 mg, Intravenous, Q6H PRN, Charmaine Frank MD    Laboratory Results:    Results from last 7 days  Lab Units 11/26/18  0502 11/25/18  0501 11/24/18  1000 11/24/18  0438 11/23/18  0439 11/22/18  1754 11/22/18  1149   WBC Thousand/uL 5 14 4 77 4 69 5 74  --   --  5 87   HEMOGLOBIN g/dL 10 3* 10 4* 10 0* 9 7*  --   --  12 2   HEMATOCRIT % 30 3* 30 8* 29 0* 29 6*  --   --  34 9   PLATELETS Thousands/uL 252 261 243 239  --  239 263   POTASSIUM mmol/L 3 6 3 7  --  3 3* 3 6  --  4 5   CHLORIDE mmol/L 110* 111*  --  108 105  --  106   CO2 mmol/L 22 21  --  21 20*  --  21   BUN mg/dL 23 24  --  27* 26*  --  19   CREATININE mg/dL 1 27 1 39*  --  1 86* 2 08*  --  1 30   CALCIUM mg/dL 8 4 8 3  --  7 8* 8 5  --  8 5

## 2018-11-26 NOTE — ASSESSMENT & PLAN NOTE
Chronic for at least 1 month   consider ACEi intolerance vs viral URI vs post nasal drip   Start flonase   Cough suppressants as needed    Influenza/RSV: negative    CXR: with crowded vasculature;suboptimal inspiration; ?interstitial edema

## 2018-11-26 NOTE — PROGRESS NOTES
Progress Note - Crispin Gutierrez 1965, 46 y o  female MRN: [id number]    Unit/Bed#:  Encounter: [id number]    Primary Care Provider: Crispin Gutierrez MD   Date and time admitted to hospital: 11/22/2018 10:42 AM    * Hypertensive emergency   Assessment & Plan    · POA e/b CHRISTIANA with proteinuria,  · /110 on arrival  · Started on nicardipine, now dc'ed   · Cont amlodipine, hydralazine and labetalol last night given elevated pressures  · Chlorthalidone started today   · Rest of plan below       CHRISTIANA (acute kidney injury) ([facility] [address] )   Assessment & Plan    · Cr 1 27 today   · UA with 3+protein, moderate blood, hyaline casts  · Case discussed with nephrology; appreciate input  · US kidneys and bladder:  · No hydronephrosis  Stable left renal simple cyst   · Incidentally noted small ascites  · KENYATTA: negative   · ANCA: pending   · AntiDSDNA: negative   ·  C3/C4: wnl   · SPEP: no monoclonal bands   · UPEP: pending   · AntiGBM: pending   · Pr/cr ratio: 13   · Renin, raghav, plasma mets pending   · Follow BMP      Headache   Assessment & Plan    · Suspect this is related to severely elevated BPs; improved with better BP control  Still has intermittent HA   · APAP as needed      Cough   Assessment & Plan    Chronic for at least 1 month   consider ACEi intolerance vs viral URI vs post nasal drip   Start flonase   Cough suppressants as needed    Influenza/RSV: negative    CXR: with crowded vasculature;suboptimal inspiration; ?interstitial edema          Type 2 diabetes mellitus with hyperglycemia, with long-term current use of insulin [facility])   Assessment & Plan    Lab Results   Component Value Date    HGBA1C 8 9 (H) 10/04/2017       Recent Labs      11/25/18   1104  11/25/18   1607  11/25/18   2055  11/26/18   0713   POCGLU  109  252*  121  74       Blood Sugar Average: Last 72 hrs:  (P) 308 [id number] controlled;    Will increase lantus and novolog given persistent elevations   Ultimately may need to switch to 70/30 if cost of meds remains an issue   ISS and accuchecks      Anemia   Assessment & Plan    Noted 10<--12 2  Stable at 10      UTI (urinary tract infection)   Assessment & Plan    Ucx: > 100K GBS  intrinsically sensitive to penicillins  ampicillin 500mg q12 x 3 days (day 2 of 3)      Diarrhea   Assessment & Plan    5 loose watery stools; no loose stool overnight, but notes some discomfort after eating   C diff: negative          VTE Pharmacologic Prophylaxis:   Pharmacologic: Heparin  Mechanical VTE Prophylaxis in Place: Yes    Patient Centered Rounds: I have performed bedside rounds with nursing staff today  Discussions with Specialists or Other Care Team Provider: nephrology     Education and Discussions with Family / Patient: patient     Time Spent for Care: 30 minutes  More than 50% of total time spent on counseling and coordination of care as described above  Current Length of Stay: 4 day(s)    Current Patient Status: Inpatient   Certification Statement: The patient will continue to require additional inpatient hospital stay due to intitiation of diuretics in pt with nephrotic syndrome  Discharge Plan / Estimated Discharge Date: TBD based on clinical course    Code Status: Level 1 - Full Code    Subjective:   Pt is note feeling well today  Feels increasingly swollen  Cough still pronounced  Pt having trouble sleeping as result  Objective:     Vitals:   Temp (24hrs), Av 2 °F (36 8 °C), Min:97 6 °F (36 4 °C), Max:98 5 °F (36 9 °C)    Temp:  [97 6 °F (36 4 °C)-98 5 °F (36 9 °C)] 98 4 °F (36 9 °C)  HR:  [66-86] 68  Resp:  [16-20] 16  BP: (140-197)/(64-93) 164/80  SpO2:  [93 %-97 %] 95 %  Body mass index is 39 64 kg/m²  Input and Output Summary (last 24 hours):        Intake/Output Summary (Last 24 hours) at 18 1209  Last data filed at 18 1101   Gross per 24 hour   Intake              390 ml   Output             1975 ml   Net            -1585 ml       Physical Exam: Physical Exam   Constitutional: She is oriented to person, place, and time  No distress  HENT:   Head: Normocephalic and atraumatic  Cardiovascular: Normal rate and regular rhythm  No murmur heard  Pulmonary/Chest: Effort normal and breath sounds normal  No respiratory distress  She has no wheezes  She has no rales  Abdominal: Soft  Bowel sounds are normal  She exhibits no distension  There is no tenderness  Musculoskeletal: She exhibits edema (diffuse UE/LE and facial swelling )  Neurological: She is alert and oriented to person, place, and time  Skin: Skin is warm and dry  She is not diaphoretic  Psychiatric: She has a normal mood and affect  Her behavior is normal    Nursing note and vitals reviewed  Additional Data:     Labs:      Results from last 7 days  Lab Units 11/26/18  0502   WBC Thousand/uL 5 14   HEMOGLOBIN g/dL 10 3*   HEMATOCRIT % 30 3*   PLATELETS Thousands/uL 252   NEUTROS PCT % 53   LYMPHS PCT % 36   MONOS PCT % 7   EOS PCT % 3       Results from last 7 days  Lab Units 11/26/18  0502  11/23/18  0439   POTASSIUM mmol/L 3 6  < > 3 6   CHLORIDE mmol/L 110*  < > 105   CO2 mmol/L 22  < > 20*   BUN mg/dL 23  < > 26*   CREATININE mg/dL 1 27  < > 2 08*   CALCIUM mg/dL 8 4  < > 8 5   ALK PHOS U/L  --   --  103   ALT U/L  --   --  33   AST U/L  --   --  27   < > = values in this interval not displayed  Results from last 7 days  Lab Units 11/22/18  1149   INR  0 81*       * I Have Reviewed All Lab Data Listed Above  * Additional Pertinent Lab Tests Reviewed:  All Labs Within Last 24 Hours Reviewed    Imaging:    Imaging Reports Reviewed Today Include:   Imaging Personally Reviewed by Myself Includes:      Recent Cultures (last 7 days):       Results from last 7 days  Lab Units 11/24/18  1116 11/24/18  1037 11/22/18  1940   URINE CULTURE   --   --  >100,000 cfu/ml Beta Hemolytic Streptococcus Group B*   INFLUENZA B PCR   --  None Detected  --    RSV PCR   --  None Detected  --    C DIFF TOXIN B  NEGATIVE for C difficle toxin by PCR    --   --        Last 24 Hours Medication List:     Current Facility-Administered Medications:  acetaminophen 650 mg Oral Q6H PRN Srinath Hayes MD    amLODIPine 5 mg Oral Q12H David Borja MD    ampicillin 500 mg Intravenous Q12H Srinath Hayes MD Last Rate: 500 mg (11/26/18 1144)   atorvastatin 80 mg Oral QPM Srinath Hayes MD    benzonatate 200 mg Oral TID PRN Venu Orona PA-C    butalbital-acetaminophen-caffeine 1 tablet Oral Q4H PRN Srinath Hayes MD    calcium carbonate 500 mg Oral TID PRN Srinath Hayes MD    chlorthalidone 25 mg Oral Daily Kaylynncorey Serrano PA-C    dextromethorphan-guaiFENesin 10 mL Oral Q4H PRN Srinath Hayes MD    diphenhydrAMINE 25 mg Oral HS PRN Jonathan Hall PA-C    famotidine 20 mg Oral Daily Srinath Hayes MD    fluticasone 1 spray Each Nare Daily Srinath Hayes MD    heparin (porcine) 5,000 Units Subcutaneous [facility] Srinath Hayes MD    hydrALAZINE 10 mg Intravenous Q8H PRN David Borja MD    hydrALAZINE 50 mg Oral [facility] Srinath Hayes MD    insulin glargine 50 Units Subcutaneous HS Srinath Hayes MD    insulin lispro 12 Units Subcutaneous TID With Meals Srinath Hayes MD    insulin lispro 2-12 Units Subcutaneous TID Gladys Bran MD    insulin lispro 2-12 Units Subcutaneous HS Srinath Hayes MD    labetalol 300 mg Oral Q12H Lexie Cavanaugh MD    ondansetron 4 mg Intravenous Q6H PRN Srinath Hayes MD         Today, Patient Was Seen By: Srinath Hayes MD    ** Please Note: This note has been constructed using a voice recognition system   **

## 2018-11-26 NOTE — ASSESSMENT & PLAN NOTE
· POA e/b CHRISTIANA with proteinuria,  · /110 on arrival  · Started on nicardipine, now dc'ed   · Cont amlodipine, hydralazine and labetalol last night given elevated pressures  · Chlorthalidone started today   · Rest of plan below

## 2018-11-27 LAB
ALDOST SERPL-MCNC: 1.2 NG/DL (ref 0–30)
ANION GAP SERPL CALCULATED.3IONS-SCNC: 8 MMOL/L (ref 4–13)
BUN SERPL-MCNC: 18 MG/DL (ref 5–25)
CALCIUM SERPL-MCNC: 8.3 MG/DL (ref 8.3–10.1)
CH50 SERPL-ACNC: 59 U/ML
CHLORIDE SERPL-SCNC: 108 MMOL/L (ref 100–108)
CO2 SERPL-SCNC: 23 MMOL/L (ref 21–32)
CREAT SERPL-MCNC: 1.28 MG/DL (ref 0.6–1.3)
FERRITIN SERPL-MCNC: 85 NG/ML (ref 8–388)
GBM AB SER IA-ACNC: 5 UNITS (ref 0–20)
GFR SERPL CREATININE-BSD FRML MDRD: 48 ML/MIN/1.73SQ M
GLUCOSE SERPL-MCNC: 107 MG/DL (ref 65–140)
GLUCOSE SERPL-MCNC: 152 MG/DL (ref 65–140)
GLUCOSE SERPL-MCNC: 74 MG/DL (ref 65–140)
GLUCOSE SERPL-MCNC: 94 MG/DL (ref 65–140)
GLUCOSE SERPL-MCNC: 96 MG/DL (ref 65–140)
IRON SATN MFR SERPL: 26 %
IRON SERPL-MCNC: 70 UG/DL (ref 50–170)
KAPPA LC FREE SER-MCNC: 32.2 MG/L (ref 3.3–19.4)
KAPPA LC FREE/LAMBDA FREE SER: 1.16 {RATIO} (ref 0.26–1.65)
LAMBDA LC FREE SERPL-MCNC: 27.8 MG/L (ref 5.7–26.3)
POTASSIUM SERPL-SCNC: 4.1 MMOL/L (ref 3.5–5.3)
SODIUM SERPL-SCNC: 139 MMOL/L (ref 136–145)
TIBC SERPL-MCNC: 268 UG/DL (ref 250–450)

## 2018-11-27 PROCEDURE — 99232 SBSQ HOSP IP/OBS MODERATE 35: CPT | Performed by: INTERNAL MEDICINE

## 2018-11-27 PROCEDURE — 83540 ASSAY OF IRON: CPT | Performed by: PHYSICIAN ASSISTANT

## 2018-11-27 PROCEDURE — 80048 BASIC METABOLIC PNL TOTAL CA: CPT | Performed by: PHYSICIAN ASSISTANT

## 2018-11-27 PROCEDURE — 82948 REAGENT STRIP/BLOOD GLUCOSE: CPT

## 2018-11-27 PROCEDURE — 82728 ASSAY OF FERRITIN: CPT | Performed by: PHYSICIAN ASSISTANT

## 2018-11-27 PROCEDURE — 83550 IRON BINDING TEST: CPT | Performed by: PHYSICIAN ASSISTANT

## 2018-11-27 PROCEDURE — 99232 SBSQ HOSP IP/OBS MODERATE 35: CPT | Performed by: PHYSICIAN ASSISTANT

## 2018-11-27 RX ORDER — HYDRALAZINE HYDROCHLORIDE 25 MG/1
75 TABLET, FILM COATED ORAL EVERY 8 HOURS SCHEDULED
Status: DISCONTINUED | OUTPATIENT
Start: 2018-11-27 | End: 2018-11-28 | Stop reason: HOSPADM

## 2018-11-27 RX ORDER — CHLORTHALIDONE 25 MG/1
25 TABLET ORAL ONCE
Status: COMPLETED | OUTPATIENT
Start: 2018-11-27 | End: 2018-11-27

## 2018-11-27 RX ORDER — NIFEDIPINE 30 MG/1
30 TABLET, EXTENDED RELEASE ORAL DAILY
Status: DISCONTINUED | OUTPATIENT
Start: 2018-11-27 | End: 2018-11-28 | Stop reason: HOSPADM

## 2018-11-27 RX ORDER — CHLORTHALIDONE 25 MG/1
50 TABLET ORAL DAILY
Status: DISCONTINUED | OUTPATIENT
Start: 2018-11-28 | End: 2018-11-28 | Stop reason: HOSPADM

## 2018-11-27 RX ORDER — HYDRALAZINE HYDROCHLORIDE 20 MG/ML
10 INJECTION INTRAMUSCULAR; INTRAVENOUS EVERY 6 HOURS PRN
Status: DISCONTINUED | OUTPATIENT
Start: 2018-11-27 | End: 2018-11-28 | Stop reason: HOSPADM

## 2018-11-27 RX ADMIN — CHLORTHALIDONE 25 MG: 25 TABLET ORAL at 08:00

## 2018-11-27 RX ADMIN — LABETALOL HYDROCHLORIDE 300 MG: 200 TABLET, FILM COATED ORAL at 08:00

## 2018-11-27 RX ADMIN — AMPICILLIN SODIUM 500 MG: 1 INJECTION, POWDER, FOR SOLUTION INTRAMUSCULAR; INTRAVENOUS at 01:39

## 2018-11-27 RX ADMIN — HEPARIN SODIUM 5000 UNITS: 5000 INJECTION, SOLUTION INTRAVENOUS; SUBCUTANEOUS at 14:13

## 2018-11-27 RX ADMIN — FAMOTIDINE 20 MG: 20 TABLET ORAL at 08:00

## 2018-11-27 RX ADMIN — GUAIFENESIN AND DEXTROMETHORPHAN 10 ML: 100; 10 SYRUP ORAL at 20:09

## 2018-11-27 RX ADMIN — HEPARIN SODIUM 5000 UNITS: 5000 INJECTION, SOLUTION INTRAVENOUS; SUBCUTANEOUS at 22:23

## 2018-11-27 RX ADMIN — HYDRALAZINE HYDROCHLORIDE 75 MG: 25 TABLET ORAL at 23:12

## 2018-11-27 RX ADMIN — HYDRALAZINE HYDROCHLORIDE 50 MG: 25 TABLET ORAL at 05:42

## 2018-11-27 RX ADMIN — ACETAMINOPHEN 650 MG: 325 TABLET ORAL at 09:59

## 2018-11-27 RX ADMIN — LABETALOL HYDROCHLORIDE 300 MG: 200 TABLET, FILM COATED ORAL at 22:22

## 2018-11-27 RX ADMIN — INSULIN LISPRO 2 UNITS: 100 INJECTION, SOLUTION INTRAVENOUS; SUBCUTANEOUS at 22:24

## 2018-11-27 RX ADMIN — GUAIFENESIN AND DEXTROMETHORPHAN 10 ML: 100; 10 SYRUP ORAL at 05:48

## 2018-11-27 RX ADMIN — ACETAMINOPHEN 650 MG: 325 TABLET ORAL at 01:37

## 2018-11-27 RX ADMIN — BENZONATATE 200 MG: 100 CAPSULE ORAL at 01:39

## 2018-11-27 RX ADMIN — GUAIFENESIN AND DEXTROMETHORPHAN 10 ML: 100; 10 SYRUP ORAL at 16:02

## 2018-11-27 RX ADMIN — HYDRALAZINE HYDROCHLORIDE 75 MG: 25 TABLET ORAL at 14:13

## 2018-11-27 RX ADMIN — AMLODIPINE BESYLATE 5 MG: 5 TABLET ORAL at 08:00

## 2018-11-27 RX ADMIN — BUTALBITAL, ACETAMINOPHEN AND CAFFEINE 1 TABLET: 50; 325; 40 TABLET ORAL at 17:21

## 2018-11-27 RX ADMIN — CHLORTHALIDONE 25 MG: 25 TABLET ORAL at 10:00

## 2018-11-27 RX ADMIN — GUAIFENESIN AND DEXTROMETHORPHAN 10 ML: 100; 10 SYRUP ORAL at 10:00

## 2018-11-27 RX ADMIN — HEPARIN SODIUM 5000 UNITS: 5000 INJECTION, SOLUTION INTRAVENOUS; SUBCUTANEOUS at 05:42

## 2018-11-27 RX ADMIN — NIFEDIPINE 30 MG: 30 TABLET, FILM COATED, EXTENDED RELEASE ORAL at 20:09

## 2018-11-27 RX ADMIN — FLUTICASONE PROPIONATE 1 SPRAY: 50 SPRAY, METERED NASAL at 08:00

## 2018-11-27 RX ADMIN — ATORVASTATIN CALCIUM 80 MG: 40 TABLET, FILM COATED ORAL at 17:21

## 2018-11-27 RX ADMIN — INSULIN GLARGINE 50 UNITS: 100 INJECTION, SOLUTION SUBCUTANEOUS at 23:11

## 2018-11-27 NOTE — ASSESSMENT & PLAN NOTE
Lab Results   Component Value Date    HGBA1C 8 9 (H) 10/04/2017       Recent Labs      11/26/18   2110  11/26/18   2137  11/27/18   0720  11/27/18   1140   POCGLU  59*  93  94  74       Blood Sugar Average: Last 72 hrs:  (P) 118poorly controlled;    Will increase lantus and novolog given persistent elevations   Ultimately may need to switch to 70/30 if cost of meds remains an issue   ISS and accuchecks

## 2018-11-27 NOTE — PROGRESS NOTES
Progress Note - Chio Burns 1965, 46 y o  female MRN: [id number]    Unit/Bed#:  Encounter: [id number]    Primary Care Provider: Chio Burns MD   Date and time admitted to hospital: 11/22/2018 10:42 AM        * Hypertensive emergency   Assessment & Plan    · POA e/b CHRISTIANA with proteinuria,  · /110 on arrival  · Started on nicardipine, now dc'ed   · Cont amlodipine, hydralazine and labetalol  · Chlorthalidone dose increased today  · Nephrology follow-up ongoing     Type 2 diabetes mellitus with hyperglycemia, with long-term current use of insulin [facility])   Assessment & Plan    Lab Results   Component Value Date    HGBA1C 8 9 (H) 10/04/2017       Recent Labs      11/26/18   2110  11/26/18   2137  11/27/18   0720  11/27/18   1140   POCGLU  59*  93  94  74       Blood Sugar Average: Last 72 hrs:  (P) 118poorly controlled; Will increase lantus and novolog given persistent elevations   Ultimately may need to switch to 70/30 if cost of meds remains an issue   ISS and accuchecks      CHRISTIANA (acute kidney injury) ([facility] [address] )   Assessment & Plan      · UA with 3+protein, moderate blood, hyaline casts  · Nephrology follow-up ongoing  · US kidneys and bladder:  · No hydronephrosis  Stable left renal simple cyst   · Incidentally noted small ascites  · KENYATTA: negative   · ANCA: pending   · AntiDSDNA: negative   ·  C3/C4: wnl   · SPEP: no monoclonal bands   · UPEP: pending   · AntiGBM: pending   · Pr/cr ratio: 13   · Renin, raghav, plasma mets pending   · Follow BMP      Headache   Assessment & Plan    · Suspect this is related to severely elevated BPs; improved with better BP control   Still has intermittent HA   · APAP as needed      Cough   Assessment & Plan    Chronic for at least 1 month   consider ACEi intolerance vs viral URI vs post nasal drip   Start flonase   Cough suppressants as needed    Influenza/RSV: negative    CXR: with crowded vasculature;suboptimal inspiration; ?interstitial edema          UTI (urinary tract infection)   Assessment & Plan    Ucx: > 100K GBS  intrinsically sensitive to penicillins  Completed ampicillin     Anemia   Assessment & Plan    Noted 10<--12 2  Stable at 10        VTE Pharmacologic Prophylaxis:   Pharmacologic: Heparin  Mechanical VTE Prophylaxis in Place: Yes    Patient Centered Rounds: I have performed bedside rounds with nursing staff today  Discussions with Specialists or Other Care Team Provider:  Nursing    Education and Discussions with Family / Patient    Time Spent for Care: 20 minutes  More than 50% of total time spent on counseling and coordination of care as described above  Current Length of Stay: 5 day(s)    Current Patient Status: Inpatient   Certification Statement: The patient will continue to require additional inpatient hospital stay due to Elevated blood pressure    Discharge Plan:  Likely next 24 hours    Code Status: Level 1 - Full Code      Subjective:   Feeling okay today  Complaining of headache  Blood pressure was elevated this morning  Complaining of swelling    Objective:     Vitals:   Temp (24hrs), Av 1 °F (36 7 °C), Min:98 °F (36 7 °C), Max:98 2 °F (36 8 °C)    Temp:  [98 °F (36 7 °C)-98 2 °F (36 8 °C)] 98 2 °F (36 8 °C)  HR:  [70-78] 70  Resp:  [20] 20  BP: (136-227)/() 136/67  SpO2:  [93 %-95 %] 95 %  Body mass index is 39 64 kg/m²  Input and Output Summary (last 24 hours): Intake/Output Summary (Last 24 hours) at 18 1306  Last data filed at 18 0720   Gross per 24 hour   Intake               50 ml   Output             1550 ml   Net            -1500 ml       Physical Exam:     Physical Exam     Gen -Patient comfortable   Neck- Supple  No thyromegaly or lymphadenopathy  Lungs-Clear bilaterally without any wheeze or rales   Heart S1-S2, regular rate and rhythm, no murmurs  Abdomen-soft nontender, no organomegaly   Bowel sounds present  Extremities-no cyanosi,  clubbing ; pitting edema bilateral upper and lower extremities as well as face  Skin- no rash  Neuro-nonfocal         Additional Data:     Labs:      Results from last 7 days  Lab Units 11/26/18  0502   WBC Thousand/uL 5 14   HEMOGLOBIN g/dL 10 3*   HEMATOCRIT % 30 3*   PLATELETS Thousands/uL 252   NEUTROS PCT % 53   LYMPHS PCT % 36   MONOS PCT % 7   EOS PCT % 3       Results from last 7 days  Lab Units 11/27/18  0453  11/23/18  0439   SODIUM mmol/L 139  < > 137   POTASSIUM mmol/L 4 1  < > 3 6   CHLORIDE mmol/L 108  < > 105   CO2 mmol/L 23  < > 20*   BUN mg/dL 18  < > 26*   CREATININE mg/dL 1 28  < > 2 08*   ANION GAP mmol/L 8  < > 12   CALCIUM mg/dL 8 3  < > 8 5   ALBUMIN g/dL  --   --  2 3*   TOTAL BILIRUBIN mg/dL  --   --  0 30   ALK PHOS U/L  --   --  103   ALT U/L  --   --  33   AST U/L  --   --  27   GLUCOSE RANDOM mg/dL 96  < > 290*   < > = values in this interval not displayed  Results from last 7 days  Lab Units 11/22/18  1149   INR  0 81*       Results from last 7 days  Lab Units 11/27/18  1140 11/27/18  0720 11/26/18  2137 11/26/18  2110 11/26/18  1602 11/26/18  1146 11/26/18  0713 11/25/18  2055 11/25/18  1607 11/25/18  1104 11/25/18  0701 11/24/18  2033   POC GLUCOSE mg/dl 74 94 93 59* 129 131 74 121 252* 109 99 146*                   * I Have Reviewed All Lab Data Listed Above  * Additional Pertinent Lab Tests Reviewed:  All Labs Within Last 24 Hours Reviewed    Imaging:    Imaging Reports Reviewed Today Include:   Imaging Personally Reviewed by Myself Includes:      Recent Cultures (last 7 days):       Results from last 7 days  Lab Units 11/24/18  1116 11/24/18  1037 11/22/18  1940   URINE CULTURE   --   --  >100,000 cfu/ml Beta Hemolytic Streptococcus Group B*   INFLUENZA B PCR   --  None Detected  --    RSV PCR   --  None Detected  --    C DIFF TOXIN B  NEGATIVE for C difficle toxin by PCR    --   --        Last 24 Hours Medication List:     Current Facility-Administered Medications:  acetaminophen 650 mg Oral Q6H PRN Marcus Richardson MD atorvastatin 80 mg Oral QPM Gian Nielsen MD   benzonatate 200 mg Oral TID PRN Tito Da Silva PA-C   butalbital-acetaminophen-caffeine 1 tablet Oral Q4H PRN Gian Nielsen MD   calcium carbonate 500 mg Oral TID PRN Gian Nielsen MD   [START ON 11/28/2018] chlorthalidone 50 mg Oral Daily Kaylynn Serrano PA-C   dextromethorphan-guaiFENesin 10 mL Oral Q4H PRN Gian Nielsen MD   diphenhydrAMINE 25 mg Oral HS PRN Jonathan Hall PA-C   famotidine 20 mg Oral Daily Gian Nielsen MD   fluticasone 1 spray Each Nare Daily Gian Nielsen MD   heparin (porcine) 5,000 Units Subcutaneous [facility] Gian Nielsen MD   hydrALAZINE 10 mg Intravenous Q6H PRN Kaylynn Serrano PA-C   hydrALAZINE 75 mg Oral [facility] Kaylynn Serrano PA-C   insulin glargine 50 Units Subcutaneous HS Gian Nielsen MD   insulin lispro 12 Units Subcutaneous TID With Meals Gian Nielsen MD   insulin lispro 2-12 Units Subcutaneous TID Timmy Montez MD   insulin lispro 2-12 Units Subcutaneous HS Gian Nielsen MD   labetalol 300 mg Oral Q12H [facility] MD David   NIFEdipine 30 mg Oral Daily Yane Guzman MD   ondansetron 4 mg Intravenous Q6H PRN Gian Nielsen MD        Today, Patient Was Seen By: Guerda Hamilton MD    ** Please Note: Dictation voice to text software may have been used in the creation of this document   **

## 2018-11-27 NOTE — ASSESSMENT & PLAN NOTE
· POA e/b CHRISTIANA with proteinuria,  · /110 on arrival  · Started on nicardipine, now dc'ed   · Cont amlodipine, hydralazine and labetalol  · Chlorthalidone dose increased today  · Nephrology follow-up ongoing

## 2018-11-27 NOTE — PROGRESS NOTES
NEPHROLOGY PROGRESS NOTE   Marj Reyes 46 y o  female MRN: [id number]  Unit/Bed#:  Encounter: [id number]  Reason for Consult: HTN    ASSESSMENT/PLAN:  1  Hypertensive Urgency- BP still elevated and patient still symptomatic  - antihypertensive regimen includes amlodipine 5mg twice a day (started 11/24), hydralazine 50mg q8hr, labetalol 300mg twice a day, and chlorthalidone 25mg daily (started 11/26)  - increase hydralazine and chlorthalidone today 11/27  - off cardene drip  - low sodium diet  - workup pending: renin, aldosterone, metanephrines  2  Edema- started on chlorthalidone  - will need to monitor potassium  3  Acute Kidney Injury- may be secondary to hypertensive urgency in the setting of an ACEI + NSAIDs  - now resolved  4  Chronic Kidney Disease stage III- Baseline creatinine is 1-1 3  Presumed etiology is hypertension and diabetes  Renal ultrasound without hydronephrosis or acute pathology  5  Proteinuria- with microscopic hematuria  - workup: SPEP (-), UPEP (-), FLC (_), C3/C4 (normal), KENYATTA (-), ANCA (_), antidsDNA (-), antiGBM (_), hepatitis (-)  - recheck UPC ratio once creatinine and hypertension controlled  - consider biopsy as an outpatient  6  Anemia- hgb stable in the 10s   - iron studies (_)    Disposition:  Increase chlorthalidone and hydralazine    SUBJECTIVE:  Patient has a headache and states she feels terrible      OBJECTIVE:  Current Weight: Weight - Scale: 98 3 kg (216 lb 11 4 oz)  Vitals:    11/26/18 2155 11/27/18 0542 11/27/18 0700 11/27/18 0801   BP: (!) 173/82 (!) 188/97 (!) 195/93 (!) 205/98   BP Location:   Right arm    Pulse:       Resp:       Temp:   98 °F (36 7 °C)    TempSrc:       SpO2:       Weight:       Height:           Intake/Output Summary (Last 24 hours) at 11/27/18 0916  Last data filed at 11/27/18 0720   Gross per 24 hour   Intake               50 ml   Output             1750 ml   Net            -1700 ml     General: NAD  Skin: no rash  HEENT: normocephalic  Neck: supple  Chest: CTAB  Heart: RRR  Abdomen: soft nt nd  Extremities: + bilateral edema  Neuro: alert awake  Psych: fatigued    Medications:    Current Facility-Administered Medications:     acetaminophen (TYLENOL) tablet 650 mg, 650 mg, Oral, Q6H PRN, Melba Martinez MD, 650 mg at 11/27/18 0137    amLODIPine (NORVASC) tablet 5 mg, 5 mg, Oral, Q12H, Peggy Mcbride MD, 5 mg at 11/27/18 0800    atorvastatin (LIPITOR) tablet 80 mg, 80 mg, Oral, QPM, Melba Martinez MD, 80 mg at 11/26/18 1705    benzonatate (TESSALON PERLES) capsule 200 mg, 200 mg, Oral, TID PRN, Nathaniel Meléndez PA-C, 200 mg at 11/27/18 0139    butalbital-acetaminophen-caffeine (FIORICET,ESGIC) -40 mg per tablet 1 tablet, 1 tablet, Oral, Q4H PRN, Melba Martinez MD, 1 tablet at 11/26/18 0836    calcium carbonate (TUMS) chewable tablet 500 mg, 500 mg, Oral, TID PRN, Melba Martinez MD, 500 mg at 11/23/18 1643    chlorthalidone tablet 25 mg, 25 mg, Oral, Daily, Kaylynn Serrano PA-C, 25 mg at 11/27/18 0800    dextromethorphan-guaiFENesin (ROBITUSSIN DM)  mg/5 mL oral syrup 10 mL, 10 mL, Oral, Q4H PRN, Melba Martinez MD, 10 mL at 11/27/18 0548    diphenhydrAMINE (BENADRYL) tablet 25 mg, 25 mg, Oral, HS PRN, Jonathan Hall PA-C, 25 mg at 11/25/18 2122    famotidine (PEPCID) tablet 20 mg, 20 mg, Oral, Daily, Melba Martinez MD, 20 mg at 11/27/18 0800    fluticasone (FLONASE) 50 mcg/act nasal spray 1 spray, 1 spray, Each Nare, Daily, Melba Martinez MD, 1 spray at 11/27/18 0800    heparin (porcine) subcutaneous injection 5,000 Units, 5,000 Units, Subcutaneous, Q8H [address], 5,000 Units at 11/27/18 0542 **AND** Platelet count, , , Once, Melba Martinez MD    hydrALAZINE (APRESOLINE) injection 10 mg, 10 mg, Intravenous, Q8H PRN, Peggy Mcbride MD, 10 mg at 11/26/18 1705    hydrALAZINE (APRESOLINE) tablet 50 mg, 50 mg, Oral, Q8H [address], Melba Martinez MD, 50 mg at 11/27/18 0542    insulin glargine (LANTUS) subcutaneous injection 50 Units 0 5 mL, 50 Units, Subcutaneous, HS, Angie Iglesias MD, Stopped at 11/26/18 2203    insulin lispro (HumaLOG) 100 units/mL subcutaneous injection 12 Units, 12 Units, Subcutaneous, TID With Meals, Angie Iglesias MD, 12 Units at 11/27/18 0759    insulin lispro (HumaLOG) 100 units/mL subcutaneous injection 2-12 Units, 2-12 Units, Subcutaneous, TID AC, 6 Units at 11/25/18 1621 **AND** Fingerstick Glucose (POCT), , , TID AC, Angie Iglesias MD    insulin lispro (HumaLOG) 100 units/mL subcutaneous injection 2-12 Units, 2-12 Units, Subcutaneous, HS, Angie Iglesias MD, 2 Units at 11/23/18 2120    labetalol (NORMODYNE) tablet 300 mg, 300 mg, Oral, Q12H [address], Angie Iglesias MD, 300 mg at 11/27/18 0800    ondansetron [facility]) injection 4 mg, 4 mg, Intravenous, Q6H PRN, Angie Iglesias MD    Laboratory Results:    Results from last 7 days  Lab Units 11/27/18  0453 11/26/18  0502 11/25/18  0501 11/24/18  1000 11/24/18  0438 11/23/18  0439 11/22/18  1754 11/22/18  1149   WBC Thousand/uL  --  5 14 4 77 4 69 5 74  --   --  5 87   HEMOGLOBIN g/dL  --  10 3* 10 4* 10 0* 9 7*  --   --  12 2   HEMATOCRIT %  --  30 3* 30 8* 29 0* 29 6*  --   --  34 9   PLATELETS Thousands/uL  --  252 261 243 239  --  239 263   POTASSIUM mmol/L 4 1 3 6 3 7  --  3 3* 3 6  --  4 5   CHLORIDE mmol/L 108 110* 111*  --  108 105  --  106   CO2 mmol/L 23 22 21  --  21 20*  --  21   BUN mg/dL 18 23 24  --  27* 26*  --  19   CREATININE mg/dL 1 28 1 27 1 39*  --  1 86* 2 08*  --  1 30   CALCIUM mg/dL 8 3 8 4 8 3  --  7 8* 8 5  --  8 5

## 2018-11-27 NOTE — SOCIAL WORK
LOS 5  Patient is not a bundle  Patient is not a readmission  CM met with patient and daughter Pedro Hannon at bedside  Daughter states that patient lives in a 2 story home: daughter and  live on the first floor, patient lives on the second floor  Daughter Pedro Florentinomadi states that patient is able to navigate the stairs and daughter assists as needed  Current DME includes a walker and a cane  Daughter states that patient used [facility] very briefly following a previous admission for a CVA  Daughter states that patient fills her prescriptions at [facility] and self-pays  Denies mental health and drug/alcohol history  Patient is unemployed  Daughter provides all transportation  Patient currently does not have insurance, MA pending  Daughter requested information on applying to become a caregiver for her mother; CM provided [facility] reference material/contact information  Patient and daughter are agreeable to CM setting up follow up KIM appt with [facility] PCP  No POA established and not interested in information on this at this time  CM name and role reviewed  Discharge Checklist reviewed and CM will continue to monitor for progress toward discharge goals in nursing and provider rounds  CM reviewed discharge planning process including the following: identifying caregivers at home, preference for d/c planning needs, availability of treatment team to discuss questions or concerns patient and/or family may have regarding diagnosis, plan of care, old or new medications and discharge planning   CM will continue to follow for care coordination and update assessment as necessary

## 2018-11-27 NOTE — PLAN OF CARE
Problem: DISCHARGE PLANNING - CARE MANAGEMENT  Goal: Discharge to post-acute care or home with appropriate resources  INTERVENTIONS:  - Conduct assessment to determine patient/family and health care team treatment goals, and need for post-acute services based on payer coverage, community resources, and patient preferences, and barriers to discharge  - Address psychosocial, clinical, and financial barriers to discharge as identified in assessment in conjunction with the patient/family and health care team  - Arrange appropriate level of post-acute services according to patients   needs and preference and payer coverage in collaboration with the physician and health care team  - Communicate with and update the patient/family, physician, and health care team regarding progress on the discharge plan  - Arrange appropriate transportation to post-acute venues  Outcome: Progressing  LOS 5  Patient is not a bundle  Patient is not a readmission  CM met with patient and daughter Riya Canchola at bedside  Daughter states that patient lives in a 2 story home: daughter and  live on the first floor, patient lives on the second floor  Daughter Riya Canchola states that patient is able to navigate the stairs and daughter assists as needed  Current DME includes a walker and a cane  Daughter states that patient used [facility] very briefly following a previous admission for a CVA  Daughter states that patient fills her prescriptions at [facility] and self-pays  Denies mental health and drug/alcohol history  Patient is unemployed  Daughter provides all transportation  Patient currently does not have insurance, MA pending  Daughter requested information on applying to become a caregiver for her mother; CM provided [facility] reference material/contact information  Patient and daughter are agreeable to CM setting up follow up KIM appt with [facility] PCP    No POA established and not interested in information on this at this time  CM name and role reviewed  Discharge Checklist reviewed and CM will continue to monitor for progress toward discharge goals in nursing and provider rounds  CM reviewed discharge planning process including the following: identifying caregivers at home, preference for d/c planning needs, availability of treatment team to discuss questions or concerns patient and/or family may have regarding diagnosis, plan of care, old or new medications and discharge planning   CM will continue to follow for care coordination and update assessment as necessary

## 2018-11-27 NOTE — DISCHARGE INSTR - OTHER ORDERS
[facility]PARTMENT OF [facility]  [address] [place] [address], [address] [place] [address] [place]  [phone]    You can follow up with [facility] on [address] [facility]

## 2018-11-27 NOTE — ASSESSMENT & PLAN NOTE
· UA with 3+protein, moderate blood, hyaline casts  · Nephrology follow-up ongoing  · US kidneys and bladder:  · No hydronephrosis  Stable left renal simple cyst   · Incidentally noted small ascites    · KENYATTA: negative   · ANCA: pending   · AntiDSDNA: negative   ·  C3/C4: wnl   · SPEP: no monoclonal bands   · UPEP: pending   · AntiGBM: pending   · Pr/cr ratio: 13   · Renin, raghav, plasma mets pending   · Follow BMP

## 2018-11-28 VITALS
SYSTOLIC BLOOD PRESSURE: 164 MMHG | RESPIRATION RATE: 21 BRPM | HEART RATE: 74 BPM | OXYGEN SATURATION: 95 % | DIASTOLIC BLOOD PRESSURE: 82 MMHG | BODY MASS INDEX: 40.25 KG/M2 | HEIGHT: 62 IN | TEMPERATURE: 98 F | WEIGHT: 218.7 LBS

## 2018-11-28 PROBLEM — I16.1 HYPERTENSIVE EMERGENCY: Status: RESOLVED | Noted: 2018-11-22 | Resolved: 2018-11-28

## 2018-11-28 PROBLEM — N39.0 UTI (URINARY TRACT INFECTION): Status: RESOLVED | Noted: 2018-11-24 | Resolved: 2018-11-28

## 2018-11-28 PROBLEM — N17.9 AKI (ACUTE KIDNEY INJURY) (HCC): Status: RESOLVED | Noted: 2017-10-03 | Resolved: 2018-11-28

## 2018-11-28 PROBLEM — R51.9 HEADACHE: Status: RESOLVED | Noted: 2018-09-23 | Resolved: 2018-11-28

## 2018-11-28 LAB
ANION GAP SERPL CALCULATED.3IONS-SCNC: 9 MMOL/L (ref 4–13)
BUN SERPL-MCNC: 18 MG/DL (ref 5–25)
C-ANCA TITR SER IF: NORMAL TITER
CALCIUM SERPL-MCNC: 8.5 MG/DL (ref 8.3–10.1)
CHLORIDE SERPL-SCNC: 108 MMOL/L (ref 100–108)
CO2 SERPL-SCNC: 23 MMOL/L (ref 21–32)
CREAT SERPL-MCNC: 1.3 MG/DL (ref 0.6–1.3)
GFR SERPL CREATININE-BSD FRML MDRD: 47 ML/MIN/1.73SQ M
GLUCOSE SERPL-MCNC: 105 MG/DL (ref 65–140)
GLUCOSE SERPL-MCNC: 118 MG/DL (ref 65–140)
GLUCOSE SERPL-MCNC: 148 MG/DL (ref 65–140)
GLUCOSE SERPL-MCNC: 71 MG/DL (ref 65–140)
GLUCOSE SERPL-MCNC: 97 MG/DL (ref 65–140)
METANEPH FREE SERPL-MCNC: 40 PG/ML (ref 0–62)
MYELOPEROXIDASE AB SER IA-ACNC: <9 U/ML (ref 0–9)
NORMETANEPHRINE SERPL-MCNC: 300 PG/ML (ref 0–145)
P-ANCA ATYPICAL TITR SER IF: NORMAL TITER
P-ANCA TITR SER IF: NORMAL TITER
POTASSIUM SERPL-SCNC: 4.2 MMOL/L (ref 3.5–5.3)
PROTEINASE3 AB SER IA-ACNC: <3.5 U/ML (ref 0–3.5)
SODIUM SERPL-SCNC: 140 MMOL/L (ref 136–145)

## 2018-11-28 PROCEDURE — 82948 REAGENT STRIP/BLOOD GLUCOSE: CPT

## 2018-11-28 PROCEDURE — 80048 BASIC METABOLIC PNL TOTAL CA: CPT | Performed by: PHYSICIAN ASSISTANT

## 2018-11-28 PROCEDURE — 99239 HOSP IP/OBS DSCHRG MGMT >30: CPT | Performed by: INTERNAL MEDICINE

## 2018-11-28 PROCEDURE — 99232 SBSQ HOSP IP/OBS MODERATE 35: CPT | Performed by: PHYSICIAN ASSISTANT

## 2018-11-28 RX ORDER — LABETALOL 300 MG/1
300 TABLET, FILM COATED ORAL EVERY 12 HOURS SCHEDULED
Qty: 60 TABLET | Refills: 0 | Status: ON HOLD | OUTPATIENT
Start: 2018-11-28 | End: 2019-06-26 | Stop reason: SDUPTHER

## 2018-11-28 RX ORDER — CHLORTHALIDONE 50 MG/1
50 TABLET ORAL DAILY
Qty: 30 TABLET | Refills: 0 | Status: SHIPPED | OUTPATIENT
Start: 2018-11-29 | End: 2019-02-22 | Stop reason: HOSPADM

## 2018-11-28 RX ORDER — HYDROCODONE POLISTIREX AND CHLORPHENIRAMINE POLISTIREX 10; 8 MG/5ML; MG/5ML
5 SUSPENSION, EXTENDED RELEASE ORAL 2 TIMES DAILY
Qty: 100 ML | Refills: 0 | Status: SHIPPED | OUTPATIENT
Start: 2018-11-28 | End: 2018-12-08

## 2018-11-28 RX ORDER — NIFEDIPINE 30 MG/1
30 TABLET, FILM COATED, EXTENDED RELEASE ORAL DAILY
Qty: 30 TABLET | Refills: 0 | Status: ON HOLD | OUTPATIENT
Start: 2018-11-29 | End: 2019-06-26 | Stop reason: SDUPTHER

## 2018-11-28 RX ORDER — HYDROCODONE POLISTIREX AND CHLORPHENIRAMINE POLISTIREX 10; 8 MG/5ML; MG/5ML
5 SUSPENSION, EXTENDED RELEASE ORAL 2 TIMES DAILY
Status: DISCONTINUED | OUTPATIENT
Start: 2018-11-28 | End: 2018-11-28 | Stop reason: HOSPADM

## 2018-11-28 RX ORDER — BENZONATATE 100 MG/1
200 CAPSULE ORAL 3 TIMES DAILY
Status: DISCONTINUED | OUTPATIENT
Start: 2018-11-28 | End: 2018-11-28 | Stop reason: HOSPADM

## 2018-11-28 RX ORDER — HYDRALAZINE HYDROCHLORIDE 25 MG/1
75 TABLET, FILM COATED ORAL 3 TIMES DAILY
Qty: 90 TABLET | Refills: 0 | Status: SHIPPED | OUTPATIENT
Start: 2018-11-28 | End: 2019-06-26 | Stop reason: HOSPADM

## 2018-11-28 RX ORDER — BUTALBITAL, ACETAMINOPHEN AND CAFFEINE 50; 325; 40 MG/1; MG/1; MG/1
1 TABLET ORAL EVERY 4 HOURS PRN
Qty: 30 TABLET | Refills: 0 | Status: SHIPPED | OUTPATIENT
Start: 2018-11-28 | End: 2018-11-28 | Stop reason: HOSPADM

## 2018-11-28 RX ADMIN — GUAIFENESIN AND DEXTROMETHORPHAN 10 ML: 100; 10 SYRUP ORAL at 06:21

## 2018-11-28 RX ADMIN — HEPARIN SODIUM 5000 UNITS: 5000 INJECTION, SOLUTION INTRAVENOUS; SUBCUTANEOUS at 06:16

## 2018-11-28 RX ADMIN — ATORVASTATIN CALCIUM 80 MG: 40 TABLET, FILM COATED ORAL at 17:11

## 2018-11-28 RX ADMIN — BENZONATATE 200 MG: 100 CAPSULE ORAL at 10:18

## 2018-11-28 RX ADMIN — HYDROCODONE POLISTIREX AND CHLORPHENIRAMINE POLISTIREX 5 ML: 10; 8 SUSPENSION, EXTENDED RELEASE ORAL at 17:12

## 2018-11-28 RX ADMIN — HYDRALAZINE HYDROCHLORIDE 75 MG: 25 TABLET ORAL at 06:16

## 2018-11-28 RX ADMIN — HYDROCODONE POLISTIREX AND CHLORPHENIRAMINE POLISTIREX 5 ML: 10; 8 SUSPENSION, EXTENDED RELEASE ORAL at 10:18

## 2018-11-28 RX ADMIN — BUTALBITAL, ACETAMINOPHEN AND CAFFEINE 1 TABLET: 50; 325; 40 TABLET ORAL at 06:15

## 2018-11-28 RX ADMIN — HEPARIN SODIUM 5000 UNITS: 5000 INJECTION, SOLUTION INTRAVENOUS; SUBCUTANEOUS at 13:52

## 2018-11-28 RX ADMIN — NIFEDIPINE 30 MG: 30 TABLET, FILM COATED, EXTENDED RELEASE ORAL at 08:34

## 2018-11-28 RX ADMIN — HYDRALAZINE HYDROCHLORIDE 75 MG: 25 TABLET ORAL at 13:52

## 2018-11-28 RX ADMIN — BENZONATATE 200 MG: 100 CAPSULE ORAL at 17:11

## 2018-11-28 RX ADMIN — LABETALOL HYDROCHLORIDE 300 MG: 200 TABLET, FILM COATED ORAL at 08:34

## 2018-11-28 RX ADMIN — FAMOTIDINE 20 MG: 20 TABLET ORAL at 08:34

## 2018-11-28 RX ADMIN — FLUTICASONE PROPIONATE 1 SPRAY: 50 SPRAY, METERED NASAL at 08:35

## 2018-11-28 RX ADMIN — CHLORTHALIDONE 50 MG: 25 TABLET ORAL at 08:35

## 2018-11-28 NOTE — NURSING NOTE
AVS reviewed with patient and daughter  Stated understanding, questions answered  Taken down by PCA in wheelchair

## 2018-11-28 NOTE — PROGRESS NOTES
NEPHROLOGY PROGRESS NOTE   [facility] 46 [facility]  [facility] MRN: [id number]  Unit/Bed#: -Alisha Encounter: [id number]  Reason for Consult: HTN    ASSESSMENT/PLAN:  1  Hypertensive Urgency- BP elevated this AM but patient's headache improved some, also with three medication changes 11/27, will not change anything today unless BP stays elevated tonight then can increase procardia to BID  - antihypertensive regimen includes hydralazine 75mg q8hr, labetalol 300mg twice a day, procardia 30mg daily, and chlorthalidone 50mg daily  - off cardene drip  - low sodium diet  - workup pending: renin __, aldosterone 1 2, metanephrines __  2  Edema- started on chlorthalidone  - will need to monitor potassium  3  Acute Kidney Injury- may be secondary to hypertensive urgency in the setting of an ACEI + NSAIDs  - now resolved  4  Chronic Kidney Disease stage III- Baseline creatinine is 1-1  3   Presumed etiology is hypertension and diabetes   Renal ultrasound without hydronephrosis or acute pathology  5  Proteinuria- with microscopic hematuria  - workup: SPEP (-), UPEP (-), FLC (-), C3/C4 (normal), total complements (-), KENYATTA (-), ANCA (_), antidsDNA (-), antiGBM (-), hepatitis (-)  - recheck UPC ratio once creatinine and hypertension controlled  - consider biopsy as an outpatient  6  Anemia- hgb stable in the 10s   - iron studies acceptable    SUBJECTIVE:  Patient states headache is better  Complaining of dry cough at night      OBJECTIVE:  Current Weight: Weight - Scale: 99 2 kg (218 lb 11 1 oz)  Vitals:    11/27/18 2241 11/27/18 2311 11/28/18 0615 11/28/18 0729   BP: 152/77 140/63 (!) 183/106 (!) 174/90   BP Location: Right arm   Left arm   Pulse: 77   94   Resp: 20   20   Temp: 98 6 °F (37 °C)   98 1 °F (36 7 °C)   TempSrc: Oral   Oral   SpO2: 90%   94%   Weight:       Height:         No intake or output data in the 24 hours ending 11/28/18 1006  General: NAD  Skin: no rash  HEENT: normocephalic  Neck: supple  Chest: CTAB  Heart: RRR  Abdomen: soft nt nd  Extremities: + edema  Neuro: alert awake  Psych: mood and affect appropriate    Medications:    Current Facility-Administered Medications:     acetaminophen (TYLENOL) tablet 650 mg, 650 mg, Oral, Q6H PRN, Nasir Torres MD, 650 mg at 11/27/18 0959    atorvastatin (LIPITOR) tablet 80 mg, 80 mg, Oral, QPM, Nasir Torres MD, 80 mg at 11/27/18 1721    benzonatate (TESSALON PERLES) capsule 200 mg, 200 mg, Oral, TID, Irma De Leon MD    butalbital-acetaminophen-caffeine (FIORICET,ESGIC) -40 mg per tablet 1 tablet, 1 tablet, Oral, Q4H PRN, Nasir Torres MD, 1 tablet at 11/28/18 0615    calcium carbonate (TUMS) chewable tablet 500 mg, 500 mg, Oral, TID PRN, Nasir Torres MD, 500 mg at 11/23/18 1643    chlorthalidone tablet 50 mg, 50 mg, Oral, Daily, Kaylynn Serrano PA-C, 50 mg at 11/28/18 0835    dextromethorphan-guaiFENesin (ROBITUSSIN DM)  mg/5 mL oral syrup 10 mL, 10 mL, Oral, Q4H PRN, Nasir Torres MD, 10 mL at 11/28/18 6518    diphenhydrAMINE (BENADRYL) tablet 25 mg, 25 mg, Oral, HS PRN, Jonathan Hall PA-C, 25 mg at 11/25/18 2122    famotidine (PEPCID) tablet 20 mg, 20 mg, Oral, Daily, Nasir Torres MD, 20 mg at 11/28/18 0834    fluticasone (FLONASE) 50 mcg/act nasal spray 1 spray, 1 spray, Each Nare, Daily, Nasir Torres MD, 1 spray at 11/28/18 0835    heparin (porcine) subcutaneous injection 5,000 Units, 5,000 Units, Subcutaneous, Q8H [address], 5,000 Units at 11/28/18 0616 **AND** Platelet count, , , Once, Nasir Torres MD    hydrALAZINE (APRESOLINE) injection 10 mg, 10 mg, Intravenous, Q6H PRN, Kaylynn Serrano PA-C    hydrALAZINE (APRESOLINE) tablet 75 mg, 75 mg, Oral, Q8H [address], Kaylynn Serrano PA-C, 75 mg at 11/28/18 0616    hydrocodone-chlorpheniramine polistirex (TUSSIONEX) 10-8 mg/5 mL ER suspension 5 mL, 5 mL, Oral, BID, Irma De Leon MD    insulin glargine (LANTUS) subcutaneous injection 50 Units 0 5 mL, 50 Units, Subcutaneous, , Onofre Henry Ernesto Hoffman MD, 50 Units at 11/27/18 2311    insulin lispro (HumaLOG) 100 units/mL subcutaneous injection 12 Units, 12 Units, Subcutaneous, TID With Meals, Srinath Hayes MD, 12 Units at 11/28/18 0837    insulin lispro (HumaLOG) 100 units/mL subcutaneous injection 2-12 Units, 2-12 Units, Subcutaneous, TID AC, 6 Units at 11/25/18 1621 **AND** Fingerstick Glucose (POCT), , , TID AC, Srinath Hayes MD    insulin lispro (HumaLOG) 100 units/mL subcutaneous injection 2-12 Units, 2-12 Units, Subcutaneous, HS, Srinath Hayes MD, 2 Units at 11/27/18 2224    labetalol (NORMODYNE) tablet 300 mg, 300 mg, Oral, Q12H [address], Srinath Hayes MD, 300 mg at 11/28/18 0834    NIFEdipine (PROCARDIA XL) 24 hr tablet 30 mg, 30 mg, Oral, Daily, Mirta Ceja MD, 30 mg at 11/28/18 0834    ondansetron [facility]) injection 4 mg, 4 mg, Intravenous, Q6H PRN, Srinath Hayes MD    Laboratory Results:    Results from last 7 days  Lab Units 11/28/18  0751 11/27/18  0453 11/26/18  0502 11/25/18  0501 11/24/18  1000 11/24/18  0438 11/23/18  0439 11/22/18  1754 11/22/18  1149   WBC Thousand/uL  --   --  5 14 4 77 4 69 5 74  --   --  5 87   HEMOGLOBIN g/dL  --   --  10 3* 10 4* 10 0* 9 7*  --   --  12 2   HEMATOCRIT %  --   --  30 3* 30 8* 29 0* 29 6*  --   --  34 9   PLATELETS Thousands/uL  --   --  252 261 243 239  --  239 263   POTASSIUM mmol/L 4 2 4 1 3 6 3 7  --  3 3* 3 6  --  4 5   CHLORIDE mmol/L 108 108 110* 111*  --  108 105  --  106   CO2 mmol/L 23 23 22 21  --  21 20*  --  21   BUN mg/dL 18 18 23 24  --  27* 26*  --  19   CREATININE mg/dL 1 30 1 28 1 27 1 39*  --  1 86* 2 08*  --  1 30   CALCIUM mg/dL 8 5 8 3 8 4 8 3  --  7 8* 8 5  --  8 5

## 2018-11-28 NOTE — SOCIAL WORK
Given scripts for labetalol, nifedipine, and chlorthalidone from Dr Dany Hall for price check as pt does not have insurance  Scripts sent to [facility]  Per Javier Aguilar at [facility], total cost is $96 20 which pt's daughter states she cannot afford  Indigent med process complete and medications picked up from [facility] by this CM and given to nurse Tony Styles  Script received for Tussonex and ran for price  Cost is $62 67  D/t medication not being life sustaining, medication not filled and pt's daughter given script if she would like to fill at a later time

## 2018-11-28 NOTE — DISCHARGE SUMMARY
Discharge Summary - [facility] Internal Medicine    Patient Information: Pamela Ruff 46 y o  female MRN: [id number]  Unit/Bed#: -01 Encounter: [id number]    Discharging Physician / Practitioner: Marin Pace MD  PCP: Pamela Ruff MD  Admission Date: 11/22/2018  Discharge Date: 11/28/18    Disposition:     Home    Reason for Admission:  Headache and cough    Discharge Diagnoses:     Principal Problem:    Hypertensive emergency  Active Problems:    Type 2 diabetes mellitus with hyperglycemia, with long-term current use of insulin ([facility] )    CHRISTIANA (acute kidney injury) ([email or web address] )    Headache    Cough    UTI (urinary tract infection)    Anemia    Diarrhea    Nephrotic range proteinuria    Stage 3 chronic kidney disease ([facility] )  Resolved Problems:    * No resolved hospital problems  *      Consultations During Hospital Stay:  · Nephrology    Procedures Performed:     · CT head no acute pathology  · Chest x-ray normal  · Kidney ultrasound:  No hydronephrosis    Significant Findings / Test Results:     · As above    Hospital Course:     Pamela Ruff is a 46 y o  female patient who originally presented to the hospital on 11/22/2018 due to headache and cough  History was supplied by her daughter, who reported that patient has not been feeling well for few days  She noted foamy urine with swelling of bilateral upper and lower extremities including facial swelling  She also reported elevated blood pressure  Subsequently evaluated and in the emergency department patient noted to have blood pressure of 260/110  She was subsequently admitted to step-down unit for Cardene drip  Once blood pressure stabilized, her p o  antihypertensive regimen was titrated  She was seen by Nephrology, and noted to have nephrotic range proteinuria  Patient had several serological tests performed which are pending at the time of discharge  She will require follow-up with Nephrology to determine need for kidney biopsy    Her blood pressure stabilized with labetalol, hydralazine, Procardia and chlorthalidone  She is being discharged home in stable condition  Was provided with contact number for nephrologist      Condition at Discharge: good     Discharge Day Visit / Exam:     Subjective:  Feeling better today  Headache is controlled  Vitals: Blood Pressure: 164/82 (11/28/18 1539)  Pulse: 74 (11/28/18 1539)  Temperature: 98 °F (36 7 °C) (11/28/18 1539)  Temp Source: Oral (11/28/18 1539)  Respirations: 21 (11/28/18 1539)  Height: 5' 2" (157 5 cm) (11/27/18 1633)  Weight - Scale: 99 2 kg (218 lb 11 1 oz) (11/27/18 1633)  SpO2: 95 % (11/28/18 1539)  Exam:   Physical Exam     Gen -Patient comfortable  Pallor noted   Neck- Supple  No thyromegaly or lymphadenopathy  Lungs-Clear bilaterally without any wheeze or rales   Heart S1-S2, regular rate and rhythm, no murmurs  Abdomen-soft nontender, no organomegaly  Bowel sounds present  Extremities-no cyanosi,  clubbing   Bilateral upper lower extremity edema and periorbital swelling  Skin- no rash  Neuro-nonfocal     Discussion with Family:  Discussed with daughter    Discharge instructions/Information to patient and family:   See after visit summary for information provided to patient and family  Provisions for Follow-Up Care:  See after visit summary for information related to follow-up care and any pertinent home health orders  Planned Readmission: no     Discharge Statement:  I spent 35 minutes discharging the patient  This time was spent on the day of discharge  I had direct contact with the patient on the day of discharge  Greater than 50% of the total time was spent examining patient, answering all patient questions, arranging and discussing plan of care with patient as well as directly providing post-discharge instructions  Additional time then spent on discharge activities      Discharge Medications:  See after visit summary for reconciled discharge medications provided to patient and family        ** Please Note: This note has been constructed using a voice recognition system **

## 2018-11-29 LAB — RENIN PLAS-CCNC: 2.07 NG/ML/HR (ref 0.17–5.38)

## 2018-11-29 NOTE — PROGRESS NOTES
Hello    Patient normally is followed up by Ms Claire Gibbs  Patient was recently discharged from the hospital   I am not sure the patient will be following    Patient had indeterminate normetanephrine level   -we set the patient up with a follow-up appointment in the office, can the patient have a BMP and also plasma free metanephrines    Thank you    np

## 2018-11-30 ENCOUNTER — TELEPHONE (OUTPATIENT)
Dept: NEPHROLOGY | Facility: CLINIC | Age: 53
End: 2018-11-30

## 2018-11-30 NOTE — TELEPHONE ENCOUNTER
----- Message from Wing Estrella MD sent at 11/29/2018  4:28 PM EST -----  Hello    Patient normally is followed up by Ms Dahiana Sharpe  Patient was recently discharged from the hospital   I am not sure the patient will be following    Patient had indeterminate normetanephrine level   -we set the patient up with a follow-up appointment in the office, can the patient have a BMP and also plasma free metanephrines    Thank you    np

## 2018-11-30 NOTE — TELEPHONE ENCOUNTER
I Lm for patient to call the office and schedule appt for [facility] F/U  Will place labs in chart once spoken to pt

## 2018-12-04 DIAGNOSIS — N17.9 AKI (ACUTE KIDNEY INJURY) (HCC): Primary | ICD-10-CM

## 2019-01-19 NOTE — SOCIAL WORK
CM met with Pt at bedside  Pt was unable to afford medications which is what caused her to be hospitalized  Pt is MA Pending  Dr Rojas Mention was able to change some medications to medications that can be filled on the $4 [facility] list, Pt is able to afford those medications  Pt will also fill insulin at [facility] as it is $25, nurse Daniel Ruff will review education with Pt and Pt's daughter prior to discharge  Pt is unable to afford other medications, per Shaggy Bowers at [facility] is 100% eligible for assistance  CM sent medications that are not on the $4 list to [facility] for indigent, indigent meds were filled for topamax, tricor, lexapro, and neurontin  CM picked up meds at [facility] and delivered to Pt's room  Pt states she can no longer go to her PCP because she doesn't have insurance  Pt states she needs a doctor close by as her daughter normally transports her but is busy with work and school  CM arranged appointment for Pt at [facility] in [place] on Tuesday October 23 at 2:20pm as that is the next available appointments  Pt agreeable to going there and time  Pt will discharge home today  no nausea/no vomiting

## 2019-02-16 ENCOUNTER — APPOINTMENT (INPATIENT)
Dept: ULTRASOUND IMAGING | Facility: HOSPITAL | Age: 54
DRG: 683 | End: 2019-02-16
Payer: COMMERCIAL

## 2019-02-16 ENCOUNTER — APPOINTMENT (EMERGENCY)
Dept: RADIOLOGY | Facility: HOSPITAL | Age: 54
DRG: 683 | End: 2019-02-16
Payer: COMMERCIAL

## 2019-02-16 ENCOUNTER — HOSPITAL ENCOUNTER (INPATIENT)
Facility: HOSPITAL | Age: 54
LOS: 6 days | Discharge: HOME | DRG: 683 | End: 2019-02-22
Attending: EMERGENCY MEDICINE | Admitting: HOSPITALIST
Payer: COMMERCIAL

## 2019-02-16 DIAGNOSIS — N04.9 NEPHROTIC SYNDROME: Primary | ICD-10-CM

## 2019-02-16 DIAGNOSIS — H53.8 BLURRY VISION, BILATERAL: ICD-10-CM

## 2019-02-16 DIAGNOSIS — R06.09 OTHER FORM OF DYSPNEA: ICD-10-CM

## 2019-02-16 DIAGNOSIS — I10 HYPERTENSION: Chronic | ICD-10-CM

## 2019-02-16 DIAGNOSIS — I16.0 HYPERTENSIVE URGENCY: ICD-10-CM

## 2019-02-16 PROBLEM — E87.70 VOLUME OVERLOAD: Status: ACTIVE | Noted: 2019-02-16

## 2019-02-16 PROBLEM — N18.4 CHRONIC KIDNEY DISEASE, STAGE IV (SEVERE) (HCC): Status: ACTIVE | Noted: 2019-02-16

## 2019-02-16 PROBLEM — E11.21 DIABETIC NEPHROPATHY ASSOCIATED WITH TYPE 2 DIABETES MELLITUS (HCC): Status: ACTIVE | Noted: 2019-02-16

## 2019-02-16 PROBLEM — D63.1 ANEMIA OF CHRONIC RENAL FAILURE, STAGE 4 (SEVERE) (HCC): Status: ACTIVE | Noted: 2019-02-16

## 2019-02-16 PROBLEM — N18.4 ANEMIA OF CHRONIC RENAL FAILURE, STAGE 4 (SEVERE) (HCC): Status: ACTIVE | Noted: 2019-02-16

## 2019-02-16 PROBLEM — J81.0 ACUTE PULMONARY EDEMA (HCC): Status: ACTIVE | Noted: 2019-02-16

## 2019-02-16 PROBLEM — I50.33 ACUTE ON CHRONIC DIASTOLIC HEART FAILURE (HCC): Status: ACTIVE | Noted: 2019-02-16

## 2019-02-16 LAB
ALBUMIN SERPL BCP-MCNC: 2.2 G/DL (ref 3.5–5)
ALP SERPL-CCNC: 117 U/L (ref 46–116)
ALT SERPL W P-5'-P-CCNC: 22 U/L (ref 12–78)
ANION GAP SERPL CALCULATED.3IONS-SCNC: 10 MMOL/L (ref 4–13)
AST SERPL W P-5'-P-CCNC: 26 U/L (ref 5–45)
ATRIAL RATE: 127 BPM
BACTERIA UR QL AUTO: ABNORMAL /HPF
BASOPHILS # BLD AUTO: 0.05 THOUSANDS/ΜL (ref 0–0.1)
BASOPHILS NFR BLD AUTO: 1 % (ref 0–1)
BILIRUB SERPL-MCNC: 0.3 MG/DL (ref 0.2–1)
BILIRUB UR QL STRIP: NEGATIVE
BUN SERPL-MCNC: 32 MG/DL (ref 5–25)
CALCIUM SERPL-MCNC: 8.9 MG/DL (ref 8.3–10.1)
CHLORIDE SERPL-SCNC: 107 MMOL/L (ref 100–108)
CHOLEST SERPL-MCNC: 295 MG/DL (ref 50–200)
CK MB SERPL-MCNC: 2.5 NG/ML (ref 0–5)
CK MB SERPL-MCNC: <1 % (ref 0–2.5)
CK SERPL-CCNC: 271 U/L (ref 26–192)
CLARITY UR: ABNORMAL
CLARITY UR: CLEAR
CLARITY UR: CLEAR
CO2 SERPL-SCNC: 25 MMOL/L (ref 21–32)
COLOR UR: YELLOW
CREAT SERPL-MCNC: 2.28 MG/DL (ref 0.6–1.3)
CREAT UR-MCNC: 127 MG/DL
DATE SPECIMEN #1: NORMAL
EOSINOPHIL # BLD AUTO: 0.26 THOUSAND/ΜL (ref 0–0.61)
EOSINOPHIL NFR BLD AUTO: 4 % (ref 0–6)
ERYTHROCYTE [DISTWIDTH] IN BLOOD BY AUTOMATED COUNT: 12.4 % (ref 11.6–15.1)
FINE GRAN CASTS URNS QL MICRO: ABNORMAL /LPF
GFR SERPL CREATININE-BSD FRML MDRD: 24 ML/MIN/1.73SQ M
GLUCOSE SERPL-MCNC: 150 MG/DL (ref 65–140)
GLUCOSE SERPL-MCNC: 156 MG/DL (ref 65–140)
GLUCOSE SERPL-MCNC: 184 MG/DL (ref 65–140)
GLUCOSE UR STRIP-MCNC: ABNORMAL MG/DL
HCT VFR BLD AUTO: 32.1 % (ref 34.8–46.1)
HDLC SERPL-MCNC: 48 MG/DL (ref 40–60)
HEMOCCULT SP1 STL QL: NEGATIVE
HGB BLD-MCNC: 10.8 G/DL (ref 11.5–15.4)
HGB UR QL STRIP.AUTO: ABNORMAL
HYALINE CASTS #/AREA URNS LPF: ABNORMAL /LPF
IGA SERPL-MCNC: 207 MG/DL (ref 70–400)
IMM GRANULOCYTES # BLD AUTO: 0.02 THOUSAND/UL (ref 0–0.2)
IMM GRANULOCYTES NFR BLD AUTO: 0 % (ref 0–2)
KETONES UR STRIP-MCNC: NEGATIVE MG/DL
LDLC SERPL CALC-MCNC: 175 MG/DL (ref 0–100)
LEUKOCYTE ESTERASE UR QL STRIP: NEGATIVE
LYMPHOCYTES # BLD AUTO: 1.89 THOUSANDS/ΜL (ref 0.6–4.47)
LYMPHOCYTES NFR BLD AUTO: 30 % (ref 14–44)
MCH RBC QN AUTO: 29.8 PG (ref 26.8–34.3)
MCHC RBC AUTO-ENTMCNC: 33.6 G/DL (ref 31.4–37.4)
MCV RBC AUTO: 89 FL (ref 82–98)
MONOCYTES # BLD AUTO: 0.38 THOUSAND/ΜL (ref 0.17–1.22)
MONOCYTES NFR BLD AUTO: 6 % (ref 4–12)
NEUTROPHILS # BLD AUTO: 3.69 THOUSANDS/ΜL (ref 1.85–7.62)
NEUTS SEG NFR BLD AUTO: 59 % (ref 43–75)
NITRITE UR QL STRIP: NEGATIVE
NON-SQ EPI CELLS URNS QL MICRO: ABNORMAL /HPF
NONHDLC SERPL-MCNC: 247 MG/DL
NRBC BLD AUTO-RTO: 0 /100 WBCS
NT-PROBNP SERPL-MCNC: 2992 PG/ML
P AXIS: 56 DEGREES
PH UR STRIP.AUTO: 6.5 [PH] (ref 4.5–8)
PLATELET # BLD AUTO: 304 THOUSANDS/UL (ref 149–390)
PMV BLD AUTO: 10.3 FL (ref 8.9–12.7)
POTASSIUM SERPL-SCNC: 3.8 MMOL/L (ref 3.5–5.3)
PR INTERVAL: 158 MS
PROT SERPL-MCNC: 6.4 G/DL (ref 6.4–8.2)
PROT UR STRIP-MCNC: >=300 MG/DL
PROT UR-MCNC: 1463 MG/DL
PROT/CREAT UR: 11.52 MG/G{CREAT} (ref 0–0.1)
QRS AXIS: -11 DEGREES
QRSD INTERVAL: 70 MS
QT INTERVAL: 334 MS
QTC INTERVAL: 408 MS
RBC # BLD AUTO: 3.62 MILLION/UL (ref 3.81–5.12)
RBC #/AREA URNS AUTO: ABNORMAL /HPF
SODIUM SERPL-SCNC: 142 MMOL/L (ref 136–145)
SP GR UR STRIP.AUTO: 1.02 (ref 1–1.03)
T WAVE AXIS: 57 DEGREES
TRIGL SERPL-MCNC: 358 MG/DL
TROPONIN I SERPL-MCNC: 0.02 NG/ML
TROPONIN I SERPL-MCNC: <0.02 NG/ML
TROPONIN I SERPL-MCNC: <0.02 NG/ML
UROBILINOGEN UR QL STRIP.AUTO: 0.2 E.U./DL
VENTRICULAR RATE: 90 BPM
WBC # BLD AUTO: 6.29 THOUSAND/UL (ref 4.31–10.16)
WBC #/AREA URNS AUTO: ABNORMAL /HPF

## 2019-02-16 PROCEDURE — 76770 US EXAM ABDO BACK WALL COMP: CPT

## 2019-02-16 PROCEDURE — 83880 ASSAY OF NATRIURETIC PEPTIDE: CPT | Performed by: EMERGENCY MEDICINE

## 2019-02-16 PROCEDURE — 82270 OCCULT BLOOD FECES: CPT | Performed by: INTERNAL MEDICINE

## 2019-02-16 PROCEDURE — 96374 THER/PROPH/DIAG INJ IV PUSH: CPT

## 2019-02-16 PROCEDURE — 82553 CREATINE MB FRACTION: CPT | Performed by: INTERNAL MEDICINE

## 2019-02-16 PROCEDURE — 82550 ASSAY OF CK (CPK): CPT | Performed by: INTERNAL MEDICINE

## 2019-02-16 PROCEDURE — 93010 ELECTROCARDIOGRAM REPORT: CPT | Performed by: INTERNAL MEDICINE

## 2019-02-16 PROCEDURE — 84484 ASSAY OF TROPONIN QUANT: CPT | Performed by: EMERGENCY MEDICINE

## 2019-02-16 PROCEDURE — 99255 IP/OBS CONSLTJ NEW/EST HI 80: CPT | Performed by: INTERNAL MEDICINE

## 2019-02-16 PROCEDURE — 82570 ASSAY OF URINE CREATININE: CPT | Performed by: INTERNAL MEDICINE

## 2019-02-16 PROCEDURE — 82948 REAGENT STRIP/BLOOD GLUCOSE: CPT

## 2019-02-16 PROCEDURE — 82784 ASSAY IGA/IGD/IGG/IGM EACH: CPT | Performed by: INTERNAL MEDICINE

## 2019-02-16 PROCEDURE — 84484 ASSAY OF TROPONIN QUANT: CPT | Performed by: INTERNAL MEDICINE

## 2019-02-16 PROCEDURE — 80053 COMPREHEN METABOLIC PANEL: CPT | Performed by: EMERGENCY MEDICINE

## 2019-02-16 PROCEDURE — 99223 1ST HOSP IP/OBS HIGH 75: CPT | Performed by: INTERNAL MEDICINE

## 2019-02-16 PROCEDURE — 93005 ELECTROCARDIOGRAM TRACING: CPT

## 2019-02-16 PROCEDURE — 80061 LIPID PANEL: CPT | Performed by: INTERNAL MEDICINE

## 2019-02-16 PROCEDURE — 81001 URINALYSIS AUTO W/SCOPE: CPT | Performed by: INTERNAL MEDICINE

## 2019-02-16 PROCEDURE — 81003 URINALYSIS AUTO W/O SCOPE: CPT

## 2019-02-16 PROCEDURE — 84156 ASSAY OF PROTEIN URINE: CPT | Performed by: INTERNAL MEDICINE

## 2019-02-16 PROCEDURE — 87086 URINE CULTURE/COLONY COUNT: CPT | Performed by: INTERNAL MEDICINE

## 2019-02-16 PROCEDURE — 71046 X-RAY EXAM CHEST 2 VIEWS: CPT

## 2019-02-16 PROCEDURE — 93975 VASCULAR STUDY: CPT

## 2019-02-16 PROCEDURE — 85025 COMPLETE CBC W/AUTO DIFF WBC: CPT | Performed by: EMERGENCY MEDICINE

## 2019-02-16 PROCEDURE — 81001 URINALYSIS AUTO W/SCOPE: CPT

## 2019-02-16 PROCEDURE — 99285 EMERGENCY DEPT VISIT HI MDM: CPT

## 2019-02-16 PROCEDURE — 36415 COLL VENOUS BLD VENIPUNCTURE: CPT | Performed by: EMERGENCY MEDICINE

## 2019-02-16 RX ORDER — FENOFIBRATE 48 MG/1
48 TABLET, COATED ORAL DAILY
Status: DISCONTINUED | OUTPATIENT
Start: 2019-02-17 | End: 2019-02-22 | Stop reason: HOSPADM

## 2019-02-16 RX ORDER — HYDRALAZINE HYDROCHLORIDE 25 MG/1
75 TABLET, FILM COATED ORAL 3 TIMES DAILY
Status: DISCONTINUED | OUTPATIENT
Start: 2019-02-16 | End: 2019-02-16

## 2019-02-16 RX ORDER — LABETALOL 20 MG/4 ML (5 MG/ML) INTRAVENOUS SYRINGE
10 ONCE
Status: COMPLETED | OUTPATIENT
Start: 2019-02-16 | End: 2019-02-16

## 2019-02-16 RX ORDER — AMLODIPINE BESYLATE 5 MG/1
5 TABLET ORAL DAILY
Status: DISCONTINUED | OUTPATIENT
Start: 2019-02-16 | End: 2019-02-20

## 2019-02-16 RX ORDER — LABETALOL 100 MG/1
300 TABLET, FILM COATED ORAL EVERY 12 HOURS SCHEDULED
Status: DISCONTINUED | OUTPATIENT
Start: 2019-02-16 | End: 2019-02-22 | Stop reason: HOSPADM

## 2019-02-16 RX ORDER — LORATADINE 10 MG/1
10 TABLET ORAL DAILY
Status: DISCONTINUED | OUTPATIENT
Start: 2019-02-16 | End: 2019-02-22 | Stop reason: HOSPADM

## 2019-02-16 RX ORDER — INSULIN GLARGINE 100 [IU]/ML
45 INJECTION, SOLUTION SUBCUTANEOUS
Status: DISCONTINUED | OUTPATIENT
Start: 2019-02-16 | End: 2019-02-22 | Stop reason: HOSPADM

## 2019-02-16 RX ORDER — FLUOXETINE HYDROCHLORIDE 20 MG/1
20 CAPSULE ORAL DAILY
Status: DISCONTINUED | OUTPATIENT
Start: 2019-02-16 | End: 2019-02-22 | Stop reason: HOSPADM

## 2019-02-16 RX ORDER — LOSARTAN POTASSIUM 50 MG/1
50 TABLET ORAL DAILY
Status: DISCONTINUED | OUTPATIENT
Start: 2019-02-16 | End: 2019-02-22 | Stop reason: HOSPADM

## 2019-02-16 RX ORDER — HYDRALAZINE HYDROCHLORIDE 20 MG/ML
10 INJECTION INTRAMUSCULAR; INTRAVENOUS EVERY 6 HOURS PRN
Status: DISCONTINUED | OUTPATIENT
Start: 2019-02-16 | End: 2019-02-22 | Stop reason: HOSPADM

## 2019-02-16 RX ORDER — ONDANSETRON 2 MG/ML
4 INJECTION INTRAMUSCULAR; INTRAVENOUS EVERY 6 HOURS PRN
Status: DISCONTINUED | OUTPATIENT
Start: 2019-02-16 | End: 2019-02-22 | Stop reason: HOSPADM

## 2019-02-16 RX ORDER — GABAPENTIN 300 MG/1
300 CAPSULE ORAL
Status: DISCONTINUED | OUTPATIENT
Start: 2019-02-16 | End: 2019-02-22 | Stop reason: HOSPADM

## 2019-02-16 RX ORDER — FUROSEMIDE 10 MG/ML
40 INJECTION INTRAMUSCULAR; INTRAVENOUS ONCE
Status: COMPLETED | OUTPATIENT
Start: 2019-02-16 | End: 2019-02-16

## 2019-02-16 RX ORDER — LABETALOL 100 MG/1
300 TABLET, FILM COATED ORAL EVERY 12 HOURS SCHEDULED
Status: DISCONTINUED | OUTPATIENT
Start: 2019-02-16 | End: 2019-02-16

## 2019-02-16 RX ORDER — HYDRALAZINE HYDROCHLORIDE 25 MG/1
75 TABLET, FILM COATED ORAL 3 TIMES DAILY
Status: DISCONTINUED | OUTPATIENT
Start: 2019-02-16 | End: 2019-02-22 | Stop reason: HOSPADM

## 2019-02-16 RX ORDER — ACETAMINOPHEN 325 MG/1
650 TABLET ORAL EVERY 6 HOURS PRN
Status: DISCONTINUED | OUTPATIENT
Start: 2019-02-16 | End: 2019-02-22 | Stop reason: HOSPADM

## 2019-02-16 RX ORDER — ACETAMINOPHEN 325 MG/1
650 TABLET ORAL ONCE
Status: COMPLETED | OUTPATIENT
Start: 2019-02-16 | End: 2019-02-16

## 2019-02-16 RX ORDER — MELATONIN
1000 DAILY
Status: DISCONTINUED | OUTPATIENT
Start: 2019-02-16 | End: 2019-02-22 | Stop reason: HOSPADM

## 2019-02-16 RX ORDER — FENOFIBRATE 145 MG/1
145 TABLET, COATED ORAL DAILY
Status: DISCONTINUED | OUTPATIENT
Start: 2019-02-16 | End: 2019-02-16

## 2019-02-16 RX ORDER — TOPIRAMATE 25 MG/1
50 TABLET ORAL
Status: DISCONTINUED | OUTPATIENT
Start: 2019-02-16 | End: 2019-02-22 | Stop reason: HOSPADM

## 2019-02-16 RX ORDER — ATORVASTATIN CALCIUM 40 MG/1
80 TABLET, FILM COATED ORAL EVERY EVENING
Status: DISCONTINUED | OUTPATIENT
Start: 2019-02-16 | End: 2019-02-22 | Stop reason: HOSPADM

## 2019-02-16 RX ORDER — ISOSORBIDE DINITRATE 10 MG/1
10 TABLET ORAL
Status: DISCONTINUED | OUTPATIENT
Start: 2019-02-16 | End: 2019-02-20

## 2019-02-16 RX ADMIN — FUROSEMIDE 40 MG: 10 INJECTION, SOLUTION INTRAMUSCULAR; INTRAVENOUS at 08:04

## 2019-02-16 RX ADMIN — GABAPENTIN 300 MG: 300 CAPSULE ORAL at 22:32

## 2019-02-16 RX ADMIN — ENOXAPARIN SODIUM 30 MG: 30 INJECTION SUBCUTANEOUS at 14:31

## 2019-02-16 RX ADMIN — INSULIN GLARGINE 45 UNITS: 100 INJECTION, SOLUTION SUBCUTANEOUS at 22:32

## 2019-02-16 RX ADMIN — LABETALOL 20 MG/4 ML (5 MG/ML) INTRAVENOUS SYRINGE 10 MG: at 10:30

## 2019-02-16 RX ADMIN — TOPIRAMATE 50 MG: 25 TABLET, FILM COATED ORAL at 22:32

## 2019-02-16 RX ADMIN — INSULIN LISPRO 15 UNITS: 100 INJECTION, SOLUTION INTRAVENOUS; SUBCUTANEOUS at 18:44

## 2019-02-16 RX ADMIN — HYDRALAZINE HYDROCHLORIDE 75 MG: 25 TABLET ORAL at 23:17

## 2019-02-16 RX ADMIN — ACETAMINOPHEN 650 MG: 325 TABLET, FILM COATED ORAL at 14:31

## 2019-02-16 RX ADMIN — HYDRALAZINE HYDROCHLORIDE 75 MG: 25 TABLET ORAL at 17:49

## 2019-02-16 RX ADMIN — ISOSORBIDE DINITRATE 10 MG: 10 TABLET ORAL at 14:32

## 2019-02-16 RX ADMIN — LABETALOL HYDROCHLORIDE 300 MG: 100 TABLET, FILM COATED ORAL at 23:16

## 2019-02-16 RX ADMIN — ACETAMINOPHEN 650 MG: 325 TABLET, FILM COATED ORAL at 10:58

## 2019-02-16 RX ADMIN — ATORVASTATIN CALCIUM 80 MG: 40 TABLET, FILM COATED ORAL at 17:49

## 2019-02-16 NOTE — ASSESSMENT & PLAN NOTE
Lab Results   Component Value Date    HGBA1C 8 9 (H) 10/04/2017       No results for input(s): POCGLU in the last 72 hours      Blood Sugar Average: Last 72 hrs:  continue lantus 45qhs and novolog 15u tidac  SSI  Note associated nephropathy

## 2019-02-16 NOTE — H&P
H&P- Jacob Spear 1965, 46 y o  female MRN: [id number]    Unit/Bed#: -01 Encounter: [id number]    Primary Care Provider: Jacob Spear MD   Date and time admitted to hospital: 2/16/2019  7:37 AM    Volume overload  Assessment & Plan  Component of non cardiogenic pulmonary edema  Serial troponins  Echo 9/18 - EF65%, concentric hypertrophy  PO nitro for now and monitor BP  Eventually need ischemia w/u  Cardiology input as needed    Nephrotic syndrome  Assessment & Plan  Established nephrotic range proteinuria  Note associated hypoalbuminemia/ hypertriglyceridemia  Probably diabetes related vs other  May need renal Bx  Note associated High BP - will need Ace-I ; will d/w nephrology  lovenox for DVT ppx  IV lasix 20 BID and monitor  Low salt diet    Obesity due to excess calories  Assessment & Plan  Diet and lifestyle changes    Hyperlipidemia  Assessment & Plan  Continue clofibrate      Hypertension  Assessment & Plan  Uncontrolled  For now continue home meds and add Hydralazine IV prn  Will need ACE/ARB once ok with renal  D/w Dr Kirk Braswell    Type 2 diabetes mellitus with hyperglycemia, with long-term current use of insulin [facility])  Assessment & Plan  Lab Results   Component Value Date    HGBA1C 8 9 (H) 10/04/2017       No results for input(s): POCGLU in the last 72 hours  Blood Sugar Average: Last 72 hrs:  continue lantus 45qhs and novolog 15u tidac  SSI  Note associated nephropathy  family updated at bedside      VTE Prophylaxis: Enoxaparin (Lovenox)  / sequential compression device   Code Status: Level 1 - Full Code  POLST:     Anticipated Length of Stay:  Patient will be admitted on an Inpatient basis with an anticipated length of stay of  > 2 midnights  Justification for Hospital Stay:     Total Time for Visit, including Counseling / Coordination of Care: 1 hour  Greater than 50% of this total time spent on direct patient counseling and coordination of care      Chief Complaint: Shortness of breath    of Present Illness:    Luther Umanzor is a 46 y o  female who presents with few days of progressive dyspnea that worsened to dyspnea at rest and therefore patient presented to ED  She also reported generalized body swelling and worsened puffy face  reports urinary outpt  No chest pain, palpitations, presyncope, syncope  Wishes to swith to [facility]ph[facility] team     Review of Systems:    Review of Systems   Constitutional: Positive for activity change, appetite change and fatigue  HENT: Negative  Eyes: Negative  Respiratory: Positive for shortness of breath  Gastrointestinal: Positive for abdominal distention  Endocrine: Negative  Genitourinary: Negative  Musculoskeletal: Positive for gait problem  Skin: Positive for pallor  Allergic/Immunologic: Negative  Hematological: Negative  Psychiatric/Behavioral: Negative  Past Medical and Surgical History:     Past Medical History:   Diagnosis Date    Asthma     Diabetes mellitus ([facility] )     Hyperlipidemia     Hypertension        Past Surgical History:   Procedure Laterality Date    CHOLECYSTECTOMY      HYSTERECTOMY         Meds/Allergies:    PTA meds:   Prior to Admission Medications   Prescriptions Last Dose Informant Patient Reported? Taking?    FLUoxetine (PROzac) 20 MG tablet   Yes No   Sig: Take 20 mg by mouth daily   NIFEdipine (ADALAT CC) 30 MG 24 hr tablet   No No   Sig: Take 1 tablet (30 mg total) by mouth daily   acetaminophen (TYLENOL) 500 MG chewable tablet   No Yes   Sig: Chew 1 tablet every 6 (six) hours as needed for mild pain   atorvastatin (LIPITOR) 40 mg tablet   No No   Sig: Take 2 tablets (80 mg total) by mouth every evening   chlorthalidone (HYGROTEN) 50 MG tablet   No No   Sig: Take 1 tablet (50 mg total) by mouth daily   cholecalciferol (VITAMIN D3) 1,000 units tablet   No No   Sig: Take 1 tablet by mouth daily   fenofibrate (TRICOR) 145 mg tablet   No No   Sig: Take 1 tablet (145 mg total) by mouth daily   fexofenadine (ALLEGRA) 180 MG tablet   Yes No   Sig: Take 180 mg by mouth daily   gabapentin (NEURONTIN) 300 mg capsule   No No   Sig: Take 1 capsule (300 mg total) by mouth daily at bedtime   Patient taking differently: Take 600 mg by mouth 3 (three) times a day     hydrALAZINE (APRESOLINE) 25 mg tablet   No No   Sig: Take 3 tablets (75 mg total) by mouth 3 (three) times a day   insulin aspart (NovoLOG) 100 units/mL injection   Yes No   Sig: Inject 15 Units under the skin 3 (three) times a day before meals     insulin glargine (LANTUS) 100 units/mL subcutaneous injection   Yes No   Sig: Inject 45 Units under the skin daily at bedtime     labetalol (NORMODYNE) 300 mg tablet   No No   Sig: Take 1 tablet (300 mg total) by mouth every 12 (twelve) hours   topiramate (TOPAMAX) 50 MG tablet   No No   Sig: Take 1 tablet (50 mg total) by mouth daily at bedtime   Patient taking differently: Take 50 mg by mouth daily at bedtime        Facility-Administered Medications: None       Allergies: Allergies   Allergen Reactions    Lisinopril Swelling and Cough     History:     Marital Status:    Occupation:   Patient Pre-hospital Living Situation:   Patient Pre-hospital Level of Mobility:   Patient Pre-hospital Diet Restrictions:   Substance Use History:   Social History     Substance and Sexual Activity   Alcohol Use No     Social History     Tobacco Use   Smoking Status Never Smoker   Smokeless Tobacco Never Used     Social History     Substance and Sexual Activity   Drug Use No       Family History:    History reviewed  No pertinent family history      Physical Exam:     Vitals:   Blood Pressure: (!) 200/99 (02/16/19 1245)  Pulse: 83 (02/16/19 1245)  Temperature: 98 6 °F (37 °C) (02/16/19 0740)  Temp Source: Oral (02/16/19 0740)  Respirations: 18 (02/16/19 1245)  Weight - Scale: 95 2 kg (209 lb 14 1 oz) (02/16/19 0739)  SpO2: 96 % (02/16/19 1245)    Physical Exam   Constitutional: She is oriented to person, place, and time  HENT:   Head: Normocephalic  Eyes: Pupils are equal, round, and reactive to light  Cardiovascular: Normal rate and normal heart sounds  Musculoskeletal: She exhibits edema  Neurological: She is alert and oriented to person, place, and time  Lab and Imaging Results: I have personally reviewed pertinent films in PACS    Results from last 7 days   Lab Units 02/16/19  0758   WBC Thousand/uL 6 29   HEMOGLOBIN g/dL 10 8*   HEMATOCRIT % 32 1*   PLATELETS Thousands/uL 304   NEUTROS PCT % 59   LYMPHS PCT % 30   MONOS PCT % 6   EOS PCT % 4     Results from last 7 days   Lab Units 02/16/19  0758   POTASSIUM mmol/L 3 8   CHLORIDE mmol/L 107   CO2 mmol/L 25   BUN mg/dL 32*   CREATININE mg/dL 2 28*   CALCIUM mg/dL 8 9   ALK PHOS U/L 117*   ALT U/L 22   AST U/L 26           No results found  EKG, Pathology, and Other Studies Reviewed on Admission:   ·     Allscripts Records Reviewed: Yes     ** Please Note: Dragon 360 Dictation voice to text software may have been used in the creation of this document   **

## 2019-02-16 NOTE — ASSESSMENT & PLAN NOTE
Uncontrolled  For now continue home meds and add Hydralazine IV prn  Will need ACE/ARB once ok with renal  D/w Dr Kristel Tirado

## 2019-02-16 NOTE — ED PROVIDER NOTES
History  Chief Complaint   Patient presents with    Shortness of Breath     Patient c/o sob that started yesterday; has asthma  Brought in by family because "it's getting worse "  No fever  45 y/o female presents today complaining of shortness of breath and headache since yesterday  States she has a history of kidney problems  No change in medications or diet recently  Has not taken anything for her symptoms  No cough  No fever  No chest pain  History provided by:  Patient  Shortness of Breath   Severity:  Moderate  Onset quality:  Sudden  Duration:  2 days  Timing:  Constant  Progression:  Worsening  Chronicity:  New  Relieved by:  None tried  Worsened by:  Nothing  Ineffective treatments:  None tried  Associated symptoms: headaches    Associated symptoms: no abdominal pain, no chest pain, no diaphoresis, no rash, no sputum production and no wheezing    Risk factors: no hx of cancer, no hx of PE/DVT and no tobacco use        Prior to Admission Medications   Prescriptions Last Dose Informant Patient Reported? Taking?    FLUoxetine (PROzac) 20 MG tablet   Yes No   Sig: Take 20 mg by mouth daily   NIFEdipine (ADALAT CC) 30 MG 24 hr tablet   No No   Sig: Take 1 tablet (30 mg total) by mouth daily   acetaminophen (TYLENOL) 500 MG chewable tablet   No Yes   Sig: Chew 1 tablet every 6 (six) hours as needed for mild pain   atorvastatin (LIPITOR) 40 mg tablet   No No   Sig: Take 2 tablets (80 mg total) by mouth every evening   chlorthalidone (HYGROTEN) 50 MG tablet   No No   Sig: Take 1 tablet (50 mg total) by mouth daily   cholecalciferol (VITAMIN D3) 1,000 units tablet   No No   Sig: Take 1 tablet by mouth daily   fenofibrate (TRICOR) 145 mg tablet   No No   Sig: Take 1 tablet (145 mg total) by mouth daily   fexofenadine (ALLEGRA) 180 MG tablet   Yes No   Sig: Take 180 mg by mouth daily   gabapentin (NEURONTIN) 300 mg capsule   No No   Sig: Take 1 capsule (300 mg total) by mouth daily at bedtime   Patient taking differently: Take 600 mg by mouth 3 (three) times a day     hydrALAZINE (APRESOLINE) 25 mg tablet   No No   Sig: Take 3 tablets (75 mg total) by mouth 3 (three) times a day   insulin aspart (NovoLOG) 100 units/mL injection   Yes No   Sig: Inject 15 Units under the skin 3 (three) times a day before meals     insulin glargine (LANTUS) 100 units/mL subcutaneous injection   Yes No   Sig: Inject 45 Units under the skin daily at bedtime     labetalol (NORMODYNE) 300 mg tablet   No No   Sig: Take 1 tablet (300 mg total) by mouth every 12 (twelve) hours   topiramate (TOPAMAX) 50 MG tablet   No No   Sig: Take 1 tablet (50 mg total) by mouth daily at bedtime   Patient taking differently: Take 50 mg by mouth daily at bedtime        Facility-Administered Medications: None       Past Medical History:   Diagnosis Date    Asthma     Diabetes mellitus ([facility] [address] )     Hyperlipidemia     Hypertension        Past Surgical History:   Procedure Laterality Date    CHOLECYSTECTOMY      HYSTERECTOMY         History reviewed  No pertinent family history  I have reviewed and agree with the history as documented  Social History     Tobacco Use    Smoking status: Never Smoker    Smokeless tobacco: Never Used   Substance Use Topics    Alcohol use: No    Drug use: No        Review of Systems   Constitutional: Negative for diaphoresis  HENT: Negative for congestion  Eyes: Negative for visual disturbance  Respiratory: Positive for shortness of breath  Negative for sputum production, chest tightness and wheezing  Cardiovascular: Positive for leg swelling  Negative for chest pain  Gastrointestinal: Negative for abdominal pain  Genitourinary: Negative for difficulty urinating  Musculoskeletal: Negative for back pain  Skin: Negative for pallor and rash  Neurological: Positive for headaches  Negative for dizziness  Psychiatric/Behavioral: Negative for confusion         Physical Exam  Physical Exam   Constitutional: She is oriented to person, place, and time  She appears well-developed and well-nourished  HENT:   Head: Normocephalic and atraumatic  Mouth/Throat: Uvula is midline, oropharynx is clear and moist and mucous membranes are normal  No tonsillar exudate  Eyes: Pupils are equal, round, and reactive to light  Neck: Normal range of motion  Neck supple  Cardiovascular: Normal rate and regular rhythm  Pulmonary/Chest: Effort normal  She has decreased breath sounds (moderate, diffuse)  She has rales (crackles appreciated b/l bases)  Abdominal: Soft  Bowel sounds are normal  There is no tenderness  There is no rebound and no guarding  Musculoskeletal: She exhibits no tenderness or deformity  Right lower leg: She exhibits edema  Left lower leg: She exhibits edema  Neurological: She is alert and oriented to person, place, and time  Patient moving all extremities equally, no focal neuro deficits noted  Skin: Skin is warm and dry  Psychiatric: She has a normal mood and affect  Nursing note and vitals reviewed        Vital Signs  ED Triage Vitals   Temperature Pulse Respirations Blood Pressure SpO2   02/16/19 0740 02/16/19 0740 02/16/19 0740 02/16/19 0741 02/16/19 0741   98 6 °F (37 °C) 89 19 (!) 217/109 91 %      Temp Source Heart Rate Source Patient Position - Orthostatic VS BP Location FiO2 (%)   02/16/19 0740 02/16/19 0740 02/16/19 0740 02/16/19 0740 --   Oral Monitor Lying Right arm       Pain Score       02/16/19 0740       8           Vitals:    02/16/19 0740 02/16/19 0741 02/16/19 0953   BP:  (!) 217/109 (!) 230/104   Pulse: 89  (!) 112   Patient Position - Orthostatic VS: Lying Lying Lying       Visual Acuity      ED Medications  Medications   furosemide (LASIX) injection 40 mg (40 mg Intravenous Given 2/16/19 0804)   Labetalol HCl (NORMODYNE) injection 10 mg (10 mg Intravenous Given 2/16/19 1030)   acetaminophen (TYLENOL) tablet 650 mg (650 mg Oral Given 2/16/19 1058) Diagnostic Studies  Results Reviewed     Procedure Component Value Units Date/Time    Lipid panel [[id number]]  (Abnormal) Collected:  02/16/19 0758    Lab Status:  Final result Specimen:  Blood from Arm, Left Updated:  02/16/19 1111     Cholesterol 295 mg/dL      Triglycerides 358 mg/dL      HDL, Direct 48 mg/dL      LDL Calculated 175 mg/dL      Non-HDL-Chol (CHOL-HDL) 247 mg/dl     B-type natriuretic peptide [[id number]]  (Abnormal) Collected:  02/16/19 0758    Lab Status:  Final result Specimen:  Blood from Arm, Left Updated:  02/16/19 0939     NT-proBNP 2,992 pg/mL     Urine Microscopic [[id number]]  (Abnormal) Collected:  02/16/19 0808    Lab Status:  Final result Specimen:  Urine, Clean Catch Updated:  02/16/19 0831     RBC, UA 1-2 /hpf      WBC, UA 2-4 /hpf      Epithelial Cells Moderate /hpf      Bacteria, UA Moderate /hpf      Hyaline Casts, UA 2-4 /lpf      Fine granular casts 3-5 /lpf     Comprehensive metabolic panel [[id number]]  (Abnormal) Collected:  02/16/19 0758    Lab Status:  Final result Specimen:  Blood from Arm, Left Updated:  02/16/19 0522     Sodium 142 mmol/L      Potassium 3 8 mmol/L      Chloride 107 mmol/L      CO2 25 mmol/L      ANION GAP 10 mmol/L      BUN 32 mg/dL      Creatinine 2 28 mg/dL      Glucose 150 mg/dL      Calcium 8 9 mg/dL      AST 26 U/L      ALT 22 U/L      Alkaline Phosphatase 117 U/L      Total Protein 6 4 g/dL      Albumin 2 2 g/dL      Total Bilirubin 0 30 mg/dL      eGFR 24 ml/min/1 73sq m     Narrative:       National Kid[employer] Disease Education Program recommendations are as follows:  GFR calculation is accurate only with a steady state creatinine  Chronic Kidney disease less than 60 ml/min/1 73 sq  meters  Kidney failure less than 15 ml/min/1 73 sq  meters      Troponin I [[id number]]  (Normal) Collected:  02/16/19 0758    Lab Status:  Final result Specimen:  Blood from Arm, Left Updated:  02/16/19 4018     Troponin I <0 02 ng/mL     ED Urine Macroscopic [[id number]]  (Abnormal) Collected:  02/16/19 0808    Lab Status:  Final result Specimen:  Urine Updated:  02/16/19 0806     Color, UA Yellow     Clarity, UA Cloudy     pH, UA 6 5     Leukocytes, UA Negative     Nitrite, UA Negative     Protein, UA >=300 mg/dl      Glucose,  (1/4%) mg/dl      Ketones, UA Negative mg/dl      Urobilinogen, UA 0 2 E U /dl      Bilirubin, UA Negative     Blood, UA Small     Specific [place], UA 1 025    Narrative:       CLINITEK RESULT    CBC and differential [[id number]]  (Abnormal) Collected:  02/16/19 0758    Lab Status:  Final result Specimen:  Blood from Arm, Left Updated:  02/16/19 0806     WBC 6 29 Thousand/uL      RBC 3 62 Million/uL      Hemoglobin 10 8 g/dL      Hematocrit 32 1 %      MCV 89 fL      MCH 29 8 pg      MCHC 33 6 g/dL      RDW 12 4 %      MPV 10 3 fL      Platelets 835 Thousands/uL      nRBC 0 /100 WBCs      Neutrophils Relative 59 %      Immat GRANS % 0 %      Lymphocytes Relative 30 %      Monocytes Relative 6 %      Eosinophils Relative 4 %      Basophils Relative 1 %      Neutrophils Absolute 3 69 Thousands/µL      Immature Grans Absolute 0 02 Thousand/uL      Lymphocytes Absolute 1 89 Thousands/µL      Monocytes Absolute 0 38 Thousand/µL      Eosinophils Absolute 0 26 Thousand/µL      Basophils Absolute 0 05 Thousands/µL                  XR chest 2 views    (Results Pending)              Procedures  ECG 12 Lead Documentation  Date/Time: 2/16/2019 11:32 AM  Performed by: Ragini Ma DO  Authorized by: Winston Arnett DO     Indications / Diagnosis:  Shortness of breath  ECG reviewed by me, the ED Provider: yes    Patient location:  ED  Previous ECG:     Previous ECG:  Compared to current  Comments:      Normal sinus rhythm at 90 beats per minute  Frequent PACs noted  Left axis deviation, nonspecific ST T wave abnormalities present  QTC is normal   PACs and nonspecific ST T wave abnormalities are new when compared to prior from 11/22/2018  Phone Contacts  ED Phone Contact    ED Course                               MDM  Number of Diagnoses or Management Options  Hypertensive urgency: new and requires workup  Nephrotic syndrome: new and requires workup  Other form of dyspnea: new and requires workup  Diagnosis management comments: MDM: Patient presents to the Emergency Department and was diagnosed with acute hypertensive urgency with nephrotic syndrome  This is a new problem that will require additional planned work-up in a hospitalized setting  Clinical laboratory testing, radiology imaging, and medical testing (EKG) were ordered  I independently reviewed the radiologic imaging, EKG, and laboratory studies  This case is considered high risk secondary to the above listed disease process that poses a threat to bodily function that requires further diagnostic testing and management which may include the administration of parenteral controlled substances  Discussed with EDDIE  We had a detailed discussion of the patient's condition and case,  including need for admission  Accepts to his/her service  Bed request/bridging orders placed             Amount and/or Complexity of Data Reviewed  Clinical lab tests: ordered and reviewed  Tests in the radiology section of CPT®: ordered and reviewed  Tests in the medicine section of CPT®: reviewed and ordered  Review and summarize past medical records: yes  Discuss the patient with other providers: yes  Independent visualization of images, tracings, or specimens: yes    Risk of Complications, Morbidity, and/or Mortality  Presenting problems: high  Diagnostic procedures: high  Management options: high    Patient Progress  Patient progress: stable      Disposition  Final diagnoses:   Nephrotic syndrome   Other form of dyspnea   Hypertensive urgency     Time reflects when diagnosis was documented in both MDM as applicable and the Disposition within this note     Time User Action Codes Description Comment 2/16/2019 11:06 AM Amiar Raphael Add [N04 9] Nephrotic syndrome     2/16/2019 11:33 AM Amira Seed Add [R06 09] Other form of dyspnea     2/16/2019 11:34 AM Hitesh Arnett Add [I16 0] Hypertensive urgency       ED Disposition     ED Disposition Condition Date/Time Comment    Admit Stable Sat Feb 16, 2019 10:02 AM Case was discussed with EDDIE and the patient's admission status was agreed to be Admission Status: inpatient status to the service of Dr Norma Betancourt     Follow-up Information    None         Patient's Medications   Discharge Prescriptions    No medications on file     No discharge procedures on file      ED Provider  Electronically Signed by           Ismael Mendoza, DO  02/16/19 [address] [facility], DO  02/16/19 0703

## 2019-02-16 NOTE — ASSESSMENT & PLAN NOTE
Component of non cardiogenic pulmonary edema  Serial troponins  Echo 9/18 - EF65%, concentric hypertrophy  PO nitro for now and monitor BP  Eventually need ischemia w/u  Cardiology input as needed

## 2019-02-16 NOTE — ASSESSMENT & PLAN NOTE
Established nephrotic range proteinuria  Note associated hypoalbuminemia/ hypertriglyceridemia  Probably diabetes related vs other  May need renal Bx  Note associated High BP - will need Ace-I ; will d/w nephrology  lovenox for DVT ppx  IV lasix 20 BID and monitor  Low salt diet

## 2019-02-16 NOTE — CONSULTS
Consultation - Nephrology   Doc Canales 46 y o  female MRN: [id number]  Unit/Bed#: -01 Encounter: [id number]    Inpatient consult to Nephrology  Consult performed by: Carly Willis MD  Consult ordered by: Tramaine Lamas MD          History of Present Illness   Physician Requesting Consult: Tramaine Lamas MD  Reason for Consult / Principal Problem:  Anasarca/proteinuria    HPI: Doc Canales is a 46y o  year old female with a history of diabetes mellitus type 2/ asthma/dyslipidemia/hypertension who presents with 1-2 days history of worsening shortness of breath but no chest pain  She has also noted generalized body swelling of LEs few days including her face  She denies any fevers or chills  No significant cough  No nausea vomiting and only occasional diarrhea  She says they are a lot of bubbles in her urine but she denies any dysuria and cannot tell she has had gross hematuria because of some blindness  She has been evaluated for proteinuria in the past in November with serologies apparently all unremarkable  She was followed by [facility] for this but apparently it has worsened and she would like to switch to our group  She states that she has intermittent body swelling  She denies any NSAIDs  Denies any frequent urinary tract infections  She does say that she had a kidney stone many years ago and apparently needed some procedure to remove it  She has a history of diabetes mellitus for approximately 26 years associated with retinopathy  History of hypertension as well  Of note, in the emergency room her blood pressure was over 498 systolic  In regards to creatinine:  -it appears creatinine has vacillated between 1 28-2 0  Most recent in November was 1 3  However, reviewing records at [facility] her new creatinine level appears to be from 2-2 4      History obtained from the patient, the chart and the old records which I completely reviewed        General:  See HPI, no fevers or chills or colds  Cardiovascular:  See HPI  Respiratory:  History of asthma, shortness of Breath as outlined with essentially nonproductive minimal cough  Gastrointestinal:  No nausea vomiting diarrhea at this time or abdominal pain  Genitourinary:  See HPI  Neuro:  Slight headache today, no numbness or tingling  Rest of review of systems as completely reviewed with the patient are negative    Historical Information   Past Medical History:   Diagnosis Date    Asthma     Diabetes mellitus ([facility] )     Hyperlipidemia     Hypertension      Past Surgical History:   Procedure Laterality Date    CHOLECYSTECTOMY      HYSTERECTOMY       Social History   Social History     Substance and Sexual Activity   Alcohol Use No     Social History     Substance and Sexual Activity   Drug Use No     Social History     Tobacco Use   Smoking Status Never Smoker   Smokeless Tobacco Never Used     Family history:  Father with some form of kidney disease related to diabetes mellitus post on dialysis    Meds/Allergies   all current active meds have been reviewed, current meds:   Current Facility-Administered Medications   Medication Dose Route Frequency    acetaminophen (TYLENOL) tablet 650 mg  650 mg Oral Q6H PRN    atorvastatin (LIPITOR) tablet 80 mg  80 mg Oral QPM    cholecalciferol (VITAMIN D3) tablet 1,000 Units  1,000 Units Oral Daily    enoxaparin (LOVENOX) subcutaneous injection 40 mg  40 mg Subcutaneous Daily    fenofibrate (TRICOR) tablet 145 mg  145 mg Oral Daily    FLUoxetine (PROzac) capsule 20 mg  20 mg Oral Daily    gabapentin (NEURONTIN) capsule 300 mg  300 mg Oral HS    hydrALAZINE (APRESOLINE) injection 10 mg  10 mg Intravenous Q6H PRN    hydrALAZINE (APRESOLINE) tablet 75 mg  75 mg Oral TID    insulin glargine (LANTUS) subcutaneous injection 45 Units 0 45 mL  45 Units Subcutaneous HS    insulin lispro (HumaLOG) 100 units/mL subcutaneous injection 15 Units  15 Units Subcutaneous TID With Meals    isosorbide dinitrate (ISORDIL) tablet 10 mg  10 mg Oral TID after meals    labetalol (NORMODYNE) tablet 300 mg  300 mg Oral Q12H [facility]    loratadine (CLARITIN) tablet 10 mg  10 mg Oral Daily    ondansetron (ZOFRAN) injection 4 mg  4 mg Intravenous Q6H PRN    topiramate (TOPAMAX) tablet 50 mg  50 mg Oral HS    and PTA meds:    Medications Prior to Admission   Medication    acetaminophen (TYLENOL) 500 MG chewable tablet    atorvastatin (LIPITOR) 40 mg tablet    chlorthalidone (HYGROTEN) 50 MG tablet    cholecalciferol (VITAMIN D3) 1,000 units tablet    fenofibrate (TRICOR) 145 mg tablet    fexofenadine (ALLEGRA) 180 MG tablet    FLUoxetine (PROzac) 20 MG tablet    gabapentin (NEURONTIN) 300 mg capsule    hydrALAZINE (APRESOLINE) 25 mg tablet    insulin aspart (NovoLOG) 100 units/mL injection    insulin glargine (LANTUS) 100 units/mL subcutaneous injection    labetalol (NORMODYNE) 300 mg tablet    NIFEdipine (ADALAT CC) 30 MG 24 hr tablet    topiramate (TOPAMAX) 50 MG tablet       Allergies   Allergen Reactions    Lisinopril Swelling and Cough       Objective   No intake or output data in the 24 hours ending 02/16/19 1315  Patient Vitals for the past 24 hrs:   BP Temp Temp src Pulse Resp SpO2 Weight   02/16/19 1245 (!) 200/99   83 18 96 %    02/16/19 0953 (!) 230/104   (!) 112 18 93 %    02/16/19 0741 (!) 217/109     91 %    02/16/19 0740  98 6 °F (37 °C) Oral 89 19     02/16/19 0739       95 2 kg (209 lb 14 1 oz)       Invasive Devices:      Vitals:    02/16/19 1245   BP: (!) 200/99   Pulse: 83   Resp: 18   Temp:    SpO2: 96%     General:  Obese, anasarca, no acute distress  Skin:  No acute rash  Eyes:  No scleral icterus, noninjected  ENT:  Normocephalic/atraumatic, mucous membranes moist  Neck:  Supple, no jugular venous distention, 1+ carotid upstroke, no carotid bruits, no thyromegaly  Back:  No CVA tenderness  Chest:  Diffuse rhonchi bilaterally with some wheezing but no rales and no dullness to percussion, good respiratory effort  CVS:  Slightly irregular rhythm, no murmurs rubs or gallops appreciable  Abdomen:  Obese, soft and nontender with normal bowel sounds, no hepatosplenomegaly or bruits appreciable  Extremities:  No clubbing, no cyanosis, 1-2 +anasarca of upper and lower extremities; no femoral bruits, 1+ dorsalis pedis pulses  Neuro:  No gross focality  Psych:  Alert and oriented appropriate    Current Weight: Weight - Scale: 95 2 kg (209 lb 14 1 oz)  First Weight: Weight - Scale: 95 2 kg (209 lb 14 1 oz)    Lab Results:  I have personally reviewed pertinent labs    CBC:   Lab Results   Component Value Date    WBC 6 29 02/16/2019    WBC 6 71 11/09/2015    RBC 3 62 (L) 02/16/2019    RBC 4 32 11/09/2015     CMP:   Lab Results   Component Value Date     11/09/2015     02/16/2019     11/09/2015    CO2 25 02/16/2019    CO2 21 6 11/09/2015    ANIONGAP 11 11/09/2015    BUN 32 (H) 02/16/2019    BUN 22 11/09/2015    CREATININE 2 28 (H) 02/16/2019    CREATININE 0 97 11/09/2015    GLUCOSE 186 (H) 11/09/2015    CALCIUM 8 9 02/16/2019    CALCIUM 8 6 11/09/2015    AST 26 02/16/2019    AST 34 09/27/2014    ALT 22 02/16/2019    ALT 67 (H) 09/27/2014    ALKPHOS 117 (H) 02/16/2019    ALKPHOS 145 (H) 09/27/2014    PROT 7 1 09/27/2014    BILITOT 0 5 09/27/2014    EGFR 24 02/16/2019     Phosphorus:   Lab Results   Component Value Date    PHOS 2 7 10/04/2017     Magnesium:   Lab Results   Component Value Date    MG 2 0 10/07/2017     Urinalysis:   Lab Results   Component Value Date    COLORU Yellow 02/16/2019    COLORU Yellow 09/27/2014    CLARITYU Cloudy 02/16/2019    CLARITYU Clear 09/27/2014    SPECGRAV 1 025 02/16/2019    SPECGRAV 1 025 09/27/2014    PHUR 6 5 02/16/2019    PHUR 5 5 09/27/2014    LEUKOCYTESUR Negative 02/16/2019    LEUKOCYTESUR Negative 09/27/2014    NITRITE Negative 02/16/2019    NITRITE Negative 09/27/2014    PROTEINUA 100 (2+) (A) 09/27/2014    GLUCOSEU 250 (1/4%) (A) 02/16/2019    GLUCOSEU >=1000 (1%) (A) 09/27/2014    KETONESU Negative 02/16/2019    KETONESU Negative 09/27/2014    BILIRUBINUR Negative 02/16/2019    BILIRUBINUR Negative 09/27/2014    BLOODU Small (A) 02/16/2019    BLOODU Small (A) 09/27/2014     BMP:   Lab Results   Component Value Date    GLUCOSE 186 (H) 11/09/2015    SODIUM 142 02/16/2019    CO2 25 02/16/2019    CO2 21 6 11/09/2015    BUN 32 (H) 02/16/2019    BUN 22 11/09/2015    CREATININE 2 28 (H) 02/16/2019    CREATININE 0 97 11/09/2015    CALCIUM 8 9 02/16/2019    CALCIUM 8 6 11/09/2015     Radiology review:   Chest x-ray by my review:  No active disease no heart failure        Assessment/Plan   79-year-old female with history of diabetes mellitus complicated by retinopathy and hypertension who presents with accelerated hypertension and anasarca in the setting of profound nephrotic syndrome with 13 g of proteinuria and a creatinine of 2 28  We are asked to see regarding proteinuria/CKD stage 4/hypertension:    1  CKD stage 4/proteinuria without significant hematuria:  Patient  clearly has nephrotic syndrome associated with hypoalbuminemia/dyslipidemia/anasarca and proteinuria far exceeding 3 5 g   Differential diagnosis would include diabetic nephropathy which is most likely, with a component of hypertensive nephrosclerosis, or another type of primary glomerular process such as membranous nephropathy, FSGS in less likely minimal change disease or other more rare etiologies  Her creatinine appears to be at her new baseline of 2 28 mg/dL  Her baseline is outlined is 2-2 4  Workup:  Serologies:  -negative ANCA  -negative KENYATTA/double-stranded DNA  -negative complements  -negative hepatitis-B/C  -negative SPEP/UPEP and light chains  Recommend:  -renal ultrasound  -PVR with bladder scans  -check IgA for completeness of the serology  -most likely will require a kidney biopsy to make a definitive diagnosis  Patient understands this  We will need to make a more stable before ordering this  In particular we need to get her blood pressure improved and her respiratory symptoms improved  Treatment:  -I would add an angiotensin receptor 2 blocker given the fact her creatinine is actually at a new baseline  Will of course need to watch her renal function closely  -add diuretics for the anasarca  -aggressively treat dyslipidemia  -goal blood pressure will be eventually less than 125/75, we will lower this slowly    2  Accelerated hypertension:  Most likely related to renal disease  Workup:  -Renal workup  -aldosterone/renin ratio  -plasma free metanephrines  -repeat TSH/free T4  -renal artery duplex  Treatment:  -add angiotensin receptor 2 blocker  -Amlodipine 5 mg daily, avoid higher dose given anasarca  -torsemide 20 mg daily  -hydralazine 75 mg 3 times a day as ordered  -labetalol 300 mg q 12 hours  -p r n  Intravenous labetalol  Again ultimate goal is lower at less than 125/75 but we have to do this slowly to avoid hypoperfusion and CHRISTIANA  NOTE:  PATIENT APPARENTLY HAD A REACTION TO LISINOPRIL WITH SWELLING  WE WILL USE AN ANGIOTENSIN RECEPTOR 2 BLOCKER WHICH SHOULD NOT CAUSE THIS PROBLEM BUT WILL OBSERVE HER VERY CLOSELY  3  Electrolytes are stable    4  Mineral bone disorder:  -check magnesium/phosphorus  -check PTH intact level vitamin-D levels    5  Anemia:  Possibly related to her CKD and chronic disease  -negative multiple myeloma evaluation  -check iron studies  -check B12/folate  -stool for occult blood    6  Treat dyslipidemia which is extremely high secondary to nephrotic syndrome  Case discussed with [facility]        Portions of the record may have been created with voice recognition software   Occasional wrong word or "sound a like" substitutions may have occurred due to the inherent limitations of voice recognition software   Read the chart carefully and recognize, using context, where substitutions have occurred

## 2019-02-17 PROBLEM — H53.9 VISUAL DISTURBANCE: Status: ACTIVE | Noted: 2019-02-17

## 2019-02-17 LAB
ALBUMIN SERPL BCP-MCNC: 1.8 G/DL (ref 3.5–5)
ALP SERPL-CCNC: 100 U/L (ref 46–116)
ALT SERPL W P-5'-P-CCNC: 17 U/L (ref 12–78)
ANION GAP SERPL CALCULATED.3IONS-SCNC: 10 MMOL/L (ref 4–13)
AST SERPL W P-5'-P-CCNC: 22 U/L (ref 5–45)
ATRIAL RATE: 91 BPM
BACTERIA UR QL AUTO: ABNORMAL /HPF
BILIRUB SERPL-MCNC: 0.2 MG/DL (ref 0.2–1)
BILIRUB UR QL STRIP: NEGATIVE
BUN SERPL-MCNC: 30 MG/DL (ref 5–25)
CALCIUM SERPL-MCNC: 8.5 MG/DL (ref 8.3–10.1)
CHLORIDE SERPL-SCNC: 110 MMOL/L (ref 100–108)
CLARITY UR: CLEAR
CO2 SERPL-SCNC: 22 MMOL/L (ref 21–32)
COLOR UR: YELLOW
CREAT SERPL-MCNC: 2.1 MG/DL (ref 0.6–1.3)
CREAT UR-MCNC: 129 MG/DL
ERYTHROCYTE [DISTWIDTH] IN BLOOD BY AUTOMATED COUNT: 12.3 % (ref 11.6–15.1)
FERRITIN SERPL-MCNC: 53 NG/ML (ref 8–388)
FOLATE SERPL-MCNC: 14.6 NG/ML (ref 3.1–17.5)
GFR SERPL CREATININE-BSD FRML MDRD: 26 ML/MIN/1.73SQ M
GLUCOSE SERPL-MCNC: 169 MG/DL (ref 65–140)
GLUCOSE SERPL-MCNC: 65 MG/DL (ref 65–140)
GLUCOSE SERPL-MCNC: 73 MG/DL (ref 65–140)
GLUCOSE SERPL-MCNC: 95 MG/DL (ref 65–140)
GLUCOSE UR STRIP-MCNC: ABNORMAL MG/DL
HCT VFR BLD AUTO: 28.8 % (ref 34.8–46.1)
HGB BLD-MCNC: 9.6 G/DL (ref 11.5–15.4)
HGB UR QL STRIP.AUTO: ABNORMAL
IRON SATN MFR SERPL: 28 %
IRON SERPL-MCNC: 65 UG/DL (ref 50–170)
KETONES UR STRIP-MCNC: NEGATIVE MG/DL
LEUKOCYTE ESTERASE UR QL STRIP: ABNORMAL
MAGNESIUM SERPL-MCNC: 2.2 MG/DL (ref 1.6–2.6)
MCH RBC QN AUTO: 29.5 PG (ref 26.8–34.3)
MCHC RBC AUTO-ENTMCNC: 33.3 G/DL (ref 31.4–37.4)
MCV RBC AUTO: 89 FL (ref 82–98)
NITRITE UR QL STRIP: NEGATIVE
NON-SQ EPI CELLS URNS QL MICRO: ABNORMAL /HPF
P AXIS: 51 DEGREES
PH UR STRIP.AUTO: 7 [PH] (ref 4.5–8)
PHOSPHATE SERPL-MCNC: 4.5 MG/DL (ref 2.7–4.5)
PLATELET # BLD AUTO: 263 THOUSANDS/UL (ref 149–390)
PMV BLD AUTO: 10.1 FL (ref 8.9–12.7)
POTASSIUM SERPL-SCNC: 3.6 MMOL/L (ref 3.5–5.3)
PR INTERVAL: 158 MS
PROT SERPL-MCNC: 5.5 G/DL (ref 6.4–8.2)
PROT UR STRIP-MCNC: >=300 MG/DL
PROT UR-MCNC: 1390 MG/DL
PROT/CREAT UR: 10.78 MG/G{CREAT} (ref 0–0.1)
QRS AXIS: -10 DEGREES
QRSD INTERVAL: 72 MS
QT INTERVAL: 424 MS
QTC INTERVAL: 518 MS
RBC # BLD AUTO: 3.25 MILLION/UL (ref 3.81–5.12)
RBC #/AREA URNS AUTO: ABNORMAL /HPF
SODIUM SERPL-SCNC: 142 MMOL/L (ref 136–145)
SP GR UR STRIP.AUTO: 1.02 (ref 1–1.03)
T WAVE AXIS: 22 DEGREES
T4 FREE SERPL-MCNC: 0.8 NG/DL (ref 0.76–1.46)
TIBC SERPL-MCNC: 232 UG/DL (ref 250–450)
TROPONIN I SERPL-MCNC: 0.02 NG/ML
TSH SERPL DL<=0.05 MIU/L-ACNC: 8.49 UIU/ML (ref 0.36–3.74)
UROBILINOGEN UR QL STRIP.AUTO: 0.2 E.U./DL
VENTRICULAR RATE: 90 BPM
VIT B12 SERPL-MCNC: 588 PG/ML (ref 100–900)
WBC # BLD AUTO: 6 THOUSAND/UL (ref 4.31–10.16)
WBC #/AREA URNS AUTO: ABNORMAL /HPF

## 2019-02-17 PROCEDURE — 84443 ASSAY THYROID STIM HORMONE: CPT | Performed by: INTERNAL MEDICINE

## 2019-02-17 PROCEDURE — 82948 REAGENT STRIP/BLOOD GLUCOSE: CPT

## 2019-02-17 PROCEDURE — 82746 ASSAY OF FOLIC ACID SERUM: CPT | Performed by: INTERNAL MEDICINE

## 2019-02-17 PROCEDURE — 99232 SBSQ HOSP IP/OBS MODERATE 35: CPT | Performed by: INTERNAL MEDICINE

## 2019-02-17 PROCEDURE — 83550 IRON BINDING TEST: CPT | Performed by: INTERNAL MEDICINE

## 2019-02-17 PROCEDURE — 82728 ASSAY OF FERRITIN: CPT | Performed by: INTERNAL MEDICINE

## 2019-02-17 PROCEDURE — 93010 ELECTROCARDIOGRAM REPORT: CPT | Performed by: INTERNAL MEDICINE

## 2019-02-17 PROCEDURE — 83735 ASSAY OF MAGNESIUM: CPT | Performed by: INTERNAL MEDICINE

## 2019-02-17 PROCEDURE — 84156 ASSAY OF PROTEIN URINE: CPT | Performed by: INTERNAL MEDICINE

## 2019-02-17 PROCEDURE — 83540 ASSAY OF IRON: CPT | Performed by: INTERNAL MEDICINE

## 2019-02-17 PROCEDURE — 80053 COMPREHEN METABOLIC PANEL: CPT | Performed by: INTERNAL MEDICINE

## 2019-02-17 PROCEDURE — 81001 URINALYSIS AUTO W/SCOPE: CPT | Performed by: INTERNAL MEDICINE

## 2019-02-17 PROCEDURE — 99233 SBSQ HOSP IP/OBS HIGH 50: CPT | Performed by: NURSE PRACTITIONER

## 2019-02-17 PROCEDURE — 85027 COMPLETE CBC AUTOMATED: CPT | Performed by: INTERNAL MEDICINE

## 2019-02-17 PROCEDURE — 84439 ASSAY OF FREE THYROXINE: CPT | Performed by: INTERNAL MEDICINE

## 2019-02-17 PROCEDURE — 93975 VASCULAR STUDY: CPT | Performed by: SURGERY

## 2019-02-17 PROCEDURE — 84244 ASSAY OF RENIN: CPT | Performed by: INTERNAL MEDICINE

## 2019-02-17 PROCEDURE — 82607 VITAMIN B-12: CPT | Performed by: INTERNAL MEDICINE

## 2019-02-17 PROCEDURE — 83835 ASSAY OF METANEPHRINES: CPT | Performed by: INTERNAL MEDICINE

## 2019-02-17 PROCEDURE — 84484 ASSAY OF TROPONIN QUANT: CPT | Performed by: INTERNAL MEDICINE

## 2019-02-17 PROCEDURE — 87086 URINE CULTURE/COLONY COUNT: CPT | Performed by: INTERNAL MEDICINE

## 2019-02-17 PROCEDURE — 82570 ASSAY OF URINE CREATININE: CPT | Performed by: INTERNAL MEDICINE

## 2019-02-17 PROCEDURE — 84100 ASSAY OF PHOSPHORUS: CPT | Performed by: INTERNAL MEDICINE

## 2019-02-17 PROCEDURE — 82088 ASSAY OF ALDOSTERONE: CPT | Performed by: INTERNAL MEDICINE

## 2019-02-17 RX ORDER — TORSEMIDE 20 MG/1
20 TABLET ORAL DAILY
Status: DISCONTINUED | OUTPATIENT
Start: 2019-02-18 | End: 2019-02-18

## 2019-02-17 RX ORDER — TORSEMIDE 20 MG/1
40 TABLET ORAL ONCE
Status: COMPLETED | OUTPATIENT
Start: 2019-02-17 | End: 2019-02-17

## 2019-02-17 RX ORDER — FUROSEMIDE 10 MG/ML
40 INJECTION INTRAMUSCULAR; INTRAVENOUS ONCE
Status: COMPLETED | OUTPATIENT
Start: 2019-02-17 | End: 2019-02-17

## 2019-02-17 RX ORDER — SPIRONOLACTONE 25 MG/1
25 TABLET ORAL DAILY
Status: DISCONTINUED | OUTPATIENT
Start: 2019-02-17 | End: 2019-02-19

## 2019-02-17 RX ADMIN — LABETALOL HYDROCHLORIDE 300 MG: 100 TABLET, FILM COATED ORAL at 08:46

## 2019-02-17 RX ADMIN — INSULIN LISPRO 15 UNITS: 100 INJECTION, SOLUTION INTRAVENOUS; SUBCUTANEOUS at 08:47

## 2019-02-17 RX ADMIN — FENOFIBRATE 48 MG: 48 TABLET ORAL at 08:46

## 2019-02-17 RX ADMIN — FUROSEMIDE 40 MG: 10 INJECTION, SOLUTION INTRAMUSCULAR; INTRAVENOUS at 12:37

## 2019-02-17 RX ADMIN — INSULIN GLARGINE 22 UNITS: 100 INJECTION, SOLUTION SUBCUTANEOUS at 22:13

## 2019-02-17 RX ADMIN — FLUOXETINE HYDROCHLORIDE 20 MG: 20 CAPSULE ORAL at 08:46

## 2019-02-17 RX ADMIN — VITAMIN D, TAB 1000IU (100/BT) 1000 UNITS: 25 TAB at 08:46

## 2019-02-17 RX ADMIN — ATORVASTATIN CALCIUM 80 MG: 40 TABLET, FILM COATED ORAL at 17:07

## 2019-02-17 RX ADMIN — HYDRALAZINE HYDROCHLORIDE 75 MG: 25 TABLET ORAL at 17:07

## 2019-02-17 RX ADMIN — ENOXAPARIN SODIUM 30 MG: 30 INJECTION SUBCUTANEOUS at 08:45

## 2019-02-17 RX ADMIN — LABETALOL HYDROCHLORIDE 300 MG: 100 TABLET, FILM COATED ORAL at 22:11

## 2019-02-17 RX ADMIN — ISOSORBIDE DINITRATE 10 MG: 10 TABLET ORAL at 17:07

## 2019-02-17 RX ADMIN — HYDRALAZINE HYDROCHLORIDE 75 MG: 25 TABLET ORAL at 22:12

## 2019-02-17 RX ADMIN — GABAPENTIN 300 MG: 300 CAPSULE ORAL at 22:13

## 2019-02-17 RX ADMIN — ISOSORBIDE DINITRATE 10 MG: 10 TABLET ORAL at 12:37

## 2019-02-17 RX ADMIN — ISOSORBIDE DINITRATE 10 MG: 10 TABLET ORAL at 08:46

## 2019-02-17 RX ADMIN — TORSEMIDE 40 MG: 20 TABLET ORAL at 22:12

## 2019-02-17 RX ADMIN — TOPIRAMATE 50 MG: 25 TABLET, FILM COATED ORAL at 22:11

## 2019-02-17 RX ADMIN — ACETAMINOPHEN 650 MG: 325 TABLET, FILM COATED ORAL at 11:03

## 2019-02-17 RX ADMIN — LOSARTAN POTASSIUM 50 MG: 50 TABLET, FILM COATED ORAL at 08:46

## 2019-02-17 RX ADMIN — HYDRALAZINE HYDROCHLORIDE 75 MG: 25 TABLET ORAL at 08:45

## 2019-02-17 RX ADMIN — LORATADINE 10 MG: 10 TABLET ORAL at 08:46

## 2019-02-17 RX ADMIN — AMLODIPINE BESYLATE 5 MG: 5 TABLET ORAL at 08:46

## 2019-02-17 RX ADMIN — SPIRONOLACTONE 25 MG: 25 TABLET ORAL at 17:07

## 2019-02-17 NOTE — ASSESSMENT & PLAN NOTE
· Nephrology following  · Baseline between 2-2 4  · Creatinine today 2 1  · Check a m  BMP  · Renal US: Limited evaluation of the bladder as it is nondistended   No hydronephrosis

## 2019-02-17 NOTE — ASSESSMENT & PLAN NOTE
· Likely secondary to chronic disease  · Hemoglobin 9 6 today  · Heme test negative for occult blood  · Follow-up iron studies

## 2019-02-17 NOTE — ASSESSMENT & PLAN NOTE
· Component of non cardiogenic pulmonary edema  · Serial troponins (-)  · Echo 9/18 - EF65%, concentric hypertrophy  · Give one time dose of lasix 40mg IV now   · Eventually need ischemia w/u  · Consider cardiology consult   · Chest x-ray: Mild pulmonary vascular congestion   Patchy alveolar infiltrate, likely cardiogenic in nature

## 2019-02-17 NOTE — UTILIZATION REVIEW
Initial Clinical Review    Admission: Date/Time/Statement: 2/16/19 @ 1013   Orders Placed This Encounter   Procedures    Inpatient Admission (expected length of stay for this patient Order details is greater than two midnights)     Standing Status:   Standing     Number of Occurrences:   1     Order Specific Question:   Admitting Physician     Answer:   Forrest Carlton     Order Specific Question:   Level of Care     Answer:   Med Surg [16]     Order Specific Question:   Estimated length of stay     Answer:   More than 2 Midnights     Order Specific Question:   Certification     Answer:   I certify that inpatient services are medically necessary for this patient for a duration of greater than two midnights  See H&P and MD Progress Notes for additional information about the patient's course of treatment  ED: Date/Time/Mode of Arrival:   ED Arrival Information     Expected Arrival Acuity Means of Arrival Escorted By Service Admission Type    - 2/16/2019 07:26 Urgent Walk-In Family Member Hospitalist Urgent    Arrival Complaint    shortness of breath        Chief Complaint:   Chief Complaint   Patient presents with    Shortness of Breath     Patient c/o sob that started yesterday; has asthma  Brought in by family because "it's getting worse "  No fever  History of Illness: Meredith Aviles is a 46 y o  female who presents with few days of progressive dyspnea that worsened to dyspnea at rest and therefore patient presented to ED  She also reported generalized body swelling and worsened puffy face  reports urinary outpt  No chest pain, palpitations, presyncope, syncope   Wishes to swith to [facility] Nephrology team         ED Vital Signs:   ED Triage Vitals   Temperature Pulse Respirations Blood Pressure SpO2   02/16/19 0740 02/16/19 0740 02/16/19 0740 02/16/19 0741 02/16/19 0741   98 6 °F (37 °C) 89 19 (!) 217/109 91 %      Temp Source Heart Rate Source Patient Position - Orthostatic VS BP Location FiO2 (%)   02/16/19 0740 02/16/19 0740 02/16/19 0740 02/16/19 0740 --   Oral Monitor Lying Right arm       Pain Score       02/16/19 0740       8        Wt Readings from Last 1 Encounters:   02/17/19 92 2 kg (203 lb 3 2 oz)     Vital Signs (abnormal):    above    Pertinent Labs/Diagnostic Test Results:   BNP   2,992  BUN/Creat   32/2 28  Albumin   2 2  Alk phos  117  U/A    sm blood     >  300 protein       250 glucose  H/H   10 8/32 1  CXR:  Mild pulmonary vascular congestion   Patchy alveolar infiltrate, likely cardiogenic in nature  Interstitial prominence, consistent with reactive airway disease and the patient's known history of asthma  ED Treatment:   Medication Administration from 02/16/2019 0726 to 02/16/2019 1258       Date/Time Order Dose Route Action Action by Comments     02/16/2019 0804 furosemide (LASIX) injection 40 mg 40 mg Intravenous Given Nelson Laboy RN      02/16/2019 1030 Labetalol HCl (NORMODYNE) injection 10 mg 10 mg Intravenous Given Nelson Laboy RN      02/16/2019 1058 acetaminophen (TYLENOL) tablet 650 mg 650 mg Oral Given Nelson Laboy RN         Past Medical/Surgical History:    Active Ambulatory Problems     Diagnosis Date Noted    Type 2 diabetes mellitus with hyperglycemia, with long-term current use of insulin ([facility] ) 10/03/2017    Hypertension 10/03/2017    Hyperlipidemia 10/03/2017    Migraine 10/04/2017    Left-sided weakness 10/27/2017    H/O: CVA (cerebrovascular accident) 09/21/2018    Viral URI 09/21/2018    Obesity due to excess calories 09/21/2018    Cough 11/22/2018    Anemia 11/24/2018    Diarrhea 11/24/2018    Nephrotic range proteinuria 11/26/2018    Stage 3 chronic kidney disease ([facility] ) 11/26/2018     Resolved Ambulatory Problems     Diagnosis Date Noted    Stroke [facility]) 10/03/2017    Hypertensive urgency 10/03/2017    CHRISTIANA (acute kidney injury) ([facility] ) 10/03/2017    Headache 09/23/2018    Hypertensive emergency 11/22/2018    UTI (urinary tract infection) 11/24/2018     Past Medical History:   Diagnosis Date    Asthma     Diabetes mellitus ([facility] )     Hyperlipidemia     Hypertension      Admitting Diagnosis: Shortness of breath [R06 02]  Nephrotic syndrome [N04 9]  Hypertensive urgency [I16 0]  Other form of dyspnea [R06 09]  Age/Sex: 46 y o  female     Assessment/Plan: Volume overload  Assessment & Plan  Component of non cardiogenic pulmonary edema  Serial troponins  Echo 9/18 - EF65%, concentric hypertrophy  PO nitro for now and monitor BP  Eventually need ischemia w/u  Cardiology input as needed     Nephrotic syndrome  Assessment & Plan  Established nephrotic range proteinuria  Note associated hypoalbuminemia/ hypertriglyceridemia  Probably diabetes related vs other  May need renal Bx  Note associated High BP - will need Ace-I ; will d/w nephrology  lovenox for DVT ppx  IV lasix 20 BID and monitor  Low salt diet     Obesity due to excess calories  Assessment & Plan  Diet and lifestyle changes     Hyperlipidemia  Assessment & Plan  Continue clofibrate        Hypertension  Assessment & Plan  Uncontrolled  For now continue home meds and add Hydralazine IV prn  Will need ACE/ARB once ok with renal  D/w Dr Ishmael Das    Type 2 diabetes mellitus with hyperglycemia, with long-term current use of insulin ([facility]   Anticipated Length of Stay:  Patient will be admitted on an Inpatient basis with an anticipated length of stay of  > 2 midnights               Admission Orders:   IP  2/16  @    1013  Scheduled Meds:   Current Facility-Administered Medications:  acetaminophen 650 mg Oral Q6H PRN Francesca Aguirre MD   amLODIPine 5 mg Oral Daily Saige Ruiz MD   atorvastatin 80 mg Oral QPM Francesca Aguirre MD   cholecalciferol 1,000 Units Oral Daily Francesca Aguirre MD   enoxaparin 30 mg Subcutaneous Daily Francesca Aguirre MD   fenofibrate 48 mg Oral Daily Francesca Aguirre MD   FLUoxetine 20 mg Oral Daily Francesca Aguirre MD   gabapentin 300 mg Oral HS Carlene Harada, MD   hydrALAZINE 10 mg Intravenous Q6H PRN Carlene Harada, MD   hydrALAZINE 75 mg Oral TID Don Iglesias MD   insulin glargine 45 Units Subcutaneous HS Carlene Harada, MD   insulin lispro 15 Units Subcutaneous TID With Meals Carlene Harada, MD   isosorbide dinitrate 10 mg Oral TID after meals Carlene Harada, MD   labetalol 300 mg Oral Q12H [facility] Don Iglesias MD   loratadine 10 mg Oral Daily Carlene Harada, MD   losartan 50 mg Oral Daily Don Iglesias MD   ondansetron 4 mg Intravenous Q6H PRN Carlene Harada, MD   topiramate 50 mg Oral HS Carlene Harada, MD     Continuous Infusions:    PRN Meds:   acetaminophen    hydrALAZINE    ondansetron     Cardiac  Diet  Renal artery  eval  Tele  Cons opthamology  O2  2L    Per  Nephrology  Consult:     CKD stage 4/proteinuria without significant hematuria:  Patient  clearly has nephrotic syndrome associated with hypoalbuminemia/dyslipidemia/anasarca and proteinuria far exceeding 3 5 g   Differential diagnosis would include diabetic nephropathy which is most likely, with a component of hypertensive nephrosclerosis, or another type of primary glomerular process such as membranous nephropathy, FSGS in less likely minimal change disease or other more rare etiologies  Her creatinine appears to be at her new baseline of 2 28 mg/dL  Her baseline is outlined is 2-2 4  Workup:  Serologies:  -negative ANCA  -negative KENYATTA/double-stranded DNA  -negative complements  -negative hepatitis-B/C  -negative SPEP/UPEP and light chains  Recommend:  -renal ultrasound  -PVR with bladder scans  -check IgA for completeness of the serology  most likely will require a kidney biopsy to make a definitive diagnosis  Patient understands this  We will need to make a more stable before ordering this    In particular we need to get her blood pressure improved and her respiratory symptoms improved      Treatment:  -I would add an angiotensin receptor 2 blocker given the fact her creatinine is actually at a new baseline  Will of course need to watch her renal function closely  -add diuretics for the anasarca  -aggressively treat dyslipidemia  -goal blood pressure will be eventually less than 125/75, we will lower this slowly     2  Accelerated hypertension:  Most likely related to renal disease  Workup:  -Renal workup  -aldosterone/renin ratio  -plasma free metanephrines  -repeat TSH/free T4  -renal artery duplex  Treatment:  -add angiotensin receptor 2 blocker  Amlodipine 5 mg daily, avoid higher dose given anasarca  -torsemide 20 mg daily  -hydralazine 75 mg 3 times a day as ordered  -labetalol 300 mg q 12 hours  -p r n  Intravenous labetalol  Again ultimate goal is lower at less than 125/75 but we have to do this slowly to avoid hypoperfusion and CHRISTIANA  NOTE:  PATIENT APPARENTLY HAD A REACTION TO LISINOPRIL WITH SWELLING  WE WILL USE AN ANGIOTENSIN RECEPTOR 2 BLOCKER WHICH SHOULD NOT CAUSE THIS PROBLEM BUT WILL OBSERVE HER VERY CLOSELY      3  Electrolytes are stable     4  Mineral bone disorder:  -check magnesium/phosphorus  -check PTH intact level vitamin-D levels     5  Anemia:  Possibly related to her CKD and chronic disease  -negative multiple myeloma evaluation  check iron studies  -check B12/folate  -stool for occult blood     6  Treat dyslipidemia which is extremely high secondary to nephrotic syndrome  Network Utilization Review Department  Phone: [phone]; Fax [phone]  [email or web address]  ATTENTION: Please call with any questions or concerns to [phone]  and carefully listen to the prompts so that you are directed to the right person  Send all requests for admission clinical reviews, approved or denied determinations and any other requests to fax [phone]   All voicemails are confidential

## 2019-02-17 NOTE — ASSESSMENT & PLAN NOTE
· History of of CVA with left-sided hemiparesis  · Supportive care  · Will require outpatient f/u with neurology after discharge

## 2019-02-17 NOTE — PROGRESS NOTES
Progress Note - Crispin Gutierrez 1965, 46 y o  female MRN: [id number]    Unit/Bed#: -Alisha Encounter: [id number]    Primary Care Provider: Crispin Gutierrez MD   Date and time admitted to hospital: 2/16/2019  7:37 AM        * Volume overload  Assessment & Plan  · Component of non cardiogenic pulmonary edema  · Serial troponins (-)  · Echo 9/18 - EF65%, concentric hypertrophy  · Give one time dose of lasix 40mg IV now   · Eventually need ischemia w/u  · Consider cardiology consult   · Chest x-ray: Mild pulmonary vascular congestion   Patchy alveolar infiltrate, likely cardiogenic in nature    Nephrotic syndrome  Assessment & Plan  · Established nephrotic range proteinuria  · Nephrology following and managing     Visual disturbance  Assessment & Plan  · Consult Ophthalmology    Anemia of chronic renal failure, stage 4 (severe) (HCC)  Assessment & Plan  · Likely secondary to chronic disease  · Hemoglobin 9 6 today  · Heme test negative for occult blood  · Follow-up iron studies    Chronic kidney disease, stage IV (severe) ([address] )  Assessment & Plan  · Nephrology following  · Baseline between 2-2 4  · Creatinine today 2 1  · Check a m  BMP  · Renal US: Limited evaluation of the bladder as it is nondistended   No hydronephrosis      Obesity due to excess calories  Assessment & Plan  · Diet and lifestyle changes    H/O: CVA (cerebrovascular accident)  Assessment & Plan  · History of of CVA with left-sided hemiparesis  · Supportive care  · Will require outpatient f/u with neurology after discharge     Migraine  Assessment & Plan  · Chronic  · C/w Topamax  · Outpatient f/u with neuro  · PRN tylenol  · Avoid NSAIDs    Hyperlipidemia  Assessment & Plan  · C/w statin and tricor       Hypertension  Assessment & Plan  · Uncontrolled  · Nephrology assisting with management   · C/w norvasc 5mg daily; labetalol 300mg BID; hydralazine 75mg TID; losartan 50mg daily     Type 2 diabetes mellitus with hyperglycemia, with long-term current use of insulin [facility])  Assessment & Plan  Lab Results   Component Value Date    HGBA1C 8 9 (H) 10/04/2017       Recent Labs     19  1607 19  2110 19  1242   POCGLU 184* 156* 65       Blood Sugar Average: Last 72 hrs:  (P) 135      · C/w current insulin regimen: lantus 45 units at HS; novolog 15 units TID    · a1c 8 9      VTE Pharmacologic Prophylaxis:   Pharmacologic: Enoxaparin (Lovenox)  Mechanical VTE Prophylaxis in Place: No    Patient Centered Rounds: I have performed bedside rounds with nursing staff today  Discussions with Specialists or Other Care Team Provider: d/w RN  D/w Tari Eng, D/w Dr Alo Bonilla    Education and Discussions with Family / Patient: d/w patient  Declined call to family     Time Spent for Care: 45 minutes  More than 50% of total time spent on counseling and coordination of care as described above  Current Length of Stay: 1 day(s)    Current Patient Status: Inpatient   Certification Statement: The patient will continue to require additional inpatient hospital stay due to iv diuretics     Discharge Plan: not stable for discharge     Code Status: Level 1 - Full Code      Subjective:   Pt reports she overall isnt feeling better  She reports that overall edema is worse  Pt reports she is having a posterior headache  She does get migraines and reports this headache is typical  Pt reports she has blurry vision for awhile now and its getting worse to the point where she couldn't even read today  "I can't see " pt reports she has left sided hemiparesis d/ ta cva last year  (+) SOB but feels this is slightly improved today  Objective:     Vitals:   Temp (24hrs), Av 4 °F (37 4 °C), Min:98 4 °F (36 9 °C), Max:100 7 °F (38 2 °C)    Temp:  [98 4 °F (36 9 °C)-100 7 °F (38 2 °C)] 98 4 °F (36 9 °C)  HR:  [71-93] 71  Resp:  [18] 18  BP: (137-180)/(80-81) 180/80  SpO2:  [92 %-93 %] 92 %  Body mass index is 37 17 kg/m²       Input and Output Summary (last 24 hours): Intake/Output Summary (Last 24 hours) at 2/17/2019 1510  Last data filed at 2/17/2019 0510  Gross per 24 hour   Intake 240 ml   Output 950 ml   Net -710 ml       Physical Exam:     Physical Exam   Constitutional: She is oriented to person, place, and time  No distress  HENT:   Head: Normocephalic  Mouth/Throat: Oropharynx is clear and moist  No oropharyngeal exudate  Eyes: Pupils are equal, round, and reactive to light  Conjunctivae are normal  No scleral icterus  Neck: Neck supple  Cardiovascular: Normal rate, regular rhythm, normal heart sounds and intact distal pulses  No murmur heard  Pulmonary/Chest: Effort normal  No respiratory distress  She has wheezes (exp wheeze upper lobes b/l)  She has no rales  Abdominal: Soft  Bowel sounds are normal  She exhibits no distension  There is no tenderness  There is no rebound  Musculoskeletal: She exhibits edema (significant anasarca noted and facial swelling  see pictures )  She exhibits no tenderness  Neurological: She is alert and oriented to person, place, and time  A cranial nerve deficit (left hemiparesis, left sided facial droop, left arm and leg drift noted by patient to be secondary to a CVA she had last year- no change per patient  ) is present  Skin: Skin is warm and dry  No rash noted  She is not diaphoretic  No erythema  Psychiatric: She has a normal mood and affect               Additional Data:     Labs:    Results from last 7 days   Lab Units 02/17/19  0503 02/16/19  0758   WBC Thousand/uL 6 00 6 29   HEMOGLOBIN g/dL 9 6* 10 8*   HEMATOCRIT % 28 8* 32 1*   PLATELETS Thousands/uL 263 304   NEUTROS PCT %  --  59   LYMPHS PCT %  --  30   MONOS PCT %  --  6   EOS PCT %  --  4     Results from last 7 days   Lab Units 02/17/19  0503   SODIUM mmol/L 142   POTASSIUM mmol/L 3 6   CHLORIDE mmol/L 110*   CO2 mmol/L 22   BUN mg/dL 30*   CREATININE mg/dL 2 10*   ANION GAP mmol/L 10   CALCIUM mg/dL 8 5   ALBUMIN g/dL 1 8* TOTAL BILIRUBIN mg/dL 0 20   ALK PHOS U/L 100   ALT U/L 17   AST U/L 22   GLUCOSE RANDOM mg/dL 95         Results from last 7 days   Lab Units 02/17/19  1242 02/16/19  2110 02/16/19  1607   POC GLUCOSE mg/dl 65 156* 184*                   * I Have Reviewed All Lab Data Listed Above  * Additional Pertinent Lab Tests Reviewed: All Labs Within Last 24 Hours Reviewed    Imaging:    Imaging Reports Reviewed Today Include:  Chest x-ray, renal ultrasound    Recent Cultures (last 7 days):           Last 24 Hours Medication List:     Current Facility-Administered Medications:  acetaminophen 650 mg Oral Q6H PRN Tramaine Lamas MD   amLODIPine 5 mg Oral Daily Carly Willis MD   atorvastatin 80 mg Oral QPM Tramaine Lamas MD   cholecalciferol 1,000 Units Oral Daily Tramaine Lamas MD   enoxaparin 30 mg Subcutaneous Daily Tramaine Lamas MD   fenofibrate 48 mg Oral Daily Tramaine Lamas MD   FLUoxetine 20 mg Oral Daily Tramaine Lamas MD   gabapentin 300 mg Oral HS Tramaine Lamas MD   hydrALAZINE 10 mg Intravenous Q6H PRN Tramaine Lamas MD   hydrALAZINE 75 mg Oral TID Carly Willis MD   insulin glargine 45 Units Subcutaneous HS Tramaine Lamas MD   insulin lispro 15 Units Subcutaneous TID With Meals Tramaine Lamas MD   isosorbide dinitrate 10 mg Oral TID after meals Tramaine Lamas MD   labetalol 300 mg Oral Q12H [address] Carly Willis MD   loratadine 10 mg Oral Daily Tramaine Lamas MD   losartan 50 mg Oral Daily Carly Willis MD   ondansetron 4 mg Intravenous Q6H PRN Tramaine Lamas MD   topiramate 50 mg Oral HS Tramaine Lamas MD        Today, Patient Was Seen By: GONZALO Cunningham    ** Please Note: Dictation voice to text software may have been used in the creation of this document   **

## 2019-02-17 NOTE — ASSESSMENT & PLAN NOTE
Lab Results   Component Value Date    HGBA1C 8 9 (H) 10/04/2017       Recent Labs     02/16/19  1607 02/16/19  2110 02/17/19  1242   POCGLU 184* 156* 65       Blood Sugar Average: Last 72 hrs:  (P) 135      · C/w current insulin regimen: lantus 45 units at HS; novolog 15 units TID    · a1c 8 9

## 2019-02-17 NOTE — PLAN OF CARE
Problem: Potential for Falls  Goal: Patient will remain free of falls  Description  INTERVENTIONS:  - Assess patient frequently for physical needs  -  Identify cognitive and physical deficits and behaviors that affect risk of falls    -  [place] fall precautions as indicated by assessment   - Educate patient/family on patient safety including physical limitations  - Instruct patient to call for assistance with activity based on assessment  - Modify environment to reduce risk of injury  - Consider OT/PT consult to assist with strengthening/mobility  Outcome: Progressing

## 2019-02-17 NOTE — PROGRESS NOTES
[address] [facility] NOTE   Clyde Peguero 46 y o  female MRN: [id number]  Unit/Bed#: -01 Encounter: [id number]  Reason for Consult:  CKD and proteinuria    ASSESSMENT and PLAN:  The patient is a 21-year-old female with a longstanding history of diabetes type 2, asthma, diabetes and hypertension who presented with a 2 day history of worsening shortness of breath and edema  Edema was described as a generalized body swelling including her face  She has a known history of CKD with her most recent baseline between 2-2 4  Nephrology was consulted for management of CKD, stage IV with proteinuria  1  Chronic kidney disease, stage IV with proteinuria but no significant hematuria:  · Etiology likely diabetic nephropathy especially in light of longstanding disease with retinopathy  May also be a component of hypertensive nephrosclerosis  Other glomerular diseases as noted below  · Progression of disease noted with previous baseline creatinine fluctuating between 1 3-2 0, now increasing and remaining between 2-2 4  · Creatinine 2 28 on admission, currently 2 1  · Urinalysis 10-20 RBCs, 4-10 WBCs, moderate epithelial cells, moderate bacteria, 0-1 fine granular casts, greater than for 3+ protein  · Renal ultrasound right kidney 12 4 cm, left kidney 12 9 cm no hydronephrosis, left kidney cyst-unchanged in size or character  · Status post 1 dose of Lasix  Will give another dose this morning due to persistent edema/facial swelling  · Will likely need standing dose, may also need the addition of albumin to boost effectiveness  · Outpatient follow-up needed  2  Nephrotic syndrome:  Evidenced by hypoalbuminemia, dyslipidemia, anasarca and proteinuria exceeding 3 5 g  Previous workup negative  Differential diagnosis includes diabetic nephropathy which is the most likely cause, component of hypertensive nephrosclerosis    Also consideration for another type of primary glomerular process such as membranous nephropathy, FSGS or minimal change disease  · Urine protein creatinine ratio 11 5 g  · TSH elevated 8 439, free T4 pending  · IgA within normal limits  · Previous workup November 2018   · KENYATTA negative  · ANCA negative  · Complements normal  · Anti dsDNA negative  · Anti-GBM negative  · SPEP and UPEP, free light chains negative  · Hepatitis nonreactive  3  Accelerated Hypertension:  Good blood pressure control essential  · Workup in progress:  Renin aldosterone ratio, plasma free metanephrines, TSH, T4, renal artery duplex  · Current medications:  Amlodipine 5 mg daily, hydralazine 75 mg t i d , isosorbide 10 mg 3 times a day, labetalol 300 mg every 12 hours and losartan 50 mg daily  P r n  Labetalol also available  · Given Lasix x1 yesterday  · Blood pressure has greatly improved  · Avoid hypotension  · Reported allergy to lisinopril therefore absorbs closely on losartan which was just started  4  Diabetes mellitus with retinopathy  · Management per primary team  5  CKD MBD:  Phosphorus within normal limits  6  Anemia:  Likely due to chronic disease  · Iron saturation, ferritin, folate, B12 pending  · Stool for occult blood negative  7  Dyslipidemia:  Uncontrolled secondary to nephrotic syndrome    Plan discussed with primary service  SUBJECTIVE / INTERVAL HISTORY:  Chief complaint is vision which she states is still blurry  She states that she had a sudden onset of blurry vision earlier this week  Not have any difficulty breathing    No headache    OBJECTIVE:  Current Weight: Weight - Scale: 92 2 kg (203 lb 3 2 oz)  Vitals:    02/16/19 1316 02/16/19 1500 02/16/19 2200 02/17/19 0510   BP: 160/70 150/75 137/81    BP Location: Right arm Right arm Right arm    Pulse: 86 79 93    Resp: 18 18 18    Temp: 98 4 °F (36 9 °C) 98 °F (36 7 °C) 99 2 °F (37 3 °C)    TempSrc: Oral Oral Oral    SpO2: 92% 96% 93%    Weight: 92 6 kg (204 lb 3 2 oz)   92 2 kg (203 lb 3 2 oz)   Height: 5' 2" (1 575 m)          Intake/Output Summary (Last 24 hours) at 2/17/2019 0710  Last data filed at 2/17/2019 0510  Gross per 24 hour   Intake 600 ml   Output 950 ml   Net -350 ml     General:  No acute distress  Skin:  Warm and dry, no rash  Eyes:  Sclera clear  ENT:  Oropharynx moist  Neck:  Supple no JVD appreciated, no bruit  Chest:  Clear bilaterally no wheezes rhonchi or rales  CVS:  S1-S2 no murmur rub or gallop  Abdomen:  Soft, nondistended, nontender, bowel sounds present  Extremities:  Generalized anasarca including facial edema  :  No Jeong  Neuro:  Alert and oriented  Psych:  Appropriate  Medications:    Current Facility-Administered Medications:     acetaminophen (TYLENOL) tablet 650 mg, 650 mg, Oral, Q6H PRN, Luz Jiménez MD, 650 mg at 02/16/19 1431    amLODIPine (NORVASC) tablet 5 mg, 5 mg, Oral, Daily, Eran Bo MD    atorvastatin (LIPITOR) tablet 80 mg, 80 mg, Oral, QPM, Shiva Valerio MD, 80 mg at 02/16/19 1749    cholecalciferol (VITAMIN D3) tablet 1,000 Units, 1,000 Units, Oral, Daily, Luz Jiménez MD    enoxaparin (LOVENOX) subcutaneous injection 30 mg, 30 mg, Subcutaneous, Daily, Luz Jiménez MD, 30 mg at 02/16/19 1431    fenofibrate (TRICOR) tablet 48 mg, 48 mg, Oral, Daily, Luz Jiménez MD    FLUoxetine (PROzac) capsule 20 mg, 20 mg, Oral, Daily, Luz Jiménez MD    gabapentin (NEURONTIN) capsule 300 mg, 300 mg, Oral, HS, Shiva Valerio MD, 300 mg at 02/16/19 2232    hydrALAZINE (APRESOLINE) injection 10 mg, 10 mg, Intravenous, Q6H PRN, Luz Jiménez MD    hydrALAZINE (APRESOLINE) tablet 75 mg, 75 mg, Oral, TID, Eran Bo MD, 75 mg at 02/16/19 2317    insulin glargine (LANTUS) subcutaneous injection 45 Units 0 45 mL, 45 Units, Subcutaneous, HS, Shiva Valerio MD, 45 Units at 02/16/19 2232    insulin lispro (HumaLOG) 100 units/mL subcutaneous injection 15 Units, 15 Units, Subcutaneous, TID With Meals, Luz Jiménez MD, 15 Units at 02/16/19 184   isosorbide dinitrate (ISORDIL) tablet 10 mg, 10 mg, Oral, TID after meals, Juan Mascorro MD, 10 mg at 02/16/19 1432    labetalol (NORMODYNE) tablet 300 mg, 300 mg, Oral, Q12H [address], Sonam De Oliveira MD, 300 mg at 02/16/19 2316    loratadine (CLARITIN) tablet 10 mg, 10 mg, Oral, Daily, Juan Mascorro MD    losartan (COZAAR) tablet 50 mg, 50 mg, Oral, Daily, Sonam De Oliveira MD    ondansetron [facility]) injection 4 mg, 4 mg, Intravenous, Q6H PRN, Juan Mascorro MD    topiramate (TOPAMAX) tablet 50 mg, 50 mg, Oral, HS, Juan Mascorro MD, 50 mg at 02/16/19 2232    Laboratory Results:  Results from last 7 days   Lab Units 02/17/19  0503 02/16/19  0758   WBC Thousand/uL 6 00 6 29   HEMOGLOBIN g/dL 9 6* 10 8*   HEMATOCRIT % 28 8* 32 1*   PLATELETS Thousands/uL 263 304   POTASSIUM mmol/L 3 6 3 8   CHLORIDE mmol/L 110* 107   CO2 mmol/L 22 25   BUN mg/dL 30* 32*   CREATININE mg/dL 2 10* 2 28*   CALCIUM mg/dL 8 5 8 9   MAGNESIUM mg/dL 2 2  --    PHOSPHORUS mg/dL 4 5  --      Work up:

## 2019-02-17 NOTE — CONSULTS
Consultation: ophthalmology   Reason: blurred vision left greater than right for a few weeks and previous history of stroke  Examination:    NVacc 20/150 OD 20/400 OS  IOP 24 OD and  26 OS  Visual fields intact to confrontation OU  Extra ocular muscles intact  Pupils 3-2 OD no APD, 3-2 OS with subtle escape    Anterior segment unremarkable with mild nuclear sclerosis    Dilated exam showed mild diabetic changes and sharp optic nerves  Assessment and plan:    45 y/o with diabetes, kidney disease, asthma, hypertension, anemia, dyslipidemia none of which is particularly well controlled at this time  All of these conditions can affect the visional functioning  Apparently she has not seen her ophthalmologist in [facility] recently  Her current adnexal  swelling is limiting ocular examination  Her eye pressures are mildly elevated but there is swelling and she was squeezing during the exam   There was no gross extravascular blood in her posterior segment but retinal vascular flow can't be assessed at the bedside  The patient was unaware if the previous stroke affected her visual fields  Plan:    Would repeat formal visual field and acquire a retinal OCT when the situation permits  Would repeat dilated examination and eye pressure measurement as swelling goes down  Would obtain a refraction to establish best corrected vision  Bruno Garcia consider retinal consultation as outpatient depending of results or repeat general tests        Leane Both MD [place] I'm new to epic and have not figured out templates yet

## 2019-02-17 NOTE — ASSESSMENT & PLAN NOTE
· Uncontrolled  · Nephrology assisting with management   · C/w norvasc 5mg daily; labetalol 300mg BID; hydralazine 75mg TID; losartan 50mg daily

## 2019-02-18 LAB
ALBUMIN SERPL BCP-MCNC: 1.7 G/DL (ref 3.5–5)
ANION GAP SERPL CALCULATED.3IONS-SCNC: 9 MMOL/L (ref 4–13)
BACTERIA UR CULT: NORMAL
BUN SERPL-MCNC: 30 MG/DL (ref 5–25)
CALCIUM SERPL-MCNC: 8.4 MG/DL (ref 8.3–10.1)
CHLORIDE SERPL-SCNC: 106 MMOL/L (ref 100–108)
CO2 SERPL-SCNC: 25 MMOL/L (ref 21–32)
CREAT SERPL-MCNC: 2.5 MG/DL (ref 0.6–1.3)
ERYTHROCYTE [DISTWIDTH] IN BLOOD BY AUTOMATED COUNT: 12.3 % (ref 11.6–15.1)
GFR SERPL CREATININE-BSD FRML MDRD: 21 ML/MIN/1.73SQ M
GLUCOSE SERPL-MCNC: 111 MG/DL (ref 65–140)
GLUCOSE SERPL-MCNC: 134 MG/DL (ref 65–140)
GLUCOSE SERPL-MCNC: 164 MG/DL (ref 65–140)
GLUCOSE SERPL-MCNC: 192 MG/DL (ref 65–140)
GLUCOSE SERPL-MCNC: 94 MG/DL (ref 65–140)
HCT VFR BLD AUTO: 26 % (ref 34.8–46.1)
HGB BLD-MCNC: 8.6 G/DL (ref 11.5–15.4)
MAGNESIUM SERPL-MCNC: 2 MG/DL (ref 1.6–2.6)
MCH RBC QN AUTO: 29.5 PG (ref 26.8–34.3)
MCHC RBC AUTO-ENTMCNC: 33.1 G/DL (ref 31.4–37.4)
MCV RBC AUTO: 89 FL (ref 82–98)
PHOSPHATE SERPL-MCNC: 4.4 MG/DL (ref 2.7–4.5)
PLATELET # BLD AUTO: 269 THOUSANDS/UL (ref 149–390)
PMV BLD AUTO: 10 FL (ref 8.9–12.7)
POTASSIUM SERPL-SCNC: 3.9 MMOL/L (ref 3.5–5.3)
RBC # BLD AUTO: 2.92 MILLION/UL (ref 3.81–5.12)
SODIUM SERPL-SCNC: 140 MMOL/L (ref 136–145)
WBC # BLD AUTO: 5.43 THOUSAND/UL (ref 4.31–10.16)

## 2019-02-18 PROCEDURE — 80069 RENAL FUNCTION PANEL: CPT | Performed by: NURSE PRACTITIONER

## 2019-02-18 PROCEDURE — G8979 MOBILITY GOAL STATUS: HCPCS

## 2019-02-18 PROCEDURE — 99232 SBSQ HOSP IP/OBS MODERATE 35: CPT | Performed by: PHYSICIAN ASSISTANT

## 2019-02-18 PROCEDURE — 85027 COMPLETE CBC AUTOMATED: CPT | Performed by: NURSE PRACTITIONER

## 2019-02-18 PROCEDURE — 99232 SBSQ HOSP IP/OBS MODERATE 35: CPT | Performed by: INTERNAL MEDICINE

## 2019-02-18 PROCEDURE — 97163 PT EVAL HIGH COMPLEX 45 MIN: CPT

## 2019-02-18 PROCEDURE — 83735 ASSAY OF MAGNESIUM: CPT | Performed by: NURSE PRACTITIONER

## 2019-02-18 PROCEDURE — G8978 MOBILITY CURRENT STATUS: HCPCS

## 2019-02-18 PROCEDURE — 82948 REAGENT STRIP/BLOOD GLUCOSE: CPT

## 2019-02-18 RX ORDER — TORSEMIDE 20 MG/1
20 TABLET ORAL
Status: DISCONTINUED | OUTPATIENT
Start: 2019-02-18 | End: 2019-02-19

## 2019-02-18 RX ADMIN — ISOSORBIDE DINITRATE 10 MG: 10 TABLET ORAL at 12:03

## 2019-02-18 RX ADMIN — TORSEMIDE 20 MG: 20 TABLET ORAL at 08:35

## 2019-02-18 RX ADMIN — SPIRONOLACTONE 25 MG: 25 TABLET ORAL at 08:25

## 2019-02-18 RX ADMIN — HYDRALAZINE HYDROCHLORIDE 75 MG: 25 TABLET ORAL at 21:20

## 2019-02-18 RX ADMIN — LABETALOL HYDROCHLORIDE 300 MG: 100 TABLET, FILM COATED ORAL at 08:24

## 2019-02-18 RX ADMIN — HYDRALAZINE HYDROCHLORIDE 75 MG: 25 TABLET ORAL at 08:26

## 2019-02-18 RX ADMIN — FLUOXETINE HYDROCHLORIDE 20 MG: 20 CAPSULE ORAL at 08:26

## 2019-02-18 RX ADMIN — ISOSORBIDE DINITRATE 10 MG: 10 TABLET ORAL at 17:08

## 2019-02-18 RX ADMIN — FENOFIBRATE 48 MG: 48 TABLET ORAL at 08:24

## 2019-02-18 RX ADMIN — INSULIN LISPRO 15 UNITS: 100 INJECTION, SOLUTION INTRAVENOUS; SUBCUTANEOUS at 17:08

## 2019-02-18 RX ADMIN — INSULIN LISPRO 15 UNITS: 100 INJECTION, SOLUTION INTRAVENOUS; SUBCUTANEOUS at 12:03

## 2019-02-18 RX ADMIN — LABETALOL HYDROCHLORIDE 300 MG: 100 TABLET, FILM COATED ORAL at 21:21

## 2019-02-18 RX ADMIN — INSULIN GLARGINE 45 UNITS: 100 INJECTION, SOLUTION SUBCUTANEOUS at 21:21

## 2019-02-18 RX ADMIN — INSULIN LISPRO 15 UNITS: 100 INJECTION, SOLUTION INTRAVENOUS; SUBCUTANEOUS at 08:31

## 2019-02-18 RX ADMIN — VITAMIN D, TAB 1000IU (100/BT) 1000 UNITS: 25 TAB at 08:25

## 2019-02-18 RX ADMIN — ENOXAPARIN SODIUM 30 MG: 30 INJECTION SUBCUTANEOUS at 08:24

## 2019-02-18 RX ADMIN — GABAPENTIN 300 MG: 300 CAPSULE ORAL at 21:21

## 2019-02-18 RX ADMIN — ATORVASTATIN CALCIUM 80 MG: 40 TABLET, FILM COATED ORAL at 17:08

## 2019-02-18 RX ADMIN — TORSEMIDE 20 MG: 20 TABLET ORAL at 17:08

## 2019-02-18 RX ADMIN — HYDRALAZINE HYDROCHLORIDE 75 MG: 25 TABLET ORAL at 17:08

## 2019-02-18 RX ADMIN — ISOSORBIDE DINITRATE 10 MG: 10 TABLET ORAL at 08:25

## 2019-02-18 RX ADMIN — TOPIRAMATE 50 MG: 25 TABLET, FILM COATED ORAL at 21:20

## 2019-02-18 RX ADMIN — LOSARTAN POTASSIUM 50 MG: 50 TABLET, FILM COATED ORAL at 08:25

## 2019-02-18 RX ADMIN — LORATADINE 10 MG: 10 TABLET ORAL at 08:24

## 2019-02-18 RX ADMIN — AMLODIPINE BESYLATE 5 MG: 5 TABLET ORAL at 08:25

## 2019-02-18 NOTE — PLAN OF CARE
Problem: PHYSICAL THERAPY ADULT  Goal: Performs mobility at highest level of function for planned discharge setting  See evaluation for individualized goals  Description  Treatment/Interventions: Functional transfer training, LE strengthening/ROM, Therapeutic exercise, Endurance training, Patient/family training, Equipment eval/education, Bed mobility, Gait training, Spoke to nursing, Spoke to case management(Pt  to see when stair training is appropriate)  Equipment Recommended: Chilon Cage       See flowsheet documentation for full assessment, interventions and recommendations  Note:   Prognosis: Good  Problem List: Decreased strength, Decreased endurance, Impaired balance, Decreased mobility, Decreased coordination, Decreased cognition, Impaired judgement, Decreased safety awareness  Assessment: Pt  is a 47 yo F who present swith few days of progressive worsening dyspnea  Pt  reports new onset of Dizziness on 2/18/19  Dx: Volume overload, order placed for PT eval and tx, w/ activity order of activity as tolerated  pt presents w/ comorbidities of DM2, Htn, HLD, Migranes, Hx of CVA, obesity, CKD IV, Anemia, and visual disturbance and personal factors of inaccessible home environment, living in 3 story house, mobilizing w/ assistive device, stair(s) to enter home, inability to ambulate household distances, inability to navigate community distances, inability to navigate level surfaces w/o external assistance, unable to perform dynamic tasks in community, limited home support, visual impairments, unable to perform physical activity, inability to perform IADLs, inability to perform ADLs and inability to live alone  pt presents w/ weakness, decreased endurance, impaired balance, gait deviations, visual impairment, decreased safety awareness and fall risk   these impairments are evident in findings from physical examination (weakness and impaired coordination), mobility assessment (need for Min assist w/ all phases of mobility when usually mobilizing independently, tolerance to only 60 feet of ambulation and need for cueing for mobility technique), and Barthel Index: 50/100  pt needed input for task focus and mobility technique  pt is at risk for falls due to physical and safety awareness deficits  pt's clinical presentation is unstable/unpredictable (evident in need for assist w/ all phases of mobility when usually mobilizing independently, tolerance to only 60 feet of ambulation, need for input for mobility technique, need for input for task focus and mobility technique and need for input for mobility technique/safety)  pt needs inpatient PT tx to improve mobility deficits  discharge recommendation is for inpatient rehab to reduce fall risk and maximize level of functional independence  Barriers to Discharge: Inaccessible home environment, Decreased caregiver support     Recommendation: Short-term skilled PT     PT - OK to Discharge: Yes    See flowsheet documentation for full assessment

## 2019-02-18 NOTE — SOCIAL WORK
LOS 2, not a bundle, not a readmit  CM met with Pt and Pt's daughter at bedside  Pt lives in a 2nd floor apartment with about 21 JONAH  Pt's daughter lives in apartment on the 1st floor  Pt uses a cane/walker as needed at home  Pt was mostly independent with ambulation and ADL's  Pt has had [facility] in the past and has been to [facility] in the past for STR  Pt uses [facility] on [address], Pt does not currently have any insurance, Pt reports the [facility] has been assisting Pt with medications except for insulin but that PCP has been providing samples of insulin  Pt does not have a POA, Pt does not want information  Pt's daughter transports Pt to appointments  PT evaluated Pt and recommend STR  Pt is MA pending, Pt's daughter reports that Pt gets covered for emergency hospital visits but is not eligible for medical assistance as she has only had a green card for 3 years and needs to have it for 5 years  CM discussed Home therapy with Pt and daughter, they would want to know price  CM submitted referral to [facility], per KAITLIN pt would have to sign an agreement that she would be financially responsible if MA isn't approved  CM discussed with Pt and daughter and they do not want to sign financial agreement  CM made KAITLIN aware  Pt's daughter reports Pt can stay with her for some time on a main level with no JONAH  CM dept will follow

## 2019-02-18 NOTE — ASSESSMENT & PLAN NOTE
· Nephrology following  · Baseline between 2-2 4  · Creatinine today 2 5  · Check a m  BMP  · Recommending inpatient renal bx if no infection  · Reviewed urine cx-- mixed contaminants x3 with low colony counts-- no evidence of acute UTI  · Renal US: Limited evaluation of the bladder as it is nondistended   No hydronephrosis

## 2019-02-18 NOTE — PHYSICAL THERAPY NOTE
PHYSICAL THERAPY Evaluation NOTE    Patient Name: Perri Ordaz  LIFFI'I Date: 2/18/2019     AGE:   46 y o  Mrn:   [id number]  ADMIT DX:  Shortness of breath [R06 02]  Nephrotic syndrome [N04 9]  Hypertensive urgency [I16 0]  Other form of dyspnea [R06 09]    Past Medical History:   Diagnosis Date    Asthma     Diabetes mellitus ([facility] [address] )     Hyperlipidemia     Hypertension      Length Of Stay: 2  PHYSICAL THERAPY EVALUATION :    02/18/19 1406   Note Type   Note type Eval only   Pain Assessment   Pain Assessment 0-10   Pain Score No Pain   Home Living   Type of Home Apartment   Home Layout One level;Stairs to enter with rails; Able to live on main level with bedroom/bathroom; Performs ADLs on one level   Bathroom Shower/Tub Tub/shower unit   Bathroom Toilet Standard   Bathroom Accessibility Accessible   Home Equipment Cane;Walker   Additional Comments Pt  lives alone in a 3rd floor apartment, with 21 JONAH  Daughter lives on 1st floor and assists with IADLs  Per patient They are currently looking for apartments without stairs   Prior Function   Level of [place] Independent with ADLs and functional mobility; Needs assistance with IADLs   Lives With Alone   Receives Help From Family   ADL Assistance Independent   IADLs Needs assistance   Falls in the last 6 months 0   Vocational Retired   Comments PTA, Pt  reports INDEP with ADLs and functional mobility with RW or Cane  Assistance from daughter for IADLs  Restrictions/Precautions   Other Precautions Fall Risk;Telemetry; Visual impairment   General   Additional Pertinent History Pt  is a 47 yo F who present swith few days of progressive worsening dyspnea  Pt  reports new onset of Dizziness on 2/18/19  Dx: Volume overload, Comorbidities include DM2, Htn, HLD, Migranes, Hx of CVA, obesity, CKD IV, Anemia, and visual disturbance      Family/Caregiver Present No   Cognition   Overall Cognitive Status WFL   Arousal/Participation Cooperative   Orientation Level Oriented X4   Memory Within functional limits   Following Commands Follows multistep commands with increased time or repetition   Comments Pt  was identified with full name and birthdate   RUE Assessment   RUE Assessment WFL  (grossly 4-/5)   LUE Assessment   LUE Assessment   (grossly 3+/5)   RLE Assessment   RLE Assessment WFL  (grossly 4-/5)   LLE Assessment   LLE Assessment   (grossly 3+/5)   Coordination   Movements are Fluid and Coordinated 0   Coordination and Movement Description severe intention tremors noted with all MMTs with LUE/LLE  Sensation WFL   Light Touch   RLE Light Touch Grossly intact   LLE Light Touch Grossly intact   Bed Mobility   Supine to Sit Unable to assess   Sit to Supine 5  Supervision   Additional items HOB elevated; Bedrails; Increased time required   Transfers   Sit to Stand 4  Minimal assistance   Additional items Assist x 1; Increased time required;Verbal cues  (for hand placement and sequencing)   Stand to Sit 4  Minimal assistance   Additional items Assist x 1; Impulsive;Verbal cues  (to reach back for controlled descent)   Ambulation/Elevation   Gait pattern Improper Weight shift; Poor UE support;Decreased foot clearance; Forward Flexion;Narrow CECILLE;Shuffling; Inconsistent nadiya; Excessively slow; Short stride   Gait Assistance 4  Minimal assist   Additional items Assist x 1;Verbal cues  (for hand placement and sequencing)   Assistive Device Straight cane   Distance 60'x1   Balance   Static Sitting Fair +   Dynamic Sitting Fair   Static Standing Fair -   Dynamic Standing Poor +   Ambulatory Poor +   Endurance Deficit   Endurance Deficit Yes   Endurance Deficit Description postural and gait degradation with fatigue   Activity Tolerance   Activity Tolerance Treatment limited secondary to medical complications (Comment); Patient limited by fatigue   Nurse Made Aware Spoke to [facility]   Assessment   Prognosis Good   Problem List Decreased strength;Decreased endurance; Impaired balance;Decreased mobility; Decreased coordination;Decreased cognition; Impaired judgement;Decreased safety awareness   Assessment Pt  is a 47 yo F who present swith few days of progressive worsening dyspnea  Pt  reports new onset of Dizziness on 2/18/19  Dx: Volume overload, order placed for PT eval and tx, w/ activity order of activity as tolerated  pt presents w/ comorbidities of DM2, Htn, HLD, Migranes, Hx of CVA, obesity, CKD IV, Anemia, and visual disturbance and personal factors of inaccessible home environment, living in 3 story house, mobilizing w/ assistive device, stair(s) to enter home, inability to ambulate household distances, inability to navigate community distances, inability to navigate level surfaces w/o external assistance, unable to perform dynamic tasks in community, limited home support, visual impairments, unable to perform physical activity, inability to perform IADLs, inability to perform ADLs and inability to live alone  pt presents w/ weakness, decreased endurance, impaired balance, gait deviations, visual impairment, decreased safety awareness and fall risk  these impairments are evident in findings from physical examination (weakness and impaired coordination), mobility assessment (need for Min assist w/ all phases of mobility when usually mobilizing independently, tolerance to only 60 feet of ambulation and need for cueing for mobility technique), and Barthel Index: 50/100  pt needed input for task focus and mobility technique  pt is at risk for falls due to physical and safety awareness deficits  pt's clinical presentation is unstable/unpredictable (evident in need for assist w/ all phases of mobility when usually mobilizing independently, tolerance to only 60 feet of ambulation, need for input for mobility technique, need for input for task focus and mobility technique and need for input for mobility technique/safety)  pt needs inpatient PT tx to improve mobility deficits  discharge recommendation is for inpatient rehab to reduce fall risk and maximize level of functional independence  Barriers to Discharge Inaccessible home environment;Decreased caregiver support   Goals   Patient Goals to get better and be safe   STG Expiration Date 02/28/19   Short Term Goal #1 pt will: Increase bilateral LE strength 1/2 grade to facilitate independent mobility, Perform all bed mobility tasks modified independent to decrease fall risk factors, Perform all transfers modified independent to improve independence, Ambulate 250 ft  with least restrictive assistive device w/ supervision w/o LOB, Increase all balance 1/2 grade to decrease risk for falls and Improve Barthel Index score to 75 or greater to facilitate independence PT to see when stair training is appropriate   Treatment Day 0   Plan   Treatment/Interventions Functional transfer training;LE strengthening/ROM; Therapeutic exercise; Endurance training;Patient/family training;Equipment eval/education; Bed mobility;Gait training;Spoke to nursing;Spoke to case management  (Pt  to see when stair training is appropriate)   PT Frequency 5x/wk   Recommendation   Recommendation Short-term skilled PT   Equipment Recommended Cane;Walker   PT - OK to Discharge Yes   Additional Comments to rehab when medically appropriate   Barthel Index   Feeding 10   Bathing 0   Grooming Score 0   Dressing Score 5   Bladder Score 10   Bowels Score 10   Toilet Use Score 5   Transfers (Bed/Chair) Score 10   Mobility (Level Surface) Score 0   Stairs Score 0   Barthel Index Score 50     Skilled PT recommended while in hospital and upon DC to progress pt toward treatment goals       Lisandra Acosta, PT 2/18/2019

## 2019-02-18 NOTE — ASSESSMENT & PLAN NOTE
· Component of non cardiogenic pulmonary edema  · Serial troponins (-)  · Echo 9/18 - EF65%, concentric hypertrophy  · Received lasix 40mg IV x2  · Nephrology increased torsemide to BID dosing  · Eventually need ischemia w/u  · Chest x-ray: Mild pulmonary vascular congestion   Patchy alveolar infiltrate, likely cardiogenic in nature

## 2019-02-18 NOTE — PLAN OF CARE
Problem: DISCHARGE PLANNING - CARE MANAGEMENT  Goal: Discharge to post-acute care or home with appropriate resources  Description  INTERVENTIONS:  - Conduct assessment to determine patient/family and health care team treatment goals, and need for post-acute services based on payer coverage, community resources, and patient preferences, and barriers to discharge  - Address psychosocial, clinical, and financial barriers to discharge as identified in assessment in conjunction with the patient/family and health care team  - Arrange appropriate level of post-acute services according to patient's   needs and preference and payer coverage in collaboration with the physician and health care team  - Communicate with and update the patient/family, physician, and health care team regarding progress on the discharge plan  - Arrange appropriate transportation to post-acute venues   Outcome: Progressing  Note:   LOS 2, not a bundle, not a readmit  CM met with Pt and Pt's daughter at bedside  Pt lives in a 2nd floor apartment with about 21 JONAH  Pt's daughter lives in apartment on the 1st floor  Pt uses a cane/walker as needed at home  Pt was mostly independent with ambulation and ADL's  Pt has had [facility] in the past and has been to [facility] in the past for STR  Pt uses [facility] on [address], Pt does not currently have any insurance, Pt reports the county has been assisting Pt with medications except for insulin but that PCP has been providing samples of insulin  Pt does not have a POA, Pt does not want information  Pt's daughter transports Pt to appointments  PT evaluated Pt and recommend STR  Pt is MA pending, Pt's daughter reports that Pt gets covered for emergency hospital visits but is not eligible for medical assistance as she has only had a green card for 3 years and needs to have it for 5 years  CM discussed Home therapy with Pt and daughter, they would want to know price   CM submitted referral to [facility], [facility] [place] pt would have to sign an agreement that she would be financially responsible if MA isn't approved  CM discussed with Pt and daughter and they do not want to sign financial agreement  CM made [place] aware  Pt's daughter reports Pt can stay with her for some time on a main level with no JONAH  CM dept will follow

## 2019-02-18 NOTE — PLAN OF CARE
Problem: Potential for Falls  Goal: Patient will remain free of falls  Description  INTERVENTIONS:  - Assess patient frequently for physical needs  -  Identify cognitive and physical deficits and behaviors that affect risk of falls    -  [place] fall precautions as indicated by assessment   - Educate patient/family on patient safety including physical limitations  - Instruct patient to call for assistance with activity based on assessment  - Modify environment to reduce risk of injury  - Consider OT/PT consult to assist with strengthening/mobility  Outcome: Progressing

## 2019-02-18 NOTE — PLAN OF CARE
Problem: Potential for Falls  Goal: Patient will remain free of falls  Description  INTERVENTIONS:  - Assess patient frequently for physical needs  -  Identify cognitive and physical deficits and behaviors that affect risk of falls    -  [place] fall precautions as indicated by assessment   - Educate patient/family on patient safety including physical limitations  - Instruct patient to call for assistance with activity based on assessment  - Modify environment to reduce risk of injury  - Consider OT/PT consult to assist with strengthening/mobility  Outcome: Progressing

## 2019-02-18 NOTE — PHYSICIAN ADVISOR
Current patient class: Inpatient  The patient is currently on Hospital Day: 3 at [facility]      The patient was admitted to the hospital at 1013 on 2/16/19 for the following diagnosis:  Shortness of breath [R06 02]  Nephrotic syndrome [N04 9]  Hypertensive urgency [I16 0]  Other form of dyspnea [R06 09]       There is documentation in the medical record of an expected length of stay of at least 2 midnights  The patient is therefore expected to satisfy the 2 midnight benchmark and given the 2 midnight presumption is appropriate for INPATIENT ADMISSION  Given this expectation of a satisfying stay, CMS instructs us that the patient is most often appropriate for inpatient admission under part A provided medical necessity is documented in the chart  After review of the relevant documentation, labs, vital signs and test results, the patient is appropriate for INPATIENT ADMISSION  Admission to the hospital as an inpatient is a complex decision making process which requires the practitioner to consider the patients presenting complaint, history and physical examination and all relevant testing  With this in mind, in this case, the patient was deemed appropriate for INPATIENT ADMISSION  After review of the documentation and testing available at the time of the admission I concur with this clinical determination of medical necessity  Rationale is as follows: The patient is a 46 yrs old Female who presented to the ED at 2/16/2019  7:37 AM with a chief complaint of Shortness of Breath (Patient c/o sob that started yesterday; has asthma  Brought in by family because "it's getting worse "  No fever  )     Given the need for further hospitalization, and along with the documentation of medical necessity present in the chart, the patient is appropriate for inpatient admission  The patient is expected to satisfy the 2 midnight benchmark, and will require further acute medical care   The patient does have comorbid conditions which increases the risk for significant adverse outcome  Given this the patient is appropriate for inpatient admission  The patients vitals on arrival were ED Triage Vitals   Temperature Pulse Respirations Blood Pressure SpO2   02/16/19 0740 02/16/19 0740 02/16/19 0740 02/16/19 0741 02/16/19 0741   98 6 °F (37 °C) 89 19 (!) 217/109 91 %      Temp Source Heart Rate Source Patient Position - Orthostatic VS BP Location FiO2 (%)   02/16/19 0740 02/16/19 0740 02/16/19 0740 02/16/19 0740 --   Oral Monitor Lying Right arm       Pain Score       02/16/19 0740       8           Past Medical History:   Diagnosis Date    Asthma     [facility] mellitus ([facility] [address] )     Hyperlipidemia     Hypertension      Past Surgical History:   Procedure Laterality Date    CHOLECYSTECTOMY      HYSTERECTOMY             Consults have been placed to:   IP CONSULT TO NEPHROLOGY  IP CONSULT TO OPHTHALMOLOGY    Vitals:    02/17/19 1053 02/17/19 1247 02/17/19 1500 02/17/19 2202   BP:  (!) 180/80 165/90 155/96   BP Location:  Right arm Right arm Right arm   Pulse:   79 94   Resp:   18 18   Temp: 98 4 °F (36 9 °C)  97 5 °F (36 4 °C) 98 2 °F (36 8 °C)   TempSrc: Oral  Oral Oral   SpO2:   90% 93%   Weight:       Height:           Most recent labs:    Recent Labs     02/16/19  1716  02/17/19  0001 02/17/19  0503   WBC  --   --   --  6 00   HGB  --   --   --  9 6*   HCT  --   --   --  28 8*   PLT  --   --   --  263   K  --   --   --  3 6   CALCIUM  --   --   --  8 5   BUN  --   --   --  30*   CREATININE  --   --   --  2 10*   TROPONINI 0 02   < > 0 02  --    CKTOTAL 271*  --   --   --    AST  --   --   --  22   ALT  --   --   --  17   ALKPHOS  --   --   --  100    < > = values in this interval not displayed         Scheduled Meds:  Current Facility-Administered Medications:  acetaminophen 650 mg Oral Q6H PRN Jazlyn Bowers MD   amLODIPine 5 mg Oral Daily Kings Salomon MD   atorvastatin 80 mg Oral QPM Sahzia Quinn MD Iman   cholecalciferol 1,000 Units Oral Daily Garcia Martínez MD   enoxaparin 30 mg Subcutaneous Daily Garcia Martínez MD   fenofibrate 48 mg Oral Daily Garcia Martínez MD   FLUoxetine 20 mg Oral Daily Garcia Martínez MD   gabapentin 300 mg Oral HS Garcia Martínez MD   hydrALAZINE 10 mg Intravenous Q6H PRN Garcia Martínez MD   hydrALAZINE 75 mg Oral TID Jose L Selby MD   insulin glargine 45 Units Subcutaneous HS Garcia Martínez MD   insulin lispro 15 Units Subcutaneous TID With Meals Garcia Martínez MD   isosorbide dinitrate 10 mg Oral TID after meals Garcia Martínez MD   labetalol 300 mg Oral Q12H [address] Jose L Selby MD   loratadine 10 mg Oral Daily Garcia Martínez MD   losartan 50 mg Oral Daily Jose L Selby MD   ondansetron 4 mg Intravenous Q6H PRN Garcia Martínez MD   spironolactone 25 mg Oral Daily Jose L Selby MD   topiramate 50 mg Oral HS Garcia Martínez MD   torsemide 20 mg Oral Daily Jose L Selby MD     Continuous Infusions:   PRN Meds:   acetaminophen    hydrALAZINE    ondansetron    Surgical procedures (if appropriate):

## 2019-02-18 NOTE — PROGRESS NOTES
Progress Note - Tg Diaz 1965, 46 y o  female MRN: [id number]    Unit/Bed#: -01 Encounter: [id number]    Primary Care Provider: Tg Diaz MD   Date and time admitted to hospital: 2/16/2019  7:37 AM    Addendum: reviewed UC that was collected on admission (2/16) which does not indicate an acute infection  Repeat UC that was obtained yesterday is still pending  Afebrile x24 hours  Will continue to follow UC  Visual disturbance  Assessment & Plan  · Consult Ophthalmology    Anemia of chronic renal failure, stage 4 (severe) (Formerly [facility])  Assessment & Plan  · Likely secondary to chronic disease  · Hemoglobin 8 6 today  · Heme test negative for occult blood  · Follow-up iron studies    Chronic kidney disease, stage IV (severe) (Formerly [facility])  Assessment & Plan  · Nephrology following  · Baseline between 2-2 4  · Creatinine today 2 5  · Check a m  BMP  · Recommending inpatient renal bx if no infection  · Reviewed urine cx-- mixed contaminants x3 with low colony counts-- no evidence of acute UTI  · Renal US: Limited evaluation of the bladder as it is nondistended   No hydronephrosis      Nephrotic syndrome  Assessment & Plan  · Established nephrotic range proteinuria  · Nephrology following and managing     Obesity due to excess calories  Assessment & Plan  · Diet and lifestyle changes    H/O: CVA (cerebrovascular accident)  Assessment & Plan  · History of of CVA with left-sided hemiparesis  · Supportive care  · Will require outpatient f/u with neurology after discharge     Migraine  Assessment & Plan  · Chronic  · C/w Topamax  · Outpatient f/u with neuro  · PRN tylenol  · Avoid NSAIDs    Hyperlipidemia  Assessment & Plan  · C/w statin and tricor       Hypertension  Assessment & Plan  · Uncontrolled  · Nephrology assisting with management   · C/w norvasc 5mg daily; labetalol 300mg BID; hydralazine 75mg TID; losartan 50mg daily     Type 2 diabetes mellitus with hyperglycemia, with long-term current use of insulin [facility])  Assessment & Plan  Lab Results   Component Value Date    HGBA1C 8 9 (H) 10/04/2017       Recent Labs     02/17/19  1242 02/17/19  1548 02/17/19 2028 02/18/19  0742   POCGLU 65 73 169* 111       Blood Sugar Average: Last 72 hrs:  (P) 045 [id number]  · C/w current insulin regimen: lantus 45 units at HS; novolog 15 units TID    · a1c 8 9    * Volume overload  Assessment & Plan  · Component of non cardiogenic pulmonary edema  · Serial troponins (-)  · Echo 9/18 - EF65%, concentric hypertrophy  · Received lasix 40mg IV x2  · Nephrology increased torsemide to BID dosing  · Eventually need ischemia w/u  · Chest x-ray: Mild pulmonary vascular congestion   Patchy alveolar infiltrate, likely cardiogenic in nature      VTE Pharmacologic Prophylaxis:   Pharmacologic: Pharmacologic VTE Prophylaxis contraindicated due to low hgb r/o GI bleeding  Mechanical VTE Prophylaxis in Place: No    Patient Centered Rounds: I have performed bedside rounds with nursing staff today  Discussions with Specialists or Other Care Team Provider: ASHLEY  Nephrology     Education and Discussions with Family / Patient: patient; will update daughter when she arrives to hospital     Time Spent for Care: 30 minutes  More than 50% of total time spent on counseling and coordination of care as described above  Current Length of Stay: 2 day(s)    Current Patient Status: Inpatient   Certification Statement: The patient will continue to require additional inpatient hospital stay due to likely renal bx     Discharge Plan: d/c when medically stable-- like 24-48 hours    Code Status: Level 1 - Full Code      Subjective:   Patient reports increased blurry vision and dizziness  She reports that the dizziness is new as of today  She reports that she developed dizziness when she stood up to go to the bathroom today  It is better at rest, but still present when she closes her eyes  She does not feel that her swelling is improved       Objective: Vitals:   Temp (24hrs), Av 2 °F (36 8 °C), Min:97 5 °F (36 4 °C), Max:98 9 °F (37 2 °C)    Temp:  [97 5 °F (36 4 °C)-98 9 °F (37 2 °C)] 98 9 °F (37 2 °C)  HR:  [78-94] 78  Resp:  [18] 18  BP: (155-180)/(80-96) 174/80  SpO2:  [90 %-93 %] 92 %  Body mass index is 36 73 kg/m²  Input and Output Summary (last 24 hours): Intake/Output Summary (Last 24 hours) at 2019 1132  Last data filed at 2019 1030  Gross per 24 hour   Intake    Output 488 ml   Net -488 ml       Physical Exam:     Physical Exam   Constitutional: She is oriented to person, place, and time  No distress  HENT:   Head: Normocephalic and atraumatic  Mouth/Throat: No oropharyngeal exudate  Eyes: Pupils are equal, round, and reactive to light  EOM are normal    Neck: No JVD present  Cardiovascular: Normal rate and regular rhythm  Exam reveals no friction rub  No murmur heard  Pulmonary/Chest: Effort normal and breath sounds normal  No stridor  No respiratory distress  She has no wheezes  Abdominal: Soft  Bowel sounds are normal  She exhibits no distension  There is no tenderness  There is no guarding  Musculoskeletal: She exhibits edema (diffuse-- arms, legs, face)  She exhibits no tenderness  Neurological: She is alert and oriented to person, place, and time  She displays normal reflexes  No cranial nerve deficit  Coordination normal    Skin: Skin is warm and dry  She is not diaphoretic  No erythema  Psychiatric: She has a normal mood and affect  Her behavior is normal      Additional Data:     Labs:    Results from last 7 days   Lab Units 19  1004  19  0758   WBC Thousand/uL 5 43   < > 6 29   HEMOGLOBIN g/dL 8 6*   < > 10 8*   HEMATOCRIT % 26 0*   < > 32 1*   PLATELETS Thousands/uL 269   < > 304   NEUTROS PCT %  --   --  59   LYMPHS PCT %  --   --  30   MONOS PCT %  --   --  6   EOS PCT %  --   --  4    < > = values in this interval not displayed       Results from last 7 days   Lab Units 02/18/19  1004 02/17/19  0503   SODIUM mmol/L 140 142   POTASSIUM mmol/L 3 9 3 6   CHLORIDE mmol/L 106 110*   CO2 mmol/L 25 22   BUN mg/dL 30* 30*   CREATININE mg/dL 2 50* 2 10*   ANION GAP mmol/L 9 10   CALCIUM mg/dL 8 4 8 5   ALBUMIN g/dL 1 7* 1 8*   TOTAL BILIRUBIN mg/dL  --  0 20   ALK PHOS U/L  --  100   ALT U/L  --  17   AST U/L  --  22   GLUCOSE RANDOM mg/dL 192* 95         Results from last 7 days   Lab Units 02/18/19  0742 02/17/19  2028 02/17/19  1548 02/17/19  1242 02/16/19  2110 02/16/19  1607   POC GLUCOSE mg/dl 111 169* 73 65 156* 184*                   * I Have Reviewed All Lab Data Listed Above  * Additional Pertinent Lab Tests Reviewed:  Daniele Andrade Admission Reviewed    Imaging:    Imaging Reports Reviewed Today Include: CXR  Imaging Personally Reviewed by Myself Includes:  CXR    Recent Cultures (last 7 days):     Results from last 7 days   Lab Units 02/16/19  1939   URINE CULTURE  <10,000 cfu/ml        Last 24 Hours Medication List:     Current Facility-Administered Medications:  acetaminophen 650 mg Oral Q6H PRN Paula Naranjo MD   amLODIPine 5 mg Oral Daily Cande Mckeon MD   atorvastatin 80 mg Oral QPM Paula Naranjo MD   cholecalciferol 1,000 Units Oral Daily Paula Naranjo MD   enoxaparin 30 mg Subcutaneous Daily Paula Naranjo MD   fenofibrate 48 mg Oral Daily Paula Naranjo MD   FLUoxetine 20 mg Oral Daily Paula Naranjo MD   gabapentin 300 mg Oral HS Paula Naranjo MD   hydrALAZINE 10 mg Intravenous Q6H PRN Paula Naranjo MD   hydrALAZINE 75 mg Oral TID Cande Mckeon MD   insulin glargine 45 Units Subcutaneous HS Paula Naranjo MD   insulin lispro 15 Units Subcutaneous TID With Meals Paula Naranjo MD   isosorbide dinitrate 10 mg Oral TID after meals Paula Naranjo MD   labetalol 300 mg Oral Q12H [facility] Cande Mckeon MD   loratadine 10 mg Oral Daily Paula Naranjo MD   losartan 50 mg Oral Daily Cande Mckeon MD ondansetron 4 mg Intravenous Q6H PRN Martha Urbina MD   spironolactone 25 mg Oral Daily Marifer Montero MD   topiramate 50 mg Oral HS Martha Urbina MD   torsemide 20 mg Oral BID (diuretic) Destini Peter PA-C        Today, Patient Was Seen By: Flakita Gaitan PA-C    ** Please Note: Dictation voice to text software may have been used in the creation of this document   **

## 2019-02-18 NOTE — ASSESSMENT & PLAN NOTE
Lab Results   Component Value Date    HGBA1C 8 9 (H) 10/04/2017       Recent Labs     02/17/19  1242 02/17/19  1548 02/17/19 2028 02/18/19  0742   POCGLU 65 73 169* 111       Blood Sugar Average: Last 72 hrs:  (P) 373 [id number]  · C/w current insulin regimen: lantus 45 units at HS; novolog 15 units TID    · a1c 8 9

## 2019-02-18 NOTE — PROGRESS NOTES
NEPHROLOGY PROGRESS NOTE   Meredith Aviles 46 y o  female MRN: [id number]  Unit/Bed#: -01 Encounter: [id number]  Reason for Consult: CKD/nephrotic syndrome    ASSESSMENT/PLAN:  1  Chronic Kidney Disease stage IV- presumed etiology due to diabetic nephropathy  - follows with [facility] (Dr Solomon Hamilton)  - baseline creatinine progressed to 2-2 4 starting this year  - previously was around 1 3  - may need to tolerate a higher baseline creatinine for euvolemia  2  Nephrotic Syndrome- likely secondary to diabetic nephropathy  - previous workup negative  - received lasix IV x2 doses then transitioned to oral torsemide and spironolactone  - increase torsemide to BID  - consider albumin   - consider renal biopsy (but rule out infection first)  - UPC ratio = 11 5 --> 10 8  - weight improving from 92 6kg to 91 1kg  3  Hypertension- BP elevated  - workup pending (renin, aldosterone, metanephrines, renal artery duplex)  - current medications:  Amlodipine 5 mg daily, hydralazine 75 mg TID, isosorbide 10 mg TID, labetalol 300 mg q12 hours, and losartan 50 mg daily  4  Anemia- hgb dropping, continue to monitor   - negative FOBT  - iron studies acceptable  5  Fever- possible UTI, culture pending      SUBJECTIVE:  Patient feeling badly today  States her vision is causing her distress and she is now dizzy today      OBJECTIVE:  Current Weight: Weight - Scale: 91 1 kg (200 lb 12 8 oz)  Vitals:    02/17/19 1500 02/17/19 2202 02/18/19 0526 02/18/19 0740   BP: 165/90 155/96  (!) 174/80   BP Location: Right arm Right arm  Right arm   Pulse: 79 94  78   Resp: 18 18 18   Temp: 97 5 °F (36 4 °C) 98 2 °F (36 8 °C)  98 9 °F (37 2 °C)   TempSrc: Oral Oral  Oral   SpO2: 90% 93%  92%   Weight:   91 1 kg (200 lb 12 8 oz)    Height:           Intake/Output Summary (Last 24 hours) at 2/18/2019 1107  Last data filed at 2/18/2019 1030  Gross per 24 hour   Intake    Output 488 ml   Net -488 ml     General: NAD  Skin: no rash  HEENT: normocephalic  Neck: supple  Chest: CTAB  Heart: RRR  Abdomen: soft nt nd  Extremities: + bilateral edema, + anasarca  Neuro: alert awake  Psych: mood and affect appropriate    Medications:    Current Facility-Administered Medications:     acetaminophen (TYLENOL) tablet 650 mg, 650 mg, Oral, Q6H PRN, Jadiel Lindsey MD, 650 mg at 02/17/19 1103    amLODIPine (NORVASC) tablet 5 mg, 5 mg, Oral, Daily, Kiara So MD, 5 mg at 02/18/19 0825    atorvastatin (LIPITOR) tablet 80 mg, 80 mg, Oral, QPM, Jadiel Lindsey MD, 80 mg at 02/17/19 1707    cholecalciferol (VITAMIN D3) tablet 1,000 Units, 1,000 Units, Oral, Daily, Jadiel Lindsey MD, 1,000 Units at 02/18/19 0825    enoxaparin (LOVENOX) subcutaneous injection 30 mg, 30 mg, Subcutaneous, Daily, Jadiel Lindsey MD, 30 mg at 02/18/19 0824    fenofibrate (TRICOR) tablet 48 mg, 48 mg, Oral, Daily, Jadiel Lindsey MD, 48 mg at 02/18/19 0824    FLUoxetine (PROzac) capsule 20 mg, 20 mg, Oral, Daily, Jadiel Lindsey MD, 20 mg at 02/18/19 3279    gabapentin (NEURONTIN) capsule 300 mg, 300 mg, Oral, HS, Jadiel Lindsey MD, 300 mg at 02/17/19 2213    hydrALAZINE (APRESOLINE) injection 10 mg, 10 mg, Intravenous, Q6H PRN, Jadiel Lindsey MD    hydrALAZINE (APRESOLINE) tablet 75 mg, 75 mg, Oral, TID, Kiara So MD, 75 mg at 02/18/19 0826    insulin glargine (LANTUS) subcutaneous injection 45 Units 0 45 mL, 45 Units, Subcutaneous, HS, Jadiel Lindsey MD, 22 Units at 02/17/19 2213    insulin lispro (HumaLOG) 100 units/mL subcutaneous injection 15 Units, 15 Units, Subcutaneous, TID With Meals, Jadiel Lindsey MD, 15 Units at 02/18/19 0831    isosorbide dinitrate (ISORDIL) tablet 10 mg, 10 mg, Oral, TID after meals, Jadiel Lindsey MD, 10 mg at 02/18/19 0825    labetalol (NORMODYNE) tablet 300 mg, 300 mg, Oral, Q12H [facility], Kiara So MD, 300 mg at 02/18/19 0824    loratadine (CLARITIN) tablet 10 mg, 10 mg, Oral, Daily, Jadiel Lindsey MD, 10 mg at 02/18/19 2909    losartan (COZAAR) tablet 50 mg, 50 mg, Oral, Daily, Kavin Anguiano MD, 50 mg at 02/18/19 0825    ondansetron (ZOFRAN) injection 4 mg, 4 mg, Intravenous, Q6H PRN, Liyah Delaney MD    spironolactone (ALDACTONE) tablet 25 mg, 25 mg, Oral, Daily, Kavin Anguiano MD, 25 mg at 02/18/19 0825    topiramate (TOPAMAX) tablet 50 mg, 50 mg, Oral, HS, Liyah Delaney MD, 50 mg at 02/17/19 2211    torsemide (DEMADEX) tablet 20 mg, 20 mg, Oral, Daily, Kavin Anguiano MD, 20 mg at 02/18/19 0835    Laboratory Results:  Results from last 7 days   Lab Units 02/18/19  1005 02/18/19  1004 02/17/19  0503 02/16/19  0758   WBC Thousand/uL  --  5 43 6 00 6 29   HEMOGLOBIN g/dL  --  8 6* 9 6* 10 8*   HEMATOCRIT %  --  26 0* 28 8* 32 1*   PLATELETS Thousands/uL  --  269 263 304   POTASSIUM mmol/L  --  3 9 3 6 3 8   CHLORIDE mmol/L  --  106 110* 107   CO2 mmol/L  --  25 22 25   BUN mg/dL  --  30* 30* 32*   CREATININE mg/dL  --  2 50* 2 10* 2 28*   CALCIUM mg/dL  --  8 4 8 5 8 9   MAGNESIUM mg/dL 2 0  --  2 2  --    PHOSPHORUS mg/dL  --  4 4 4 5  --

## 2019-02-19 LAB
ANION GAP SERPL CALCULATED.3IONS-SCNC: 10 MMOL/L (ref 4–13)
BACTERIA UR CULT: NORMAL
BUN SERPL-MCNC: 31 MG/DL (ref 5–25)
CALCIUM SERPL-MCNC: 8.6 MG/DL (ref 8.3–10.1)
CHLORIDE SERPL-SCNC: 108 MMOL/L (ref 100–108)
CO2 SERPL-SCNC: 24 MMOL/L (ref 21–32)
CREAT SERPL-MCNC: 2.31 MG/DL (ref 0.6–1.3)
GFR SERPL CREATININE-BSD FRML MDRD: 23 ML/MIN/1.73SQ M
GLUCOSE SERPL-MCNC: 115 MG/DL (ref 65–140)
GLUCOSE SERPL-MCNC: 119 MG/DL (ref 65–140)
GLUCOSE SERPL-MCNC: 131 MG/DL (ref 65–140)
GLUCOSE SERPL-MCNC: 134 MG/DL (ref 65–140)
GLUCOSE SERPL-MCNC: 171 MG/DL (ref 65–140)
POTASSIUM SERPL-SCNC: 3.9 MMOL/L (ref 3.5–5.3)
SODIUM SERPL-SCNC: 142 MMOL/L (ref 136–145)

## 2019-02-19 PROCEDURE — 97110 THERAPEUTIC EXERCISES: CPT

## 2019-02-19 PROCEDURE — 99232 SBSQ HOSP IP/OBS MODERATE 35: CPT | Performed by: PHYSICIAN ASSISTANT

## 2019-02-19 PROCEDURE — 80048 BASIC METABOLIC PNL TOTAL CA: CPT | Performed by: PHYSICIAN ASSISTANT

## 2019-02-19 PROCEDURE — 82948 REAGENT STRIP/BLOOD GLUCOSE: CPT

## 2019-02-19 PROCEDURE — G8987 SELF CARE CURRENT STATUS: HCPCS

## 2019-02-19 PROCEDURE — G8988 SELF CARE GOAL STATUS: HCPCS

## 2019-02-19 PROCEDURE — 97530 THERAPEUTIC ACTIVITIES: CPT

## 2019-02-19 PROCEDURE — 99232 SBSQ HOSP IP/OBS MODERATE 35: CPT | Performed by: INTERNAL MEDICINE

## 2019-02-19 PROCEDURE — 97166 OT EVAL MOD COMPLEX 45 MIN: CPT

## 2019-02-19 PROCEDURE — 97116 GAIT TRAINING THERAPY: CPT

## 2019-02-19 RX ORDER — TORSEMIDE 20 MG/1
20 TABLET ORAL
Status: DISCONTINUED | OUTPATIENT
Start: 2019-02-19 | End: 2019-02-20

## 2019-02-19 RX ORDER — SPIRONOLACTONE 25 MG/1
50 TABLET ORAL DAILY
Status: DISCONTINUED | OUTPATIENT
Start: 2019-02-20 | End: 2019-02-22 | Stop reason: HOSPADM

## 2019-02-19 RX ORDER — TORSEMIDE 20 MG/1
40 TABLET ORAL DAILY
Status: DISCONTINUED | OUTPATIENT
Start: 2019-02-20 | End: 2019-02-20

## 2019-02-19 RX ORDER — TORSEMIDE 20 MG/1
20 TABLET ORAL ONCE
Status: COMPLETED | OUTPATIENT
Start: 2019-02-19 | End: 2019-02-19

## 2019-02-19 RX ADMIN — ACETAMINOPHEN 650 MG: 325 TABLET, FILM COATED ORAL at 15:36

## 2019-02-19 RX ADMIN — ISOSORBIDE DINITRATE 10 MG: 10 TABLET ORAL at 08:50

## 2019-02-19 RX ADMIN — AMLODIPINE BESYLATE 5 MG: 5 TABLET ORAL at 08:50

## 2019-02-19 RX ADMIN — TORSEMIDE 20 MG: 20 TABLET ORAL at 08:57

## 2019-02-19 RX ADMIN — INSULIN LISPRO 15 UNITS: 100 INJECTION, SOLUTION INTRAVENOUS; SUBCUTANEOUS at 16:59

## 2019-02-19 RX ADMIN — INSULIN LISPRO 15 UNITS: 100 INJECTION, SOLUTION INTRAVENOUS; SUBCUTANEOUS at 08:55

## 2019-02-19 RX ADMIN — TOPIRAMATE 50 MG: 25 TABLET, FILM COATED ORAL at 21:18

## 2019-02-19 RX ADMIN — GABAPENTIN 300 MG: 300 CAPSULE ORAL at 21:19

## 2019-02-19 RX ADMIN — ISOSORBIDE DINITRATE 10 MG: 10 TABLET ORAL at 12:22

## 2019-02-19 RX ADMIN — VITAMIN D, TAB 1000IU (100/BT) 1000 UNITS: 25 TAB at 08:49

## 2019-02-19 RX ADMIN — TORSEMIDE 20 MG: 20 TABLET ORAL at 10:51

## 2019-02-19 RX ADMIN — HYDRALAZINE HYDROCHLORIDE 75 MG: 25 TABLET ORAL at 21:18

## 2019-02-19 RX ADMIN — ISOSORBIDE DINITRATE 10 MG: 10 TABLET ORAL at 16:59

## 2019-02-19 RX ADMIN — LABETALOL HYDROCHLORIDE 300 MG: 100 TABLET, FILM COATED ORAL at 21:19

## 2019-02-19 RX ADMIN — INSULIN LISPRO 15 UNITS: 100 INJECTION, SOLUTION INTRAVENOUS; SUBCUTANEOUS at 12:22

## 2019-02-19 RX ADMIN — FENOFIBRATE 48 MG: 48 TABLET ORAL at 08:49

## 2019-02-19 RX ADMIN — INSULIN GLARGINE 45 UNITS: 100 INJECTION, SOLUTION SUBCUTANEOUS at 21:19

## 2019-02-19 RX ADMIN — LOSARTAN POTASSIUM 50 MG: 50 TABLET, FILM COATED ORAL at 08:49

## 2019-02-19 RX ADMIN — ENOXAPARIN SODIUM 30 MG: 30 INJECTION SUBCUTANEOUS at 08:50

## 2019-02-19 RX ADMIN — HYDRALAZINE HYDROCHLORIDE 75 MG: 25 TABLET ORAL at 15:35

## 2019-02-19 RX ADMIN — FLUOXETINE HYDROCHLORIDE 20 MG: 20 CAPSULE ORAL at 08:48

## 2019-02-19 RX ADMIN — SPIRONOLACTONE 25 MG: 25 TABLET ORAL at 08:50

## 2019-02-19 RX ADMIN — LORATADINE 10 MG: 10 TABLET ORAL at 08:50

## 2019-02-19 RX ADMIN — LABETALOL HYDROCHLORIDE 300 MG: 100 TABLET, FILM COATED ORAL at 08:49

## 2019-02-19 RX ADMIN — ATORVASTATIN CALCIUM 80 MG: 40 TABLET, FILM COATED ORAL at 17:00

## 2019-02-19 RX ADMIN — HYDRALAZINE HYDROCHLORIDE 75 MG: 25 TABLET ORAL at 09:34

## 2019-02-19 RX ADMIN — TORSEMIDE 20 MG: 20 TABLET ORAL at 15:36

## 2019-02-19 NOTE — ASSESSMENT & PLAN NOTE
· Component of non cardiogenic pulmonary edema  · Serial troponins (-)  · Echo 9/18 - EF65%, concentric hypertrophy  · Received lasix 40mg IV x2  · Nephrology increased torsemide to BID dosing-- now 40mg QAM and 20mg QPM; spironolactone also increased to 50mg  · Eventually need ischemia w/u  · Chest x-ray: Mild pulmonary vascular congestion   Patchy alveolar infiltrate, likely cardiogenic in nature

## 2019-02-19 NOTE — ASSESSMENT & PLAN NOTE
· Nephrology following  · Baseline between 2-2 4  · Creatinine today 2 3  · Check a m  BMP  · Recommending inpatient renal bx if no infection  · Reviewed urine cx from 2/16 and 2/17-- both mixed contaminants x3 with low colony counts-- no evidence of acute UTI  · Renal US: Limited evaluation of the bladder as it is nondistended   No hydronephrosis

## 2019-02-19 NOTE — PLAN OF CARE
Problem: PHYSICAL THERAPY ADULT  Goal: Performs mobility at highest level of function for planned discharge setting  See evaluation for individualized goals  Description  Treatment/Interventions: Functional transfer training, LE strengthening/ROM, Therapeutic exercise, Endurance training, Patient/family training, Equipment eval/education, Bed mobility, Gait training, Spoke to nursing, Spoke to case management(Pt  to see when stair training is appropriate)  Equipment Recommended: Karsten Hunter       See flowsheet documentation for full assessment, interventions and recommendations  Outcome: Progressing  Note:   Prognosis: Good  Problem List: Decreased strength, Decreased endurance, Impaired balance, Decreased mobility, Decreased coordination, Impaired judgement  Assessment: Pt  progressing well with mobility however continues to require A for Long distance as patient fatigues  Noted tremors in LLE while attempting to perform standing TE's and patient reported she is unable to do it  Pt  noted with steadier gait with RW  Needed standing rest furing 220ft gait  Pt  ambulated with SPC and RW  Adjusted the height of SPC to fit patient better, Per RN patient ambulates in room independently  Pt  reported she ha impaired vision however patient able to read the time out correctly from the clock which was located behind patient to the right  Pt  will benefit from continued skilled PT to improve safe mobility and attain PLOF  Barriers to Discharge: Inaccessible home environment, Decreased caregiver support     Recommendation: Short-term skilled PT     PT - OK to Discharge: Yes    See flowsheet documentation for full assessment

## 2019-02-19 NOTE — ASSESSMENT & PLAN NOTE
Lab Results   Component Value Date    HGBA1C 8 9 (H) 10/04/2017       Recent Labs     02/18/19  1510 02/18/19  2118 02/19/19  0733 02/19/19  1102   POCGLU 94 134 115 171*       Blood Sugar Average: Last 72 hrs:  (P) 666 [id number]  · C/w current insulin regimen: lantus 45 units at HS; novolog 15 units TID    · a1c 8 9

## 2019-02-19 NOTE — PROGRESS NOTES
NEPHROLOGY PROGRESS NOTE   Valerie Pereira 46 y o  female MRN: [id number]  Unit/Bed#: -01 Encounter: [id number]  Reason for Consult: CHRISTIANA/CKD    ASSESSMENT/PLAN:  1  Chronic Kidney Disease stage IV- presumed etiology due to diabetic nephropathy  - follows with [facility] (Dr Fernando Acevedo)  - baseline creatinine progressed to 2-2 4 starting this year  - previously was around 1 3  - may need to tolerate a higher baseline creatinine for euvolemia  2  Nephrotic Syndrome- likely secondary to diabetic nephropathy  - previous workup negative  - received lasix IV x2 doses then transitioned to oral torsemide and spironolactone  - increase torsemide to BID on 2/18, increase to 40mg / 20mg 2/19  - consider albumin   - consider renal biopsy once BP improves  - UPC ratio = 11 5 --> 10 8  - weight improving from 92 6kg to 91 6kg  - urine output acceptable (-1 8L 2/18)  3  Hypertension- BP elevated  - workup pending (renin __, aldosterone __, metanephrines __, renal artery duplex negative)  - current medications:  Amlodipine 5 mg daily, hydralazine 75 mg TID, isosorbide 10 mg TID, labetalol 300 mg q12 hours, and losartan 50 mg daily  - increase torsemide to 40mg AM and 20mg PM  4  Anemia- hgb dropping, continue to monitor   - negative FOBT  - iron studies acceptable  5  Fever- resolved, urine culture negative    Disposition:  Holding off biopsy until BP better controlled    SUBJECTIVE:  Patient without acute complaints  Feels she is wheezing more today  Denies pain      OBJECTIVE:  Current Weight: Weight - Scale: 91 6 kg (201 lb 15 1 oz)  Vitals:    02/18/19 1500 02/18/19 2200 02/19/19 0548 02/19/19 0733   BP: 157/84 162/90  (!) 176/83   BP Location: Right arm Right arm  Right arm   Pulse: 73 83  77   Resp: 18 18  16   Temp: 98 1 °F (36 7 °C) 99 4 °F (37 4 °C)  98 5 °F (36 9 °C)   TempSrc: Oral Oral  Oral   SpO2: 93% 94%  93%   Weight:   91 6 kg (201 lb 15 1 oz)    Height:           Intake/Output Summary (Last 24 hours) at 2/19/2019 1000 [facility] filed at 2/19/2019 0547  Gross per 24 hour   Intake 420 ml   Output 1818 ml   Net -1398 ml     General: NAD  Skin: no rash  HEENT: normocephalic  Neck: supple  Chest: + wheeze  Heart: RRR  Abdomen: soft nt nd  Extremities: + bilateral edema, + anasarca  Neuro: alert awake  Psych: mood and affect appropriate    Medications:    Current Facility-Administered Medications:     acetaminophen (TYLENOL) tablet 650 mg, 650 mg, Oral, Q6H PRN, Adrianna Berrios MD, 650 mg at 02/17/19 1103    amLODIPine (NORVASC) tablet 5 mg, 5 mg, Oral, Daily, Sharita Johnson MD, 5 mg at 02/19/19 0850    atorvastatin (LIPITOR) tablet 80 mg, 80 mg, Oral, QPM, Adrianna Berrios MD, 80 mg at 02/18/19 1708    cholecalciferol (VITAMIN D3) tablet 1,000 Units, 1,000 Units, Oral, Daily, Adrianna Berrios MD, 1,000 Units at 02/19/19 0849    enoxaparin (LOVENOX) subcutaneous injection 30 mg, 30 mg, Subcutaneous, Daily, Adrianna Berrios MD, 30 mg at 02/19/19 0850    fenofibrate (TRICOR) tablet 48 mg, 48 mg, Oral, Daily, Adrianna Berrios MD, 48 mg at 02/19/19 0849    FLUoxetine (PROzac) capsule 20 mg, 20 mg, Oral, Daily, Adrianna Berrios MD, 20 mg at 02/19/19 0848    gabapentin (NEURONTIN) capsule 300 mg, 300 mg, Oral, HS, Adrianna Berrios MD, 300 mg at 02/18/19 2121    hydrALAZINE (APRESOLINE) injection 10 mg, 10 mg, Intravenous, Q6H PRN, Adrianna Berrios MD    hydrALAZINE (APRESOLINE) tablet 75 mg, 75 mg, Oral, TID, Sharita Johnson MD, 75 mg at 02/19/19 0934    insulin glargine (LANTUS) subcutaneous injection 45 Units 0 45 mL, 45 Units, Subcutaneous, HS, Adrianna Berrios MD, 45 Units at 02/18/19 2121    insulin lispro (HumaLOG) 100 units/mL subcutaneous injection 15 Units, 15 Units, Subcutaneous, TID With Meals, Adrianna Berrios MD, 15 Units at 02/19/19 0855    isosorbide dinitrate (ISORDIL) tablet 10 mg, 10 mg, Oral, TID after meals, Adrianna Berrios MD, 10 mg at 02/19/19 0850    labetalol (NORMODYNE) tablet 300 mg, 300 mg, Oral, Q12H [facility], Alyia Castañeda MD, 300 mg at 02/19/19 0849    loratadine (CLARITIN) tablet 10 mg, 10 mg, Oral, Daily, Windy Newsome MD, 10 mg at 02/19/19 0850    losartan (COZAAR) tablet 50 mg, 50 mg, Oral, Daily, Aliya Castañeda MD, 50 mg at 02/19/19 0849    ondansetron (ZOFRAN) injection 4 mg, 4 mg, Intravenous, Q6H PRN, Windy Newsome MD    spironolactone (ALDACTONE) tablet 25 mg, 25 mg, Oral, Daily, Aliya Castañeda MD, 25 mg at 02/19/19 0850    topiramate (TOPAMAX) tablet 50 mg, 50 mg, Oral, HS, Windy Newsome MD, 50 mg at 02/18/19 2120    torsemide (DEMADEX) tablet 20 mg, 20 mg, Oral, BID (diuretic), The PO Berkowitz, 20 mg at 02/19/19 0857    Laboratory Results:  Results from last 7 days   Lab Units 02/19/19  0558 02/18/19  1005 02/18/19  1004 02/17/19  0503 02/16/19  0758   WBC Thousand/uL  --   --  5 43 6 00 6 29   HEMOGLOBIN g/dL  --   --  8 6* 9 6* 10 8*   HEMATOCRIT %  --   --  26 0* 28 8* 32 1*   PLATELETS Thousands/uL  --   --  269 263 304   POTASSIUM mmol/L 3 9  --  3 9 3 6 3 8   CHLORIDE mmol/L 108  --  106 110* 107   CO2 mmol/L 24  --  25 22 25   BUN mg/dL 31*  --  30* 30* 32*   CREATININE mg/dL 2 31*  --  2 50* 2 10* 2 28*   CALCIUM mg/dL 8 6  --  8 4 8 5 8 9   MAGNESIUM mg/dL  --  2 0  --  2 2  --    PHOSPHORUS mg/dL  --   --  4 4 4 5  --

## 2019-02-19 NOTE — PLAN OF CARE
Problem: Potential for Falls  Goal: Patient will remain free of falls  Description  INTERVENTIONS:  - Assess patient frequently for physical needs  -  Identify cognitive and physical deficits and behaviors that affect risk of falls    -  [place] fall precautions as indicated by assessment   - Educate patient/family on patient safety including physical limitations  - Instruct patient to call for assistance with activity based on assessment  - Modify environment to reduce risk of injury  - Consider OT/PT consult to assist with strengthening/mobility  Outcome: Progressing     Problem: DISCHARGE PLANNING - CARE MANAGEMENT  Goal: Discharge to post-acute care or home with appropriate resources  Description  INTERVENTIONS:  - Conduct assessment to determine patient/family and health care team treatment goals, and need for post-acute services based on payer coverage, community resources, and patient preferences, and barriers to discharge  - Address psychosocial, clinical, and financial barriers to discharge as identified in assessment in conjunction with the patient/family and health care team  - Arrange appropriate level of post-acute services according to patient's   needs and preference and payer coverage in collaboration with the physician and health care team  - Communicate with and update the patient/family, physician, and health care team regarding progress on the discharge plan  - Arrange appropriate transportation to post-acute venues   Outcome: Progressing

## 2019-02-19 NOTE — PLAN OF CARE
Problem: OCCUPATIONAL THERAPY ADULT  Goal: Performs self-care activities at highest level of function for planned discharge setting  See evaluation for individualized goals  Description  Treatment Interventions: ADL retraining, Visual perceptual retraining, Functional transfer training, UE strengthening/ROM, Endurance training, Patient/family training, Equipment evaluation/education, Compensatory technique education, Energy conservation, Activityengagement          See flowsheet documentation for full assessment, interventions and recommendations  Note:   Limitation: Decreased ADL status, Decreased UE strength, Decreased endurance, Decreased self-care trans, Decreased high-level ADLs, Visual deficit     Assessment: Pt is a 46 y o  female seen for OT evaluation s/p admit to [address] [place] [address] on 2/16/2019 w/ Volume overload  She came to ED with c/o progressive worseing dyspnea, body swelling, worsening puffy face  Also c/o visual deficits past few weeks  Comorbidities affecting pt's functional performance at time of assessment include: DM, HTN, obesity and migraines, CVA, asthma  Personal factors affecting pt at time of IE include:steps to enter environment, limited home support, difficulty performing ADLS and difficulty performing IADLS   Prior to admission, pt was living alone in a third floor walk up apartment  She was independent, using a cane for mobility  Upon evaluation: Pt requires overall min assist for completion of self care, CGA for safe mobility  She does have some visual deficits, difficulty reading print, unable to see large numbers on wall clock etc  Was seen by ophtalmology on 2/17/19  She does have  the following deficits impacting occupational performance: weakness, decreased strength, decreased balance, decreased tolerance and visual deficits   Pt to benefit from continued skilled OT tx while in the hospital to address deficits as defined above and maximize level of functional independence w ADL's and functional mobility  Occupational Performance areas to address include: grooming, bathing/shower, toilet hygiene, dressing, functional mobility, clothing management and energy conservation technique and visual perceptual training  Pt was seen for OT treatment immediately following IE with focus on visual scanning/ongoing visual assessment  See separate note for details  Pt scored  50 /100 on the barthel index  Based on findings from OT evaluation and functional performance deficits, pt has been identified as a  Moderate complexity evaluation  From OT standpoint, recommendation at time of d/c would be short term rehab as patient is currently functioning below baseline        OT Discharge Recommendation: Short Term Rehab

## 2019-02-19 NOTE — OCCUPATIONAL THERAPY NOTE
Occupational Therapy Evaluation and treatment session      Elliott Alexandre    2/19/2019    Patient Active Problem List   Diagnosis    Type 2 diabetes mellitus with hyperglycemia, with long-term current use of insulin ([facility] [address] )    Hypertension    Hyperlipidemia    Migraine    Left-sided weakness    H/O: CVA (cerebrovascular accident)    Viral URI    Obesity due to excess calories    Cough    Anemia    Diarrhea    Nephrotic range proteinuria    Stage 3 chronic kidney disease (HCC)    Nephrotic syndrome    Volume overload    Diabetic nephropathy associated with type 2 diabetes mellitus (HCC)    Chronic kidney disease, stage IV (severe) (HCC)    Essential hypertension, malignant    Anemia of chronic renal failure, stage 4 (severe) (HCC)    Visual disturbance       Past Medical History:   Diagnosis Date    Asthma     Diabetes mellitus ([facility] [address] )     Hyperlipidemia     Hypertension        Past Surgical History:   Procedure Laterality Date    CHOLECYSTECTOMY      HYSTERECTOMY          02/19/19 1054   Note Type   Note type Eval/Treat   Restrictions/Precautions   Other Precautions Aspiration; Fall Risk;Visual impairment   Pain Assessment   Pain Assessment No/denies pain   Pain Score No Pain   Home Living   Type of Home Apartment  ([address])   Home Layout One level;Stairs to enter with rails   Bathroom Shower/Tub Walk-in shower     [facility]   Additional Comments lives in a third floor walk up apartment  Daughter lives on first floor   Prior Function   Level of [place] Independent with ADLs and functional mobility   Lives With Alone   Receives Help From Family   ADL Assistance Independent   IADLs Needs assistance   Falls in the last 6 months 0   Vocational On disability   Comments pt states she had a stroke a year ago, disabled since   Lifestyle   Autonomy pt reports being mostly independent w self care, mobility w use of cane  doesn't drive   Family assist w home management   Reciprocal Relationships supportive family   Intrinsic Gratification take care of her 2 birds (one small parrot and one large [place])   Psychosocial   Psychosocial (WDL) WDL   Subjective   Subjective "My vision has been bad for a few weeks, I can barely see, I am so worried"   ADL   Eating Assistance 7  Independent   Eating Deficit Setup; Increased time to complete   Grooming Assistance 4  Minimal Assistance   [address] [place] [address]  4  Minimal Assistance   Bed Mobility   Additional Comments pt oob in chair   Transfers   Sit to Stand 4  Minimal assistance   Additional items Assist x 1; Increased time required   Stand to Sit 5  Supervision   Stand pivot 5  Supervision   Functional Mobility   Functional Mobility 4  Minimal assistance  (occasional CGA provided due to unsteadiness)   Additional items SPC   Balance   Static Sitting Good   Dynamic Sitting Fair   Static Standing Fair -   Dynamic Standing Poor +   Activity Tolerance   Activity Tolerance Patient limited by fatigue   Nurse Made Aware appropriate to see per BERENICE Evangelista   RUE Assessment   RUE Assessment WFL  (grossly 4-/5)   Edema   RUE Edema Non-pitting   LUE Assessment   LUE Assessment X   LUE Overall AROM   L Shoulder Flexion 75*   LUE Strength   LUE Overall Strength Deficits  (from previos stroke)   Edema   LUE Edema Non-pitting   Hand Function   Gross Motor Coordination Functional   Fine Motor Coordination Impaired  (L hand)   Vision-Basic Assessment   Current Vision Wears glasses only for reading   Visual History Other (Comment)  (pt c/o overall poor vision past few weeks)   Patient Visual Report Blurring of print when reading; Unable to keep objects in focus;Diplopia   Vision - Complex Assessment   Tracking   (difficulty tracking)   Visual Screen Results Decreased visual acuity   Additional Comments pt with difficulty tracking, eyelids closes when eyes moves peripherally, pt c/o face feeling swollen  apperars to have decreased peripheral vision both sides  difficulty reading newspaper print, increased time needed  unable to make out numbers on large clock on wall   Cognition   Overall Cognitive Status [facility]   Arousal/Participation Alert; Cooperative   Attention Within functional limits   Orientation Level Oriented X4   Memory Within functional limits   Following Commands Follows one step commands without difficulty   Assessment   Limitation Decreased ADL status; Decreased UE strength;Decreased endurance;Decreased self-care trans;Decreased high-level ADLs; Visual deficit   Assessment Pt is a 46 y o  female seen for OT evaluation s/p admit [facility] on 2/16/2019 w/ Volume overload  She came to ED with c/o progressive worseing dyspnea, body swelling, worsening puffy face  Also c/o visual deficits past few weeks  Comorbidities affecting pt's functional performance at time of assessment include: DM, HTN, obesity and migraines, CVA, asthma  Personal factors affecting pt at time of IE include:steps to enter environment, limited home support, difficulty performing ADLS and difficulty performing IADLS   Prior to admission, pt was living alone in a third floor walk up apartment  She was independent, using a cane for mobility  Upon evaluation: Pt requires overall min assist for completion of self care, CGA for safe mobility  She does have some visual deficits, difficulty reading print, unable to see large numbers on wall clock etc  Was seen by ophtalmology on 2/17/19  She does have  the following deficits impacting occupational performance: weakness, decreased strength, decreased balance, decreased tolerance and visual deficits   Pt to benefit from continued skilled OT tx while in the hospital to address deficits as defined above and maximize level of functional independence w ADL's and functional mobility  Occupational Performance areas to address include: grooming, bathing/shower, toilet hygiene, dressing, functional mobility, clothing management and energy conservation technique and visual perceptual training  Pt was seen for OT treatment immediately following IE with focus on visual scanning/ongoing visual assessment  See separate note for details  Pt scored  50 /100 on the barthel index  Based on findings from OT evaluation and functional performance deficits, pt has been identified as a  Moderate complexity evaluation  From OT standpoint, recommendation at time of d/c would be short term rehab as patient is currently functioning below baseline  Goals   Patient Goals to feel better   LTG Time Frame 10-14   Long Term Goal #1 see below   Plan   Treatment Interventions ADL retraining;Visual perceptual retraining;Functional transfer training;UE strengthening/ROM; Endurance training;Patient/family training;Equipment evaluation/education; Compensatory technique education; Energy conservation; Activityengagement   Goal Expiration Date 03/05/19   OT Frequency 3-5x/wk   Additional Treatment Session   Start Time 2410  (IE from 2761-4184)   End Time 1054   Treatment Assessment pt seen for skilled OT treatment session w focus on visual assessment and training  She was given a sheet with multiple capital letters and was asked to [place] all the K's  Pt wa able to [place] random K's and a couple of X's, however missing most of them  she was given increased time for completion  all the k's on either lateral sides were missed  Pt appears to have decreased peripheral vison  she does c/o everything "floating togetgher", having a hard time, with lots of concentrationg to locating a few of the letters     Additional Treatment Day 1   Recommendation   OT Discharge Recommendation Short Term Rehab   Barthel Index   Feeding 10   Bathing 0   Grooming Score 0   Dressing Score 5   Bladder Score 10   Bowels Score 10   Toilet Use Score 5   Transfers (Bed/Chair) Score 10   Mobility (Level Surface) Score 0   Stairs Score 0   Barthel Index Score 50     GOALS TO BE ACHIEVED IN 2 WEEKS:    Patient will complete bed mobility mod I  With good safety     Pt will demonstrate good balance sitting at EOB x 10 min for increased safety w self care and in preparation for increased indpendence    Pt will complete grooming independently with set up     Pt will complete UB bathing and dressing independently  Pt will complete LB bathing and dressing independently    Pt will complete toileting mod I  and good safety     Pt will tolerate standing at sinkside x 5 min w fair + balance for increased safety w hygiene    Pt will perform simulated home management activitives w S and good safety     Pt will participate in ongoing visual/perceptual  assessment and training as appropriate to assist w safest dc plan    Pt will complete functional mobility in room and bathroom mod I with good safety

## 2019-02-19 NOTE — ASSESSMENT & PLAN NOTE
· Likely secondary to chronic disease  · Hemoglobin 8 6 yesterday  · Heme test negative for occult blood  · Follow-up iron studies

## 2019-02-19 NOTE — PROGRESS NOTES
Progress Note - Thelma Espinosa 1965, 46 y o  female MRN: [id number]    Unit/Bed#: -01 Encounter: [id number]    Primary Care Provider: Thelma Espinosa MD   Date and time admitted to hospital: 2/16/2019  7:37 AM    Visual disturbance  Assessment & Plan  · Consult Ophthalmology-- OP management recommended    Anemia of chronic renal failure, stage 4 (severe) ([facility])  Assessment & Plan  · Likely secondary to chronic disease  · Hemoglobin 8 6 yesterday  · Heme test negative for occult blood  · Follow-up iron studies    Chronic kidney disease, stage IV (severe) (HCC)  Assessment & Plan  · Nephrology following  · Baseline between 2-2 4  · Creatinine today 2 3  · Check a m  BMP  · Recommending inpatient renal bx if no infection  · Reviewed urine cx from 2/16 and 2/17-- both mixed contaminants x3 with low colony counts-- no evidence of acute UTI  · Renal US: Limited evaluation of the bladder as it is nondistended   No hydronephrosis      Nephrotic syndrome  Assessment & Plan  · Established nephrotic range proteinuria  · Nephrology following and managing     Obesity due to excess calories  Assessment & Plan  · Diet and lifestyle changes    H/O: CVA (cerebrovascular accident)  Assessment & Plan  · History of of CVA with left-sided hemiparesis  · Supportive care  · Will require outpatient f/u with neurology after discharge     Migraine  Assessment & Plan  · Chronic  · C/w Topamax  · Outpatient f/u with neuro  · PRN tylenol  · Avoid NSAIDs    Hyperlipidemia  Assessment & Plan  · C/w statin and tricor       Hypertension  Assessment & Plan  · Uncontrolled  · Nephrology assisting with management   · C/w norvasc 5mg daily; labetalol 300mg BID; hydralazine 75mg TID; losartan 50mg daily     Type 2 diabetes mellitus with hyperglycemia, with long-term current use of insulin [facility])  Assessment & Plan  Lab Results   Component Value Date    HGBA1C 8 9 (H) 10/04/2017       Recent Labs     02/18/19  1510 02/18/19  2110 19  0733 19  1102   POCGLU 94 134 115 171*       Blood Sugar Average: Last 72 hrs:  (P) 849 [id number]  · C/w current insulin regimen: lantus 45 units at HS; novolog 15 units TID    · a1c 8 9    * Volume overload  Assessment & Plan  · Component of non cardiogenic pulmonary edema  · Serial troponins (-)  · Echo  - EF65%, concentric hypertrophy  · Received lasix 40mg IV x2  · Nephrology increased torsemide to BID dosing-- now 40mg QAM and 20mg QPM; spironolactone also increased to 50mg  · Eventually need ischemia w/u  · Chest x-ray: Mild pulmonary vascular congestion   Patchy alveolar infiltrate, likely cardiogenic in nature      VTE Pharmacologic Prophylaxis:   Pharmacologic: Heparin  Mechanical VTE Prophylaxis in Place: Yes    Patient Centered Rounds: I have performed bedside rounds with nursing staff today  Discussions with Specialists or Other Care Team Provider: CM, renal     Education and Discussions with Family / Patient: patient; declined family update    Time Spent for Care: 30 minutes  More than 50% of total time spent on counseling and coordination of care as described above  Current Length of Stay: 3 day(s)    Current Patient Status: Inpatient   Certification Statement: The patient will continue to require additional inpatient hospital stay due to ongoing management of BP    Discharge Plan: d/c likely tomorrow if BP better controlled    Code Status: Level 1 - Full Code      Subjective:   Less dizzy  Still with swelling  Overall feels slightly better than yesterday    Objective:     Vitals:   Temp (24hrs), Av 7 °F (37 1 °C), Min:98 1 °F (36 7 °C), Max:99 4 °F (37 4 °C)    Temp:  [98 1 °F (36 7 °C)-99 4 °F (37 4 °C)] 98 5 °F (36 9 °C)  HR:  [73-83] 77  Resp:  [16-18] 16  BP: (150-176)/(76-90) 150/76  SpO2:  [93 %-94 %] 93 %  Body mass index is 36 94 kg/m²  Input and Output Summary (last 24 hours):        Intake/Output Summary (Last 24 hours) at 2019 1325  Last data filed at 2/19/2019 0547  Gross per 24 hour   Intake 420 ml   Output 1750 ml   Net -1330 ml       Physical Exam:     Physical Exam   Constitutional: She is oriented to person, place, and time  She appears well-developed and well-nourished  No distress  HENT:   Head: Normocephalic and atraumatic  Eyes: Pupils are equal, round, and reactive to light  EOM are normal    Neck: No JVD present  Cardiovascular: Normal rate and regular rhythm  Exam reveals no gallop and no friction rub  No murmur heard  Pulmonary/Chest: Effort normal and breath sounds normal  No stridor  No respiratory distress  She has no wheezes  Abdominal: Soft  Bowel sounds are normal  She exhibits no distension  There is no tenderness  There is no guarding  Musculoskeletal: Normal range of motion  She exhibits edema (anasarca)  Neurological: She is alert and oriented to person, place, and time  She displays normal reflexes  No cranial nerve deficit  Coordination normal    Skin: Skin is warm and dry  She is not diaphoretic  No erythema  Psychiatric: She has a normal mood and affect  Her behavior is normal        Additional Data:     Labs:    Results from last 7 days   Lab Units 02/18/19  1004  02/16/19  0758   WBC Thousand/uL 5 43   < > 6 29   HEMOGLOBIN g/dL 8 6*   < > 10 8*   HEMATOCRIT % 26 0*   < > 32 1*   PLATELETS Thousands/uL 269   < > 304   NEUTROS PCT %  --   --  59   LYMPHS PCT %  --   --  30   MONOS PCT %  --   --  6   EOS PCT %  --   --  4    < > = values in this interval not displayed       Results from last 7 days   Lab Units 02/19/19  0558 02/18/19  1004 02/17/19  0503   SODIUM mmol/L 142 140 142   POTASSIUM mmol/L 3 9 3 9 3 6   CHLORIDE mmol/L 108 106 110*   CO2 mmol/L 24 25 22   BUN mg/dL 31* 30* 30*   CREATININE mg/dL 2 31* 2 50* 2 10*   ANION GAP mmol/L 10 9 10   CALCIUM mg/dL 8 6 8 4 8 5   ALBUMIN g/dL  --  1 7* 1 8*   TOTAL BILIRUBIN mg/dL  --   --  0 20   ALK PHOS U/L  --   --  100   ALT U/L  --   --  17   AST U/L  -- --  22   GLUCOSE RANDOM mg/dL 134 192* 95         Results from last 7 days   Lab Units 02/19/19  1102 02/19/19  0733 02/18/19  2118 02/18/19  1510 02/18/19  1203 02/18/19  0742 02/17/19  2028 02/17/19  1548 02/17/19  1242 02/16/19  2110 02/16/19  1607   POC GLUCOSE mg/dl 171* 115 134 94 164* 111 169* 73 65 156* 184*                   * I Have Reviewed All Lab Data Listed Above  * Additional Pertinent Lab Tests Reviewed:  Daniele 65 Admission Reviewed    Imaging:    Imaging Reports Reviewed Today Include: CXR  Imaging Personally Reviewed by Myself Includes:  CXR    Recent Cultures (last 7 days):     Results from last 7 days   Lab Units 02/17/19  2311 02/16/19  1939   URINE CULTURE  <10,000 cfu/ml  <10,000 cfu/ml        Last 24 Hours Medication List:     Current Facility-Administered Medications:  acetaminophen 650 mg Oral Q6H PRN Garcia Martínez MD   amLODIPine 5 mg Oral Daily Jose L Selby MD   atorvastatin 80 mg Oral QPM Garcia Martínez MD   cholecalciferol 1,000 Units Oral Daily Garcia Martínez MD   enoxaparin 30 mg Subcutaneous Daily Garcia Martínez MD   fenofibrate 48 mg Oral Daily Garcia Martínez MD   FLUoxetine 20 mg Oral Daily Garcia Martínez MD   gabapentin 300 mg Oral HS Garcia Martínez MD   hydrALAZINE 10 mg Intravenous Q6H PRN Garcia Martínez MD   hydrALAZINE 75 mg Oral TID Jose L Selby MD   insulin glargine 45 Units Subcutaneous HS Garcia Martínez MD   insulin lispro 15 Units Subcutaneous TID With Meals Garcia Martínez MD   isosorbide dinitrate 10 mg Oral TID after meals Garcia Martínez MD   labetalol 300 mg Oral Q12H [address] Jose L Selby MD   loratadine 10 mg Oral Daily Garcia Martínez MD   losartan 50 mg Oral Daily Jose L Selby MD   ondansetron 4 mg Intravenous Q6H PRN Garcia Martínez MD   [START ON 2/20/2019] spironolactone 50 mg Oral Daily Margarette Frank MD   topiramate 50 mg Oral HS Garcia Martínez MD   torsemide 20 mg Oral Daily With PO Cameron   [START ON 2/20/2019] torsemide 40 mg Oral Daily Destini Peter PA-C        Today, Patient Was Seen By: Flakita Gaitan PA-C    ** Please Note: Dictation voice to text software may have been used in the creation of this document   **

## 2019-02-20 LAB
ANION GAP SERPL CALCULATED.3IONS-SCNC: 10 MMOL/L (ref 4–13)
BUN SERPL-MCNC: 33 MG/DL (ref 5–25)
CALCIUM SERPL-MCNC: 8.8 MG/DL (ref 8.3–10.1)
CHLORIDE SERPL-SCNC: 108 MMOL/L (ref 100–108)
CO2 SERPL-SCNC: 23 MMOL/L (ref 21–32)
CREAT SERPL-MCNC: 2.19 MG/DL (ref 0.6–1.3)
ERYTHROCYTE [DISTWIDTH] IN BLOOD BY AUTOMATED COUNT: 12.3 % (ref 11.6–15.1)
GFR SERPL CREATININE-BSD FRML MDRD: 25 ML/MIN/1.73SQ M
GLUCOSE SERPL-MCNC: 101 MG/DL (ref 65–140)
GLUCOSE SERPL-MCNC: 102 MG/DL (ref 65–140)
GLUCOSE SERPL-MCNC: 138 MG/DL (ref 65–140)
GLUCOSE SERPL-MCNC: 178 MG/DL (ref 65–140)
GLUCOSE SERPL-MCNC: 97 MG/DL (ref 65–140)
HCT VFR BLD AUTO: 27.5 % (ref 34.8–46.1)
HGB BLD-MCNC: 9.2 G/DL (ref 11.5–15.4)
MCH RBC QN AUTO: 29.7 PG (ref 26.8–34.3)
MCHC RBC AUTO-ENTMCNC: 33.5 G/DL (ref 31.4–37.4)
MCV RBC AUTO: 89 FL (ref 82–98)
PLATELET # BLD AUTO: 267 THOUSANDS/UL (ref 149–390)
PMV BLD AUTO: 10 FL (ref 8.9–12.7)
POTASSIUM SERPL-SCNC: 4 MMOL/L (ref 3.5–5.3)
RBC # BLD AUTO: 3.1 MILLION/UL (ref 3.81–5.12)
SODIUM SERPL-SCNC: 141 MMOL/L (ref 136–145)
WBC # BLD AUTO: 5.63 THOUSAND/UL (ref 4.31–10.16)

## 2019-02-20 PROCEDURE — 85027 COMPLETE CBC AUTOMATED: CPT | Performed by: PHYSICIAN ASSISTANT

## 2019-02-20 PROCEDURE — 99233 SBSQ HOSP IP/OBS HIGH 50: CPT | Performed by: INTERNAL MEDICINE

## 2019-02-20 PROCEDURE — 82948 REAGENT STRIP/BLOOD GLUCOSE: CPT

## 2019-02-20 PROCEDURE — 80048 BASIC METABOLIC PNL TOTAL CA: CPT | Performed by: PHYSICIAN ASSISTANT

## 2019-02-20 RX ORDER — FUROSEMIDE 10 MG/ML
80 INJECTION INTRAMUSCULAR; INTRAVENOUS EVERY 8 HOURS
Status: DISCONTINUED | OUTPATIENT
Start: 2019-02-20 | End: 2019-02-22 | Stop reason: HOSPADM

## 2019-02-20 RX ORDER — NIFEDIPINE 30 MG/1
30 TABLET, EXTENDED RELEASE ORAL DAILY
Status: DISCONTINUED | OUTPATIENT
Start: 2019-02-20 | End: 2019-02-22 | Stop reason: HOSPADM

## 2019-02-20 RX ADMIN — HYDRALAZINE HYDROCHLORIDE 75 MG: 25 TABLET ORAL at 17:58

## 2019-02-20 RX ADMIN — FENOFIBRATE 48 MG: 48 TABLET ORAL at 08:27

## 2019-02-20 RX ADMIN — NIFEDIPINE 30 MG: 30 TABLET, EXTENDED RELEASE ORAL at 12:11

## 2019-02-20 RX ADMIN — ACETAMINOPHEN 650 MG: 325 TABLET, FILM COATED ORAL at 16:12

## 2019-02-20 RX ADMIN — SPIRONOLACTONE 50 MG: 25 TABLET, FILM COATED ORAL at 08:27

## 2019-02-20 RX ADMIN — LOSARTAN POTASSIUM 50 MG: 50 TABLET, FILM COATED ORAL at 08:27

## 2019-02-20 RX ADMIN — FUROSEMIDE 80 MG: 10 INJECTION, SOLUTION INTRAMUSCULAR; INTRAVENOUS at 12:11

## 2019-02-20 RX ADMIN — INSULIN LISPRO 15 UNITS: 100 INJECTION, SOLUTION INTRAVENOUS; SUBCUTANEOUS at 17:59

## 2019-02-20 RX ADMIN — LABETALOL HYDROCHLORIDE 300 MG: 100 TABLET, FILM COATED ORAL at 08:27

## 2019-02-20 RX ADMIN — LABETALOL HYDROCHLORIDE 300 MG: 100 TABLET, FILM COATED ORAL at 22:28

## 2019-02-20 RX ADMIN — TOPIRAMATE 50 MG: 25 TABLET, FILM COATED ORAL at 22:28

## 2019-02-20 RX ADMIN — INSULIN GLARGINE 45 UNITS: 100 INJECTION, SOLUTION SUBCUTANEOUS at 22:28

## 2019-02-20 RX ADMIN — HYDRALAZINE HYDROCHLORIDE 75 MG: 25 TABLET ORAL at 08:28

## 2019-02-20 RX ADMIN — INSULIN LISPRO 15 UNITS: 100 INJECTION, SOLUTION INTRAVENOUS; SUBCUTANEOUS at 08:29

## 2019-02-20 RX ADMIN — LORATADINE 10 MG: 10 TABLET ORAL at 08:27

## 2019-02-20 RX ADMIN — FUROSEMIDE 80 MG: 10 INJECTION, SOLUTION INTRAMUSCULAR; INTRAVENOUS at 19:17

## 2019-02-20 RX ADMIN — ATORVASTATIN CALCIUM 80 MG: 40 TABLET, FILM COATED ORAL at 17:58

## 2019-02-20 RX ADMIN — ENOXAPARIN SODIUM 30 MG: 30 INJECTION SUBCUTANEOUS at 08:28

## 2019-02-20 RX ADMIN — INSULIN LISPRO 15 UNITS: 100 INJECTION, SOLUTION INTRAVENOUS; SUBCUTANEOUS at 12:49

## 2019-02-20 RX ADMIN — FLUOXETINE HYDROCHLORIDE 20 MG: 20 CAPSULE ORAL at 08:27

## 2019-02-20 RX ADMIN — HYDRALAZINE HYDROCHLORIDE 75 MG: 25 TABLET ORAL at 22:28

## 2019-02-20 RX ADMIN — TORSEMIDE 40 MG: 20 TABLET ORAL at 08:28

## 2019-02-20 RX ADMIN — GABAPENTIN 300 MG: 300 CAPSULE ORAL at 22:28

## 2019-02-20 RX ADMIN — AMLODIPINE BESYLATE 5 MG: 5 TABLET ORAL at 08:27

## 2019-02-20 RX ADMIN — ISOSORBIDE DINITRATE 10 MG: 10 TABLET ORAL at 08:27

## 2019-02-20 RX ADMIN — VITAMIN D, TAB 1000IU (100/BT) 1000 UNITS: 25 TAB at 08:27

## 2019-02-20 NOTE — PROGRESS NOTES
Pt resting comfortably and shows no signs of distress  Call bell within reach  Will continue to monitor

## 2019-02-20 NOTE — ASSESSMENT & PLAN NOTE
· Reported loss of vision approximately 10 days ago  · Consult Ophthalmology-- will see in office with complete exam and follow up  · Suspect diabetic retinopathy

## 2019-02-20 NOTE — PROGRESS NOTES
Progress Note - Marj Reyes 1965, 46 y o  female MRN: [id number]    Unit/Bed#: -01 Encounter: [id number]    Primary Care Provider: Marj Reyes MD   Date and time admitted to hospital: 2/16/2019  7:37 AM    Visual disturbance  Assessment & Plan  · Reported loss of vision approximately 10 days ago  · Consult Ophthalmology-- will see in office with complete exam and follow up  · Suspect diabetic retinopathy    Anemia of chronic renal failure, stage 4 (severe) ([facility])  Assessment & Plan  · Stable Hb    Chronic kidney disease, stage IV (severe) ([facility] [address] )  Assessment & Plan  · Nephrology following  · Baseline between 2-2 4  · Creatinine today 2 3  · Check a m  BMP  · Recommending inpatient renal bx if no infection  · Reviewed urine cx from 2/16 and 2/17-- both mixed contaminants x3 with low colony counts-- no evidence of acute UTI  · Renal US: Limited evaluation of the bladder as it is nondistended   No hydronephrosis      Nephrotic syndrome  Assessment & Plan  · Established nephrotic range proteinuria  · Nephrology following and managing   · D/w nephrology Dr Kristy Duran Renal Bx may not     Obesity due to excess calories  Assessment & Plan  · Diet and lifestyle changes    H/O: CVA (cerebrovascular accident)  Assessment & Plan  · History of of CVA with left-sided hemiparesis  · Supportive care  · Will require outpatient f/u with neurology after discharge     Hyperlipidemia  Assessment & Plan  · C/w statin and tricor       Hypertension  Assessment & Plan  · Uncontrolled  · Nephrology assisting with management   · DC norvasc 5mg daily & isordil; c/w labetalol 300mg BID; hydralazine 75mg TID; losartan 50mg daily; add nifedipine 30 home dose  · Torsemide and spironolactone    Type 2 diabetes mellitus with hyperglycemia, with long-term current use of insulin [facility])  Assessment & Plan  Lab Results   Component Value Date    HGBA1C 8 9 (H) 10/04/2017       Recent Labs     02/19/19  1102 19  1540 19  2108 19  0802   POCGLU 171* 131 119 101       Blood Sugar Average: Last 72 hrs:  (P) 859 [id number]  · C/w current insulin regimen: lantus 45 units at HS; novolog 15 units TID    · a1c 8 9  · Acceptable BS    * Volume overload  Assessment & Plan  · Improving with diuresis  · Echo  - EF65%, concentric hypertrophy  · Nephrology increased torsemide to BID dosing-- now 40mg QAM and 20mg QPM; spironolactone also increased to 50mg  · 2" nephrotic syndrome    VTE Pharmacologic Prophylaxis: Enoxaparin (Lovenox)  Mechanical: Mechanical VTE prophylaxis in place  Patient Centered Rounds: I have performed bedside rounds with nursing staff today  Discussions with Specialists or Other Care Team Provider: Dr Glynn Gamez    Education and Discussions with Family / Patient: dtr updated    Time Spent for Care: 35 min for all above  More than 50% of total time spent on counseling and coordination of care as described above  Current Length of Stay: 4 day(s)    Current Patient Status: Inpatient   Certification Statement: The patient will continue to require additional inpatient hospital stay due to as above    Discharge Plan:    Code Status: Level 1 - Full Code      Subjective: nil acute    Objective:     Vitals:   Temp (24hrs), Av °F (36 7 °C), Min:97 9 °F (36 6 °C), Max:98 1 °F (36 7 °C)    Temp:  [97 9 °F (36 6 °C)-98 1 °F (36 7 °C)] 97 9 °F (36 6 °C)  HR:  [68-86] 74  Resp:  [16-18] 18  BP: (139-167)/(70-86) 167/84  SpO2:  [93 %-94 %] 93 %  Body mass index is 36 54 kg/m²  Input and Output Summary (last 24 hours): Intake/Output Summary (Last 24 hours) at 2019 0935  Last data filed at 2019 0525  Gross per 24 hour   Intake 240 ml   Output 400 ml   Net -160 ml       Physical Exam   HENT:   Head: Normocephalic  Eyes: Pupils are equal, round, and reactive to light  Cardiovascular: Normal rate and normal heart sounds     Pulmonary/Chest: Effort normal and breath sounds normal    Abdominal: Soft  Bowel sounds are normal    Neurological: She is alert  Skin: Skin is warm  Additional Data:     Labs:    Results from last 7 days   Lab Units 02/20/19  0522  02/16/19  0758   WBC Thousand/uL 5 63   < > 6 29   HEMOGLOBIN g/dL 9 2*   < > 10 8*   HEMATOCRIT % 27 5*   < > 32 1*   PLATELETS Thousands/uL 267   < > 304   NEUTROS PCT %  --   --  59   LYMPHS PCT %  --   --  30   MONOS PCT %  --   --  6   EOS PCT %  --   --  4    < > = values in this interval not displayed  Results from last 7 days   Lab Units 02/20/19  0522  02/17/19  0503   POTASSIUM mmol/L 4 0   < > 3 6   CHLORIDE mmol/L 108   < > 110*   CO2 mmol/L 23   < > 22   BUN mg/dL 33*   < > 30*   CREATININE mg/dL 2 19*   < > 2 10*   CALCIUM mg/dL 8 8   < > 8 5   ALK PHOS U/L  --   --  100   ALT U/L  --   --  17   AST U/L  --   --  22    < > = values in this interval not displayed  * I Have Reviewed All Lab Data Listed Above      Imaging:  Imaging Personally Reviewed by Myself Includes:     Cultures:   Blood Culture: No results found for: BLOODCX  Urine Culture:   Lab Results   Component Value Date    URINECX <10,000 cfu/ml  02/17/2019    URINECX <10,000 cfu/ml  02/16/2019    URINECX (A) 11/22/2018     >100,000 cfu/ml Beta Hemolytic Streptococcus Group B    URINECX <10,000 cfu/ml Mixed Contaminants X2 11/22/2016    URINECX 10,000-19,000 cfu/ml Mixed Contaminants X4 10/24/2016    URINECX No Growth <1000 cfu/mL 04/09/2016     Sputum Culture: No components found for: SPUTUMCX  Wound Culture: No results found for: WOUNDCULT    Last 24 Hours Medication List:     Current Facility-Administered Medications:  acetaminophen 650 mg Oral Q6H PRN Favio Polanco MD   atorvastatin 80 mg Oral QPM Favio Polanco MD   cholecalciferol 1,000 Units Oral Daily Favio Polanco MD   enoxaparin 30 mg Subcutaneous Daily Favio Polanco MD   fenofibrate 48 mg Oral Daily Favio Polanco MD   FLUoxetine 20 mg Oral Daily Penelope Hoffman MD   gabapentin 300 mg Oral HS Penelope Hoffman MD   hydrALAZINE 10 mg Intravenous Q6H PRN Penelope Hoffman MD   hydrALAZINE 75 mg Oral TID Irving Kinney MD   insulin glargine 45 Units Subcutaneous HS Penelope Hoffman MD   insulin lispro 15 Units Subcutaneous TID With Meals Penelope Hoffman MD   labetalol 300 mg Oral Q12H [address] Irving Kinney MD   loratadine 10 mg Oral Daily Penelope Hoffman MD   losartan 50 mg Oral Daily Irving Kinney MD   NIFEdipine 30 mg Oral Daily Penelope Hoffman MD   ondansetron 4 mg Intravenous Q6H PRN Penelope Hoffman MD   spironolactone 50 mg Oral Daily Eyad Her MD   topiramate 50 mg Oral HS Penelope Hoffman MD   torsemide 20 mg Oral Daily With PO Cameron   torsemide 40 mg Oral Daily Kike Cline PA-C        Today, Patient Was Seen By: Penelope Hoffman MD    ** Please Note: Dragon 360 Dictation voice to text software may have been used in the creation of this document   **

## 2019-02-20 NOTE — ASSESSMENT & PLAN NOTE
· Improving with diuresis  · Echo 9/18 - EF65%, concentric hypertrophy  · Nephrology increased torsemide to BID dosing-- now 40mg QAM and 20mg QPM; spironolactone also increased to 50mg  · 2" nephrotic syndrome

## 2019-02-20 NOTE — ASSESSMENT & PLAN NOTE
Lab Results   Component Value Date    HGBA1C 8 9 (H) 10/04/2017       Recent Labs     02/19/19  1102 02/19/19  1540 02/19/19  2108 02/20/19  0802   POCGLU 171* 131 119 101       Blood Sugar Average: Last 72 hrs:  (P) 646 [id number]  · C/w current insulin regimen: lantus 45 units at HS; novolog 15 units TID    · a1c 8 9  · Acceptable BS

## 2019-02-20 NOTE — ASSESSMENT & PLAN NOTE
· Uncontrolled  · Nephrology assisting with management   · DC norvasc 5mg daily & isordil; c/w labetalol 300mg BID; hydralazine 75mg TID; losartan 50mg daily; add nifedipine 30 home dose  · Torsemide and spironolactone

## 2019-02-20 NOTE — PROGRESS NOTES
[address] [facility] NOTE   Liz Banks 46 y o  female MRN: [id number]  Unit/Bed#: -01 Encounter: [id number]  Reason for Consult: Nephrotic syndrome    ASSESSMENT and PLAN:  1  CKD IV:  · Baseline creatinine was 1 3 in 2018 but now in the range of 2 0 to 2 4 likely due to progression of CKD  · She saw Dr Daniel Castano of [facility] as an outpatient on 2/5/19 after a recent admission to [facility] in January 2019  · Renal function is stable at this time  · She has nephrotic range proteinuria with a negative serologic workup  There is a high likelihood that this is due to diabetic nephropathy given her history of diabetes mellitus x 27 years (poorly controlled based on all the HBa1c readings that I saw) and diabetic retinopathy  I don't think a renal biopsy is urgently necessary (additionally, BP and volume are not well controlled) and I believe that her outpatient nephrologist (Dr Daniel Castano) can decide if they want to pursue this  Of note, she may not be the best candidate for steroids due to her uncontrolled DM  2  Nephrotic syndrome, fluid overload:  · She was discharged on Torsemide 100 mg daily from [facility] in January 2019 but apparently did not take the medication due to cost or insurance coverage  · She is currently on Torsemide 40 mg in AM and 20 mg in PM which I doubt will be enough to treat her volume overload  · I will give her Furosemide 80 mg IV q 8 hours for now and transition to oral once stable for discharge  · She would to go on either Torsemide 100 mg daily vs Furosemide 100 mg BID  3  Hypertension:  · Likely volume mediated  · Continue Hydralazine, Labetalol, Losartan, and Spironolactone  · Change Amlodipine to Nifedipine which she apparently has at home  · Keep O>I      4  Anemia: Monitor Hgb  5  Electrolytes: stable  DISPOSITION:  · I don't think a renal biopsy is urgently necessary since her BP is high and she remains fluid overload   I would defer this decision to her outpatient nephrologist    · Change to Furosemide 80 mg IV q8H  · On discharge, would favor Torsemide 100 mg daily  If not feasible due to cost, would use Furosemide 100 mg BID  · Would recommend keeping Losartan and Spironolactone on discharge with close renal follow up  · Continue Hydralazine and Labetalol  · BP is likely to get better with improved volume management at which time I would wean Hydralazine down  · I explained to the patient that her renal prognosis is poor  Discussed with Dr Homero Shay  SUBJECTIVE / INTERVAL HISTORY:  Still complaining of being swollen - face, arms, legs  OBJECTIVE:  Current Weight: Weight - Scale: 90 6 kg (199 lb 12 8 oz)  Vitals:    02/19/19 1505 02/19/19 2200 02/20/19 0600 02/20/19 0800   BP: 139/86 148/70  167/84   BP Location: Right arm Right arm  Right arm   Pulse: 68 86  74   Resp: 16 16  18   Temp: 98 °F (36 7 °C) 98 1 °F (36 7 °C)  97 9 °F (36 6 °C)   TempSrc: Oral Oral  Oral   SpO2: 93% 94%  93%   Weight:   90 6 kg (199 lb 12 8 oz)    Height:           Intake/Output Summary (Last 24 hours) at 2/20/2019 1000  Last data filed at 2/20/2019 0525  Gross per 24 hour   Intake 240 ml   Output 400 ml   Net -160 ml     General: conscious, coherent, cooperative, no distress  Chest/Lungs: mild expiratory wheeze  CVS: distinct heart sounds, normal rate, regular  Abdomen: soft  Extremities: (+) anasarca  : no fowler catheter  Neuro: awake, alert       Medications:    Current Facility-Administered Medications:     acetaminophen (TYLENOL) tablet 650 mg, 650 mg, Oral, Q6H PRN, Favio Polanco MD, 650 mg at 02/19/19 1536    atorvastatin (LIPITOR) tablet 80 mg, 80 mg, Oral, QPM, Shiva Valerio MD, 80 mg at 02/19/19 1700    cholecalciferol (VITAMIN D3) tablet 1,000 Units, 1,000 Units, Oral, Daily, Favio Polanco MD, 1,000 Units at 02/20/19 0827    enoxaparin (LOVENOX) subcutaneous injection 30 mg, 30 mg, Subcutaneous, Daily, Shiva Alyson Concepcion MD, 30 mg at 02/20/19 9669    fenofibrate (TRICOR) tablet 48 mg, 48 mg, Oral, Daily, Jadiel Lindsey MD, 48 mg at 02/20/19 0827    FLUoxetine (PROzac) capsule 20 mg, 20 mg, Oral, Daily, Jadiel Lindsey MD, 20 mg at 02/20/19 0827    gabapentin (NEURONTIN) capsule 300 mg, 300 mg, Oral, HS, Jadiel Lindsey MD, 300 mg at 02/19/19 2119    hydrALAZINE (APRESOLINE) injection 10 mg, 10 mg, Intravenous, Q6H PRN, Jadiel Lindsey MD    hydrALAZINE (APRESOLINE) tablet 75 mg, 75 mg, Oral, TID, Kiara So MD, 75 mg at 02/20/19 0828    insulin glargine (LANTUS) subcutaneous injection 45 Units 0 45 mL, 45 Units, Subcutaneous, HS, Jadiel Lindsey MD, 45 Units at 02/19/19 2119    insulin lispro (HumaLOG) 100 units/mL subcutaneous injection 15 Units, 15 Units, Subcutaneous, TID With Meals, Jadiel Lindsey MD, 15 Units at 02/20/19 0829    labetalol (NORMODYNE) tablet 300 mg, 300 mg, Oral, Q12H [address], Kiara So MD, 300 mg at 02/20/19 0827    loratadine (CLARITIN) tablet 10 mg, 10 mg, Oral, Daily, Jadiel Lindsey MD, 10 mg at 02/20/19 0827    losartan (COZAAR) tablet 50 mg, 50 mg, Oral, Daily, Kiara So MD, 50 mg at 02/20/19 0827    NIFEdipine (PROCARDIA XL) 24 hr tablet 30 mg, 30 mg, Oral, Daily, Jadiel Lindsey MD    ondansetron (ZOFRAN) injection 4 mg, 4 mg, Intravenous, Q6H PRN, Jadiel Lindsey MD    spironolactone (ALDACTONE) tablet 50 mg, 50 mg, Oral, Daily, Giovani Trinidad MD, 50 mg at 02/20/19 0827    topiramate (TOPAMAX) tablet 50 mg, 50 mg, Oral, HS, Jadiel Lindsey MD, 50 mg at 02/19/19 0920    Laboratory Results:  Results from last 7 days   Lab Units 02/20/19  0522 02/19/19  0558 02/18/19  1005 02/18/19  1004 02/17/19  0503 02/16/19  0758   WBC Thousand/uL 5 63  --   --  5 43 6 00 6 29   HEMOGLOBIN g/dL 9 2*  --   --  8 6* 9 6* 10 8*   HEMATOCRIT % 27 5*  --   --  26 0* 28 8* 32 1*   PLATELETS Thousands/uL 267  --   --  269 263 304   POTASSIUM mmol/L 4 0 3 9  --  3 9 3 6 3 8   CHLORIDE mmol/L 108 108  --  106 110* 107   CO2 mmol/L 23 24  --  25 22 25   BUN mg/dL 33* 31*  --  30* 30* 32*   CREATININE mg/dL 2 19* 2 31*  --  2 50* 2 10* 2 28*   CALCIUM mg/dL 8 8 8 6  --  8 4 8 5 8 9   MAGNESIUM mg/dL  --   --  2 0  --  2 2  --    PHOSPHORUS mg/dL  --   --   --  4 4 4 5  --      Work up:

## 2019-02-20 NOTE — ASSESSMENT & PLAN NOTE
· Established nephrotic range proteinuria  · Nephrology following and managing   · D/w nephrology Dr Ryder Hurst Renal Bx may not

## 2019-02-21 LAB
ANION GAP SERPL CALCULATED.3IONS-SCNC: 10 MMOL/L (ref 4–13)
BUN SERPL-MCNC: 41 MG/DL (ref 5–25)
CALCIUM SERPL-MCNC: 8.8 MG/DL (ref 8.3–10.1)
CHLORIDE SERPL-SCNC: 105 MMOL/L (ref 100–108)
CO2 SERPL-SCNC: 24 MMOL/L (ref 21–32)
CREAT SERPL-MCNC: 2.57 MG/DL (ref 0.6–1.3)
GFR SERPL CREATININE-BSD FRML MDRD: 20 ML/MIN/1.73SQ M
GLUCOSE SERPL-MCNC: 101 MG/DL (ref 65–140)
GLUCOSE SERPL-MCNC: 134 MG/DL (ref 65–140)
GLUCOSE SERPL-MCNC: 134 MG/DL (ref 65–140)
GLUCOSE SERPL-MCNC: 161 MG/DL (ref 65–140)
GLUCOSE SERPL-MCNC: 72 MG/DL (ref 65–140)
METANEPH FREE SERPL-MCNC: 36 PG/ML (ref 0–62)
NORMETANEPHRINE SERPL-MCNC: 225 PG/ML (ref 0–145)
POTASSIUM SERPL-SCNC: 3.8 MMOL/L (ref 3.5–5.3)
SODIUM SERPL-SCNC: 139 MMOL/L (ref 136–145)

## 2019-02-21 PROCEDURE — 82948 REAGENT STRIP/BLOOD GLUCOSE: CPT

## 2019-02-21 PROCEDURE — 80048 BASIC METABOLIC PNL TOTAL CA: CPT | Performed by: INTERNAL MEDICINE

## 2019-02-21 PROCEDURE — 97110 THERAPEUTIC EXERCISES: CPT

## 2019-02-21 PROCEDURE — 99232 SBSQ HOSP IP/OBS MODERATE 35: CPT | Performed by: INTERNAL MEDICINE

## 2019-02-21 PROCEDURE — 97116 GAIT TRAINING THERAPY: CPT

## 2019-02-21 RX ADMIN — FENOFIBRATE 48 MG: 48 TABLET ORAL at 09:20

## 2019-02-21 RX ADMIN — HYDRALAZINE HYDROCHLORIDE 75 MG: 25 TABLET ORAL at 21:34

## 2019-02-21 RX ADMIN — TOPIRAMATE 50 MG: 25 TABLET, FILM COATED ORAL at 21:35

## 2019-02-21 RX ADMIN — INSULIN LISPRO 15 UNITS: 100 INJECTION, SOLUTION INTRAVENOUS; SUBCUTANEOUS at 17:41

## 2019-02-21 RX ADMIN — ENOXAPARIN SODIUM 30 MG: 30 INJECTION SUBCUTANEOUS at 09:21

## 2019-02-21 RX ADMIN — LABETALOL HYDROCHLORIDE 300 MG: 100 TABLET, FILM COATED ORAL at 09:20

## 2019-02-21 RX ADMIN — LORATADINE 10 MG: 10 TABLET ORAL at 09:21

## 2019-02-21 RX ADMIN — LABETALOL HYDROCHLORIDE 300 MG: 100 TABLET, FILM COATED ORAL at 21:35

## 2019-02-21 RX ADMIN — FUROSEMIDE 80 MG: 10 INJECTION, SOLUTION INTRAMUSCULAR; INTRAVENOUS at 10:53

## 2019-02-21 RX ADMIN — VITAMIN D, TAB 1000IU (100/BT) 1000 UNITS: 25 TAB at 09:20

## 2019-02-21 RX ADMIN — INSULIN GLARGINE 45 UNITS: 100 INJECTION, SOLUTION SUBCUTANEOUS at 21:35

## 2019-02-21 RX ADMIN — NIFEDIPINE 30 MG: 30 TABLET, EXTENDED RELEASE ORAL at 09:20

## 2019-02-21 RX ADMIN — ATORVASTATIN CALCIUM 80 MG: 40 TABLET, FILM COATED ORAL at 17:37

## 2019-02-21 RX ADMIN — SPIRONOLACTONE 50 MG: 25 TABLET, FILM COATED ORAL at 09:20

## 2019-02-21 RX ADMIN — HYDRALAZINE HYDROCHLORIDE 75 MG: 25 TABLET ORAL at 09:20

## 2019-02-21 RX ADMIN — FUROSEMIDE 80 MG: 10 INJECTION, SOLUTION INTRAMUSCULAR; INTRAVENOUS at 01:33

## 2019-02-21 RX ADMIN — FUROSEMIDE 80 MG: 10 INJECTION, SOLUTION INTRAMUSCULAR; INTRAVENOUS at 17:37

## 2019-02-21 RX ADMIN — GABAPENTIN 300 MG: 300 CAPSULE ORAL at 21:34

## 2019-02-21 RX ADMIN — INSULIN LISPRO 15 UNITS: 100 INJECTION, SOLUTION INTRAVENOUS; SUBCUTANEOUS at 13:18

## 2019-02-21 RX ADMIN — INSULIN LISPRO 15 UNITS: 100 INJECTION, SOLUTION INTRAVENOUS; SUBCUTANEOUS at 09:20

## 2019-02-21 RX ADMIN — FLUOXETINE HYDROCHLORIDE 20 MG: 20 CAPSULE ORAL at 09:21

## 2019-02-21 RX ADMIN — HYDRALAZINE HYDROCHLORIDE 75 MG: 25 TABLET ORAL at 17:37

## 2019-02-21 RX ADMIN — LOSARTAN POTASSIUM 50 MG: 50 TABLET, FILM COATED ORAL at 09:20

## 2019-02-21 NOTE — ASSESSMENT & PLAN NOTE
· Nephrology following  · Baseline between 2-2 4  · Creatinine today 2 3  · Check a m  BMP  · Recommending inpatient renal bx if no infection  · Reviewed urine cx from 2/16 and 2/17-- both mixed contaminants x3   · Renal US: Limited evaluation of the bladder as it is nondistended   No hydronephrosis

## 2019-02-21 NOTE — PLAN OF CARE
Problem: Potential for Falls  Goal: Patient will remain free of falls  Description  INTERVENTIONS:  - Assess patient frequently for physical needs  -  Identify cognitive and physical deficits and behaviors that affect risk of falls    -  [place] fall precautions as indicated by assessment   - Educate patient/family on patient safety including physical limitations  - Instruct patient to call for assistance with activity based on assessment  - Modify environment to reduce risk of injury  - Consider OT/PT consult to assist with strengthening/mobility  Outcome: Progressing     Problem: DISCHARGE PLANNING - CARE MANAGEMENT  Goal: Discharge to post-acute care or home with appropriate resources  Description  INTERVENTIONS:  - Conduct assessment to determine patient/family and health care team treatment goals, and need for post-acute services based on payer coverage, community resources, and patient preferences, and barriers to discharge  - Address psychosocial, clinical, and financial barriers to discharge as identified in assessment in conjunction with the patient/family and health care team  - Arrange appropriate level of post-acute services according to patient's   needs and preference and payer coverage in collaboration with the physician and health care team  - Communicate with and update the patient/family, physician, and health care team regarding progress on the discharge plan  - Arrange appropriate transportation to post-acute venues   Outcome: Progressing     Problem: CARDIOVASCULAR - ADULT  Goal: Maintains optimal cardiac output and hemodynamic stability  Description  INTERVENTIONS:  - Monitor I/O, vital signs and rhythm  - Monitor for S/S and trends of decreased cardiac output i e  bleeding, hypotension  - Administer and titrate ordered vasoactive medications to optimize hemodynamic stability  - Assess quality of pulses, skin color and temperature  - Assess for signs of decreased coronary artery perfusion - ex  Angina  - Instruct patient to report change in severity of symptoms  Outcome: Progressing     Problem: METABOLIC, FLUID AND ELECTROLYTES - ADULT  Goal: Electrolytes maintained within normal limits  Description  INTERVENTIONS:  - Monitor labs and assess patient for signs and symptoms of electrolyte imbalances  - Administer electrolyte replacement as ordered  - Monitor response to electrolyte replacements, including repeat lab results as appropriate  - Instruct patient on fluid and nutrition as appropriate  Outcome: Progressing  Goal: Fluid balance maintained  Description  INTERVENTIONS:  - Monitor labs and assess for signs and symptoms of volume excess or deficit  - Monitor I/O and WT  - Instruct patient on fluid and nutrition as appropriate  Outcome: Progressing  Goal: Glucose maintained within target range  Description  INTERVENTIONS:  - Monitor Blood Glucose as ordered  - Assess for signs and symptoms of hyperglycemia and hypoglycemia  - Administer ordered medications to maintain glucose within target range  - Assess nutritional intake and initiate nutrition service referral as needed  Outcome: Progressing

## 2019-02-21 NOTE — PLAN OF CARE
Problem: PHYSICAL THERAPY ADULT  Goal: Performs mobility at highest level of function for planned discharge setting  See evaluation for individualized goals  Description  Treatment/Interventions: Functional transfer training, LE strengthening/ROM, Therapeutic exercise, Endurance training, Patient/family training, Equipment eval/education, Bed mobility, Gait training, Spoke to nursing, Spoke to case management(Pt  to see when stair training is appropriate)  Equipment Recommended: Deanne Jackson       See flowsheet documentation for full assessment, interventions and recommendations     Outcome: Progressing

## 2019-02-21 NOTE — ASSESSMENT & PLAN NOTE
Lab Results   Component Value Date    HGBA1C 8 9 (H) 10/04/2017       Recent Labs     02/20/19  1505 02/20/19  2054 02/21/19  0703 02/21/19  1044   POCGLU 97 138 101 134       Blood Sugar Average: Last 72 hrs:  (P) 127 [id number]  · C/w current insulin regimen: lantus 45 units at HS; novolog 15 units TID    · a1c 8 9  · Blood sugars are controlled

## 2019-02-21 NOTE — ASSESSMENT & PLAN NOTE
· Improving with diuresis  · Echo 9/18 - EF65%, concentric hypertrophy  · Continue IV Lasix 80 mg q 8 hours hourly  · Close nephrology follow-up ongoing  · Continue spironolactone

## 2019-02-21 NOTE — PROGRESS NOTES
NEPHROLOGY PROGRESS NOTE   Elise Nguyễn 46 y o  female MRN: [id number]  Unit/Bed#: -01 Encounter: [id number]  Reason for Consult: CKD    ASSESSMENT/PLAN:  1  Chronic Kidney Disease stage IV- presumed etiology due to diabetic nephropathy  - follows with [facility] (Dr Charley Rae)  - baseline creatinine progressed to 2-2 4 starting this year  - previously was around 1 3  - may need to tolerate a higher baseline creatinine for euvolemia  2  Nephrotic Syndrome- likely secondary to diabetic nephropathy  - previous workup negative  - received lasix IV x2 doses then transitioned to oral torsemide and spironolactone  - torsemide was increased but still not urinating as much as we wanted so transitioned to IV lasix 2/20  - no need for renal biopsy since it will likely not   - UPC ratio = 11 5 --> 10 8  - weight improving from 92 6kg to 89 4kg  - urine output acceptable and improved now that she is on IV lasix  3  Hypertension- BP improving with diuresis  - workup pending (renin __, aldosterone __, metanephrines 225, renal artery duplex negative)  - current medications:  hydralazine 75 mg TID, labetalol 300 mg q12 hours, and losartan 50 mg daily, nifedipine 30mg daily  - current diuretics: spironolactone 50mg daily and lasix 80mg IV q8hr  - prior home diuretic was torsemide 100mg daily  - would send rolando rigoberto torsemide 100mg daily or lasix 100mg twice a day  4  Anemia- continue to monitor   - negative FOBT  - iron studies acceptable  5  Fever- resolved, urine culture negative    Disposition:  Continue IV diuresis    SUBJECTIVE:  Patient feeling better but still with anasarca  Questioning why she shouldn't have a biopsy  Explained situation to patient detailing the fact that it would likely not   Patient understands that we will manage fluid overload by increasing diuretics as an outpatient      OBJECTIVE:  Current Weight: Weight - Scale: 89 4 kg (197 lb 3 2 oz)  Vitals:    02/20/19 1758 02/20/19 2200 02/21/19 0600 02/21/19 0700   BP: 138/78 146/70  129/78   BP Location:  Left arm  Left arm   Pulse:  82  81   Resp:  18  18   Temp:  98 9 °F (37 2 °C)  98 5 °F (36 9 °C)   TempSrc:  Oral  Oral   SpO2:  95%  92%   Weight:   89 4 kg (197 lb 3 2 oz)    Height:           Intake/Output Summary (Last 24 hours) at 2/21/2019 1217  Last data filed at 2/21/2019 0900  Gross per 24 hour   Intake 1500 ml   Output 3075 ml   Net -1575 ml     General: NAD  Skin: no rash  HEENT: periorbital edema  Neck: supple  Chest: CTAB  Heart: RRR  Abdomen: soft nt nd  Extremities: + anasarca  Neuro: alert awake  Psych: mood and affect appropriate    Medications:    Current Facility-Administered Medications:     acetaminophen (TYLENOL) tablet 650 mg, 650 mg, Oral, Q6H PRN, Jas Pichardo MD, 650 mg at 02/20/19 1612    atorvastatin (LIPITOR) tablet 80 mg, 80 mg, Oral, QPM, Jas Pichardo MD, 80 mg at 02/20/19 1758    cholecalciferol (VITAMIN D3) tablet 1,000 Units, 1,000 Units, Oral, Daily, Jas Pichardo MD, 1,000 Units at 02/21/19 0920    enoxaparin (LOVENOX) subcutaneous injection 30 mg, 30 mg, Subcutaneous, Daily, Jas Pichardo MD, 30 mg at 02/21/19 4461    fenofibrate (TRICOR) tablet 48 mg, 48 mg, Oral, Daily, Jas Pichardo MD, 48 mg at 02/21/19 0920    FLUoxetine (PROzac) capsule 20 mg, 20 mg, Oral, Daily, Jas Pichardo MD, 20 mg at 02/21/19 4635    furosemide (LASIX) injection 80 mg, 80 mg, Intravenous, Q8H, Eduin Peacock MD, 80 mg at 02/21/19 1053    gabapentin (NEURONTIN) capsule 300 mg, 300 mg, Oral, HS, Jas Pichardo MD, 300 mg at 02/20/19 2228    hydrALAZINE (APRESOLINE) injection 10 mg, 10 mg, Intravenous, Q6H PRN, Jas Pichardo MD    hydrALAZINE (APRESOLINE) tablet 75 mg, 75 mg, Oral, TID, Glenis Delgadillo MD, 75 mg at 02/21/19 0920    insulin glargine (LANTUS) subcutaneous injection 45 Units 0 45 mL, 45 Units, Subcutaneous, HS, Jas Pichardo MD, 45 Units at 02/20/19 2228    insulin lispro (HumaLOG) 100 units/mL subcutaneous injection 15 Units, 15 Units, Subcutaneous, TID With Meals, Lona Gardner MD, 15 Units at 02/21/19 0920    labetalol (NORMODYNE) tablet 300 mg, 300 mg, Oral, Q12H [facility], Duc Pringle MD, 300 mg at 02/21/19 0920    loratadine (CLARITIN) tablet 10 mg, 10 mg, Oral, Daily, Lona Gardner MD, 10 mg at 02/21/19 4261    losartan (COZAAR) tablet 50 mg, 50 mg, Oral, Daily, Duc Pringle MD, 50 mg at 02/21/19 0920    NIFEdipine (PROCARDIA XL) 24 hr tablet 30 mg, 30 mg, Oral, Daily, Lona Gardner MD, 30 mg at 02/21/19 0920    ondansetron (ZOFRAN) injection 4 mg, 4 mg, Intravenous, Q6H PRN, Lona Gardner MD    spironolactone (ALDACTONE) tablet 50 mg, 50 mg, Oral, Daily, Flip Silva MD, 50 mg at 02/21/19 0920    topiramate (TOPAMAX) tablet 50 mg, 50 mg, Oral, HS, Lona Gardnre MD, 50 mg at 02/20/19 2228    Laboratory Results:  Results from last 7 days   Lab Units 02/21/19  0438 02/20/19  0522 02/19/19  0558 02/18/19  1005 02/18/19  1004 02/17/19  0503 02/16/19  0758   WBC Thousand/uL  --  5 63  --   --  5 43 6 00 6 29   HEMOGLOBIN g/dL  --  9 2*  --   --  8 6* 9 6* 10 8*   HEMATOCRIT %  --  27 5*  --   --  26 0* 28 8* 32 1*   PLATELETS Thousands/uL  --  267  --   --  269 263 304   POTASSIUM mmol/L 3 8 4 0 3 9  --  3 9 3 6 3 8   CHLORIDE mmol/L 105 108 108  --  106 110* 107   CO2 mmol/L 24 23 24  --  25 22 25   BUN mg/dL 41* 33* 31*  --  30* 30* 32*   CREATININE mg/dL 2 57* 2 19* 2 31*  --  2 50* 2 10* 2 28*   CALCIUM mg/dL 8 8 8 8 8 6  --  8 4 8 5 8 9   MAGNESIUM mg/dL  --   --   --  2 0  --  2 2  --    PHOSPHORUS mg/dL  --   --   --   --  4 4 4 5  --

## 2019-02-21 NOTE — PROGRESS NOTES
Progress Note - Umu Elroy 1965, 46 y o  female MRN: [id number]    Unit/Bed#: -01 Encounter: [id number]    Primary Care Provider: Umu Abbasi MD   Date and time admitted to hospital: 2/16/2019  7:37 AM    * Volume overload  Assessment & Plan  · Improving with diuresis  · Echo 9/18 - EF65%, concentric hypertrophy  · Continue IV Lasix 80 mg q 8 hours hourly  · Close nephrology follow-up ongoing  · Continue spironolactone    Nephrotic syndrome  Assessment & Plan  · Established nephrotic range proteinuria  · Nephrology following and managing   · Continue ARB    Type 2 diabetes mellitus with hyperglycemia, with long-term current use of insulin [facility])  Assessment & Plan  Lab Results   Component Value Date    HGBA1C 8 9 (H) 10/04/2017       Recent Labs     02/20/19  1505 02/20/19  2054 02/21/19  0703 02/21/19  1044   POCGLU 97 138 101 134       Blood Sugar Average: Last 72 hrs:  (P) 127 [id number]  · C/w current insulin regimen: lantus 45 units at HS; novolog 15 units TID    · a1c 8 9  · Blood sugars are controlled    Anemia of chronic renal failure, stage 4 (severe) (HCC)  Assessment & Plan  · Stable Hb    Chronic kidney disease, stage IV (severe) ([facility])  Assessment & Plan  · Nephrology following  · Baseline between 2-2 4  · Creatinine today 2 3  · Check a m  BMP  · Recommending inpatient renal bx if no infection  · Reviewed urine cx from 2/16 and 2/17-- both mixed contaminants x3   · Renal US: Limited evaluation of the bladder as it is nondistended   No hydronephrosis  VTE Pharmacologic Prophylaxis:   Pharmacologic: Enoxaparin (Lovenox)  Mechanical VTE Prophylaxis in Place: Yes    Patient Centered Rounds: I have performed bedside rounds with nursing staff today  Discussions with Specialists or Other Care Team Provider:     Education and Discussions with Family / Patient:  Patient    Time Spent for Care: 20 minutes    More than 50% of total time spent on counseling and coordination of care as described above  Current Length of Stay: 5 day(s)    Current Patient Status: Inpatient   Certification Statement: The patient will continue to require additional inpatient hospital stay due to Volume overload    Discharge Plan:  Pending improvement    Code Status: Level 1 - Full Code      Subjective:   Feeling better today  Leg swelling improving  No visual disturbances    Objective:     Vitals:   Temp (24hrs), Av 5 °F (36 9 °C), Min:98 1 °F (36 7 °C), Max:98 9 °F (37 2 °C)    Temp:  [98 1 °F (36 7 °C)-98 9 °F (37 2 °C)] 98 5 °F (36 9 °C)  HR:  [81-86] 81  Resp:  [18] 18  BP: (111-146)/(55-78) 129/78  SpO2:  [92 %-95 %] 92 %  Body mass index is 36 07 kg/m²  Input and Output Summary (last 24 hours): Intake/Output Summary (Last 24 hours) at 2019 1420  Last data filed at 2019 1346  Gross per 24 hour   Intake 1380 ml   Output 2625 ml   Net -1245 ml       Physical Exam:     Physical Exam     Gen -Patient comfortable at rest   Neck- Supple  No thyromegaly or lymphadenopathy  Lungs-Clear bilaterally without any wheeze or rales   Heart S1-S2, regular rate and rhythm, no murmurs  Abdomen-soft nontender, no organomegaly  Bowel sounds present  Extremities-no cyanosi,  clubbing ; pitting edema noted  Skin- no rash  Neuro-nonfocal     Additional Data:     Labs:    Results from last 7 days   Lab Units 19  0522  19  0758   WBC Thousand/uL 5 63   < > 6 29   HEMOGLOBIN g/dL 9 2*   < > 10 8*   HEMATOCRIT % 27 5*   < > 32 1*   PLATELETS Thousands/uL 267   < > 304   NEUTROS PCT %  --   --  59   LYMPHS PCT %  --   --  30   MONOS PCT %  --   --  6   EOS PCT %  --   --  4    < > = values in this interval not displayed       Results from last 7 days   Lab Units 19  0438  19  1004 19  0503   SODIUM mmol/L 139   < > 140 142   POTASSIUM mmol/L 3 8   < > 3 9 3 6   CHLORIDE mmol/L 105   < > 106 110*   CO2 mmol/L 24   < > 25 22   BUN mg/dL 41*   < > 30* 30*   CREATININE mg/dL 2 57*   < > 2 50* 2 10*   ANION GAP mmol/L 10   < > 9 10   CALCIUM mg/dL 8 8   < > 8 4 8 5   ALBUMIN g/dL  --   --  1 7* 1 8*   TOTAL BILIRUBIN mg/dL  --   --   --  0 20   ALK PHOS U/L  --   --   --  100   ALT U/L  --   --   --  17   AST U/L  --   --   --  22   GLUCOSE RANDOM mg/dL 134   < > 192* 95    < > = values in this interval not displayed  Results from last 7 days   Lab Units 02/21/19  1044 02/21/19  0703 02/20/19  2054 02/20/19  1505 02/20/19  1105 02/20/19  0802 02/19/19  2108 02/19/19  1540 02/19/19  1102 02/19/19  0733 02/18/19  2118 02/18/19  1510   POC GLUCOSE mg/dl 134 101 138 97 178* 101 119 131 171* 115 134 94                   * I Have Reviewed All Lab Data Listed Above  * Additional Pertinent Lab Tests Reviewed:  All Labs Within Last 24 Hours Reviewed    Imaging:    Imaging Reports Reviewed Today Include:   Imaging Personally Reviewed by Myself Includes:    Recent Cultures (last 7 days):     Results from last 7 days   Lab Units 02/17/19  2311 02/16/19  1939   URINE CULTURE  <10,000 cfu/ml  <10,000 cfu/ml        Last 24 Hours Medication List:     Current Facility-Administered Medications:  acetaminophen 650 mg Oral Q6H PRN Bruno Lopez MD   atorvastatin 80 mg Oral QPM Bruno Lopez MD   cholecalciferol 1,000 Units Oral Daily Shiva Valerio MD   enoxaparin 30 mg Subcutaneous Daily Bruno Lopez MD   fenofibrate 48 mg Oral Daily Bruno Lopez MD   FLUoxetine 20 mg Oral Daily Bruno Lopez MD   furosemide 80 mg Intravenous Q8H Chiquita Juarez MD   gabapentin 300 mg Oral HS Bruno Lopez MD   hydrALAZINE 10 mg Intravenous Q6H PRN Bruno Lopez MD   hydrALAZINE 75 mg Oral TID Kiran Mckeon MD   insulin glargine 45 Units Subcutaneous HS Bruno Lopez MD   insulin lispro 15 Units Subcutaneous TID With Meals Bruno Lopez MD   labetalol 300 mg Oral Q12H [address] Kiran Mckeon MD   loratadine 10 mg Oral Daily Bruno Lopez MD   losartan 50 mg Oral Daily Toby Duke MD   NIFEdipine 30 mg Oral Daily Vaishnavi Mortensen MD   ondansetron 4 mg Intravenous Q6H PRN Vaishnavi Mortensen MD   spironolactone 50 mg Oral Daily Annie Vale MD   topiramate 50 mg Oral HS Vaishnavi Mortensen MD        Today, Patient Was Seen By: Myranda Lancaster MD    ** Please Note: Dictation voice to text software may have been used in the creation of this document   **

## 2019-02-21 NOTE — PHYSICAL THERAPY NOTE
PHYSICAL THERAPY NOTE    Patient Name: Karmen Dawson  AAPRL'H Date: 19 0749   Pain Assessment   Pain Assessment 0-10   Pain Score 7  (when ambulating)   Pain Type Chronic pain   Pain Location Back   Restrictions/Precautions   Weight Bearing Precautions Per Order No   Other Precautions Fall Risk;Telemetry; Visual impairment   General   Family/Caregiver Present No   Subjective   Subjective Patient seated OOB in recliner and is agreeable to therapy session  Patient identifers obtained from name &   Bed Mobility   Supine to Sit Unable to assess   Sit to Supine Unable to assess   Additional Comments Patient seated OOB in recliner with call bell and belongings in reach  Transfers   Sit to Stand 5  Supervision   Additional items Assist x 1; Armrests; Increased time required;Verbal cues   Stand to Sit 5  Supervision   Additional items Assist x 1; Armrests; Increased time required;Verbal cues   Ambulation/Elevation   Gait pattern Improper Weight shift;Decreased foot clearance;Decreased L stance; Excessively slow; Short stride   Gait Assistance 4  Minimal assist  (min a to contact guard assist)   Additional items Assist x 1;Verbal cues   Assistive Device Straight cane   Distance 50' x2, 30' x1, 40' x1   Balance   Static Sitting Good   Dynamic Sitting Fair   Static Standing Fair -   Dynamic Standing Poor +   Ambulatory Poor +   Endurance Deficit   Endurance Deficit Yes   Endurance Deficit Description limited ambulation distance   Activity Tolerance   Activity Tolerance Patient limited by fatigue;Patient limited by pain   Nurse Made Aware Spoke to Jose Ramon Jay RN    Exercises   Hip Abduction Sitting;10 reps;AROM; Bilateral   Knee AROM Long Arc Quad Sitting;10 reps;AROM; Bilateral   Ankle Pumps Sitting;10 reps;AROM; Bilateral   Marching Sitting;10 reps;AROM; Bilateral   Assessment   Prognosis Good   Problem List Decreased strength;Decreased endurance; Impaired balance;Decreased mobility; Decreased coordination;Decreased cognition; Impaired judgement;Decreased safety awareness   Assessment Patient motivated and eager to paritcipate with expression of feeling better today  Patient remians supervision for sit<>stand transfers with good technique and safety  Patient ambulated short distances for multiple trials with min a to contact guard and use of straight cane  Gait distance limited to increase back pain "7/10" with ambulation that improved with rest  Patient requires verbal instruction for path navigation in hallway with good follow through  Patient fatigues quickly requiring standing and seated rest breaks throughout  Patient participated in seated B LE exercise program with fair understanding requiring input for form and pace  Continue to focus on OOB mobility with progression of ambulation as appropriate  Barriers to Discharge Inaccessible home environment;Decreased caregiver support   Goals   Patient Goals none stated   STG Expiration Date 02/28/19   Treatment Day 2   Plan   Treatment/Interventions Functional transfer training;LE strengthening/ROM; Therapeutic exercise; Endurance training;Patient/family training;Equipment eval/education; Bed mobility;Gait training;Spoke to nursing  (Pt  to see when stair training is appropriate)   Progress Progressing toward goals   PT Frequency 5x/wk   Recommendation   Recommendation Short-term skilled PT   Equipment Recommended Don Roles, PTA

## 2019-02-22 VITALS
WEIGHT: 196.4 LBS | TEMPERATURE: 98.4 F | SYSTOLIC BLOOD PRESSURE: 141 MMHG | RESPIRATION RATE: 18 BRPM | BODY MASS INDEX: 36.14 KG/M2 | HEIGHT: 62 IN | DIASTOLIC BLOOD PRESSURE: 72 MMHG | HEART RATE: 84 BPM | OXYGEN SATURATION: 94 %

## 2019-02-22 LAB
ALDOST SERPL-MCNC: 2.1 NG/DL (ref 0–30)
ANION GAP SERPL CALCULATED.3IONS-SCNC: 9 MMOL/L (ref 4–13)
BUN SERPL-MCNC: 42 MG/DL (ref 5–25)
CALCIUM SERPL-MCNC: 9 MG/DL (ref 8.3–10.1)
CHLORIDE SERPL-SCNC: 106 MMOL/L (ref 100–108)
CO2 SERPL-SCNC: 26 MMOL/L (ref 21–32)
CREAT SERPL-MCNC: 2.72 MG/DL (ref 0.6–1.3)
GFR SERPL CREATININE-BSD FRML MDRD: 19 ML/MIN/1.73SQ M
GLUCOSE SERPL-MCNC: 119 MG/DL (ref 65–140)
GLUCOSE SERPL-MCNC: 132 MG/DL (ref 65–140)
GLUCOSE SERPL-MCNC: 188 MG/DL (ref 65–140)
POTASSIUM SERPL-SCNC: 4 MMOL/L (ref 3.5–5.3)
SODIUM SERPL-SCNC: 141 MMOL/L (ref 136–145)

## 2019-02-22 PROCEDURE — 80048 BASIC METABOLIC PNL TOTAL CA: CPT | Performed by: PHYSICIAN ASSISTANT

## 2019-02-22 PROCEDURE — 99239 HOSP IP/OBS DSCHRG MGMT >30: CPT | Performed by: INTERNAL MEDICINE

## 2019-02-22 PROCEDURE — 99232 SBSQ HOSP IP/OBS MODERATE 35: CPT | Performed by: PHYSICIAN ASSISTANT

## 2019-02-22 PROCEDURE — 82948 REAGENT STRIP/BLOOD GLUCOSE: CPT

## 2019-02-22 RX ORDER — SPIRONOLACTONE 50 MG/1
50 TABLET, FILM COATED ORAL DAILY
Qty: 30 TABLET | Refills: 0 | Status: SHIPPED | OUTPATIENT
Start: 2019-02-23 | End: 2019-06-26 | Stop reason: HOSPADM

## 2019-02-22 RX ORDER — FUROSEMIDE 80 MG
120 TABLET ORAL 2 TIMES DAILY
Qty: 90 TABLET | Refills: 0 | Status: SHIPPED | OUTPATIENT
Start: 2019-02-22 | End: 2019-06-26 | Stop reason: HOSPADM

## 2019-02-22 RX ORDER — LOSARTAN POTASSIUM 50 MG/1
50 TABLET ORAL DAILY
Qty: 30 TABLET | Refills: 0 | Status: SHIPPED | OUTPATIENT
Start: 2019-02-23 | End: 2019-06-26 | Stop reason: HOSPADM

## 2019-02-22 RX ADMIN — FUROSEMIDE 80 MG: 10 INJECTION, SOLUTION INTRAMUSCULAR; INTRAVENOUS at 11:30

## 2019-02-22 RX ADMIN — FUROSEMIDE 80 MG: 10 INJECTION, SOLUTION INTRAMUSCULAR; INTRAVENOUS at 02:45

## 2019-02-22 RX ADMIN — LABETALOL HYDROCHLORIDE 300 MG: 100 TABLET, FILM COATED ORAL at 08:29

## 2019-02-22 RX ADMIN — SPIRONOLACTONE 50 MG: 25 TABLET, FILM COATED ORAL at 08:30

## 2019-02-22 RX ADMIN — ENOXAPARIN SODIUM 30 MG: 30 INJECTION SUBCUTANEOUS at 08:30

## 2019-02-22 RX ADMIN — INSULIN LISPRO 15 UNITS: 100 INJECTION, SOLUTION INTRAVENOUS; SUBCUTANEOUS at 08:31

## 2019-02-22 RX ADMIN — LOSARTAN POTASSIUM 50 MG: 50 TABLET, FILM COATED ORAL at 08:30

## 2019-02-22 RX ADMIN — LORATADINE 10 MG: 10 TABLET ORAL at 08:30

## 2019-02-22 RX ADMIN — HYDRALAZINE HYDROCHLORIDE 75 MG: 25 TABLET ORAL at 08:30

## 2019-02-22 RX ADMIN — VITAMIN D, TAB 1000IU (100/BT) 1000 UNITS: 25 TAB at 08:29

## 2019-02-22 RX ADMIN — INSULIN LISPRO 15 UNITS: 100 INJECTION, SOLUTION INTRAVENOUS; SUBCUTANEOUS at 12:44

## 2019-02-22 RX ADMIN — FLUOXETINE HYDROCHLORIDE 20 MG: 20 CAPSULE ORAL at 08:30

## 2019-02-22 RX ADMIN — NIFEDIPINE 30 MG: 30 TABLET, EXTENDED RELEASE ORAL at 08:30

## 2019-02-22 RX ADMIN — FENOFIBRATE 48 MG: 48 TABLET ORAL at 08:30

## 2019-02-22 NOTE — SOCIAL WORK
Pt will be discharged home today  Per nursing, Pt's daughter works until 3 and then will  Pt and assist Pt with getting home and into apartment  Pt not eligible for STR or HHC due to no insurance

## 2019-02-22 NOTE — PLAN OF CARE
Problem: DISCHARGE PLANNING - CARE MANAGEMENT  Goal: Discharge to post-acute care or home with appropriate resources  Description  INTERVENTIONS:  - Conduct assessment to determine patient/family and health care team treatment goals, and need for post-acute services based on payer coverage, community resources, and patient preferences, and barriers to discharge  - Address psychosocial, clinical, and financial barriers to discharge as identified in assessment in conjunction with the patient/family and health care team  - Arrange appropriate level of post-acute services according to patient's   needs and preference and payer coverage in collaboration with the physician and health care team  - Communicate with and update the patient/family, physician, and health care team regarding progress on the discharge plan  - Arrange appropriate transportation to post-acute venues   Outcome: Completed  Note:   Pt will be discharged home today  Per nursing, Pt's daughter works until 3 and then will  Pt and assist Pt with getting home and into apartment  Pt not eligible for [facility] or [facility] due to no insurance

## 2019-02-22 NOTE — PROGRESS NOTES
NEPHROLOGY PROGRESS NOTE   Karmen Dawson 46 y o  female MRN: [id number]  Unit/Bed#: -01 Encounter: [id number]  Reason for Consult: CKD    ASSESSMENT/PLAN:  1  Chronic Kidney Disease stage IV- presumed etiology due to diabetic nephropathy  - follows with [facility] (Dr Salina Manzano)  - baseline creatinine progressed to 2-2 4 starting this year  - previously was around 1 3  - may need to tolerate a higher baseline creatinine for euvolemia  2  Nephrotic Syndrome- likely secondary to diabetic nephropathy  - previous workup negative  - received lasix IV x2 doses then transitioned to oral torsemide and spironolactone  - torsemide was increased but still not urinating as much as we wanted so transitioned to IV lasix 2/20  - no need for renal biopsy since it will likely not   - UPC ratio = 11 5 --> 10 8  - weight improving from 92 6kg to 89 1kg  - urine output acceptable and improved now that she is on IV lasix  - discharge diuretics: lasix 120mg po BID  3  Hypertension- BP improving with diuresis  - workup pending (renin __, aldosterone 2 1, metanephrines 225, renal artery duplex negative)  - current medications:  hydralazine 75 mg TID, labetalol 300 mg q12 hours, and losartan 50 mg daily, nifedipine 30mg daily  - current diuretics: spironolactone 50mg daily and lasix 80mg IV q8hr  - prior home diuretic was torsemide 100mg daily but unable to afford it  - would send home on lasix 120mg po twice a day and continue spironolactone 50mg daily  4  Anemia- continue to monitor   - negative FOBT  - iron studies acceptable  5  Fever- resolved, urine culture negative    Disposition:  Okay for discharge  Please discharge on lasix 120mg PO BID  Appointment set up with [facility] Dr Salina Manzano for 3/20 (unfortunately that was the earliest appointment they had)    SUBJECTIVE:  Patient feeling well  Denies SOB  States she is not able to afford torsemide  Understands need to follow up with [facility]    Understands to call Dr Salina Manzano or go to ER for SOB or increasing weight       OBJECTIVE:  Current Weight: Weight - Scale: 89 1 kg (196 lb 6 4 oz)  Vitals:    02/21/19 2200 02/22/19 0246 02/22/19 0559 02/22/19 0700   BP: 138/82 129/62  141/72   BP Location: Left arm Left arm  Left arm   Pulse: 87 80  84   Resp: 18   18   Temp: 98 3 °F (36 8 °C)   98 4 °F (36 9 °C)   TempSrc: Oral   Oral   SpO2: 96%   94%   Weight:   89 1 kg (196 lb 6 4 oz)    Height:           Intake/Output Summary (Last 24 hours) at 2/22/2019 1244  Last data filed at 2/22/2019 1114  Gross per 24 hour   Intake 420 ml   Output 1350 ml   Net -930 ml     General: NAD  Skin: no rash  HEENT: normocephalic  Neck: supple  Chest: CTAB  Heart: RRR  Abdomen: soft nt nd  Extremities: + edema  Neuro: alert awake  Psych: mood and affect appropriate    Medications:    Current Facility-Administered Medications:     acetaminophen (TYLENOL) tablet 650 mg, 650 mg, Oral, Q6H PRN, Martha Urbina MD, 650 mg at 02/20/19 1612    atorvastatin (LIPITOR) tablet 80 mg, 80 mg, Oral, QPM, Martha Urbina MD, 80 mg at 02/21/19 1737    cholecalciferol (VITAMIN D3) tablet 1,000 Units, 1,000 Units, Oral, Daily, Martha Urbina MD, 1,000 Units at 02/22/19 0829    enoxaparin (LOVENOX) subcutaneous injection 30 mg, 30 mg, Subcutaneous, Daily, Martha Urbina MD, 30 mg at 02/22/19 0830    fenofibrate (TRICOR) tablet 48 mg, 48 mg, Oral, Daily, Martha Urbina MD, 48 mg at 02/22/19 0830    FLUoxetine (PROzac) capsule 20 mg, 20 mg, Oral, Daily, Martha Urbina MD, 20 mg at 02/22/19 0830    furosemide (LASIX) injection 80 mg, 80 mg, Intravenous, Q8H, Phoenix Neves MD, 80 mg at 02/22/19 1130    gabapentin (NEURONTIN) capsule 300 mg, 300 mg, Oral, HS, Santh Silparshetty, MD, 300 mg at 02/21/19 2134    hydrALAZINE (APRESOLINE) injection 10 mg, 10 mg, Intravenous, Q6H PRN, Martha Urbina MD    hydrALAZINE (APRESOLINE) tablet 75 mg, 75 mg, Oral, TID, Marifer Montero MD, 75 mg at 02/22/19 0830    insulin glargine (LANTUS) subcutaneous injection 45 Units 0 45 mL, 45 Units, Subcutaneous, HS, Akshat Costa MD, 45 Units at 02/21/19 2135    insulin lispro (HumaLOG) 100 units/mL subcutaneous injection 15 Units, 15 Units, Subcutaneous, TID With Meals, Akshat Costa MD, 15 Units at 02/22/19 0831    labetalol (NORMODYNE) tablet 300 mg, 300 mg, Oral, Q12H [address], Lauren David MD, 300 mg at 02/22/19 2358    loratadine (CLARITIN) tablet 10 mg, 10 mg, Oral, Daily, Akshat Costa MD, 10 mg at 02/22/19 0830    losartan (COZAAR) tablet 50 mg, 50 mg, Oral, Daily, Lauren David MD, 50 mg at 02/22/19 0830    NIFEdipine (PROCARDIA XL) 24 hr tablet 30 mg, 30 mg, Oral, Daily, Akshat Costa MD, 30 mg at 02/22/19 0830    ondansetron (ZOFRAN) injection 4 mg, 4 mg, Intravenous, Q6H PRN, Akshat Costa MD    spironolactone (ALDACTONE) tablet 50 mg, 50 mg, Oral, Daily, Kiki Terrell MD, 50 mg at 02/22/19 0830    topiramate (TOPAMAX) tablet 50 mg, 50 mg, Oral, HS, Akshat Costa MD, 50 mg at 02/21/19 2135    Laboratory Results:  Results from last 7 days   Lab Units 02/22/19  0555 02/21/19  0438 02/20/19  0522 02/19/19  0558 02/18/19  1005 02/18/19  1004 02/17/19  0503 02/16/19  0758   WBC Thousand/uL  --   --  5 63  --   --  5 43 6 00 6 29   HEMOGLOBIN g/dL  --   --  9 2*  --   --  8 6* 9 6* 10 8*   HEMATOCRIT %  --   --  27 5*  --   --  26 0* 28 8* 32 1*   PLATELETS Thousands/uL  --   --  267  --   --  269 263 304   POTASSIUM mmol/L 4 0 3 8 4 0 3 9  --  3 9 3 6 3 8   CHLORIDE mmol/L 106 105 108 108  --  106 110* 107   CO2 mmol/L 26 24 23 24  --  25 22 25   BUN mg/dL 42* 41* 33* 31*  --  30* 30* 32*   CREATININE mg/dL 2 72* 2 57* 2 19* 2 31*  --  2 50* 2 10* 2 28*   CALCIUM mg/dL 9 0 8 8 8 8 8 6  --  8 4 8 5 8 9   MAGNESIUM mg/dL  --   --   --   --  2 0  --  2 2  --    PHOSPHORUS mg/dL  --   --   --   --   --  4 4 4 5  --

## 2019-02-22 NOTE — DISCHARGE SUMMARY
Discharge Summary - [facility] Internal Medicine    Patient Information: Tyrel Fitzgerald 46 y o  female MRN: [id number]  Unit/Bed#: -01 Encounter: [id number]    Discharging Physician / Practitioner: Omar John MD  PCP: Tyrel Fitzgerald MD  Admission Date: 2/16/2019  Discharge Date: 02/22/19    Disposition:     Home    Reason for Admission:  Shortness of breath    Discharge Diagnoses:     Principal Problem:    Volume overload  Active Problems:    Nephrotic syndrome    Type 2 diabetes mellitus with hyperglycemia, with long-term current use of insulin ([facility])    Chronic kidney disease, stage IV (severe) ([facility])    H/O: CVA (cerebrovascular accident)    Hypertension    Hyperlipidemia    Migraine    Obesity due to excess calories    Visual disturbance  Resolved Problems:    * No resolved hospital problems  *      Consultations During Hospital Stay:  · Nephrology  · Ophthalmology    Procedures Performed:     · Chest x-ray pulmonary vascular congestion noted  · Ultrasound kidneys showed no hydronephrosis  · Renal artery duplex : normal caliber renal arteries noted    Significant Findings / Test Results:     · As above    Incidental Findings:   · As above    Test Results Pending at Discharge (will require follow up): · None     Outpatient Tests Requested:  · None    Complications:  None    Hospital Course:     Tyrel Fitzgerald is a 46 y o  female patient with diabetes mellitus, nephrotic syndrome, hypertension, and hyperlipidemia who originally presented to the hospital on 2/16/2019 due to shortness of breath  Patient was noted to be volume overloaded and was subsequently admitted for further management  She received IV diuretics with improvement in her symptomatology  While inpatient patient complained of visual disturbances, and was seen by Ophthalmology team   It was suspected that she may have diabetic retinopathy  Patient symptomatically and clinically improved with diuresis    She is being discharged with increased dose of Lasix to 120 mg b i d  Outpatient nephrology and Ophthalmology follow-up recommended  Condition at Discharge: good     Discharge Day Visit / Exam:     Subjective:  Feeling better and anxious to go home  Vitals: Blood Pressure: 141/72 (02/22/19 0700)  Pulse: 84 (02/22/19 0700)  Temperature: 98 4 °F (36 9 °C) (02/22/19 0700)  Temp Source: Oral (02/22/19 0700)  Respirations: 18 (02/22/19 0700)  Height: 5' 2" (157 5 cm) (02/16/19 1316)  Weight - Scale: 89 1 kg (196 lb 6 4 oz) (02/22/19 0559)  SpO2: 94 % (02/22/19 0700)  Exam:   Physical Exam     Gen -Patient comfortable at rest  Puffy face  Neck- Supple  No thyromegaly or lymphadenopathy  Lungs-occasional wheeze  Heart S1-S2, regular rate and rhythm, no murmurs  Abdomen-soft nontender, no organomegaly  Bowel sounds present  Extremities-no cyanosi,  clubbing ; pitting edema of extremities  Skin- no rash  Neuro-nonfocal     Discussion with Family:    Discharge instructions/Information to patient and family:   See after visit summary for information provided to patient and family  Provisions for Follow-Up Care:  See after visit summary for information related to follow-up care and any pertinent home health orders  Planned Readmission: no     Discharge Statement:  I spent 35 minutes discharging the patient  This time was spent on the day of discharge  I had direct contact with the patient on the day of discharge  Greater than 50% of the total time was spent examining patient, answering all patient questions, arranging and discussing plan of care with patient as well as directly providing post-discharge instructions  Additional time then spent on discharge activities  Discharge Medications:  See after visit summary for reconciled discharge medications provided to patient and family        ** Please Note: This note has been constructed using a voice recognition system **

## 2019-02-26 LAB — RENIN PLAS-CCNC: 0.35 NG/ML/HR (ref 0.17–5.38)

## 2019-03-11 NOTE — UTILIZATION REVIEW
URGENT/EMERGENT  INPATIENT/SPU AUTHORIZATION REQUEST    Date: 03/11/19            # Pages in this Request:     X New Request   Additional Information for PA#:     Office Contact Name:  Ramsey Jimenez Title: Utilization Review, Raquel Nurse     Phone: [phone]  Ext  Availability (Date/Time): Wednesday - Friday 8 am- 4 pm    X Inpatient Review  SPU Review        Current       X Late Pick-up   · How your facility was first notified of the Late Pick-up:  Paths Letter   ·   · When your facility was first notified of the Late Pick-up (date): 3/7/2019         RECIPIENT INFORMATION    Recipient ID#: [id number]   Recipient Name: Christal Becker      YOB: 1965  46 y o  Recipient Alias:     Gender:   Male X Female Medicaid Eligibility ([address] [place] [address]): INSURANCE INFORMATION    (All other private or governmental health insurance benefits must be utilized prior to billing the [place] Program)    Was this admission the result of an MVA, other accident, assault, injury, fall, gunshot, bite etc ? Yes X No                   If yes, provide a brief description of the incident  Does the recipient have other insurance coverage? Yes X No        Insurance Company Name/Policy #      Did that insurance pay on this claim? Yes  No        Did that insurance deny this claim? Yes  No    If yes, reason for denial:      Does the recipient have Medicare? Yes X No        Did Medicare exhaust prior to this admission? Yes  No            Did Medicare partially pay this claim? Yes  No        Did that insurance deny this claim? Yes  No    If yes, reason for denial:          Was the recipient a prisoner at the time of admission?    Yes X No            PROVIDER INFORMATION    [facility]  [employer] Provider ID#: [id number]    [address] [place] [address] Physician Name: Bernardino Villa Provider ID#: [id number]        ADMISSION INFORMATION    Type of Admission: (please choose one)    X ED      Direct    If yes, from where? Transfer    If yes, transferring hospital (inpatient, rehab, or psych) Provider Name/Provider ID#: Admission Floor or Unit Type:  Med Surg   Dates/Times:        ED Date/Time: 11/22/2018 10:42 AM        Observation Date/Time:         Admission Date/Time: 11/22/18 1316        Discharge or Transfer Date/Time: 11/28/2018  5:24 PM        DIAGNOSIS/PROCEDURE CODES    Primary Diagnosis Code/Primary Diagnosis Code description:   N17 9 Acute kidney failure, unspecified   I16 1 Hypertensive emergency   N39 0 Urinary tract infection, site not specified   Z79 4 Long term (current) use of insulin   Additional Diagnosis Code(s) and Description(s)-(up to three additional codes):     Procedure Code (one) and description:         CLINICAL INFORMATION - PRIOR ADMISSION ONLY    Is there a prior admission with a discharge date within 30 days of the date of this admission? X No (Proceed to the next section - "Clinical Information - General Review Checklist:)      Yes (Provide the following information)     Prior admission dates:    MA Prior Authorization Number:        Review Outcome:     Diagnosis Code(s)/Description:    Procedure Code/Description:    Findings:    Treatment:    Condition on Discharge:   Vitals:    Labs:   Imaging:   Medications: Follow-up Instructions:    Disposition:        CLINICAL INFORMATION - GENERAL REVIEW CHECKLIST    EMERGENCY DEPARTMENT: (Proceed to "ADMISSION" if Direct Admission)    Presenting Signs/Symptoms:53 y  o  female history of poorly controlled hypertension, diabetes who presents with several complaints including headache   Portions of the history provided by patient's daughter as patient is primarily Japanese-speaking   For the last few days patient has had poor urine output with difficulty urinating   She also noticed blood in her urine and the urine has a lot of foam   During this time, pt also complained of severe headache     Medication/treatment prior to arrival in the ED:     Past Medical History:   Diagnosis    Asthma    Diabetes mellitus ([address] )    Hyperlipidemia    Hypertension       Clinical Exam:     Initial Vital Signs: (Temp, Pulse, Resp, and BP)   ED Triage Vitals   Temperature Pulse Respirations Blood Pressure SpO2   11/22/18 1109 11/22/18 1046 11/22/18 1046 11/22/18 1046 11/22/18 1046   98 5 °F (36 9 °C) 89 20 (!) 262/122 96 %      Temp Source Heart Rate Source Patient Position - Orthostatic VS BP Location FiO2 (%)   11/22/18 1046 11/22/18 1046 11/22/18 1150 11/22/18 1150 --   Oral Monitor Lying Right arm       Pain Score       11/22/18 1046       Worst Possible Pain           Pertinent Repeat Vital Signs: (include times they were obtained)  Date and Time Temp Pulse SpO2 Resp BP   11/22/18 1344 -- 73 98 % 16  224/102   11/22/18 1247 -- 76 97 % 20  237/107   11/22/18 1150 -- 75 95 % 20  238/108         Pertinent Sustained Findings: (include times they were obtained)    Weight in Kilograms:  Wt Readings from Last 1 Encounters:   02/22/19 89 1 kg (196 lb 6 4 oz)       Pertinent Labs (results):  NT-pro BNP 1434, Glucose 356, AST 48, Alk Phos 126, 3rd Generation TSH 5 899  Protein / creatinine ratio, urine [[id number]] (Abnormal)    11/22/2018   Lab Status: Final result Specimen: Urine     Creatinine, Ur 116 0 mg/dL     Protein Urine Random 1,508 mg/dL     Prot/Creat Ratio, Ur 13 00 (H) 0 00 - 0 10                  Urinalysis with microscopic [[id number]] (Abnormal)   Lab Status: Final result    11/22/2018 Specimen: Urine from Urine, Clean Catch     Clarity, UA   Clear       Color, UA  Yellow       Specific Gravity, UA >=1 030 1 003 - 1 030     pH, UA 5 5 4 5 - 8 0     Glucose,  (1/2%) (A) Negative mg/dl     Ketones, UA Negative Negative mg/dl     Blood, UA Moderate (A) Negative     Protein, UA >=300 (A) Negative mg/dl     Nitrite, UA Negative Negative     Bilirubin, UA Negative Negative     Urobilinogen, UA 0 2 0 2, 1 0 E U /dl E U /dl     Leukocytes, UA Negative Negative     WBC, UA 10-20 (A) None Seen, 0-5, 5-55, 5-65 /hpf     RBC, UA 4-10 (A) None Seen, 0-5 /hpf     Hyaline Casts, UA 4-10 (A) (none) /lpf     Bacteria, UA Innumerable (A) None Seen, Occasional /hpf     Fine granular casts 3-4 /lpf     COARSE GRANULAR CASTS 10-25 /lpf     OTHER OBSERVATIONS  WBCs Clumped   Renal Epithelial Cells Present                    Radiology (results):  CXR: No active pulmonary disease on examination which is somewhat limited secondary to low lung volumes       EKG (results): Other tests (results):    Tests pending final results:    Treatment in the ED:   Medication Administration from 11/22/2018 1035 to 11/22/2018 1503       Date/Time Order Dose Route Action     11/22/2018 1148 labetalol (NORMODYNE) injection 10 mg 10 mg Intravenous Given     11/22/2018 1244 furosemide (LASIX) injection 20 mg 20 mg Intravenous Given     11/22/2018 1248 labetalol (NORMODYNE) injection 10 mg 10 mg Intravenous Given     11/22/2018 1443 niCARdipine (CARDENE) 25 mg (STANDARD CONCENTRATION) in sodium chloride 0 9% 250 mL 2 5 mg/hr Intravenous New Bag           Other treatments:      Change in condition while in the ED:       Response to ED Treatment:           OBSERVATION: (Proceed to "ADMISSION" if Direct Admission)    Orders written during the observation period  Meds Name, dose, route, time, how may doses given:  PRN Meds Name, dose, route, time, how many doses given within the first 24 hrs :  IVs Type, rate, and total amt  ordered/given:  Labs, imaging, other:  Consults and findings:    Test Results during the observation period  Pertinent Lab tests (dates/results):  Culture results (blood, urine, spinal, wound, respiratory, etc ):  Imaging tests (dates/results):  EKG (dates/results):   Other test (dates/results):  Tests pending (dates/results):    Surgical or Invasive Procedures during the observation period  Name of surgery/procedure:  Date & Time:  Patient Response:  Post-operative orders:  Operative Report/Findings:    Response to Treatment, Major Change in Condition, Major Charge in Treatment during the observation period          ADMISSION:    DIRECT Admissions Only:    · Presenting Signs/Symptoms:   ·   · Medication/treatment prior to arrival:  ·   · Past Medical History:  ·   · Clinical Exam on admission:  ·   · Vital Signs on admission: (Temp, Pulse, Resp, and BP)  ·   · Weight in kilograms:     ALL Admissions:  Hypertensive emergency   Assessment & Plan     · POA e/b CHRISTIANA with proteinuria,  · /110 on arrival  · Admit SD2  · Started on nicardipine  ? Goal SBP 190s; will continue further titration over next 24-48hours   · Rest of plan below        CHRISTIANA (acute kidney injury) ([facility] [address] )   Assessment & Plan     · Cr 1 3 from 0 9 in September  · UA with 3+protein, moderate blood, hyaline casts  · Case discussed with nephrology; appreciate input  ? Repeat UA with microscopy    ? Check US kidneys and bladder  ? KENYATTA, ANCA, antiDSDNA, C3/C4, complement total  ? SPEP/UPEP  ?  Pr/cr ratio   · Follow BMP       Headache   Assessment & Plan     · Suspect this is related to severely elevated BPs  · Monitor improvement with better BP control   · APAP as needed       Cough   Assessment & Plan     Chronic for at least 1 month   May be due to ACEi intolerance  CXR with some crowding of the vessels; no obvious interstitial edema  Received lasix x1 in ED for BP and ?volume overload   Will hold medication for now given nicardipine gtt         Type 2 diabetes mellitus with hyperglycemia, with long-term current use of insulin (HCC)   Assessment & Plan                   Lab Results   Component Value Date     HGBA1C 8 9 (H) 10/04/2017         No results for input(s): POCGLU in the last 72 hours      Blood Sugar Average: Last 72 hrs:  poorly controlled; may be due to lack of insurance   Will resume lantus and novolog similar to previous admission  Ultimately may need to switch to 70/30 if cost of meds remains an issue   ISS and accuchecks               Admission Orders and Other Orders written within the first 24 hrs after admission  Inpatient/StepDown  Continuous Cardiac Monitoring  Serial Cardiac Enzymes q3h x 3  Consult Renal r/e CHRISTIANA, hypertension emergency         Bilateral Sequential Compression Device   Urine Culture Pending  Urine for Na, Urea   Renal Ultrasound  SSI  IV Cardene Drip to Titration        Meds Name, dose, route, time, how may doses given:Current Facility-Administered Medications:  atorvastatin 80 mg Oral QPM    butalbital-acetaminophen-caffeine 1 tablet Oral Q4H PRN 11/23 x 1  11/24 x 3   heparin (porcine) 5,000 Units Subcutaneous Q8H [address]    hydrALAZINE 25 mg Oral Q8H [address] Increased to 50 mg po on 11/24   insulin glargine 50 Units Subcutaneous HS    insulin lispro 12 Units Subcutaneous TID With Meals    insulin lispro 2-12 Units Subcutaneous TID AC    insulin lispro 2-12 Units Subcutaneous HS     Norvasc 5 mg  Oral  Q12 Started 11/24   Pepcid  20mg  Oral  QD Started 11/23   Lasix  20mg  Intravenous Once  11/22   labetalol 200mg  Oral  Q12 Started 11/23 increased to 300 mg on 11/24    K dur  20 meq  Oral  Once  11/24         PRN Meds Name, dose, route, time, how many doses given within the first 24 hrs :    Tylenol  650 mg po - 11/22 x 2 - 11/23 x 1  - 11/24 x 3     Tessalon Perles po - 11/23 x 1 - 11/24 x 4  Tums  500 mg  po - 11/23 x 1  Benadryl 25 mg po 11/24 x 1  Hydralazine  10 mg  IV - 11/24 x 1       IVs Type, rate, and total amt  Ordered/given:   Cardene gtt -   Titrated - 1-15 mg /hr - d/c'd on 11/254 @ 9:06  Labs, imaging, other:      4  Consults and findings: nephrology  - 11/23 - Acute kidney injury:  Oliguric; urine output 425 (received 20 mg of Lasix IV yesterday)    Creatinine 1 3 on admission currently 2 08  · Underlying disease likely with long-term proteinuria and baseline creatinine 1-1 3 due to diabetic nephropathy, hypertensive nephrosclerosis, arteriolar nephrosclerosis; ? Analgesic nephropathy  · Concern due to nephrotic range proteinuria, microscopic hematuria  · ATN likely due to presence coarse and fine granular casts  Worsening renal function  There may be a prerenal cause such as intravascular volume depletion from 3rd spacing, chronic NSAID use, ACEi  Also concern for glomerular disease/nephrotic syndrome  Also assess for post renal-await results of renal ultrasound  (ascites on renal ultrasound therefore bladder scan will likely be unrevealing)  · Workup thus far:    ? Complements normal, TSH elevated-5 899 (previously normal in September), platelets normal  ? In progress:  Anti dsDNA antibody, SPEP, UPEP, ANCA, KENYATTA, complement total  ? Obtain light chain ratio, hepatitis panel, anti-GBM  ? Obtain urine lytes  ? Urinalysis: Specific gravity greater than 1 030, greater than 300 protein, 4-10 RBCs, 10-20 WBCs, innumerable bacteria, 4-10 hyaline casts, 3-4 fine granular casts, 10-25 coarse granular casts  ? Recent CT of the abdomen without contrast on 11/19 showed simple cyst left kidney, no stones, no hydronephrosis  ? Renal ultrasound pending  ? May need renal biopsy  · At this time:  ? hold ACE-inhibitor, HCTZ   ? Hold TriCor   ? prevent hypotension, avoid nephrotoxic agents  ? Check urine lytes to guide care  2  Hypertensive urgency:    · On nicardipine infusion, hydralazine 25 mg every 8 hours p o , received 1 dose of Lasix 20 mg IV on 11/22  · Blood pressure improving, currently 160/90  · Recommendations: Likely restart beta-blocker due to tachycardia  Hold ACE-inhibitor  Restart calcium channel blocker  · Goal blood pressure less than 130/80 due to proteinuria but at this juncture will allow a higher blood pressure for renal perfusion  3  Proteinuria:    · Urine protein creatinine ratio 13 g  Albumin level 2 3  · Nephrotic range proteinuria  · Likely due to longstanding, uncontrolled diabetes mellitus    Presence of RBCs in the urine may also indicate a nephritic type injury especially in the presence of uncontrolled hypertension  · Last lipid panel April 2018:  Cholesterol 263, triglycerides 318,   Continue Lipitor, Hold TriCor (history of significant hyperlipidemia:  Triglycerides were as high as 1,232 October 2016)  4  Anemia:  Hemoglobin 10 1 on 11/19  On admission hemoglobin 12 2    5  Low bicarbonate:  Likely due to renal insufficiency  6  Headache:    · Likely related to uncontrolled hypertension  CT of the head negative for acute intracranial abnormality  7  Cough:    · May be related to ACE-inhibitor  Chest x-ray-no active pulmonary disease  Low lung volumes           Test Results after admission  Pertinent Lab tests (dates/results):  Culture results (blood, urine, spinal, wound, respiratory, etc ):  Imaging tests (dates/results):  EKG (dates/results): Other test (dates/results):  Tests pending (dates/results):    Surgical or Invasive Procedures  Name of surgery/procedure:  Date & Time:  Patient Response:  Post-operative orders:  Operative Report/Findings:    Response to Treatment, Major Change in Condition, Major Charge in Treatment anytime during admission  Pearl Sagastume is a 46 y o  female patient who originally presented to the hospital on 11/22/2018 due to headache and cough  History was supplied by her daughter, who reported that patient has not been feeling well for few days  She noted foamy urine with swelling of bilateral upper and lower extremities including facial swelling  She also reported elevated blood pressure  Subsequently evaluated and in the emergency department patient noted to have blood pressure of 260/110  She was subsequently admitted to step-down unit for Cardene drip  Once blood pressure stabilized, her p o  antihypertensive regimen was titrated  She was seen by Nephrology, and noted to have nephrotic range proteinuria    Patient had several serological tests performed which are pending at the time of discharge  She will require follow-up with Nephrology to determine need for kidney biopsy  Her blood pressure stabilized with labetalol, hydralazine, Procardia and chlorthalidone  She is being discharged home in stable condition  Was provided with contact number for nephrologist        Disposition on Discharge  Home, Rehab, SNF, LTC, Shelter, etc : Home/Self Care    Cease to Breathe (CTB)  If a patient expires during an admission, in addition to the above information, please include:    Summary/timeline of the patient's decline in condition:    Medications and treatment:    Patient response to treatment:    Date and time patient ceased to breathe:        Is there a Readmission that follows this admission? Yes X No    If yes, reason for denial:          InterQual Review    InterQual Criteria Met: X Yes  No  N/A        Please include the InterQual Review, InterQual year/version used, and the criteria selected:  Created Using Review Status Review Entered   [employer]® In Primary 11/23/2018 10:15       Criteria Set Name - Subset   LOC:Acute Adult-Hypertension       Criteria Review   REVIEW SUMMARY     Patient: Avery Mathur  Review Number: [id number]  Review Status:  In Primary  Criteria Status: Critical Met  Day Met: Episode Day 1     Condition Specific: Yes        OUTCOMES  Outcome Type: Primary           REVIEW DETAILS     Product: [place] Adult  Subset: Hypertension      (Symptom or finding within 24h)         (Excludes PO medications unless noted)          [X] Select Day, One:              [X] Episode Day 1, One:                  [X] CRITICAL, All:                      [X] Hypertensive emergency (includes pheochromocytoma induced hypertension), >= One:                          [X] Systolic BP >= 023 mmHg                          [X] Diastolic BP >= 277 mmHg                      [X] Finding, >= One:                          [X] Acute end-organ damage, >= One: [X] Acute kidney injury, >= One:                                  [X] Hematuria                      [X] IV antihypertensive administration, >= One:                          [X] Titration q1-2h and monitoring     Version: InterQual® 2017 2  [place] [employer]  © 2017 [employer] and/or one of its [place]  All Rights Reserved  CPT only © 2016 American [employer] Association  All Rights Reserved  PLEASE SUBMIT THE COMPLETED FORM TO THE DEPARTMENT OF HUMAN SERVICES - DIVISION OF CLINICAL  REVIEW VIA FAX [phone] or VIA E-MAIL TO [email or web address]    Signature: Kori Mcgraw Date:  03/11/19    Confidentiality Notice: The documents accompanying this telecopy may contain confidential information belonging to the sender  The information is intended only for the use of the individual named above  If you are not the intended recipient, you are hereby notified  That any disclosure, copying, distribution or taking of any telecopy is strictly prohibited

## 2019-06-10 ENCOUNTER — HOSPITAL ENCOUNTER (OUTPATIENT)
Facility: HOSPITAL | Age: 54
Setting detail: OBSERVATION
End: 2019-06-13
Attending: EMERGENCY MEDICINE | Admitting: INTERNAL MEDICINE
Payer: COMMERCIAL

## 2019-06-10 DIAGNOSIS — F32.A DEPRESSION: Primary | ICD-10-CM

## 2019-06-10 DIAGNOSIS — N17.9 AKI (ACUTE KIDNEY INJURY) (HCC): ICD-10-CM

## 2019-06-10 DIAGNOSIS — N18.4 CHRONIC KIDNEY DISEASE, STAGE IV (SEVERE) (HCC): ICD-10-CM

## 2019-06-10 DIAGNOSIS — R45.851 SUICIDAL IDEATION: ICD-10-CM

## 2019-06-10 LAB
ALBUMIN SERPL BCP-MCNC: 3.1 G/DL (ref 3.5–5)
ALP SERPL-CCNC: 125 U/L (ref 46–116)
ALT SERPL W P-5'-P-CCNC: 28 U/L (ref 12–78)
AMPHETAMINES SERPL QL SCN: NEGATIVE
ANION GAP SERPL CALCULATED.3IONS-SCNC: 11 MMOL/L (ref 4–13)
AST SERPL W P-5'-P-CCNC: 23 U/L (ref 5–45)
BACTERIA UR QL AUTO: ABNORMAL /HPF
BARBITURATES UR QL: NEGATIVE
BASOPHILS # BLD AUTO: 0.06 THOUSANDS/ΜL (ref 0–0.1)
BASOPHILS NFR BLD AUTO: 1 % (ref 0–1)
BENZODIAZ UR QL: NEGATIVE
BILIRUB SERPL-MCNC: 0.2 MG/DL (ref 0.2–1)
BILIRUB UR QL STRIP: NEGATIVE
BUN SERPL-MCNC: 68 MG/DL (ref 5–25)
CALCIUM SERPL-MCNC: 9.1 MG/DL (ref 8.3–10.1)
CHLORIDE SERPL-SCNC: 103 MMOL/L (ref 100–108)
CLARITY UR: CLEAR
CO2 SERPL-SCNC: 22 MMOL/L (ref 21–32)
COCAINE UR QL: NEGATIVE
COLOR UR: YELLOW
CREAT SERPL-MCNC: 3.22 MG/DL (ref 0.6–1.3)
EOSINOPHIL # BLD AUTO: 0.24 THOUSAND/ΜL (ref 0–0.61)
EOSINOPHIL NFR BLD AUTO: 4 % (ref 0–6)
ERYTHROCYTE [DISTWIDTH] IN BLOOD BY AUTOMATED COUNT: 12.7 % (ref 11.6–15.1)
ETHANOL EXG-MCNC: 0 MG/DL
GFR SERPL CREATININE-BSD FRML MDRD: 16 ML/MIN/1.73SQ M
GLUCOSE SERPL-MCNC: 169 MG/DL (ref 65–140)
GLUCOSE SERPL-MCNC: 265 MG/DL (ref 65–140)
GLUCOSE UR STRIP-MCNC: ABNORMAL MG/DL
HCT VFR BLD AUTO: 27.6 % (ref 34.8–46.1)
HGB BLD-MCNC: 9.3 G/DL (ref 11.5–15.4)
HGB UR QL STRIP.AUTO: NEGATIVE
HYALINE CASTS #/AREA URNS LPF: ABNORMAL /LPF
IMM GRANULOCYTES # BLD AUTO: 0.02 THOUSAND/UL (ref 0–0.2)
IMM GRANULOCYTES NFR BLD AUTO: 0 % (ref 0–2)
KETONES UR STRIP-MCNC: NEGATIVE MG/DL
LEUKOCYTE ESTERASE UR QL STRIP: ABNORMAL
LYMPHOCYTES # BLD AUTO: 2.4 THOUSANDS/ΜL (ref 0.6–4.47)
LYMPHOCYTES NFR BLD AUTO: 38 % (ref 14–44)
MCH RBC QN AUTO: 30.1 PG (ref 26.8–34.3)
MCHC RBC AUTO-ENTMCNC: 33.7 G/DL (ref 31.4–37.4)
MCV RBC AUTO: 89 FL (ref 82–98)
METHADONE UR QL: NEGATIVE
MONOCYTES # BLD AUTO: 0.53 THOUSAND/ΜL (ref 0.17–1.22)
MONOCYTES NFR BLD AUTO: 8 % (ref 4–12)
NEUTROPHILS # BLD AUTO: 3.04 THOUSANDS/ΜL (ref 1.85–7.62)
NEUTS SEG NFR BLD AUTO: 49 % (ref 43–75)
NITRITE UR QL STRIP: NEGATIVE
NON-SQ EPI CELLS URNS QL MICRO: ABNORMAL /HPF
NRBC BLD AUTO-RTO: 0 /100 WBCS
OPIATES UR QL SCN: NEGATIVE
PCP UR QL: NEGATIVE
PH UR STRIP.AUTO: 5 [PH] (ref 4.5–8)
PLATELET # BLD AUTO: 268 THOUSANDS/UL (ref 149–390)
PMV BLD AUTO: 10.2 FL (ref 8.9–12.7)
POTASSIUM SERPL-SCNC: 4.3 MMOL/L (ref 3.5–5.3)
PROT SERPL-MCNC: 7 G/DL (ref 6.4–8.2)
PROT UR STRIP-MCNC: >=300 MG/DL
RBC # BLD AUTO: 3.09 MILLION/UL (ref 3.81–5.12)
RBC #/AREA URNS AUTO: ABNORMAL /HPF
SODIUM SERPL-SCNC: 136 MMOL/L (ref 136–145)
SP GR UR STRIP.AUTO: >=1.03 (ref 1–1.03)
THC UR QL: NEGATIVE
TSH SERPL DL<=0.05 MIU/L-ACNC: 3.52 UIU/ML (ref 0.36–3.74)
UROBILINOGEN UR QL STRIP.AUTO: 0.2 E.U./DL
WBC # BLD AUTO: 6.29 THOUSAND/UL (ref 4.31–10.16)
WBC #/AREA URNS AUTO: ABNORMAL /HPF

## 2019-06-10 PROCEDURE — 85025 COMPLETE CBC W/AUTO DIFF WBC: CPT | Performed by: EMERGENCY MEDICINE

## 2019-06-10 PROCEDURE — 99220 PR INITIAL OBSERVATION CARE/DAY 70 MINUTES: CPT | Performed by: INTERNAL MEDICINE

## 2019-06-10 PROCEDURE — 93005 ELECTROCARDIOGRAM TRACING: CPT

## 2019-06-10 PROCEDURE — 36415 COLL VENOUS BLD VENIPUNCTURE: CPT | Performed by: EMERGENCY MEDICINE

## 2019-06-10 PROCEDURE — 80053 COMPREHEN METABOLIC PANEL: CPT | Performed by: EMERGENCY MEDICINE

## 2019-06-10 PROCEDURE — 84443 ASSAY THYROID STIM HORMONE: CPT | Performed by: EMERGENCY MEDICINE

## 2019-06-10 PROCEDURE — 99285 EMERGENCY DEPT VISIT HI MDM: CPT | Performed by: EMERGENCY MEDICINE

## 2019-06-10 PROCEDURE — 81001 URINALYSIS AUTO W/SCOPE: CPT

## 2019-06-10 PROCEDURE — 82948 REAGENT STRIP/BLOOD GLUCOSE: CPT

## 2019-06-10 PROCEDURE — 82075 ASSAY OF BREATH ETHANOL: CPT | Performed by: EMERGENCY MEDICINE

## 2019-06-10 PROCEDURE — 80307 DRUG TEST PRSMV CHEM ANLYZR: CPT | Performed by: EMERGENCY MEDICINE

## 2019-06-10 PROCEDURE — 99285 EMERGENCY DEPT VISIT HI MDM: CPT

## 2019-06-10 RX ORDER — INSULIN GLARGINE 100 [IU]/ML
45 INJECTION, SOLUTION SUBCUTANEOUS
Status: DISCONTINUED | OUTPATIENT
Start: 2019-06-10 | End: 2019-06-11

## 2019-06-10 RX ORDER — ATORVASTATIN CALCIUM 40 MG/1
80 TABLET, FILM COATED ORAL EVERY EVENING
Status: DISCONTINUED | OUTPATIENT
Start: 2019-06-10 | End: 2019-06-13 | Stop reason: HOSPADM

## 2019-06-10 RX ORDER — NIFEDIPINE 30 MG/1
30 TABLET, EXTENDED RELEASE ORAL DAILY
Status: DISCONTINUED | OUTPATIENT
Start: 2019-06-11 | End: 2019-06-13 | Stop reason: HOSPADM

## 2019-06-10 RX ORDER — HYDRALAZINE HYDROCHLORIDE 25 MG/1
75 TABLET, FILM COATED ORAL 3 TIMES DAILY
Status: DISCONTINUED | OUTPATIENT
Start: 2019-06-10 | End: 2019-06-13 | Stop reason: HOSPADM

## 2019-06-10 RX ORDER — ONDANSETRON 2 MG/ML
4 INJECTION INTRAMUSCULAR; INTRAVENOUS EVERY 6 HOURS PRN
Status: DISCONTINUED | OUTPATIENT
Start: 2019-06-10 | End: 2019-06-13 | Stop reason: HOSPADM

## 2019-06-10 RX ORDER — TOPIRAMATE 25 MG/1
25 TABLET ORAL DAILY
Status: DISCONTINUED | OUTPATIENT
Start: 2019-06-11 | End: 2019-06-13 | Stop reason: HOSPADM

## 2019-06-10 RX ORDER — FLUOXETINE HYDROCHLORIDE 20 MG/1
20 CAPSULE ORAL DAILY
Status: DISCONTINUED | OUTPATIENT
Start: 2019-06-11 | End: 2019-06-13 | Stop reason: HOSPADM

## 2019-06-10 RX ORDER — TOPIRAMATE 25 MG/1
50 TABLET ORAL
Status: DISCONTINUED | OUTPATIENT
Start: 2019-06-10 | End: 2019-06-13 | Stop reason: HOSPADM

## 2019-06-10 RX ORDER — ACETAMINOPHEN 325 MG/1
650 TABLET ORAL EVERY 6 HOURS PRN
Status: DISCONTINUED | OUTPATIENT
Start: 2019-06-10 | End: 2019-06-13 | Stop reason: HOSPADM

## 2019-06-10 RX ORDER — GABAPENTIN 300 MG/1
600 CAPSULE ORAL 3 TIMES DAILY
Status: DISCONTINUED | OUTPATIENT
Start: 2019-06-10 | End: 2019-06-11

## 2019-06-10 RX ORDER — METOPROLOL TARTRATE 5 MG/5ML
5 INJECTION INTRAVENOUS EVERY 6 HOURS PRN
Status: DISCONTINUED | OUTPATIENT
Start: 2019-06-10 | End: 2019-06-13 | Stop reason: HOSPADM

## 2019-06-10 RX ORDER — SODIUM CHLORIDE 9 MG/ML
100 INJECTION, SOLUTION INTRAVENOUS CONTINUOUS
Status: DISPENSED | OUTPATIENT
Start: 2019-06-10 | End: 2019-06-11

## 2019-06-10 RX ORDER — LORATADINE 10 MG/1
10 TABLET ORAL DAILY
Status: DISCONTINUED | OUTPATIENT
Start: 2019-06-11 | End: 2019-06-13 | Stop reason: HOSPADM

## 2019-06-10 RX ORDER — LABETALOL 100 MG/1
300 TABLET, FILM COATED ORAL EVERY 12 HOURS SCHEDULED
Status: DISCONTINUED | OUTPATIENT
Start: 2019-06-10 | End: 2019-06-13 | Stop reason: HOSPADM

## 2019-06-10 RX ORDER — MELATONIN
1000 DAILY
Status: DISCONTINUED | OUTPATIENT
Start: 2019-06-11 | End: 2019-06-13 | Stop reason: HOSPADM

## 2019-06-10 RX ADMIN — ACETAMINOPHEN 650 MG: 325 TABLET, FILM COATED ORAL at 22:40

## 2019-06-10 RX ADMIN — SODIUM CHLORIDE 100 ML/HR: 0.9 INJECTION, SOLUTION INTRAVENOUS at 22:48

## 2019-06-10 RX ADMIN — GABAPENTIN 600 MG: 300 CAPSULE ORAL at 22:00

## 2019-06-10 RX ADMIN — SODIUM CHLORIDE 1000 ML: 0.9 INJECTION, SOLUTION INTRAVENOUS at 21:45

## 2019-06-10 RX ADMIN — TOPIRAMATE 50 MG: 25 TABLET, FILM COATED ORAL at 22:30

## 2019-06-10 RX ADMIN — HYDRALAZINE HYDROCHLORIDE 75 MG: 25 TABLET ORAL at 22:00

## 2019-06-10 RX ADMIN — INSULIN GLARGINE 45 UNITS: 100 INJECTION, SOLUTION SUBCUTANEOUS at 22:30

## 2019-06-10 RX ADMIN — LABETALOL HYDROCHLORIDE 300 MG: 100 TABLET, FILM COATED ORAL at 22:00

## 2019-06-10 RX ADMIN — ATORVASTATIN CALCIUM 80 MG: 40 TABLET, FILM COATED ORAL at 22:00

## 2019-06-11 LAB
ANION GAP SERPL CALCULATED.3IONS-SCNC: 10 MMOL/L (ref 4–13)
BUN SERPL-MCNC: 60 MG/DL (ref 5–25)
CALCIUM SERPL-MCNC: 8.8 MG/DL (ref 8.3–10.1)
CHLORIDE SERPL-SCNC: 110 MMOL/L (ref 100–108)
CO2 SERPL-SCNC: 21 MMOL/L (ref 21–32)
CREAT SERPL-MCNC: 2.89 MG/DL (ref 0.6–1.3)
ERYTHROCYTE [DISTWIDTH] IN BLOOD BY AUTOMATED COUNT: 12.8 % (ref 11.6–15.1)
EST. AVERAGE GLUCOSE BLD GHB EST-MCNC: 200 MG/DL
GFR SERPL CREATININE-BSD FRML MDRD: 18 ML/MIN/1.73SQ M
GLUCOSE P FAST SERPL-MCNC: 151 MG/DL (ref 65–99)
GLUCOSE SERPL-MCNC: 126 MG/DL (ref 65–140)
GLUCOSE SERPL-MCNC: 139 MG/DL (ref 65–140)
GLUCOSE SERPL-MCNC: 151 MG/DL (ref 65–140)
GLUCOSE SERPL-MCNC: 159 MG/DL (ref 65–140)
GLUCOSE SERPL-MCNC: 90 MG/DL (ref 65–140)
HBA1C MFR BLD: 8.6 % (ref 4.2–6.3)
HCT VFR BLD AUTO: 24.7 % (ref 34.8–46.1)
HGB BLD-MCNC: 8.1 G/DL (ref 11.5–15.4)
MCH RBC QN AUTO: 29.9 PG (ref 26.8–34.3)
MCHC RBC AUTO-ENTMCNC: 32.8 G/DL (ref 31.4–37.4)
MCV RBC AUTO: 91 FL (ref 82–98)
PLATELET # BLD AUTO: 232 THOUSANDS/UL (ref 149–390)
PMV BLD AUTO: 10.8 FL (ref 8.9–12.7)
POTASSIUM SERPL-SCNC: 4.1 MMOL/L (ref 3.5–5.3)
RBC # BLD AUTO: 2.71 MILLION/UL (ref 3.81–5.12)
SODIUM SERPL-SCNC: 141 MMOL/L (ref 136–145)
WBC # BLD AUTO: 5.76 THOUSAND/UL (ref 4.31–10.16)

## 2019-06-11 PROCEDURE — 80048 BASIC METABOLIC PNL TOTAL CA: CPT | Performed by: PHYSICIAN ASSISTANT

## 2019-06-11 PROCEDURE — 99244 OFF/OP CNSLTJ NEW/EST MOD 40: CPT | Performed by: PSYCHIATRY & NEUROLOGY

## 2019-06-11 PROCEDURE — 82948 REAGENT STRIP/BLOOD GLUCOSE: CPT

## 2019-06-11 PROCEDURE — 99245 OFF/OP CONSLTJ NEW/EST HI 55: CPT | Performed by: INTERNAL MEDICINE

## 2019-06-11 PROCEDURE — 83036 HEMOGLOBIN GLYCOSYLATED A1C: CPT | Performed by: PHYSICIAN ASSISTANT

## 2019-06-11 PROCEDURE — 99225 PR SBSQ OBSERVATION CARE/DAY 25 MINUTES: CPT | Performed by: INTERNAL MEDICINE

## 2019-06-11 PROCEDURE — 85027 COMPLETE CBC AUTOMATED: CPT | Performed by: PHYSICIAN ASSISTANT

## 2019-06-11 RX ORDER — GABAPENTIN 100 MG/1
200 CAPSULE ORAL EVERY 12 HOURS
Status: DISCONTINUED | OUTPATIENT
Start: 2019-06-11 | End: 2019-06-13 | Stop reason: HOSPADM

## 2019-06-11 RX ORDER — INSULIN GLARGINE 100 [IU]/ML
22 INJECTION, SOLUTION SUBCUTANEOUS
Status: DISCONTINUED | OUTPATIENT
Start: 2019-06-11 | End: 2019-06-13 | Stop reason: HOSPADM

## 2019-06-11 RX ADMIN — ATORVASTATIN CALCIUM 80 MG: 40 TABLET, FILM COATED ORAL at 17:39

## 2019-06-11 RX ADMIN — GABAPENTIN 200 MG: 100 CAPSULE ORAL at 22:35

## 2019-06-11 RX ADMIN — INSULIN LISPRO 1 UNITS: 100 INJECTION, SOLUTION INTRAVENOUS; SUBCUTANEOUS at 00:23

## 2019-06-11 RX ADMIN — INSULIN LISPRO 15 UNITS: 100 INJECTION, SOLUTION INTRAVENOUS; SUBCUTANEOUS at 09:01

## 2019-06-11 RX ADMIN — LORATADINE 10 MG: 10 TABLET ORAL at 08:59

## 2019-06-11 RX ADMIN — TOPIRAMATE 25 MG: 25 TABLET, FILM COATED ORAL at 08:59

## 2019-06-11 RX ADMIN — HYDRALAZINE HYDROCHLORIDE 75 MG: 25 TABLET ORAL at 22:36

## 2019-06-11 RX ADMIN — HYDRALAZINE HYDROCHLORIDE 75 MG: 25 TABLET ORAL at 09:00

## 2019-06-11 RX ADMIN — SODIUM CHLORIDE 100 ML/HR: 0.9 INJECTION, SOLUTION INTRAVENOUS at 00:12

## 2019-06-11 RX ADMIN — TOPIRAMATE 50 MG: 25 TABLET, FILM COATED ORAL at 22:35

## 2019-06-11 RX ADMIN — VITAMIN D, TAB 1000IU (100/BT) 1000 UNITS: 25 TAB at 08:59

## 2019-06-11 RX ADMIN — LABETALOL HYDROCHLORIDE 300 MG: 100 TABLET, FILM COATED ORAL at 22:36

## 2019-06-11 RX ADMIN — ACETAMINOPHEN 650 MG: 325 TABLET, FILM COATED ORAL at 19:19

## 2019-06-11 RX ADMIN — INSULIN GLARGINE 22 UNITS: 100 INJECTION, SOLUTION SUBCUTANEOUS at 22:35

## 2019-06-11 RX ADMIN — FLUOXETINE 20 MG: 20 CAPSULE ORAL at 09:00

## 2019-06-11 RX ADMIN — NIFEDIPINE 30 MG: 30 TABLET, FILM COATED, EXTENDED RELEASE ORAL at 08:59

## 2019-06-11 RX ADMIN — LABETALOL HYDROCHLORIDE 300 MG: 100 TABLET, FILM COATED ORAL at 08:53

## 2019-06-11 RX ADMIN — GABAPENTIN 600 MG: 300 CAPSULE ORAL at 08:53

## 2019-06-11 RX ADMIN — HYDRALAZINE HYDROCHLORIDE 75 MG: 25 TABLET ORAL at 17:38

## 2019-06-11 RX ADMIN — INSULIN LISPRO 1 UNITS: 100 INJECTION, SOLUTION INTRAVENOUS; SUBCUTANEOUS at 09:01

## 2019-06-12 VITALS
DIASTOLIC BLOOD PRESSURE: 75 MMHG | WEIGHT: 191.8 LBS | SYSTOLIC BLOOD PRESSURE: 138 MMHG | HEART RATE: 74 BPM | OXYGEN SATURATION: 95 % | BODY MASS INDEX: 35.3 KG/M2 | HEIGHT: 62 IN | TEMPERATURE: 98.1 F | RESPIRATION RATE: 20 BRPM

## 2019-06-12 LAB
ANION GAP SERPL CALCULATED.3IONS-SCNC: 9 MMOL/L (ref 4–13)
ATRIAL RATE: 77 BPM
BUN SERPL-MCNC: 51 MG/DL (ref 5–25)
CALCIUM SERPL-MCNC: 9 MG/DL (ref 8.3–10.1)
CHLORIDE SERPL-SCNC: 108 MMOL/L (ref 100–108)
CO2 SERPL-SCNC: 22 MMOL/L (ref 21–32)
CREAT SERPL-MCNC: 2.66 MG/DL (ref 0.6–1.3)
GFR SERPL CREATININE-BSD FRML MDRD: 20 ML/MIN/1.73SQ M
GLUCOSE SERPL-MCNC: 131 MG/DL (ref 65–140)
GLUCOSE SERPL-MCNC: 136 MG/DL (ref 65–140)
GLUCOSE SERPL-MCNC: 177 MG/DL (ref 65–140)
GLUCOSE SERPL-MCNC: 202 MG/DL (ref 65–140)
GLUCOSE SERPL-MCNC: 94 MG/DL (ref 65–140)
P AXIS: 52 DEGREES
POTASSIUM SERPL-SCNC: 5 MMOL/L (ref 3.5–5.3)
PR INTERVAL: 174 MS
QRS AXIS: 18 DEGREES
QRSD INTERVAL: 78 MS
QT INTERVAL: 442 MS
QTC INTERVAL: 500 MS
SODIUM SERPL-SCNC: 139 MMOL/L (ref 136–145)
T WAVE AXIS: 37 DEGREES
VENTRICULAR RATE: 77 BPM

## 2019-06-12 PROCEDURE — G8978 MOBILITY CURRENT STATUS: HCPCS

## 2019-06-12 PROCEDURE — 99217 PR OBSERVATION CARE DISCHARGE MANAGEMENT: CPT | Performed by: INTERNAL MEDICINE

## 2019-06-12 PROCEDURE — 82948 REAGENT STRIP/BLOOD GLUCOSE: CPT

## 2019-06-12 PROCEDURE — 80048 BASIC METABOLIC PNL TOTAL CA: CPT | Performed by: INTERNAL MEDICINE

## 2019-06-12 PROCEDURE — 93010 ELECTROCARDIOGRAM REPORT: CPT | Performed by: INTERNAL MEDICINE

## 2019-06-12 PROCEDURE — 97116 GAIT TRAINING THERAPY: CPT

## 2019-06-12 PROCEDURE — 97163 PT EVAL HIGH COMPLEX 45 MIN: CPT

## 2019-06-12 PROCEDURE — 99225 PR SBSQ OBSERVATION CARE/DAY 25 MINUTES: CPT | Performed by: INTERNAL MEDICINE

## 2019-06-12 PROCEDURE — G8979 MOBILITY GOAL STATUS: HCPCS

## 2019-06-12 RX ORDER — TOPIRAMATE 25 MG/1
25 TABLET ORAL DAILY
Status: CANCELLED | OUTPATIENT
Start: 2019-06-13

## 2019-06-12 RX ORDER — FLUOXETINE HYDROCHLORIDE 20 MG/1
20 CAPSULE ORAL DAILY
Status: CANCELLED | OUTPATIENT
Start: 2019-06-13

## 2019-06-12 RX ORDER — LORATADINE 10 MG/1
10 TABLET ORAL DAILY
Status: CANCELLED | OUTPATIENT
Start: 2019-06-13

## 2019-06-12 RX ORDER — LABETALOL 100 MG/1
300 TABLET, FILM COATED ORAL EVERY 12 HOURS SCHEDULED
Status: CANCELLED | OUTPATIENT
Start: 2019-06-12

## 2019-06-12 RX ORDER — TOPIRAMATE 25 MG/1
50 TABLET ORAL
Status: CANCELLED | OUTPATIENT
Start: 2019-06-12

## 2019-06-12 RX ORDER — ATORVASTATIN CALCIUM 40 MG/1
80 TABLET, FILM COATED ORAL EVERY EVENING
Status: CANCELLED | OUTPATIENT
Start: 2019-06-12

## 2019-06-12 RX ORDER — INSULIN GLARGINE 100 [IU]/ML
22 INJECTION, SOLUTION SUBCUTANEOUS
Status: CANCELLED | OUTPATIENT
Start: 2019-06-12

## 2019-06-12 RX ORDER — ACETAMINOPHEN 325 MG/1
650 TABLET ORAL EVERY 6 HOURS PRN
Status: CANCELLED | OUTPATIENT
Start: 2019-06-12

## 2019-06-12 RX ORDER — MELATONIN
1000 DAILY
Status: CANCELLED | OUTPATIENT
Start: 2019-06-13

## 2019-06-12 RX ORDER — AMITRIPTYLINE HYDROCHLORIDE 50 MG/1
25 TABLET, FILM COATED ORAL
COMMUNITY
End: 2019-06-26 | Stop reason: HOSPADM

## 2019-06-12 RX ORDER — NIFEDIPINE 30 MG/1
30 TABLET, EXTENDED RELEASE ORAL DAILY
Status: CANCELLED | OUTPATIENT
Start: 2019-06-13

## 2019-06-12 RX ORDER — GABAPENTIN 100 MG/1
200 CAPSULE ORAL EVERY 12 HOURS
Status: CANCELLED | OUTPATIENT
Start: 2019-06-12

## 2019-06-12 RX ORDER — LANOLIN ALCOHOL/MO/W.PET/CERES
3 CREAM (GRAM) TOPICAL
Status: CANCELLED | OUTPATIENT
Start: 2019-06-12

## 2019-06-12 RX ORDER — HYDRALAZINE HYDROCHLORIDE 25 MG/1
75 TABLET, FILM COATED ORAL 3 TIMES DAILY
Status: CANCELLED | OUTPATIENT
Start: 2019-06-12

## 2019-06-12 RX ADMIN — HYDRALAZINE HYDROCHLORIDE 50 MG: 25 TABLET ORAL at 09:15

## 2019-06-12 RX ADMIN — INSULIN LISPRO 15 UNITS: 100 INJECTION, SOLUTION INTRAVENOUS; SUBCUTANEOUS at 09:28

## 2019-06-12 RX ADMIN — INSULIN GLARGINE 22 UNITS: 100 INJECTION, SOLUTION SUBCUTANEOUS at 21:34

## 2019-06-12 RX ADMIN — LABETALOL HYDROCHLORIDE 300 MG: 100 TABLET, FILM COATED ORAL at 09:15

## 2019-06-12 RX ADMIN — ACETAMINOPHEN 650 MG: 325 TABLET, FILM COATED ORAL at 17:07

## 2019-06-12 RX ADMIN — ACETAMINOPHEN 650 MG: 325 TABLET, FILM COATED ORAL at 03:28

## 2019-06-12 RX ADMIN — HYDRALAZINE HYDROCHLORIDE 75 MG: 25 TABLET ORAL at 21:14

## 2019-06-12 RX ADMIN — TOPIRAMATE 50 MG: 25 TABLET, FILM COATED ORAL at 21:14

## 2019-06-12 RX ADMIN — FLUOXETINE 20 MG: 20 CAPSULE ORAL at 09:16

## 2019-06-12 RX ADMIN — GABAPENTIN 200 MG: 100 CAPSULE ORAL at 21:14

## 2019-06-12 RX ADMIN — TOPIRAMATE 25 MG: 25 TABLET, FILM COATED ORAL at 09:16

## 2019-06-12 RX ADMIN — HYDRALAZINE HYDROCHLORIDE 75 MG: 25 TABLET ORAL at 16:59

## 2019-06-12 RX ADMIN — ATORVASTATIN CALCIUM 80 MG: 40 TABLET, FILM COATED ORAL at 17:07

## 2019-06-12 RX ADMIN — NIFEDIPINE 30 MG: 30 TABLET, FILM COATED, EXTENDED RELEASE ORAL at 09:16

## 2019-06-12 RX ADMIN — LABETALOL HYDROCHLORIDE 300 MG: 100 TABLET, FILM COATED ORAL at 21:15

## 2019-06-12 RX ADMIN — INSULIN LISPRO 1 UNITS: 100 INJECTION, SOLUTION INTRAVENOUS; SUBCUTANEOUS at 21:34

## 2019-06-13 ENCOUNTER — HOSPITAL ENCOUNTER (INPATIENT)
Facility: HOSPITAL | Age: 54
LOS: 13 days | Discharge: HOME HEALTH | DRG: 751 | End: 2019-06-26
Attending: PSYCHIATRY & NEUROLOGY | Admitting: PSYCHIATRY & NEUROLOGY
Payer: COMMERCIAL

## 2019-06-13 DIAGNOSIS — I10 ESSENTIAL HYPERTENSION: Chronic | ICD-10-CM

## 2019-06-13 DIAGNOSIS — N17.9 ACUTE RENAL FAILURE SUPERIMPOSED ON STAGE 4 CHRONIC KIDNEY DISEASE, UNSPECIFIED ACUTE RENAL FAILURE TYPE (HCC): ICD-10-CM

## 2019-06-13 DIAGNOSIS — E11.65 TYPE 2 DIABETES MELLITUS WITH HYPERGLYCEMIA, WITH LONG-TERM CURRENT USE OF INSULIN (HCC): Chronic | ICD-10-CM

## 2019-06-13 DIAGNOSIS — F32.A DEPRESSION: Primary | ICD-10-CM

## 2019-06-13 DIAGNOSIS — E78.5 HYPERLIPIDEMIA, UNSPECIFIED HYPERLIPIDEMIA TYPE: Chronic | ICD-10-CM

## 2019-06-13 DIAGNOSIS — Z79.4 TYPE 2 DIABETES MELLITUS WITH HYPERGLYCEMIA, WITH LONG-TERM CURRENT USE OF INSULIN (HCC): Chronic | ICD-10-CM

## 2019-06-13 DIAGNOSIS — F33.2 MDD (MAJOR DEPRESSIVE DISORDER), RECURRENT SEVERE, WITHOUT PSYCHOSIS (HCC): ICD-10-CM

## 2019-06-13 DIAGNOSIS — G47.00 INSOMNIA: ICD-10-CM

## 2019-06-13 DIAGNOSIS — N18.4 ACUTE RENAL FAILURE SUPERIMPOSED ON STAGE 4 CHRONIC KIDNEY DISEASE, UNSPECIFIED ACUTE RENAL FAILURE TYPE (HCC): ICD-10-CM

## 2019-06-13 DIAGNOSIS — E11.40 DIABETIC NEUROPATHY (HCC): ICD-10-CM

## 2019-06-13 DIAGNOSIS — E55.9 VITAMIN D DEFICIENCY: ICD-10-CM

## 2019-06-13 DIAGNOSIS — I16.0 HYPERTENSIVE URGENCY: ICD-10-CM

## 2019-06-13 PROBLEM — F41.1 GAD (GENERALIZED ANXIETY DISORDER): Status: ACTIVE | Noted: 2019-06-13

## 2019-06-13 LAB
GLUCOSE SERPL-MCNC: 119 MG/DL (ref 65–140)
GLUCOSE SERPL-MCNC: 161 MG/DL (ref 65–140)
GLUCOSE SERPL-MCNC: 210 MG/DL (ref 65–140)
GLUCOSE SERPL-MCNC: 55 MG/DL (ref 65–140)
GLUCOSE SERPL-MCNC: 70 MG/DL (ref 65–140)

## 2019-06-13 PROCEDURE — NC001 PR NO CHARGE: Performed by: PHYSICIAN ASSISTANT

## 2019-06-13 PROCEDURE — 99222 1ST HOSP IP/OBS MODERATE 55: CPT | Performed by: PSYCHIATRY & NEUROLOGY

## 2019-06-13 PROCEDURE — 82948 REAGENT STRIP/BLOOD GLUCOSE: CPT

## 2019-06-13 RX ORDER — SERTRALINE HYDROCHLORIDE 25 MG/1
25 TABLET, FILM COATED ORAL DAILY
Status: DISCONTINUED | OUTPATIENT
Start: 2019-06-13 | End: 2019-06-13

## 2019-06-13 RX ORDER — LORATADINE 10 MG/1
10 TABLET ORAL DAILY
Status: DISCONTINUED | OUTPATIENT
Start: 2019-06-13 | End: 2019-06-26 | Stop reason: HOSPADM

## 2019-06-13 RX ORDER — INSULIN GLARGINE 100 [IU]/ML
22 INJECTION, SOLUTION SUBCUTANEOUS
Status: DISCONTINUED | OUTPATIENT
Start: 2019-06-13 | End: 2019-06-26 | Stop reason: HOSPADM

## 2019-06-13 RX ORDER — SERTRALINE HYDROCHLORIDE 25 MG/1
25 TABLET, FILM COATED ORAL DAILY
Status: COMPLETED | OUTPATIENT
Start: 2019-06-13 | End: 2019-06-13

## 2019-06-13 RX ORDER — GABAPENTIN 100 MG/1
200 CAPSULE ORAL EVERY 12 HOURS
Status: DISCONTINUED | OUTPATIENT
Start: 2019-06-13 | End: 2019-06-26 | Stop reason: HOSPADM

## 2019-06-13 RX ORDER — FLUOXETINE HYDROCHLORIDE 20 MG/1
20 CAPSULE ORAL DAILY
Status: DISCONTINUED | OUTPATIENT
Start: 2019-06-13 | End: 2019-06-13

## 2019-06-13 RX ORDER — LANOLIN ALCOHOL/MO/W.PET/CERES
3 CREAM (GRAM) TOPICAL
Status: DISCONTINUED | OUTPATIENT
Start: 2019-06-13 | End: 2019-06-26 | Stop reason: HOSPADM

## 2019-06-13 RX ORDER — TOPIRAMATE 25 MG/1
50 TABLET ORAL
Status: DISCONTINUED | OUTPATIENT
Start: 2019-06-13 | End: 2019-06-18

## 2019-06-13 RX ORDER — ACETAMINOPHEN 325 MG/1
650 TABLET ORAL EVERY 6 HOURS PRN
Status: DISCONTINUED | OUTPATIENT
Start: 2019-06-13 | End: 2019-06-21

## 2019-06-13 RX ORDER — HYDRALAZINE HYDROCHLORIDE 25 MG/1
75 TABLET, FILM COATED ORAL 3 TIMES DAILY
Status: DISCONTINUED | OUTPATIENT
Start: 2019-06-13 | End: 2019-06-18

## 2019-06-13 RX ORDER — NIFEDIPINE 30 MG/1
30 TABLET, EXTENDED RELEASE ORAL DAILY
Status: DISCONTINUED | OUTPATIENT
Start: 2019-06-13 | End: 2019-06-18

## 2019-06-13 RX ORDER — ATORVASTATIN CALCIUM 40 MG/1
80 TABLET, FILM COATED ORAL EVERY EVENING
Status: DISCONTINUED | OUTPATIENT
Start: 2019-06-13 | End: 2019-06-26 | Stop reason: HOSPADM

## 2019-06-13 RX ORDER — LABETALOL 200 MG/1
300 TABLET, FILM COATED ORAL EVERY 12 HOURS SCHEDULED
Status: DISCONTINUED | OUTPATIENT
Start: 2019-06-13 | End: 2019-06-18

## 2019-06-13 RX ORDER — ATORVASTATIN CALCIUM 40 MG/1
40 TABLET, FILM COATED ORAL EVERY EVENING
Status: DISCONTINUED | OUTPATIENT
Start: 2019-06-13 | End: 2019-06-13

## 2019-06-13 RX ORDER — ATORVASTATIN CALCIUM 40 MG/1
80 TABLET, FILM COATED ORAL EVERY EVENING
Status: DISCONTINUED | OUTPATIENT
Start: 2019-06-13 | End: 2019-06-13

## 2019-06-13 RX ORDER — TOPIRAMATE 25 MG/1
25 TABLET ORAL EVERY MORNING
Status: DISCONTINUED | OUTPATIENT
Start: 2019-06-14 | End: 2019-06-18

## 2019-06-13 RX ORDER — MELATONIN
1000 DAILY
Status: DISCONTINUED | OUTPATIENT
Start: 2019-06-13 | End: 2019-06-25

## 2019-06-13 RX ORDER — TOPIRAMATE 25 MG/1
25 TABLET ORAL DAILY
Status: DISCONTINUED | OUTPATIENT
Start: 2019-06-13 | End: 2019-06-13

## 2019-06-13 RX ADMIN — LABETALOL HCL 300 MG: 200 TABLET, FILM COATED ORAL at 09:38

## 2019-06-13 RX ADMIN — SERTRALINE HYDROCHLORIDE 25 MG: 25 TABLET ORAL at 17:07

## 2019-06-13 RX ADMIN — FLUOXETINE 20 MG: 20 CAPSULE ORAL at 09:38

## 2019-06-13 RX ADMIN — GABAPENTIN 200 MG: 100 CAPSULE ORAL at 21:45

## 2019-06-13 RX ADMIN — NIFEDIPINE 30 MG: 30 TABLET, FILM COATED, EXTENDED RELEASE ORAL at 09:39

## 2019-06-13 RX ADMIN — INSULIN LISPRO 15 UNITS: 100 INJECTION, SOLUTION INTRAVENOUS; SUBCUTANEOUS at 17:16

## 2019-06-13 RX ADMIN — INSULIN LISPRO 15 UNITS: 100 INJECTION, SOLUTION INTRAVENOUS; SUBCUTANEOUS at 12:14

## 2019-06-13 RX ADMIN — TOPIRAMATE 50 MG: 25 TABLET, FILM COATED ORAL at 21:45

## 2019-06-13 RX ADMIN — HYDRALAZINE HYDROCHLORIDE 75 MG: 25 TABLET ORAL at 09:40

## 2019-06-13 RX ADMIN — HYDRALAZINE HYDROCHLORIDE 75 MG: 25 TABLET ORAL at 21:46

## 2019-06-13 RX ADMIN — INSULIN LISPRO 15 UNITS: 100 INJECTION, SOLUTION INTRAVENOUS; SUBCUTANEOUS at 09:35

## 2019-06-13 RX ADMIN — INSULIN LISPRO 1 UNITS: 100 INJECTION, SOLUTION INTRAVENOUS; SUBCUTANEOUS at 09:36

## 2019-06-13 RX ADMIN — ACETAMINOPHEN 650 MG: 325 TABLET, FILM COATED ORAL at 16:21

## 2019-06-13 RX ADMIN — LORATADINE 10 MG: 10 TABLET ORAL at 09:39

## 2019-06-13 RX ADMIN — VITAMIN D, TAB 1000IU (100/BT) 1000 UNITS: 25 TAB at 09:39

## 2019-06-13 RX ADMIN — TOPIRAMATE 25 MG: 25 TABLET, FILM COATED ORAL at 09:39

## 2019-06-13 RX ADMIN — INSULIN LISPRO 2 UNITS: 100 INJECTION, SOLUTION INTRAVENOUS; SUBCUTANEOUS at 12:15

## 2019-06-13 RX ADMIN — HYDRALAZINE HYDROCHLORIDE 75 MG: 25 TABLET ORAL at 17:07

## 2019-06-13 RX ADMIN — ATORVASTATIN CALCIUM 80 MG: 40 TABLET, FILM COATED ORAL at 17:08

## 2019-06-13 RX ADMIN — GABAPENTIN 200 MG: 100 CAPSULE ORAL at 09:38

## 2019-06-13 RX ADMIN — INSULIN GLARGINE 22 UNITS: 100 INJECTION, SOLUTION SUBCUTANEOUS at 21:42

## 2019-06-13 RX ADMIN — LABETALOL HCL 300 MG: 200 TABLET, FILM COATED ORAL at 21:43

## 2019-06-14 LAB
ANION GAP SERPL CALCULATED.3IONS-SCNC: 11 MMOL/L (ref 4–13)
BUN SERPL-MCNC: 47 MG/DL (ref 5–25)
CALCIUM SERPL-MCNC: 9.2 MG/DL (ref 8.3–10.1)
CHLORIDE SERPL-SCNC: 110 MMOL/L (ref 100–108)
CO2 SERPL-SCNC: 21 MMOL/L (ref 21–32)
CREAT SERPL-MCNC: 2.4 MG/DL (ref 0.6–1.3)
GFR SERPL CREATININE-BSD FRML MDRD: 22 ML/MIN/1.73SQ M
GLUCOSE SERPL-MCNC: 158 MG/DL (ref 65–140)
GLUCOSE SERPL-MCNC: 160 MG/DL (ref 65–140)
GLUCOSE SERPL-MCNC: 195 MG/DL (ref 65–140)
GLUCOSE SERPL-MCNC: 225 MG/DL (ref 65–140)
GLUCOSE SERPL-MCNC: 68 MG/DL (ref 65–140)
GLUCOSE SERPL-MCNC: 81 MG/DL (ref 65–140)
POTASSIUM SERPL-SCNC: 4.6 MMOL/L (ref 3.5–5.3)
SODIUM SERPL-SCNC: 142 MMOL/L (ref 136–145)

## 2019-06-14 PROCEDURE — 99232 SBSQ HOSP IP/OBS MODERATE 35: CPT | Performed by: PSYCHIATRY & NEUROLOGY

## 2019-06-14 PROCEDURE — 82948 REAGENT STRIP/BLOOD GLUCOSE: CPT

## 2019-06-14 PROCEDURE — 97163 PT EVAL HIGH COMPLEX 45 MIN: CPT

## 2019-06-14 PROCEDURE — G8979 MOBILITY GOAL STATUS: HCPCS

## 2019-06-14 PROCEDURE — 80048 BASIC METABOLIC PNL TOTAL CA: CPT | Performed by: PSYCHIATRY & NEUROLOGY

## 2019-06-14 PROCEDURE — 97110 THERAPEUTIC EXERCISES: CPT

## 2019-06-14 PROCEDURE — 99254 IP/OBS CNSLTJ NEW/EST MOD 60: CPT | Performed by: INTERNAL MEDICINE

## 2019-06-14 PROCEDURE — G8978 MOBILITY CURRENT STATUS: HCPCS

## 2019-06-14 RX ORDER — FUROSEMIDE 40 MG/1
80 TABLET ORAL
Status: DISCONTINUED | OUTPATIENT
Start: 2019-06-14 | End: 2019-06-17

## 2019-06-14 RX ADMIN — INSULIN LISPRO 15 UNITS: 100 INJECTION, SOLUTION INTRAVENOUS; SUBCUTANEOUS at 12:25

## 2019-06-14 RX ADMIN — LORATADINE 10 MG: 10 TABLET ORAL at 09:26

## 2019-06-14 RX ADMIN — GABAPENTIN 200 MG: 100 CAPSULE ORAL at 09:25

## 2019-06-14 RX ADMIN — INSULIN LISPRO 1 UNITS: 100 INJECTION, SOLUTION INTRAVENOUS; SUBCUTANEOUS at 09:24

## 2019-06-14 RX ADMIN — INSULIN LISPRO 1 UNITS: 100 INJECTION, SOLUTION INTRAVENOUS; SUBCUTANEOUS at 21:29

## 2019-06-14 RX ADMIN — INSULIN LISPRO 15 UNITS: 100 INJECTION, SOLUTION INTRAVENOUS; SUBCUTANEOUS at 09:24

## 2019-06-14 RX ADMIN — INSULIN GLARGINE 22 UNITS: 100 INJECTION, SOLUTION SUBCUTANEOUS at 21:29

## 2019-06-14 RX ADMIN — NIFEDIPINE 30 MG: 30 TABLET, FILM COATED, EXTENDED RELEASE ORAL at 09:25

## 2019-06-14 RX ADMIN — HYDRALAZINE HYDROCHLORIDE 75 MG: 25 TABLET ORAL at 09:25

## 2019-06-14 RX ADMIN — HYDRALAZINE HYDROCHLORIDE 75 MG: 25 TABLET ORAL at 16:16

## 2019-06-14 RX ADMIN — TOPIRAMATE 50 MG: 25 TABLET, FILM COATED ORAL at 21:25

## 2019-06-14 RX ADMIN — SERTRALINE HYDROCHLORIDE 50 MG: 50 TABLET ORAL at 09:26

## 2019-06-14 RX ADMIN — LABETALOL HCL 300 MG: 200 TABLET, FILM COATED ORAL at 21:25

## 2019-06-14 RX ADMIN — GABAPENTIN 200 MG: 100 CAPSULE ORAL at 21:26

## 2019-06-14 RX ADMIN — ACETAMINOPHEN 650 MG: 325 TABLET, FILM COATED ORAL at 20:00

## 2019-06-14 RX ADMIN — LABETALOL HCL 300 MG: 200 TABLET, FILM COATED ORAL at 09:26

## 2019-06-14 RX ADMIN — MELATONIN 3 MG: at 21:28

## 2019-06-14 RX ADMIN — FUROSEMIDE 80 MG: 40 TABLET ORAL at 18:24

## 2019-06-14 RX ADMIN — TOPIRAMATE 25 MG: 25 TABLET, FILM COATED ORAL at 09:25

## 2019-06-14 RX ADMIN — ATORVASTATIN CALCIUM 80 MG: 40 TABLET, FILM COATED ORAL at 17:59

## 2019-06-14 RX ADMIN — INSULIN LISPRO 2 UNITS: 100 INJECTION, SOLUTION INTRAVENOUS; SUBCUTANEOUS at 12:25

## 2019-06-14 RX ADMIN — VITAMIN D, TAB 1000IU (100/BT) 1000 UNITS: 25 TAB at 09:25

## 2019-06-14 RX ADMIN — HYDRALAZINE HYDROCHLORIDE 75 MG: 25 TABLET ORAL at 21:25

## 2019-06-15 LAB
ANION GAP SERPL CALCULATED.3IONS-SCNC: 11 MMOL/L (ref 4–13)
BACTERIA UR QL AUTO: ABNORMAL /HPF
BILIRUB UR QL STRIP: NEGATIVE
BUN SERPL-MCNC: 49 MG/DL (ref 5–25)
CALCIUM SERPL-MCNC: 9.1 MG/DL (ref 8.3–10.1)
CHLORIDE SERPL-SCNC: 107 MMOL/L (ref 100–108)
CLARITY UR: ABNORMAL
CO2 SERPL-SCNC: 21 MMOL/L (ref 21–32)
COARSE GRAN CASTS URNS QL MICRO: ABNORMAL /LPF
COLOR UR: YELLOW
CREAT SERPL-MCNC: 2.33 MG/DL (ref 0.6–1.3)
ERYTHROCYTE [DISTWIDTH] IN BLOOD BY AUTOMATED COUNT: 12.5 % (ref 11.6–15.1)
GFR SERPL CREATININE-BSD FRML MDRD: 23 ML/MIN/1.73SQ M
GLUCOSE SERPL-MCNC: 124 MG/DL (ref 65–140)
GLUCOSE SERPL-MCNC: 177 MG/DL (ref 65–140)
GLUCOSE SERPL-MCNC: 182 MG/DL (ref 65–140)
GLUCOSE SERPL-MCNC: 227 MG/DL (ref 65–140)
GLUCOSE SERPL-MCNC: 96 MG/DL (ref 65–140)
GLUCOSE UR STRIP-MCNC: ABNORMAL MG/DL
HCT VFR BLD AUTO: 25.9 % (ref 34.8–46.1)
HGB BLD-MCNC: 8.3 G/DL (ref 11.5–15.4)
HGB UR QL STRIP.AUTO: NEGATIVE
KETONES UR STRIP-MCNC: NEGATIVE MG/DL
LEUKOCYTE ESTERASE UR QL STRIP: ABNORMAL
MCH RBC QN AUTO: 29.4 PG (ref 26.8–34.3)
MCHC RBC AUTO-ENTMCNC: 32 G/DL (ref 31.4–37.4)
MCV RBC AUTO: 92 FL (ref 82–98)
NITRITE UR QL STRIP: NEGATIVE
NON-SQ EPI CELLS URNS QL MICRO: ABNORMAL /HPF
PH UR STRIP.AUTO: 5.5 [PH]
PLATELET # BLD AUTO: 219 THOUSANDS/UL (ref 149–390)
PMV BLD AUTO: 10.5 FL (ref 8.9–12.7)
POTASSIUM SERPL-SCNC: 4.6 MMOL/L (ref 3.5–5.3)
PROT UR STRIP-MCNC: ABNORMAL MG/DL
RBC # BLD AUTO: 2.82 MILLION/UL (ref 3.81–5.12)
RBC #/AREA URNS AUTO: ABNORMAL /HPF
SODIUM SERPL-SCNC: 139 MMOL/L (ref 136–145)
SP GR UR STRIP.AUTO: >=1.03 (ref 1–1.03)
UROBILINOGEN UR QL STRIP.AUTO: 0.2 E.U./DL
WBC # BLD AUTO: 5.42 THOUSAND/UL (ref 4.31–10.16)
WBC #/AREA URNS AUTO: ABNORMAL /HPF

## 2019-06-15 PROCEDURE — 81001 URINALYSIS AUTO W/SCOPE: CPT | Performed by: PSYCHIATRY & NEUROLOGY

## 2019-06-15 PROCEDURE — 85027 COMPLETE CBC AUTOMATED: CPT | Performed by: INTERNAL MEDICINE

## 2019-06-15 PROCEDURE — 99232 SBSQ HOSP IP/OBS MODERATE 35: CPT | Performed by: PSYCHIATRY & NEUROLOGY

## 2019-06-15 PROCEDURE — 87086 URINE CULTURE/COLONY COUNT: CPT | Performed by: PSYCHIATRY & NEUROLOGY

## 2019-06-15 PROCEDURE — 82948 REAGENT STRIP/BLOOD GLUCOSE: CPT

## 2019-06-15 PROCEDURE — 97530 THERAPEUTIC ACTIVITIES: CPT

## 2019-06-15 PROCEDURE — 80048 BASIC METABOLIC PNL TOTAL CA: CPT | Performed by: INTERNAL MEDICINE

## 2019-06-15 RX ADMIN — ACETAMINOPHEN 650 MG: 325 TABLET, FILM COATED ORAL at 10:19

## 2019-06-15 RX ADMIN — INSULIN LISPRO 1 UNITS: 100 INJECTION, SOLUTION INTRAVENOUS; SUBCUTANEOUS at 12:04

## 2019-06-15 RX ADMIN — LABETALOL HCL 300 MG: 200 TABLET, FILM COATED ORAL at 21:34

## 2019-06-15 RX ADMIN — MELATONIN 3 MG: at 21:37

## 2019-06-15 RX ADMIN — VITAMIN D, TAB 1000IU (100/BT) 1000 UNITS: 25 TAB at 09:04

## 2019-06-15 RX ADMIN — LABETALOL HCL 300 MG: 200 TABLET, FILM COATED ORAL at 09:03

## 2019-06-15 RX ADMIN — INSULIN LISPRO 1 UNITS: 100 INJECTION, SOLUTION INTRAVENOUS; SUBCUTANEOUS at 09:00

## 2019-06-15 RX ADMIN — NIFEDIPINE 30 MG: 30 TABLET, FILM COATED, EXTENDED RELEASE ORAL at 09:11

## 2019-06-15 RX ADMIN — TOPIRAMATE 50 MG: 25 TABLET, FILM COATED ORAL at 22:12

## 2019-06-15 RX ADMIN — HYDRALAZINE HYDROCHLORIDE 75 MG: 25 TABLET ORAL at 09:03

## 2019-06-15 RX ADMIN — GABAPENTIN 200 MG: 100 CAPSULE ORAL at 09:03

## 2019-06-15 RX ADMIN — ATORVASTATIN CALCIUM 80 MG: 40 TABLET, FILM COATED ORAL at 21:34

## 2019-06-15 RX ADMIN — TOPIRAMATE 25 MG: 25 TABLET, FILM COATED ORAL at 09:04

## 2019-06-15 RX ADMIN — SERTRALINE HYDROCHLORIDE 50 MG: 50 TABLET ORAL at 09:03

## 2019-06-15 RX ADMIN — HYDRALAZINE HYDROCHLORIDE 75 MG: 25 TABLET ORAL at 21:43

## 2019-06-15 RX ADMIN — FUROSEMIDE 80 MG: 40 TABLET ORAL at 16:53

## 2019-06-15 RX ADMIN — INSULIN GLARGINE 22 UNITS: 100 INJECTION, SOLUTION SUBCUTANEOUS at 21:32

## 2019-06-15 RX ADMIN — HYDRALAZINE HYDROCHLORIDE 75 MG: 25 TABLET ORAL at 16:54

## 2019-06-15 RX ADMIN — GABAPENTIN 200 MG: 100 CAPSULE ORAL at 22:13

## 2019-06-15 RX ADMIN — FUROSEMIDE 80 MG: 40 TABLET ORAL at 09:04

## 2019-06-15 RX ADMIN — LORATADINE 10 MG: 10 TABLET ORAL at 09:04

## 2019-06-16 LAB
BACTERIA UR CULT: NORMAL
GLUCOSE SERPL-MCNC: 108 MG/DL (ref 65–140)
GLUCOSE SERPL-MCNC: 170 MG/DL (ref 65–140)
GLUCOSE SERPL-MCNC: 218 MG/DL (ref 65–140)
GLUCOSE SERPL-MCNC: 283 MG/DL (ref 65–140)

## 2019-06-16 PROCEDURE — 82948 REAGENT STRIP/BLOOD GLUCOSE: CPT

## 2019-06-16 PROCEDURE — 99232 SBSQ HOSP IP/OBS MODERATE 35: CPT | Performed by: PSYCHIATRY & NEUROLOGY

## 2019-06-16 RX ORDER — QUETIAPINE FUMARATE 25 MG/1
50 TABLET, FILM COATED ORAL ONCE
Status: COMPLETED | OUTPATIENT
Start: 2019-06-16 | End: 2019-06-16

## 2019-06-16 RX ORDER — SERTRALINE HYDROCHLORIDE 100 MG/1
100 TABLET, FILM COATED ORAL DAILY
Status: DISCONTINUED | OUTPATIENT
Start: 2019-06-17 | End: 2019-06-26 | Stop reason: HOSPADM

## 2019-06-16 RX ADMIN — TOPIRAMATE 50 MG: 25 TABLET, FILM COATED ORAL at 21:23

## 2019-06-16 RX ADMIN — LABETALOL HCL 300 MG: 200 TABLET, FILM COATED ORAL at 08:24

## 2019-06-16 RX ADMIN — GABAPENTIN 200 MG: 100 CAPSULE ORAL at 21:22

## 2019-06-16 RX ADMIN — TOPIRAMATE 25 MG: 25 TABLET, FILM COATED ORAL at 08:26

## 2019-06-16 RX ADMIN — ATORVASTATIN CALCIUM 80 MG: 40 TABLET, FILM COATED ORAL at 17:00

## 2019-06-16 RX ADMIN — FUROSEMIDE 80 MG: 40 TABLET ORAL at 16:49

## 2019-06-16 RX ADMIN — INSULIN LISPRO 4 UNITS: 100 INJECTION, SOLUTION INTRAVENOUS; SUBCUTANEOUS at 11:40

## 2019-06-16 RX ADMIN — GABAPENTIN 200 MG: 100 CAPSULE ORAL at 08:26

## 2019-06-16 RX ADMIN — LABETALOL HCL 300 MG: 200 TABLET, FILM COATED ORAL at 21:22

## 2019-06-16 RX ADMIN — VITAMIN D, TAB 1000IU (100/BT) 1000 UNITS: 25 TAB at 08:26

## 2019-06-16 RX ADMIN — MELATONIN 3 MG: at 21:24

## 2019-06-16 RX ADMIN — SERTRALINE HYDROCHLORIDE 50 MG: 50 TABLET ORAL at 08:24

## 2019-06-16 RX ADMIN — LORATADINE 10 MG: 10 TABLET ORAL at 08:24

## 2019-06-16 RX ADMIN — HYDRALAZINE HYDROCHLORIDE 75 MG: 25 TABLET ORAL at 08:27

## 2019-06-16 RX ADMIN — NIFEDIPINE 30 MG: 30 TABLET, FILM COATED, EXTENDED RELEASE ORAL at 08:28

## 2019-06-16 RX ADMIN — HYDRALAZINE HYDROCHLORIDE 75 MG: 25 TABLET ORAL at 16:49

## 2019-06-16 RX ADMIN — INSULIN LISPRO 2 UNITS: 100 INJECTION, SOLUTION INTRAVENOUS; SUBCUTANEOUS at 21:22

## 2019-06-16 RX ADMIN — FUROSEMIDE 80 MG: 40 TABLET ORAL at 08:27

## 2019-06-16 RX ADMIN — INSULIN LISPRO 1 UNITS: 100 INJECTION, SOLUTION INTRAVENOUS; SUBCUTANEOUS at 08:22

## 2019-06-16 RX ADMIN — INSULIN GLARGINE 22 UNITS: 100 INJECTION, SOLUTION SUBCUTANEOUS at 21:21

## 2019-06-16 RX ADMIN — QUETIAPINE FUMARATE 50 MG: 25 TABLET ORAL at 12:33

## 2019-06-16 RX ADMIN — HYDRALAZINE HYDROCHLORIDE 75 MG: 25 TABLET ORAL at 21:23

## 2019-06-17 LAB
ALBUMIN SERPL BCP-MCNC: 3.2 G/DL (ref 3.5–5)
ALP SERPL-CCNC: 137 U/L (ref 46–116)
ALT SERPL W P-5'-P-CCNC: 27 U/L (ref 12–78)
ANION GAP SERPL CALCULATED.3IONS-SCNC: 13 MMOL/L (ref 4–13)
AST SERPL W P-5'-P-CCNC: 19 U/L (ref 5–45)
BILIRUB SERPL-MCNC: 0.2 MG/DL (ref 0.2–1)
BUN SERPL-MCNC: 58 MG/DL (ref 5–25)
CALCIUM SERPL-MCNC: 9.3 MG/DL (ref 8.3–10.1)
CHLORIDE SERPL-SCNC: 107 MMOL/L (ref 100–108)
CO2 SERPL-SCNC: 19 MMOL/L (ref 21–32)
CREAT SERPL-MCNC: 2.63 MG/DL (ref 0.6–1.3)
GFR SERPL CREATININE-BSD FRML MDRD: 20 ML/MIN/1.73SQ M
GLUCOSE SERPL-MCNC: 123 MG/DL (ref 65–140)
GLUCOSE SERPL-MCNC: 128 MG/DL (ref 65–140)
GLUCOSE SERPL-MCNC: 152 MG/DL (ref 65–140)
GLUCOSE SERPL-MCNC: 154 MG/DL (ref 65–140)
GLUCOSE SERPL-MCNC: 176 MG/DL (ref 65–140)
GLUCOSE SERPL-MCNC: 181 MG/DL (ref 65–140)
GLUCOSE SERPL-MCNC: 286 MG/DL (ref 65–140)
POTASSIUM SERPL-SCNC: 4.5 MMOL/L (ref 3.5–5.3)
PROT SERPL-MCNC: 7.3 G/DL (ref 6.4–8.2)
SODIUM SERPL-SCNC: 139 MMOL/L (ref 136–145)

## 2019-06-17 PROCEDURE — 97166 OT EVAL MOD COMPLEX 45 MIN: CPT

## 2019-06-17 PROCEDURE — 99232 SBSQ HOSP IP/OBS MODERATE 35: CPT | Performed by: INTERNAL MEDICINE

## 2019-06-17 PROCEDURE — G8988 SELF CARE GOAL STATUS: HCPCS

## 2019-06-17 PROCEDURE — 82948 REAGENT STRIP/BLOOD GLUCOSE: CPT

## 2019-06-17 PROCEDURE — G8987 SELF CARE CURRENT STATUS: HCPCS

## 2019-06-17 PROCEDURE — 97116 GAIT TRAINING THERAPY: CPT

## 2019-06-17 PROCEDURE — 80053 COMPREHEN METABOLIC PANEL: CPT | Performed by: PSYCHIATRY & NEUROLOGY

## 2019-06-17 PROCEDURE — 99232 SBSQ HOSP IP/OBS MODERATE 35: CPT | Performed by: PSYCHIATRY & NEUROLOGY

## 2019-06-17 RX ORDER — TORSEMIDE 20 MG/1
40 TABLET ORAL DAILY
Status: DISCONTINUED | OUTPATIENT
Start: 2019-06-17 | End: 2019-06-17

## 2019-06-17 RX ORDER — TORSEMIDE 20 MG/1
40 TABLET ORAL DAILY
Status: DISCONTINUED | OUTPATIENT
Start: 2019-06-18 | End: 2019-06-18

## 2019-06-17 RX ADMIN — NIFEDIPINE 30 MG: 30 TABLET, FILM COATED, EXTENDED RELEASE ORAL at 08:02

## 2019-06-17 RX ADMIN — LORATADINE 10 MG: 10 TABLET ORAL at 08:01

## 2019-06-17 RX ADMIN — LABETALOL HCL 300 MG: 200 TABLET, FILM COATED ORAL at 08:00

## 2019-06-17 RX ADMIN — INSULIN GLARGINE 22 UNITS: 100 INJECTION, SOLUTION SUBCUTANEOUS at 21:11

## 2019-06-17 RX ADMIN — GABAPENTIN 200 MG: 100 CAPSULE ORAL at 21:08

## 2019-06-17 RX ADMIN — VITAMIN D, TAB 1000IU (100/BT) 1000 UNITS: 25 TAB at 08:01

## 2019-06-17 RX ADMIN — FUROSEMIDE 80 MG: 40 TABLET ORAL at 08:00

## 2019-06-17 RX ADMIN — GABAPENTIN 200 MG: 100 CAPSULE ORAL at 08:06

## 2019-06-17 RX ADMIN — TOPIRAMATE 50 MG: 25 TABLET, FILM COATED ORAL at 21:08

## 2019-06-17 RX ADMIN — ACETAMINOPHEN 650 MG: 325 TABLET, FILM COATED ORAL at 08:08

## 2019-06-17 RX ADMIN — MELATONIN 3 MG: at 21:13

## 2019-06-17 RX ADMIN — TOPIRAMATE 25 MG: 25 TABLET, FILM COATED ORAL at 08:00

## 2019-06-17 RX ADMIN — HYDRALAZINE HYDROCHLORIDE 75 MG: 25 TABLET ORAL at 21:10

## 2019-06-17 RX ADMIN — ATORVASTATIN CALCIUM 80 MG: 40 TABLET, FILM COATED ORAL at 17:46

## 2019-06-17 RX ADMIN — HYDRALAZINE HYDROCHLORIDE 75 MG: 25 TABLET ORAL at 08:02

## 2019-06-17 RX ADMIN — ACETAMINOPHEN 650 MG: 325 TABLET, FILM COATED ORAL at 00:51

## 2019-06-17 RX ADMIN — SERTRALINE HYDROCHLORIDE 100 MG: 100 TABLET ORAL at 08:01

## 2019-06-17 RX ADMIN — LABETALOL HCL 300 MG: 200 TABLET, FILM COATED ORAL at 21:08

## 2019-06-17 RX ADMIN — INSULIN LISPRO 1 UNITS: 100 INJECTION, SOLUTION INTRAVENOUS; SUBCUTANEOUS at 08:04

## 2019-06-17 RX ADMIN — INSULIN LISPRO 1 UNITS: 100 INJECTION, SOLUTION INTRAVENOUS; SUBCUTANEOUS at 16:56

## 2019-06-17 RX ADMIN — HYDRALAZINE HYDROCHLORIDE 75 MG: 25 TABLET ORAL at 16:15

## 2019-06-18 ENCOUNTER — APPOINTMENT (INPATIENT)
Dept: RADIOLOGY | Facility: HOSPITAL | Age: 54
DRG: 751 | End: 2019-06-18
Payer: COMMERCIAL

## 2019-06-18 LAB
ANION GAP SERPL CALCULATED.3IONS-SCNC: 9 MMOL/L (ref 4–13)
BASOPHILS # BLD AUTO: 0.03 THOUSANDS/ΜL (ref 0–0.1)
BASOPHILS NFR BLD AUTO: 1 % (ref 0–1)
BUN SERPL-MCNC: 57 MG/DL (ref 5–25)
CALCIUM SERPL-MCNC: 8.8 MG/DL (ref 8.3–10.1)
CHLORIDE SERPL-SCNC: 108 MMOL/L (ref 100–108)
CO2 SERPL-SCNC: 22 MMOL/L (ref 21–32)
CREAT SERPL-MCNC: 2.56 MG/DL (ref 0.6–1.3)
EOSINOPHIL # BLD AUTO: 0.21 THOUSAND/ΜL (ref 0–0.61)
EOSINOPHIL NFR BLD AUTO: 4 % (ref 0–6)
ERYTHROCYTE [DISTWIDTH] IN BLOOD BY AUTOMATED COUNT: 12.5 % (ref 11.6–15.1)
GFR SERPL CREATININE-BSD FRML MDRD: 21 ML/MIN/1.73SQ M
GLUCOSE P FAST SERPL-MCNC: 141 MG/DL (ref 65–99)
GLUCOSE SERPL-MCNC: 139 MG/DL (ref 65–140)
GLUCOSE SERPL-MCNC: 141 MG/DL (ref 65–140)
GLUCOSE SERPL-MCNC: 153 MG/DL (ref 65–140)
GLUCOSE SERPL-MCNC: 91 MG/DL (ref 65–140)
GLUCOSE SERPL-MCNC: 99 MG/DL (ref 65–140)
HCT VFR BLD AUTO: 24.7 % (ref 34.8–46.1)
HGB BLD-MCNC: 8.2 G/DL (ref 11.5–15.4)
IMM GRANULOCYTES # BLD AUTO: 0.02 THOUSAND/UL (ref 0–0.2)
IMM GRANULOCYTES NFR BLD AUTO: 0 % (ref 0–2)
LYMPHOCYTES # BLD AUTO: 1.48 THOUSANDS/ΜL (ref 0.6–4.47)
LYMPHOCYTES NFR BLD AUTO: 28 % (ref 14–44)
MCH RBC QN AUTO: 29.6 PG (ref 26.8–34.3)
MCHC RBC AUTO-ENTMCNC: 33.2 G/DL (ref 31.4–37.4)
MCV RBC AUTO: 89 FL (ref 82–98)
MONOCYTES # BLD AUTO: 0.52 THOUSAND/ΜL (ref 0.17–1.22)
MONOCYTES NFR BLD AUTO: 10 % (ref 4–12)
NEUTROPHILS # BLD AUTO: 3.11 THOUSANDS/ΜL (ref 1.85–7.62)
NEUTS SEG NFR BLD AUTO: 57 % (ref 43–75)
NRBC BLD AUTO-RTO: 0 /100 WBCS
PLATELET # BLD AUTO: 217 THOUSANDS/UL (ref 149–390)
PMV BLD AUTO: 10.4 FL (ref 8.9–12.7)
POTASSIUM SERPL-SCNC: 4.5 MMOL/L (ref 3.5–5.3)
RBC # BLD AUTO: 2.77 MILLION/UL (ref 3.81–5.12)
SODIUM SERPL-SCNC: 139 MMOL/L (ref 136–145)
WBC # BLD AUTO: 5.37 THOUSAND/UL (ref 4.31–10.16)

## 2019-06-18 PROCEDURE — 85025 COMPLETE CBC W/AUTO DIFF WBC: CPT | Performed by: PHYSICIAN ASSISTANT

## 2019-06-18 PROCEDURE — 82948 REAGENT STRIP/BLOOD GLUCOSE: CPT

## 2019-06-18 PROCEDURE — 99232 SBSQ HOSP IP/OBS MODERATE 35: CPT | Performed by: PSYCHIATRY & NEUROLOGY

## 2019-06-18 PROCEDURE — 80048 BASIC METABOLIC PNL TOTAL CA: CPT | Performed by: PHYSICIAN ASSISTANT

## 2019-06-18 PROCEDURE — 71045 X-RAY EXAM CHEST 1 VIEW: CPT

## 2019-06-18 PROCEDURE — 99232 SBSQ HOSP IP/OBS MODERATE 35: CPT | Performed by: INTERNAL MEDICINE

## 2019-06-18 RX ORDER — HYDRALAZINE HYDROCHLORIDE 25 MG/1
50 TABLET, FILM COATED ORAL 3 TIMES DAILY
Status: DISCONTINUED | OUTPATIENT
Start: 2019-06-18 | End: 2019-06-18

## 2019-06-18 RX ORDER — BENZONATATE 100 MG/1
100 CAPSULE ORAL 3 TIMES DAILY PRN
Status: DISCONTINUED | OUTPATIENT
Start: 2019-06-18 | End: 2019-06-26 | Stop reason: HOSPADM

## 2019-06-18 RX ORDER — GUAIFENESIN 100 MG/5ML
200 SOLUTION ORAL EVERY 4 HOURS PRN
Status: DISCONTINUED | OUTPATIENT
Start: 2019-06-18 | End: 2019-06-26 | Stop reason: HOSPADM

## 2019-06-18 RX ORDER — TORSEMIDE 20 MG/1
60 TABLET ORAL
Status: DISCONTINUED | OUTPATIENT
Start: 2019-06-18 | End: 2019-06-18

## 2019-06-18 RX ORDER — HYDRALAZINE HYDROCHLORIDE 25 MG/1
50 TABLET, FILM COATED ORAL 3 TIMES DAILY
Status: DISCONTINUED | OUTPATIENT
Start: 2019-06-18 | End: 2019-06-26 | Stop reason: HOSPADM

## 2019-06-18 RX ORDER — TORSEMIDE 20 MG/1
40 TABLET ORAL
Status: DISCONTINUED | OUTPATIENT
Start: 2019-06-18 | End: 2019-06-26 | Stop reason: HOSPADM

## 2019-06-18 RX ORDER — GUAIFENESIN 600 MG
600 TABLET, EXTENDED RELEASE 12 HR ORAL EVERY 12 HOURS SCHEDULED
Status: DISCONTINUED | OUTPATIENT
Start: 2019-06-18 | End: 2019-06-18

## 2019-06-18 RX ORDER — LABETALOL 200 MG/1
300 TABLET, FILM COATED ORAL EVERY 12 HOURS SCHEDULED
Status: DISCONTINUED | OUTPATIENT
Start: 2019-06-18 | End: 2019-06-26 | Stop reason: HOSPADM

## 2019-06-18 RX ORDER — NIFEDIPINE 30 MG/1
30 TABLET, EXTENDED RELEASE ORAL DAILY
Status: DISCONTINUED | OUTPATIENT
Start: 2019-06-19 | End: 2019-06-26 | Stop reason: HOSPADM

## 2019-06-18 RX ORDER — TORSEMIDE 20 MG/1
40 TABLET ORAL
Status: DISCONTINUED | OUTPATIENT
Start: 2019-06-18 | End: 2019-06-18

## 2019-06-18 RX ADMIN — GABAPENTIN 200 MG: 100 CAPSULE ORAL at 09:08

## 2019-06-18 RX ADMIN — SERTRALINE HYDROCHLORIDE 100 MG: 100 TABLET ORAL at 09:09

## 2019-06-18 RX ADMIN — ATORVASTATIN CALCIUM 80 MG: 40 TABLET, FILM COATED ORAL at 17:00

## 2019-06-18 RX ADMIN — VITAMIN D, TAB 1000IU (100/BT) 1000 UNITS: 25 TAB at 09:08

## 2019-06-18 RX ADMIN — MELATONIN 3 MG: at 21:24

## 2019-06-18 RX ADMIN — TORSEMIDE 40 MG: 20 TABLET ORAL at 09:08

## 2019-06-18 RX ADMIN — ACETAMINOPHEN 650 MG: 325 TABLET, FILM COATED ORAL at 07:59

## 2019-06-18 RX ADMIN — HYDRALAZINE HYDROCHLORIDE 50 MG: 25 TABLET ORAL at 21:20

## 2019-06-18 RX ADMIN — INSULIN GLARGINE 22 UNITS: 100 INJECTION, SOLUTION SUBCUTANEOUS at 21:20

## 2019-06-18 RX ADMIN — TOPIRAMATE 25 MG: 25 TABLET, FILM COATED ORAL at 09:09

## 2019-06-18 RX ADMIN — GABAPENTIN 200 MG: 100 CAPSULE ORAL at 21:20

## 2019-06-18 RX ADMIN — LORATADINE 10 MG: 10 TABLET ORAL at 09:09

## 2019-06-18 RX ADMIN — TORSEMIDE 40 MG: 20 TABLET ORAL at 17:00

## 2019-06-18 RX ADMIN — ACETAMINOPHEN 650 MG: 325 TABLET, FILM COATED ORAL at 17:30

## 2019-06-18 RX ADMIN — HYDRALAZINE HYDROCHLORIDE 75 MG: 25 TABLET ORAL at 09:09

## 2019-06-18 RX ADMIN — BENZONATATE 100 MG: 100 CAPSULE ORAL at 21:24

## 2019-06-18 RX ADMIN — LABETALOL HCL 300 MG: 200 TABLET, FILM COATED ORAL at 21:18

## 2019-06-18 RX ADMIN — GUAIFENESIN 600 MG: 600 TABLET ORAL at 17:00

## 2019-06-18 RX ADMIN — NIFEDIPINE 30 MG: 30 TABLET, FILM COATED, EXTENDED RELEASE ORAL at 09:09

## 2019-06-18 RX ADMIN — INSULIN LISPRO 1 UNITS: 100 INJECTION, SOLUTION INTRAVENOUS; SUBCUTANEOUS at 12:00

## 2019-06-18 RX ADMIN — HYDRALAZINE HYDROCHLORIDE 50 MG: 25 TABLET ORAL at 17:00

## 2019-06-18 RX ADMIN — LABETALOL HCL 300 MG: 200 TABLET, FILM COATED ORAL at 09:08

## 2019-06-19 LAB
ANION GAP SERPL CALCULATED.3IONS-SCNC: 8 MMOL/L (ref 4–13)
BUN SERPL-MCNC: 57 MG/DL (ref 5–25)
CALCIUM SERPL-MCNC: 8.5 MG/DL (ref 8.3–10.1)
CHLORIDE SERPL-SCNC: 102 MMOL/L (ref 100–108)
CO2 SERPL-SCNC: 23 MMOL/L (ref 21–32)
CREAT SERPL-MCNC: 2.81 MG/DL (ref 0.6–1.3)
GFR SERPL CREATININE-BSD FRML MDRD: 18 ML/MIN/1.73SQ M
GLUCOSE P FAST SERPL-MCNC: 117 MG/DL (ref 65–99)
GLUCOSE SERPL-MCNC: 110 MG/DL (ref 65–140)
GLUCOSE SERPL-MCNC: 117 MG/DL (ref 65–140)
GLUCOSE SERPL-MCNC: 145 MG/DL (ref 65–140)
GLUCOSE SERPL-MCNC: 154 MG/DL (ref 65–140)
GLUCOSE SERPL-MCNC: 91 MG/DL (ref 65–140)
POTASSIUM SERPL-SCNC: 4 MMOL/L (ref 3.5–5.3)
SODIUM SERPL-SCNC: 133 MMOL/L (ref 136–145)

## 2019-06-19 PROCEDURE — 97530 THERAPEUTIC ACTIVITIES: CPT

## 2019-06-19 PROCEDURE — 99232 SBSQ HOSP IP/OBS MODERATE 35: CPT | Performed by: PSYCHIATRY & NEUROLOGY

## 2019-06-19 PROCEDURE — 80048 BASIC METABOLIC PNL TOTAL CA: CPT | Performed by: INTERNAL MEDICINE

## 2019-06-19 PROCEDURE — 82948 REAGENT STRIP/BLOOD GLUCOSE: CPT

## 2019-06-19 PROCEDURE — 99232 SBSQ HOSP IP/OBS MODERATE 35: CPT | Performed by: INTERNAL MEDICINE

## 2019-06-19 PROCEDURE — 97116 GAIT TRAINING THERAPY: CPT

## 2019-06-19 RX ADMIN — GUAIFENESIN 200 MG: 100 SOLUTION ORAL at 04:59

## 2019-06-19 RX ADMIN — ATORVASTATIN CALCIUM 80 MG: 40 TABLET, FILM COATED ORAL at 17:49

## 2019-06-19 RX ADMIN — HYDRALAZINE HYDROCHLORIDE 50 MG: 25 TABLET ORAL at 09:01

## 2019-06-19 RX ADMIN — BENZONATATE 100 MG: 100 CAPSULE ORAL at 21:27

## 2019-06-19 RX ADMIN — HYDRALAZINE HYDROCHLORIDE 50 MG: 25 TABLET ORAL at 17:11

## 2019-06-19 RX ADMIN — GABAPENTIN 200 MG: 100 CAPSULE ORAL at 21:20

## 2019-06-19 RX ADMIN — ACETAMINOPHEN 650 MG: 325 TABLET, FILM COATED ORAL at 14:24

## 2019-06-19 RX ADMIN — BENZONATATE 100 MG: 100 CAPSULE ORAL at 05:01

## 2019-06-19 RX ADMIN — TORSEMIDE 40 MG: 20 TABLET ORAL at 09:02

## 2019-06-19 RX ADMIN — INSULIN GLARGINE 22 UNITS: 100 INJECTION, SOLUTION SUBCUTANEOUS at 21:21

## 2019-06-19 RX ADMIN — BENZONATATE 100 MG: 100 CAPSULE ORAL at 14:24

## 2019-06-19 RX ADMIN — ACETAMINOPHEN 650 MG: 325 TABLET, FILM COATED ORAL at 21:26

## 2019-06-19 RX ADMIN — LABETALOL HCL 300 MG: 200 TABLET, FILM COATED ORAL at 21:20

## 2019-06-19 RX ADMIN — LABETALOL HCL 300 MG: 200 TABLET, FILM COATED ORAL at 09:02

## 2019-06-19 RX ADMIN — LORATADINE 10 MG: 10 TABLET ORAL at 09:01

## 2019-06-19 RX ADMIN — TORSEMIDE 40 MG: 20 TABLET ORAL at 17:11

## 2019-06-19 RX ADMIN — GUAIFENESIN 200 MG: 100 SOLUTION ORAL at 14:24

## 2019-06-19 RX ADMIN — NIFEDIPINE 30 MG: 30 TABLET, FILM COATED, EXTENDED RELEASE ORAL at 09:01

## 2019-06-19 RX ADMIN — ACETAMINOPHEN 650 MG: 325 TABLET, FILM COATED ORAL at 06:25

## 2019-06-19 RX ADMIN — SERTRALINE HYDROCHLORIDE 100 MG: 100 TABLET ORAL at 09:03

## 2019-06-19 RX ADMIN — GUAIFENESIN 200 MG: 100 SOLUTION ORAL at 21:27

## 2019-06-19 RX ADMIN — INSULIN LISPRO 1 UNITS: 100 INJECTION, SOLUTION INTRAVENOUS; SUBCUTANEOUS at 21:21

## 2019-06-19 RX ADMIN — GABAPENTIN 200 MG: 100 CAPSULE ORAL at 09:01

## 2019-06-19 RX ADMIN — VITAMIN D, TAB 1000IU (100/BT) 1000 UNITS: 25 TAB at 09:01

## 2019-06-19 RX ADMIN — HYDRALAZINE HYDROCHLORIDE 50 MG: 25 TABLET ORAL at 21:20

## 2019-06-20 LAB
GLUCOSE SERPL-MCNC: 102 MG/DL (ref 65–140)
GLUCOSE SERPL-MCNC: 103 MG/DL (ref 65–140)
GLUCOSE SERPL-MCNC: 149 MG/DL (ref 65–140)
GLUCOSE SERPL-MCNC: 155 MG/DL (ref 65–140)

## 2019-06-20 PROCEDURE — 99232 SBSQ HOSP IP/OBS MODERATE 35: CPT | Performed by: PSYCHIATRY & NEUROLOGY

## 2019-06-20 PROCEDURE — 82948 REAGENT STRIP/BLOOD GLUCOSE: CPT

## 2019-06-20 PROCEDURE — 99232 SBSQ HOSP IP/OBS MODERATE 35: CPT | Performed by: INTERNAL MEDICINE

## 2019-06-20 RX ADMIN — TORSEMIDE 40 MG: 20 TABLET ORAL at 16:48

## 2019-06-20 RX ADMIN — GABAPENTIN 200 MG: 100 CAPSULE ORAL at 21:23

## 2019-06-20 RX ADMIN — LABETALOL HCL 300 MG: 200 TABLET, FILM COATED ORAL at 09:12

## 2019-06-20 RX ADMIN — LORATADINE 10 MG: 10 TABLET ORAL at 09:12

## 2019-06-20 RX ADMIN — GUAIFENESIN 200 MG: 100 SOLUTION ORAL at 21:27

## 2019-06-20 RX ADMIN — ACETAMINOPHEN 650 MG: 325 TABLET, FILM COATED ORAL at 07:28

## 2019-06-20 RX ADMIN — ATORVASTATIN CALCIUM 80 MG: 40 TABLET, FILM COATED ORAL at 18:03

## 2019-06-20 RX ADMIN — TORSEMIDE 40 MG: 20 TABLET ORAL at 09:14

## 2019-06-20 RX ADMIN — BENZONATATE 100 MG: 100 CAPSULE ORAL at 16:50

## 2019-06-20 RX ADMIN — VITAMIN D, TAB 1000IU (100/BT) 1000 UNITS: 25 TAB at 09:13

## 2019-06-20 RX ADMIN — LABETALOL HCL 300 MG: 200 TABLET, FILM COATED ORAL at 21:22

## 2019-06-20 RX ADMIN — GABAPENTIN 200 MG: 100 CAPSULE ORAL at 09:12

## 2019-06-20 RX ADMIN — INSULIN GLARGINE 22 UNITS: 100 INJECTION, SOLUTION SUBCUTANEOUS at 21:23

## 2019-06-20 RX ADMIN — HYDRALAZINE HYDROCHLORIDE 50 MG: 25 TABLET ORAL at 21:23

## 2019-06-20 RX ADMIN — BENZONATATE 100 MG: 100 CAPSULE ORAL at 04:04

## 2019-06-20 RX ADMIN — SERTRALINE HYDROCHLORIDE 100 MG: 100 TABLET ORAL at 09:13

## 2019-06-20 RX ADMIN — HYDRALAZINE HYDROCHLORIDE 50 MG: 25 TABLET ORAL at 16:48

## 2019-06-20 RX ADMIN — GUAIFENESIN 200 MG: 100 SOLUTION ORAL at 04:04

## 2019-06-20 RX ADMIN — HYDRALAZINE HYDROCHLORIDE 50 MG: 25 TABLET ORAL at 09:13

## 2019-06-20 RX ADMIN — INSULIN LISPRO 1 UNITS: 100 INJECTION, SOLUTION INTRAVENOUS; SUBCUTANEOUS at 09:10

## 2019-06-20 RX ADMIN — NIFEDIPINE 30 MG: 30 TABLET, FILM COATED, EXTENDED RELEASE ORAL at 09:14

## 2019-06-20 RX ADMIN — MELATONIN 3 MG: at 21:25

## 2019-06-21 LAB
ANION GAP SERPL CALCULATED.3IONS-SCNC: 13 MMOL/L (ref 4–13)
BUN SERPL-MCNC: 58 MG/DL (ref 5–25)
CALCIUM SERPL-MCNC: 9 MG/DL (ref 8.3–10.1)
CHLORIDE SERPL-SCNC: 105 MMOL/L (ref 100–108)
CO2 SERPL-SCNC: 21 MMOL/L (ref 21–32)
CREAT SERPL-MCNC: 2.6 MG/DL (ref 0.6–1.3)
GFR SERPL CREATININE-BSD FRML MDRD: 20 ML/MIN/1.73SQ M
GLUCOSE SERPL-MCNC: 119 MG/DL (ref 65–140)
GLUCOSE SERPL-MCNC: 130 MG/DL (ref 65–140)
GLUCOSE SERPL-MCNC: 143 MG/DL (ref 65–140)
GLUCOSE SERPL-MCNC: 211 MG/DL (ref 65–140)
GLUCOSE SERPL-MCNC: 64 MG/DL (ref 65–140)
GLUCOSE SERPL-MCNC: 84 MG/DL (ref 65–140)
POTASSIUM SERPL-SCNC: 3.8 MMOL/L (ref 3.5–5.3)
SODIUM SERPL-SCNC: 139 MMOL/L (ref 136–145)

## 2019-06-21 PROCEDURE — 82948 REAGENT STRIP/BLOOD GLUCOSE: CPT

## 2019-06-21 PROCEDURE — 80048 BASIC METABOLIC PNL TOTAL CA: CPT | Performed by: INTERNAL MEDICINE

## 2019-06-21 PROCEDURE — 99232 SBSQ HOSP IP/OBS MODERATE 35: CPT | Performed by: INTERNAL MEDICINE

## 2019-06-21 PROCEDURE — 99232 SBSQ HOSP IP/OBS MODERATE 35: CPT | Performed by: PSYCHIATRY & NEUROLOGY

## 2019-06-21 RX ORDER — ECHINACEA PURPUREA EXTRACT 125 MG
1 TABLET ORAL
Status: DISCONTINUED | OUTPATIENT
Start: 2019-06-21 | End: 2019-06-26 | Stop reason: HOSPADM

## 2019-06-21 RX ORDER — ACETAMINOPHEN 325 MG/1
650 TABLET ORAL EVERY 6 HOURS PRN
Status: DISCONTINUED | OUTPATIENT
Start: 2019-06-21 | End: 2019-06-26 | Stop reason: HOSPADM

## 2019-06-21 RX ADMIN — GUAIFENESIN 200 MG: 100 SOLUTION ORAL at 21:26

## 2019-06-21 RX ADMIN — TORSEMIDE 40 MG: 20 TABLET ORAL at 09:26

## 2019-06-21 RX ADMIN — INSULIN GLARGINE 22 UNITS: 100 INJECTION, SOLUTION SUBCUTANEOUS at 21:21

## 2019-06-21 RX ADMIN — SERTRALINE HYDROCHLORIDE 100 MG: 100 TABLET ORAL at 09:26

## 2019-06-21 RX ADMIN — BENZONATATE 100 MG: 100 CAPSULE ORAL at 00:31

## 2019-06-21 RX ADMIN — LABETALOL HCL 300 MG: 200 TABLET, FILM COATED ORAL at 21:20

## 2019-06-21 RX ADMIN — GUAIFENESIN 200 MG: 100 SOLUTION ORAL at 11:01

## 2019-06-21 RX ADMIN — MELATONIN 3 MG: at 21:24

## 2019-06-21 RX ADMIN — ACETAMINOPHEN 650 MG: 325 TABLET, FILM COATED ORAL at 00:07

## 2019-06-21 RX ADMIN — SALINE NASAL SPRAY 1 SPRAY: 1.5 SOLUTION NASAL at 16:45

## 2019-06-21 RX ADMIN — BENZONATATE 100 MG: 100 CAPSULE ORAL at 21:25

## 2019-06-21 RX ADMIN — HYDRALAZINE HYDROCHLORIDE 50 MG: 25 TABLET ORAL at 09:26

## 2019-06-21 RX ADMIN — HYDRALAZINE HYDROCHLORIDE 50 MG: 25 TABLET ORAL at 21:21

## 2019-06-21 RX ADMIN — ACETAMINOPHEN 650 MG: 325 TABLET, FILM COATED ORAL at 17:04

## 2019-06-21 RX ADMIN — SALINE NASAL SPRAY 1 SPRAY: 1.5 SOLUTION NASAL at 13:55

## 2019-06-21 RX ADMIN — SALINE NASAL SPRAY 1 SPRAY: 1.5 SOLUTION NASAL at 21:25

## 2019-06-21 RX ADMIN — VITAMIN D, TAB 1000IU (100/BT) 1000 UNITS: 25 TAB at 09:26

## 2019-06-21 RX ADMIN — INSULIN LISPRO 2 UNITS: 100 INJECTION, SOLUTION INTRAVENOUS; SUBCUTANEOUS at 12:03

## 2019-06-21 RX ADMIN — GABAPENTIN 200 MG: 100 CAPSULE ORAL at 09:26

## 2019-06-21 RX ADMIN — HYDRALAZINE HYDROCHLORIDE 50 MG: 25 TABLET ORAL at 16:32

## 2019-06-21 RX ADMIN — ATORVASTATIN CALCIUM 80 MG: 40 TABLET, FILM COATED ORAL at 17:04

## 2019-06-21 RX ADMIN — NIFEDIPINE 30 MG: 30 TABLET, FILM COATED, EXTENDED RELEASE ORAL at 09:26

## 2019-06-21 RX ADMIN — LORATADINE 10 MG: 10 TABLET ORAL at 09:26

## 2019-06-21 RX ADMIN — TORSEMIDE 40 MG: 20 TABLET ORAL at 16:33

## 2019-06-21 RX ADMIN — LABETALOL HCL 300 MG: 200 TABLET, FILM COATED ORAL at 09:25

## 2019-06-21 RX ADMIN — GABAPENTIN 200 MG: 100 CAPSULE ORAL at 21:20

## 2019-06-22 LAB
ANION GAP SERPL CALCULATED.3IONS-SCNC: 11 MMOL/L (ref 4–13)
BUN SERPL-MCNC: 56 MG/DL (ref 5–25)
CALCIUM SERPL-MCNC: 9 MG/DL (ref 8.3–10.1)
CHLORIDE SERPL-SCNC: 104 MMOL/L (ref 100–108)
CO2 SERPL-SCNC: 21 MMOL/L (ref 21–32)
CREAT SERPL-MCNC: 2.59 MG/DL (ref 0.6–1.3)
GFR SERPL CREATININE-BSD FRML MDRD: 20 ML/MIN/1.73SQ M
GLUCOSE P FAST SERPL-MCNC: 160 MG/DL (ref 65–99)
GLUCOSE SERPL-MCNC: 104 MG/DL (ref 65–140)
GLUCOSE SERPL-MCNC: 155 MG/DL (ref 65–140)
GLUCOSE SERPL-MCNC: 156 MG/DL (ref 65–140)
GLUCOSE SERPL-MCNC: 160 MG/DL (ref 65–140)
GLUCOSE SERPL-MCNC: 185 MG/DL (ref 65–140)
POTASSIUM SERPL-SCNC: 3.7 MMOL/L (ref 3.5–5.3)
SODIUM SERPL-SCNC: 136 MMOL/L (ref 136–145)

## 2019-06-22 PROCEDURE — 99232 SBSQ HOSP IP/OBS MODERATE 35: CPT | Performed by: PSYCHIATRY & NEUROLOGY

## 2019-06-22 PROCEDURE — 82948 REAGENT STRIP/BLOOD GLUCOSE: CPT

## 2019-06-22 PROCEDURE — 80048 BASIC METABOLIC PNL TOTAL CA: CPT | Performed by: INTERNAL MEDICINE

## 2019-06-22 PROCEDURE — 99232 SBSQ HOSP IP/OBS MODERATE 35: CPT | Performed by: INTERNAL MEDICINE

## 2019-06-22 RX ADMIN — INSULIN LISPRO 1 UNITS: 100 INJECTION, SOLUTION INTRAVENOUS; SUBCUTANEOUS at 21:22

## 2019-06-22 RX ADMIN — LORATADINE 10 MG: 10 TABLET ORAL at 08:55

## 2019-06-22 RX ADMIN — BENZONATATE 100 MG: 100 CAPSULE ORAL at 03:48

## 2019-06-22 RX ADMIN — GUAIFENESIN 200 MG: 100 SOLUTION ORAL at 03:48

## 2019-06-22 RX ADMIN — SALINE NASAL SPRAY 1 SPRAY: 1.5 SOLUTION NASAL at 21:21

## 2019-06-22 RX ADMIN — SALINE NASAL SPRAY 1 SPRAY: 1.5 SOLUTION NASAL at 12:02

## 2019-06-22 RX ADMIN — HYDRALAZINE HYDROCHLORIDE 50 MG: 25 TABLET ORAL at 08:54

## 2019-06-22 RX ADMIN — NIFEDIPINE 30 MG: 30 TABLET, FILM COATED, EXTENDED RELEASE ORAL at 08:54

## 2019-06-22 RX ADMIN — MELATONIN 3 MG: at 21:18

## 2019-06-22 RX ADMIN — TORSEMIDE 40 MG: 20 TABLET ORAL at 16:13

## 2019-06-22 RX ADMIN — SERTRALINE HYDROCHLORIDE 100 MG: 100 TABLET ORAL at 08:55

## 2019-06-22 RX ADMIN — LABETALOL HCL 300 MG: 200 TABLET, FILM COATED ORAL at 21:18

## 2019-06-22 RX ADMIN — GUAIFENESIN 200 MG: 100 SOLUTION ORAL at 21:19

## 2019-06-22 RX ADMIN — HYDRALAZINE HYDROCHLORIDE 50 MG: 25 TABLET ORAL at 21:19

## 2019-06-22 RX ADMIN — INSULIN LISPRO 1 UNITS: 100 INJECTION, SOLUTION INTRAVENOUS; SUBCUTANEOUS at 08:56

## 2019-06-22 RX ADMIN — BENZONATATE 100 MG: 100 CAPSULE ORAL at 17:14

## 2019-06-22 RX ADMIN — GABAPENTIN 200 MG: 100 CAPSULE ORAL at 21:19

## 2019-06-22 RX ADMIN — ATORVASTATIN CALCIUM 80 MG: 40 TABLET, FILM COATED ORAL at 17:10

## 2019-06-22 RX ADMIN — ACETAMINOPHEN 650 MG: 325 TABLET, FILM COATED ORAL at 18:57

## 2019-06-22 RX ADMIN — TORSEMIDE 40 MG: 20 TABLET ORAL at 08:54

## 2019-06-22 RX ADMIN — GUAIFENESIN 200 MG: 100 SOLUTION ORAL at 14:13

## 2019-06-22 RX ADMIN — LABETALOL HCL 300 MG: 200 TABLET, FILM COATED ORAL at 08:53

## 2019-06-22 RX ADMIN — INSULIN LISPRO 1 UNITS: 100 INJECTION, SOLUTION INTRAVENOUS; SUBCUTANEOUS at 12:00

## 2019-06-22 RX ADMIN — SALINE NASAL SPRAY 1 SPRAY: 1.5 SOLUTION NASAL at 08:59

## 2019-06-22 RX ADMIN — HYDRALAZINE HYDROCHLORIDE 50 MG: 25 TABLET ORAL at 16:13

## 2019-06-22 RX ADMIN — GABAPENTIN 200 MG: 100 CAPSULE ORAL at 08:54

## 2019-06-22 RX ADMIN — INSULIN GLARGINE 22 UNITS: 100 INJECTION, SOLUTION SUBCUTANEOUS at 21:22

## 2019-06-22 RX ADMIN — VITAMIN D, TAB 1000IU (100/BT) 1000 UNITS: 25 TAB at 08:55

## 2019-06-22 RX ADMIN — BENZONATATE 100 MG: 100 CAPSULE ORAL at 21:18

## 2019-06-23 LAB
GLUCOSE SERPL-MCNC: 132 MG/DL (ref 65–140)
GLUCOSE SERPL-MCNC: 150 MG/DL (ref 65–140)
GLUCOSE SERPL-MCNC: 207 MG/DL (ref 65–140)
GLUCOSE SERPL-MCNC: 74 MG/DL (ref 65–140)

## 2019-06-23 PROCEDURE — 82948 REAGENT STRIP/BLOOD GLUCOSE: CPT

## 2019-06-23 PROCEDURE — 99232 SBSQ HOSP IP/OBS MODERATE 35: CPT | Performed by: PSYCHIATRY & NEUROLOGY

## 2019-06-23 RX ADMIN — GABAPENTIN 200 MG: 100 CAPSULE ORAL at 21:22

## 2019-06-23 RX ADMIN — LABETALOL HCL 300 MG: 200 TABLET, FILM COATED ORAL at 09:14

## 2019-06-23 RX ADMIN — INSULIN GLARGINE 22 UNITS: 100 INJECTION, SOLUTION SUBCUTANEOUS at 21:26

## 2019-06-23 RX ADMIN — INSULIN LISPRO 1 UNITS: 100 INJECTION, SOLUTION INTRAVENOUS; SUBCUTANEOUS at 09:18

## 2019-06-23 RX ADMIN — BENZONATATE 100 MG: 100 CAPSULE ORAL at 21:21

## 2019-06-23 RX ADMIN — ATORVASTATIN CALCIUM 80 MG: 40 TABLET, FILM COATED ORAL at 17:16

## 2019-06-23 RX ADMIN — SALINE NASAL SPRAY 1 SPRAY: 1.5 SOLUTION NASAL at 12:05

## 2019-06-23 RX ADMIN — GUAIFENESIN 200 MG: 100 SOLUTION ORAL at 09:21

## 2019-06-23 RX ADMIN — HYDRALAZINE HYDROCHLORIDE 50 MG: 25 TABLET ORAL at 09:15

## 2019-06-23 RX ADMIN — TORSEMIDE 40 MG: 20 TABLET ORAL at 16:13

## 2019-06-23 RX ADMIN — SERTRALINE HYDROCHLORIDE 100 MG: 100 TABLET ORAL at 09:15

## 2019-06-23 RX ADMIN — GUAIFENESIN 200 MG: 100 SOLUTION ORAL at 21:22

## 2019-06-23 RX ADMIN — NIFEDIPINE 30 MG: 30 TABLET, FILM COATED, EXTENDED RELEASE ORAL at 09:13

## 2019-06-23 RX ADMIN — HYDRALAZINE HYDROCHLORIDE 50 MG: 25 TABLET ORAL at 16:13

## 2019-06-23 RX ADMIN — SALINE NASAL SPRAY 1 SPRAY: 1.5 SOLUTION NASAL at 09:22

## 2019-06-23 RX ADMIN — LORATADINE 10 MG: 10 TABLET ORAL at 09:15

## 2019-06-23 RX ADMIN — HYDRALAZINE HYDROCHLORIDE 50 MG: 25 TABLET ORAL at 21:22

## 2019-06-23 RX ADMIN — INSULIN LISPRO 2 UNITS: 100 INJECTION, SOLUTION INTRAVENOUS; SUBCUTANEOUS at 12:06

## 2019-06-23 RX ADMIN — SALINE NASAL SPRAY 1 SPRAY: 1.5 SOLUTION NASAL at 21:25

## 2019-06-23 RX ADMIN — MELATONIN 3 MG: at 21:21

## 2019-06-23 RX ADMIN — VITAMIN D, TAB 1000IU (100/BT) 1000 UNITS: 25 TAB at 09:13

## 2019-06-23 RX ADMIN — TORSEMIDE 40 MG: 20 TABLET ORAL at 09:13

## 2019-06-23 RX ADMIN — LABETALOL HCL 300 MG: 200 TABLET, FILM COATED ORAL at 21:21

## 2019-06-23 RX ADMIN — GABAPENTIN 200 MG: 100 CAPSULE ORAL at 09:15

## 2019-06-24 LAB
GLUCOSE SERPL-MCNC: 116 MG/DL (ref 65–140)
GLUCOSE SERPL-MCNC: 155 MG/DL (ref 65–140)
GLUCOSE SERPL-MCNC: 202 MG/DL (ref 65–140)
GLUCOSE SERPL-MCNC: 96 MG/DL (ref 65–140)

## 2019-06-24 PROCEDURE — 99232 SBSQ HOSP IP/OBS MODERATE 35: CPT | Performed by: PSYCHIATRY & NEUROLOGY

## 2019-06-24 PROCEDURE — 82948 REAGENT STRIP/BLOOD GLUCOSE: CPT

## 2019-06-24 PROCEDURE — 99232 SBSQ HOSP IP/OBS MODERATE 35: CPT | Performed by: INTERNAL MEDICINE

## 2019-06-24 RX ADMIN — BENZONATATE 100 MG: 100 CAPSULE ORAL at 08:24

## 2019-06-24 RX ADMIN — NIFEDIPINE 30 MG: 30 TABLET, FILM COATED, EXTENDED RELEASE ORAL at 08:23

## 2019-06-24 RX ADMIN — VITAMIN D, TAB 1000IU (100/BT) 1000 UNITS: 25 TAB at 08:23

## 2019-06-24 RX ADMIN — LABETALOL HCL 300 MG: 200 TABLET, FILM COATED ORAL at 21:12

## 2019-06-24 RX ADMIN — GUAIFENESIN 200 MG: 100 SOLUTION ORAL at 04:32

## 2019-06-24 RX ADMIN — INSULIN LISPRO 1 UNITS: 100 INJECTION, SOLUTION INTRAVENOUS; SUBCUTANEOUS at 08:55

## 2019-06-24 RX ADMIN — ATORVASTATIN CALCIUM 80 MG: 40 TABLET, FILM COATED ORAL at 18:22

## 2019-06-24 RX ADMIN — LORATADINE 10 MG: 10 TABLET ORAL at 08:23

## 2019-06-24 RX ADMIN — TORSEMIDE 40 MG: 20 TABLET ORAL at 08:23

## 2019-06-24 RX ADMIN — TORSEMIDE 40 MG: 20 TABLET ORAL at 16:31

## 2019-06-24 RX ADMIN — INSULIN GLARGINE 22 UNITS: 100 INJECTION, SOLUTION SUBCUTANEOUS at 22:21

## 2019-06-24 RX ADMIN — HYDRALAZINE HYDROCHLORIDE 50 MG: 25 TABLET ORAL at 08:23

## 2019-06-24 RX ADMIN — GABAPENTIN 200 MG: 100 CAPSULE ORAL at 08:23

## 2019-06-24 RX ADMIN — HYDRALAZINE HYDROCHLORIDE 50 MG: 25 TABLET ORAL at 22:24

## 2019-06-24 RX ADMIN — HYDRALAZINE HYDROCHLORIDE 50 MG: 25 TABLET ORAL at 16:31

## 2019-06-24 RX ADMIN — GABAPENTIN 200 MG: 100 CAPSULE ORAL at 21:12

## 2019-06-24 RX ADMIN — MELATONIN 3 MG: at 21:14

## 2019-06-24 RX ADMIN — LABETALOL HCL 300 MG: 200 TABLET, FILM COATED ORAL at 08:23

## 2019-06-24 RX ADMIN — SERTRALINE HYDROCHLORIDE 100 MG: 100 TABLET ORAL at 08:23

## 2019-06-24 RX ADMIN — INSULIN LISPRO 2 UNITS: 100 INJECTION, SOLUTION INTRAVENOUS; SUBCUTANEOUS at 12:09

## 2019-06-25 LAB
ANION GAP SERPL CALCULATED.3IONS-SCNC: 10 MMOL/L (ref 4–13)
BASOPHILS # BLD AUTO: 0.03 THOUSANDS/ΜL (ref 0–0.1)
BASOPHILS NFR BLD AUTO: 1 % (ref 0–1)
BUN SERPL-MCNC: 57 MG/DL (ref 5–25)
CALCIUM SERPL-MCNC: 8.9 MG/DL (ref 8.3–10.1)
CHLORIDE SERPL-SCNC: 108 MMOL/L (ref 100–108)
CO2 SERPL-SCNC: 21 MMOL/L (ref 21–32)
CREAT SERPL-MCNC: 2.22 MG/DL (ref 0.6–1.3)
EOSINOPHIL # BLD AUTO: 0.3 THOUSAND/ΜL (ref 0–0.61)
EOSINOPHIL NFR BLD AUTO: 5 % (ref 0–6)
ERYTHROCYTE [DISTWIDTH] IN BLOOD BY AUTOMATED COUNT: 12.4 % (ref 11.6–15.1)
GFR SERPL CREATININE-BSD FRML MDRD: 24 ML/MIN/1.73SQ M
GLUCOSE SERPL-MCNC: 148 MG/DL (ref 65–140)
GLUCOSE SERPL-MCNC: 181 MG/DL (ref 65–140)
GLUCOSE SERPL-MCNC: 206 MG/DL (ref 65–140)
GLUCOSE SERPL-MCNC: 69 MG/DL (ref 65–140)
GLUCOSE SERPL-MCNC: 90 MG/DL (ref 65–140)
HCT VFR BLD AUTO: 24.4 % (ref 34.8–46.1)
HGB BLD-MCNC: 8.2 G/DL (ref 11.5–15.4)
IMM GRANULOCYTES # BLD AUTO: 0.02 THOUSAND/UL (ref 0–0.2)
IMM GRANULOCYTES NFR BLD AUTO: 0 % (ref 0–2)
LYMPHOCYTES # BLD AUTO: 2.3 THOUSANDS/ΜL (ref 0.6–4.47)
LYMPHOCYTES NFR BLD AUTO: 39 % (ref 14–44)
MCH RBC QN AUTO: 29.7 PG (ref 26.8–34.3)
MCHC RBC AUTO-ENTMCNC: 33.6 G/DL (ref 31.4–37.4)
MCV RBC AUTO: 88 FL (ref 82–98)
MONOCYTES # BLD AUTO: 0.31 THOUSAND/ΜL (ref 0.17–1.22)
MONOCYTES NFR BLD AUTO: 5 % (ref 4–12)
NEUTROPHILS # BLD AUTO: 2.97 THOUSANDS/ΜL (ref 1.85–7.62)
NEUTS SEG NFR BLD AUTO: 50 % (ref 43–75)
NRBC BLD AUTO-RTO: 0 /100 WBCS
PHOSPHATE SERPL-MCNC: 4.7 MG/DL (ref 2.7–4.5)
PLATELET # BLD AUTO: 220 THOUSANDS/UL (ref 149–390)
PMV BLD AUTO: 10.5 FL (ref 8.9–12.7)
POTASSIUM SERPL-SCNC: 3.6 MMOL/L (ref 3.5–5.3)
RBC # BLD AUTO: 2.76 MILLION/UL (ref 3.81–5.12)
SODIUM SERPL-SCNC: 139 MMOL/L (ref 136–145)
WBC # BLD AUTO: 5.93 THOUSAND/UL (ref 4.31–10.16)

## 2019-06-25 PROCEDURE — 82948 REAGENT STRIP/BLOOD GLUCOSE: CPT

## 2019-06-25 PROCEDURE — 80048 BASIC METABOLIC PNL TOTAL CA: CPT | Performed by: INTERNAL MEDICINE

## 2019-06-25 PROCEDURE — 99231 SBSQ HOSP IP/OBS SF/LOW 25: CPT | Performed by: PSYCHIATRY & NEUROLOGY

## 2019-06-25 PROCEDURE — 85025 COMPLETE CBC W/AUTO DIFF WBC: CPT | Performed by: INTERNAL MEDICINE

## 2019-06-25 PROCEDURE — 99232 SBSQ HOSP IP/OBS MODERATE 35: CPT | Performed by: INTERNAL MEDICINE

## 2019-06-25 PROCEDURE — 99254 IP/OBS CNSLTJ NEW/EST MOD 60: CPT | Performed by: INTERNAL MEDICINE

## 2019-06-25 PROCEDURE — 84100 ASSAY OF PHOSPHORUS: CPT | Performed by: INTERNAL MEDICINE

## 2019-06-25 RX ORDER — ERGOCALCIFEROL 1.25 MG/1
50000 CAPSULE ORAL 3 TIMES WEEKLY
Status: DISCONTINUED | OUTPATIENT
Start: 2019-06-25 | End: 2019-06-26 | Stop reason: HOSPADM

## 2019-06-25 RX ADMIN — GUAIFENESIN 200 MG: 100 SOLUTION ORAL at 09:28

## 2019-06-25 RX ADMIN — TORSEMIDE 40 MG: 20 TABLET ORAL at 15:36

## 2019-06-25 RX ADMIN — HYDRALAZINE HYDROCHLORIDE 50 MG: 25 TABLET ORAL at 20:58

## 2019-06-25 RX ADMIN — TORSEMIDE 40 MG: 20 TABLET ORAL at 09:24

## 2019-06-25 RX ADMIN — GABAPENTIN 200 MG: 100 CAPSULE ORAL at 09:24

## 2019-06-25 RX ADMIN — ATORVASTATIN CALCIUM 80 MG: 40 TABLET, FILM COATED ORAL at 17:24

## 2019-06-25 RX ADMIN — LABETALOL HCL 300 MG: 200 TABLET, FILM COATED ORAL at 21:06

## 2019-06-25 RX ADMIN — GUAIFENESIN 200 MG: 100 SOLUTION ORAL at 02:24

## 2019-06-25 RX ADMIN — HYDRALAZINE HYDROCHLORIDE 50 MG: 25 TABLET ORAL at 15:36

## 2019-06-25 RX ADMIN — GABAPENTIN 200 MG: 100 CAPSULE ORAL at 20:58

## 2019-06-25 RX ADMIN — MELATONIN 3 MG: at 21:20

## 2019-06-25 RX ADMIN — SALINE NASAL SPRAY 1 SPRAY: 1.5 SOLUTION NASAL at 09:22

## 2019-06-25 RX ADMIN — GUAIFENESIN 200 MG: 100 SOLUTION ORAL at 21:12

## 2019-06-25 RX ADMIN — INSULIN LISPRO 2 UNITS: 100 INJECTION, SOLUTION INTRAVENOUS; SUBCUTANEOUS at 12:19

## 2019-06-25 RX ADMIN — LABETALOL HCL 300 MG: 200 TABLET, FILM COATED ORAL at 09:24

## 2019-06-25 RX ADMIN — ERGOCALCIFEROL 50000 UNITS: 1.25 CAPSULE, LIQUID FILLED ORAL at 09:23

## 2019-06-25 RX ADMIN — INSULIN LISPRO 1 UNITS: 100 INJECTION, SOLUTION INTRAVENOUS; SUBCUTANEOUS at 09:30

## 2019-06-25 RX ADMIN — NIFEDIPINE 30 MG: 30 TABLET, FILM COATED, EXTENDED RELEASE ORAL at 09:24

## 2019-06-25 RX ADMIN — BENZONATATE 100 MG: 100 CAPSULE ORAL at 02:24

## 2019-06-25 RX ADMIN — LORATADINE 10 MG: 10 TABLET ORAL at 09:25

## 2019-06-25 RX ADMIN — INSULIN GLARGINE 22 UNITS: 100 INJECTION, SOLUTION SUBCUTANEOUS at 21:05

## 2019-06-25 RX ADMIN — SERTRALINE HYDROCHLORIDE 100 MG: 100 TABLET ORAL at 09:25

## 2019-06-25 RX ADMIN — HYDRALAZINE HYDROCHLORIDE 50 MG: 25 TABLET ORAL at 09:24

## 2019-06-26 VITALS
RESPIRATION RATE: 18 BRPM | HEIGHT: 62 IN | DIASTOLIC BLOOD PRESSURE: 66 MMHG | TEMPERATURE: 96.8 F | BODY MASS INDEX: 34.71 KG/M2 | OXYGEN SATURATION: 98 % | HEART RATE: 72 BPM | WEIGHT: 188.6 LBS | SYSTOLIC BLOOD PRESSURE: 137 MMHG

## 2019-06-26 LAB
GLUCOSE SERPL-MCNC: 149 MG/DL (ref 65–140)
GLUCOSE SERPL-MCNC: 222 MG/DL (ref 65–140)

## 2019-06-26 PROCEDURE — 99232 SBSQ HOSP IP/OBS MODERATE 35: CPT | Performed by: INTERNAL MEDICINE

## 2019-06-26 PROCEDURE — 99239 HOSP IP/OBS DSCHRG MGMT >30: CPT | Performed by: PSYCHIATRY & NEUROLOGY

## 2019-06-26 PROCEDURE — 82948 REAGENT STRIP/BLOOD GLUCOSE: CPT

## 2019-06-26 RX ORDER — SERTRALINE HYDROCHLORIDE 100 MG/1
100 TABLET, FILM COATED ORAL DAILY
Qty: 30 TABLET | Refills: 0 | Status: SHIPPED | OUTPATIENT
Start: 2019-06-27 | End: 2020-03-16 | Stop reason: HOSPADM

## 2019-06-26 RX ORDER — NIFEDIPINE 30 MG/1
30 TABLET, FILM COATED, EXTENDED RELEASE ORAL DAILY
Qty: 30 TABLET | Refills: 0 | Status: SHIPPED | OUTPATIENT
Start: 2019-06-26

## 2019-06-26 RX ORDER — GABAPENTIN 100 MG/1
200 CAPSULE ORAL EVERY 12 HOURS
Qty: 120 CAPSULE | Refills: 0 | Status: SHIPPED | OUTPATIENT
Start: 2019-06-26 | End: 2020-07-27 | Stop reason: HOSPADM

## 2019-06-26 RX ORDER — INSULIN GLARGINE 100 [IU]/ML
22 INJECTION, SOLUTION SUBCUTANEOUS
Qty: 10 ML | Refills: 0 | Status: ON HOLD | OUTPATIENT
Start: 2019-06-26 | End: 2020-03-16 | Stop reason: SDUPTHER

## 2019-06-26 RX ORDER — ERGOCALCIFEROL 1.25 MG/1
50000 CAPSULE ORAL 3 TIMES WEEKLY
Qty: 19 CAPSULE | Refills: 0 | Status: SHIPPED | OUTPATIENT
Start: 2019-06-28 | End: 2020-04-29 | Stop reason: HOSPADM

## 2019-06-26 RX ORDER — LABETALOL 300 MG/1
300 TABLET, FILM COATED ORAL EVERY 12 HOURS SCHEDULED
Qty: 60 TABLET | Refills: 0 | Status: SHIPPED | OUTPATIENT
Start: 2019-06-26

## 2019-06-26 RX ORDER — TORSEMIDE 20 MG/1
40 TABLET ORAL
Qty: 60 TABLET | Refills: 0 | Status: SHIPPED | OUTPATIENT
Start: 2019-06-26 | End: 2020-03-16 | Stop reason: HOSPADM

## 2019-06-26 RX ORDER — HYDRALAZINE HYDROCHLORIDE 50 MG/1
50 TABLET, FILM COATED ORAL 3 TIMES DAILY
Qty: 90 TABLET | Refills: 0 | Status: SHIPPED | OUTPATIENT
Start: 2019-06-26 | End: 2020-03-16 | Stop reason: HOSPADM

## 2019-06-26 RX ORDER — LANOLIN ALCOHOL/MO/W.PET/CERES
3 CREAM (GRAM) TOPICAL
Qty: 30 TABLET | Refills: 0 | Status: SHIPPED | OUTPATIENT
Start: 2019-06-26 | End: 2020-04-08 | Stop reason: HOSPADM

## 2019-06-26 RX ORDER — ATORVASTATIN CALCIUM 80 MG/1
80 TABLET, FILM COATED ORAL EVERY EVENING
Qty: 30 TABLET | Refills: 0 | Status: SHIPPED | OUTPATIENT
Start: 2019-06-26

## 2019-06-26 RX ADMIN — HYDRALAZINE HYDROCHLORIDE 50 MG: 25 TABLET ORAL at 16:03

## 2019-06-26 RX ADMIN — TORSEMIDE 40 MG: 20 TABLET ORAL at 09:24

## 2019-06-26 RX ADMIN — GABAPENTIN 200 MG: 100 CAPSULE ORAL at 09:25

## 2019-06-26 RX ADMIN — INSULIN LISPRO 2 UNITS: 100 INJECTION, SOLUTION INTRAVENOUS; SUBCUTANEOUS at 12:10

## 2019-06-26 RX ADMIN — LABETALOL HCL 300 MG: 200 TABLET, FILM COATED ORAL at 09:24

## 2019-06-26 RX ADMIN — BENZONATATE 100 MG: 100 CAPSULE ORAL at 09:28

## 2019-06-26 RX ADMIN — ATORVASTATIN CALCIUM 80 MG: 40 TABLET, FILM COATED ORAL at 16:06

## 2019-06-26 RX ADMIN — SERTRALINE HYDROCHLORIDE 100 MG: 100 TABLET ORAL at 09:25

## 2019-06-26 RX ADMIN — HYDRALAZINE HYDROCHLORIDE 50 MG: 25 TABLET ORAL at 09:24

## 2019-06-26 RX ADMIN — ACETAMINOPHEN 650 MG: 325 TABLET, FILM COATED ORAL at 10:40

## 2019-06-26 RX ADMIN — LORATADINE 10 MG: 10 TABLET ORAL at 09:24

## 2019-06-26 RX ADMIN — NIFEDIPINE 30 MG: 30 TABLET, FILM COATED, EXTENDED RELEASE ORAL at 09:24

## 2019-06-26 RX ADMIN — TORSEMIDE 40 MG: 20 TABLET ORAL at 16:03

## 2019-09-28 ENCOUNTER — TELEPHONE (OUTPATIENT)
Dept: OTHER | Facility: OTHER | Age: 54
End: 2019-09-28

## 2019-09-29 NOTE — TELEPHONE ENCOUNTER
Best text @ 2217  [phone]/[facility]/Dr black/Pt  Meri Carvajal/1965/Reason pt   in ER

## 2019-11-30 NOTE — H&P
H&P- Amber Roche 1965, 46 y o  female MRN: [id number]    Unit/Bed#: ED 29 Encounter: [id number]    Primary Care Provider: Amber Roche MD   Date and time admitted to hospital: 9/21/2018 10:05 AM        Type 2 diabetes mellitus with hyperglycemia, with long-term current use of insulin [facility])   Assessment & Plan    Lab Results   Component Value Date    HGBA1C 8 9 (H) 10/04/2017       Recent Labs      09/21/18   1042  09/21/18   1247  09/21/18   1248  09/21/18   1353   POCGLU  >500*  462*  462  419*       Blood Sugar Average: Last 72 hrs:  (P) 335 75   Patient presents with markedly elevated blood glucose level secondary to lack of taking insulin for last many weeks  Denies nausea vomiting or abdominal discomfort  No acute acidoses  Start insulin GTT  Once blood sugars are consistently stable will start her on long-acting and pre meal insulin  Obesity due to excess calories   Assessment & Plan    Therapeutic lifestyle modification  H/O: CVA (cerebrovascular accident)   Assessment & Plan    She had Cerebral  infarction  With left-sided hemiparesis 1 year back  Has been very noncompliant with medications and has had poorly controlled blood pressure and diabetes        Hyperlipidemia   Assessment & Plan    Continue with outpatient statin therapy  Hypertensive urgency   Assessment & Plan    Patient presented with headache, blurred vision on markedly elevated blood pressure 211/104 on presentation to the emergency room  Has not been taking any of her medications for 4 weeks  Bedside opthalmoscopic  examination negative for papilledema  Resume outpatient antihypertensive  Controlled Reduction of blood pressure  Patient has history of stroke and left-sided hemiparesis last year  Continue with aspirin and statin therapy  Also complains of chronic headache and has been on migraine prophylaxis for that in the past  Follow up on CT head    Continue with serial neuro exam        * Viral URI   Assessment & Plan    Patient complained of 2 days history of headache fever up to 102 in cold-like symptoms  Positive sick contacts at home  Denies any recent travel  Follow up on influenza PCR  Afebrile in the emergency room  VTE Prophylaxis: Enoxaparin (Lovenox)  / sequential compression device   Code Status: FULL CODE  POLST: POLST form is not discussed and not completed at this time  Discussion with family:   Discussed with patient at bedside    Anticipated Length of Stay:  Patient will be admitted on an Inpatient basis with an anticipated length of stay of > 2 midnights  Justification for Hospital Stay:   Control blood sugar and blood pressure    Total Time for Visit, including Counseling / Coordination of Care: 45 minutes  Greater than 50% of this total time spent on direct patient counseling and coordination of care  Chief Complaint:     Fever  History of Present Illness:    Trudy Johnson is a 46 y o  female who presents with 2 days history of fever headache and cold-like symptoms  He states that her daughter and son-in-law at home with similar symptoms  She denies any recent travel history  He stated that her temperature was 102° at home yesterday  She denied any cough ,postnasal drip, diarrhea or abdominal discomfort  She had 3 episodes of nonbilious vomiting yesterday  Patient states that for the last 3-4 weeks he has not been taking any of her medications secondary to running out of insurance and her medications  She has also not been able to see her primary care physician  She has a reported history of noncompliance with the medications in the past   She also had cerebral infarct resulting in left-sided hemiparesis approximately a year back  Review of Systems:    Review of Systems   Constitutional: Positive for appetite change and fever  HENT: Positive for congestion, rhinorrhea and sinus pressure  Eyes: Positive for discharge     Respiratory: Negative  Cardiovascular: Negative  Gastrointestinal: Positive for nausea and vomiting  Endocrine: Negative  Genitourinary: Positive for frequency  Musculoskeletal: Positive for arthralgias and myalgias  Allergic/Immunologic: Negative  Neurological: Positive for dizziness  Hematological: Negative  Psychiatric/Behavioral: Negative  Past Medical and Surgical History:     Past Medical History:   Diagnosis Date    Diabetes mellitus ([facility] [address] )     Hyperlipidemia     Hypertension     Stroke [facility])        Past Surgical History:   Procedure Laterality Date    CHOLECYSTECTOMY      HYSTERECTOMY         Meds/Allergies:    Prior to Admission medications    Medication Sig Start Date End Date Taking?  Authorizing Provider   acetaminophen (TYLENOL) 500 MG chewable tablet Chew 1 tablet every 6 (six) hours as needed for mild pain 11/15/17  Yes Tyler Alaniz MD   atorvastatin (LIPITOR) 80 mg tablet Take 1 tablet by mouth every evening 10/9/17  Yes Kt Mccollum MD   butalbital-acetaminophen-caffeine (FIORICET,ESGIC) -40 mg per tablet Take 1 tablet by mouth every 6 (six) hours as needed for headaches 10/9/17  Yes Kt Mccollum MD   cholecalciferol (VITAMIN D3) 1,000 units tablet Take 1 tablet by mouth daily 10/9/17  Yes Kt Mccollum MD   clonazePAM (KlonoPIN) 0 5 mg tablet Take 1 tablet (0 5 mg total) by mouth daily at bedtime as needed for anxiety 5/14/18  Yes Leigh Ann Rocha MD   escitalopram (LEXAPRO) 10 mg tablet Take 1 tablet by mouth daily 10/10/17  Yes Kt Mccollum MD   fenofibrate (TRICOR) 145 mg tablet Take 145 mg by mouth daily   Yes Historical Provider, MD   gabapentin (NEURONTIN) 300 mg capsule Take 1 capsule (300 mg total) by mouth daily at bedtime 5/14/18  Yes Leigh Ann Rocha MD   Insulin Glargine (TOUJEO SOLOSTAR) 300 units/mL CONCETRATED U-300 injection pen 58 Units daily at bedtime   Yes Historical Provider, MD   insulin lispro (HumaLOG) 100 units/mL injection Inject 12 Units under the skin 3 (three) times a day with meals  Patient taking differently: Inject 38 Units under the skin 3 (three) times a day with meals   10/9/17  Yes Victorino Wakefield MD   metoprolol tartrate (LOPRESSOR) 25 mg tablet Take 1 tablet (25 mg total) by mouth daily 5/14/18  Yes Nicole Blair MD   pioglitazone (ACTOS) 30 mg tablet Take 30 mg by mouth daily   Yes Historical Provider, MD   topiramate (TOPAMAX) 50 MG tablet Take 1 tablet by mouth daily at bedtime 10/9/17  Yes Victorino Wakefield MD   valsartan-hydrochlorothiazide (DIOVAN-HCT) 320-25 MG per tablet Take 1 tablet by mouth daily 5/14/18  Yes Nicole Blair MD   Canagliflozin [facility]) 300 MG TABS Take 1 tablet (300 mg total) by mouth daily 5/14/18 9/21/18 Yes Nicole Blair MD   butalbital-acetaminophen-caffeine (FIORICET,ESGIC) -40 mg per tablet Take 1 tablet by mouth every 4 (four) hours as needed for headaches 7/31/18 9/21/18  Ruth Ann Max MD     I have reviewed home medications using allscripts  Allergies: No Known Allergies    Social History:     Marital Status:      Substance Use History:   History   Alcohol Use No     History   Smoking Status    Never Smoker   Smokeless Tobacco    Never Used     History   Drug Use No       Family History:    non-contributory    Physical Exam:     Vitals:   Blood Pressure: (!) 219/109 (09/21/18 1411)  Pulse: 78 (09/21/18 1411)  Temperature: 98 6 °F (37 °C) (09/21/18 1007)  Temp Source: Oral (09/21/18 1007)  Respirations: 20 (09/21/18 1411)  SpO2: 94 % (09/21/18 1411)    Physical Exam   Constitutional: She is oriented to person, place, and time  She appears distressed  HENT:   Head: Normocephalic and atraumatic  Eyes: Pupils are equal, round, and reactive to light  Conjunctiva erythema   Neck: Normal range of motion  Neck supple  No JVD present  Cardiovascular: Normal rate and regular rhythm  Pulmonary/Chest: Effort normal    Abdominal: Soft  Bowel sounds are normal    Musculoskeletal: She exhibits no edema  Neurological: She is alert and oriented to person, place, and time  Skin: Skin is warm  She is not diaphoretic  Psychiatric: She has a normal mood and affect  Additional Data:     Lab Results: I have personally reviewed pertinent reports  Results from last 7 days  Lab Units 09/21/18  1036   WBC Thousand/uL 6 27   HEMOGLOBIN g/dL 13 8   HEMATOCRIT % 39 4   PLATELETS Thousands/uL 226   NEUTROS PCT % 61   LYMPHS PCT % 27   MONOS PCT % 8   EOS PCT % 2       Results from last 7 days  Lab Units 09/21/18  1036   SODIUM mmol/L 136   POTASSIUM mmol/L 3 9   CHLORIDE mmol/L 101   CO2 mmol/L 25   BUN mg/dL 11   CREATININE mg/dL 1 08   CALCIUM mg/dL 8 0*           Results from last 7 days  Lab Units 09/21/18  1353 09/21/18  1248 09/21/18  1247 09/21/18  1042   POC GLUCOSE mg/dl 419* 462 462* >500*           Imaging: I have personally reviewed pertinent reports  CT head without contrast   Final Result by Jaun Roque MD (09/21 1348)      No acute intracranial abnormality  Similar to previous CT study            Workstation performed: [id number]             EKG, Pathology, and Other Studies Reviewed on Admission:   · EKG: Reviewed    Allscripts / Epic Records Reviewed: Yes     ** Please Note: This note has been constructed using a voice recognition system   ** [facility]  ENT  [address], [address]nd [place] [address]  Phone: [phone]  Fax:   Follow Up Time:

## 2020-03-12 ENCOUNTER — APPOINTMENT (EMERGENCY)
Dept: RADIOLOGY | Facility: HOSPITAL | Age: 55
DRG: 193 | End: 2020-03-12
Payer: COMMERCIAL

## 2020-03-12 ENCOUNTER — HOSPITAL ENCOUNTER (INPATIENT)
Facility: HOSPITAL | Age: 55
LOS: 4 days | Discharge: HOME | DRG: 193 | End: 2020-03-16
Attending: EMERGENCY MEDICINE | Admitting: HOSPITALIST
Payer: COMMERCIAL

## 2020-03-12 DIAGNOSIS — Z99.2 ESRD (END STAGE RENAL DISEASE) ON DIALYSIS (HCC): ICD-10-CM

## 2020-03-12 DIAGNOSIS — N18.6 ESRD (END STAGE RENAL DISEASE) ON DIALYSIS (HCC): ICD-10-CM

## 2020-03-12 DIAGNOSIS — J18.9 PNEUMONIA: ICD-10-CM

## 2020-03-12 DIAGNOSIS — Z79.4 TYPE 2 DIABETES MELLITUS WITH HYPERGLYCEMIA, WITH LONG-TERM CURRENT USE OF INSULIN (HCC): Chronic | ICD-10-CM

## 2020-03-12 DIAGNOSIS — R10.12 LUQ PAIN: ICD-10-CM

## 2020-03-12 DIAGNOSIS — N18.30 STAGE 3 CHRONIC KIDNEY DISEASE (HCC): ICD-10-CM

## 2020-03-12 DIAGNOSIS — E11.65 TYPE 2 DIABETES MELLITUS WITH HYPERGLYCEMIA, WITH LONG-TERM CURRENT USE OF INSULIN (HCC): Chronic | ICD-10-CM

## 2020-03-12 DIAGNOSIS — J18.9 PNEUMONIA OF LEFT LOWER LOBE DUE TO INFECTIOUS ORGANISM: Primary | ICD-10-CM

## 2020-03-12 DIAGNOSIS — R78.81 POSITIVE BLOOD CULTURE: ICD-10-CM

## 2020-03-12 PROBLEM — N18.9 ANEMIA OF CHRONIC RENAL FAILURE: Status: ACTIVE | Noted: 2019-02-16

## 2020-03-12 PROBLEM — R65.10 SIRS (SYSTEMIC INFLAMMATORY RESPONSE SYNDROME) (HCC): Status: ACTIVE | Noted: 2020-03-12

## 2020-03-12 LAB
ALBUMIN SERPL BCP-MCNC: 3.5 G/DL (ref 3.5–5)
ALP SERPL-CCNC: 175 U/L (ref 46–116)
ALT SERPL W P-5'-P-CCNC: 34 U/L (ref 12–78)
AMORPH URATE CRY URNS QL MICRO: ABNORMAL /HPF
ANION GAP SERPL CALCULATED.3IONS-SCNC: 10 MMOL/L (ref 4–13)
APTT PPP: 32 SECONDS (ref 23–37)
AST SERPL W P-5'-P-CCNC: 31 U/L (ref 5–45)
ATRIAL RATE: 101 BPM
BACTERIA UR QL AUTO: ABNORMAL /HPF
BASOPHILS # BLD AUTO: 0.07 THOUSANDS/ΜL (ref 0–0.1)
BASOPHILS NFR BLD AUTO: 1 % (ref 0–1)
BILIRUB SERPL-MCNC: 0.42 MG/DL (ref 0.2–1)
BILIRUB UR QL STRIP: ABNORMAL
BUN SERPL-MCNC: 9 MG/DL (ref 5–25)
CALCIUM SERPL-MCNC: 8.7 MG/DL (ref 8.3–10.1)
CHLORIDE SERPL-SCNC: 92 MMOL/L (ref 100–108)
CLARITY UR: ABNORMAL
CO2 SERPL-SCNC: 34 MMOL/L (ref 21–32)
COLOR UR: YELLOW
CREAT SERPL-MCNC: 2.71 MG/DL (ref 0.6–1.3)
EOSINOPHIL # BLD AUTO: 0.19 THOUSAND/ΜL (ref 0–0.61)
EOSINOPHIL NFR BLD AUTO: 3 % (ref 0–6)
ERYTHROCYTE [DISTWIDTH] IN BLOOD BY AUTOMATED COUNT: 16.2 % (ref 11.6–15.1)
FINE GRAN CASTS URNS QL MICRO: ABNORMAL /LPF
FLUAV RNA NPH QL NAA+PROBE: NORMAL
FLUBV RNA NPH QL NAA+PROBE: NORMAL
GFR SERPL CREATININE-BSD FRML MDRD: 19 ML/MIN/1.73SQ M
GLUCOSE SERPL-MCNC: 173 MG/DL (ref 65–140)
GLUCOSE SERPL-MCNC: 296 MG/DL (ref 65–140)
GLUCOSE UR STRIP-MCNC: ABNORMAL MG/DL
HCT VFR BLD AUTO: 31.7 % (ref 34.8–46.1)
HGB BLD-MCNC: 10.5 G/DL (ref 11.5–15.4)
HGB UR QL STRIP.AUTO: ABNORMAL
IMM GRANULOCYTES # BLD AUTO: 0.04 THOUSAND/UL (ref 0–0.2)
IMM GRANULOCYTES NFR BLD AUTO: 1 % (ref 0–2)
INR PPP: 1.01 (ref 0.84–1.19)
KETONES UR STRIP-MCNC: NEGATIVE MG/DL
LACTATE SERPL-SCNC: 0.8 MMOL/L (ref 0.5–2)
LACTATE SERPL-SCNC: 1.1 MMOL/L (ref 0.5–2)
LEUKOCYTE ESTERASE UR QL STRIP: ABNORMAL
LYMPHOCYTES # BLD AUTO: 1.26 THOUSANDS/ΜL (ref 0.6–4.47)
LYMPHOCYTES NFR BLD AUTO: 17 % (ref 14–44)
MCH RBC QN AUTO: 31 PG (ref 26.8–34.3)
MCHC RBC AUTO-ENTMCNC: 33.1 G/DL (ref 31.4–37.4)
MCV RBC AUTO: 94 FL (ref 82–98)
MONOCYTES # BLD AUTO: 0.49 THOUSAND/ΜL (ref 0.17–1.22)
MONOCYTES NFR BLD AUTO: 7 % (ref 4–12)
NEUTROPHILS # BLD AUTO: 5.31 THOUSANDS/ΜL (ref 1.85–7.62)
NEUTS SEG NFR BLD AUTO: 71 % (ref 43–75)
NITRITE UR QL STRIP: NEGATIVE
NON-SQ EPI CELLS URNS QL MICRO: ABNORMAL /HPF
NRBC BLD AUTO-RTO: 0 /100 WBCS
P AXIS: 25 DEGREES
PH UR STRIP.AUTO: 5 [PH]
PLATELET # BLD AUTO: 255 THOUSANDS/UL (ref 149–390)
PMV BLD AUTO: 9.9 FL (ref 8.9–12.7)
POTASSIUM SERPL-SCNC: 3.9 MMOL/L (ref 3.5–5.3)
PR INTERVAL: 130 MS
PROCALCITONIN SERPL-MCNC: 0.5 NG/ML
PROT SERPL-MCNC: 7.6 G/DL (ref 6.4–8.2)
PROT UR STRIP-MCNC: >=300 MG/DL
PROTHROMBIN TIME: 12.7 SECONDS (ref 11.6–14.5)
QRS AXIS: 13 DEGREES
QRSD INTERVAL: 76 MS
QT INTERVAL: 370 MS
QTC INTERVAL: 470 MS
RBC # BLD AUTO: 3.39 MILLION/UL (ref 3.81–5.12)
RBC #/AREA URNS AUTO: ABNORMAL /HPF
RSV RNA NPH QL NAA+PROBE: NORMAL
SODIUM SERPL-SCNC: 136 MMOL/L (ref 136–145)
SP GR UR STRIP.AUTO: >=1.03 (ref 1–1.03)
T WAVE AXIS: 29 DEGREES
UROBILINOGEN UR QL STRIP.AUTO: 0.2 E.U./DL
VENTRICULAR RATE: 97 BPM
WBC # BLD AUTO: 7.36 THOUSAND/UL (ref 4.31–10.16)
WBC #/AREA URNS AUTO: ABNORMAL /HPF

## 2020-03-12 PROCEDURE — 87147 CULTURE TYPE IMMUNOLOGIC: CPT | Performed by: EMERGENCY MEDICINE

## 2020-03-12 PROCEDURE — 99285 EMERGENCY DEPT VISIT HI MDM: CPT

## 2020-03-12 PROCEDURE — 99223 1ST HOSP IP/OBS HIGH 75: CPT | Performed by: NURSE PRACTITIONER

## 2020-03-12 PROCEDURE — 85025 COMPLETE CBC W/AUTO DIFF WBC: CPT | Performed by: EMERGENCY MEDICINE

## 2020-03-12 PROCEDURE — 87631 RESP VIRUS 3-5 TARGETS: CPT | Performed by: EMERGENCY MEDICINE

## 2020-03-12 PROCEDURE — 96365 THER/PROPH/DIAG IV INF INIT: CPT

## 2020-03-12 PROCEDURE — 93010 ELECTROCARDIOGRAM REPORT: CPT | Performed by: INTERNAL MEDICINE

## 2020-03-12 PROCEDURE — 85730 THROMBOPLASTIN TIME PARTIAL: CPT | Performed by: EMERGENCY MEDICINE

## 2020-03-12 PROCEDURE — 85610 PROTHROMBIN TIME: CPT | Performed by: EMERGENCY MEDICINE

## 2020-03-12 PROCEDURE — 82948 REAGENT STRIP/BLOOD GLUCOSE: CPT

## 2020-03-12 PROCEDURE — 99285 EMERGENCY DEPT VISIT HI MDM: CPT | Performed by: EMERGENCY MEDICINE

## 2020-03-12 PROCEDURE — 71046 X-RAY EXAM CHEST 2 VIEWS: CPT

## 2020-03-12 PROCEDURE — 87040 BLOOD CULTURE FOR BACTERIA: CPT | Performed by: EMERGENCY MEDICINE

## 2020-03-12 PROCEDURE — 83605 ASSAY OF LACTIC ACID: CPT | Performed by: EMERGENCY MEDICINE

## 2020-03-12 PROCEDURE — 84145 PROCALCITONIN (PCT): CPT | Performed by: EMERGENCY MEDICINE

## 2020-03-12 PROCEDURE — 36415 COLL VENOUS BLD VENIPUNCTURE: CPT | Performed by: EMERGENCY MEDICINE

## 2020-03-12 PROCEDURE — 93005 ELECTROCARDIOGRAM TRACING: CPT

## 2020-03-12 PROCEDURE — 80053 COMPREHEN METABOLIC PANEL: CPT | Performed by: EMERGENCY MEDICINE

## 2020-03-12 PROCEDURE — 81001 URINALYSIS AUTO W/SCOPE: CPT | Performed by: EMERGENCY MEDICINE

## 2020-03-12 RX ORDER — GABAPENTIN 100 MG/1
200 CAPSULE ORAL EVERY 12 HOURS SCHEDULED
Status: DISCONTINUED | OUTPATIENT
Start: 2020-03-12 | End: 2020-03-16 | Stop reason: HOSPADM

## 2020-03-12 RX ORDER — LABETALOL 100 MG/1
100 TABLET, FILM COATED ORAL EVERY 8 HOURS SCHEDULED
Status: DISCONTINUED | OUTPATIENT
Start: 2020-03-12 | End: 2020-03-14

## 2020-03-12 RX ORDER — NIFEDIPINE 30 MG/1
30 TABLET, EXTENDED RELEASE ORAL DAILY
Status: DISCONTINUED | OUTPATIENT
Start: 2020-03-13 | End: 2020-03-16 | Stop reason: HOSPADM

## 2020-03-12 RX ORDER — ACETAMINOPHEN 325 MG/1
650 TABLET ORAL EVERY 6 HOURS PRN
Status: DISCONTINUED | OUTPATIENT
Start: 2020-03-12 | End: 2020-03-16 | Stop reason: HOSPADM

## 2020-03-12 RX ORDER — LABETALOL 100 MG/1
300 TABLET, FILM COATED ORAL EVERY 12 HOURS SCHEDULED
Status: DISCONTINUED | OUTPATIENT
Start: 2020-03-12 | End: 2020-03-12

## 2020-03-12 RX ORDER — LANOLIN ALCOHOL/MO/W.PET/CERES
3 CREAM (GRAM) TOPICAL
Status: DISCONTINUED | OUTPATIENT
Start: 2020-03-12 | End: 2020-03-16 | Stop reason: HOSPADM

## 2020-03-12 RX ORDER — ATORVASTATIN CALCIUM 40 MG/1
80 TABLET, FILM COATED ORAL EVERY EVENING
Status: DISCONTINUED | OUTPATIENT
Start: 2020-03-12 | End: 2020-03-16 | Stop reason: HOSPADM

## 2020-03-12 RX ORDER — INSULIN GLARGINE 100 [IU]/ML
12 INJECTION, SOLUTION SUBCUTANEOUS
Status: DISCONTINUED | OUTPATIENT
Start: 2020-03-12 | End: 2020-03-13

## 2020-03-12 RX ORDER — GUAIFENESIN 600 MG
600 TABLET, EXTENDED RELEASE 12 HR ORAL 2 TIMES DAILY
Status: DISCONTINUED | OUTPATIENT
Start: 2020-03-12 | End: 2020-03-16 | Stop reason: HOSPADM

## 2020-03-12 RX ORDER — BENZONATATE 100 MG/1
100 CAPSULE ORAL 3 TIMES DAILY PRN
Status: DISCONTINUED | OUTPATIENT
Start: 2020-03-12 | End: 2020-03-16 | Stop reason: HOSPADM

## 2020-03-12 RX ORDER — ACETAMINOPHEN 325 MG/1
975 TABLET ORAL ONCE
Status: COMPLETED | OUTPATIENT
Start: 2020-03-12 | End: 2020-03-12

## 2020-03-12 RX ORDER — HEPARIN SODIUM 5000 [USP'U]/ML
5000 INJECTION, SOLUTION INTRAVENOUS; SUBCUTANEOUS EVERY 8 HOURS SCHEDULED
Status: DISCONTINUED | OUTPATIENT
Start: 2020-03-12 | End: 2020-03-16 | Stop reason: HOSPADM

## 2020-03-12 RX ADMIN — GUAIFENESIN 600 MG: 600 TABLET, EXTENDED RELEASE ORAL at 23:20

## 2020-03-12 RX ADMIN — INSULIN GLARGINE 12 UNITS: 100 INJECTION, SOLUTION SUBCUTANEOUS at 23:19

## 2020-03-12 RX ADMIN — ATORVASTATIN CALCIUM 80 MG: 40 TABLET, FILM COATED ORAL at 23:19

## 2020-03-12 RX ADMIN — LABETALOL HYDROCHLORIDE 100 MG: 100 TABLET, FILM COATED ORAL at 23:20

## 2020-03-12 RX ADMIN — ACETAMINOPHEN 975 MG: 325 TABLET, FILM COATED ORAL at 19:06

## 2020-03-12 RX ADMIN — CEFEPIME HYDROCHLORIDE 2000 MG: 2 INJECTION, POWDER, FOR SOLUTION INTRAVENOUS at 20:00

## 2020-03-12 RX ADMIN — VANCOMYCIN HYDROCHLORIDE 1250 MG: 5 INJECTION, POWDER, LYOPHILIZED, FOR SOLUTION INTRAVENOUS at 20:25

## 2020-03-12 RX ADMIN — HEPARIN SODIUM 5000 UNITS: 5000 INJECTION INTRAVENOUS; SUBCUTANEOUS at 23:19

## 2020-03-12 RX ADMIN — GABAPENTIN 200 MG: 100 CAPSULE ORAL at 23:20

## 2020-03-12 NOTE — ED PROVIDER NOTES
History  Chief Complaint   Patient presents with    Shortness of Breath     Pt was doing dyalsis today  They reported at dyalsis she had fever, decreased blood pressure, sob, dry cough and headache  Pt is normally on 2L of oxygen all the time  Patient is a 50-year-old female with a history of diabetes, end-stage renal disease on hemodialysis, hypertension and hyperlipidemia who presents with a fever  Patient completed her full dialysis today but the nurse noted her to be febrile  Patient initially went to [address] [place] [address] by private vehicle however her daughter brought her here because [facility] was too busy  Patient complains of a fever, chills, cough, shortness of breath and headache  Daughter states that she was hospitalized for a fever in February 2020  Patient did not receive any medication for her headache prior to arrival   She has no other complaints at this time  She denies sick contacts  She denies recent travel  History provided by:  Patient and relative  Fever - 9 weeks to 74 years   Temp source:  Oral  Timing:  Constant  Progression:  Improving  Chronicity:  New  Ineffective treatments:  None tried  Associated symptoms: chills, cough, diarrhea, headaches and vomiting    Associated symptoms: no chest pain, no dysuria, no nausea, no rash and no sore throat        Prior to Admission Medications   Prescriptions Last Dose Informant Patient Reported? Taking?    NIFEdipine ER (ADALAT CC) 30 MG 24 hr tablet 3/12/2020 at Unknown time  No Yes   Sig: Take 1 tablet (30 mg total) by mouth daily At 9am   acetaminophen (TYLENOL) 500 MG chewable tablet 3/12/2020 at Unknown time  No Yes   Sig: Chew 1 tablet every 6 (six) hours as needed for mild pain   atorvastatin (LIPITOR) 80 mg tablet 3/11/2020 at Unknown time  No Yes   Sig: Take 1 tablet (80 mg total) by mouth every evening   ergocalciferol (VITAMIN D2) 50,000 units   No No   Sig: Take 1 capsule (50,000 Units total) by mouth 3 (three) times a week for 19 doses Tuesday, Friday, Sunday   fexofenadine (ALLEGRA) 180 MG tablet Not Taking at Unknown time  Yes No   Sig: Take 180 mg by mouth daily   gabapentin (NEURONTIN) 100 mg capsule 3/11/2020 at Unknown time  No Yes   Sig: Take 2 capsules (200 mg total) by mouth every 12 (twelve) hours At 9am and 9pm   hydrALAZINE (APRESOLINE) 50 mg tablet Not Taking at Unknown time  No No   Sig: Take 1 tablet (50 mg total) by mouth 3 (three) times a day At 9am, 4pm, 9pm   Patient not taking: Reported on 3/12/2020   insulin glargine (LANTUS) 100 units/mL subcutaneous injection   No No   Sig: Inject 22 Units under the skin daily at bedtime   Patient taking differently: Inject 12 Units under the skin daily at bedtime    insulin lispro (HumaLOG) 100 units/mL injection Not Taking at Unknown time  No No   Sig: Inject 8 Units under the skin 3 (three) times a day with meals   Patient not taking: Reported on 3/12/2020   labetalol (NORMODYNE) 300 mg tablet 3/12/2020 at Unknown time  No Yes   Sig: Take 1 tablet (300 mg total) by mouth every 12 (twelve) hours At 9am and 9pm   melatonin 3 mg 3/11/2020 at Unknown time  No Yes   Sig: Take 1 tablet (3 mg total) by mouth daily at bedtime as needed (insomnia)   sertraline (ZOLOFT) 100 mg tablet Not Taking at Unknown time  No No   Sig: Take 1 tablet (100 mg total) by mouth daily At 9am   Patient not taking: Reported on 3/12/2020   torsemide (DEMADEX) 20 mg tablet Not Taking at Unknown time  No No   Sig: Take 2 tablets (40 mg total) by mouth 2 (two) times a day At 9am and 6pm   Patient not taking: Reported on 3/12/2020      Facility-Administered Medications: None       Past Medical History:   Diagnosis Date    Asthma     Depression     Diabetes mellitus ([facility] [address] )     Hyperlipidemia     Hypertension     Renal disorder     daylsis T,Th, Saturday    Stroke [facility])        Past Surgical History:   Procedure Laterality Date    CHOLECYSTECTOMY      HYSTERECTOMY         History reviewed   No pertinent family history  I have reviewed and agree with the history as documented  E-Cigarette/Vaping    E-Cigarette Use Never User      E-Cigarette/Vaping Substances     Social History     Tobacco Use    Smoking status: Never Smoker    Smokeless tobacco: Never Used   Substance Use Topics    Alcohol use: No    Drug use: No       Review of Systems   Constitutional: Positive for chills and fever  Negative for diaphoresis  HENT: Negative for nosebleeds, sore throat and trouble swallowing  Eyes: Negative for photophobia, pain and visual disturbance  Respiratory: Positive for cough  Negative for chest tightness and shortness of breath  Cardiovascular: Negative for chest pain, palpitations and leg swelling  Gastrointestinal: Positive for diarrhea and vomiting  Negative for abdominal pain, constipation and nausea  Endocrine: Negative for polydipsia and polyuria  Genitourinary: Negative for difficulty urinating, dysuria, hematuria, pelvic pain, vaginal bleeding and vaginal discharge  Musculoskeletal: Negative for back pain, neck pain and neck stiffness  Skin: Negative for pallor and rash  Neurological: Positive for headaches  Negative for dizziness, seizures and light-headedness  All other systems reviewed and are negative  Physical Exam  Physical Exam   Constitutional: She is oriented to person, place, and time  She appears well-developed and well-nourished  No distress  HENT:   Head: Normocephalic and atraumatic  Mouth/Throat: Oropharynx is clear and moist and mucous membranes are normal    Eyes: Pupils are equal, round, and reactive to light  EOM are normal    Neck: Normal range of motion  Neck supple  Cardiovascular: Normal rate, regular rhythm, normal heart sounds, intact distal pulses and normal pulses  Pulmonary/Chest: Effort normal  No respiratory distress  She has rhonchi in the left lower field  Abdominal: Soft  She exhibits no distension  There is no tenderness  There is no rigidity, no rebound and no guarding  Musculoskeletal: Normal range of motion  She exhibits no edema or tenderness  Arms:  Lymphadenopathy:     She has no cervical adenopathy  Neurological: She is alert and oriented to person, place, and time  She has normal strength  No cranial nerve deficit or sensory deficit  Skin: Skin is warm and dry  Capillary refill takes less than 2 seconds  Psychiatric: She has a normal mood and affect  Nursing note and vitals reviewed        Vital Signs  ED Triage Vitals   Temperature Pulse Respirations Blood Pressure SpO2   03/12/20 1744 03/12/20 1744 03/12/20 1744 03/12/20 1744 03/12/20 1744   100 2 °F (37 9 °C) 95 (!) 24 (!) 184/79 100 %      Temp Source Heart Rate Source Patient Position - Orthostatic VS BP Location FiO2 (%)   03/12/20 1744 03/12/20 1744 03/12/20 1744 03/12/20 1744 --   Oral Monitor Sitting Left arm       Pain Score       03/12/20 2151       5           Vitals:    03/12/20 2300 03/13/20 0519 03/13/20 0811 03/13/20 1603   BP: 138/90 140/82 153/78 134/78   Pulse: 81 84 76 80   Patient Position - Orthostatic VS: Lying  Sitting Lying         Visual Acuity      ED Medications  Medications   guaiFENesin (MUCINEX) 12 hr tablet 600 mg (600 mg Oral Given 3/13/20 1701)   benzonatate (TESSALON PERLES) capsule 100 mg (has no administration in time range)   heparin (porcine) subcutaneous injection 5,000 Units (5,000 Units Subcutaneous Given 3/13/20 2114)   cefepime (MAXIPIME) 1,000 mg in dextrose 5 % 50 mL IVPB (1,000 mg Intravenous New Bag 3/13/20 2010)   acetaminophen (TYLENOL) tablet 650 mg (650 mg Oral Given 3/13/20 0014)   diclofenac sodium (VOLTAREN) 1 % topical gel 2 g (2 g Topical Given 3/13/20 1701)   atorvastatin (LIPITOR) tablet 80 mg (80 mg Oral Given 3/13/20 1701)   gabapentin (NEURONTIN) capsule 200 mg (200 mg Oral Given 3/13/20 2114)   melatonin tablet 3 mg (has no administration in time range)   NIFEdipine (PROCARDIA XL) 24 hr tablet 30 mg (30 mg Oral Given 3/13/20 0833)   insulin lispro (HumaLOG) 100 units/mL subcutaneous injection 1-6 Units (2 Units Subcutaneous Given 3/13/20 1704)   insulin lispro (HumaLOG) 100 units/mL subcutaneous injection 1-5 Units (1 Units Subcutaneous Given 3/13/20 2114)   labetalol (NORMODYNE) tablet 100 mg (100 mg Oral Given 3/13/20 2114)   albuterol (PROVENTIL HFA,VENTOLIN HFA) inhaler 2 puff (has no administration in time range)   vancomycin (VANCOCIN) IVPB (premix) 750 mg (has no administration in time range)   sevelamer (RENAGEL) tablet 1,600 mg (1,600 mg Oral Given 3/13/20 1702)   epoetin ha (EPOGEN,PROCRIT) injection 12,000 Units (has no administration in time range)   insulin glargine (LANTUS) subcutaneous injection 20 Units 0 2 mL (20 Units Subcutaneous Given 3/13/20 2114)   lidocaine (LIDODERM) 5 % patch 1 patch (1 patch Topical Medication Applied 3/13/20 1701)   acetaminophen (TYLENOL) tablet 975 mg (975 mg Oral Given 3/12/20 1906)   vancomycin (VANCOCIN) 1,250 mg in sodium chloride 0 9 % 250 mL IVPB (1,250 mg Intravenous New Bag 3/12/20 2025)   cefepime (MAXIPIME) 2 g/50 mL dextrose IVPB (0 mg Intravenous Stopped 3/12/20 2025)       Diagnostic Studies  Results Reviewed     Procedure Component Value Units Date/Time    Blood culture #2 [[id number]]  (Abnormal) Collected:  03/12/20 1928    Lab Status:  Preliminary result Specimen:  Blood from Arm, Left Updated:  03/13/20 1907     Gram Stain Result Gram positive cocci in clusters    Blood culture #1 [[id number]] Collected:  03/12/20 1900    Lab Status:  Preliminary result Specimen:  Blood from Arm, Left Updated:  03/13/20 0101     Blood Culture Received in Microbiology Lab  Culture in Progress      Procalcitonin [[id number]]  (Abnormal) Collected:  03/12/20 1901    Lab Status:  Final result Specimen:  Blood from Arm, Left Updated:  03/12/20 2346     Procalcitonin 0 50 ng/ml     Lactic acid x2 [[id number]]  (Normal) Collected:  03/12/20 2145    Lab Status:  Final result Specimen:  Blood from Hand, Left Updated:  03/12/20 2212     LACTIC ACID 1 1 mmol/L     Narrative:       Result may be elevated if tourniquet was used during collection      Influenza A/B and RSV PCR [[id number]]  (Normal) Collected:  03/12/20 1903    Lab Status:  Final result Specimen:  Nasopharyngeal from Nose Updated:  03/12/20 2013     INFLUENZA A PCR None Detected     INFLUENZA B PCR None Detected     RSV PCR None Detected    Urine Microscopic [[id number]]  (Abnormal) Collected:  03/12/20 1936    Lab Status:  Final result Specimen:  Urine, Clean Catch Updated:  03/12/20 2009     RBC, UA 2-4 /hpf      WBC, UA 4-10 /hpf      Epithelial Cells Moderate /hpf      Bacteria, UA Innumerable /hpf      Fine granular casts 1-2 /lpf      AMORPH URATES Innumerable /hpf     UA w Reflex to Microscopic w Reflex to Culture [[id number]]  (Abnormal) Collected:  03/12/20 1936    Lab Status:  Final result Specimen:  Urine, Clean Catch Updated:  03/12/20 1957     Color, UA Yellow     Clarity, UA Cloudy     Specific Gravity, UA >=1 030     pH, UA 5 0     Leukocytes, UA Trace     Nitrite, UA Negative     Protein, UA >=300 mg/dl      Glucose,  (1/4%) mg/dl      Ketones, UA Negative mg/dl      Urobilinogen, UA 0 2 E U /dl      Bilirubin, UA Moderate     Blood, UA Moderate    Comprehensive metabolic panel [[id number]]  (Abnormal) Collected:  03/12/20 1901    Lab Status:  Final result Specimen:  Blood from Arm, Left Updated:  03/12/20 1940     Sodium 136 mmol/L      Potassium 3 9 mmol/L      Chloride 92 mmol/L      CO2 34 mmol/L      ANION GAP 10 mmol/L      BUN 9 mg/dL      Creatinine 2 71 mg/dL      Glucose 173 mg/dL      Calcium 8 7 mg/dL      AST 31 U/L      ALT 34 U/L      Alkaline Phosphatase 175 U/L      Total Protein 7 6 g/dL      Albumin 3 5 g/dL      Total Bilirubin 0 42 mg/dL      eGFR 19 ml/min/1 73sq m     Narrative:       National Kidney Disease Foundation guidelines for Chronic Kidney Disease (CKD):     Stage 1 with normal or high GFR (GFR > 90 mL/min/1 73 square meters)    Stage 2 Mild CKD (GFR = 60-89 mL/min/1 73 square meters)    Stage 3A Moderate CKD (GFR = 45-59 mL/min/1 73 square meters)    Stage 3B Moderate CKD (GFR = 30-44 mL/min/1 73 square meters)    Stage 4 Severe CKD (GFR = 15-29 mL/min/1 73 square meters)    Stage 5 End Stage CKD (GFR <15 mL/min/1 73 square meters)  Note: GFR calculation is accurate only with a steady state creatinine    Lactic acid x2 [[id number]]  (Normal) Collected:  03/12/20 1901    Lab Status:  Final result Specimen:  Blood from Arm, Left Updated:  03/12/20 1935     LACTIC ACID 0 8 mmol/L     Narrative:       Result may be elevated if tourniquet was used during collection      Protime-INR [[id number]]  (Normal) Collected:  03/12/20 1901    Lab Status:  Final result Specimen:  Blood from Arm, Left Updated:  03/12/20 1926     Protime 12 7 seconds      INR 1 01    APTT [[id number]]  (Normal) Collected:  03/12/20 1901    Lab Status:  Final result Specimen:  Blood from Arm, Left Updated:  03/12/20 1926     PTT 32 seconds     CBC and differential [[id number]]  (Abnormal) Collected:  03/12/20 1901    Lab Status:  Final result Specimen:  Blood from Arm, Left Updated:  03/12/20 1911     WBC 7 36 Thousand/uL      RBC 3 39 Million/uL      Hemoglobin 10 5 g/dL      Hematocrit 31 7 %      MCV 94 fL      MCH 31 0 pg      MCHC 33 1 g/dL      RDW 16 2 %      MPV 9 9 fL      Platelets 967 Thousands/uL      nRBC 0 /100 WBCs      Neutrophils Relative 71 %      Immat GRANS % 1 %      Lymphocytes Relative 17 %      Monocytes Relative 7 %      Eosinophils Relative 3 %      Basophils Relative 1 %      Neutrophils Absolute 5 31 Thousands/µL      Immature Grans Absolute 0 04 Thousand/uL      Lymphocytes Absolute 1 26 Thousands/µL      Monocytes Absolute 0 49 Thousand/µL      Eosinophils Absolute 0 19 Thousand/µL      Basophils Absolute 0 07 Thousands/µL                  XR chest 2 views   Final Result by Dimitris Worthy MD Isaías (03/12 1921)      Suspected left lower lung airspace opacity  This can be confirmed with noncontrast chest CT  The study was marked in [facility] for immediate notification  Workstation performed: [id number]                    Procedures  ECG 12 Lead Documentation Only  Date/Time: 3/12/2020 7:12 PM  Performed by: Shazia Sanders DO  Authorized by: Shazia Sanders DO     ECG reviewed by me, the ED Provider: yes    Patient location:  ED  Previous ECG:     Previous ECG:  Compared to current    Similarity:  No change    Comparison to cardiac monitor: Yes    Comments:      Normal sinus rhythm at a rate of 97 beats per minute  Normal intervals  Normal axis  Normal QRS  No ST T wave abnormalities  Occasional PVCs  Similar to previous from 06/10/2019  ED Course  ED Course as of Mar 13 2115   Thu Mar 12, 2020   1929 Chest x-ray reveals a left lower lobe pneumonia  Patient has multiple risk factors for healthcare associated pneumonia  Will start vancomycin and cefepime  MDM  Number of Diagnoses or Management Options  ESRD (end stage renal disease) on dialysis [facility]): new and requires workup  Pneumonia of left lower lobe due to infectious organism [facility]): new and requires workup  Diagnosis management comments: Patient presents with a fever  X-ray shows a left lower lobe pneumonia  Patient is high risk for healthcare associated pneumonia  Therefore will start on broad-spectrum antibiotics  She does not have evidence of severe sepsis or septic shock at this time  Will hospitalize for further workup and treatment         Amount and/or Complexity of Data Reviewed  Clinical lab tests: ordered and reviewed  Tests in the radiology section of CPT®: ordered and reviewed  Tests in the medicine section of CPT®: ordered and reviewed  Review and summarize past medical records: yes  Discuss the patient with other providers: yes  Independent visualization of images, tracings, or specimens: yes    Risk of Complications, Morbidity, and/or Mortality  Presenting problems: high  Diagnostic procedures: moderate  Management options: high    Patient Progress  Patient progress: stable        Disposition  Final diagnoses:   [facility] lower [facility]   ESRD (end stage renal disease) on dialysis [facility])     Time reflects when diagnosis was documented in both MDM as applicable and the Disposition within this note     Time User Action Codes Description Comment    3/12/2020  8:17 PM Kyle Edmonds ALBERTINA Add [J18 1] Pneumonia of left lower lobe due to infectious organism ([facility] )     3/12/2020  8:17 PM Lincoln Soulier Add [N18 6,  Z99 2] ESRD (end stage renal disease) on dialysis [facility])       ED Disposition     ED Disposition Condition Date/Time Comment    Admit Stable Thu Mar 12, 2020  8:17 PM Case was discussed with EDDIE and the patient's admission status was agreed to be Admission Status: inpatient status to the service of Dr Rochelle Hernandezor           Follow-up Information    None         Current Discharge Medication List      CONTINUE these medications which have NOT CHANGED    Details   acetaminophen (TYLENOL) 500 MG chewable tablet Chew 1 tablet every 6 (six) hours as needed for mild pain  Qty: 30 tablet, Refills: 0      atorvastatin (LIPITOR) 80 mg tablet Take 1 tablet (80 mg total) by mouth every evening  Qty: 30 tablet, Refills: 0    Associated Diagnoses: Hyperlipidemia, unspecified hyperlipidemia type      gabapentin (NEURONTIN) 100 mg capsule Take 2 capsules (200 mg total) by mouth every 12 (twelve) hours At 9am and 9pm  Qty: 120 capsule, Refills: 0    Associated Diagnoses: Diabetic neuropathy (HCC)      labetalol (NORMODYNE) 300 mg tablet Take 1 tablet (300 mg total) by mouth every 12 (twelve) hours At 9am and 9pm  Qty: 60 tablet, Refills: 0    Associated Diagnoses: Hypertensive urgency      melatonin 3 mg Take 1 tablet (3 mg total) by mouth daily at bedtime as needed (insomnia)  Qty: 30 tablet, Refills: 0    Associated Diagnoses: Insomnia      NIFEdipine ER (ADALAT CC) 30 MG 24 hr tablet Take 1 tablet (30 mg total) by mouth daily At 9am  Qty: 30 tablet, Refills: 0    Associated Diagnoses: Hypertensive urgency      ergocalciferol (VITAMIN D2) 50,000 units Take 1 capsule (50,000 Units total) by mouth 3 (three) times a week for 19 doses Tuesday, Friday, Sunday  Qty: 19 capsule, Refills: 0    Associated Diagnoses: Vitamin D deficiency      fexofenadine (ALLEGRA) 180 MG tablet Take 180 mg by mouth daily      hydrALAZINE (APRESOLINE) 50 mg tablet Take 1 tablet (50 mg total) by mouth 3 (three) times a day At 9am, 4pm, 9pm  Qty: 90 tablet, Refills: 0    Associated Diagnoses: Essential hypertension      insulin glargine (LANTUS) 100 units/mL subcutaneous injection Inject 22 Units under the skin daily at bedtime  Qty: 10 mL, Refills: 0    Associated Diagnoses: Type 2 diabetes mellitus with hyperglycemia, with long-term current use of insulin (HCC)      insulin lispro (HumaLOG) 100 units/mL injection Inject 8 Units under the skin 3 (three) times a day with meals  Qty: 10 mL, Refills: 0    Associated Diagnoses: Type 2 diabetes mellitus with hyperglycemia, with long-term current use of insulin (HCC)      sertraline (ZOLOFT) 100 mg tablet Take 1 tablet (100 mg total) by mouth daily At 9am  Qty: 30 tablet, Refills: 0    Associated Diagnoses: MDD (major depressive disorder), recurrent severe, without psychosis (HCC)      torsemide (DEMADEX) 20 mg tablet Take 2 tablets (40 mg total) by mouth 2 (two) times a day At 9am and 6pm  Qty: 60 tablet, Refills: 0    Associated Diagnoses: Essential hypertension           No discharge procedures on file      PDMP Review     None          ED Provider  Electronically Signed by           Kendra Jeff DO  03/13/20 7602

## 2020-03-13 LAB
ANION GAP SERPL CALCULATED.3IONS-SCNC: 8 MMOL/L (ref 4–13)
BASOPHILS # BLD AUTO: 0.07 THOUSANDS/ΜL (ref 0–0.1)
BASOPHILS NFR BLD AUTO: 2 % (ref 0–1)
BUN SERPL-MCNC: 21 MG/DL (ref 5–25)
CALCIUM SERPL-MCNC: 9.3 MG/DL (ref 8.3–10.1)
CHLORIDE SERPL-SCNC: 94 MMOL/L (ref 100–108)
CO2 SERPL-SCNC: 30 MMOL/L (ref 21–32)
CREAT SERPL-MCNC: 4.1 MG/DL (ref 0.6–1.3)
EOSINOPHIL # BLD AUTO: 0.21 THOUSAND/ΜL (ref 0–0.61)
EOSINOPHIL NFR BLD AUTO: 4 % (ref 0–6)
ERYTHROCYTE [DISTWIDTH] IN BLOOD BY AUTOMATED COUNT: 16.3 % (ref 11.6–15.1)
GFR SERPL CREATININE-BSD FRML MDRD: 12 ML/MIN/1.73SQ M
GLUCOSE SERPL-MCNC: 166 MG/DL (ref 65–140)
GLUCOSE SERPL-MCNC: 203 MG/DL (ref 65–140)
GLUCOSE SERPL-MCNC: 209 MG/DL (ref 65–140)
GLUCOSE SERPL-MCNC: 252 MG/DL (ref 65–140)
GLUCOSE SERPL-MCNC: 274 MG/DL (ref 65–140)
HCT VFR BLD AUTO: 34.1 % (ref 34.8–46.1)
HGB BLD-MCNC: 10.8 G/DL (ref 11.5–15.4)
IMM GRANULOCYTES # BLD AUTO: 0.01 THOUSAND/UL (ref 0–0.2)
IMM GRANULOCYTES NFR BLD AUTO: 0 % (ref 0–2)
LYMPHOCYTES # BLD AUTO: 1.44 THOUSANDS/ΜL (ref 0.6–4.47)
LYMPHOCYTES NFR BLD AUTO: 30 % (ref 14–44)
MCH RBC QN AUTO: 30.2 PG (ref 26.8–34.3)
MCHC RBC AUTO-ENTMCNC: 31.7 G/DL (ref 31.4–37.4)
MCV RBC AUTO: 95 FL (ref 82–98)
MONOCYTES # BLD AUTO: 0.39 THOUSAND/ΜL (ref 0.17–1.22)
MONOCYTES NFR BLD AUTO: 8 % (ref 4–12)
NEUTROPHILS # BLD AUTO: 2.65 THOUSANDS/ΜL (ref 1.85–7.62)
NEUTS SEG NFR BLD AUTO: 56 % (ref 43–75)
NRBC BLD AUTO-RTO: 0 /100 WBCS
PLATELET # BLD AUTO: 247 THOUSANDS/UL (ref 149–390)
PMV BLD AUTO: 10.6 FL (ref 8.9–12.7)
POTASSIUM SERPL-SCNC: 3.7 MMOL/L (ref 3.5–5.3)
PROCALCITONIN SERPL-MCNC: 0.87 NG/ML
RBC # BLD AUTO: 3.58 MILLION/UL (ref 3.81–5.12)
SODIUM SERPL-SCNC: 132 MMOL/L (ref 136–145)
WBC # BLD AUTO: 4.77 THOUSAND/UL (ref 4.31–10.16)

## 2020-03-13 PROCEDURE — 99254 IP/OBS CNSLTJ NEW/EST MOD 60: CPT | Performed by: INTERNAL MEDICINE

## 2020-03-13 PROCEDURE — 87505 NFCT AGENT DETECTION GI: CPT | Performed by: NURSE PRACTITIONER

## 2020-03-13 PROCEDURE — 99232 SBSQ HOSP IP/OBS MODERATE 35: CPT | Performed by: INTERNAL MEDICINE

## 2020-03-13 PROCEDURE — 94664 DEMO&/EVAL PT USE INHALER: CPT

## 2020-03-13 PROCEDURE — 84145 PROCALCITONIN (PCT): CPT | Performed by: NURSE PRACTITIONER

## 2020-03-13 PROCEDURE — 82948 REAGENT STRIP/BLOOD GLUCOSE: CPT

## 2020-03-13 PROCEDURE — 85025 COMPLETE CBC W/AUTO DIFF WBC: CPT | Performed by: NURSE PRACTITIONER

## 2020-03-13 PROCEDURE — 87493 C DIFF AMPLIFIED PROBE: CPT | Performed by: NURSE PRACTITIONER

## 2020-03-13 PROCEDURE — 80048 BASIC METABOLIC PNL TOTAL CA: CPT | Performed by: NURSE PRACTITIONER

## 2020-03-13 PROCEDURE — 87449 NOS EACH ORGANISM AG IA: CPT | Performed by: INTERNAL MEDICINE

## 2020-03-13 PROCEDURE — 87081 CULTURE SCREEN ONLY: CPT | Performed by: NURSE PRACTITIONER

## 2020-03-13 PROCEDURE — 94760 N-INVAS EAR/PLS OXIMETRY 1: CPT

## 2020-03-13 RX ORDER — INSULIN GLARGINE 100 [IU]/ML
20 INJECTION, SOLUTION SUBCUTANEOUS
Status: DISCONTINUED | OUTPATIENT
Start: 2020-03-13 | End: 2020-03-16 | Stop reason: HOSPADM

## 2020-03-13 RX ORDER — LIDOCAINE 50 MG/G
1 PATCH TOPICAL DAILY
Status: DISCONTINUED | OUTPATIENT
Start: 2020-03-13 | End: 2020-03-16 | Stop reason: HOSPADM

## 2020-03-13 RX ORDER — INSULIN GLARGINE 100 [IU]/ML
18 INJECTION, SOLUTION SUBCUTANEOUS
Status: DISCONTINUED | OUTPATIENT
Start: 2020-03-13 | End: 2020-03-13

## 2020-03-13 RX ORDER — SEVELAMER HYDROCHLORIDE 800 MG/1
1600 TABLET, FILM COATED ORAL
Status: DISCONTINUED | OUTPATIENT
Start: 2020-03-13 | End: 2020-03-16 | Stop reason: HOSPADM

## 2020-03-13 RX ORDER — LIDOCAINE 50 MG/G
1 PATCH TOPICAL DAILY
Status: DISCONTINUED | OUTPATIENT
Start: 2020-03-14 | End: 2020-03-13

## 2020-03-13 RX ORDER — ALBUTEROL SULFATE 90 UG/1
2 AEROSOL, METERED RESPIRATORY (INHALATION) EVERY 4 HOURS PRN
Status: DISCONTINUED | OUTPATIENT
Start: 2020-03-13 | End: 2020-03-16 | Stop reason: HOSPADM

## 2020-03-13 RX ADMIN — NIFEDIPINE 30 MG: 30 TABLET, FILM COATED, EXTENDED RELEASE ORAL at 08:33

## 2020-03-13 RX ADMIN — ATORVASTATIN CALCIUM 80 MG: 40 TABLET, FILM COATED ORAL at 17:01

## 2020-03-13 RX ADMIN — INSULIN LISPRO 2 UNITS: 100 INJECTION, SOLUTION INTRAVENOUS; SUBCUTANEOUS at 17:04

## 2020-03-13 RX ADMIN — LABETALOL HYDROCHLORIDE 100 MG: 100 TABLET, FILM COATED ORAL at 13:18

## 2020-03-13 RX ADMIN — GABAPENTIN 200 MG: 100 CAPSULE ORAL at 21:14

## 2020-03-13 RX ADMIN — HEPARIN SODIUM 5000 UNITS: 5000 INJECTION INTRAVENOUS; SUBCUTANEOUS at 21:14

## 2020-03-13 RX ADMIN — SEVELAMER HYDROCHLORIDE 1600 MG: 800 TABLET, FILM COATED PARENTERAL at 12:06

## 2020-03-13 RX ADMIN — INSULIN LISPRO 1 UNITS: 100 INJECTION, SOLUTION INTRAVENOUS; SUBCUTANEOUS at 21:14

## 2020-03-13 RX ADMIN — INSULIN LISPRO 2 UNITS: 100 INJECTION, SOLUTION INTRAVENOUS; SUBCUTANEOUS at 00:15

## 2020-03-13 RX ADMIN — GUAIFENESIN 600 MG: 600 TABLET, EXTENDED RELEASE ORAL at 08:33

## 2020-03-13 RX ADMIN — INSULIN GLARGINE 20 UNITS: 100 INJECTION, SOLUTION SUBCUTANEOUS at 21:14

## 2020-03-13 RX ADMIN — LABETALOL HYDROCHLORIDE 100 MG: 100 TABLET, FILM COATED ORAL at 21:14

## 2020-03-13 RX ADMIN — SEVELAMER HYDROCHLORIDE 1600 MG: 800 TABLET, FILM COATED PARENTERAL at 17:02

## 2020-03-13 RX ADMIN — ACETAMINOPHEN 650 MG: 325 TABLET, FILM COATED ORAL at 00:14

## 2020-03-13 RX ADMIN — INSULIN LISPRO 2 UNITS: 100 INJECTION, SOLUTION INTRAVENOUS; SUBCUTANEOUS at 08:34

## 2020-03-13 RX ADMIN — HEPARIN SODIUM 5000 UNITS: 5000 INJECTION INTRAVENOUS; SUBCUTANEOUS at 13:18

## 2020-03-13 RX ADMIN — HEPARIN SODIUM 5000 UNITS: 5000 INJECTION INTRAVENOUS; SUBCUTANEOUS at 05:19

## 2020-03-13 RX ADMIN — DICLOFENAC 2 G: 10 GEL TOPICAL at 08:33

## 2020-03-13 RX ADMIN — LIDOCAINE 1 PATCH: 50 PATCH TOPICAL at 17:01

## 2020-03-13 RX ADMIN — DICLOFENAC 2 G: 10 GEL TOPICAL at 12:07

## 2020-03-13 RX ADMIN — DICLOFENAC 2 G: 10 GEL TOPICAL at 17:01

## 2020-03-13 RX ADMIN — INSULIN LISPRO 3 UNITS: 100 INJECTION, SOLUTION INTRAVENOUS; SUBCUTANEOUS at 12:07

## 2020-03-13 RX ADMIN — LABETALOL HYDROCHLORIDE 100 MG: 100 TABLET, FILM COATED ORAL at 05:19

## 2020-03-13 RX ADMIN — GUAIFENESIN 600 MG: 600 TABLET, EXTENDED RELEASE ORAL at 17:01

## 2020-03-13 RX ADMIN — CEFEPIME HYDROCHLORIDE 1000 MG: 1 INJECTION, POWDER, FOR SOLUTION INTRAMUSCULAR; INTRAVENOUS at 20:10

## 2020-03-13 RX ADMIN — GABAPENTIN 200 MG: 100 CAPSULE ORAL at 08:33

## 2020-03-13 NOTE — CONSULTS
Consultation - Nephrology   Cleve Mcghee 53 y o  female MRN: [id number]  Unit/Bed#: S -01 Encounter: [id number]    ASSESSMENT:  1  ESRD on HD (TTS @ [address] [facility] [address]): next HD is Saturday  2  Access: right CVC  - right AVF placed 2/14/20 but patient states she was told it will never work and she needs a new access  3  Hypertension: BP acceptable, continue to monitor  4  Anemia: hgb at goal  - she receives epogen 12,000 units with HD which was increased on Saturday (confirmed via phone with outpatient unit)  5  Secondary Hyperparathyroidism of renal origin: continue renagel 1600mg with meals  - renal diet  6  Pneumonia: per primary team  - on antibiotics (vancomycin & cefepime)  7  Diarrhea: rule out C diff per primary team    PLAN:  HD Saturday  Orders obtained and written    Patient asking to transfer to [address] [place] [address]    HISTORY OF PRESENT ILLNESS:  Requesting Physician: Aye Keene MD  Reason for Consult: ESRD on HD    Cleve Mcghee is a 53y o  year old female who was admitted to [address] [place] [address] after presenting with shortness of breath and fever and night sweats  She went to dialysis clinic on Thursday and came to the ER after  She states she was not feeling well for the past few days and also had diarrhea which she is still having today  A renal consultation is requested today for assistance in the management of ESRD  Cleve Mcghee is a known ESRD patient who undergoes maintenance hemodialysis at [address] [facility] on TTS  She had a full treatment with her outpatient clinic on Thursday and states there were no issues with this treatment  She currently denies SOB  She denies nausea and vomiting      PAST MEDICAL HISTORY:  Past Medical History:   Diagnosis Date    Asthma     Depression     Diabetes mellitus ([address] )     Hyperlipidemia     Hypertension     Renal disorder     daylsis T,Th, Saturday    Stroke [facility])        PAST SURGICAL HISTORY:  Past Surgical History:   Procedure Laterality Date    CHOLECYSTECTOMY      HYSTERECTOMY         ALLERGIES:  Allergies   Allergen Reactions    Lisinopril Swelling and Cough       SOCIAL HISTORY:  Social History     Substance and Sexual Activity   Alcohol Use No     Social History     Substance and Sexual Activity   Drug Use No     Social History     Tobacco Use   Smoking Status Never Smoker   Smokeless Tobacco Never Used       FAMILY HISTORY:  History reviewed  No pertinent family history      MEDICATIONS:    Current Facility-Administered Medications:     acetaminophen (TYLENOL) tablet 650 mg, 650 mg, Oral, Q6H PRN, GONZALO Garay, 650 mg at 03/13/20 0014    albuterol (PROVENTIL HFA,VENTOLIN HFA) inhaler 2 puff, 2 puff, Inhalation, Q4H PRN, Nirali Pearl MD    atorvastatin (LIPITOR) tablet 80 mg, 80 mg, Oral, QPM, GONZALO Chicas, 80 mg at 03/12/20 2319    benzonatate (TESSALON PERLES) capsule 100 mg, 100 mg, Oral, TID PRN, GONZALO Garay    cefepime (MAXIPIME) 1,000 mg in dextrose 5 % 50 mL IVPB, 1,000 mg, Intravenous, Q24H, GONZALO Chicas    diclofenac sodium (VOLTAREN) 1 % topical gel 2 g, 2 g, Topical, 4x Daily, GONZALO Chicas, 2 g at 03/13/20 0833    [START ON 3/14/2020] epoetin ha (EPOGEN,PROCRIT) injection 12,000 Units, 12,000 Units, Subcutaneous, After Dialysis, [facility]    gabapentin (NEURONTIN) capsule 200 mg, 200 mg, Oral, Q12H [facility], GONZALO Chicas, 200 mg at 03/13/20 7769    guaiFENesin (MUCINEX) 12 hr tablet 600 mg, 600 mg, Oral, BID, GONZALO Chicas, 600 mg at 03/13/20 9271    heparin (porcine) subcutaneous injection 5,000 Units, 5,000 Units, Subcutaneous, Q8H [facility], 5,000 Units at 03/13/20 0519 **AND** Platelet count, , , Once, GONZALO Garay    insulin glargine (LANTUS) subcutaneous injection 12 Units 0 12 mL, 12 Units, Subcutaneous, HS, GONZALO Chicas, 12 Units at 03/12/20 1148    insulin lispro (HumaLOG) 100 units/mL subcutaneous injection 1-5 Units, 1-5 Units, Subcutaneous, HS, Clarissa McGeehan, CRNP, 2 Units at 03/13/20 0015    insulin lispro (HumaLOG) 100 units/mL subcutaneous injection 1-6 Units, 1-6 Units, Subcutaneous, TID AC, 2 Units at 03/13/20 0834 **AND** Fingerstick Glucose (POCT), , , TID AC, Clarissa McGeehan, CRNP    labetalol (NORMODYNE) tablet 100 mg, 100 mg, Oral, Q8H [facility], Clarissa Radhaeehan, CRNP, 100 mg at 03/13/20 0519    melatonin tablet 3 mg, 3 mg, Oral, HS PRN, Pasty Noon, CRNP    NIFEdipine (PROCARDIA XL) 24 hr tablet 30 mg, 30 mg, Oral, Daily, Clarissa McGeehan, CRNP, 30 mg at 03/13/20 0487    sevelamer (RENAGEL) tablet 1,600 mg, 1,600 mg, Oral, TID With Meals, [facility], [facility]    [START ON 3/14/2020] vancomycin (VANCOCIN) IVPB (premix) 750 mg, 10 mg/kg (Adjusted), Intravenous, After Dialysis, Pasty Noon, NEALNP    REVIEW OF SYSTEMS:  A complete 10 point review of systems was performed and found to be negative unless otherwise noted below or in the HPI  General: No fevers, chills  Cardiovascular: No chest pain, shortness of breath, leg edema  Respiratory: No cough, sputum production, shortness of breath  Gastrointestinal: No nausea, vomiting, abdominal pain, + diarrhea  Genitourinary: No hematuria      PHYSICAL EXAM:  Current Weight: Weight - Scale: 78 4 kg (172 lb 13 5 oz)  First Weight: Weight - Scale: 78 4 kg (172 lb 13 5 oz)  Vitals:    03/13/20 0032 03/13/20 0519 03/13/20 0600 03/13/20 0811   BP:  140/82  153/78   BP Location:    Left arm   Pulse:  84  76   Resp:    16   Temp:    97 5 °F (36 4 °C)   TempSrc:    Oral   SpO2: 99%   94%   Weight:   78 4 kg (172 lb 13 5 oz)    Height:           Intake/Output Summary (Last 24 hours) at 3/13/2020 1038  Last data filed at 3/13/2020 0900  Gross per 24 hour   Intake 230 ml   Output    Net 230 ml     General: NAD  Skin: no rash  Eyes: anicteric  ENMT: mm moist  Neck: no masses  Respiratory: CTAB  CVS: RRR  Extremities: trace edema  GI: soft nt nd  Neuro: alert awake  Psych: mood and affect appropriate     Lab Results:   Results from last 7 days   Lab Units 03/13/20  0944 03/12/20  1901   WBC Thousand/uL 4 77 7 36   HEMOGLOBIN g/dL 10 8* 10 5*   HEMATOCRIT % 34 1* 31 7*   PLATELETS Thousands/uL 247 255   POTASSIUM mmol/L 3 7 3 9   CHLORIDE mmol/L 94* 92*   CO2 mmol/L 30 34*   BUN mg/dL 21 9   CREATININE mg/dL 4 10* 2 71*   CALCIUM mg/dL 9 3 8 7   ALK PHOS U/L  --  175*   ALT U/L  --  34   AST U/L  --  31     Lab Results   Component Value Date    CALCIUM 9 3 03/13/2020    PHOS 4 7 (H) 06/25/2019

## 2020-03-13 NOTE — ASSESSMENT & PLAN NOTE
· Patient receives hemodialysis on Tuesday, Thursday, Saturdays  · Patient underwent full hemodialysis session today, 03/12/2020, prior to arrival in the ER  · Nephrology consult

## 2020-03-13 NOTE — PLAN OF CARE
Problem: Potential for Falls  Goal: Patient will remain free of falls  Description  INTERVENTIONS:  - Assess patient frequently for physical needs  -  Identify cognitive and physical deficits and behaviors that affect risk of falls  -  [place] fall precautions as indicated by assessment   - Educate patient/family on patient safety including physical limitations  - Instruct patient to call for assistance with activity based on assessment  - Modify environment to reduce risk of injury  - Consider OT/PT consult to assist with strengthening/mobility  Outcome: Progressing     Problem: Prexisting or High Potential for Compromised Skin Integrity  Goal: Skin integrity is maintained or improved  Description  INTERVENTIONS:  - Identify patients at risk for skin breakdown  - Assess and monitor skin integrity  - Assess and monitor nutrition and hydration status  - Monitor labs   - Assess for incontinence   - Turn and reposition patient  - Assist with mobility/ambulation  - Relieve pressure over bony prominences  - Avoid friction and shearing  - Provide appropriate hygiene as needed including keeping skin clean and dry  - Evaluate need for skin moisturizer/barrier cream  - Collaborate with interdisciplinary team   - Patient/family teaching  - Consider wound care consult   Outcome: Progressing     Problem: Nutrition/Hydration-ADULT  Goal: Nutrient/Hydration intake appropriate for improving, restoring or maintaining nutritional needs  Description  Monitor and assess patient's nutrition/hydration status for malnutrition  Collaborate with interdisciplinary team and initiate plan and interventions as ordered  Monitor patient's weight and dietary intake as ordered or per policy  Utilize nutrition screening tool and intervene as necessary  Determine patient's food preferences and provide high-protein, high-caloric foods as appropriate       INTERVENTIONS:  - Monitor oral intake, urinary output, labs, and treatment plans  - Assess nutrition and hydration status and recommend course of action  - Evaluate amount of meals eaten  - Assist patient with eating if necessary   - Allow adequate time for meals  - Recommend/ encourage appropriate diets, oral nutritional supplements, and vitamin/mineral supplements  - Order, calculate, and assess calorie counts as needed  - Recommend, monitor, and adjust tube feedings and TPN/PPN based on assessed needs  - Assess need for intravenous fluids  - Provide specific nutrition/hydration education as appropriate  - Include patient/family/caregiver in decisions related to nutrition  Outcome: Progressing

## 2020-03-13 NOTE — ASSESSMENT & PLAN NOTE
SIRS criteria:  Hyperthermia, tachypnea  Suspected source:  Pneumonia  Lactic acid: 0 08  End organ damage:  Creatinine 2 71, however this is patient's baseline  IV Fluids:  Contraindicated due to patient's ESRD  IV antibiotics:  IV cefepime and IV vancomycin  Follow up on culture results  Monitor vital signs, laboratory studies

## 2020-03-13 NOTE — UTILIZATION REVIEW
Initial Clinical Review    Admission: Date/Time/Statement: Admission Orders (From admission, onward)     Ordered        03/12/20 2018  Inpatient Admission  Once                   Orders Placed This Encounter   Procedures    Inpatient Admission     Standing Status:   Standing     Number of Occurrences:   1     Order Specific Question:   Admitting Physician     Answer:   Laura Lopez [[id number]]     Order Specific Question:   Level of Care     Answer:   Med Surg [[id number]]     Order Specific Question:   Estimated length of stay     Answer:   More than 2 Midnights     Order Specific Question:   Certification     Answer:   I certify that inpatient services are medically necessary for this patient for a duration of greater than two midnights  See H&P and MD Progress Notes for additional information about the patient's course of treatment  ED Arrival Information     Expected Arrival Acuity Means of Arrival Escorted By Service Admission Type    - 3/12/2020 17:25 Emergent Walk-In Family Member Hospitalist Emergency    Arrival Complaint    SOB/Fever        Chief Complaint   Patient presents with    Shortness of Breath     Pt was doing dyalsis today  They reported at dyalsis she had fever, decreased blood pressure, sob, dry cough and headache  Pt is normally on 2L of oxygen all the time  Assessment/Plan:  this is a 53year old female from home to ED admitted inpatient due to pneumonia/SIRS/diarrhea  ESRD on dialysis  Home oxygen at 2 liters  Presented due to fever, cough, shortness of breath and headache starting at dialysis  With diarhea last 4 days  On exam has rhonchi in left lower field  Right AV fistula with palpable thrill  CxR showed LLL pneumonia  Procacitonin 0 50  Wbc 7 36  Blood cultures done  UA trace leukocytes, >300 protein, 1/3=4% glucose  Bun 9  Creatinine 2 71  Glucose 173  IVF contraindicated due to ESRD  IV antibiotics in progress  Stool C diff and enteric bacterial panel ordered  Renal consulted  On 3/13/2020 per nephrology - Patient with ESRD and next HD is Saturday  Antibiotics continue for pneumonia  ED Triage Vitals   Temperature Pulse Respirations Blood Pressure SpO2   03/12/20 1744 03/12/20 1744 03/12/20 1744 03/12/20 1744 03/12/20 1744   100 2 °F (37 9 °C) 95 (!) 24 (!) 184/79 100 %      Temp Source Heart Rate Source Patient Position - Orthostatic VS BP Location FiO2 (%)   03/12/20 1744 03/12/20 1744 03/12/20 1744 03/12/20 1744 --   Oral Monitor Sitting Left arm       Pain Score       03/12/20 2151       5        Wt Readings from Last 1 Encounters:   03/13/20 78 4 kg (172 lb 13 5 oz)     Additional Vital Signs:   03/13/20 0811  97 5 °F (36 4 °C)  76  16  153/78  94 %  None (Room air)  Sitting   03/12/20 2300  97 4 °F (36 3 °C)Abnormal   81  14  138/90  93 %  Nasal cannula  Lying   03/12/20 2129  98 8 °F (37 1 °C)  86  28Abnormal   175/82Abnormal   90 %  Nasal cannula         03/13/20 0600  78 4 kg (172 lb 13 5 oz)  Bed scale     03/12/20 1744      5' 2" (1 575 m)      03/12 0701  03/13 0700 03/13 0701  03/14 0700   P  O   180   IV Piggyback 50    Total Intake(mL/kg) 50 (0 6) 180 (2 3)   Net +50 +180       Pertinent Labs/Diagnostic Test Results:   3/12/2020 CxR - Suspected left lower lung airspace opacity  3/12/2200 EKG - Normal sinus rhythm at a rate of 97 beats per minute  Normal intervals  Normal axis  Normal QRS  No ST T wave abnormalities  Occasional PVCs  Similar to previous from 06/10/2019    Results from last 7 days   Lab Units 03/13/20  0944 03/12/20  1901   WBC Thousand/uL 4 77 7 36   HEMOGLOBIN g/dL 10 8* 10 5*   HEMATOCRIT % 34 1* 31 7*   PLATELETS Thousands/uL 247 255   NEUTROS ABS Thousands/µL 2 65 5 31     Results from last 7 days   Lab Units 03/13/20  0944 03/12/20  1901   SODIUM mmol/L 132* 136   POTASSIUM mmol/L 3 7 3 9   CHLORIDE mmol/L 94* 92*   CO2 mmol/L 30 34*   ANION GAP mmol/L 8 10   BUN mg/dL 21 9   CREATININE mg/dL 4 10* 2 71*   EGFR ml/min/1 73sq m 12 19   CALCIUM mg/dL 9 3 8 7     Results from last 7 days   Lab Units 03/12/20  1901   AST U/L 31   ALT U/L 34   ALK PHOS U/L 175*   TOTAL PROTEIN g/dL 7 6   ALBUMIN g/dL 3 5   TOTAL BILIRUBIN mg/dL 0 42     Results from last 7 days   Lab Units 03/13/20  1054 03/13/20  0758 03/12/20  2348   POC GLUCOSE mg/dl 252* 203* 296*     Results from last 7 days   Lab Units 03/13/20  0944 03/12/20  1901   GLUCOSE RANDOM mg/dL 274* 173*     Results from last 7 days   Lab Units 03/12/20  1901   PROTIME seconds 12 7   INR  1 01   PTT seconds 32     Results from last 7 days   Lab Units 03/12/20  1901   PROCALCITONIN ng/ml 0 50*     Results from last 7 days   Lab Units 03/12/20  2147 03/12/20  1901   LACTIC ACID mmol/L 1 1 0 8     Results from last 7 days   Lab Units 03/12/20  1936   CLARITY UA  Cloudy   COLOR UA  Yellow   SPEC GRAV UA  >=1 030   PH UA  5 0   GLUCOSE UA mg/dl 250 (1/4%)*   KETONES UA mg/dl Negative   BLOOD UA  Moderate*   PROTEIN UA mg/dl >=300*   NITRITE UA  Negative   BILIRUBIN UA  Moderate*   UROBILINOGEN UA E U /dl 0 2   LEUKOCYTES UA  Trace*   WBC UA /hpf 4-10*   RBC UA /hpf 2-4*   BACTERIA UA /hpf Innumerable*   EPITHELIAL CELLS WET PREP /hpf Moderate*     Results from last 7 days   Lab Units 03/12/20  1903   INFLUENZA A PCR  None Detected   INFLUENZA B PCR  None Detected   RSV PCR  None Detected     Results from last 7 days   Lab Units 03/12/20  1928 03/12/20  1900   BLOOD CULTURE  Received in Microbiology Lab  Culture in Progress  Received in Microbiology Lab  Culture in Progress         ED Treatment:   Medication Administration from 03/12/2020 1725 to 03/12/2020 2122       Date/Time Order Dose Route Action Comments     03/12/2020 1906 acetaminophen (TYLENOL) tablet 975 mg 975 mg Oral Given      03/12/2020 2025 vancomycin (VANCOCIN) 1,250 mg in sodium chloride 0 9 % 250 mL IVPB 1,250 mg Intravenous New Bag      03/12/2020 2000 cefepime (MAXIPIME) 2 g/50 mL dextrose IVPB 2,000 mg Intravenous New Bag         Past Medical History:   Diagnosis Date    Asthma     Depression     [facility]     Hyperlipidemia     Hypertension     Renal disorder     daylsis T,Th, Saturday    Stroke [facility])      Present on Admission:   Hypertension   Hyperlipidemia   Diarrhea   Anemia of chronic renal failure      Admitting Diagnosis: SOB (shortness of breath) [R06 02]  ESRD (end stage renal disease) on dialysis (HCC) [N18 6, Z99 2]  Pneumonia of left lower lobe due to infectious organism [facility]) [J18 1]  Age/Sex: 53 y o  female  Admission Orders: 3/12/2020 2018 inpatient   Scheduled Medications:  Medications:  atorvastatin 80 mg Oral QPM   cefepime 1,000 mg Intravenous Q24H   diclofenac sodium 2 g Topical 4x Daily   gabapentin 200 mg Oral Q12H [facility]   guaiFENesin 600 mg Oral BID   heparin (porcine) 5,000 Units Subcutaneous Q8H [facility]   insulin glargine 12 Units Subcutaneous HS   insulin lispro 1-5 Units Subcutaneous HS   insulin lispro 1-6 Units Subcutaneous TID AC   labetalol 100 mg Oral Q8H [facility]   NIFEdipine ER 30 mg Oral Daily   sevelamer 1,600 mg Oral TID With Meals     Continuous IV Infusions: none     PRN Meds:  Acetaminophen - used x 1  650 mg Oral Q6H PRN   albuterol 2 puff Inhalation Q4H PRN   benzonatate 100 mg Oral TID PRN   [START ON 3/14/2020] epoetin ha 12,000 Units Subcutaneous After Dialysis   melatonin 3 mg Oral HS PRN   [START ON 3/14/2020] vancomycin 10 mg/kg (Adjusted) Intravenous After Dialysis       IP CONSULT TO PHARMACY  IP CONSULT TO NEPHROLOGY     Fluid restriction    Network Utilization Review Department  [email or web address]  ATTENTION: Please call with any questions or concerns to [phone] and carefully listen to the prompts so that you are directed to the right person   All voicemails are confidential   Paula Rivera all requests for admission clinical reviews, approved or denied determinations and any other requests to dedicated fax number below belonging to the [place] where the patient is receiving treatment   List of dedicated fax numbers for the Facilities:  [address] DENIALS (Administrative/Medical Necessity) [phone]   [address] (Maternity/NICU/Pediatrics) [phone]   Kaycecorey Jody [phone]   Phyllis Layne [phone]   [address] [place] [address] [phone]   [facility] [phone]   [address] [place] [address] [address] [place] [address] [phone]   [facility]  [phone]   [address] [facility]  [address] [facility] [address] [facility] [phone]

## 2020-03-13 NOTE — PROGRESS NOTES
Vancomycin Assessment    Wanda Schultz is a 53 y o  female who is currently receiving vancomycin 1250mg (20mg/kg) load followed with 750mg (10mg/kg) after each HD session  for Pneumonia     Relevant clinical data and objective history reviewed:  Creatinine   Date Value Ref Range Status   03/12/2020 2 71 (H) 0 60 - 1 30 mg/dL Final     Comment:     Standardized to IDMS reference method   06/25/2019 2 22 (H) 0 60 - 1 30 mg/dL Final     Comment:     Standardized to IDMS reference method   06/22/2019 2 59 (H) 0 60 - 1 30 mg/dL Final     Comment:     Standardized to IDMS reference method   11/09/2015 0 97 0 60 - 1 30 mg/dL Final     Comment:     Standardized to IDMS reference method   09/27/2014 0 98 0 60 - 1 30 mg/dL Final     Comment:     Standardized to IDMS reference method   03/19/2014 0 78 0 60 - 1 30 mg/dL Final     Comment:     Standardized to IDMS reference method     /82   Pulse 84   Temp (!) 97 4 °F (36 3 °C) (Oral)   Resp 14   Ht 5' 2" (1 575 m)   SpO2 99%   BMI 34 50 kg/m²   No intake/output data recorded  Lab Results   Component Value Date/Time    BUN 9 03/12/2020 07:01 PM    BUN 22 11/09/2015 07:36 PM    WBC 7 36 03/12/2020 07:01 PM    WBC 6 71 11/09/2015 07:36 PM    HGB 10 5 (L) 03/12/2020 07:01 PM    HGB 12 6 11/09/2015 07:36 PM    HCT 31 7 (L) 03/12/2020 07:01 PM    HCT 36 5 11/09/2015 07:36 PM    MCV 94 03/12/2020 07:01 PM    MCV 85 11/09/2015 07:36 PM     03/12/2020 07:01 PM     11/09/2015 07:36 PM     Temp Readings from Last 3 Encounters:   03/12/20 (!) 97 4 °F (36 3 °C) (Oral)   06/26/19 (!) 96 8 °F (36 °C) (Tympanic)   06/12/19 98 1 °F (36 7 °C) (Oral)     Vancomycin Days of Therapy: 1    Assessment/Plan  The patient is currently on vancomycin utilizing scheduled dosing based on adjusted body weight (due to obesity)  Baseline risks associated with therapy include: pre-existing renal impairment receiving HD on Tues,Thur & Sat    The patient is currently receiving 1250mg (20mg/kg) load followed with 750mg (10mg/kg) after each HD session  and is clinically appropriate and dose will be continued  Pharmacy will also follow closely for s/sx of nephrotoxicity, infusion reactions and appropriateness of therapy  BMP and CBC will be ordered per protocol  Plan for pre-HD random level prior to the 3rd  dose at approximately 0600 on 3/17/20  Due to infection severity, will target a pre-HD level of 20-25 (equates to 15-20 post-HD)  Pharmacy will continue to follow the patients culture results and clinical progress daily      Liliane Pena, Pharmacist

## 2020-03-13 NOTE — H&P
[address] [facility]    H&P- Jonatan Conway 1965, 53 y o  female MRN: [id number]    Unit/Bed#: S -01 Encounter: [id number]    Primary Care Provider: Jonatan Conway MD   Date and time admitted to hospital: 3/12/2020  6:16 PM        * Pneumonia  Assessment & Plan   Chest x-ray:  Suspected left lower lobe airspace opacity "   Treating as Hcap  o IV antibiotics:  IV cefepime and IV vancomycin  MRSA swab pending   Respiratory protocol, nebs PRN   Obtain procalcitonin   Patient is chronically on 2 L of oxygen at baseline  Patient currently requiring 3 L of oxygen at this time  Wean O2 to baseline oxygen demands   Monitor respiratory status  SIRS (systemic inflammatory response syndrome) (HCC)  Assessment & Plan  SIRS criteria:  Hyperthermia, tachypnea  Suspected source:  Pneumonia  Lactic acid: 0 08  End organ damage:  Creatinine 2 71, however this is patient's baseline  IV Fluids:  Contraindicated due to patient's ESRD  IV antibiotics:  IV cefepime and IV vancomycin  Follow up on culture results  Monitor vital signs, laboratory studies  Diarrhea  Assessment & Plan  · Patient reports 4 day history of diarrhea  · Obtain C diff sample and stool enteric bacterial sample  · Contact precautions  · Monitor output  · Monitor electrolytes  ESRD on hemodialysis [facility])  Assessment & Plan  · Patient receives hemodialysis on Tuesday, Thursday, Saturdays  · Patient underwent full hemodialysis session today, 03/12/2020, prior to arrival in the ER  · Nephrology consult  Hypertension  Assessment & Plan  · Blood pressure mildly elevated on admission  Currently 170s/80s, likely in the setting the patient did not receive evening dose medications will give now  · Continue home medications      Type 2 diabetes mellitus with hyperglycemia, with long-term current use of insulin [facility])  Assessment & Plan  Lab Results   Component Value Date    HGBA1C 8 7 (H) 02/05/2020       No results for input(s): POCGLU in the last 72 hours  Blood Sugar Average: Last 72 hrs:  · Home regimen of 20 units of Lantus at bedtime  · Q i d  Accu-Cheks and sliding scale insulin  · Hypoglycemia protocol  Hyperlipidemia  Assessment & Plan  · Continue statin  Anemia of chronic renal failure  Assessment & Plan  · Stable  · Present admission 10 5  · Baseline appears to be around 9    · Monitor CBC  VTE Prophylaxis: Heparin  / reason for no mechanical VTE prophylaxis not indicated  Code Status: DNR/DNI  POLST: POLST form is not discussed and not completed at this time  Discussion with family: Not at bedside  Anticipated Length of Stay:  Patient will be admitted on an Inpatient basis with an anticipated length of stay of  > 2 midnights  Justification for Hospital Stay: IV antibiotics  Total Time for Visit, including Counseling / Coordination of Care: 1 hour  Greater than 50% of this total time spent on direct patient counseling and coordination of care  Chief Complaint:   Fever and shortness of breath    History of Present Illness:    Atul York is a 53 y o  female with history of ESRD on HD, HTN, DM2, and HLD who presents with fever and shortness of breath that started today  Patient presented to the hospital from her dialysis center due to decreased blood pressure, shortness of breath, dry cough and headache while at the dialysis center  Patient was able to receive a full hemodialysis session  Patient is chronically on 2 L of oxygen and upon arrival to the ER requiring 3 L of oxygen  Patient reports that she was recently hospitalized at [facility] in February 2020 for fever related to pneumonia  Additionally, patient reports 4 day history of diarrhea  She reports shortness of breath, fever, chills, productive cough with clear sputum and bilateral rib pain from coughing  She denies chest pain, lightheadedness, dizziness or headache      Patient will be admitted for IV antibiotics  Review of Systems:    Review of Systems   Constitutional: Positive for chills and fever  Respiratory: Positive for cough, chest tightness and shortness of breath  Negative for wheezing  Cardiovascular: Negative for chest pain and palpitations  Gastrointestinal: Positive for diarrhea  Negative for nausea and vomiting  Genitourinary: Negative for dysuria  Musculoskeletal: Positive for arthralgias  Neurological: Negative for dizziness, light-headedness and headaches  All other systems reviewed and are negative  Past Medical and Surgical History:     Past Medical History:   Diagnosis Date    Asthma     Depression     Diabetes mellitus ([facility] [address] )     Hyperlipidemia     Hypertension     Renal disorder     daylsis T,Th, Saturday    Stroke [facility])        Past Surgical History:   Procedure Laterality Date    CHOLECYSTECTOMY      HYSTERECTOMY         Meds/Allergies:    Prior to Admission medications    Medication Sig Start Date End Date Taking?  Authorizing Provider   acetaminophen (TYLENOL) 500 MG chewable tablet Chew 1 tablet every 6 (six) hours as needed for mild pain 11/15/17  Yes Alley Irwin MD   atorvastatin (LIPITOR) 80 mg tablet Take 1 tablet (80 mg total) by mouth every evening 6/26/19  Yes Bianka Francisco PA-C   gabapentin (NEURONTIN) 100 mg capsule Take 2 capsules (200 mg total) by mouth every 12 (twelve) hours At 9am and 9pm 6/26/19  Yes Bianka Francisco PA-C   labetalol (NORMODYNE) 300 mg tablet Take 1 tablet (300 mg total) by mouth every 12 (twelve) hours At 9am and 9pm 6/26/19  Yes Bianka Francisco PA-C   melatonin 3 mg Take 1 tablet (3 mg total) by mouth daily at bedtime as needed (insomnia) 6/26/19  Yes Bianka Francisco PA-C   NIFEdipine ER (ADALAT CC) 30 MG 24 hr tablet Take 1 tablet (30 mg total) by mouth daily At 9am 6/26/19  Yes Bianka Francisco PA-C   ergocalciferol (VITAMIN D2) 50,000 units Take 1 capsule (50,000 Units total) by mouth 3 (three) times a week for 19 doses Tuesday, Friday, Dimitri 6/28/19 8/10/19  Judd Grullon PA-C   fexofenadine (ALLEGRA) 180 MG tablet Take 180 mg by mouth daily    Historical Provider, MD   hydrALAZINE (APRESOLINE) 50 mg tablet Take 1 tablet (50 mg total) by mouth 3 (three) times a day At 9am, 4pm, 9pm  Patient not taking: Reported on 3/12/2020 6/26/19   Judd Grullon PA-C   insulin glargine (LANTUS) 100 units/mL subcutaneous injection Inject 22 Units under the skin daily at bedtime  Patient taking differently: Inject 12 Units under the skin daily at bedtime  6/26/19   Judd Grullon PA-C   insulin lispro (HumaLOG) 100 units/mL injection Inject 8 Units under the skin 3 (three) times a day with meals  Patient not taking: Reported on 3/12/2020 6/26/19   Judd Grullon PA-C   sertraline (ZOLOFT) 100 mg tablet Take 1 tablet (100 mg total) by mouth daily At 9am  Patient not taking: Reported on 3/12/2020 6/27/19   Judd Grullon PA-C   torsemide [facility]) 20 mg tablet Take 2 tablets (40 mg total) by mouth 2 (two) times a day At 9am and 6pm  Patient not taking: Reported on 3/12/2020 6/26/19   Judd Grullon PA-C     I have reviewed home medications with patient personally  Allergies:    Allergies   Allergen Reactions    Lisinopril Swelling and Cough       Social History:     Marital Status:    Occupation: Unknown  Patient Pre-hospital Living Situation: Private residence  Patient Pre-hospital Level of Mobility: Independent  Patient Pre-hospital Diet Restrictions: Renal/Diabetic  Substance Use History:   Social History     Substance and Sexual Activity   Alcohol Use No     Social History     Tobacco Use   Smoking Status Never Smoker   Smokeless Tobacco Never Used     Social History     Substance and Sexual Activity   Drug Use No       Family History:    non-contributory    Physical Exam:     Vitals:   Blood Pressure: (!) 175/82 (03/12/20 2129)  Pulse: 86 (03/12/20 2129)  Temperature: 98 8 °F (37 1 °C) (03/12/20 2129)  Temp Source: Oral (03/12/20 2129)  Respirations: (!) 28 (03/12/20 2129)  Height: 5' 2" (157 5 cm) (03/12/20 1744)  SpO2: 90 % (03/12/20 2129)    Physical Exam   Constitutional: She is oriented to person, place, and time  She appears well-developed and well-nourished  HENT:   Head: Normocephalic and atraumatic  Eyes: Conjunctivae are normal  No scleral icterus  Neck: Neck supple  Cardiovascular: Normal rate, regular rhythm, normal heart sounds and intact distal pulses  Exam reveals no gallop and no friction rub  No murmur heard  Pulmonary/Chest: Effort normal  She has no decreased breath sounds  She has no wheezes  She has rhonchi in the left middle field and the left lower field  She has no rales  Abdominal: Soft  Normal appearance  There is generalized tenderness  Musculoskeletal: Normal range of motion  Neurological: She is alert and oriented to person, place, and time  She is not disoriented  Skin: Skin is warm, dry and intact  Capillary refill takes less than 2 seconds  No rash noted  Psychiatric: She has a normal mood and affect  Her speech is normal    Nursing note and vitals reviewed  Additional Data:     Lab Results: I have personally reviewed pertinent reports        Results from last 7 days   Lab Units 03/12/20 1901   WBC Thousand/uL 7 36   HEMOGLOBIN g/dL 10 5*   HEMATOCRIT % 31 7*   PLATELETS Thousands/uL 255   NEUTROS PCT % 71   LYMPHS PCT % 17   MONOS PCT % 7   EOS PCT % 3     Results from last 7 days   Lab Units 03/12/20 1901   SODIUM mmol/L 136   POTASSIUM mmol/L 3 9   CHLORIDE mmol/L 92*   CO2 mmol/L 34*   BUN mg/dL 9   CREATININE mg/dL 2 71*   ANION GAP mmol/L 10   CALCIUM mg/dL 8 7   ALBUMIN g/dL 3 5   TOTAL BILIRUBIN mg/dL 0 42   ALK PHOS U/L 175*   ALT U/L 34   AST U/L 31   GLUCOSE RANDOM mg/dL 173*     Results from last 7 days   Lab Units 03/12/20 1901   INR  1 01             Results from last 7 days   Lab Units 03/12/20 2147 03/12/20 1901 LACTIC ACID mmol/L 1 1 0 8       Imaging: I have personally reviewed pertinent reports  XR chest 2 views   Final Result by Lauren Ambrose MD (03/12 1921)      Suspected left lower lung airspace opacity  This can be confirmed with noncontrast chest CT  The study was marked in [facility] for immediate notification  Workstation performed: [id number]             EKG, Pathology, and Other Studies Reviewed on Admission:   · EKG:  Sinus rhythm with PVCs, heart rate of 97    Allscripts / Epic Records Reviewed: Yes     ** Please Note: This note has been constructed using a voice recognition system   **

## 2020-03-13 NOTE — ASSESSMENT & PLAN NOTE
· Patient reports 4 day history of diarrhea  · Obtain C diff sample and stool enteric bacterial sample  · Contact precautions  · Monitor output  · Monitor electrolytes

## 2020-03-13 NOTE — ASSESSMENT & PLAN NOTE
Lab Results   Component Value Date    HGBA1C 8 7 (H) 02/05/2020       Recent Labs     03/12/20  2348 03/13/20  0758 03/13/20  1054 03/13/20  1606   POCGLU 296* 203* 252* 209*       Blood Sugar Average: Last 72 hrs:  · (P) 240Home regimen of 20 units of Lantus at bedtime  · Q i d  Accu-Cheks and sliding scale insulin  · Hypoglycemia protocol    · Will increase lantus for hyperglycemia

## 2020-03-13 NOTE — ASSESSMENT & PLAN NOTE
· Blood pressure mildly elevated on admission  Currently 170s/80s, likely in the setting the patient did not receive evening dose medications will give now  · Continue home medications

## 2020-03-13 NOTE — ASSESSMENT & PLAN NOTE
 Chest x-ray:  Suspected left lower lobe airspace opacity "   Treating as Hcap  o IV antibiotics:  IV cefepime and IV vancomycin  MRSA swab pending   Respiratory protocol, nebs PRN   Procalcitonin 0 87  Continue broad spectrum Abx  Wait for cultures   Patient is chronically on 2 L of oxygen at baseline  Patient currently requiring 3 L of oxygen at this time  Wean O2 to baseline oxygen demands   Monitor respiratory status

## 2020-03-13 NOTE — PROGRESS NOTES
Progress Note - Grace Fonseca 1965, 53 y o  female MRN: [id number]    Unit/Bed#: S -01 Encounter: [id number]    Primary Care Provider: Grace Fonseca MD   Date and time admitted to hospital: 3/12/2020  6:16 PM        * Pneumonia  Assessment & Plan   Chest x-ray:  Suspected left lower lobe airspace opacity "   Treating as Hcap  o IV antibiotics:  IV cefepime and IV vancomycin  MRSA swab pending   Respiratory protocol, nebs PRN   Procalcitonin 0 87  Continue broad spectrum Abx  Wait for cultures   Patient is chronically on 2 L of oxygen at baseline  Patient currently requiring 3 L of oxygen at this time  Wean O2 to baseline oxygen demands   Monitor respiratory status  Type 2 diabetes mellitus with hyperglycemia, with long-term current use of insulin [facility])  Assessment & Plan  Lab Results   Component Value Date    HGBA1C 8 7 (H) 02/05/2020       Recent Labs     03/12/20  2348 03/13/20  0758 03/13/20  1054 03/13/20  1606   POCGLU 296* 203* 252* 209*       Blood Sugar Average: Last 72 hrs:  · (P) 240Home regimen of 20 units of Lantus at bedtime  · Q i d  Accu-Cheks and sliding scale insulin  · Hypoglycemia protocol  · Will increase lantus for hyperglycemia    Diarrhea  Assessment & Plan  · Patient reports 4 day history of diarrhea  · Obtain C diff sample and stool enteric bacterial sample  · Contact precautions  · Monitor output  · Monitor electrolytes  ESRD on hemodialysis [facility])  Assessment & Plan  · Patient receives hemodialysis on Tuesday, Thursday, Saturdays  · Patient underwent full hemodialysis session today, 03/12/2020, prior to arrival in the ER  · Nephrology consult  VTE Pharmacologic Prophylaxis:   Pharmacologic: Heparin  Mechanical VTE Prophylaxis in Place: Yes    Patient Centered Rounds: I have performed bedside rounds with nursing staff today      Discussions with Specialists or Other Care Team Provider:     Education and Discussions with Family / Patient:     Time Spent for Care: 20 minutes  More than 50% of total time spent on counseling and coordination of care as described above  Current Length of Stay: 1 day(s)    Current Patient Status: Inpatient   Certification Statement: The patient will continue to require additional inpatient hospital stay due to above    Discharge Plan / Estimated Discharge Date: waiting for cultures/IV abx    Code Status: Level 3 - DNAR and DNI      Subjective:   Pt c/o productive cough  Denies dyspnea  Objective:     Vitals:   Temp (24hrs), Av 4 °F (36 9 °C), Min:97 4 °F (36 3 °C), Max:100 2 °F (37 9 °C)    Temp:  [97 4 °F (36 3 °C)-100 2 °F (37 9 °C)] 98 °F (36 7 °C)  HR:  [76-95] 80  Resp:  [14-28] 18  BP: (134-184)/(78-90) 134/78  SpO2:  [90 %-100 %] 93 %  Body mass index is 31 61 kg/m²  Input and Output Summary (last 24 hours): Intake/Output Summary (Last 24 hours) at 3/13/2020 1647  Last data filed at 3/13/2020 0900  Gross per 24 hour   Intake 230 ml   Output    Net 230 ml       Physical Exam:     Physical Exam   Constitutional: No distress  Eyes: Conjunctivae are normal    Neck: No JVD present  Cardiovascular: Normal rate, regular rhythm and normal heart sounds  Pulmonary/Chest: Effort normal  No stridor  No respiratory distress  She has no wheezes  Abdominal: Soft  Bowel sounds are normal  She exhibits no distension  There is no tenderness  Musculoskeletal: She exhibits no edema  Neurological: She is alert  Skin: Skin is warm  She is not diaphoretic  Psychiatric: She has a normal mood and affect   Her behavior is normal          Additional Data:     Labs:    Results from last 7 days   Lab Units 20  0944   WBC Thousand/uL 4 77   HEMOGLOBIN g/dL 10 8*   HEMATOCRIT % 34 1*   PLATELETS Thousands/uL 247   NEUTROS PCT % 56   LYMPHS PCT % 30   MONOS PCT % 8   EOS PCT % 4     Results from last 7 days   Lab Units 20  0944 20  1901   POTASSIUM mmol/L 3 7 3 9   CHLORIDE mmol/L 94* 92*   CO2 mmol/L 30 34*   BUN mg/dL 21 9   CREATININE mg/dL 4 10* 2 71*   CALCIUM mg/dL 9 3 8 7   ALK PHOS U/L  --  175*   ALT U/L  --  34   AST U/L  --  31     Results from last 7 days   Lab Units 03/12/20  1901   INR  1 01         Recent Cultures (last 7 days):     Results from last 7 days   Lab Units 03/12/20  1928 03/12/20  1900   BLOOD CULTURE  Received in Microbiology Lab  Culture in Progress  Received in Microbiology Lab  Culture in Progress  Last 24 Hours Medication List:     Current Facility-Administered Medications:  acetaminophen 650 mg Oral Q6H PRN GONZALO Silver   albuterol 2 puff Inhalation Q4H PRN Odin Garcia MD   atorvastatin 80 mg Oral QPM GONZALO Silver   benzonatate 100 mg Oral TID PRN GONZALO Silver   cefepime 1,000 mg Intravenous Q24H GONZALO Silver   diclofenac sodium 2 g Topical 4x Daily GONZALO Silver   [START ON 3/14/2020] epoetin ha 12,000 Units Subcutaneous After Dialysis [facility], PA-C   gabapentin 200 mg Oral Q12H 528 GONZALO Landeros   guaiFENesin 600 mg Oral BID GONZALO Silver   heparin (porcine) 5,000 Units Subcutaneous Q8H [address] [place] DanielitoyGONZALO   insulin glargine 20 Units Subcutaneous HS Odin Garcia MD   insulin lispro 1-5 Units Subcutaneous HS GONZALO Chicas   insulin lispro 1-6 Units Subcutaneous TID AC GONZALO Chicas   labetalol 100 mg Oral Q8H [address] GONZALO Silver   [START ON 3/14/2020] lidocaine 1 patch Topical Daily Odin Garcia MD   melatonin 3 mg Oral HS PRN GONZALO Silver   NIFEdipine ER 30 mg Oral Daily GONZALO Silver   sevelamer 1,600 mg Oral TID With Meals [place], [place]   [START ON 3/14/2020] vancomycin 10 mg/kg (Adjusted) Intravenous After Dialysis GONZALO Silver        Today, Patient Was Seen By: Odin Garcia MD    ** Please Note: This note has been constructed using a voice recognition system   ** 23-Jun-2018 15:03

## 2020-03-13 NOTE — ASSESSMENT & PLAN NOTE
 Chest x-ray:  Suspected left lower lobe airspace opacity "   Treating as Hcap  o IV antibiotics:  IV cefepime and IV vancomycin  MRSA swab pending   Respiratory protocol, nebs PRN   Obtain procalcitonin   Patient is chronically on 2 L of oxygen at baseline  Patient currently requiring 3 L of oxygen at this time  Wean O2 to baseline oxygen demands   Monitor respiratory status

## 2020-03-14 ENCOUNTER — APPOINTMENT (INPATIENT)
Dept: DIALYSIS | Facility: HOSPITAL | Age: 55
DRG: 193 | End: 2020-03-14
Payer: COMMERCIAL

## 2020-03-14 ENCOUNTER — APPOINTMENT (INPATIENT)
Dept: CT IMAGING | Facility: HOSPITAL | Age: 55
DRG: 193 | End: 2020-03-14
Payer: COMMERCIAL

## 2020-03-14 PROBLEM — R10.12 LUQ PAIN: Status: ACTIVE | Noted: 2020-03-14

## 2020-03-14 PROBLEM — R78.81 POSITIVE BLOOD CULTURE: Status: ACTIVE | Noted: 2020-03-14

## 2020-03-14 LAB
ANION GAP SERPL CALCULATED.3IONS-SCNC: 8 MMOL/L (ref 4–13)
BASOPHILS # BLD AUTO: 0.06 THOUSANDS/ΜL (ref 0–0.1)
BASOPHILS NFR BLD AUTO: 1 % (ref 0–1)
BUN SERPL-MCNC: 30 MG/DL (ref 5–25)
C DIFF TOX GENS STL QL NAA+PROBE: NEGATIVE
CALCIUM SERPL-MCNC: 9.1 MG/DL (ref 8.3–10.1)
CAMPYLOBACTER DNA SPEC NAA+PROBE: NORMAL
CHLORIDE SERPL-SCNC: 98 MMOL/L (ref 100–108)
CO2 SERPL-SCNC: 31 MMOL/L (ref 21–32)
CREAT SERPL-MCNC: 4.67 MG/DL (ref 0.6–1.3)
EOSINOPHIL # BLD AUTO: 0.29 THOUSAND/ΜL (ref 0–0.61)
EOSINOPHIL NFR BLD AUTO: 5 % (ref 0–6)
ERYTHROCYTE [DISTWIDTH] IN BLOOD BY AUTOMATED COUNT: 16.1 % (ref 11.6–15.1)
GFR SERPL CREATININE-BSD FRML MDRD: 10 ML/MIN/1.73SQ M
GLUCOSE SERPL-MCNC: 132 MG/DL (ref 65–140)
GLUCOSE SERPL-MCNC: 142 MG/DL (ref 65–140)
GLUCOSE SERPL-MCNC: 157 MG/DL (ref 65–140)
GLUCOSE SERPL-MCNC: 162 MG/DL (ref 65–140)
GLUCOSE SERPL-MCNC: 178 MG/DL (ref 65–140)
HCT VFR BLD AUTO: 31.1 % (ref 34.8–46.1)
HGB BLD-MCNC: 10 G/DL (ref 11.5–15.4)
IMM GRANULOCYTES # BLD AUTO: 0.02 THOUSAND/UL (ref 0–0.2)
IMM GRANULOCYTES NFR BLD AUTO: 0 % (ref 0–2)
L PNEUMO1 AG UR QL IA.RAPID: NEGATIVE
LYMPHOCYTES # BLD AUTO: 1.86 THOUSANDS/ΜL (ref 0.6–4.47)
LYMPHOCYTES NFR BLD AUTO: 35 % (ref 14–44)
MCH RBC QN AUTO: 30.4 PG (ref 26.8–34.3)
MCHC RBC AUTO-ENTMCNC: 32.2 G/DL (ref 31.4–37.4)
MCV RBC AUTO: 95 FL (ref 82–98)
MONOCYTES # BLD AUTO: 0.57 THOUSAND/ΜL (ref 0.17–1.22)
MONOCYTES NFR BLD AUTO: 11 % (ref 4–12)
MRSA NOSE QL CULT: NORMAL
NEUTROPHILS # BLD AUTO: 2.56 THOUSANDS/ΜL (ref 1.85–7.62)
NEUTS SEG NFR BLD AUTO: 48 % (ref 43–75)
NRBC BLD AUTO-RTO: 0 /100 WBCS
PLATELET # BLD AUTO: 270 THOUSANDS/UL (ref 149–390)
PMV BLD AUTO: 10.6 FL (ref 8.9–12.7)
POTASSIUM SERPL-SCNC: 3.7 MMOL/L (ref 3.5–5.3)
RBC # BLD AUTO: 3.29 MILLION/UL (ref 3.81–5.12)
S PNEUM AG UR QL: NEGATIVE
SALMONELLA DNA SPEC QL NAA+PROBE: NORMAL
SHIGA TOXIN STX GENE SPEC NAA+PROBE: NORMAL
SHIGELLA DNA SPEC QL NAA+PROBE: NORMAL
SODIUM SERPL-SCNC: 137 MMOL/L (ref 136–145)
WBC # BLD AUTO: 5.36 THOUSAND/UL (ref 4.31–10.16)

## 2020-03-14 PROCEDURE — 82948 REAGENT STRIP/BLOOD GLUCOSE: CPT

## 2020-03-14 PROCEDURE — 85025 COMPLETE CBC W/AUTO DIFF WBC: CPT | Performed by: INTERNAL MEDICINE

## 2020-03-14 PROCEDURE — 74176 CT ABD & PELVIS W/O CONTRAST: CPT

## 2020-03-14 PROCEDURE — 99232 SBSQ HOSP IP/OBS MODERATE 35: CPT | Performed by: INTERNAL MEDICINE

## 2020-03-14 PROCEDURE — 87040 BLOOD CULTURE FOR BACTERIA: CPT | Performed by: INTERNAL MEDICINE

## 2020-03-14 PROCEDURE — 5A1D70Z PERFORMANCE OF URINARY FILTRATION, INTERMITTENT, LESS THAN 6 HOURS PER DAY: ICD-10-PCS | Performed by: INTERNAL MEDICINE

## 2020-03-14 PROCEDURE — 80048 BASIC METABOLIC PNL TOTAL CA: CPT | Performed by: PHYSICIAN ASSISTANT

## 2020-03-14 PROCEDURE — 90935 HEMODIALYSIS ONE EVALUATION: CPT | Performed by: INTERNAL MEDICINE

## 2020-03-14 RX ORDER — LABETALOL 100 MG/1
300 TABLET, FILM COATED ORAL 2 TIMES DAILY
Status: DISCONTINUED | OUTPATIENT
Start: 2020-03-14 | End: 2020-03-16 | Stop reason: HOSPADM

## 2020-03-14 RX ORDER — HYDRALAZINE HYDROCHLORIDE 20 MG/ML
5 INJECTION INTRAMUSCULAR; INTRAVENOUS EVERY 6 HOURS PRN
Status: DISCONTINUED | OUTPATIENT
Start: 2020-03-14 | End: 2020-03-16 | Stop reason: HOSPADM

## 2020-03-14 RX ORDER — LOPERAMIDE HYDROCHLORIDE 2 MG/1
2 CAPSULE ORAL ONCE
Status: COMPLETED | OUTPATIENT
Start: 2020-03-14 | End: 2020-03-14

## 2020-03-14 RX ADMIN — LABETALOL HYDROCHLORIDE 300 MG: 100 TABLET, FILM COATED ORAL at 17:35

## 2020-03-14 RX ADMIN — SEVELAMER HYDROCHLORIDE 1600 MG: 800 TABLET, FILM COATED PARENTERAL at 13:13

## 2020-03-14 RX ADMIN — ATORVASTATIN CALCIUM 80 MG: 40 TABLET, FILM COATED ORAL at 17:35

## 2020-03-14 RX ADMIN — SEVELAMER HYDROCHLORIDE 1600 MG: 800 TABLET, FILM COATED PARENTERAL at 17:35

## 2020-03-14 RX ADMIN — LIDOCAINE 1 PATCH: 50 PATCH TOPICAL at 17:35

## 2020-03-14 RX ADMIN — EPOETIN ALFA 12000 UNITS: 20000 SOLUTION INTRAVENOUS; SUBCUTANEOUS at 10:26

## 2020-03-14 RX ADMIN — GUAIFENESIN 600 MG: 600 TABLET, EXTENDED RELEASE ORAL at 08:26

## 2020-03-14 RX ADMIN — HEPARIN SODIUM 5000 UNITS: 5000 INJECTION INTRAVENOUS; SUBCUTANEOUS at 05:08

## 2020-03-14 RX ADMIN — GUAIFENESIN 600 MG: 600 TABLET, EXTENDED RELEASE ORAL at 17:35

## 2020-03-14 RX ADMIN — ACETAMINOPHEN 650 MG: 325 TABLET, FILM COATED ORAL at 11:24

## 2020-03-14 RX ADMIN — CEFEPIME HYDROCHLORIDE 1000 MG: 1 INJECTION, POWDER, FOR SOLUTION INTRAMUSCULAR; INTRAVENOUS at 22:04

## 2020-03-14 RX ADMIN — LOPERAMIDE HYDROCHLORIDE 2 MG: 2 CAPSULE ORAL at 22:40

## 2020-03-14 RX ADMIN — GABAPENTIN 200 MG: 100 CAPSULE ORAL at 08:26

## 2020-03-14 RX ADMIN — NIFEDIPINE 30 MG: 30 TABLET, FILM COATED, EXTENDED RELEASE ORAL at 08:26

## 2020-03-14 RX ADMIN — INSULIN LISPRO 1 UNITS: 100 INJECTION, SOLUTION INTRAVENOUS; SUBCUTANEOUS at 22:05

## 2020-03-14 RX ADMIN — SEVELAMER HYDROCHLORIDE 1600 MG: 800 TABLET, FILM COATED PARENTERAL at 08:26

## 2020-03-14 RX ADMIN — HEPARIN SODIUM 5000 UNITS: 5000 INJECTION INTRAVENOUS; SUBCUTANEOUS at 13:13

## 2020-03-14 RX ADMIN — INSULIN LISPRO 1 UNITS: 100 INJECTION, SOLUTION INTRAVENOUS; SUBCUTANEOUS at 17:36

## 2020-03-14 RX ADMIN — DICLOFENAC 2 G: 10 GEL TOPICAL at 17:40

## 2020-03-14 RX ADMIN — GABAPENTIN 200 MG: 100 CAPSULE ORAL at 22:04

## 2020-03-14 RX ADMIN — DICLOFENAC 2 G: 10 GEL TOPICAL at 08:31

## 2020-03-14 RX ADMIN — DICLOFENAC 2 G: 10 GEL TOPICAL at 22:14

## 2020-03-14 RX ADMIN — INSULIN GLARGINE 20 UNITS: 100 INJECTION, SOLUTION SUBCUTANEOUS at 22:04

## 2020-03-14 RX ADMIN — LABETALOL HYDROCHLORIDE 100 MG: 100 TABLET, FILM COATED ORAL at 05:08

## 2020-03-14 RX ADMIN — DICLOFENAC 2 G: 10 GEL TOPICAL at 13:13

## 2020-03-14 RX ADMIN — VANCOMYCIN HYDROCHLORIDE 750 MG: 750 INJECTION, SOLUTION INTRAVENOUS at 12:08

## 2020-03-14 RX ADMIN — HEPARIN SODIUM 5000 UNITS: 5000 INJECTION INTRAVENOUS; SUBCUTANEOUS at 22:04

## 2020-03-14 NOTE — PLAN OF CARE
Chronic hemodialysis treatment ordered for 3 hours 45 minutes using 4 mEq/L dialysate solution for serum potassium 3 7 this morning  Patient below EDW 76 5 kg  Fluid goal 1000 ml as discussed with Dr Ambika Smith  Hemodialysis treatment completed    Right permacath maintains 400 bfr   Given vancomycin 750 mg and epogen 18474 units   -volume removal 945 ml  -pre weight 74 5  -post weight 73 5 kg      Problem: METABOLIC, FLUID AND ELECTROLYTES - ADULT  Goal: Electrolytes maintained within normal limits  Description  INTERVENTIONS:  - Monitor labs and assess patient for signs and symptoms of electrolyte imbalances  - Administer electrolyte replacement as ordered  - Monitor response to electrolyte replacements, including repeat lab results as appropriate  - Instruct patient on fluid and nutrition as appropriate  Outcome: Progressing  Goal: Fluid balance maintained  Description  INTERVENTIONS:  - Monitor labs   - Monitor I/O and WT  - Instruct patient on fluid and nutrition as appropriate  - Assess for signs & symptoms of volume excess or deficit  Outcome: Progressing

## 2020-03-14 NOTE — ASSESSMENT & PLAN NOTE
 Chest x-ray:  Suspected left lower lobe airspace opacity "   Treating as Hcap  o IV antibiotics:  IV cefepime and IV vancomycin day 2   o Blood culture positive g cocci cluster 1st, negative on 2nd set, discussed with my attending patient is already on vancomycin will wait for final results  o  MRSA swab pending   Respiratory protocol, nebs PRN   Procalcitonin 0 87  Continue broad spectrum Abx  Wait for cultures   Patient is chronically on 2 L of oxygen at baseline  Patient currently requiring 3 L of oxygen at this time  Wean O2 to baseline oxygen demands   Monitor respiratory status   BMP in a m    CBC in a m

## 2020-03-14 NOTE — PROGRESS NOTES
Progress Note - Mark Sifuentes 1965, 53 y o  female MRN: [id number]    Unit/Bed#: S -01 Encounter: [id number]    Primary Care Provider: Mark Sifuentes MD   Date and time admitted to hospital: 3/12/2020  6:16 PM        LUQ pain  Assessment & Plan  Patient has been complaining of chronic left upper quadrant pain that has been going on for years now, according to the patient she has seen so many doctors but no one can tell her what is going on  · CT of the abdomen without contrast  · Lidoderm patch    * Pneumonia  Assessment & Plan   Chest x-ray:  Suspected left lower lobe airspace opacity "   Treating as Hcap  o IV antibiotics:  IV cefepime and IV vancomycin day 2   o Blood culture positive g cocci cluster 1st, negative on 2nd set, discussed with my attending patient is already on vancomycin will wait for final results  o  MRSA swab pending   Respiratory protocol, nebs PRN   Procalcitonin 0 87  Continue broad spectrum Abx  Wait for cultures   Patient is chronically on 2 L of oxygen at baseline  Patient currently requiring 3 L of oxygen at this time  Wean O2 to baseline oxygen demands   Monitor respiratory status   BMP in a m    CBC in a m  SIRS (systemic inflammatory response syndrome) (HCC)  Assessment & Plan  SIRS criteria:  Hyperthermia, tachypnea  Suspected source:  Pneumonia  Lactic acid: 0 08  End organ damage:  Creatinine 2 71, however this is patient's baseline  IV Fluids:  Contraindicated due to patient's ESRD  IV antibiotics:  IV cefepime and IV vancomycin  Follow up on culture results  Monitor vital signs, laboratory studies  Diarrhea  Assessment & Plan  · Patient reports 4 day history of diarrhea  · C diff negative  · Enteric panel pending  · Contact precautions  · Patient no longer complaining of diarrhea    ESRD on hemodialysis [facility])  Assessment & Plan  · Patient receives hemodialysis on Tuesday, Thursday, Saturdays    · Patient underwent full hemodialysis session today, 2020  · Nephrology consult  Anemia of chronic renal failure  Assessment & Plan  · Stable  · Present admission 10 5  · Baseline appears to be around 9    · Monitor CBC  Hyperlipidemia  Assessment & Plan  · Continue statin  Hypertension  Assessment & Plan  · Blood pressure mildly elevated on admission  Currently 170s/80s, likely in the setting the patient did not receive evening dose medications will give now  · Blood pressure in the 160s  · Hydralazine p r n  For elevated blood pressure  · Continue home medications  Type 2 diabetes mellitus with hyperglycemia, with long-term current use of insulin [facility])  Assessment & Plan  Lab Results   Component Value Date    HGBA1C 8 7 (H) 2020       Recent Labs     20  1606 20  2113 20  0733 20  1104   POCGLU 209* 166* 142* 157*       Blood Sugar Average: Last 72 hrs:  · (P) 203 [id number] regimen of 20 units of Lantus at bedtime  · Q i d  Accu-Cheks and sliding scale insulin  · Hypoglycemia protocol  · Continue current lantus for hyperglycemia        VTE Pharmacologic Prophylaxis:   Pharmacologic: Heparin  Mechanical VTE Prophylaxis in Place: Yes    Discussions with Specialists or Other Care Team Provider:  Discussed with my attending current treatment plan    Education and Discussions with Family / Patient:  Discussed with the patient current treatment plan, offered to call relatives over the phone regarding updates, patient refused      Current Length of Stay: 2 day(s)    Current Patient Status: Inpatient     Discharge Plan / Estimated Discharge Date:  Likely discharge Monday    Code Status: Level 3 - DNAR and DNI      Subjective:   Patient reported improvement of diarrhea, however complains of left upper quadrant pain    Objective:     Vitals:   Temp (24hrs), Av 3 °F (36 8 °C), Min:98 °F (36 7 °C), Max:98 4 °F (36 9 °C)    Temp:  [98 °F (36 7 °C)-98 4 °F (36 9 °C)] 98 3 °F (36 8 °C)  HR:  [65-96] 73  Resp:  [18] 18  BP: (134-190)/(74-98) 167/97  SpO2:  [93 %-97 %] 97 %  Body mass index is 31 69 kg/m²  Input and Output Summary (last 24 hours): Intake/Output Summary (Last 24 hours) at 3/14/2020 1156  Last data filed at 3/14/2020 1120  Gross per 24 hour   Intake 740 ml   Output    Net 740 ml       Physical Exam:     Physical Exam   Constitutional: She is oriented to person, place, and time  She appears well-developed and well-nourished  HENT:   Head: Normocephalic and atraumatic  Eyes: Pupils are equal, round, and reactive to light  Conjunctivae and EOM are normal    Neck: Normal range of motion  Cardiovascular: Normal rate, regular rhythm, normal heart sounds and intact distal pulses  Pulmonary/Chest: Effort normal    Diminished in the base   Abdominal: Soft  Bowel sounds are normal  There is tenderness (Tenderness in the left upper quadrant)  Musculoskeletal: Normal range of motion  Neurological: She is alert and oriented to person, place, and time  A cranial nerve deficit (Left facial droop, chronic according to the patient from previous stroke) is present  Additional Data:     Labs:    Results from last 7 days   Lab Units 03/14/20  0454   WBC Thousand/uL 5 36   HEMOGLOBIN g/dL 10 0*   HEMATOCRIT % 31 1*   PLATELETS Thousands/uL 270   NEUTROS PCT % 48   LYMPHS PCT % 35   MONOS PCT % 11   EOS PCT % 5     Results from last 7 days   Lab Units 03/14/20  0505  03/12/20  1901   POTASSIUM mmol/L 3 7   < > 3 9   CHLORIDE mmol/L 98*   < > 92*   CO2 mmol/L 31   < > 34*   BUN mg/dL 30*   < > 9   CREATININE mg/dL 4 67*   < > 2 71*   CALCIUM mg/dL 9 1   < > 8 7   ALK PHOS U/L  --   --  175*   ALT U/L  --   --  34   AST U/L  --   --  31    < > = values in this interval not displayed  Results from last 7 days   Lab Units 03/12/20  1901   INR  1 01       * I Have Reviewed All Lab Data Listed Above  * Additional Pertinent Lab Tests Reviewed:  All Labs Within Last 24 Hours Reviewed        Recent Cultures (last 7 days):     Results from last 7 days   Lab Units 03/13/20 1911 03/13/20  0909 03/12/20 1928 03/12/20  1900   BLOOD CULTURE   --   --   --  No Growth at 24 hrs  GRAM STAIN RESULT   --   --  Gram positive cocci in clusters*  --    LEGIONELLA URINARY ANTIGEN  Negative  --   --   --    C DIFF TOXIN B   --  Negative  --   --        Last 24 Hours Medication List:     Current Facility-Administered Medications:  acetaminophen 650 mg Oral Q6H PRN GONZALO Calabrese    albuterol 2 puff Inhalation Q4H PRN Black Holland MD    atorvastatin 80 mg Oral QPM GONZALO Calabrese    benzonatate 100 mg Oral TID PRN GONZALO Calabrese    cefepime 1,000 mg Intravenous Q24H GONZALO Calabrese Last Rate: 1,000 mg (03/13/20 2010)   diclofenac sodium 2 g Topical 4x Daily GONZALO Chicas    epoetin ha 12,000 Units Subcutaneous After Dialysis PO Shaikh    gabapentin 200 mg Oral Q12H [address] GONZALO Chicas    guaiFENesin 600 mg Oral BID GONZALO Calabrese    heparin (porcine) 5,000 Units Subcutaneous [facility] GONZALO Chicas    hydrALAZINE 5 mg Intravenous Q6H PRN Nuvia Chao MD    insulin glargine 20 Units Subcutaneous HS Black Holland MD    insulin lispro 1-5 Units Subcutaneous HS GONZALO Chicas    insulin lispro 1-6 Units Subcutaneous TID AC GONZALO Chicas    labetalol 100 mg Oral Q8H [address] GONZALO Chicas    lidocaine 1 patch Topical Daily Black Holland MD    melatonin 3 mg Oral HS PRN GONZALO Calabrese    NIFEdipine ER 30 mg Oral Daily GONZALO Calabrese    sevelamer 1,600 mg Oral TID With Meals PO Shaikh    vancomycin 10 mg/kg (Adjusted) Intravenous After Dialysis GONZALO Calabrese         Today, Patient Was Seen By: Nuvia Chao MD    ** Please Note: This note has been constructed using a voice recognition system   **

## 2020-03-14 NOTE — ASSESSMENT & PLAN NOTE
· Patient reports 4 day history of diarrhea  · C diff negative  · Enteric panel pending  · Contact precautions    · Patient no longer complaining of diarrhea

## 2020-03-14 NOTE — ASSESSMENT & PLAN NOTE
· Patient receives hemodialysis on Tuesday, Thursday, Saturdays  · Patient underwent full hemodialysis session today, 03/14/2020  · Nephrology consult

## 2020-03-14 NOTE — PROGRESS NOTES
[facility] PROGRESS NOTE   Veronica Melendrez 53 y o  female MRN: [id number]  Unit/Bed#: S -01 Encounter: [id number]  Reason for Consult: ESRD on HD    ASSESSMENT and PLAN:  1  ESRD on HD [facility]):  · HD today  2  Access:   · R IJ permcath  · Has right arm AVF which is not usable  3  Hypertension:   · BP is above goal    · Increase Labetalol to 300 mg BID which is home dose  · May need to restart Hydralazine if BP remains high  4  Anemia:   · Hgb at goal    · Continue Epogen  5  Mineral and bone disease:   · Continue Sevelamer for hyperphos  6  Pneumonia: on abx    7  Diarrhea    DISPOSITION:  · HD today  · Increase Labetalol to 300 mg BID  SUBJECTIVE / INTERVAL HISTORY:  Less cough  No SON or fever  HEMODIALYSIS PROCEDURE NOTE  The patient was seen and examined on hemodialysis  Time: 3h 45 m  Sodium: 137 Blood flow: 400   Dialyzer: F180 Potassium: 4 Dialysate flow: 1 5X   Access: R IJ permcath Bicarbonate: 35 Ultrafiltration goal: 0 5 kg   Medications on HD: Epogen 12,000 units, Vancomycin  OBJECTIVE:  Current Weight: Weight - Scale: 78 6 kg (173 lb 4 5 oz)  Vitals:    03/14/20 1000 03/14/20 1030 03/14/20 1100 03/14/20 1130   BP: 146/96 161/97 163/94 167/97   BP Location:       Pulse: 65 78 87 73   Resp: 18 18 18 18   Temp:       TempSrc:       SpO2:       Weight:       Height:           Intake/Output Summary (Last 24 hours) at 3/14/2020 1203  Last data filed at 3/14/2020 1120  Gross per 24 hour   Intake 740 ml   Output    Net 740 ml     General: conscious, coherent, cooperative, no distress  Skin: dry, no rash  Eyes: pink conjunctivae, no scleral icterus  ENT: moist mucous membranes  Chest/Lungs: equal chest expansion  CVS: distinct heart sounds, normal rate, regular rhythm, no rub, no edema  Abdomen: soft, non tender, non distended, normal bowel sounds  Extremities: no edema  : no fowler catheter  Neuro: awake, alert     Psych: appropriate affect    Medications:    Current Facility-Administered Medications:     acetaminophen (TYLENOL) tablet 650 mg, 650 mg, Oral, Q6H PRN, Clarissa McGboris, CRNP, 650 mg at 03/14/20 1124    albuterol (PROVENTIL HFA,VENTOLIN HFA) inhaler 2 puff, 2 puff, Inhalation, Q4H PRN, Aye Keene MD    atorvastatin (LIPITOR) tablet 80 mg, 80 mg, Oral, QPM, Clarissa Ott, CRNP, 80 mg at 03/13/20 1701    benzonatate (TESSALON PERLES) capsule 100 mg, 100 mg, Oral, TID PRN, GONZALO Bolivar    cefepime (MAXIPIME) 1,000 mg in dextrose 5 % 50 mL IVPB, 1,000 mg, Intravenous, Q24H, NEAL ChicasNP, Last Rate: 100 mL/hr at 03/13/20 2010, 1,000 mg at 03/13/20 2010    diclofenac sodium (VOLTAREN) 1 % topical gel 2 g, 2 g, Topical, 4x Daily, Clarissa McGtrhan, CRNP, 2 g at 03/14/20 0831    epoetin ha (EPOGEN,PROCRIT) injection 12,000 Units, 12,000 Units, Subcutaneous, After Dialysis, Miracle Wade, PO, 12,000 Units at 03/14/20 1026    gabapentin (NEURONTIN) capsule 200 mg, 200 mg, Oral, Q12H [address], Clarissa McGeehan, CRNP, 200 mg at 03/14/20 0826    guaiFENesin (MUCINEX) 12 hr tablet 600 mg, 600 mg, Oral, BID, Clarissa McGeehan, CRNP, 600 mg at 03/14/20 8860    heparin (porcine) subcutaneous injection 5,000 Units, 5,000 Units, Subcutaneous, Q8H [address], 5,000 Units at 03/14/20 0508 **AND** Platelet count, , , Once, GONZALO Bolivar    hydrALAZINE (APRESOLINE) injection 5 mg, 5 mg, Intravenous, Q6H PRN, Ryan Cobb MD    insulin glargine (LANTUS) subcutaneous injection 20 Units 0 2 mL, 20 Units, Subcutaneous, HS, Aye Keene MD, 20 Units at 03/13/20 2114    insulin lispro (HumaLOG) 100 units/mL subcutaneous injection 1-5 Units, 1-5 Units, Subcutaneous, HS, GONZALO Chicas, 1 Units at 03/13/20 2114    insulin lispro (HumaLOG) 100 units/mL subcutaneous injection 1-6 Units, 1-6 Units, Subcutaneous, TID AC, 2 Units at 03/13/20 1704 **AND** Fingerstick Glucose (POCT), , , TID Clarissa MARTINO GONZALO Ott    labetalol (NORMODYNE) tablet 100 mg, 100 mg, Oral, Q8H [facility], GONZALO Chicas, 100 mg at 03/14/20 0508    lidocaine (LIDODERM) 5 % patch 1 patch, 1 patch, Topical, Daily, Barry Gomez MD, 1 patch at 03/13/20 1701    melatonin tablet 3 mg, 3 mg, Oral, HS PRN, GONZALO Caba    NIFEdipine (PROCARDIA XL) 24 hr tablet 30 mg, 30 mg, Oral, Daily, GONZALO Chicas, 30 mg at 03/14/20 9059    sevelamer (RENAGEL) tablet 1,600 mg, 1,600 mg, Oral, TID With Meals, [facility], [facility], 1,600 mg at 03/14/20 1986    vancomycin (VANCOCIN) IVPB (premix) 750 mg, 10 mg/kg (Adjusted), Intravenous, After Dialysis, GONZALO Caba    Laboratory Results:  Results from last 7 days   Lab Units 03/14/20  0505 03/14/20  0454 03/13/20  0944 03/12/20  1901   WBC Thousand/uL  --  5 36 4 77 7 36   HEMOGLOBIN g/dL  --  10 0* 10 8* 10 5*   HEMATOCRIT %  --  31 1* 34 1* 31 7*   PLATELETS Thousands/uL  --  270 247 255   POTASSIUM mmol/L 3 7  --  3 7 3 9   CHLORIDE mmol/L 98*  --  94* 92*   CO2 mmol/L 31  --  30 34*   BUN mg/dL 30*  --  21 9   CREATININE mg/dL 4 67*  --  4 10* 2 71*   CALCIUM mg/dL 9 1  --  9 3 8 7

## 2020-03-14 NOTE — ASSESSMENT & PLAN NOTE
· Blood pressure mildly elevated on admission  Currently 170s/80s, likely in the setting the patient did not receive evening dose medications will give now  · Blood pressure in the 160s  · Hydralazine p r n  For elevated blood pressure  · Continue home medications

## 2020-03-14 NOTE — PLAN OF CARE
Problem: Potential for Falls  Goal: Patient will remain free of falls  Description  INTERVENTIONS:  - Assess patient frequently for physical needs  -  Identify cognitive and physical deficits and behaviors that affect risk of falls  -  [place] fall precautions as indicated by assessment   - Educate patient/family on patient safety including physical limitations  - Instruct patient to call for assistance with activity based on assessment  - Modify environment to reduce risk of injury  - Consider OT/PT consult to assist with strengthening/mobility  Outcome: Progressing     Problem: Prexisting or High Potential for Compromised Skin Integrity  Goal: Skin integrity is maintained or improved  Description  INTERVENTIONS:  - Identify patients at risk for skin breakdown  - Assess and monitor skin integrity  - Assess and monitor nutrition and hydration status  - Monitor labs   - Assess for incontinence   - Turn and reposition patient  - Assist with mobility/ambulation  - Relieve pressure over bony prominences  - Avoid friction and shearing  - Provide appropriate hygiene as needed including keeping skin clean and dry  - Evaluate need for skin moisturizer/barrier cream  - Collaborate with interdisciplinary team   - Patient/family teaching  - Consider wound care consult   Outcome: Progressing     Problem: Nutrition/Hydration-ADULT  Goal: Nutrient/Hydration intake appropriate for improving, restoring or maintaining nutritional needs  Description  Monitor and assess patient's nutrition/hydration status for malnutrition  Collaborate with interdisciplinary team and initiate plan and interventions as ordered  Monitor patient's weight and dietary intake as ordered or per policy  Utilize nutrition screening tool and intervene as necessary  Determine patient's food preferences and provide high-protein, high-caloric foods as appropriate       INTERVENTIONS:  - Monitor oral intake, urinary output, labs, and treatment plans  - Assess nutrition and hydration status and recommend course of action  - Evaluate amount of meals eaten  - Assist patient with eating if necessary   - Allow adequate time for meals  - Recommend/ encourage appropriate diets, oral nutritional supplements, and vitamin/mineral supplements  - Order, calculate, and assess calorie counts as needed  - Recommend, monitor, and adjust tube feedings and TPN/PPN based on assessed needs  - Assess need for intravenous fluids  - Provide specific nutrition/hydration education as appropriate  - Include patient/family/caregiver in decisions related to nutrition  Outcome: Progressing     Problem: METABOLIC, FLUID AND ELECTROLYTES - ADULT  Goal: Electrolytes maintained within normal limits  Description  INTERVENTIONS:  - Monitor labs and assess patient for signs and symptoms of electrolyte imbalances  - Administer electrolyte replacement as ordered  - Monitor response to electrolyte replacements, including repeat lab results as appropriate  - Instruct patient on fluid and nutrition as appropriate  Outcome: Progressing  Goal: Fluid balance maintained  Description  INTERVENTIONS:  - Monitor labs   - Monitor I/O and WT  - Instruct patient on fluid and nutrition as appropriate  - Assess for signs & symptoms of volume excess or deficit  Outcome: Progressing

## 2020-03-14 NOTE — DISCHARGE SUMMARY
Discharge- Dora Callahan 1965, 53 y o  female MRN: [id number]    Unit/Bed#: S -01 Encounter: [id number]    Primary Care Provider: Dora Callahan MD   Date and time admitted to hospital: 3/12/2020  6:16 PM        * Pneumonia  Assessment & Plan   Chest x-ray:  Suspected left lower lobe airspace opacity "   Treating as Hcap  o IV antibiotics:  Discontinue IV cefepime day 3   o Per ID recommendations cefdinir 300 mg p o  Every 24 hours for 7 days total antibiotics  o Procalcitonin trending down from 0 8>0 47  o Blood culture positive g cocci cluster 1st, negative on 2nd set,repeat blood culture collected on 03/14/2020 negative for  2 sets  o Per ID recommendation repeat blood cultures in 2 weeks  o MRSA swab negative, vancomycin was discontinued   Patient is chronically on 2 L of oxygen at baseline  Patient is currently at baseline   Monitor respiratory status      LUQ pain  Assessment & Plan  Patient has been complaining of chronic left upper quadrant pain that has been going on for years now, according to the patient she has seen so many doctors but no one can tell her what is going on  · CT of the abdomen without contrast: No abnormality to account for left upper quadrant abdominal pain  · Lidoderm patch  · Started on Protonix for possibility of gastritis  · Continue Voltaren gel in the left upper quadrant    SIRS (systemic inflammatory response syndrome) (HCC)resolved as of 3/16/2020  Assessment & Plan  SIRS criteria:  Hyperthermia, tachypnea  Suspected source:  Pneumonia  Lactic acid: 0 08  End organ damage:  Creatinine 2 71, however this is patient's baseline  IV Fluids:  Contraindicated due to patient's ESRD  Monitor vital signs, laboratory studies      Diarrhea  Assessment & Plan  · Patient reports 4 day history of diarrhea prior to admission  · C diff negative  · Enteric panel negative  · Patient was no longer complaining of diarrhea    ESRD on hemodialysis [facility])  Assessment & Plan  · Patient receives hemodialysis on Tuesday, Thursday, Saturdays  · Patient underwent full hemodialysis session on 3/14/2020  · Patient was interested in switching nephrologist and dialysis, discuss this with the on-call nephrologist and , they recommend the patient to see her nephrologist and go to her scheduled hemodialysis and tell them that she is interested in switching to [facility], for better transition  · Nephrology information was given to the patient  Anemia of chronic renal failure  Assessment & Plan  · Stable  · Present admission 10 5  · Baseline appears to be around 9    · Monitor CBC  Hyperlipidemia  Assessment & Plan  · Continue statin  Hypertension  Assessment & Plan  · Blood pressure mildly elevated on admission  Currently 170s/80s, likely in the setting the patient did not receive evening dose medications will give now  · Patient reported that she no longer takes her hydralazine, torsemide, and sertraline  · Hydralazine p r n  For elevated blood pressure  · Continue home medications  Type 2 diabetes mellitus with hyperglycemia, with long-term current use of insulin [facility])  Assessment & Plan  Lab Results   Component Value Date    HGBA1C 8 7 (H) 02/05/2020       Recent Labs     03/15/20  1920 03/15/20  2130 03/16/20  0735 03/16/20  1106   POCGLU 188* 186* 114 181*       Blood Sugar Average: Last 72 hrs:  · (P) 950 [id number] regimen of 20 units of Lantus at bedtime  · Q i d  Accu-Cheks and sliding scale insulin  · Confirm with patient, patient reports that she no longer  take her lispro 3 times a day anymore will discontinue  · Hypoglycemia protocol    · Continue current lantus for hyperglycemia    Discharging Resident Physician: Ryan Cobb MD  Attending: Aye Keene MD  PCP: Cleve Mcghee MD  Admission Date: 3/12/2020  Discharge Date: 03/16/20    Disposition:     Home    Reason for Admission:  Pneumonia    Consultations During [facility] Stay:  · Nephrology  · Pharmacy  · Infectious disease    Procedures Performed:     · Hemodialysis in the hospital    Significant Findings / Test Results:     · 03/12/2020 1st set blood culture positive staph coagulase negative, gram-positive cocci clusters, 2nd set of blood culture no growth for 24 hours  · C diff negative  · Stool enteric panel negative  · 03/14/2020 blood culture negative  · Procalcitonin 0 87>0 4    CT of the abdomen 03/14/2020  1   No abnormality to account for left upper quadrant abdominal pain  2   Subcutaneous edema within the anterior lower abdominal wall   This can be seen with subcutaneous injection therapy, however if this is not consistent with history consider cellulitis  3   Diffuse groundglass opacities throughout the visualized lungs consistent with pulmonary edema  X-ray of the chest  1  Suspected left lower lung airspace opacity   This can be confirmed with noncontrast chest CT  Incidental Findings:   · None    Test Results Pending at Discharge (will require follow up): · None     Outpatient Tests Requested:  · Blood culture in 2 weeks per primary care physician    Complications:  None    Hospital Course:     Rosa Maxwell is a 53 y o  female patient who originally presented to the hospital on 3/12/2020 due to fever and shortness of breath with history of ESRD on HD, HTN, DM2, and HLD who presents with fever and shortness of breath that started today  Patient presented to the hospital from her dialysis center due to decreased blood pressure, shortness of breath, dry cough and headache while at the dialysis center  Patient was able to receive a full hemodialysis session  Patient is chronically on 2 L of oxygen and upon arrival to the ER requiring 3 L of oxygen  Patient reports that she was recently hospitalized at [facility] in February 2020 for fever related to pneumonia      Additionally, patient reports 4 day history of diarrhea    She reports shortness of breath, fever, chills, productive cough with clear sputum and bilateral rib pain from coughing  She denies chest pain, lightheadedness, dizziness or headache      Patient will be admitted for IV antibiotics, patient was started on IV cefepime and vancomycin, MRSA swab was done  Patient stool was tested for C diff and stool enteric panel which are all negative  Patient was put on contact precaution due to the diarrhea  Nephrology was consulted for hemodialysis  Right IJ PermCath was accessed for hemodialysis because right arm AVF is not usable  Chest x-ray showed suspected left lower lobe airspace opacity  With elevated procalcitonin  First set of blood culture grew bacteria however the 2nd set did not  Repeat blood culture was performed and ID was consulted  Patient was also complaining of left upper quadrant pain that has been going on for at least 6 years now, she reported that she has been seeing a physician for that problem but no diagnosis has ever been made and they cannot find what's going on  CT of the abdomen was done in the hospital, but no abnormality related to the abdominal pain can be found  Lidoderm patch was given to the patient  Blood culture was negative patient is stable for discharge will continue antibiotics for total of 7 days  Patient will need a follow-up with her nephrologist and hemodialysis and tell them that she is interested in switching services  Condition at Discharge: good     Discharge Day Visit / Exam:     Subjective:  No subjective complain aside from persistent left upper quadrant which has been ongoing for 4 years now    Vitals: Blood Pressure: 166/79 (03/16/20 0729)  Pulse: 75 (03/16/20 0729)  Temperature: 97 7 °F (36 5 °C) (03/16/20 0729)  Temp Source: Oral (03/16/20 0729)  Respirations: 18 (03/16/20 0729)  Height: 5' 2" (157 5 cm) (03/12/20 1744)  Weight - Scale: 76 4 kg (168 lb 6 9 oz) (03/15/20 0600)  SpO2: 98 % (03/16/20 0729)  Exam:   Physical Exam Constitutional: She is oriented to person, place, and time  She appears well-developed and well-nourished  HENT:   Head: Normocephalic and atraumatic  Eyes: Pupils are equal, round, and reactive to light  Conjunctivae and EOM are normal    Neck: Normal range of motion  Cardiovascular: Normal rate, regular rhythm, normal heart sounds and intact distal pulses  Pulmonary/Chest: Effort normal    Diminished in the base   Abdominal: Soft  Bowel sounds are normal  There is tenderness (Tenderness in the left upper quadrant)  Musculoskeletal: Normal range of motion  Neurological: She is alert and oriented to person, place, and time  A cranial nerve deficit (Left facial droop, chronic according to the patient from previous stroke) is present  Nursing note and vitals reviewed  Discussion with Family:  Called patient daughter over the phone and discuss current treatment plan regarding the discharge  Discharge instructions/Information to patient and family:   See after visit summary for information provided to patient and family  Provisions for Follow-Up Care:  See after visit summary for information related to follow-up care and any pertinent home health orders  Discharge Medications:  See after visit summary for reconciled discharge medications provided to patient and family        ** Please Note: This note has been constructed using a voice recognition system **

## 2020-03-14 NOTE — ASSESSMENT & PLAN NOTE
Patient has been complaining of chronic left upper quadrant pain that has been going on for years now, according to the patient she has seen so many doctors but no one can tell her what is going on      · CT of the abdomen without contrast  · Lidoderm patch

## 2020-03-14 NOTE — PROGRESS NOTES
Vancomycin IV Pharmacy-to-Dose Consultation    Arcelia Lake is a 53 y o  female who is currently receiving Vancomycin IV with management by the Pharmacy Consult service  Assessment/Plan:  The patient was reviewed  Renal function is stable and no signs or symptoms of nephrotoxicity and/or infusion reactions were documented in the chart  Based on todays assessment, continue current vancomycin (day # 2) dosing of 750mg PHD, with a plan for trough to be drawn Pre-HD on 03/17  We will continue to follow the patients culture results and clinical progress daily      Judy Pinzon, Pharmacist

## 2020-03-15 LAB
ANION GAP SERPL CALCULATED.3IONS-SCNC: 8 MMOL/L (ref 4–13)
BACTERIA BLD CULT: ABNORMAL
BUN SERPL-MCNC: 21 MG/DL (ref 5–25)
CALCIUM SERPL-MCNC: 9.2 MG/DL (ref 8.3–10.1)
CHLORIDE SERPL-SCNC: 97 MMOL/L (ref 100–108)
CO2 SERPL-SCNC: 28 MMOL/L (ref 21–32)
CREAT SERPL-MCNC: 3.5 MG/DL (ref 0.6–1.3)
ERYTHROCYTE [DISTWIDTH] IN BLOOD BY AUTOMATED COUNT: 16.2 % (ref 11.6–15.1)
GFR SERPL CREATININE-BSD FRML MDRD: 14 ML/MIN/1.73SQ M
GLUCOSE SERPL-MCNC: 117 MG/DL (ref 65–140)
GLUCOSE SERPL-MCNC: 132 MG/DL (ref 65–140)
GLUCOSE SERPL-MCNC: 132 MG/DL (ref 65–140)
GLUCOSE SERPL-MCNC: 165 MG/DL (ref 65–140)
GLUCOSE SERPL-MCNC: 186 MG/DL (ref 65–140)
GLUCOSE SERPL-MCNC: 188 MG/DL (ref 65–140)
GRAM STN SPEC: ABNORMAL
HCT VFR BLD AUTO: 31 % (ref 34.8–46.1)
HGB BLD-MCNC: 9.9 G/DL (ref 11.5–15.4)
MCH RBC QN AUTO: 30.2 PG (ref 26.8–34.3)
MCHC RBC AUTO-ENTMCNC: 31.9 G/DL (ref 31.4–37.4)
MCV RBC AUTO: 95 FL (ref 82–98)
PLATELET # BLD AUTO: 257 THOUSANDS/UL (ref 149–390)
PMV BLD AUTO: 10.5 FL (ref 8.9–12.7)
POTASSIUM SERPL-SCNC: 4 MMOL/L (ref 3.5–5.3)
RBC # BLD AUTO: 3.28 MILLION/UL (ref 3.81–5.12)
SODIUM SERPL-SCNC: 133 MMOL/L (ref 136–145)
WBC # BLD AUTO: 5.82 THOUSAND/UL (ref 4.31–10.16)

## 2020-03-15 PROCEDURE — 99232 SBSQ HOSP IP/OBS MODERATE 35: CPT | Performed by: INTERNAL MEDICINE

## 2020-03-15 PROCEDURE — 87205 SMEAR GRAM STAIN: CPT | Performed by: INTERNAL MEDICINE

## 2020-03-15 PROCEDURE — 82948 REAGENT STRIP/BLOOD GLUCOSE: CPT

## 2020-03-15 PROCEDURE — 87070 CULTURE OTHR SPECIMN AEROBIC: CPT | Performed by: INTERNAL MEDICINE

## 2020-03-15 PROCEDURE — 80048 BASIC METABOLIC PNL TOTAL CA: CPT | Performed by: INTERNAL MEDICINE

## 2020-03-15 PROCEDURE — 85027 COMPLETE CBC AUTOMATED: CPT | Performed by: INTERNAL MEDICINE

## 2020-03-15 PROCEDURE — 99233 SBSQ HOSP IP/OBS HIGH 50: CPT | Performed by: INTERNAL MEDICINE

## 2020-03-15 RX ORDER — LOPERAMIDE HYDROCHLORIDE 2 MG/1
2 CAPSULE ORAL 3 TIMES DAILY PRN
Status: DISCONTINUED | OUTPATIENT
Start: 2020-03-15 | End: 2020-03-16 | Stop reason: HOSPADM

## 2020-03-15 RX ORDER — CEFDINIR 300 MG/1
300 CAPSULE ORAL EVERY 24 HOURS
Status: DISCONTINUED | OUTPATIENT
Start: 2020-03-15 | End: 2020-03-16 | Stop reason: HOSPADM

## 2020-03-15 RX ORDER — PANTOPRAZOLE SODIUM 40 MG/1
40 TABLET, DELAYED RELEASE ORAL
Status: DISCONTINUED | OUTPATIENT
Start: 2020-03-15 | End: 2020-03-16 | Stop reason: HOSPADM

## 2020-03-15 RX ADMIN — DICLOFENAC 2 G: 10 GEL TOPICAL at 12:37

## 2020-03-15 RX ADMIN — LABETALOL HYDROCHLORIDE 300 MG: 100 TABLET, FILM COATED ORAL at 08:08

## 2020-03-15 RX ADMIN — DICLOFENAC 2 G: 10 GEL TOPICAL at 08:11

## 2020-03-15 RX ADMIN — DICLOFENAC 2 G: 10 GEL TOPICAL at 17:24

## 2020-03-15 RX ADMIN — INSULIN LISPRO 1 UNITS: 100 INJECTION, SOLUTION INTRAVENOUS; SUBCUTANEOUS at 12:37

## 2020-03-15 RX ADMIN — ATORVASTATIN CALCIUM 80 MG: 40 TABLET, FILM COATED ORAL at 17:25

## 2020-03-15 RX ADMIN — NIFEDIPINE 30 MG: 30 TABLET, FILM COATED, EXTENDED RELEASE ORAL at 08:08

## 2020-03-15 RX ADMIN — GABAPENTIN 200 MG: 100 CAPSULE ORAL at 08:08

## 2020-03-15 RX ADMIN — HEPARIN SODIUM 5000 UNITS: 5000 INJECTION INTRAVENOUS; SUBCUTANEOUS at 21:49

## 2020-03-15 RX ADMIN — PANTOPRAZOLE SODIUM 40 MG: 40 TABLET, DELAYED RELEASE ORAL at 08:08

## 2020-03-15 RX ADMIN — CEFDINIR 300 MG: 300 CAPSULE ORAL at 13:44

## 2020-03-15 RX ADMIN — SEVELAMER HYDROCHLORIDE 1600 MG: 800 TABLET, FILM COATED PARENTERAL at 08:08

## 2020-03-15 RX ADMIN — LIDOCAINE 1 PATCH: 50 PATCH TOPICAL at 17:22

## 2020-03-15 RX ADMIN — HEPARIN SODIUM 5000 UNITS: 5000 INJECTION INTRAVENOUS; SUBCUTANEOUS at 13:22

## 2020-03-15 RX ADMIN — GUAIFENESIN 600 MG: 600 TABLET, EXTENDED RELEASE ORAL at 17:22

## 2020-03-15 RX ADMIN — INSULIN GLARGINE 20 UNITS: 100 INJECTION, SOLUTION SUBCUTANEOUS at 21:49

## 2020-03-15 RX ADMIN — SEVELAMER HYDROCHLORIDE 1600 MG: 800 TABLET, FILM COATED PARENTERAL at 12:36

## 2020-03-15 RX ADMIN — SEVELAMER HYDROCHLORIDE 1600 MG: 800 TABLET, FILM COATED PARENTERAL at 17:23

## 2020-03-15 RX ADMIN — DICLOFENAC 2 G: 10 GEL TOPICAL at 21:50

## 2020-03-15 RX ADMIN — GUAIFENESIN 600 MG: 600 TABLET, EXTENDED RELEASE ORAL at 08:08

## 2020-03-15 RX ADMIN — HEPARIN SODIUM 5000 UNITS: 5000 INJECTION INTRAVENOUS; SUBCUTANEOUS at 05:35

## 2020-03-15 RX ADMIN — LABETALOL HYDROCHLORIDE 300 MG: 100 TABLET, FILM COATED ORAL at 17:22

## 2020-03-15 RX ADMIN — INSULIN LISPRO 1 UNITS: 100 INJECTION, SOLUTION INTRAVENOUS; SUBCUTANEOUS at 21:52

## 2020-03-15 RX ADMIN — GABAPENTIN 200 MG: 100 CAPSULE ORAL at 21:49

## 2020-03-15 NOTE — ASSESSMENT & PLAN NOTE
· Patient reports 4 day history of diarrhea prior to admission  · C diff negative  · Enteric panel negative  · Patient still complaining of diarrhea, Imodium p r n   Was added

## 2020-03-15 NOTE — ASSESSMENT & PLAN NOTE
 Chest x-ray:  Suspected left lower lobe airspace opacity "   Treating as Hcap  o IV antibiotics:  IV cefepime day 3   o Blood culture positive g cocci cluster 1st, negative on 2nd set, pending repeat blood culture collected on 03/14/2020  o Procalcitonin trending up, check procalcitonin tomorrow a m   o ID was consulted  o MRSA swab negative, vancomycin was discontinued   Respiratory protocol, nebs PRN   Patient is chronically on 2 L of oxygen at baseline  Patient is currently at baseline   Monitor respiratory status   BMP in a m    CBC in a m

## 2020-03-15 NOTE — PROGRESS NOTES
[facility] PROGRESS NOTE   Mu Leal 53 y o  female MRN: [id number]  Unit/Bed#: S -01 Encounter: [id number]  Reason for Consult: ESRD on HD    ASSESSMENT and PLAN:  1  ESRD on HD [facility]):  · Had HD on Saturday  · Next HD is Tuesday  2  Access:   · R IJ permcath  · Has right arm AVF which is not usable yet  3  Pneumonia:  · On Vancomycin and Cefepime  · CT findings also indicative of possible pulm edema  4  Coag negative staph bacteremia  · True bacteremia vs contaminant  · Appears clinically compensated  · May need to consider HD catheter infection  · I don't think there is an urgent need to remove the HD catheter today  · Will await ID input  5  Hypertension:   · BP is above goal    · Continue Labetalol 300 mg BID and Nifedipine 30 mg daily for now  · No changes given potential bacteremia  6  Anemia:   · Hgb close to goal    · Continue Epogen on HD  7  Mineral and bone disease:   · Continue Sevelamer for hyperphos  8  Abdominal pain  DISPOSITION:  · Next HD is Tuesday  · Follow up repeat blood cultures  · No need to urgently remove HD catheter today unless the ID service feels otherwise  SUBJECTIVE / INTERVAL HISTORY:  Tolerated HD yesterday  Found to have staph epi bacteremia on blood cultures  No fever or hypotension overnight      OBJECTIVE:  Current Weight: Weight - Scale: 76 4 kg (168 lb 6 9 oz)  Vitals:    03/14/20 1735 03/14/20 2358 03/15/20 0600 03/15/20 0700   BP: 132/60 165/78  (!) 171/83   BP Location:  Left arm  Left arm   Pulse: 82 77  79   Resp:  18  18   Temp:  98 5 °F (36 9 °C)  97 8 °F (36 6 °C)   TempSrc:  Oral  Oral   SpO2:  96%  100%   Weight:   76 4 kg (168 lb 6 9 oz)    Height:           Intake/Output Summary (Last 24 hours) at 3/15/2020 1151  Last data filed at 3/15/2020 0900  Gross per 24 hour   Intake 1480 ml   Output 945 ml   Net 535 ml     General: conscious, coherent, cooperative, no distress  Skin: dry, no rash  Eyes: pink conjunctivae, no scleral icterus  ENT: moist mucous membranes  Chest/Lungs: equal chest expansion, clear breath sounds  CVS: distinct heart sounds, normal rate, regular rhythm, no rub, no edema  Abdomen: soft, non tender, non distended, normal bowel sounds  Extremities: no edema  : no fowler catheter  Neuro: awake, alert     Psych: appropriate affect    Medications:    Current Facility-Administered Medications:     acetaminophen (TYLENOL) tablet 650 mg, 650 mg, Oral, Q6H PRN, Clarissa McGeehan, CRNP, 650 mg at 03/14/20 1124    albuterol (PROVENTIL HFA,VENTOLIN HFA) inhaler 2 puff, 2 puff, Inhalation, Q4H PRN, Taylor Pham MD    atorvastatin (LIPITOR) tablet 80 mg, 80 mg, Oral, QPM, Clarissa McGeehan, CRNP, 80 mg at 03/14/20 1735    benzonatate (TESSALON PERLES) capsule 100 mg, 100 mg, Oral, TID PRN, GONZALO Rivas    cefepime (MAXIPIME) 1,000 mg in dextrose 5 % 50 mL IVPB, 1,000 mg, Intravenous, Q24H, Clarissa Teodorahan, CRNP, Last Rate: 100 mL/hr at 03/14/20 2204, 1,000 mg at 03/14/20 2204    diclofenac sodium (VOLTAREN) 1 % topical gel 2 g, 2 g, Topical, 4x Daily, Clarissa McGeehan, CRNP, 2 g at 03/15/20 0811    epoetin ha (EPOGEN,PROCRIT) injection 12,000 Units, 12,000 Units, Subcutaneous, After Dialysis, Miracle Das 473, PA-C, 12,000 Units at 03/14/20 1026    gabapentin (NEURONTIN) capsule 200 mg, 200 mg, Oral, Q12H [address], Clarissa McGeehan, CRNP, 200 mg at 03/15/20 0808    guaiFENesin (MUCINEX) 12 hr tablet 600 mg, 600 mg, Oral, BID, Clarissa McGeehan, CRNP, 600 mg at 03/15/20 0808    heparin (porcine) subcutaneous injection 5,000 Units, 5,000 Units, Subcutaneous, Q8H [address], 5,000 Units at 03/15/20 0535 **AND** Platelet count, , , Once, GONZALO Rivas    hydrALAZINE (APRESOLINE) injection 5 mg, 5 mg, Intravenous, Q6H PRN, Yunior Garza MD    insulin glargine (LANTUS) subcutaneous injection 20 Units 0 2 mL, 20 Units, Subcutaneous, HS, Balwinder Corona MD, 20 Units at 03/14/20 2204    insulin lispro (HumaLOG) 100 units/mL subcutaneous injection 1-5 Units, 1-5 Units, Subcutaneous, HS, GONZALO Cook, 1 Units at 03/14/20 2205    insulin lispro (HumaLOG) 100 units/mL subcutaneous injection 1-6 Units, 1-6 Units, Subcutaneous, TID AC, 1 Units at 03/14/20 1736 **AND** Fingerstick Glucose (POCT), , , TID AC, GONZALO Chicas    labetalol (NORMODYNE) tablet 300 mg, 300 mg, Oral, BID, Josue Stinson MD, 300 mg at 03/15/20 4085    lidocaine (LIDODERM) 5 % patch 1 patch, 1 patch, Topical, Daily, Balwinder Corona MD, 1 patch at 03/14/20 1735    loperamide (IMODIUM) capsule 2 mg, 2 mg, Oral, TID PRN, Abraham Nguyễn MD    melatonin tablet 3 mg, 3 mg, Oral, HS PRN, GONZALO Cook    NIFEdipine (PROCARDIA XL) 24 hr tablet 30 mg, 30 mg, Oral, Daily, GONZALO Chicas, 30 mg at 03/15/20 0808    pantoprazole (PROTONIX) EC tablet 40 mg, 40 mg, Oral, Early Morning, Abraham Nguyễn MD, 40 mg at 03/15/20 0808    sevelamer (RENAGEL) tablet 1,600 mg, 1,600 mg, Oral, TID With Meals, [facility], 1,600 mg at 03/15/20 3671    vancomycin (VANCOCIN) IVPB (premix) 750 mg, 10 mg/kg (Adjusted), Intravenous, After Dialysis, GONZALO Cook, Last Rate: 150 mL/hr at 03/14/20 1208, 750 mg at 03/14/20 1208    Laboratory Results:  Results from last 7 days   Lab Units 03/15/20  0514 03/14/20  0505 03/14/20  0454 03/13/20  0944 03/12/20  1901   WBC Thousand/uL 5 82  --  5 36 4 77 7 36   HEMOGLOBIN g/dL 9 9*  --  10 0* 10 8* 10 5*   HEMATOCRIT % 31 0*  --  31 1* 34 1* 31 7*   PLATELETS Thousands/uL 257  --  270 247 255   POTASSIUM mmol/L 4 0 3 7  --  3 7 3 9   CHLORIDE mmol/L 97* 98*  --  94* 92*   CO2 mmol/L 28 31  --  30 34*   BUN mg/dL 21 30*  --  21 9   CREATININE mg/dL 3 50* 4 67*  --  4 10* 2 71*   CALCIUM mg/dL 9 2 9 1  --  9 3 8 7

## 2020-03-15 NOTE — CONSULTS
Consultation - Infectious Disease   Adan Fitzpatrick 53 y o  female MRN: [id number]  Unit/Bed#: S -01 Encounter: [id number]      IMPRESSION & RECOMMENDATIONS:   1  Sepsis-POA  Fever and tachypnea  Suspect secondary to a respiratory infection with possible pneumonia  Consideration for the possibility of bacteremia with a catheter related infection, however only 1 of 2 blood cultures are positive for this organism  Fortunately the patient is clinically improved and seems to be tolerating the antibiotics without difficulty  The elevated procalcitonin level is difficult to interpret dense the setting of end-stage renal disease, however trends may be helpful   -discontinue vancomycin cefepime  -follow up repeat blood cultures  -begin cefdinir 300 mg p o  Q 24 hours  -recheck procalcitonin level tomorrow a m   -plan 7 days total of antibiotics  -supportive care    2  Pneumonia-with the patient having fever, shortness of breath, and at least equivocal chest x-ray findings for pneumonia, favor treating for this possibility  The patient's respiratory status has improved and her temperatures come down  She seems to be tolerating the antibiotics without difficulty   -antibiotics as above  -O2 support  -monitor respiratory status    3  Coagulase-negative Staphylococcus bacteremia-with only 1 of 2 sets positive  With the patient having a catheter in place there is a possibility this this represents a true bacteremia but this is less likely with only 1 set positive  Repeat blood cultures are negative thus far but only sent yesterday   -antibiotics as above  -follow up repeat blood cultures  -if repeat blood cultures positive, will need to reconsider diagnosis  -recommend repeat blood cultures x2 sets in 2 weeks to make sure no relapse    4  Left upper quadrant pain-unclear etiology  This seems to be somewhat chronic and the CT the abdomen pelvis did not reveal any source  Will monitor for now      5  End-stage renal disease on hemodialysis Q Tuesday Thursday and Saturday  -nephrology follow-up    Discussed the above management plan with the primary service    HISTORY OF PRESENT ILLNESS:  Reason for Consult:  Bacteremia  HPI: Veronica Melendrez is a 53y o  year old female with end-stage renal disease, and diabetes mellitus admitted to [facility] with fever and shortness of breath who I am asked to assist with antibiotic management for positive blood culture  Patient apparently was admitted to [facility] in February with fever associated with pneumonia  At that time her blood cultures are negative  In the few days prior to this admission the patient apparently began developing subjective fever, chills, and shortness of breath as well as diarrhea  She was seen at her dialysis center on the day of admission and noted to have decreased blood pressure, shortness of breath, dry cough, and headache  She was therefore sent to the emergency department for further evaluation  Emergency department she was found to be febrile and had an abnormal chest x-ray suggesting the possibility of pneumonia  The patient had blood cultures obtained and was started on vancomycin cefepime and admitted for further management  She underwent stool studies that were negative for C diff and other enteric pathogens  The patient has clinically improved with resolution of the fever  She is back to baseline as far as oxygen needs  She has continued to have some diarrhea  She currently denies any headache or stiff neck, denies any increased cough or shortness of breath, denies any chest pain or abdominal pain, denies any new rash or skin lesions, denies any new joint or muscle pains  Her blood cultures came back positive with 1 of 2 blood cultures positive for coagulase-negative Staphylococcus  REVIEW OF SYSTEMS:  A complete review of systems is negative other than that noted in the HPI      PAST MEDICAL HISTORY:  Past Medical History:   Diagnosis Date    Asthma     Depression     Diabetes mellitus ([address] )     Hyperlipidemia     Hypertension     Renal disorder     , Saturday    Stroke [facility])      Past Surgical History:   Procedure Laterality Date    CHOLECYSTECTOMY      HYSTERECTOMY         FAMILY HISTORY:  Non-contributory    SOCIAL HISTORY:  Social History   Social History     Substance and Sexual Activity   Alcohol Use No     Social History     Substance and Sexual Activity   Drug Use No     Social History     Tobacco Use   Smoking Status Never Smoker   Smokeless Tobacco Never Used       ALLERGIES:  Allergies   Allergen Reactions    Lisinopril Swelling and Cough       MEDICATIONS:  All current active medications have been reviewed  Antibiotics:  Vancomycin cefepime 4    PHYSICAL EXAM:  Temp:  [97 8 °F (36 6 °C)-98 5 °F (36 9 °C)] 97 8 °F (36 6 °C)  HR:  [77-84] 79  Resp:  [18] 18  BP: (126-171)/(60-83) 171/83  SpO2:  [94 %-100 %] 100 %  Temp (24hrs), Av 1 °F (36 7 °C), Min:97 8 °F (36 6 °C), Max:98 5 °F (36 9 °C)  Current: Temperature: 97 8 °F (36 6 °C)    Intake/Output Summary (Last 24 hours) at 3/15/2020 1319  Last data filed at 3/15/2020 0900  Gross per 24 hour   Intake 1180 ml   Output    Net 1180 ml       General Appearance:  Appearing well, nontoxic, and in no distress   Head:  Normocephalic, without obvious abnormality, atraumatic   Eyes:  Conjunctiva pale and sclera anicteric, both eyes   Nose: Nares normal, mucosa normal, no drainage   Throat: Oropharynx moist without lesions   Neck: Supple, symmetrical, no adenopathy, no tenderness/mass/nodules   Back:   Symmetric, no curvature, ROM normal, no CVA tenderness   Lungs:   Decreased breath sounds bilaterally, respirations unlabored   Chest Wall:  No tenderness or deformity    Right chest wall PermCath site without erythema or drainage   Heart:  RRR; no murmur, rub or gallop   Abdomen:   Soft, non-tender, non-distended, positive bowel sounds    Extremities: No cyanosis, clubbing  Right upper extremity with fistula with palpable thrill  Skin: No rashes or lesions  No draining wounds noted  Lymph nodes: Cervical, supraclavicular nodes normal   Neurologic: Alert and oriented times 3, extremity strength 5/5 and symmetric       LABS, IMAGING, & OTHER STUDIES:  Lab Results:  I have personally reviewed pertinent labs  Results from last 7 days   Lab Units 03/15/20  0514 03/14/20  0454 03/13/20  0944   WBC Thousand/uL 5 82 5 36 4 77   HEMOGLOBIN g/dL 9 9* 10 0* 10 8*   PLATELETS Thousands/uL 257 270 247     Results from last 7 days   Lab Units 03/15/20  0514 03/14/20  0505 03/13/20  0944 03/12/20  1901   SODIUM mmol/L 133* 137 132* 136   POTASSIUM mmol/L 4 0 3 7 3 7 3 9   CHLORIDE mmol/L 97* 98* 94* 92*   CO2 mmol/L 28 31 30 34*   BUN mg/dL 21 30* 21 9   CREATININE mg/dL 3 50* 4 67* 4 10* 2 71*   EGFR ml/min/1 73sq m 14 10 12 19   CALCIUM mg/dL 9 2 9 1 9 3 8 7   AST U/L  --   --   --  31   ALT U/L  --   --   --  34   ALK PHOS U/L  --   --   --  175*     Results from last 7 days   Lab Units 03/14/20  1716 03/14/20  1713 03/13/20  1911 03/13/20  0948 03/13/20  0909 03/12/20  1928 03/12/20  1900   BLOOD CULTURE  Received in Microbiology Lab  Culture in Progress  Received in Microbiology Lab  Culture in Progress  --   --   --  Staphylococcus coagulase negative* No Growth at 48 hrs     GRAM STAIN RESULT   --   --   --   --   --  Gram positive cocci in clusters*  --    MRSA CULTURE ONLY   --   --   --  No Methicillin Resistant Staphlyococcus aureus (MRSA) isolated  --   --   --    LEGIONELLA URINARY ANTIGEN   --   --  Negative  --   --   --   --    C DIFF TOXIN B   --   --   --   --  Negative  --   --      Results from last 7 days   Lab Units 03/13/20  0944 03/12/20  1901   PROCALCITONIN ng/ml 0 87* 0 50*       Imaging Studies:     CT abdomen pelvis-no acute intra-abdominal process, diffuse ground-glass opacities consistent with pulmonary edema    Chest x-ray-suspected left lower lung airspace opacity    Images personally reviewed by me in PACS

## 2020-03-15 NOTE — ASSESSMENT & PLAN NOTE
SIRS criteria:  Hyperthermia, tachypnea  Suspected source:  Pneumonia  Lactic acid: 0 08  End organ damage:  Creatinine 2 71, however this is patient's baseline  IV Fluids:  Contraindicated due to patient's ESRD  IV antibiotics:  IV cefepime  Follow up on culture results  Monitor vital signs, laboratory studies

## 2020-03-15 NOTE — PROGRESS NOTES
Progress Note - Dm Snider 1965, 53 y o  female MRN: [id number]    Unit/Bed#: S -01 Encounter: [id number]    Primary Care Provider: Dm Snider MD   Date and time admitted to hospital: 3/12/2020  6:16 PM        * Pneumonia  Assessment & Plan   Chest x-ray:  Suspected left lower lobe airspace opacity "   Treating as Hcap  o IV antibiotics:  IV cefepime day 3   o Blood culture positive g cocci cluster 1st, negative on 2nd set, pending repeat blood culture collected on 03/14/2020  o Procalcitonin trending up, check procalcitonin tomorrow a m   o ID was consulted  o MRSA swab negative, vancomycin was discontinued   Respiratory protocol, nebs PRN   Patient is chronically on 2 L of oxygen at baseline  Patient is currently at baseline   Monitor respiratory status   BMP in a m    CBC in a m  LUQ pain  Assessment & Plan  Patient has been complaining of chronic left upper quadrant pain that has been going on for years now, according to the patient she has seen so many doctors but no one can tell her what is going on  · CT of the abdomen without contrast: No abnormality to account for left upper quadrant abdominal pain  · Lidoderm patch  · Started on Protonix for possibility of gastritis    ESRD on hemodialysis [facility])  Assessment & Plan  · Patient receives hemodialysis on Tuesday, Thursday, Saturdays  · Patient underwent full hemodialysis session on 3/14/2020  · Nephrology consult  SIRS (systemic inflammatory response syndrome) (HCC)  Assessment & Plan  SIRS criteria:  Hyperthermia, tachypnea  Suspected source:  Pneumonia  Lactic acid: 0 08  End organ damage:  Creatinine 2 71, however this is patient's baseline  IV Fluids:  Contraindicated due to patient's ESRD  IV antibiotics:  IV cefepime  Follow up on culture results  Monitor vital signs, laboratory studies  Anemia of chronic renal failure  Assessment & Plan  · Stable    · Present admission 10 5  · Baseline appears to be around 9    · Monitor CBC  Diarrhea  Assessment & Plan  · Patient reports 4 day history of diarrhea prior to admission  · C diff negative  · Enteric panel negative  · Patient still complaining of diarrhea, Imodium p r n  Was added    Hyperlipidemia  Assessment & Plan  · Continue statin  Hypertension  Assessment & Plan  · Blood pressure mildly elevated on admission  Currently 170s/80s, likely in the setting the patient did not receive evening dose medications will give now  · Blood pressure in the 160s  · Hydralazine p r n  For elevated blood pressure  · Continue home medications  Type 2 diabetes mellitus with hyperglycemia, with long-term current use of insulin [facility])  Assessment & Plan  Lab Results   Component Value Date    HGBA1C 8 7 (H) 2020       Recent Labs     20  1104 20  1643 20  2159 03/15/20  0737   POCGLU 157* 178* 162* 117       Blood Sugar Average: Last 72 hrs:  · (P) 188  2Home regimen of 20 units of Lantus at bedtime  · Q i d  Accu-Cheks and sliding scale insulin  · Hypoglycemia protocol  · Continue current lantus for hyperglycemia        VTE Pharmacologic Prophylaxis:   Pharmacologic: Heparin  Mechanical VTE Prophylaxis in Place: Yes    Discussions with Specialists or Other Care Team Provider:  Nursing    Education and Discussions with Family / Patient:  Patient    Current Length of Stay: 3 day(s)    Current Patient Status: Inpatient     Discharge Plan / Estimated Discharge Date:  Likely within 24-48 hours    Code Status: Level 3 - DNAR and DNI      Subjective:   Patient was seen examined this morning  She reports that her breathing is improving, she still complains of right upper quadrant abdominal pain and diarrhea      Objective:     Vitals:   Temp (24hrs), Av 1 °F (36 7 °C), Min:97 8 °F (36 6 °C), Max:98 5 °F (36 9 °C)    Temp:  [97 8 °F (36 6 °C)-98 5 °F (36 9 °C)] 97 8 °F (36 6 °C)  HR:  [65-87] 79  Resp:  [18] 18  BP: (126-171)/(60-97) 171/83  SpO2:  [94 %-100 %] 100 %  Body mass index is 30 81 kg/m²  Input and Output Summary (last 24 hours): Intake/Output Summary (Last 24 hours) at 3/15/2020 0953  Last data filed at 3/15/2020 0700  Gross per 24 hour   Intake 1430 ml   Output 945 ml   Net 485 ml       Physical Exam:     Physical Exam   Constitutional: She is oriented to person, place, and time  She appears well-developed and well-nourished  No distress  Nasal cannula in place  HENT:   Head: Normocephalic and atraumatic  Mouth/Throat: Oropharynx is clear and moist    Eyes: Pupils are equal, round, and reactive to light  Conjunctivae and EOM are normal    Neck: Normal range of motion  Neck supple  No JVD present  Cardiovascular: Normal rate, regular rhythm and normal heart sounds  Pulmonary/Chest: Effort normal and breath sounds normal  No respiratory distress  She has no wheezes  Diminished in the base   Abdominal: Soft  Bowel sounds are normal  There is tenderness (Tenderness in the left upper quadrant)  Musculoskeletal: Normal range of motion  She exhibits no edema  Neurological: She is alert and oriented to person, place, and time  A cranial nerve deficit (Left facial droop, chronic according to the patient from previous stroke) is present  Skin: Skin is warm and dry  Capillary refill takes less than 2 seconds  Psychiatric: She has a normal mood and affect  Her behavior is normal    Nursing note and vitals reviewed        Additional Data:     Labs:    Results from last 7 days   Lab Units 03/15/20  0514 03/14/20  0454   WBC Thousand/uL 5 82 5 36   HEMOGLOBIN g/dL 9 9* 10 0*   HEMATOCRIT % 31 0* 31 1*   PLATELETS Thousands/uL 257 270   NEUTROS PCT %  --  48   LYMPHS PCT %  --  35   MONOS PCT %  --  11   EOS PCT %  --  5     Results from last 7 days   Lab Units 03/15/20  0514  03/12/20  1901   POTASSIUM mmol/L 4 0   < > 3 9   CHLORIDE mmol/L 97*   < > 92*   CO2 mmol/L 28   < > 34*   BUN mg/dL 21   < > 9   CREATININE mg/dL 3 50*   < > 2 71* CALCIUM mg/dL 9 2   < > 8 7   ALK PHOS U/L  --   --  175*   ALT U/L  --   --  34   AST U/L  --   --  31    < > = values in this interval not displayed  Results from last 7 days   Lab Units 03/12/20  1901   INR  1 01       * I Have Reviewed All Lab Data Listed Above  * Additional Pertinent Lab Tests Reviewed: Daniele 65 Admission Reviewed    Imaging:    Imaging Reports Reviewed Today Include:  CT abdomen  Imaging Personally Reviewed by Myself Includes:  None    Recent Cultures (last 7 days):     Results from last 7 days   Lab Units 03/14/20  1716 03/14/20  1713 03/13/20  1911 03/13/20  0909 03/12/20  1928 03/12/20  1900   BLOOD CULTURE  Received in Microbiology Lab  Culture in Progress  Received in Microbiology Lab  Culture in Progress  --   --  Staphylococcus coagulase negative* No Growth at 48 hrs     GRAM STAIN RESULT   --   --   --   --  Gram positive cocci in clusters*  --    LEGIONELLA URINARY ANTIGEN   --   --  Negative  --   --   --    C DIFF TOXIN B   --   --   --  Negative  --   --        Last 24 Hours Medication List:     Current Facility-Administered Medications:  acetaminophen 650 mg Oral Q6H PRN GONZALO Silver    albuterol 2 puff Inhalation Q4H PRN Odin Garcia MD    atorvastatin 80 mg Oral QPM GONZALO Chicas    benzonatate 100 mg Oral TID PRN GONZALO Silver    cefepime 1,000 mg Intravenous Q24H GONZALO Silver Last Rate: 1,000 mg (03/14/20 2204)   diclofenac sodium 2 g Topical 4x Daily GONZALO Chicas    epoetin ha 12,000 Units Subcutaneous After Dialysis Miracle Wade, PO    gabapentin 200 mg Oral Q12H [facility] GONZALO Chicas    guaiFENesin 600 mg Oral BID GONZALO Silver    heparin (porcine) 5,000 Units Subcutaneous Q8H [facility] GONZALO Chicas    hydrALAZINE 5 mg Intravenous Q6H PRN Jenniffer Granger MD    insulin glargine 20 Units Subcutaneous HS Odin Garcia MD    insulin lispro 1-5 Units Subcutaneous HS GONZALO Dye    insulin lispro 1-6 Units Subcutaneous TID GONZALO Rehman    labetalol 300 mg Oral BID Fritz Brewster MD    lidocaine 1 patch Topical Daily Lyndon Swift MD    loperamide 2 mg Oral TID PRN Melissa Grimm MD    melatonin 3 mg Oral HS PRN GONZALO Dye    NIFEdipine ER 30 mg Oral Daily GONZALO Dye    pantoprazole 40 mg Oral Early Morning Melissa Grimm MD    sevelamer 1,600 mg Oral TID With Meals [facility], PO    vancomycin 10 mg/kg (Adjusted) Intravenous After Dialysis GONZALO Dye Last Rate: 750 mg (03/14/20 1208)        Today, Patient Was Seen By: Madison Milan MD    ** Please Note: This note has been constructed using a voice recognition system   **

## 2020-03-15 NOTE — ASSESSMENT & PLAN NOTE
Patient has been complaining of chronic left upper quadrant pain that has been going on for years now, according to the patient she has seen so many doctors but no one can tell her what is going on      · CT of the abdomen without contrast: No abnormality to account for left upper quadrant abdominal pain  · Lidoderm patch  · Started on Protonix for possibility of gastritis

## 2020-03-15 NOTE — PLAN OF CARE
Problem: Potential for Falls  Goal: Patient will remain free of falls  Description  INTERVENTIONS:  - Assess patient frequently for physical needs  -  Identify cognitive and physical deficits and behaviors that affect risk of falls  -  [place] fall precautions as indicated by assessment   - Educate patient/family on patient safety including physical limitations  - Instruct patient to call for assistance with activity based on assessment  - Modify environment to reduce risk of injury  - Consider OT/PT consult to assist with strengthening/mobility  Outcome: Progressing     Problem: Prexisting or High Potential for Compromised Skin Integrity  Goal: Skin integrity is maintained or improved  Description  INTERVENTIONS:  - Identify patients at risk for skin breakdown  - Assess and monitor skin integrity  - Assess and monitor nutrition and hydration status  - Monitor labs   - Assess for incontinence   - Turn and reposition patient  - Assist with mobility/ambulation  - Relieve pressure over bony prominences  - Avoid friction and shearing  - Provide appropriate hygiene as needed including keeping skin clean and dry  - Evaluate need for skin moisturizer/barrier cream  - Collaborate with interdisciplinary team   - Patient/family teaching  - Consider wound care consult   Outcome: Progressing     Problem: Nutrition/Hydration-ADULT  Goal: Nutrient/Hydration intake appropriate for improving, restoring or maintaining nutritional needs  Description  Monitor and assess patient's nutrition/hydration status for malnutrition  Collaborate with interdisciplinary team and initiate plan and interventions as ordered  Monitor patient's weight and dietary intake as ordered or per policy  Utilize nutrition screening tool and intervene as necessary  Determine patient's food preferences and provide high-protein, high-caloric foods as appropriate       INTERVENTIONS:  - Monitor oral intake, urinary output, labs, and treatment plans  - Assess nutrition and hydration status and recommend course of action  - Evaluate amount of meals eaten  - Assist patient with eating if necessary   - Allow adequate time for meals  - Recommend/ encourage appropriate diets, oral nutritional supplements, and vitamin/mineral supplements  - Order, calculate, and assess calorie counts as needed  - Recommend, monitor, and adjust tube feedings and TPN/PPN based on assessed needs  - Assess need for intravenous fluids  - Provide specific nutrition/hydration education as appropriate  - Include patient/family/caregiver in decisions related to nutrition  Outcome: Progressing     Problem: METABOLIC, FLUID AND ELECTROLYTES - ADULT  Goal: Electrolytes maintained within normal limits  Description  INTERVENTIONS:  - Monitor labs and assess patient for signs and symptoms of electrolyte imbalances  - Administer electrolyte replacement as ordered  - Monitor response to electrolyte replacements, including repeat lab results as appropriate  - Instruct patient on fluid and nutrition as appropriate  Outcome: Progressing  Goal: Fluid balance maintained  Description  INTERVENTIONS:  - Monitor labs   - Monitor I/O and WT  - Instruct patient on fluid and nutrition as appropriate  - Assess for signs & symptoms of volume excess or deficit  Outcome: Progressing

## 2020-03-15 NOTE — ASSESSMENT & PLAN NOTE
· Patient receives hemodialysis on Tuesday, Thursday, Saturdays  · Patient underwent full hemodialysis session on 3/14/2020  · Nephrology consult

## 2020-03-15 NOTE — PROGRESS NOTES
Vancomycin IV Pharmacy-to-Dose Consultation    Ana Camp is a 53 y o  female who is currently receiving Vancomycin IV with management by the Pharmacy Consult service  Assessment/Plan:  The patient was reviewed  Renal function is stable and no signs or symptoms of nephrotoxicity and/or infusion reactions were documented in the chart  Based on todays assessment, continue current vancomycin (day # 3) dosing of 750 mg PHD , with a plan for pre -HD level  on 03/17/2020  We will continue to follow the patients culture results and clinical progress daily      Aristeo Barrera, Pharmacist

## 2020-03-15 NOTE — ASSESSMENT & PLAN NOTE
Lab Results   Component Value Date    HGBA1C 8 7 (H) 02/05/2020       Recent Labs     03/14/20  1104 03/14/20  1643 03/14/20  2159 03/15/20  0737   POCGLU 157* 178* 162* 117       Blood Sugar Average: Last 72 hrs:  · (P) 188  2Home regimen of 20 units of Lantus at bedtime  · Q i d  Accu-Cheks and sliding scale insulin  · Hypoglycemia protocol    · Continue current lantus for hyperglycemia

## 2020-03-16 VITALS
TEMPERATURE: 97.8 F | HEIGHT: 62 IN | DIASTOLIC BLOOD PRESSURE: 81 MMHG | BODY MASS INDEX: 31 KG/M2 | SYSTOLIC BLOOD PRESSURE: 152 MMHG | OXYGEN SATURATION: 94 % | WEIGHT: 168.43 LBS | HEART RATE: 81 BPM | RESPIRATION RATE: 18 BRPM

## 2020-03-16 PROBLEM — R65.10 SIRS (SYSTEMIC INFLAMMATORY RESPONSE SYNDROME) (HCC): Status: RESOLVED | Noted: 2020-03-12 | Resolved: 2020-03-16

## 2020-03-16 LAB
ANION GAP SERPL CALCULATED.3IONS-SCNC: 13 MMOL/L (ref 4–13)
BUN SERPL-MCNC: 35 MG/DL (ref 5–25)
CALCIUM SERPL-MCNC: 9.2 MG/DL (ref 8.3–10.1)
CHLORIDE SERPL-SCNC: 99 MMOL/L (ref 100–108)
CO2 SERPL-SCNC: 24 MMOL/L (ref 21–32)
CREAT SERPL-MCNC: 4.86 MG/DL (ref 0.6–1.3)
ERYTHROCYTE [DISTWIDTH] IN BLOOD BY AUTOMATED COUNT: 16.5 % (ref 11.6–15.1)
GFR SERPL CREATININE-BSD FRML MDRD: 9 ML/MIN/1.73SQ M
GLUCOSE SERPL-MCNC: 114 MG/DL (ref 65–140)
GLUCOSE SERPL-MCNC: 126 MG/DL (ref 65–140)
GLUCOSE SERPL-MCNC: 181 MG/DL (ref 65–140)
HCT VFR BLD AUTO: 31.1 % (ref 34.8–46.1)
HGB BLD-MCNC: 9.9 G/DL (ref 11.5–15.4)
MCH RBC QN AUTO: 30.6 PG (ref 26.8–34.3)
MCHC RBC AUTO-ENTMCNC: 31.8 G/DL (ref 31.4–37.4)
MCV RBC AUTO: 96 FL (ref 82–98)
PLATELET # BLD AUTO: 276 THOUSANDS/UL (ref 149–390)
PMV BLD AUTO: 10.3 FL (ref 8.9–12.7)
POTASSIUM SERPL-SCNC: 3.9 MMOL/L (ref 3.5–5.3)
PROCALCITONIN SERPL-MCNC: 0.42 NG/ML
RBC # BLD AUTO: 3.24 MILLION/UL (ref 3.81–5.12)
SODIUM SERPL-SCNC: 136 MMOL/L (ref 136–145)
WBC # BLD AUTO: 6.17 THOUSAND/UL (ref 4.31–10.16)

## 2020-03-16 PROCEDURE — 85027 COMPLETE CBC AUTOMATED: CPT | Performed by: INTERNAL MEDICINE

## 2020-03-16 PROCEDURE — 99239 HOSP IP/OBS DSCHRG MGMT >30: CPT | Performed by: INTERNAL MEDICINE

## 2020-03-16 PROCEDURE — 82948 REAGENT STRIP/BLOOD GLUCOSE: CPT

## 2020-03-16 PROCEDURE — 80048 BASIC METABOLIC PNL TOTAL CA: CPT | Performed by: INTERNAL MEDICINE

## 2020-03-16 PROCEDURE — 84145 PROCALCITONIN (PCT): CPT | Performed by: INTERNAL MEDICINE

## 2020-03-16 PROCEDURE — 99232 SBSQ HOSP IP/OBS MODERATE 35: CPT | Performed by: INTERNAL MEDICINE

## 2020-03-16 RX ORDER — INSULIN GLARGINE 100 [IU]/ML
20 INJECTION, SOLUTION SUBCUTANEOUS
Qty: 10 ML | Refills: 0
Start: 2020-03-16

## 2020-03-16 RX ORDER — PANTOPRAZOLE SODIUM 40 MG/1
40 TABLET, DELAYED RELEASE ORAL
Qty: 30 TABLET | Refills: 0 | Status: SHIPPED | OUTPATIENT
Start: 2020-03-17 | End: 2020-07-27 | Stop reason: HOSPADM

## 2020-03-16 RX ORDER — CEFDINIR 300 MG/1
300 CAPSULE ORAL EVERY 24 HOURS
Qty: 4 CAPSULE | Refills: 0 | Status: SHIPPED | OUTPATIENT
Start: 2020-03-16 | End: 2020-03-20

## 2020-03-16 RX ORDER — GUAIFENESIN 600 MG
600 TABLET, EXTENDED RELEASE 12 HR ORAL 2 TIMES DAILY
Qty: 10 TABLET | Refills: 0 | Status: SHIPPED | OUTPATIENT
Start: 2020-03-16 | End: 2020-03-21

## 2020-03-16 RX ORDER — SEVELAMER HYDROCHLORIDE 800 MG/1
1600 TABLET, FILM COATED ORAL
Qty: 90 TABLET | Refills: 0 | Status: SHIPPED | OUTPATIENT
Start: 2020-03-16 | End: 2020-03-31

## 2020-03-16 RX ADMIN — DICLOFENAC 2 G: 10 GEL TOPICAL at 08:50

## 2020-03-16 RX ADMIN — GUAIFENESIN 600 MG: 600 TABLET, EXTENDED RELEASE ORAL at 08:50

## 2020-03-16 RX ADMIN — CEFDINIR 300 MG: 300 CAPSULE ORAL at 13:47

## 2020-03-16 RX ADMIN — LABETALOL HYDROCHLORIDE 300 MG: 100 TABLET, FILM COATED ORAL at 08:50

## 2020-03-16 RX ADMIN — PANTOPRAZOLE SODIUM 40 MG: 40 TABLET, DELAYED RELEASE ORAL at 05:53

## 2020-03-16 RX ADMIN — NIFEDIPINE 30 MG: 30 TABLET, FILM COATED, EXTENDED RELEASE ORAL at 08:50

## 2020-03-16 RX ADMIN — HEPARIN SODIUM 5000 UNITS: 5000 INJECTION INTRAVENOUS; SUBCUTANEOUS at 05:53

## 2020-03-16 RX ADMIN — SEVELAMER HYDROCHLORIDE 1600 MG: 800 TABLET, FILM COATED PARENTERAL at 08:50

## 2020-03-16 RX ADMIN — DICLOFENAC 2 G: 10 GEL TOPICAL at 11:37

## 2020-03-16 RX ADMIN — SEVELAMER HYDROCHLORIDE 1600 MG: 800 TABLET, FILM COATED PARENTERAL at 11:36

## 2020-03-16 RX ADMIN — GABAPENTIN 200 MG: 100 CAPSULE ORAL at 08:50

## 2020-03-16 RX ADMIN — INSULIN LISPRO 1 UNITS: 100 INJECTION, SOLUTION INTRAVENOUS; SUBCUTANEOUS at 11:36

## 2020-03-16 NOTE — PLAN OF CARE
Problem: Potential for Falls  Goal: Patient will remain free of falls  Description  INTERVENTIONS:  - Assess patient frequently for physical needs  -  Identify cognitive and physical deficits and behaviors that affect risk of falls  -  [place] fall precautions as indicated by assessment   - Educate patient/family on patient safety including physical limitations  - Instruct patient to call for assistance with activity based on assessment  - Modify environment to reduce risk of injury  - Consider OT/PT consult to assist with strengthening/mobility  Outcome: Progressing     Problem: Prexisting or High Potential for Compromised Skin Integrity  Goal: Skin integrity is maintained or improved  Description  INTERVENTIONS:  - Identify patients at risk for skin breakdown  - Assess and monitor skin integrity  - Assess and monitor nutrition and hydration status  - Monitor labs   - Assess for incontinence   - Turn and reposition patient  - Assist with mobility/ambulation  - Relieve pressure over bony prominences  - Avoid friction and shearing  - Provide appropriate hygiene as needed including keeping skin clean and dry  - Evaluate need for skin moisturizer/barrier cream  - Collaborate with interdisciplinary team   - Patient/family teaching  - Consider wound care consult   Outcome: Progressing     Problem: Nutrition/Hydration-ADULT  Goal: Nutrient/Hydration intake appropriate for improving, restoring or maintaining nutritional needs  Description  Monitor and assess patient's nutrition/hydration status for malnutrition  Collaborate with interdisciplinary team and initiate plan and interventions as ordered  Monitor patient's weight and dietary intake as ordered or per policy  Utilize nutrition screening tool and intervene as necessary  Determine patient's food preferences and provide high-protein, high-caloric foods as appropriate       INTERVENTIONS:  - Monitor oral intake, urinary output, labs, and treatment plans  - Assess nutrition and hydration status and recommend course of action  - Evaluate amount of meals eaten  - Assist patient with eating if necessary   - Allow adequate time for meals  - Recommend/ encourage appropriate diets, oral nutritional supplements, and vitamin/mineral supplements  - Order, calculate, and assess calorie counts as needed  - Recommend, monitor, and adjust tube feedings and TPN/PPN based on assessed needs  - Assess need for intravenous fluids  - Provide specific nutrition/hydration education as appropriate  - Include patient/family/caregiver in decisions related to nutrition  Outcome: Progressing     Problem: METABOLIC, FLUID AND ELECTROLYTES - ADULT  Goal: Electrolytes maintained within normal limits  Description  INTERVENTIONS:  - Monitor labs and assess patient for signs and symptoms of electrolyte imbalances  - Administer electrolyte replacement as ordered  - Monitor response to electrolyte replacements, including repeat lab results as appropriate  - Instruct patient on fluid and nutrition as appropriate  Outcome: Progressing  Goal: Fluid balance maintained  Description  INTERVENTIONS:  - Monitor labs   - Monitor I/O and WT  - Instruct patient on fluid and nutrition as appropriate  - Assess for signs & symptoms of volume excess or deficit  Outcome: Progressing

## 2020-03-16 NOTE — ASSESSMENT & PLAN NOTE
 Chest x-ray:  Suspected left lower lobe airspace opacity "   Treating as Hcap  o IV antibiotics:  Discontinue IV cefepime day 3   o Per ID recommendations cefdinir 300 mg p o  Every 24 hours for 7 days total antibiotics  o Procalcitonin trending down from 0 8>0 47  o Blood culture positive g cocci cluster 1st, negative on 2nd set,repeat blood culture collected on 03/14/2020 negative for  2 sets  o Per ID recommendation repeat blood cultures in 2 weeks  o MRSA swab negative, vancomycin was discontinued   Patient is chronically on 2 L of oxygen at baseline    Patient is currently at baseline   Monitor respiratory status

## 2020-03-16 NOTE — ASSESSMENT & PLAN NOTE
· Patient receives hemodialysis on Tuesday, Thursday, Saturdays  · Patient underwent full hemodialysis session on 3/14/2020  · Patient was interested in switching nephrologist and dialysis, discuss this with the on-call nephrologist and , they recommend the patient to see her nephrologist and go to her scheduled hemodialysis and tell them that she is interested in switching to [facility], for better transition  · Nephrology information was given to the patient

## 2020-03-16 NOTE — ASSESSMENT & PLAN NOTE
Lab Results   Component Value Date    HGBA1C 8 7 (H) 02/05/2020       Recent Labs     03/15/20  1920 03/15/20  2130 03/16/20  0735 03/16/20  1106   POCGLU 188* 186* 114 181*       Blood Sugar Average: Last 72 hrs:  · (P) 246 [id number] regimen of 20 units of Lantus at bedtime  · Q i d  Accu-Cheks and sliding scale insulin  · Confirm with patient, patient reports that she no longer  take her lispro 3 times a day anymore will discontinue  · Hypoglycemia protocol    · Continue current lantus for hyperglycemia

## 2020-03-16 NOTE — DISCHARGE INSTR - AVS FIRST PAGE
Dear Robyn Castelan,     It was our pleasure to care for you here at [facility]  It is our hope that we were always able to exceed the expected standards for your care during your stay  You were hospitalized due to pneumonia  You were cared for on the 4th floor by Jane Zapata MD under the service of Pricila Taylor MD with the [address] [place] [address] [facility] who covers for your primary care physician (PCP), Robyn Castelan MD, while you were hospitalized  If you have any questions or concerns related to this hospitalization, you may contact us at [phone]  For follow up as well as any medication refills, we recommend that you follow up with your primary care physician  A registered nurse will reach out to you by phone within a few days after your discharge to answer any additional questions that you may have after going home    However, at this time we provide for you here, the most important instructions / recommendations at discharge:     · Notable Medication Adjustments -   · Change Lantus to 20 units at bedtime  · Cefdinir 300 mg every day for 4 days this is for the infection  · Voltaren gel apply 4 times a day in the left abdomen for pain  · Mucinex 600 mg twice a day to help relieve secretions  · Protonix 40 mg once a day   · Renagel 800 mg 3 times a day ask your kidney doctor for how long you will be taking this medication  · Testing Required after Discharge -   · None  · Important follow up information -   · Follow-up with your nephrologist and talked to them that you are interested in switching to [address], and that you want to switch to [address]  · Follow-up with your primary care physician within 1 week  · Follow up with you Hemodialysis at [facility]  · Other Instructions -   · We recommend for your primary care physician to do a repeat blood work cold blood culture x2 sets in 2 weeks    · Please review this entire after visit summary as additional general instructions including medication list, appointments, activity, diet, any pertinent wound care, and other additional recommendations from your care team that may be provided for you        Sincerely,     Derek Nuñez MD

## 2020-03-16 NOTE — ASSESSMENT & PLAN NOTE
SIRS criteria:  Hyperthermia, tachypnea  Suspected source:  Pneumonia  Lactic acid: 0 08  End organ damage:  Creatinine 2 71, however this is patient's baseline  IV Fluids:  Contraindicated due to patient's ESRD  Monitor vital signs, laboratory studies

## 2020-03-16 NOTE — PROGRESS NOTES
Progress Note - Infectious Disease   Mohini Yuan 53 y o  female MRN: [id number]  Unit/Bed#: S -01 Encounter: [id number]      Impression/Plan:  1  Sepsis-POA  Fever and tachypnea  Suspect secondary to a respiratory infection with possible pneumonia  Consideration for the possibility of bacteremia with a catheter related infection, however only 1 of 2 blood cultures are positive for this organism  Fortunately the patient is clinically improved and seems to be tolerating the antibiotics without difficulty  The elevated procalcitonin level is difficult to interpret dense the setting of end-stage renal disease, however trending down to 0 42 today  -follow up repeat blood cultures  -continue cefdinir 300 mg p o  Q 24 hours through 3/18/20   -plan 7 days total of antibiotics  -supportive care     2  Pneumonia-with the patient having fever, shortness of breath, and at least equivocal chest x-ray findings for pneumonia, favor treating for this possibility  The patient's respiratory status has improved and her temperatures come down  She seems to be tolerating the antibiotics without difficulty   -antibiotics as above  -O2 support  -monitor respiratory status     3  Coagulase-negative Staphylococcus bacteremia-with only 1 of 2 sets positive  With the patient having a catheter in place there is a possibility this this represents a true bacteremia but this is less likely with only 1 set positive  Repeat blood cultures are negative at 24 hours  -antibiotics as above  -follow up repeat blood cultures  -recommend repeat blood cultures x 2 sets in 2 weeks to make sure no relapse     4  Left upper quadrant pain-unclear etiology  This seems to be somewhat chronic and the CT the abdomen pelvis did not reveal any source    Will monitor for now      5  End-stage renal disease on hemodialysis Q Tuesday Thursday and Saturday  -nephrology follow-up    Antibiotics:  Cefdinir antibiotic day 5    Above plan discussed in detail with patient, RN, and Dr Megan Pearl  Subjective:  Patient feels much better  Ambulatory in room  On chronic O2  Has HD tomorrow  ROS: Patient has no fever, chills, sweats; no nausea, vomiting, diarrhea; no increased cough, shortness of breath; no pain  No new symptoms  Objective:  Vitals:  Temp:  [97 7 °F (36 5 °C)-98 2 °F (36 8 °C)] 97 7 °F (36 5 °C)  HR:  [75-76] 75  Resp:  [18] 18  BP: (137-166)/(78-80) 166/79  SpO2:  [95 %-98 %] 98 %  Temp (24hrs), Av °F (36 7 °C), Min:97 7 °F (36 5 °C), Max:98 2 °F (36 8 °C)  Current: Temperature: 97 7 °F (36 5 °C)    General Appearance:  Awake, alert, cooperative, ambulatory in room, no acute distress on nasal cannula O2   Throat: Oropharynx moist without lesions  Lungs:   Decreased breath sounds throughout bilaterally; no wheezes, rhonchi or rales; respirations unlabored on nasal cannula 2   Chest: Right anterior chest wall tunneled HD catheter site nontender   Heart:  RRR; S1-S2 heard, no murmur   Abdomen:   Soft, non-tender, non-distended, positive bowel sounds  Extremities: No edema, right arm fistula nontender, arm IV site nontender   Skin: No rashes      Labs, Imaging, & Other studies:   All pertinent labs and imaging studies were personally reviewed  Results from last 7 days   Lab Units 20  0520 03/15/20  0514 20  0454   WBC Thousand/uL 6 17 5 82 5 36   HEMOGLOBIN g/dL 9 9* 9 9* 10 0*   PLATELETS Thousands/uL 276 257 270     Results from last 7 days   Lab Units 20  0520  20  1901   POTASSIUM mmol/L 3 9   < > 3 9   CHLORIDE mmol/L 99*   < > 92*   CO2 mmol/L 24   < > 34*   BUN mg/dL 35*   < > 9   CREATININE mg/dL 4 86*   < > 2 71*   EGFR ml/min/1 73sq m 9   < > 19   CALCIUM mg/dL 9 2   < > 8 7   AST U/L  --   --  31   ALT U/L  --   --  34   ALK PHOS U/L  --   --  175*    < > = values in this interval not displayed       Results from last 7 days   Lab Units 20  0520 20  0944 20  1901   PROCALCITONIN ng/ml 0 42* 0 87* 0 50*     Results from last 7 days   Lab Units 03/15/20  1240 03/14/20  1716 03/14/20  1713 03/13/20  1911 03/13/20  0948 03/13/20  0909 03/12/20  1928 03/12/20  1900   BLOOD CULTURE   --  No Growth at 24 hrs  No Growth at 24 hrs   --   --   --  Staphylococcus coagulase negative* No Growth at 72 hrs     SPUTUM CULTURE  Culture too young- will reincubate  --   --   --   --   --   --   --    GRAM STAIN RESULT   --   --   --   --   --   --  Gram positive cocci in clusters*  --    MRSA CULTURE ONLY   --   --   --   --  No Methicillin Resistant Staphlyococcus aureus (MRSA) isolated  --   --   --    LEGIONELLA URINARY ANTIGEN   --   --   --  Negative  --   --   --   --    C DIFF TOXIN B   --   --   --   --   --  Negative  --   --

## 2020-03-16 NOTE — ASSESSMENT & PLAN NOTE
Patient has been complaining of chronic left upper quadrant pain that has been going on for years now, according to the patient she has seen so many doctors but no one can tell her what is going on      · CT of the abdomen without contrast: No abnormality to account for left upper quadrant abdominal pain  · Lidoderm patch  · Started on Protonix for possibility of gastritis  · Continue Voltaren gel in the left upper quadrant

## 2020-03-16 NOTE — ASSESSMENT & PLAN NOTE
· Blood pressure mildly elevated on admission  Currently 170s/80s, likely in the setting the patient did not receive evening dose medications will give now  · Patient reported that she no longer takes her hydralazine, torsemide, and sertraline  · Hydralazine p r n  For elevated blood pressure  · Continue home medications

## 2020-03-16 NOTE — PROGRESS NOTES
[facility] PROGRESS NOTE   Joann Goodman 53 y o  female MRN: [id number]  Unit/Bed#: S -01 Encounter: [id number]  Reason for Consult:  ESRD    ASSESSMENT and PLAN:  1  ESRD on hemodialysis [facility]):  · Next hemodialysis treatment 03/17/2020  · Estimated dry weight 76 5 kg  Weight following treatment on 03/14 73 5 kg  Considering CT findings suggesting pulmonary edema will adjust estimated dry weight  · Patient requesting transfer to [facility] for hemodialysis  Encouraged her discuss this with the  at [employer]  2  Access:    · Right IJ PermCath  · Right arm AV fistula not sufficiently matured for use  Bruit slightly high-pitched? Stenosis  Intermittent numbness of last 3 fingers and shoulder pain which is new since access created  According to the patient she has follow-up scheduled with vascular surgery  3  Volume status:    · CT ground-glass opacities throughout visualized feels consistent with pulmonary edema  · Volume removal per hemodialysis  4  Hypertension:    · Blood pressure acceptable  · On labetalol 300 mg b i d , nifedipine 30 mg daily  5  Pneumonia:    · On cefdinir  Id following  6  Coagulase-negative staphylococcus bacteremia:    · 1 out of 2 sets positive  · Repeat blood cultures negative  · Since patient has dialysis catheter in place ID planning follow-up cultures  7  Anemia:    · Hemoglobin 9 9, close to goal which is 10-11; on Epogen  8  CKD MBD:    · On sevelamer for control of hyperphosphatemia  9  Abdominal pain:    · No CT findings to explain left upper quadrant pain  10  Diarrhea:  C diff negative    DISPOSITION:  Plan for hemodialysis tomorrow    SUBJECTIVE / INTERVAL HISTORY:  Concerned about right shoulder discomfort and numbness fingers right hand  She tells me that intermittently her last 3 fingers are numb  At this time she denies shortness of breath  She tells me she requires the use of oxygen in on a chronic basis  He does not feel swollen  OBJECTIVE:  Current Weight: Weight - Scale: 76 4 kg (168 lb 6 9 oz)  Vitals:    03/15/20 0700 03/15/20 1500 03/15/20 2331 03/16/20 0729   BP: (!) 171/83 146/78 137/80 166/79   BP Location: Left arm Left arm Left arm Left arm   Pulse: 79 76 76 75   Resp: 18 18 18 18   Temp: 97 8 °F (36 6 °C) 98 2 °F (36 8 °C) 98 1 °F (36 7 °C) 97 7 °F (36 5 °C)   TempSrc: Oral Oral Oral Oral   SpO2: 100% 95% 98% 98%   Weight:       Height:           Intake/Output Summary (Last 24 hours) at 3/16/2020 1440  Last data filed at 3/15/2020 1700  Gross per 24 hour   Intake 50 ml   Output    Net 50 ml     General: NAD, comfortably lying in bed  Skin: no rash  Eyes: anicteric sclera  ENT: moist mucous membrane  Neck: supple  Chest: CTA b/l, no ronchii, no wheeze, no rubs, no rales  Normal effort  CVS: s1s2, no murmur, no gallop, no rub  Regular  Abdomen: soft, nontender, nl sounds  Extremities: no edema LE b/l    Right arm AV fistula good bruit and thrill  : no fowler  Neuro: AAOX3  Psych: normal affect  Medications:    Current Facility-Administered Medications:     acetaminophen (TYLENOL) tablet 650 mg, 650 mg, Oral, Q6H PRN, GONZALO Chicas, 650 mg at 03/14/20 1124    albuterol (PROVENTIL HFA,VENTOLIN HFA) inhaler 2 puff, 2 puff, Inhalation, Q4H PRN, Linda Apodaca MD    atorvastatin (LIPITOR) tablet 80 mg, 80 mg, Oral, QPM, GONZALO Chicas, 80 mg at 03/15/20 1725    benzonatate (TESSALON PERLES) capsule 100 mg, 100 mg, Oral, TID PRN, GONZALO Groves    cefdinir (OMNICEF) capsule 300 mg, 300 mg, Oral, Q24H, Catrachita Haskins MD, 300 mg at 03/16/20 1347    diclofenac sodium (VOLTAREN) 1 % topical gel 2 g, 2 g, Topical, 4x Daily, GONZALO Chicas, 2 g at 03/16/20 1137    epoetin ha (EPOGEN,PROCRIT) injection 12,000 Units, 12,000 Units, Subcutaneous, After Dialysis, [facility] 12,000 Units at 03/14/20 1026    gabapentin (NEURONTIN) capsule 200 mg, 200 mg, Oral, Q12H [address], GONZALO Pineda, 200 mg at 03/16/20 0850    guaiFENesin (MUCINEX) 12 hr tablet 600 mg, 600 mg, Oral, BID, GONZALO Chicas, 600 mg at 03/16/20 0850    heparin (porcine) subcutaneous injection 5,000 Units, 5,000 Units, Subcutaneous, Q8H [address], Stopped at 03/16/20 1323 **AND** Platelet count, , , Once, GONZAOL Pineda    hydrALAZINE (APRESOLINE) injection 5 mg, 5 mg, Intravenous, Q6H PRN, Laisha Ewing MD    insulin glargine (LANTUS) subcutaneous injection 20 Units 0 2 mL, 20 Units, Subcutaneous, HS, Ebenezer Borrego MD, 20 Units at 03/15/20 2149    insulin lispro (HumaLOG) 100 units/mL subcutaneous injection 1-5 Units, 1-5 Units, Subcutaneous, HS, GONZALO Chicas, 1 Units at 03/15/20 2152    insulin lispro (HumaLOG) 100 units/mL subcutaneous injection 1-6 Units, 1-6 Units, Subcutaneous, TID AC, 1 Units at 03/16/20 1136 **AND** Fingerstick Glucose (POCT), , , TID AC, GONZALO Chicas    labetalol (NORMODYNE) tablet 300 mg, 300 mg, Oral, BID, Mary Lou Manjarrez MD, 300 mg at 03/16/20 0850    lidocaine (LIDODERM) 5 % patch 1 patch, 1 patch, Topical, Daily, Ebenezer Borrego MD, 1 patch at 03/15/20 1722    loperamide (IMODIUM) capsule 2 mg, 2 mg, Oral, TID PRN, Mari Javier MD    melatonin tablet 3 mg, 3 mg, Oral, HS PRN, GONZALO Pineda    NIFEdipine (PROCARDIA XL) 24 hr tablet 30 mg, 30 mg, Oral, Daily, GONZALO Chicas, 30 mg at 03/16/20 0850    pantoprazole (PROTONIX) EC tablet 40 mg, 40 mg, Oral, Early Morning, Mari Javier MD, 40 mg at 03/16/20 0553    sevelamer (RENAGEL) tablet 1,600 mg, 1,600 mg, Oral, TID With Meals, [facility], PO, 1,600 mg at 03/16/20 1136    Laboratory Results:  Results from last 7 days   Lab Units 03/16/20  0520 03/15/20  0514 03/14/20  0505 03/14/20  0454 03/13/20  0944 03/12/20  1901   WBC Thousand/uL 6 17 5 82  --  5 36 4 77 7 36   HEMOGLOBIN g/dL 9 9* 9 9*  --  10 0* 10 8* 10 5*   HEMATOCRIT % 31 1* 31 0*  -- 31 1* 34 1* 31 7*   PLATELETS Thousands/uL 276 257  --  270 247 255   POTASSIUM mmol/L 3 9 4 0 3 7  --  3 7 3 9   CHLORIDE mmol/L 99* 97* 98*  --  94* 92*   CO2 mmol/L 24 28 31  --  30 34*   BUN mg/dL 35* 21 30*  --  21 9   CREATININE mg/dL 4 86* 3 50* 4 67*  --  4 10* 2 71*   CALCIUM mg/dL 9 2 9 2 9 1  --  9 3 8 7

## 2020-03-16 NOTE — ASSESSMENT & PLAN NOTE
· Patient reports 4 day history of diarrhea prior to admission  · C diff negative  · Enteric panel negative  · Patient was no longer complaining of diarrhea

## 2020-03-17 LAB
BACTERIA SPT RESP CULT: ABNORMAL
BACTERIA SPT RESP CULT: ABNORMAL
GRAM STN SPEC: ABNORMAL

## 2020-03-18 LAB — BACTERIA BLD CULT: NORMAL

## 2020-03-18 NOTE — UTILIZATION REVIEW
URGENT/EMERGENT  INPATIENT/SPU AUTHORIZATION REQUEST    Date: 5/7/2020            # Pages in this Request:     X New Request   Additional Information for PA#:     Office Contact Name:  Valeri Montoya Title: Utilization Review, Raquel Nurse     Phone: [phone]  Ext  Availability (Date/Time): Wednesday - Friday 8 am- 4 pm    x Inpatient Review  SPU Review       x Current        Late Pick-up   · How your facility was first notified of the Late Pick-up:  · When your facility was first notified of the Late Pick-up (date):         RECIPIENT INFORMATION    Recipient ID#: [id number]   Recipient Name:  Mark Sosa     YOB: 1965  53 y o  Recipient Alias:     Gender:   Male X Female Medicaid Eligibility ([address] [place] [address]): INSURANCE INFORMATION    (All other private or governmental health insurance benefits must be utilized prior to billing the [place] Program)    Was this admission the result of an MVA, other accident, assault, injury, fall, gunshot, bite etc ? Yes X No                   If yes, provide a brief description of the incident  Does the recipient have other insurance coverage? Yes x No        Insurance Company Name/Policy #      Did that insurance pay on this claim? Yes  No        Did that insurance deny this claim? Yes  No    If yes, reason for denial:      Does the recipient have Medicare? Yes x No        Did Medicare exhaust prior to this admission? Yes  No        Did Medicare partially pay this claim? Yes  No        Did that insurance deny this claim? Yes  No    If yes, reason for denial:          Was the recipient a prisoner at the time of admission? Yes x No            PROVIDER INFORMATION    Hospital Name: [facility] Provider ID#: [id number]    [address] [place] [address] Physician Name:  Kailey Tomlin Provider ID#: [id number]        ADMISSION INFORMATION    Type of Admission: (please choose one)    X ED      Direct    If yes, from where? Transfer    If yes, transferring hospital (inpatient, rehab, or psych) Provider Name/Provider ID#: Admission Floor or Unit Type: med Surg    Dates/Times:        ED Date/Time: 3/12/2020  17:25        Observation Date/Time:         Admission Date/Time: 3/12/20 2018        Discharge or Transfer Date/Time: 3/16/2020  6:02 PM        DIAGNOSIS/PROCEDURE CODES    Primary Diagnosis Code/Primary Diagnosis Code description:  J18 9 Pneumonia, unspecified organism  I12 0 Hypertensive chronic kidney disease with stage 5 chronic kidney disease or end stage renal disease   N25 81 Secondary hyperparathyroidism of renal origin   R65 10 Systemic inflammatory response syndrome (sirs) of non-infectious origin without acute organ dysfunction   Additional Diagnosis Code(s) and Description(s)-(up to three additional codes):    Procedure Code (one) and description:  [id number] Performance of Urinary Filtration, <6 hrs/day      CLINICAL INFORMATION - 2 [facility]    Is there a prior admission with a discharge date within 30 days of the date of this admission? X No (Proceed to the next section - "Clinical Information - General Review Checklist:)      Yes (Provide the following information)     Prior admission dates:    MA Prior Authorization Number:        Review Outcome:     Diagnosis Code(s)/Description:    Procedure Code/Description:    Findings:    Treatment:    Condition on Discharge:   Vitals:    Labs:   Imaging:   Medications: Follow-up Instructions:    Disposition:        CLINICAL INFORMATION - GENERAL REVIEW CHECKLIST    EMERGENCY DEPARTMENT: (Proceed to "ADMISSION" if Direct Admission)    Presenting Signs/Symptoms: 53year old female from home to ED admitted inpatient due to pneumonia/SIRS/diarrhea  ESRD on dialysis  Home oxygen at 2 liters  Presented due to fever, cough, shortness of breath and headache starting at dialysis  With diarhea last 4 days  On exam has rhonchi in left lower field    Right AV fistula with palpable thrill  CxR showed LLL pneumonia  Procacitonin 0 50  Wbc 7 36  Blood cultures done  UA trace leukocytes, >300 protein, 1/3=4% glucose  Bun 9  Creatinine 2 71  Glucose 173  IVF contraindicated due to ESRD  IV antibiotics in progress  Stool C diff and enteric bacterial panel ordered  Renal consulted       Medication/treatment prior to arrival in the ED:    Past Medical History:   Diagnosis    Asthma    Depression    Diabetes mellitus ([address] )    Renal disorder       Clinical Exam:    Initial Vital Signs: (Temp, Pulse, Resp, and BP)   ED Triage Vitals   Temperature Pulse Respirations Blood Pressure SpO2   03/12/20 1744 03/12/20 1744 03/12/20 1744 03/12/20 1744 03/12/20 1744   100 2 °F (37 9 °C) 95 (!) 24 (!) 184/79 100 %      Temp Source Heart Rate Source Patient Position - Orthostatic VS BP Location FiO2 (%)   03/12/20 1744 03/12/20 1744 03/12/20 1744 03/12/20 1744 --   Oral Monitor Sitting Left arm       Pain Score       03/12/20 2151       5           Pertinent Repeat Vital Signs: (include times they were obtained)  03/13/20 0811   97 5 °F (36 4 °C)   76   16   153/78   94 %   None (Room air)   Sitting   03/12/20 2300   97 4 °F (36 3 °C)Abnormal    81   14   138/90   93 %   Nasal cannula   Lying   03/12/20 2129   98 8 °F (37 1 °C)   86   28Abnormal    175/82Abnormal    90 %   Nasal cannula            Pertinent Sustained Findings: (include times they were obtained)    Weight in Kilograms:  Wt Readings from Last 1 Encounters:   03/15/20 76 4 kg (168 lb 6 9 oz)       Pertinent Labs (results):  Results from last 7 days   Lab Units 03/13/20  0944 03/12/20  1901   WBC Thousand/uL 4 77 7 36   HEMOGLOBIN g/dL 10 8* 10 5*   HEMATOCRIT % 34 1* 31 7*   PLATELETS Thousands/uL 247 255   NEUTROS ABS Thousands/µL 2 65 5 31            Results from last 7 days   Lab Units 03/13/20  0944 03/12/20  1901   SODIUM mmol/L 132* 136   POTASSIUM mmol/L 3 7 3 9   CHLORIDE mmol/L 94* 92*   CO2 mmol/L 30 34*   ANION GAP mmol/L 8 10   BUN mg/dL 21 9   CREATININE mg/dL 4 10* 2 71*   EGFR ml/min/1 73sq m 12 19   CALCIUM mg/dL 9 3 8 7           Results from last 7 days   Lab Units 03/12/20  1901   AST U/L 31   ALT U/L 34   ALK PHOS U/L 175*   TOTAL PROTEIN g/dL 7 6   ALBUMIN g/dL 3 5   TOTAL BILIRUBIN mg/dL 0 42             Results from last 7 days   Lab Units 03/13/20  1054 03/13/20  0758 03/12/20  2348   POC GLUCOSE mg/dl 252* 203* 296*            Results from last 7 days   Lab Units 03/13/20  0944 03/12/20  1901   GLUCOSE RANDOM mg/dL 274* 173*           Results from last 7 days   Lab Units 03/12/20  1901   PROTIME seconds 12 7   INR   1 01   PTT seconds 32           Results from last 7 days   Lab Units 03/12/20  1901   PROCALCITONIN ng/ml 0 50*            Results from last 7 days   Lab Units 03/12/20  2147 03/12/20  1901   LACTIC ACID mmol/L 1 1 0 8           Results from last 7 days   Lab Units 03/12/20  1936   CLARITY UA   Cloudy   COLOR UA   Yellow   SPEC GRAV UA   >=1 030   PH UA   5 0   GLUCOSE UA mg/dl 250 (1/4%)*   KETONES UA mg/dl Negative   BLOOD UA   Moderate*   PROTEIN UA mg/dl >=300*   NITRITE UA   Negative   BILIRUBIN UA   Moderate*   UROBILINOGEN UA E U /dl 0 2   LEUKOCYTES UA   Trace*   WBC UA /hpf 4-10*   RBC UA /hpf 2-4*   BACTERIA UA /hpf Innumerable*   EPITHELIAL CELLS WET PREP /hpf Moderate*           Results from last 7 days   Lab Units 03/12/20  1903   INFLUENZA A PCR   None Detected   INFLUENZA B PCR   None Detected   RSV PCR   None Detected            Results from last 7 days   Lab Units 03/12/20  1928 03/12/20  1900   BLOOD CULTURE   Received in Microbiology Lab  Culture in Progress  Received in Microbiology Lab  Culture in Progress         Radiology (results):  3/12/2020 CxR - Suspected left lower lung airspace opacity  EKG (results):   3/12/2200 EKG - Normal sinus rhythm at a rate of 97 beats per minute   Normal intervals   Normal axis   Normal QRS   No ST T wave abnormalities   Occasional PVCs   Similar to previous from 06/10/2019  Other tests (results):    Tests pending final results:    Treatment in the ED:   Medication Administration from 03/12/2020 1725 to 03/12/2020 2122       Date/Time Order Dose Route Action Comments     03/12/2020 1906 acetaminophen (TYLENOL) tablet 975 mg 975 mg Oral Given      03/12/2020 2025 vancomycin (VANCOCIN) 1,250 mg in sodium chloride 0 9 % 250 mL IVPB 1,250 mg Intravenous New Bag      03/12/2020 2000 cefepime (MAXIPIME) 2 g/50 mL dextrose IVPB 2,000 mg Intravenous New Bag            Other treatments:      Change in condition while in the ED:     Response to ED Treatment:          OBSERVATION: (Proceed to "ADMISSION" if Direct Admission)    Orders written during the observation period  Meds Name, dose, route, time, how may doses given:  PRN Meds Name, dose, route, time, how many doses given within the first 24 hrs :  IVs Type, rate, and total amt  ordered/given:  Labs, imaging, other:  Consults and findings:    Test Results during the observation period  Pertinent Lab tests (dates/results):  Culture results (blood, urine, spinal, wound, respiratory, etc ):  Imaging tests (dates/results):  EKG (dates/results):   Other test (dates/results):  Tests pending (dates/results):    Surgical or Invasive Procedures during the observation period  Name of surgery/procedure:  Date & Time:  Patient Response:  Post-operative orders:  Operative Report/Findings:    Response to Treatment, Major Change in Condition, Major Charge in Treatment during the observation period          ADMISSION:    DIRECT Admissions Only:    · Presenting Signs/Symptoms:   ·   · Medication/treatment prior to arrival:  ·   · Past Medical History:  ·   · Clinical Exam on admission:  ·   · Vital Signs on admission: (Temp, Pulse, Resp, and BP)  ·   · Weight in kilograms:     ALL Admissions:    Admission Orders and Other Orders written within the first 24 hrs after admission  Meds Name, dose, route, time, how may doses given:  atorvastatin 80 mg Oral QPM   cefepime 1,000 mg Intravenous Q24H   diclofenac sodium 2 g Topical 4x Daily   gabapentin 200 mg Oral Q12H BRINA   guaiFENesin 600 mg Oral BID   heparin (porcine) 5,000 Units Subcutaneous Q8H [address]   insulin glargine 12 Units Subcutaneous HS   insulin lispro 1-5 Units Subcutaneous HS   insulin lispro 1-6 Units Subcutaneous TID AC   labetalol 100 mg Oral Q8H BRINA   NIFEdipine ER 30 mg Oral Daily   sevelamer 1,600 mg Oral TID With Meals        PRN Meds Name, dose, route, time, how many doses given within the first 24 hrs :  Acetaminophen - used x 1  650 mg Oral Q6H PRN   albuterol 2 puff Inhalation Q4H PRN   benzonatate 100 mg Oral TID PRN   [START ON 3/14/2020] epoetin ha 12,000 Units Subcutaneous After Dialysis   melatonin 3 mg Oral HS PRN   [START ON 3/14/2020] vancomycin 10 mg/kg (Adjusted) Intravenous After Dialysis       IVs Type, rate, and total amt  ordered/given:  Labs, imaging, other:      Consults and findings:  Nephrology -3/13 -   ESRD  On HD TthS  She has access via a  RIJ permacath and  has unusable R arm AVF  Next HD on Saturday  3/14  PNU on ABX's  Infectious Disease - 3/15 -  Sepsis  Suspect secondary to a URI  With possible pneumonia  Change abx to Cefdinir  For a 7 day course           Test Results after admission  Pertinent Lab tests (dates/results):  Results from last 7 days   Lab Units 03/15/20  0514 03/14/20  0454 03/13/20  0944   WBC Thousand/uL 5 82 5 36 4 77   HEMOGLOBIN g/dL 9 9* 10 0* 10 8*   PLATELETS Thousands/uL 257 270 247              Results from last 7 days   Lab Units 03/15/20  0514 03/14/20  0505 03/13/20  0944 03/12/20  1901   SODIUM mmol/L 133* 137 132* 136   POTASSIUM mmol/L 4 0 3 7 3 7 3 9   CHLORIDE mmol/L 97* 98* 94* 92*   CO2 mmol/L 28 31 30 34*   BUN mg/dL 21 30* 21 9   CREATININE mg/dL 3 50* 4 67* 4 10* 2 71*   EGFR ml/min/1 73sq m 14 10 12 19   CALCIUM mg/dL 9 2 9 1 9 3 8 7   AST U/L  --   --   --  31   ALT U/L  --   -- --  34   ALK PHOS U/L  --   --   --  175*                 Results from last 7 days   Lab Units 03/14/20  1716 03/14/20  1713 03/13/20  1911 03/13/20  0948 03/13/20  0909 03/12/20  1928 03/12/20  1900   BLOOD CULTURE   Received in Microbiology Lab  Culture in Progress  Received in Microbiology Lab  Culture in Progress  --   --   --  Staphylococcus coagulase negative* No Growth at 48 hrs  GRAM STAIN RESULT    --   --   --   --   --  Gram positive cocci in clusters*  --    MRSA CULTURE ONLY    --   --   --  No Methicillin Resistant Staphlyococcus aureus (MRSA) isolated  --   --   --    LEGIONELLA URINARY ANTIGEN    --   --  Negative  --   --   --   --    C DIFF TOXIN B    --   --   --   --  Negative  --   --             Results from last 7 days   Lab Units 03/13/20  0944 03/12/20  1901   PROCALCITONIN ng/ml 0 87* 0 50*          Culture results (blood, urine, spinal, wound, respiratory, etc ):  Imaging tests (dates/results):  EKG (dates/results):   Other test (dates/results):  Tests pending (dates/results):    Surgical or Invasive Procedures  Name of surgery/procedure:  Date & Time:  Patient Response:  Post-operative orders:  Operative Report/Findings:    Response to Treatment, Major Change in Condition, Major Charge in Treatment anytime during admission  Chetan Reich is a 53 y o  female patient who originally presented to the hospital on 3/12/2020 due to fever and shortness of breath with history of ESRD on HD, HTN, DM2, and HLD who presents with fever and shortness of breath that started today   Patient presented to the hospital from her dialysis center due to decreased blood pressure, shortness of breath, dry cough and headache while at the dialysis center  Willchente Avendañohali was able to receive a full hemodialysis session   Patient is chronically on 2 L of oxygen and upon arrival to the ER requiring 3 L of oxygen   Patient reports that she was recently hospitalized at [facility] February 2020 for fever related to pneumonia      Additionally, patient reports 4 day history of diarrhea   She reports shortness of breath, fever, chills, productive cough with clear sputum and bilateral rib pain from coughing   She denies chest pain, lightheadedness, dizziness or headache      Patient will be admitted for IV antibiotics, patient was started on IV cefepime and vancomycin, MRSA swab was done  Patient stool was tested for C diff and stool enteric panel which are all negative  Patient was put on contact precaution due to the diarrhea  Nephrology was consulted for hemodialysis  Right IJ PermCath was accessed for hemodialysis because right arm AVF is not usable  Chest x-ray showed suspected left lower lobe airspace opacity  With elevated procalcitonin  First set of blood culture grew bacteria however the 2nd set did not  Repeat blood culture was performed and ID was consulted  Patient was also complaining of left upper quadrant pain that has been going on for at least 6 years now, she reported that she has been seeing a physician for that problem but no diagnosis has ever been made and they cannot find what's going on  CT of the abdomen was done in the hospital, but no abnormality related to the abdominal pain can be found  Lidoderm patch was given to the patient  Blood culture was negative patient is stable for discharge will continue antibiotics for total of 7 days    Patient will need a follow-up with her nephrologist and hemodialysis and tell them that she is interested in switching services      Disposition on Discharge  Home, Rehab, SNF, LTC, Shelter, etc : Home/Self Care    Cease to Breathe (CTB)  If a patient expires during an admission, in addition to the above information, please include:    Summary/timeline of the patient's decline in condition:    Medications and treatment:    Patient response to treatment:    Date and time patient ceased to breathe:        Is there a Readmission that follows this admission? Yes X No    If yes, provide dates:          InterQual Review    InterQual Criteria Met:  Yes X No  N/A        Please include the InterQual Review, InterQual year/version used, and the criteria selected:   Created Using Review Status Review Entered   InterQual® In Primary 3/13/2020 13:05       Criteria Set Name - Subset   LOC:Acute Adult-Infection: Pneumonia       Criteria Review   REVIEW SUMMARY     Patient: Marita Escobedo  Review Number: [id number]  Review Status: In Primary  Criteria Status: Not Met     Condition Specific: Yes        OUTCOMES  Outcome Type: Primary           REVIEW DETAILS     Product: Truddie Sjogren Adult  Subset: Infection: Pneumonia      (Symptom or finding within 24h)         (Excludes PO medications unless noted)          Select Day, One:              [ ] Episode Day 1, One:                  [ ] ACUTE, All:                      [X] Pneumonia confirmed by imaging                      [ ] Finding, >= One:                          [ ] CURB-65 criteria, >= Two:                              [X] Blood urea nitrogen (BUN) > 19 6 mg/dL(7 mmol/L)                      [X] Intervention, All:                          [X] Anti-infective (includes PO)                          [X] Oxygenation, One:                              [X] O2 sat >= 92%(0 92) or baseline                          [X] Oximetry or blood gas                          [X] Deep vein thrombosis (DVT) prophylaxis or patient ambulatory     Version: [facility] 2019 1  Kaylin Sosa  © 2019 [employer] and/or one of its [place]  All Rights Reserved  CPT only © 2018 [employer]  All Rights Reserved  PLEASE SUBMIT THE COMPLETED FORM TO THE DEPARTMENT OF HUMAN SERVICES - DIVISION OF CLINICAL  REVIEW VIA FAX -[phone] or VIA E-MAIL TO [email or web address]    Signature: Anne Patrick Date:  5/7/2020    Confidentiality Notice:  The documents accompanying this telecopy may contain confidential information belonging to the sender  The information is intended only for the use of the individual named above  If you are not the intended recipient, you are hereby notified  That any disclosure, copying, distribution or taking of any telecopy is strictly prohibited

## 2020-03-20 LAB
BACTERIA BLD CULT: NORMAL
BACTERIA BLD CULT: NORMAL

## 2020-04-04 ENCOUNTER — HOSPITAL ENCOUNTER (INPATIENT)
Facility: HOSPITAL | Age: 55
LOS: 4 days | Discharge: HOME | DRG: 919 | End: 2020-04-08
Attending: EMERGENCY MEDICINE | Admitting: INTERNAL MEDICINE
Payer: MEDICARE

## 2020-04-04 ENCOUNTER — NURSE TRIAGE (OUTPATIENT)
Dept: OTHER | Facility: OTHER | Age: 55
End: 2020-04-04

## 2020-04-04 ENCOUNTER — APPOINTMENT (EMERGENCY)
Dept: RADIOLOGY | Facility: HOSPITAL | Age: 55
DRG: 919 | End: 2020-04-04
Payer: MEDICARE

## 2020-04-04 DIAGNOSIS — L03.90 CELLULITIS: ICD-10-CM

## 2020-04-04 DIAGNOSIS — J06.9 URI (UPPER RESPIRATORY INFECTION): Primary | ICD-10-CM

## 2020-04-04 DIAGNOSIS — N18.6 ESRD ON HEMODIALYSIS (HCC): ICD-10-CM

## 2020-04-04 DIAGNOSIS — E16.2 HYPOGLYCEMIA: ICD-10-CM

## 2020-04-04 DIAGNOSIS — Z99.2 ESRD ON HEMODIALYSIS (HCC): ICD-10-CM

## 2020-04-04 PROBLEM — U07.1 COVID-19: Status: ACTIVE | Noted: 2020-04-04

## 2020-04-04 PROBLEM — J44.9 COPD (CHRONIC OBSTRUCTIVE PULMONARY DISEASE) (HCC): Chronic | Status: ACTIVE | Noted: 2020-04-04

## 2020-04-04 LAB
ALBUMIN SERPL BCP-MCNC: 3.1 G/DL (ref 3.5–5)
ALP SERPL-CCNC: 167 U/L (ref 46–116)
ALT SERPL W P-5'-P-CCNC: 25 U/L (ref 12–78)
ANION GAP SERPL CALCULATED.3IONS-SCNC: 10 MMOL/L (ref 4–13)
APTT PPP: 33 SECONDS (ref 23–37)
AST SERPL W P-5'-P-CCNC: 37 U/L (ref 5–45)
ATRIAL RATE: 92 BPM
BASOPHILS # BLD AUTO: 0.04 THOUSANDS/ΜL (ref 0–0.1)
BASOPHILS NFR BLD AUTO: 1 % (ref 0–1)
BILIRUB SERPL-MCNC: 0.34 MG/DL (ref 0.2–1)
BUN SERPL-MCNC: 39 MG/DL (ref 5–25)
CALCIUM SERPL-MCNC: 9 MG/DL (ref 8.3–10.1)
CHLORIDE SERPL-SCNC: 97 MMOL/L (ref 100–108)
CO2 SERPL-SCNC: 28 MMOL/L (ref 21–32)
CREAT SERPL-MCNC: 5.38 MG/DL (ref 0.6–1.3)
CRP SERPL QL: 45.6 MG/L
EOSINOPHIL # BLD AUTO: 0.32 THOUSAND/ΜL (ref 0–0.61)
EOSINOPHIL NFR BLD AUTO: 4 % (ref 0–6)
ERYTHROCYTE [DISTWIDTH] IN BLOOD BY AUTOMATED COUNT: 15.3 % (ref 11.6–15.1)
GFR SERPL CREATININE-BSD FRML MDRD: 8 ML/MIN/1.73SQ M
GLUCOSE SERPL-MCNC: 124 MG/DL (ref 65–140)
GLUCOSE SERPL-MCNC: 45 MG/DL (ref 65–140)
HCT VFR BLD AUTO: 33 % (ref 34.8–46.1)
HGB BLD-MCNC: 10.4 G/DL (ref 11.5–15.4)
IMM GRANULOCYTES # BLD AUTO: 0.05 THOUSAND/UL (ref 0–0.2)
IMM GRANULOCYTES NFR BLD AUTO: 1 % (ref 0–2)
INR PPP: 1.01 (ref 0.84–1.19)
LACTATE SERPL-SCNC: 0.8 MMOL/L (ref 0.5–2)
LDH SERPL-CCNC: 359 U/L (ref 81–234)
LYMPHOCYTES # BLD AUTO: 2.17 THOUSANDS/ΜL (ref 0.6–4.47)
LYMPHOCYTES NFR BLD AUTO: 25 % (ref 14–44)
MCH RBC QN AUTO: 30.1 PG (ref 26.8–34.3)
MCHC RBC AUTO-ENTMCNC: 31.5 G/DL (ref 31.4–37.4)
MCV RBC AUTO: 95 FL (ref 82–98)
MONOCYTES # BLD AUTO: 0.8 THOUSAND/ΜL (ref 0.17–1.22)
MONOCYTES NFR BLD AUTO: 9 % (ref 4–12)
NEUTROPHILS # BLD AUTO: 5.49 THOUSANDS/ΜL (ref 1.85–7.62)
NEUTS SEG NFR BLD AUTO: 60 % (ref 43–75)
NRBC BLD AUTO-RTO: 0 /100 WBCS
P AXIS: 32 DEGREES
PLATELET # BLD AUTO: 280 THOUSANDS/UL (ref 149–390)
PMV BLD AUTO: 10 FL (ref 8.9–12.7)
POTASSIUM SERPL-SCNC: 4.2 MMOL/L (ref 3.5–5.3)
PR INTERVAL: 138 MS
PROCALCITONIN SERPL-MCNC: 0.4 NG/ML
PROT SERPL-MCNC: 7.5 G/DL (ref 6.4–8.2)
PROTHROMBIN TIME: 12.7 SECONDS (ref 11.6–14.5)
QRS AXIS: -2 DEGREES
QRSD INTERVAL: 74 MS
QT INTERVAL: 372 MS
QTC INTERVAL: 460 MS
RBC # BLD AUTO: 3.46 MILLION/UL (ref 3.81–5.12)
SODIUM SERPL-SCNC: 135 MMOL/L (ref 136–145)
T WAVE AXIS: 35 DEGREES
TROPONIN I SERPL-MCNC: <0.02 NG/ML
VENTRICULAR RATE: 92 BPM
WBC # BLD AUTO: 8.87 THOUSAND/UL (ref 4.31–10.16)

## 2020-04-04 PROCEDURE — 87635 SARS-COV-2 COVID-19 AMP PRB: CPT | Performed by: EMERGENCY MEDICINE

## 2020-04-04 PROCEDURE — 85025 COMPLETE CBC W/AUTO DIFF WBC: CPT | Performed by: EMERGENCY MEDICINE

## 2020-04-04 PROCEDURE — 85730 THROMBOPLASTIN TIME PARTIAL: CPT | Performed by: EMERGENCY MEDICINE

## 2020-04-04 PROCEDURE — 84484 ASSAY OF TROPONIN QUANT: CPT | Performed by: EMERGENCY MEDICINE

## 2020-04-04 PROCEDURE — 99285 EMERGENCY DEPT VISIT HI MDM: CPT | Performed by: EMERGENCY MEDICINE

## 2020-04-04 PROCEDURE — 80053 COMPREHEN METABOLIC PANEL: CPT | Performed by: EMERGENCY MEDICINE

## 2020-04-04 PROCEDURE — 86140 C-REACTIVE PROTEIN: CPT | Performed by: EMERGENCY MEDICINE

## 2020-04-04 PROCEDURE — 84145 PROCALCITONIN (PCT): CPT | Performed by: EMERGENCY MEDICINE

## 2020-04-04 PROCEDURE — 99285 EMERGENCY DEPT VISIT HI MDM: CPT

## 2020-04-04 PROCEDURE — 87040 BLOOD CULTURE FOR BACTERIA: CPT | Performed by: EMERGENCY MEDICINE

## 2020-04-04 PROCEDURE — 93005 ELECTROCARDIOGRAM TRACING: CPT

## 2020-04-04 PROCEDURE — 36415 COLL VENOUS BLD VENIPUNCTURE: CPT | Performed by: EMERGENCY MEDICINE

## 2020-04-04 PROCEDURE — 82948 REAGENT STRIP/BLOOD GLUCOSE: CPT

## 2020-04-04 PROCEDURE — 83615 LACTATE (LD) (LDH) ENZYME: CPT | Performed by: EMERGENCY MEDICINE

## 2020-04-04 PROCEDURE — 99223 1ST HOSP IP/OBS HIGH 75: CPT | Performed by: INTERNAL MEDICINE

## 2020-04-04 PROCEDURE — 93010 ELECTROCARDIOGRAM REPORT: CPT | Performed by: INTERNAL MEDICINE

## 2020-04-04 PROCEDURE — 83605 ASSAY OF LACTIC ACID: CPT | Performed by: EMERGENCY MEDICINE

## 2020-04-04 PROCEDURE — 85610 PROTHROMBIN TIME: CPT | Performed by: EMERGENCY MEDICINE

## 2020-04-04 PROCEDURE — 96374 THER/PROPH/DIAG INJ IV PUSH: CPT

## 2020-04-04 PROCEDURE — 71045 X-RAY EXAM CHEST 1 VIEW: CPT

## 2020-04-04 RX ORDER — ONDANSETRON 2 MG/ML
4 INJECTION INTRAMUSCULAR; INTRAVENOUS EVERY 6 HOURS PRN
Status: DISCONTINUED | OUTPATIENT
Start: 2020-04-04 | End: 2020-04-08 | Stop reason: HOSPADM

## 2020-04-04 RX ORDER — DOCUSATE SODIUM 100 MG/1
100 CAPSULE, LIQUID FILLED ORAL 2 TIMES DAILY
Status: DISCONTINUED | OUTPATIENT
Start: 2020-04-04 | End: 2020-04-08 | Stop reason: HOSPADM

## 2020-04-04 RX ORDER — DEXTROSE MONOHYDRATE 25 G/50ML
INJECTION, SOLUTION INTRAVENOUS
Status: COMPLETED
Start: 2020-04-04 | End: 2020-04-04

## 2020-04-04 RX ORDER — CEFAZOLIN SODIUM 1 G/50ML
1000 SOLUTION INTRAVENOUS EVERY 12 HOURS
Status: DISCONTINUED | OUTPATIENT
Start: 2020-04-04 | End: 2020-04-05

## 2020-04-04 RX ORDER — LABETALOL 100 MG/1
300 TABLET, FILM COATED ORAL DAILY
Status: DISCONTINUED | OUTPATIENT
Start: 2020-04-05 | End: 2020-04-06

## 2020-04-04 RX ORDER — ALBUTEROL SULFATE 90 UG/1
2 AEROSOL, METERED RESPIRATORY (INHALATION) EVERY 4 HOURS PRN
Status: DISCONTINUED | OUTPATIENT
Start: 2020-04-04 | End: 2020-04-08 | Stop reason: HOSPADM

## 2020-04-04 RX ORDER — HEPARIN SODIUM 5000 [USP'U]/ML
5000 INJECTION, SOLUTION INTRAVENOUS; SUBCUTANEOUS EVERY 8 HOURS SCHEDULED
Status: DISCONTINUED | OUTPATIENT
Start: 2020-04-04 | End: 2020-04-08 | Stop reason: HOSPADM

## 2020-04-04 RX ORDER — DEXTROSE MONOHYDRATE 25 G/50ML
50 INJECTION, SOLUTION INTRAVENOUS ONCE
Status: COMPLETED | OUTPATIENT
Start: 2020-04-04 | End: 2020-04-04

## 2020-04-04 RX ORDER — SEVELAMER HYDROCHLORIDE 800 MG/1
1600 TABLET, FILM COATED ORAL
Status: DISCONTINUED | OUTPATIENT
Start: 2020-04-04 | End: 2020-04-08 | Stop reason: HOSPADM

## 2020-04-04 RX ORDER — ERGOCALCIFEROL 1.25 MG/1
50000 CAPSULE ORAL
Status: DISCONTINUED | OUTPATIENT
Start: 2020-04-05 | End: 2020-04-08 | Stop reason: HOSPADM

## 2020-04-04 RX ORDER — HYDROXYCHLOROQUINE SULFATE 200 MG/1
200 TABLET, FILM COATED ORAL 2 TIMES DAILY
Status: DISCONTINUED | OUTPATIENT
Start: 2020-04-05 | End: 2020-04-06

## 2020-04-04 RX ORDER — ATORVASTATIN CALCIUM 40 MG/1
80 TABLET, FILM COATED ORAL EVERY EVENING
Status: DISCONTINUED | OUTPATIENT
Start: 2020-04-04 | End: 2020-04-08 | Stop reason: HOSPADM

## 2020-04-04 RX ORDER — HYDROXYCHLOROQUINE SULFATE 200 MG/1
400 TABLET, FILM COATED ORAL 2 TIMES DAILY
Status: COMPLETED | OUTPATIENT
Start: 2020-04-04 | End: 2020-04-05

## 2020-04-04 RX ORDER — NIFEDIPINE 30 MG/1
30 TABLET, EXTENDED RELEASE ORAL DAILY
Status: DISCONTINUED | OUTPATIENT
Start: 2020-04-05 | End: 2020-04-06

## 2020-04-04 RX ORDER — PANTOPRAZOLE SODIUM 40 MG/1
40 TABLET, DELAYED RELEASE ORAL
Status: DISCONTINUED | OUTPATIENT
Start: 2020-04-05 | End: 2020-04-08 | Stop reason: HOSPADM

## 2020-04-04 RX ORDER — GABAPENTIN 100 MG/1
200 CAPSULE ORAL EVERY 12 HOURS
Status: DISCONTINUED | OUTPATIENT
Start: 2020-04-04 | End: 2020-04-08 | Stop reason: HOSPADM

## 2020-04-04 RX ORDER — ACETAMINOPHEN 325 MG/1
650 TABLET ORAL EVERY 6 HOURS PRN
Status: DISCONTINUED | OUTPATIENT
Start: 2020-04-04 | End: 2020-04-06

## 2020-04-04 RX ADMIN — GABAPENTIN 200 MG: 100 CAPSULE ORAL at 18:52

## 2020-04-04 RX ADMIN — HEPARIN SODIUM 5000 UNITS: 5000 INJECTION INTRAVENOUS; SUBCUTANEOUS at 22:25

## 2020-04-04 RX ADMIN — DOCUSATE SODIUM 100 MG: 100 CAPSULE, LIQUID FILLED ORAL at 18:52

## 2020-04-04 RX ADMIN — DEXTROSE MONOHYDRATE 50 ML: 25 INJECTION, SOLUTION INTRAVENOUS at 15:58

## 2020-04-04 RX ADMIN — HYDROXYCHLOROQUINE SULFATE 400 MG: 200 TABLET, FILM COATED ORAL at 18:53

## 2020-04-04 RX ADMIN — CEFAZOLIN SODIUM 1000 MG: 1 SOLUTION INTRAVENOUS at 18:54

## 2020-04-04 RX ADMIN — ATORVASTATIN CALCIUM 80 MG: 40 TABLET, FILM COATED ORAL at 18:52

## 2020-04-05 LAB
ALBUMIN SERPL BCP-MCNC: 2.6 G/DL (ref 3.5–5)
ALP SERPL-CCNC: 138 U/L (ref 46–116)
ALT SERPL W P-5'-P-CCNC: 19 U/L (ref 12–78)
ANION GAP SERPL CALCULATED.3IONS-SCNC: 11 MMOL/L (ref 4–13)
AST SERPL W P-5'-P-CCNC: 28 U/L (ref 5–45)
BASOPHILS # BLD AUTO: 0.03 THOUSANDS/ΜL (ref 0–0.1)
BASOPHILS NFR BLD AUTO: 0 % (ref 0–1)
BILIRUB SERPL-MCNC: 0.32 MG/DL (ref 0.2–1)
BUN SERPL-MCNC: 42 MG/DL (ref 5–25)
CALCIUM SERPL-MCNC: 8.5 MG/DL (ref 8.3–10.1)
CHLORIDE SERPL-SCNC: 95 MMOL/L (ref 100–108)
CO2 SERPL-SCNC: 27 MMOL/L (ref 21–32)
CREAT SERPL-MCNC: 5.29 MG/DL (ref 0.6–1.3)
EOSINOPHIL # BLD AUTO: 0.36 THOUSAND/ΜL (ref 0–0.61)
EOSINOPHIL NFR BLD AUTO: 5 % (ref 0–6)
ERYTHROCYTE [DISTWIDTH] IN BLOOD BY AUTOMATED COUNT: 15 % (ref 11.6–15.1)
GFR SERPL CREATININE-BSD FRML MDRD: 8 ML/MIN/1.73SQ M
GLUCOSE SERPL-MCNC: 135 MG/DL (ref 65–140)
GLUCOSE SERPL-MCNC: 137 MG/DL (ref 65–140)
GLUCOSE SERPL-MCNC: 137 MG/DL (ref 65–140)
GLUCOSE SERPL-MCNC: 210 MG/DL (ref 65–140)
GLUCOSE SERPL-MCNC: 212 MG/DL (ref 65–140)
HCT VFR BLD AUTO: 30.8 % (ref 34.8–46.1)
HGB BLD-MCNC: 9.6 G/DL (ref 11.5–15.4)
IMM GRANULOCYTES # BLD AUTO: 0.03 THOUSAND/UL (ref 0–0.2)
IMM GRANULOCYTES NFR BLD AUTO: 0 % (ref 0–2)
LYMPHOCYTES # BLD AUTO: 1.58 THOUSANDS/ΜL (ref 0.6–4.47)
LYMPHOCYTES NFR BLD AUTO: 23 % (ref 14–44)
MCH RBC QN AUTO: 29.7 PG (ref 26.8–34.3)
MCHC RBC AUTO-ENTMCNC: 31.2 G/DL (ref 31.4–37.4)
MCV RBC AUTO: 95 FL (ref 82–98)
MONOCYTES # BLD AUTO: 0.58 THOUSAND/ΜL (ref 0.17–1.22)
MONOCYTES NFR BLD AUTO: 8 % (ref 4–12)
NEUTROPHILS # BLD AUTO: 4.32 THOUSANDS/ΜL (ref 1.85–7.62)
NEUTS SEG NFR BLD AUTO: 64 % (ref 43–75)
NRBC BLD AUTO-RTO: 0 /100 WBCS
PLATELET # BLD AUTO: 260 THOUSANDS/UL (ref 149–390)
PMV BLD AUTO: 10.5 FL (ref 8.9–12.7)
POTASSIUM SERPL-SCNC: 5.1 MMOL/L (ref 3.5–5.3)
PROT SERPL-MCNC: 6.7 G/DL (ref 6.4–8.2)
RBC # BLD AUTO: 3.23 MILLION/UL (ref 3.81–5.12)
SODIUM SERPL-SCNC: 133 MMOL/L (ref 136–145)
WBC # BLD AUTO: 6.9 THOUSAND/UL (ref 4.31–10.16)

## 2020-04-05 PROCEDURE — 82955 ASSAY OF G6PD ENZYME: CPT | Performed by: INTERNAL MEDICINE

## 2020-04-05 PROCEDURE — 80053 COMPREHEN METABOLIC PANEL: CPT | Performed by: INTERNAL MEDICINE

## 2020-04-05 PROCEDURE — 99223 1ST HOSP IP/OBS HIGH 75: CPT | Performed by: INTERNAL MEDICINE

## 2020-04-05 PROCEDURE — 99222 1ST HOSP IP/OBS MODERATE 55: CPT | Performed by: INTERNAL MEDICINE

## 2020-04-05 PROCEDURE — 85025 COMPLETE CBC W/AUTO DIFF WBC: CPT | Performed by: INTERNAL MEDICINE

## 2020-04-05 PROCEDURE — 82948 REAGENT STRIP/BLOOD GLUCOSE: CPT

## 2020-04-05 PROCEDURE — 99232 SBSQ HOSP IP/OBS MODERATE 35: CPT | Performed by: INTERNAL MEDICINE

## 2020-04-05 PROCEDURE — 99221 1ST HOSP IP/OBS SF/LOW 40: CPT | Performed by: NURSE PRACTITIONER

## 2020-04-05 RX ADMIN — ATORVASTATIN CALCIUM 80 MG: 40 TABLET, FILM COATED ORAL at 17:27

## 2020-04-05 RX ADMIN — LABETALOL HYDROCHLORIDE 300 MG: 100 TABLET, FILM COATED ORAL at 09:12

## 2020-04-05 RX ADMIN — GABAPENTIN 200 MG: 100 CAPSULE ORAL at 05:52

## 2020-04-05 RX ADMIN — HEPARIN SODIUM 5000 UNITS: 5000 INJECTION INTRAVENOUS; SUBCUTANEOUS at 05:52

## 2020-04-05 RX ADMIN — SEVELAMER HYDROCHLORIDE 1600 MG: 800 TABLET, FILM COATED PARENTERAL at 17:28

## 2020-04-05 RX ADMIN — HYDROXYCHLOROQUINE SULFATE 200 MG: 200 TABLET, FILM COATED ORAL at 17:35

## 2020-04-05 RX ADMIN — INSULIN LISPRO 2 UNITS: 100 INJECTION, SOLUTION INTRAVENOUS; SUBCUTANEOUS at 17:29

## 2020-04-05 RX ADMIN — SEVELAMER HYDROCHLORIDE 1600 MG: 800 TABLET, FILM COATED PARENTERAL at 09:13

## 2020-04-05 RX ADMIN — GABAPENTIN 200 MG: 100 CAPSULE ORAL at 17:45

## 2020-04-05 RX ADMIN — ERGOCALCIFEROL 50000 UNITS: 1.25 CAPSULE ORAL at 09:14

## 2020-04-05 RX ADMIN — CEFAZOLIN SODIUM 1000 MG: 1 SOLUTION INTRAVENOUS at 05:52

## 2020-04-05 RX ADMIN — HYDROXYCHLOROQUINE SULFATE 400 MG: 200 TABLET, FILM COATED ORAL at 06:43

## 2020-04-05 RX ADMIN — NIFEDIPINE 30 MG: 30 TABLET, EXTENDED RELEASE ORAL at 09:13

## 2020-04-05 RX ADMIN — HEPARIN SODIUM 5000 UNITS: 5000 INJECTION INTRAVENOUS; SUBCUTANEOUS at 17:26

## 2020-04-05 RX ADMIN — ACETAMINOPHEN 650 MG: 325 TABLET, FILM COATED ORAL at 05:56

## 2020-04-05 RX ADMIN — PANTOPRAZOLE SODIUM 40 MG: 40 TABLET, DELAYED RELEASE ORAL at 05:52

## 2020-04-06 ENCOUNTER — APPOINTMENT (INPATIENT)
Dept: DIALYSIS | Facility: HOSPITAL | Age: 55
DRG: 919 | End: 2020-04-06
Payer: MEDICARE

## 2020-04-06 PROBLEM — M79.89 PAIN AND SWELLING OF RIGHT UPPER EXTREMITY: Status: ACTIVE | Noted: 2020-04-04

## 2020-04-06 PROBLEM — M79.601 PAIN AND SWELLING OF RIGHT UPPER EXTREMITY: Status: ACTIVE | Noted: 2020-04-04

## 2020-04-06 LAB
ANION GAP SERPL CALCULATED.3IONS-SCNC: 10 MMOL/L (ref 4–13)
ATRIAL RATE: 86 BPM
BUN SERPL-MCNC: 52 MG/DL (ref 5–25)
CALCIUM SERPL-MCNC: 8.8 MG/DL (ref 8.3–10.1)
CHLORIDE SERPL-SCNC: 97 MMOL/L (ref 100–108)
CO2 SERPL-SCNC: 26 MMOL/L (ref 21–32)
CREAT SERPL-MCNC: 5.76 MG/DL (ref 0.6–1.3)
G6PD BLD QN: 407 U/10E12 RBC (ref 146–376)
GFR SERPL CREATININE-BSD FRML MDRD: 8 ML/MIN/1.73SQ M
GLUCOSE SERPL-MCNC: 153 MG/DL (ref 65–140)
GLUCOSE SERPL-MCNC: 153 MG/DL (ref 65–140)
GLUCOSE SERPL-MCNC: 162 MG/DL (ref 65–140)
GLUCOSE SERPL-MCNC: 228 MG/DL (ref 65–140)
GLUCOSE SERPL-MCNC: 230 MG/DL (ref 65–140)
P AXIS: 16 DEGREES
POTASSIUM SERPL-SCNC: 5 MMOL/L (ref 3.5–5.3)
PR INTERVAL: 154 MS
QRS AXIS: -1 DEGREES
QRSD INTERVAL: 78 MS
QT INTERVAL: 398 MS
QTC INTERVAL: 476 MS
RBC # BLD AUTO: 3.32 X10E6/UL (ref 3.77–5.28)
SARS-COV-2 RNA RESP QL NAA+PROBE: NEGATIVE
SARS-COV-2 RNA SPEC QL NAA+PROBE: NOT DETECTED
SODIUM SERPL-SCNC: 133 MMOL/L (ref 136–145)
T WAVE AXIS: 32 DEGREES
VENTRICULAR RATE: 86 BPM

## 2020-04-06 PROCEDURE — 99232 SBSQ HOSP IP/OBS MODERATE 35: CPT | Performed by: INTERNAL MEDICINE

## 2020-04-06 PROCEDURE — 87635 SARS-COV-2 COVID-19 AMP PRB: CPT | Performed by: PHYSICIAN ASSISTANT

## 2020-04-06 PROCEDURE — 5A1D70Z PERFORMANCE OF URINARY FILTRATION, INTERMITTENT, LESS THAN 6 HOURS PER DAY: ICD-10-PCS | Performed by: HOSPITALIST

## 2020-04-06 PROCEDURE — 82948 REAGENT STRIP/BLOOD GLUCOSE: CPT

## 2020-04-06 PROCEDURE — 80048 BASIC METABOLIC PNL TOTAL CA: CPT | Performed by: PHYSICIAN ASSISTANT

## 2020-04-06 PROCEDURE — 93005 ELECTROCARDIOGRAM TRACING: CPT

## 2020-04-06 PROCEDURE — 93010 ELECTROCARDIOGRAM REPORT: CPT | Performed by: INTERNAL MEDICINE

## 2020-04-06 PROCEDURE — 90935 HEMODIALYSIS ONE EVALUATION: CPT | Performed by: INTERNAL MEDICINE

## 2020-04-06 RX ORDER — ACETAMINOPHEN 325 MG/1
975 TABLET ORAL EVERY 8 HOURS SCHEDULED
Status: DISCONTINUED | OUTPATIENT
Start: 2020-04-06 | End: 2020-04-08 | Stop reason: HOSPADM

## 2020-04-06 RX ORDER — NIFEDIPINE 30 MG/1
30 TABLET, EXTENDED RELEASE ORAL DAILY
Status: DISCONTINUED | OUTPATIENT
Start: 2020-04-07 | End: 2020-04-08 | Stop reason: HOSPADM

## 2020-04-06 RX ORDER — OXYCODONE HYDROCHLORIDE 5 MG/1
5 TABLET ORAL EVERY 6 HOURS PRN
Status: DISCONTINUED | OUTPATIENT
Start: 2020-04-06 | End: 2020-04-08 | Stop reason: HOSPADM

## 2020-04-06 RX ORDER — LABETALOL 100 MG/1
300 TABLET, FILM COATED ORAL DAILY
Status: DISCONTINUED | OUTPATIENT
Start: 2020-04-07 | End: 2020-04-08 | Stop reason: HOSPADM

## 2020-04-06 RX ORDER — OXYCODONE HYDROCHLORIDE 5 MG/1
7.5 TABLET ORAL EVERY 6 HOURS PRN
Status: DISCONTINUED | OUTPATIENT
Start: 2020-04-06 | End: 2020-04-08 | Stop reason: HOSPADM

## 2020-04-06 RX ADMIN — ACETAMINOPHEN 975 MG: 325 TABLET, FILM COATED ORAL at 13:14

## 2020-04-06 RX ADMIN — NIFEDIPINE 30 MG: 30 TABLET, EXTENDED RELEASE ORAL at 08:25

## 2020-04-06 RX ADMIN — ACETAMINOPHEN 975 MG: 325 TABLET, FILM COATED ORAL at 21:48

## 2020-04-06 RX ADMIN — HEPARIN SODIUM 5000 UNITS: 5000 INJECTION INTRAVENOUS; SUBCUTANEOUS at 05:19

## 2020-04-06 RX ADMIN — LABETALOL HYDROCHLORIDE 300 MG: 100 TABLET, FILM COATED ORAL at 08:24

## 2020-04-06 RX ADMIN — HEPARIN SODIUM 5000 UNITS: 5000 INJECTION INTRAVENOUS; SUBCUTANEOUS at 21:49

## 2020-04-06 RX ADMIN — HEPARIN SODIUM 5000 UNITS: 5000 INJECTION INTRAVENOUS; SUBCUTANEOUS at 13:14

## 2020-04-06 RX ADMIN — GABAPENTIN 200 MG: 100 CAPSULE ORAL at 17:12

## 2020-04-06 RX ADMIN — SEVELAMER HYDROCHLORIDE 1600 MG: 800 TABLET, FILM COATED PARENTERAL at 17:12

## 2020-04-06 RX ADMIN — INSULIN LISPRO 3 UNITS: 100 INJECTION, SOLUTION INTRAVENOUS; SUBCUTANEOUS at 17:14

## 2020-04-06 RX ADMIN — DOCUSATE SODIUM 100 MG: 100 CAPSULE, LIQUID FILLED ORAL at 17:12

## 2020-04-06 RX ADMIN — GABAPENTIN 200 MG: 100 CAPSULE ORAL at 05:19

## 2020-04-06 RX ADMIN — SEVELAMER HYDROCHLORIDE 1600 MG: 800 TABLET, FILM COATED PARENTERAL at 12:19

## 2020-04-06 RX ADMIN — GABAPENTIN 200 MG: 100 CAPSULE ORAL at 21:48

## 2020-04-06 RX ADMIN — HYDROXYCHLOROQUINE SULFATE 200 MG: 200 TABLET, FILM COATED ORAL at 05:18

## 2020-04-06 RX ADMIN — PANTOPRAZOLE SODIUM 40 MG: 40 TABLET, DELAYED RELEASE ORAL at 05:19

## 2020-04-06 RX ADMIN — ATORVASTATIN CALCIUM 80 MG: 40 TABLET, FILM COATED ORAL at 17:12

## 2020-04-06 RX ADMIN — SEVELAMER HYDROCHLORIDE 1600 MG: 800 TABLET, FILM COATED PARENTERAL at 08:25

## 2020-04-07 ENCOUNTER — APPOINTMENT (INPATIENT)
Dept: DIALYSIS | Facility: HOSPITAL | Age: 55
DRG: 919 | End: 2020-04-07
Payer: MEDICARE

## 2020-04-07 PROBLEM — U07.1 COVID-19: Status: RESOLVED | Noted: 2020-04-04 | Resolved: 2020-04-07

## 2020-04-07 LAB
ANION GAP SERPL CALCULATED.3IONS-SCNC: 10 MMOL/L (ref 4–13)
BUN SERPL-MCNC: 37 MG/DL (ref 5–25)
CALCIUM SERPL-MCNC: 8.9 MG/DL (ref 8.3–10.1)
CHLORIDE SERPL-SCNC: 94 MMOL/L (ref 100–108)
CO2 SERPL-SCNC: 26 MMOL/L (ref 21–32)
CREAT SERPL-MCNC: 5.12 MG/DL (ref 0.6–1.3)
ERYTHROCYTE [DISTWIDTH] IN BLOOD BY AUTOMATED COUNT: 14.6 % (ref 11.6–15.1)
GFR SERPL CREATININE-BSD FRML MDRD: 9 ML/MIN/1.73SQ M
GLUCOSE SERPL-MCNC: 118 MG/DL (ref 65–140)
GLUCOSE SERPL-MCNC: 159 MG/DL (ref 65–140)
GLUCOSE SERPL-MCNC: 171 MG/DL (ref 65–140)
GLUCOSE SERPL-MCNC: 195 MG/DL (ref 65–140)
GLUCOSE SERPL-MCNC: 236 MG/DL (ref 65–140)
HCT VFR BLD AUTO: 32.1 % (ref 34.8–46.1)
HGB BLD-MCNC: 10.3 G/DL (ref 11.5–15.4)
MCH RBC QN AUTO: 29.9 PG (ref 26.8–34.3)
MCHC RBC AUTO-ENTMCNC: 32.1 G/DL (ref 31.4–37.4)
MCV RBC AUTO: 93 FL (ref 82–98)
PLATELET # BLD AUTO: 297 THOUSANDS/UL (ref 149–390)
PMV BLD AUTO: 10.2 FL (ref 8.9–12.7)
POTASSIUM SERPL-SCNC: 4.5 MMOL/L (ref 3.5–5.3)
RBC # BLD AUTO: 3.44 MILLION/UL (ref 3.81–5.12)
SODIUM SERPL-SCNC: 130 MMOL/L (ref 136–145)
WBC # BLD AUTO: 5.58 THOUSAND/UL (ref 4.31–10.16)

## 2020-04-07 PROCEDURE — 99232 SBSQ HOSP IP/OBS MODERATE 35: CPT | Performed by: HOSPITALIST

## 2020-04-07 PROCEDURE — 99232 SBSQ HOSP IP/OBS MODERATE 35: CPT | Performed by: PHYSICIAN ASSISTANT

## 2020-04-07 PROCEDURE — 85027 COMPLETE CBC AUTOMATED: CPT | Performed by: INTERNAL MEDICINE

## 2020-04-07 PROCEDURE — 80048 BASIC METABOLIC PNL TOTAL CA: CPT | Performed by: PHYSICIAN ASSISTANT

## 2020-04-07 PROCEDURE — 99233 SBSQ HOSP IP/OBS HIGH 50: CPT | Performed by: INTERNAL MEDICINE

## 2020-04-07 PROCEDURE — 82948 REAGENT STRIP/BLOOD GLUCOSE: CPT

## 2020-04-07 PROCEDURE — 5A1D70Z PERFORMANCE OF URINARY FILTRATION, INTERMITTENT, LESS THAN 6 HOURS PER DAY: ICD-10-PCS | Performed by: HOSPITALIST

## 2020-04-07 RX ADMIN — SEVELAMER HYDROCHLORIDE 1600 MG: 800 TABLET, FILM COATED PARENTERAL at 13:08

## 2020-04-07 RX ADMIN — DOCUSATE SODIUM 100 MG: 100 CAPSULE, LIQUID FILLED ORAL at 17:42

## 2020-04-07 RX ADMIN — ERGOCALCIFEROL 50000 UNITS: 1.25 CAPSULE ORAL at 08:00

## 2020-04-07 RX ADMIN — GABAPENTIN 200 MG: 100 CAPSULE ORAL at 17:42

## 2020-04-07 RX ADMIN — INSULIN LISPRO 1 UNITS: 100 INJECTION, SOLUTION INTRAVENOUS; SUBCUTANEOUS at 13:07

## 2020-04-07 RX ADMIN — ATORVASTATIN CALCIUM 80 MG: 40 TABLET, FILM COATED ORAL at 17:42

## 2020-04-07 RX ADMIN — ACETAMINOPHEN 975 MG: 325 TABLET, FILM COATED ORAL at 21:23

## 2020-04-07 RX ADMIN — SEVELAMER HYDROCHLORIDE 1600 MG: 800 TABLET, FILM COATED PARENTERAL at 08:00

## 2020-04-07 RX ADMIN — HEPARIN SODIUM 5000 UNITS: 5000 INJECTION INTRAVENOUS; SUBCUTANEOUS at 05:47

## 2020-04-07 RX ADMIN — SEVELAMER HYDROCHLORIDE 1600 MG: 800 TABLET, FILM COATED PARENTERAL at 17:43

## 2020-04-07 RX ADMIN — GABAPENTIN 200 MG: 100 CAPSULE ORAL at 05:47

## 2020-04-07 RX ADMIN — ONDANSETRON 4 MG: 2 INJECTION INTRAMUSCULAR; INTRAVENOUS at 08:08

## 2020-04-07 RX ADMIN — ACETAMINOPHEN 975 MG: 325 TABLET, FILM COATED ORAL at 13:08

## 2020-04-07 RX ADMIN — HEPARIN SODIUM 5000 UNITS: 5000 INJECTION INTRAVENOUS; SUBCUTANEOUS at 13:08

## 2020-04-07 RX ADMIN — INSULIN LISPRO 1 UNITS: 100 INJECTION, SOLUTION INTRAVENOUS; SUBCUTANEOUS at 08:00

## 2020-04-07 RX ADMIN — HEPARIN SODIUM 5000 UNITS: 5000 INJECTION INTRAVENOUS; SUBCUTANEOUS at 21:23

## 2020-04-07 RX ADMIN — PANTOPRAZOLE SODIUM 40 MG: 40 TABLET, DELAYED RELEASE ORAL at 05:47

## 2020-04-07 RX ADMIN — ACETAMINOPHEN 975 MG: 325 TABLET, FILM COATED ORAL at 05:47

## 2020-04-08 VITALS
TEMPERATURE: 97.7 F | OXYGEN SATURATION: 99 % | HEART RATE: 77 BPM | SYSTOLIC BLOOD PRESSURE: 140 MMHG | BODY MASS INDEX: 33.1 KG/M2 | DIASTOLIC BLOOD PRESSURE: 80 MMHG | WEIGHT: 181 LBS | RESPIRATION RATE: 18 BRPM

## 2020-04-08 PROBLEM — E87.1 HYPONATREMIA: Status: ACTIVE | Noted: 2020-04-08

## 2020-04-08 LAB
GLUCOSE SERPL-MCNC: 158 MG/DL (ref 65–140)
GLUCOSE SERPL-MCNC: 175 MG/DL (ref 65–140)

## 2020-04-08 PROCEDURE — 99233 SBSQ HOSP IP/OBS HIGH 50: CPT | Performed by: INTERNAL MEDICINE

## 2020-04-08 PROCEDURE — 99239 HOSP IP/OBS DSCHRG MGMT >30: CPT | Performed by: HOSPITALIST

## 2020-04-08 PROCEDURE — 82948 REAGENT STRIP/BLOOD GLUCOSE: CPT

## 2020-04-08 RX ADMIN — INSULIN LISPRO 1 UNITS: 100 INJECTION, SOLUTION INTRAVENOUS; SUBCUTANEOUS at 08:47

## 2020-04-08 RX ADMIN — PANTOPRAZOLE SODIUM 40 MG: 40 TABLET, DELAYED RELEASE ORAL at 05:44

## 2020-04-08 RX ADMIN — SEVELAMER HYDROCHLORIDE 1600 MG: 800 TABLET, FILM COATED PARENTERAL at 08:45

## 2020-04-08 RX ADMIN — LABETALOL HYDROCHLORIDE 300 MG: 100 TABLET, FILM COATED ORAL at 08:45

## 2020-04-08 RX ADMIN — GABAPENTIN 200 MG: 100 CAPSULE ORAL at 05:45

## 2020-04-08 RX ADMIN — DOCUSATE SODIUM 100 MG: 100 CAPSULE, LIQUID FILLED ORAL at 08:45

## 2020-04-08 RX ADMIN — HEPARIN SODIUM 5000 UNITS: 5000 INJECTION INTRAVENOUS; SUBCUTANEOUS at 05:45

## 2020-04-08 RX ADMIN — NIFEDIPINE 30 MG: 30 TABLET, FILM COATED, EXTENDED RELEASE ORAL at 08:45

## 2020-04-08 RX ADMIN — ACETAMINOPHEN 975 MG: 325 TABLET, FILM COATED ORAL at 05:44

## 2020-04-10 LAB — BACTERIA BLD CULT: NORMAL

## 2020-04-27 ENCOUNTER — HOSPITAL ENCOUNTER (INPATIENT)
Facility: HOSPITAL | Age: 55
LOS: 2 days | Discharge: HOME | DRG: 640 | End: 2020-04-29
Attending: EMERGENCY MEDICINE | Admitting: INTERNAL MEDICINE
Payer: MEDICARE

## 2020-04-27 ENCOUNTER — APPOINTMENT (EMERGENCY)
Dept: RADIOLOGY | Facility: HOSPITAL | Age: 55
DRG: 640 | End: 2020-04-27
Payer: MEDICARE

## 2020-04-27 DIAGNOSIS — R50.9 FEVER, UNSPECIFIED FEVER CAUSE: ICD-10-CM

## 2020-04-27 DIAGNOSIS — R20.0 PAIN AND NUMBNESS OF RIGHT UPPER EXTREMITY: ICD-10-CM

## 2020-04-27 DIAGNOSIS — E87.70 VOLUME OVERLOAD: ICD-10-CM

## 2020-04-27 DIAGNOSIS — M79.601 PAIN AND NUMBNESS OF RIGHT UPPER EXTREMITY: ICD-10-CM

## 2020-04-27 DIAGNOSIS — R06.00 DYSPNEA ON EXERTION: Primary | ICD-10-CM

## 2020-04-27 DIAGNOSIS — Z99.2 ESRD ON HEMODIALYSIS (HCC): ICD-10-CM

## 2020-04-27 DIAGNOSIS — N18.6 ESRD ON HEMODIALYSIS (HCC): ICD-10-CM

## 2020-04-27 PROBLEM — R06.02 SHORTNESS OF BREATH: Status: ACTIVE | Noted: 2020-04-27

## 2020-04-27 LAB
ALBUMIN SERPL BCP-MCNC: 3 G/DL (ref 3.5–5)
ALP SERPL-CCNC: 142 U/L (ref 46–116)
ALT SERPL W P-5'-P-CCNC: 23 U/L (ref 12–78)
ANION GAP SERPL CALCULATED.3IONS-SCNC: 8 MMOL/L (ref 4–13)
APTT PPP: 26 SECONDS (ref 23–37)
AST SERPL W P-5'-P-CCNC: 29 U/L (ref 5–45)
BACTERIA UR QL AUTO: ABNORMAL /HPF
BASOPHILS # BLD AUTO: 0.08 THOUSANDS/ΜL (ref 0–0.1)
BASOPHILS NFR BLD AUTO: 1 % (ref 0–1)
BILIRUB SERPL-MCNC: 0.45 MG/DL (ref 0.2–1)
BILIRUB UR QL STRIP: NEGATIVE
BUN SERPL-MCNC: 27 MG/DL (ref 5–25)
CALCIUM SERPL-MCNC: 9.1 MG/DL (ref 8.3–10.1)
CHLORIDE SERPL-SCNC: 99 MMOL/L (ref 100–108)
CLARITY UR: CLEAR
CO2 SERPL-SCNC: 28 MMOL/L (ref 21–32)
COLOR UR: YELLOW
CREAT SERPL-MCNC: 4 MG/DL (ref 0.6–1.3)
CRP SERPL HS-MCNC: 4.92 MG/L
EOSINOPHIL # BLD AUTO: 0.26 THOUSAND/ΜL (ref 0–0.61)
EOSINOPHIL NFR BLD AUTO: 5 % (ref 0–6)
ERYTHROCYTE [DISTWIDTH] IN BLOOD BY AUTOMATED COUNT: 15.4 % (ref 11.6–15.1)
ERYTHROCYTE [SEDIMENTATION RATE] IN BLOOD: 82 MM/HOUR (ref 0–20)
FERRITIN SERPL-MCNC: 550 NG/ML (ref 8–388)
FIBRINOGEN PPP-MCNC: 496 MG/DL (ref 227–495)
GFR SERPL CREATININE-BSD FRML MDRD: 12 ML/MIN/1.73SQ M
GLUCOSE SERPL-MCNC: 142 MG/DL (ref 65–140)
GLUCOSE SERPL-MCNC: 223 MG/DL (ref 65–140)
GLUCOSE SERPL-MCNC: 88 MG/DL (ref 65–140)
GLUCOSE UR STRIP-MCNC: ABNORMAL MG/DL
HCT VFR BLD AUTO: 34.8 % (ref 34.8–46.1)
HGB BLD-MCNC: 11.2 G/DL (ref 11.5–15.4)
HGB UR QL STRIP.AUTO: ABNORMAL
HYALINE CASTS #/AREA URNS LPF: ABNORMAL /LPF
IMM GRANULOCYTES # BLD AUTO: 0.02 THOUSAND/UL (ref 0–0.2)
IMM GRANULOCYTES NFR BLD AUTO: 0 % (ref 0–2)
INR PPP: 1.06 (ref 0.84–1.19)
KETONES UR STRIP-MCNC: NEGATIVE MG/DL
LACTATE SERPL-SCNC: 1.3 MMOL/L (ref 0.5–2)
LEUKOCYTE ESTERASE UR QL STRIP: NEGATIVE
LYMPHOCYTES # BLD AUTO: 1.48 THOUSANDS/ΜL (ref 0.6–4.47)
LYMPHOCYTES NFR BLD AUTO: 26 % (ref 14–44)
MCH RBC QN AUTO: 30.6 PG (ref 26.8–34.3)
MCHC RBC AUTO-ENTMCNC: 32.2 G/DL (ref 31.4–37.4)
MCV RBC AUTO: 95 FL (ref 82–98)
MONOCYTES # BLD AUTO: 0.44 THOUSAND/ΜL (ref 0.17–1.22)
MONOCYTES NFR BLD AUTO: 8 % (ref 4–12)
NEUTROPHILS # BLD AUTO: 3.44 THOUSANDS/ΜL (ref 1.85–7.62)
NEUTS SEG NFR BLD AUTO: 60 % (ref 43–75)
NITRITE UR QL STRIP: NEGATIVE
NON-SQ EPI CELLS URNS QL MICRO: ABNORMAL /HPF
NRBC BLD AUTO-RTO: 0 /100 WBCS
NT-PROBNP SERPL-MCNC: 6694 PG/ML
PH UR STRIP.AUTO: 6.5 [PH] (ref 4.5–8)
PLATELET # BLD AUTO: 260 THOUSANDS/UL (ref 149–390)
PLATELET # BLD AUTO: 275 THOUSANDS/UL (ref 149–390)
PMV BLD AUTO: 9.4 FL (ref 8.9–12.7)
PMV BLD AUTO: 9.6 FL (ref 8.9–12.7)
POTASSIUM SERPL-SCNC: 4.6 MMOL/L (ref 3.5–5.3)
PROT SERPL-MCNC: 7.2 G/DL (ref 6.4–8.2)
PROT UR STRIP-MCNC: >=300 MG/DL
PROTHROMBIN TIME: 13.1 SECONDS (ref 11.6–14.5)
RBC # BLD AUTO: 3.66 MILLION/UL (ref 3.81–5.12)
RBC #/AREA URNS AUTO: ABNORMAL /HPF
SARS-COV-2 RNA RESP QL NAA+PROBE: NEGATIVE
SODIUM SERPL-SCNC: 135 MMOL/L (ref 136–145)
SP GR UR STRIP.AUTO: 1.02 (ref 1–1.03)
TROPONIN I SERPL-MCNC: <0.02 NG/ML
UROBILINOGEN UR QL STRIP.AUTO: 0.2 E.U./DL
WBC # BLD AUTO: 5.72 THOUSAND/UL (ref 4.31–10.16)
WBC #/AREA URNS AUTO: ABNORMAL /HPF

## 2020-04-27 PROCEDURE — 87081 CULTURE SCREEN ONLY: CPT | Performed by: INTERNAL MEDICINE

## 2020-04-27 PROCEDURE — 99221 1ST HOSP IP/OBS SF/LOW 40: CPT | Performed by: INTERNAL MEDICINE

## 2020-04-27 PROCEDURE — 82728 ASSAY OF FERRITIN: CPT | Performed by: PHYSICIAN ASSISTANT

## 2020-04-27 PROCEDURE — 85652 RBC SED RATE AUTOMATED: CPT | Performed by: PHYSICIAN ASSISTANT

## 2020-04-27 PROCEDURE — 87040 BLOOD CULTURE FOR BACTERIA: CPT | Performed by: PHYSICIAN ASSISTANT

## 2020-04-27 PROCEDURE — 99285 EMERGENCY DEPT VISIT HI MDM: CPT | Performed by: PHYSICIAN ASSISTANT

## 2020-04-27 PROCEDURE — 71045 X-RAY EXAM CHEST 1 VIEW: CPT

## 2020-04-27 PROCEDURE — 86141 C-REACTIVE PROTEIN HS: CPT | Performed by: PHYSICIAN ASSISTANT

## 2020-04-27 PROCEDURE — 99223 1ST HOSP IP/OBS HIGH 75: CPT | Performed by: INTERNAL MEDICINE

## 2020-04-27 PROCEDURE — 80053 COMPREHEN METABOLIC PANEL: CPT | Performed by: PHYSICIAN ASSISTANT

## 2020-04-27 PROCEDURE — 87635 SARS-COV-2 COVID-19 AMP PRB: CPT | Performed by: PHYSICIAN ASSISTANT

## 2020-04-27 PROCEDURE — 93005 ELECTROCARDIOGRAM TRACING: CPT

## 2020-04-27 PROCEDURE — 85025 COMPLETE CBC W/AUTO DIFF WBC: CPT | Performed by: PHYSICIAN ASSISTANT

## 2020-04-27 PROCEDURE — 83880 ASSAY OF NATRIURETIC PEPTIDE: CPT | Performed by: PHYSICIAN ASSISTANT

## 2020-04-27 PROCEDURE — 85049 AUTOMATED PLATELET COUNT: CPT | Performed by: PHYSICIAN ASSISTANT

## 2020-04-27 PROCEDURE — 85384 FIBRINOGEN ACTIVITY: CPT | Performed by: PHYSICIAN ASSISTANT

## 2020-04-27 PROCEDURE — 81001 URINALYSIS AUTO W/SCOPE: CPT

## 2020-04-27 PROCEDURE — 85730 THROMBOPLASTIN TIME PARTIAL: CPT | Performed by: PHYSICIAN ASSISTANT

## 2020-04-27 PROCEDURE — 84484 ASSAY OF TROPONIN QUANT: CPT | Performed by: PHYSICIAN ASSISTANT

## 2020-04-27 PROCEDURE — 83605 ASSAY OF LACTIC ACID: CPT | Performed by: PHYSICIAN ASSISTANT

## 2020-04-27 PROCEDURE — 36415 COLL VENOUS BLD VENIPUNCTURE: CPT | Performed by: PHYSICIAN ASSISTANT

## 2020-04-27 PROCEDURE — 82948 REAGENT STRIP/BLOOD GLUCOSE: CPT

## 2020-04-27 PROCEDURE — 85610 PROTHROMBIN TIME: CPT | Performed by: PHYSICIAN ASSISTANT

## 2020-04-27 PROCEDURE — 99285 EMERGENCY DEPT VISIT HI MDM: CPT

## 2020-04-27 RX ORDER — NIFEDIPINE 30 MG/1
30 TABLET, EXTENDED RELEASE ORAL DAILY
Status: DISCONTINUED | OUTPATIENT
Start: 2020-04-28 | End: 2020-04-29 | Stop reason: HOSPADM

## 2020-04-27 RX ORDER — LABETALOL 100 MG/1
300 TABLET, FILM COATED ORAL EVERY 12 HOURS SCHEDULED
Status: DISCONTINUED | OUTPATIENT
Start: 2020-04-27 | End: 2020-04-29 | Stop reason: HOSPADM

## 2020-04-27 RX ORDER — PANTOPRAZOLE SODIUM 40 MG/1
40 TABLET, DELAYED RELEASE ORAL
Status: DISCONTINUED | OUTPATIENT
Start: 2020-04-28 | End: 2020-04-29 | Stop reason: HOSPADM

## 2020-04-27 RX ORDER — ACETAMINOPHEN 160 MG/1
500 BAR, CHEWABLE ORAL EVERY 6 HOURS PRN
Status: DISCONTINUED | OUTPATIENT
Start: 2020-04-27 | End: 2020-04-27

## 2020-04-27 RX ORDER — ATORVASTATIN CALCIUM 40 MG/1
80 TABLET, FILM COATED ORAL EVERY EVENING
Status: DISCONTINUED | OUTPATIENT
Start: 2020-04-27 | End: 2020-04-29 | Stop reason: HOSPADM

## 2020-04-27 RX ORDER — SEVELAMER HYDROCHLORIDE 800 MG/1
1600 TABLET, FILM COATED ORAL
Status: DISCONTINUED | OUTPATIENT
Start: 2020-04-27 | End: 2020-04-29 | Stop reason: HOSPADM

## 2020-04-27 RX ORDER — ONDANSETRON 2 MG/ML
4 INJECTION INTRAMUSCULAR; INTRAVENOUS EVERY 6 HOURS PRN
Status: DISCONTINUED | OUTPATIENT
Start: 2020-04-27 | End: 2020-04-29 | Stop reason: HOSPADM

## 2020-04-27 RX ORDER — ACETAMINOPHEN 160 MG/5ML
500 SUSPENSION, ORAL (FINAL DOSE FORM) ORAL EVERY 6 HOURS PRN
Status: DISCONTINUED | OUTPATIENT
Start: 2020-04-27 | End: 2020-04-29 | Stop reason: HOSPADM

## 2020-04-27 RX ORDER — LORATADINE 10 MG/1
10 TABLET ORAL DAILY
Status: DISCONTINUED | OUTPATIENT
Start: 2020-04-28 | End: 2020-04-29 | Stop reason: HOSPADM

## 2020-04-27 RX ORDER — GABAPENTIN 100 MG/1
200 CAPSULE ORAL 2 TIMES DAILY
Status: DISCONTINUED | OUTPATIENT
Start: 2020-04-27 | End: 2020-04-29 | Stop reason: HOSPADM

## 2020-04-27 RX ORDER — HEPARIN SODIUM 5000 [USP'U]/ML
5000 INJECTION, SOLUTION INTRAVENOUS; SUBCUTANEOUS EVERY 8 HOURS SCHEDULED
Status: DISCONTINUED | OUTPATIENT
Start: 2020-04-27 | End: 2020-04-29 | Stop reason: HOSPADM

## 2020-04-27 RX ORDER — SENNOSIDES 8.6 MG
1 TABLET ORAL DAILY
Status: DISCONTINUED | OUTPATIENT
Start: 2020-04-28 | End: 2020-04-28

## 2020-04-27 RX ORDER — INSULIN GLARGINE 100 [IU]/ML
20 INJECTION, SOLUTION SUBCUTANEOUS
Status: DISCONTINUED | OUTPATIENT
Start: 2020-04-27 | End: 2020-04-29 | Stop reason: HOSPADM

## 2020-04-27 RX ADMIN — ATORVASTATIN CALCIUM 80 MG: 40 TABLET, FILM COATED ORAL at 17:05

## 2020-04-27 RX ADMIN — SEVELAMER HYDROCHLORIDE 1600 MG: 800 TABLET, FILM COATED PARENTERAL at 17:05

## 2020-04-27 RX ADMIN — INSULIN GLARGINE 20 UNITS: 100 INJECTION, SOLUTION SUBCUTANEOUS at 21:51

## 2020-04-27 RX ADMIN — ACETAMINOPHEN 500 MG: 160 SUSPENSION ORAL at 17:01

## 2020-04-27 RX ADMIN — LABETALOL HYDROCHLORIDE 300 MG: 100 TABLET, FILM COATED ORAL at 21:51

## 2020-04-27 RX ADMIN — HEPARIN SODIUM 5000 UNITS: 5000 INJECTION INTRAVENOUS; SUBCUTANEOUS at 21:51

## 2020-04-27 RX ADMIN — GABAPENTIN 200 MG: 100 CAPSULE ORAL at 21:52

## 2020-04-28 ENCOUNTER — APPOINTMENT (INPATIENT)
Dept: DIALYSIS | Facility: HOSPITAL | Age: 55
DRG: 640 | End: 2020-04-28
Payer: MEDICARE

## 2020-04-28 PROBLEM — J96.11 CHRONIC RESPIRATORY FAILURE WITH HYPOXIA (HCC): Status: ACTIVE | Noted: 2020-04-28

## 2020-04-28 LAB
ALBUMIN SERPL BCP-MCNC: 2.9 G/DL (ref 3.5–5)
ALP SERPL-CCNC: 129 U/L (ref 46–116)
ALT SERPL W P-5'-P-CCNC: 11 U/L (ref 12–78)
ANION GAP SERPL CALCULATED.3IONS-SCNC: 8 MMOL/L (ref 4–13)
AST SERPL W P-5'-P-CCNC: 22 U/L (ref 5–45)
ATRIAL RATE: 92 BPM
BILIRUB SERPL-MCNC: 0.43 MG/DL (ref 0.2–1)
BUN SERPL-MCNC: 31 MG/DL (ref 5–25)
CALCIUM SERPL-MCNC: 9.2 MG/DL (ref 8.3–10.1)
CHLORIDE SERPL-SCNC: 103 MMOL/L (ref 100–108)
CO2 SERPL-SCNC: 27 MMOL/L (ref 21–32)
CREAT SERPL-MCNC: 3.95 MG/DL (ref 0.6–1.3)
ERYTHROCYTE [DISTWIDTH] IN BLOOD BY AUTOMATED COUNT: 15.5 % (ref 11.6–15.1)
GFR SERPL CREATININE-BSD FRML MDRD: 12 ML/MIN/1.73SQ M
GLUCOSE SERPL-MCNC: 118 MG/DL (ref 65–140)
GLUCOSE SERPL-MCNC: 155 MG/DL (ref 65–140)
GLUCOSE SERPL-MCNC: 165 MG/DL (ref 65–140)
GLUCOSE SERPL-MCNC: 98 MG/DL (ref 65–140)
GLUCOSE SERPL-MCNC: 99 MG/DL (ref 65–140)
HCT VFR BLD AUTO: 35.2 % (ref 34.8–46.1)
HGB BLD-MCNC: 10.9 G/DL (ref 11.5–15.4)
MAGNESIUM SERPL-MCNC: 2.1 MG/DL (ref 1.6–2.6)
MCH RBC QN AUTO: 29.9 PG (ref 26.8–34.3)
MCHC RBC AUTO-ENTMCNC: 31 G/DL (ref 31.4–37.4)
MCV RBC AUTO: 96 FL (ref 82–98)
P AXIS: 33 DEGREES
PLATELET # BLD AUTO: 278 THOUSANDS/UL (ref 149–390)
PMV BLD AUTO: 9.9 FL (ref 8.9–12.7)
POTASSIUM SERPL-SCNC: 4.8 MMOL/L (ref 3.5–5.3)
PR INTERVAL: 136 MS
PROT SERPL-MCNC: 6.7 G/DL (ref 6.4–8.2)
QRS AXIS: 1 DEGREES
QRSD INTERVAL: 72 MS
QT INTERVAL: 398 MS
QTC INTERVAL: 492 MS
RBC # BLD AUTO: 3.65 MILLION/UL (ref 3.81–5.12)
SODIUM SERPL-SCNC: 138 MMOL/L (ref 136–145)
T WAVE AXIS: 52 DEGREES
VENTRICULAR RATE: 92 BPM
WBC # BLD AUTO: 4.4 THOUSAND/UL (ref 4.31–10.16)

## 2020-04-28 PROCEDURE — 5A1D70Z PERFORMANCE OF URINARY FILTRATION, INTERMITTENT, LESS THAN 6 HOURS PER DAY: ICD-10-PCS | Performed by: INTERNAL MEDICINE

## 2020-04-28 PROCEDURE — 82948 REAGENT STRIP/BLOOD GLUCOSE: CPT

## 2020-04-28 PROCEDURE — 99222 1ST HOSP IP/OBS MODERATE 55: CPT | Performed by: NURSE PRACTITIONER

## 2020-04-28 PROCEDURE — 99232 SBSQ HOSP IP/OBS MODERATE 35: CPT | Performed by: INTERNAL MEDICINE

## 2020-04-28 PROCEDURE — 99232 SBSQ HOSP IP/OBS MODERATE 35: CPT | Performed by: PHYSICIAN ASSISTANT

## 2020-04-28 PROCEDURE — 93010 ELECTROCARDIOGRAM REPORT: CPT | Performed by: INTERNAL MEDICINE

## 2020-04-28 PROCEDURE — 80053 COMPREHEN METABOLIC PANEL: CPT | Performed by: PHYSICIAN ASSISTANT

## 2020-04-28 PROCEDURE — 83735 ASSAY OF MAGNESIUM: CPT | Performed by: PHYSICIAN ASSISTANT

## 2020-04-28 PROCEDURE — 85027 COMPLETE CBC AUTOMATED: CPT | Performed by: PHYSICIAN ASSISTANT

## 2020-04-28 RX ORDER — LOPERAMIDE HYDROCHLORIDE 2 MG/1
2 CAPSULE ORAL EVERY 4 HOURS PRN
Status: DISCONTINUED | OUTPATIENT
Start: 2020-04-28 | End: 2020-04-29 | Stop reason: HOSPADM

## 2020-04-28 RX ORDER — MUSCLE RUB CREAM 100; 150 MG/G; MG/G
CREAM TOPICAL 4 TIMES DAILY PRN
Status: DISCONTINUED | OUTPATIENT
Start: 2020-04-28 | End: 2020-04-29 | Stop reason: HOSPADM

## 2020-04-28 RX ORDER — LIDOCAINE 50 MG/G
1 PATCH TOPICAL DAILY
Status: DISCONTINUED | OUTPATIENT
Start: 2020-04-28 | End: 2020-04-29 | Stop reason: HOSPADM

## 2020-04-28 RX ORDER — LANOLIN ALCOHOL/MO/W.PET/CERES
3 CREAM (GRAM) TOPICAL
Status: DISCONTINUED | OUTPATIENT
Start: 2020-04-28 | End: 2020-04-29 | Stop reason: HOSPADM

## 2020-04-28 RX ORDER — LOPERAMIDE HYDROCHLORIDE 2 MG/1
4 CAPSULE ORAL ONCE
Status: COMPLETED | OUTPATIENT
Start: 2020-04-28 | End: 2020-04-28

## 2020-04-28 RX ADMIN — GABAPENTIN 200 MG: 100 CAPSULE ORAL at 08:39

## 2020-04-28 RX ADMIN — SEVELAMER HYDROCHLORIDE 1600 MG: 800 TABLET, FILM COATED PARENTERAL at 11:45

## 2020-04-28 RX ADMIN — HEPARIN SODIUM 5000 UNITS: 5000 INJECTION INTRAVENOUS; SUBCUTANEOUS at 13:31

## 2020-04-28 RX ADMIN — LIDOCAINE 1 PATCH: 50 PATCH TOPICAL at 12:24

## 2020-04-28 RX ADMIN — INSULIN GLARGINE 20 UNITS: 100 INJECTION, SOLUTION SUBCUTANEOUS at 21:14

## 2020-04-28 RX ADMIN — HEPARIN SODIUM 5000 UNITS: 5000 INJECTION INTRAVENOUS; SUBCUTANEOUS at 05:36

## 2020-04-28 RX ADMIN — INSULIN LISPRO 1 UNITS: 100 INJECTION, SOLUTION INTRAVENOUS; SUBCUTANEOUS at 17:03

## 2020-04-28 RX ADMIN — PANTOPRAZOLE SODIUM 40 MG: 40 TABLET, DELAYED RELEASE ORAL at 05:36

## 2020-04-28 RX ADMIN — INSULIN LISPRO 1 UNITS: 100 INJECTION, SOLUTION INTRAVENOUS; SUBCUTANEOUS at 21:16

## 2020-04-28 RX ADMIN — ATORVASTATIN CALCIUM 80 MG: 40 TABLET, FILM COATED ORAL at 17:01

## 2020-04-28 RX ADMIN — HEPARIN SODIUM 5000 UNITS: 5000 INJECTION INTRAVENOUS; SUBCUTANEOUS at 21:14

## 2020-04-28 RX ADMIN — SEVELAMER HYDROCHLORIDE 1600 MG: 800 TABLET, FILM COATED PARENTERAL at 08:39

## 2020-04-28 RX ADMIN — GABAPENTIN 200 MG: 100 CAPSULE ORAL at 21:14

## 2020-04-28 RX ADMIN — MELATONIN 3 MG: at 21:14

## 2020-04-28 RX ADMIN — LABETALOL HYDROCHLORIDE 300 MG: 100 TABLET, FILM COATED ORAL at 21:14

## 2020-04-28 RX ADMIN — SENNOSIDES 8.6 MG: 8.6 TABLET, FILM COATED ORAL at 08:39

## 2020-04-28 RX ADMIN — SEVELAMER HYDROCHLORIDE 1600 MG: 800 TABLET, FILM COATED PARENTERAL at 17:01

## 2020-04-28 RX ADMIN — LOPERAMIDE HYDROCHLORIDE 4 MG: 2 CAPSULE ORAL at 21:59

## 2020-04-28 RX ADMIN — LORATADINE 10 MG: 10 TABLET ORAL at 08:39

## 2020-04-29 ENCOUNTER — APPOINTMENT (INPATIENT)
Dept: NON INVASIVE DIAGNOSTICS | Facility: HOSPITAL | Age: 55
DRG: 640 | End: 2020-04-29
Payer: MEDICARE

## 2020-04-29 VITALS
OXYGEN SATURATION: 94 % | BODY MASS INDEX: 29.92 KG/M2 | SYSTOLIC BLOOD PRESSURE: 144 MMHG | RESPIRATION RATE: 17 BRPM | DIASTOLIC BLOOD PRESSURE: 85 MMHG | TEMPERATURE: 97.9 F | HEART RATE: 74 BPM | WEIGHT: 162.6 LBS | HEIGHT: 62 IN

## 2020-04-29 LAB
GLUCOSE SERPL-MCNC: 222 MG/DL (ref 65–140)
GLUCOSE SERPL-MCNC: 98 MG/DL (ref 65–140)
MRSA NOSE QL CULT: NORMAL

## 2020-04-29 PROCEDURE — 99239 HOSP IP/OBS DSCHRG MGMT >30: CPT | Performed by: PHYSICIAN ASSISTANT

## 2020-04-29 PROCEDURE — 82948 REAGENT STRIP/BLOOD GLUCOSE: CPT

## 2020-04-29 PROCEDURE — 99232 SBSQ HOSP IP/OBS MODERATE 35: CPT | Performed by: INTERNAL MEDICINE

## 2020-04-29 PROCEDURE — 93306 TTE W/DOPPLER COMPLETE: CPT | Performed by: INTERNAL MEDICINE

## 2020-04-29 PROCEDURE — 93306 TTE W/DOPPLER COMPLETE: CPT

## 2020-04-29 RX ORDER — LIDOCAINE 50 MG/G
1 PATCH TOPICAL DAILY
Qty: 30 PATCH | Refills: 0
Start: 2020-04-30 | End: 2020-07-27 | Stop reason: HOSPADM

## 2020-04-29 RX ORDER — MUSCLE RUB CREAM 100; 150 MG/G; MG/G
CREAM TOPICAL 4 TIMES DAILY PRN
Refills: 0
Start: 2020-04-29

## 2020-04-29 RX ORDER — LANOLIN ALCOHOL/MO/W.PET/CERES
3 CREAM (GRAM) TOPICAL
Refills: 0
Start: 2020-04-29

## 2020-04-29 RX ADMIN — SEVELAMER HYDROCHLORIDE 1600 MG: 800 TABLET, FILM COATED PARENTERAL at 12:23

## 2020-04-29 RX ADMIN — LABETALOL HYDROCHLORIDE 300 MG: 100 TABLET, FILM COATED ORAL at 08:00

## 2020-04-29 RX ADMIN — INSULIN LISPRO 2 UNITS: 100 INJECTION, SOLUTION INTRAVENOUS; SUBCUTANEOUS at 12:23

## 2020-04-29 RX ADMIN — LIDOCAINE 1 PATCH: 50 PATCH TOPICAL at 08:00

## 2020-04-29 RX ADMIN — HEPARIN SODIUM 5000 UNITS: 5000 INJECTION INTRAVENOUS; SUBCUTANEOUS at 14:08

## 2020-04-29 RX ADMIN — LORATADINE 10 MG: 10 TABLET ORAL at 08:00

## 2020-04-29 RX ADMIN — SEVELAMER HYDROCHLORIDE 1600 MG: 800 TABLET, FILM COATED PARENTERAL at 07:59

## 2020-04-29 RX ADMIN — PANTOPRAZOLE SODIUM 40 MG: 40 TABLET, DELAYED RELEASE ORAL at 05:02

## 2020-04-29 RX ADMIN — NIFEDIPINE 30 MG: 30 TABLET, EXTENDED RELEASE ORAL at 08:00

## 2020-04-29 RX ADMIN — HEPARIN SODIUM 5000 UNITS: 5000 INJECTION INTRAVENOUS; SUBCUTANEOUS at 05:02

## 2020-04-29 RX ADMIN — GABAPENTIN 200 MG: 100 CAPSULE ORAL at 08:00

## 2020-05-02 LAB
BACTERIA BLD CULT: NORMAL
BACTERIA BLD CULT: NORMAL

## 2020-07-23 ENCOUNTER — HOSPITAL ENCOUNTER (INPATIENT)
Facility: HOSPITAL | Age: 55
LOS: 4 days | Discharge: HOME HEALTH | DRG: 091 | End: 2020-07-27
Attending: EMERGENCY MEDICINE | Admitting: INTERNAL MEDICINE
Payer: MEDICARE

## 2020-07-23 ENCOUNTER — APPOINTMENT (EMERGENCY)
Dept: CT IMAGING | Facility: HOSPITAL | Age: 55
DRG: 091 | End: 2020-07-23
Payer: MEDICARE

## 2020-07-23 ENCOUNTER — APPOINTMENT (EMERGENCY)
Dept: RADIOLOGY | Facility: HOSPITAL | Age: 55
DRG: 091 | End: 2020-07-23
Payer: MEDICARE

## 2020-07-23 DIAGNOSIS — R41.82 ALTERED MENTAL STATUS: Primary | ICD-10-CM

## 2020-07-23 DIAGNOSIS — G92.8 TOXIC METABOLIC ENCEPHALOPATHY: ICD-10-CM

## 2020-07-23 DIAGNOSIS — F41.1 GAD (GENERALIZED ANXIETY DISORDER): ICD-10-CM

## 2020-07-23 DIAGNOSIS — Z91.89 AT RISK FOR POLYPHARMACY: ICD-10-CM

## 2020-07-23 DIAGNOSIS — Z99.2 ESRD (END STAGE RENAL DISEASE) ON DIALYSIS (HCC): ICD-10-CM

## 2020-07-23 DIAGNOSIS — I10 HYPERTENSION: ICD-10-CM

## 2020-07-23 DIAGNOSIS — F33.2 MDD (MAJOR DEPRESSIVE DISORDER), RECURRENT SEVERE, WITHOUT PSYCHOSIS (HCC): ICD-10-CM

## 2020-07-23 DIAGNOSIS — N18.6 ESRD (END STAGE RENAL DISEASE) ON DIALYSIS (HCC): ICD-10-CM

## 2020-07-23 LAB
ALBUMIN SERPL BCP-MCNC: 3.7 G/DL (ref 3.5–5)
ALP SERPL-CCNC: 145 U/L (ref 46–116)
ALT SERPL W P-5'-P-CCNC: 33 U/L (ref 12–78)
AMMONIA PLAS-SCNC: 22 UMOL/L (ref 11–35)
AMORPH URATE CRY URNS QL MICRO: ABNORMAL /HPF
AMPHETAMINES SERPL QL SCN: NEGATIVE
ANION GAP SERPL CALCULATED.3IONS-SCNC: 9 MMOL/L (ref 4–13)
APAP SERPL-MCNC: <2 UG/ML (ref 10–20)
APTT PPP: 30 SECONDS (ref 23–37)
AST SERPL W P-5'-P-CCNC: 18 U/L (ref 5–45)
BACTERIA UR QL AUTO: ABNORMAL /HPF
BARBITURATES UR QL: NEGATIVE
BASE EXCESS BLDA CALC-SCNC: 1 MMOL/L (ref -2–3)
BASOPHILS # BLD AUTO: 0.09 THOUSANDS/ΜL (ref 0–0.1)
BASOPHILS NFR BLD AUTO: 2 % (ref 0–1)
BENZODIAZ UR QL: NEGATIVE
BILIRUB DIRECT SERPL-MCNC: 0.1 MG/DL (ref 0–0.2)
BILIRUB SERPL-MCNC: 0.45 MG/DL (ref 0.2–1)
BILIRUB UR QL STRIP: NEGATIVE
BUN SERPL-MCNC: 53 MG/DL (ref 5–25)
CA-I BLD-SCNC: 1.23 MMOL/L (ref 1.12–1.32)
CALCIUM SERPL-MCNC: 9.4 MG/DL (ref 8.3–10.1)
CHLORIDE SERPL-SCNC: 98 MMOL/L (ref 100–108)
CLARITY UR: CLEAR
CO2 SERPL-SCNC: 30 MMOL/L (ref 21–32)
COCAINE UR QL: NEGATIVE
COLOR UR: YELLOW
CREAT SERPL-MCNC: 7.44 MG/DL (ref 0.6–1.3)
EOSINOPHIL # BLD AUTO: 0.18 THOUSAND/ΜL (ref 0–0.61)
EOSINOPHIL NFR BLD AUTO: 3 % (ref 0–6)
ERYTHROCYTE [DISTWIDTH] IN BLOOD BY AUTOMATED COUNT: 17.2 % (ref 11.6–15.1)
ETHANOL SERPL-MCNC: <3 MG/DL (ref 0–3)
GFR SERPL CREATININE-BSD FRML MDRD: 6 ML/MIN/1.73SQ M
GLUCOSE SERPL-MCNC: 111 MG/DL (ref 65–140)
GLUCOSE SERPL-MCNC: 111 MG/DL (ref 65–140)
GLUCOSE SERPL-MCNC: 92 MG/DL (ref 65–140)
GLUCOSE SERPL-MCNC: 93 MG/DL (ref 65–140)
GLUCOSE SERPL-MCNC: 96 MG/DL (ref 65–140)
GLUCOSE UR STRIP-MCNC: ABNORMAL MG/DL
HCO3 BLDA-SCNC: 25.5 MMOL/L (ref 22–28)
HCT VFR BLD AUTO: 35.4 % (ref 34.8–46.1)
HCT VFR BLD CALC: 31 % (ref 34.8–46.1)
HGB BLD-MCNC: 11.5 G/DL (ref 11.5–15.4)
HGB BLDA-MCNC: 10.5 G/DL (ref 11.5–15.4)
HGB UR QL STRIP.AUTO: ABNORMAL
IMM GRANULOCYTES # BLD AUTO: 0.03 THOUSAND/UL (ref 0–0.2)
IMM GRANULOCYTES NFR BLD AUTO: 1 % (ref 0–2)
INR PPP: 0.99 (ref 0.84–1.19)
KETONES UR STRIP-MCNC: NEGATIVE MG/DL
LACTATE SERPL-SCNC: 1.2 MMOL/L (ref 0.5–2)
LEUKOCYTE ESTERASE UR QL STRIP: NEGATIVE
LYMPHOCYTES # BLD AUTO: 1.79 THOUSANDS/ΜL (ref 0.6–4.47)
LYMPHOCYTES NFR BLD AUTO: 34 % (ref 14–44)
MCH RBC QN AUTO: 32.3 PG (ref 26.8–34.3)
MCHC RBC AUTO-ENTMCNC: 32.5 G/DL (ref 31.4–37.4)
MCV RBC AUTO: 99 FL (ref 82–98)
METHADONE UR QL: NEGATIVE
MONOCYTES # BLD AUTO: 0.49 THOUSAND/ΜL (ref 0.17–1.22)
MONOCYTES NFR BLD AUTO: 9 % (ref 4–12)
NEUTROPHILS # BLD AUTO: 2.77 THOUSANDS/ΜL (ref 1.85–7.62)
NEUTS SEG NFR BLD AUTO: 51 % (ref 43–75)
NITRITE UR QL STRIP: NEGATIVE
NON-SQ EPI CELLS URNS QL MICRO: ABNORMAL /HPF
NRBC BLD AUTO-RTO: 0 /100 WBCS
OPIATES UR QL SCN: NEGATIVE
OXYCODONE+OXYMORPHONE UR QL SCN: NEGATIVE
PCO2 BLD: 27 MMOL/L (ref 21–32)
PCO2 BLD: 41.5 MM HG (ref 36–44)
PCP UR QL: NEGATIVE
PH BLD: 7.4 [PH] (ref 7.35–7.45)
PH UR STRIP.AUTO: 6 [PH] (ref 4.5–8)
PLATELET # BLD AUTO: 274 THOUSANDS/UL (ref 149–390)
PMV BLD AUTO: 10.1 FL (ref 8.9–12.7)
PO2 BLD: 73 MM HG (ref 75–129)
POTASSIUM BLD-SCNC: 4.9 MMOL/L (ref 3.5–5.3)
POTASSIUM SERPL-SCNC: 5.3 MMOL/L (ref 3.5–5.3)
PROT SERPL-MCNC: 7.8 G/DL (ref 6.4–8.2)
PROT UR STRIP-MCNC: >=300 MG/DL
PROTHROMBIN TIME: 12.5 SECONDS (ref 11.6–14.5)
RBC # BLD AUTO: 3.56 MILLION/UL (ref 3.81–5.12)
RBC #/AREA URNS AUTO: ABNORMAL /HPF
SALICYLATES SERPL-MCNC: <3 MG/DL (ref 3–20)
SAO2 % BLD FROM PO2: 94 % (ref 60–85)
SODIUM BLD-SCNC: 137 MMOL/L (ref 136–145)
SODIUM SERPL-SCNC: 137 MMOL/L (ref 136–145)
SP GR UR STRIP.AUTO: 1.02 (ref 1–1.03)
SPECIMEN SOURCE: ABNORMAL
THC UR QL: NEGATIVE
TROPONIN I SERPL-MCNC: <0.02 NG/ML
TSH SERPL DL<=0.05 MIU/L-ACNC: 2.42 UIU/ML (ref 0.36–3.74)
UROBILINOGEN UR QL STRIP.AUTO: 0.2 E.U./DL
WBC # BLD AUTO: 5.35 THOUSAND/UL (ref 4.31–10.16)
WBC #/AREA URNS AUTO: ABNORMAL /HPF

## 2020-07-23 PROCEDURE — 36415 COLL VENOUS BLD VENIPUNCTURE: CPT | Performed by: PHYSICIAN ASSISTANT

## 2020-07-23 PROCEDURE — 82248 BILIRUBIN DIRECT: CPT | Performed by: PHYSICIAN ASSISTANT

## 2020-07-23 PROCEDURE — 84443 ASSAY THYROID STIM HORMONE: CPT | Performed by: PHYSICIAN ASSISTANT

## 2020-07-23 PROCEDURE — 80307 DRUG TEST PRSMV CHEM ANLYZR: CPT | Performed by: PHYSICIAN ASSISTANT

## 2020-07-23 PROCEDURE — 36600 WITHDRAWAL OF ARTERIAL BLOOD: CPT

## 2020-07-23 PROCEDURE — 84484 ASSAY OF TROPONIN QUANT: CPT | Performed by: PHYSICIAN ASSISTANT

## 2020-07-23 PROCEDURE — 84295 ASSAY OF SERUM SODIUM: CPT

## 2020-07-23 PROCEDURE — 85025 COMPLETE CBC W/AUTO DIFF WBC: CPT | Performed by: PHYSICIAN ASSISTANT

## 2020-07-23 PROCEDURE — 99223 1ST HOSP IP/OBS HIGH 75: CPT | Performed by: INTERNAL MEDICINE

## 2020-07-23 PROCEDURE — 82948 REAGENT STRIP/BLOOD GLUCOSE: CPT

## 2020-07-23 PROCEDURE — 80329 ANALGESICS NON-OPIOID 1 OR 2: CPT | Performed by: PHYSICIAN ASSISTANT

## 2020-07-23 PROCEDURE — 80320 DRUG SCREEN QUANTALCOHOLS: CPT | Performed by: PHYSICIAN ASSISTANT

## 2020-07-23 PROCEDURE — 80053 COMPREHEN METABOLIC PANEL: CPT | Performed by: PHYSICIAN ASSISTANT

## 2020-07-23 PROCEDURE — 85014 HEMATOCRIT: CPT

## 2020-07-23 PROCEDURE — 96360 HYDRATION IV INFUSION INIT: CPT

## 2020-07-23 PROCEDURE — 82140 ASSAY OF AMMONIA: CPT | Performed by: PHYSICIAN ASSISTANT

## 2020-07-23 PROCEDURE — 99285 EMERGENCY DEPT VISIT HI MDM: CPT

## 2020-07-23 PROCEDURE — 70450 CT HEAD/BRAIN W/O DYE: CPT

## 2020-07-23 PROCEDURE — 82652 VIT D 1 25-DIHYDROXY: CPT | Performed by: PSYCHIATRY & NEUROLOGY

## 2020-07-23 PROCEDURE — 81001 URINALYSIS AUTO W/SCOPE: CPT

## 2020-07-23 PROCEDURE — 85610 PROTHROMBIN TIME: CPT | Performed by: PHYSICIAN ASSISTANT

## 2020-07-23 PROCEDURE — 93005 ELECTROCARDIOGRAM TRACING: CPT

## 2020-07-23 PROCEDURE — 99285 EMERGENCY DEPT VISIT HI MDM: CPT | Performed by: EMERGENCY MEDICINE

## 2020-07-23 PROCEDURE — 85730 THROMBOPLASTIN TIME PARTIAL: CPT | Performed by: PHYSICIAN ASSISTANT

## 2020-07-23 PROCEDURE — 82947 ASSAY GLUCOSE BLOOD QUANT: CPT

## 2020-07-23 PROCEDURE — 87040 BLOOD CULTURE FOR BACTERIA: CPT | Performed by: PHYSICIAN ASSISTANT

## 2020-07-23 PROCEDURE — 83605 ASSAY OF LACTIC ACID: CPT | Performed by: PHYSICIAN ASSISTANT

## 2020-07-23 PROCEDURE — 82803 BLOOD GASES ANY COMBINATION: CPT

## 2020-07-23 PROCEDURE — 84132 ASSAY OF SERUM POTASSIUM: CPT

## 2020-07-23 PROCEDURE — 82330 ASSAY OF CALCIUM: CPT

## 2020-07-23 PROCEDURE — 71045 X-RAY EXAM CHEST 1 VIEW: CPT

## 2020-07-23 RX ORDER — INSULIN GLARGINE 100 [IU]/ML
20 INJECTION, SOLUTION SUBCUTANEOUS
Status: DISCONTINUED | OUTPATIENT
Start: 2020-07-23 | End: 2020-07-24

## 2020-07-23 RX ORDER — LABETALOL 100 MG/1
300 TABLET, FILM COATED ORAL EVERY 12 HOURS SCHEDULED
Status: DISCONTINUED | OUTPATIENT
Start: 2020-07-23 | End: 2020-07-27 | Stop reason: HOSPADM

## 2020-07-23 RX ORDER — ALBUTEROL SULFATE 90 UG/1
2 AEROSOL, METERED RESPIRATORY (INHALATION) EVERY 6 HOURS PRN
Status: DISCONTINUED | OUTPATIENT
Start: 2020-07-23 | End: 2020-07-27 | Stop reason: HOSPADM

## 2020-07-23 RX ORDER — LORATADINE 10 MG/1
10 TABLET ORAL DAILY
Status: DISCONTINUED | OUTPATIENT
Start: 2020-07-24 | End: 2020-07-27 | Stop reason: HOSPADM

## 2020-07-23 RX ORDER — AMITRIPTYLINE HYDROCHLORIDE 50 MG/1
50 TABLET, FILM COATED ORAL
COMMUNITY
End: 2020-07-27 | Stop reason: HOSPADM

## 2020-07-23 RX ORDER — ATORVASTATIN CALCIUM 40 MG/1
80 TABLET, FILM COATED ORAL EVERY EVENING
Status: DISCONTINUED | OUTPATIENT
Start: 2020-07-23 | End: 2020-07-27 | Stop reason: HOSPADM

## 2020-07-23 RX ORDER — ACETAMINOPHEN 325 MG/1
650 TABLET ORAL EVERY 6 HOURS PRN
Status: DISCONTINUED | OUTPATIENT
Start: 2020-07-23 | End: 2020-07-27 | Stop reason: HOSPADM

## 2020-07-23 RX ORDER — NIFEDIPINE 30 MG/1
30 TABLET, EXTENDED RELEASE ORAL DAILY
Status: DISCONTINUED | OUTPATIENT
Start: 2020-07-24 | End: 2020-07-27 | Stop reason: HOSPADM

## 2020-07-23 RX ORDER — OXYCODONE HYDROCHLORIDE AND ACETAMINOPHEN 5; 325 MG/1; MG/1
1 TABLET ORAL EVERY 4 HOURS PRN
COMMUNITY
End: 2020-07-27 | Stop reason: HOSPADM

## 2020-07-23 RX ORDER — LABETALOL 20 MG/4 ML (5 MG/ML) INTRAVENOUS SYRINGE
10 EVERY 6 HOURS PRN
Status: DISCONTINUED | OUTPATIENT
Start: 2020-07-23 | End: 2020-07-27 | Stop reason: HOSPADM

## 2020-07-23 RX ORDER — LOSARTAN POTASSIUM 50 MG/1
50 TABLET ORAL DAILY
Status: DISCONTINUED | OUTPATIENT
Start: 2020-07-24 | End: 2020-07-27 | Stop reason: HOSPADM

## 2020-07-23 RX ORDER — LANOLIN ALCOHOL/MO/W.PET/CERES
3 CREAM (GRAM) TOPICAL
Status: DISCONTINUED | OUTPATIENT
Start: 2020-07-23 | End: 2020-07-27 | Stop reason: HOSPADM

## 2020-07-23 RX ORDER — LOSARTAN POTASSIUM 50 MG/1
50 TABLET ORAL DAILY
COMMUNITY

## 2020-07-23 RX ORDER — TRAZODONE HYDROCHLORIDE 100 MG/1
100 TABLET ORAL
COMMUNITY

## 2020-07-23 RX ORDER — LABETALOL 20 MG/4 ML (5 MG/ML) INTRAVENOUS SYRINGE
10 ONCE
Status: DISCONTINUED | OUTPATIENT
Start: 2020-07-23 | End: 2020-07-23

## 2020-07-23 RX ORDER — BACLOFEN 10 MG/1
10 TABLET ORAL 3 TIMES DAILY PRN
COMMUNITY
Start: 2020-07-17 | End: 2020-07-27 | Stop reason: HOSPADM

## 2020-07-23 RX ORDER — ALBUTEROL SULFATE 90 UG/1
2 AEROSOL, METERED RESPIRATORY (INHALATION) EVERY 6 HOURS PRN
COMMUNITY

## 2020-07-23 RX ORDER — ONDANSETRON 2 MG/ML
4 INJECTION INTRAMUSCULAR; INTRAVENOUS EVERY 6 HOURS PRN
Status: DISCONTINUED | OUTPATIENT
Start: 2020-07-23 | End: 2020-07-27 | Stop reason: HOSPADM

## 2020-07-23 RX ADMIN — SODIUM CHLORIDE 1000 ML: 0.9 INJECTION, SOLUTION INTRAVENOUS at 13:29

## 2020-07-23 RX ADMIN — LABETALOL 20 MG/4 ML (5 MG/ML) INTRAVENOUS SYRINGE 10 MG: at 19:39

## 2020-07-23 RX ADMIN — CEFTRIAXONE SODIUM 1000 MG: 10 INJECTION, POWDER, FOR SOLUTION INTRAVENOUS at 16:30

## 2020-07-23 NOTE — ASSESSMENT & PLAN NOTE
· No focal neurologic symptoms, however exam limited due to patient cooperation   CT head normal   · Continue statin

## 2020-07-23 NOTE — H&P
[address] [facility]  H&P- Perri Ordaz 1965, 63 y o  female MRN: [id number]    Unit/Bed#: ED 08 Encounter: [id number]    Primary Care Provider: Perri Ordaz MD   Date and time admitted to hospital: 7/23/2020 12:51 PM        * Toxic metabolic encephalopathy  Assessment & Plan  · Present on admission, suspect mixed with severe depression  I suspect this is due to patient's Baclofen use  She was recently started on this for twitching  Her daughter states that she experienced hallucinations with this so her daughter advised to stop it  Reports last dose was yesterday  Missed dialysis today and Baclofen is renally cleared  Patient appears altered, however will move arm so that it does not drop on her face  Does shake her head no and will squeeze eyes shut  Unclear how much is drug induced vs psych  Overall, laboratory studies unremarkable  Doubt infectious process  Doubt stroke  · Admit patient to med/surg under inpatient status  · Hold sedating medications   · Baclofen, Elavil, Trazodone  · Status post 1 L IVF in ER  · Hold further due to issues with volume in the past   · Monitor temps  · Hold further antibiotics   · Nephro and Psych consults   · Monitor CBC, BMP, Lytes  MDD (major depressive disorder), recurrent severe, without [facility] ([facility] )  Assessment & Plan  · Patient's daughter states that patient disclosed to her sister that she was very depressed  She has had issues with this in the past  Patient is tearing on my examination, but will not speak  She is withdrawn to the point of appearing altered, but will shake her head no  However, I do suspect there is an element of toxic encephalopathy due to Baclofen usage  · Hold sedating medications   · Psychiatry consult     ANNETTE (generalized anxiety disorder)  Assessment & Plan  · Noted history  Patient was noted to have "twitching" and was started on Baclofen   ?Manifestation of anxiety  · Psych consult   · Hold off potentially sedating medications at this time     ESRD on hemodialysis [facility])  Assessment & Plan  · Patient gets dialysis TuThSat  She missed today's session  I do not believe that that directly is contributing to her mental status however I believe that dialysis would aid in removing some potential toxicity from her Baclofen   · Nephrology consult for dialysis management   · Continue current regimen     Type 2 diabetes mellitus with hyperglycemia, with long-term current use of insulin [facility])  Assessment & Plan  Lab Results   Component Value Date    HGBA1C 8 7 (H) 02/05/2020       Recent Labs     07/23/20  1312   POCGLU 111       Blood Sugar Average: Last 72 hrs:  (P) 111   · Normoglycemic on admission   · Continue home insulin - Lantus 20 U QHS  · SSI algorithm 1 with meals and bedtime   · QID accuchecks      Essential hypertension  Assessment & Plan  · BP acceptable  · Continue home regimen     H/O: CVA (cerebrovascular accident)  Assessment & Plan  · No focal neurologic symptoms, however exam limited due to patient cooperation  CT head normal   · Continue statin       VTE Prophylaxis: None needed   / reason for no mechanical VTE prophylaxis None needed   Code Status: Full Code   POLST: POLST form is not discussed and not completed at this time  Discussion with family: Call daughter     Anticipated Length of Stay:  Patient will be admitted on an Inpatient basis with an anticipated length of stay of  Greater than 2 midnights  Justification for Hospital Stay: As per above assessment and plan     Total Time for Visit, including Counseling / Coordination of Care: 1 hour  Greater than 50% of this total time spent on direct patient counseling and coordination of care  Chief Complaint:   Altered Mental Status     History of Present Illness:    Tyrel Fitzgerald is a 63 y o  female who presents with altered mental status  Patient was brought in by family members after she was found to be altered    Patient was scheduled to have dialysis today which she missed  As per ER documentation she told her family that she was dizzy last night and then went to bed  Patient is not forthcoming with any information or history  I spoke with the patient's daughter about the parameters that brought her mother into the hospital   She reports that she knew that her mother was altered  She reports that her mother calls her other daughter 3-4 times per day but she did not call her much today  She does report that she told her other daughter that she was feeling very depressed, which she reports has happened in the past   Her mother did not make any overt suicidal or homicidal ideations  The patient's daughter reports that she was recently started on a new medication for twitching that she was having  She states she was started on baclofen 3 times a day as needed  She states the patient did have some hallucination after starting this medication and she advised her to stop it  She reports the patient's last dose this medication was yesterday  She does note that the patient did miss her dialysis session today  She denies any other new medications  Review of Systems:    Review of Systems   Unable to perform ROS: Mental status change       Past Medical and Surgical History:     Past Medical History:   Diagnosis Date    Asthma     Depression     Diabetes mellitus ([facility] )     Hyperlipidemia     Hypertension     Renal disorder     daylsis T,Th, Saturday    Stroke [facility])        Past Surgical History:   Procedure Laterality Date    AV FISTULA PLACEMENT      2/13/2020 and 4/3/2020    CHOLECYSTECTOMY      HYSTERECTOMY         Meds/Allergies:    Prior to Admission medications    Medication Sig Start Date End Date Taking?  Authorizing Provider   albuterol (PROVENTIL HFA,VENTOLIN HFA) 90 mcg/act inhaler Inhale 2 puffs every 6 (six) hours as needed for wheezing   Yes Historical Provider, MD   amitriptyline (ELAVIL) 50 mg tablet Take 50 mg by mouth daily at bedtime Yes Historical Provider, MD   baclofen 10 mg tablet Take 10 mg by mouth Three times daily as needed 7/17/20 7/17/21 Yes Historical Provider, MD   gabapentin (NEURONTIN) 100 mg capsule Take 2 capsules (200 mg total) by mouth every 12 (twelve) hours At 9am and 9pm 6/26/19  Yes Mansoor Coronado PA-C   insulin glargine (LANTUS) 100 units/mL subcutaneous injection Inject 20 Units under the skin daily at bedtime 3/16/20  Yes Lona Massey MD   labetalol (NORMODYNE) 300 mg tablet Take 1 tablet (300 mg total) by mouth every 12 (twelve) hours At 9am and 9pm  Patient taking differently: Take 300 mg by mouth daily At 9am and 9pm 6/26/19  Yes Mansoor Coronado PA-C   losartan (COZAAR) 50 mg tablet Take 50 mg by mouth daily   Yes Historical Provider, MD   NIFEdipine ER (ADALAT CC) 30 MG 24 hr tablet Take 1 tablet (30 mg total) by mouth daily At 9am 6/26/19  Yes Mansoor Coronado PA-C   oxyCODONE-acetaminophen (PERCOCET) 5-325 mg per tablet Take 1 tablet by mouth every 4 (four) hours as needed for moderate pain   Yes Historical Provider, MD   traZODone (DESYREL) 100 mg tablet Take 100 mg by mouth daily at bedtime   Yes Historical Provider, MD   acetaminophen (TYLENOL) 500 MG chewable tablet Chew 1 tablet every 6 (six) hours as needed for mild pain 11/15/17   Samantha Meyer MD   atorvastatin (LIPITOR) 80 mg tablet Take 1 tablet (80 mg total) by mouth every evening 6/26/19   Mansoor Coronado PA-C   fexofenadine (ALLEGRA) 180 MG tablet Take 180 mg by mouth daily    Historical Provider, MD   lidocaine (LIDODERM) 5 % Apply 1 patch topically daily Remove & Discard patch within 12 hours or as directed by MD 4/30/20   Marialuisa Lubin PA-C   melatonin 3 mg Take 1 tablet (3 mg total) by mouth daily at bedtime Over the counter 4/29/20   Analy Juarez PA-C   menthol-methyl salicylate (BENGAY) 60-74 % cream Apply topically 4 (four) times a day as needed (shoulder pain) Over the counter 4/29/20   Marialuisa Lubin PA-C pantoprazole (PROTONIX) 40 mg tablet Take 1 tablet (40 mg total) by mouth daily in the early morning 3/17/20 4/16/20  Joselito Berman MD   sevelamer (RENAGEL) 800 mg tablet Take 2 tablets (1,600 mg total) by mouth 3 (three) times a day with meals for 15 days 3/16/20 3/31/20  Joselito Berman MD     I have reviewed home medications with patient family member  Allergies: Allergies   Allergen Reactions    Lisinopril Swelling and Cough       Social History:     Marital Status:    Occupation: Noncontributory   Patient Pre-hospital Living Situation: Home  Patient Pre-hospital Level of Mobility: Full  Patient Pre-hospital Diet Restrictions: none  Substance Use History:   Social History     Substance and Sexual Activity   Alcohol Use Not Currently     Social History     Tobacco Use   Smoking Status Never Smoker   Smokeless Tobacco Never Used     Social History     Substance and Sexual Activity   Drug Use Not Currently       Family History:    History reviewed  No pertinent family history  Physical Exam:     Vitals:   Blood Pressure: (!) 171/77 (07/23/20 1715)  Pulse: 82 (07/23/20 1715)  Temperature: 98 2 °F (36 8 °C) (07/23/20 1305)  Temp Source: Oral (07/23/20 1305)  Respirations: 18 (07/23/20 1255)  Weight - Scale: 75 8 kg (167 lb 1 7 oz) (07/23/20 1255)  SpO2: 97 % (07/23/20 1715)    Physical Exam   Constitutional: Vital signs are normal  She appears well-developed and well-nourished  She is uncooperative  Non-toxic appearance  No distress  HENT:   Head: Normocephalic and atraumatic  Mouth/Throat: Mucous membranes are not dry  Eyes: Pupils are equal, round, and reactive to light  EOM are normal  Right conjunctiva is injected  Left conjunctiva is injected  No scleral icterus  Pupils are equal    Neck: Neck supple  Cardiovascular: Normal rate, regular rhythm, S1 normal, S2 normal, normal heart sounds and intact distal pulses  Exam reveals no S3 and no S4     No murmur heard   Pulmonary/Chest: Effort normal and breath sounds normal  No accessory muscle usage or stridor  No apnea, no tachypnea and no bradypnea  No respiratory distress  She has no decreased breath sounds  She has no wheezes  She has no rhonchi  She has no rales  She exhibits no tenderness  Abdominal: Soft  Bowel sounds are normal  She exhibits no distension and no mass  There is no tenderness  There is no rigidity, no rebound and no guarding  Neurological: She displays tremor  She displays no seizure activity  Skin: Skin is warm and dry  Psychiatric: She is withdrawn  She is not agitated and not aggressive  She is noncommunicative  Patient noted to have tears in her eyes that her clothes on examination  When I attempt to open the patient's eyes she will squeeze them shut  I asked the patient in her native tongue if she is depressed and what is happening with no response  Patient would shake her head no when I asked her to talk to me more  It was noted that when the patient's arm was lifted above her head that she would redirect her arm so I would not hit her head but then allowed to fall to her side with gravity  Additional Data:     Lab Results: I have personally reviewed pertinent reports        Results from last 7 days   Lab Units 07/23/20 1512 07/23/20  1308   WBC Thousand/uL  --  5 35   HEMOGLOBIN g/dL  --  11 5   I STAT HEMOGLOBIN g/dl 10 5*  --    HEMATOCRIT %  --  35 4   HEMATOCRIT, ISTAT % 31*  --    PLATELETS Thousands/uL  --  274   NEUTROS PCT %  --  51   LYMPHS PCT %  --  34   MONOS PCT %  --  9   EOS PCT %  --  3     Results from last 7 days   Lab Units 07/23/20  1512 07/23/20  1309   SODIUM mmol/L  --  137   POTASSIUM mmol/L  --  5 3   CHLORIDE mmol/L  --  98*   CO2 mmol/L  --  30   CO2, I-STAT mmol/L 27  --    BUN mg/dL  --  53*   CREATININE mg/dL  --  7 44*   ANION GAP mmol/L  --  9   CALCIUM mg/dL  --  9 4   ALBUMIN g/dL  --  3 7   TOTAL BILIRUBIN mg/dL  --  0 45   ALK PHOS U/L  --  145*   ALT U/L  --  33   AST U/L  --  18   GLUCOSE RANDOM mg/dL  --  111     Results from last 7 days   Lab Units 07/23/20  1309   INR  0 99     Results from last 7 days   Lab Units 07/23/20  1312   POC GLUCOSE mg/dl 111         Results from last 7 days   Lab Units 07/23/20  1308   LACTIC ACID mmol/L 1 2       Imaging: I have personally reviewed pertinent reports  CT head without contrast   Final Result by Jey Honeycutt MD (07/23 1351)      No acute intracranial hemorrhage seen                     Workstation performed: [id number]         XR chest 1 view portable   Final Result by Stevenson Singletary MD (07/23 1340)      No acute cardiopulmonary disease  Workstation performed: [id number]             EKG, Pathology, and Other Studies Reviewed on Admission:   · EKG: NSR, 90 BPM  · CXR: No acute pulmonary disease   · CT Head: No acute intracranial hemorrhage seen  Allscripts / [facility] Records Reviewed: Yes     ** Please Note: This note has been constructed using a voice recognition system   **

## 2020-07-23 NOTE — ASSESSMENT & PLAN NOTE
· Patient's daughter states that patient disclosed to her sister that she was very depressed  She has had issues with this in the past  Patient is tearing on my examination, but will not speak  She is withdrawn to the point of appearing altered, but will shake her head no  However, I do suspect there is an element of toxic encephalopathy due to Baclofen usage     · Hold sedating medications   · Psychiatry consult

## 2020-07-23 NOTE — ED ATTENDING ATTESTATION
7/23/2020  I, Johann Ball MD, saw and evaluated the patient  I have discussed the patient with the resident/non-physician practitioner and agree with the resident's/non-physician practitioner's findings, Plan of Care, and MDM as documented in the resident's/non-physician practitioner's note, except where noted  All available labs and Radiology studies were reviewed  I was present for key portions of any procedure(s) performed by the resident/non-physician practitioner and I was immediately available to provide assistance  At this point I agree with the current assessment done in the Emergency Department  I have conducted an independent evaluation of this patient a history and physical is as follows:  I personally saw and evaluated patient shortly after patient's arrival   Patient was global encephalopathy  Does have a history of CVA  Blood sugar unremarkable  Patient with no focal symptoms to warrant stroke alert at this time  She was last known last evening by family  Does have a history psychiatric illness previous suicide attempt  Medical workup reveals UTI, IV Rocephin given to patient  GCS 11-12 in ER  No indication for intubation at this time  Will need admission for encephalopathy of unknown etiology at this time, possibly infectious, possibly toxic metabolic, possibly related to hypertension  Patient's blood pressure improved without ER treatment    Will defer additional management to inpatient team     ED Course         Critical Care Time  Procedures

## 2020-07-23 NOTE — ASSESSMENT & PLAN NOTE
· Noted history  Patient was noted to have "twitching" and was started on Baclofen   ?Manifestation of anxiety  · Psych consult   · Hold off potentially sedating medications at this time

## 2020-07-23 NOTE — ASSESSMENT & PLAN NOTE
Lab Results   Component Value Date    HGBA1C 8 7 (H) 02/05/2020       Recent Labs     07/23/20  1312   POCGLU 111       Blood Sugar Average: Last 72 hrs:  (P) 111   · Normoglycemic on admission   · Continue home insulin - Lantus 20 U QHS  · SSI algorithm 1 with meals and bedtime   · QID accuchecks

## 2020-07-23 NOTE — ASSESSMENT & PLAN NOTE
· Present on admission, suspect mixed with severe depression  I suspect this is due to patient's Baclofen use  She was recently started on this for twitching  Her daughter states that she experienced hallucinations with this so her daughter advised to stop it  Reports last dose was yesterday  Missed dialysis today and Baclofen is renally cleared  Patient appears altered, however will move arm so that it does not drop on her face  Does shake her head no and will squeeze eyes shut  Unclear how much is drug induced vs psych  Overall, laboratory studies unremarkable  Doubt infectious process  Doubt stroke  · Admit patient to med/surg under inpatient status  · Hold sedating medications   · Baclofen, Elavil, Trazodone  · Status post 1 L IVF in ER  · Hold further due to issues with volume in the past   · Monitor temps  · Hold further antibiotics   · Nephro and Psych consults   · Monitor CBC, BMP, Lytes

## 2020-07-23 NOTE — ED PROVIDER NOTES
History  Chief Complaint   Patient presents with    CVA/TIA-like Symptoms     per EMS pt was found this am by family with AMS  last seen well last night 1900  hx CVA with left sided weakness  The patient is a 65-year-old female with extensive past medical history including previous CVA and ESRD on dialysis who presents to the emergency department via EMS due to altered mental status  Per EMS, family last saw the patient normal around 1900 last night  They state when they went to wake her up this morning, and she was altered  Per the family on the phone, they state that patient generally walks and talks normally  They states she last had dialysis Tuesday  They states that around late afternoon yesterday, the patient began complaining that she did not feel well and felt a little dizzy  He states she did not want to go to the hospital at the time  They report that she just wanted to go to bed and sleep  They state when they left the house this morning, she was still sleeping, and they did not disturb her at that time  Prior to calling EMS, when they went to wake her they found her in the altered state  The additionally report that she has been more depressed recently  Patient has a previous history of stroke with some residual left-sided deficits  Patient's family has no further information is at this time  History provided by:  Relative and EMS personnel  History limited by:  Mental status change   used: Yes    CVA/TIA-like Symptoms       Prior to Admission Medications   Prescriptions Last Dose Informant Patient Reported? Taking?    NIFEdipine ER (ADALAT CC) 30 MG 24 hr tablet   No Yes   Sig: Take 1 tablet (30 mg total) by mouth daily At 9am   acetaminophen (TYLENOL) 500 MG chewable tablet   No No   Sig: Chew 1 tablet every 6 (six) hours as needed for mild pain   albuterol (PROVENTIL HFA,VENTOLIN HFA) 90 mcg/act inhaler   Yes Yes   Sig: Inhale 2 puffs every 6 (six) hours as needed for wheezing   amitriptyline (ELAVIL) 50 mg tablet   Yes Yes   Sig: Take 50 mg by mouth daily at bedtime   atorvastatin (LIPITOR) 80 mg tablet   No No   Sig: Take 1 tablet (80 mg total) by mouth every evening   baclofen 10 mg tablet   Yes Yes   Sig: Take 10 mg by mouth Three times daily as needed   fexofenadine (ALLEGRA) 180 MG tablet   Yes No   Sig: Take 180 mg by mouth daily   gabapentin (NEURONTIN) 100 mg capsule   No Yes   Sig: Take 2 capsules (200 mg total) by mouth every 12 (twelve) hours At 9am and 9pm   insulin glargine (LANTUS) 100 units/mL subcutaneous injection   No Yes   Sig: Inject 20 Units under the skin daily at bedtime   labetalol (NORMODYNE) 300 mg tablet   No Yes   Sig: Take 1 tablet (300 mg total) by mouth every 12 (twelve) hours At 9am and 9pm   Patient taking differently: Take 300 mg by mouth daily At 9am and 9pm   lidocaine (LIDODERM) 5 %   No No   Sig: Apply 1 patch topically daily Remove & Discard patch within 12 hours or as directed by MD   losartan (COZAAR) 50 mg tablet   Yes Yes   Sig: Take 50 mg by mouth daily   melatonin 3 mg   No No   Sig: Take 1 tablet (3 mg total) by mouth daily at bedtime Over the counter   menthol-methyl salicylate (BENGAY) 94-02 % cream   No No   Sig: Apply topically 4 (four) times a day as needed (shoulder pain) Over the counter   oxyCODONE-acetaminophen (PERCOCET) 5-325 mg per tablet   Yes Yes   Sig: Take 1 tablet by mouth every 4 (four) hours as needed for moderate pain   pantoprazole (PROTONIX) 40 mg tablet   No No   Sig: Take 1 tablet (40 mg total) by mouth daily in the early morning   sevelamer (RENAGEL) 800 mg tablet   No No   Sig: Take 2 tablets (1,600 mg total) by mouth 3 (three) times a day with meals for 15 days   traZODone (DESYREL) 100 mg tablet   Yes Yes   Sig: Take 100 mg by mouth daily at bedtime      Facility-Administered Medications: None       Past Medical History:   Diagnosis Date    Asthma     Depression     Diabetes mellitus ([facility] [address] )  Hyperlipidemia     Hypertension     Renal disorder     daylsis T,Th, Saturday    Stroke [facility])        Past Surgical History:   Procedure Laterality Date    AV FISTULA PLACEMENT      2/13/2020 and 4/3/2020    CHOLECYSTECTOMY      HYSTERECTOMY         History reviewed  No pertinent family history  I have reviewed and agree with the history as documented  E-Cigarette/Vaping    E-Cigarette Use Never User      E-Cigarette/Vaping Substances     Social History     Tobacco Use    Smoking status: Never Smoker    Smokeless tobacco: Never Used   Substance Use Topics    Alcohol use: Not Currently    Drug use: Not Currently       Review of Systems   Unable to perform ROS: Mental status change       Physical Exam  Physical Exam   Constitutional: She appears well-developed and well-nourished  She has a sickly appearance  She appears ill  Patient responsive to pain  She is not appropriately answering questions  She is not following commands  Patient is whimpering  HENT:   Head: Normocephalic and atraumatic  Nose: Nose normal    Eyes: Pupils are equal, round, and reactive to light  Conjunctivae and lids are normal    Neck: Normal range of motion  Neck supple  Cardiovascular: Normal rate and regular rhythm  Murmur heard  Pulmonary/Chest: Effort normal and breath sounds normal    Abdominal: Normal appearance and bowel sounds are normal    Neurological: She is disoriented  GCS eye subscore is 3  GCS verbal subscore is 4  GCS motor subscore is 5         Vital Signs  ED Triage Vitals   Temperature Pulse Respirations Blood Pressure SpO2   07/23/20 1305 07/23/20 1255 07/23/20 1255 07/23/20 1255 07/23/20 1255   98 2 °F (36 8 °C) 94 18 (!) 166/110 97 %      Temp Source Heart Rate Source Patient Position - Orthostatic VS BP Location FiO2 (%)   07/23/20 1305 07/23/20 1255 07/23/20 1255 07/23/20 1255 --   Oral Monitor Lying Right arm       Pain Score       --                  Vitals:    07/23/20 1715 07/23/20 1745 07/23/20 1812 07/23/20 1838   BP: (!) 171/77 168/72 (!) 197/94 (!) 178/70   Pulse: 82 78 85    Patient Position - Orthostatic VS:  Lying Lying Lying         Visual Acuity  Visual Acuity      Most Recent Value   L Pupil Size (mm)  4   R Pupil Size (mm)  4          ED Medications  Medications   albuterol (PROVENTIL HFA,VENTOLIN HFA) inhaler 2 puff (has no administration in time range)   atorvastatin (LIPITOR) tablet 80 mg (80 mg Oral Not Given 7/23/20 1826)   loratadine (CLARITIN) tablet 10 mg (has no administration in time range)   insulin glargine (LANTUS) subcutaneous injection 20 Units 0 2 mL (has no administration in time range)   labetalol (NORMODYNE) tablet 300 mg (has no administration in time range)   losartan (COZAAR) tablet 50 mg (has no administration in time range)   melatonin tablet 3 mg (has no administration in time range)   NIFEdipine (PROCARDIA XL) 24 hr tablet 30 mg (has no administration in time range)   ondansetron (ZOFRAN) injection 4 mg (has no administration in time range)   insulin lispro (HumaLOG) 100 units/mL subcutaneous injection 1-5 Units (1 Units Subcutaneous Not Given 7/23/20 1941)   insulin lispro (HumaLOG) 100 units/mL subcutaneous injection 1-5 Units (has no administration in time range)   acetaminophen (TYLENOL) tablet 650 mg (has no administration in time range)   Labetalol HCl (NORMODYNE) injection 10 mg (10 mg Intravenous Given 7/23/20 1939)   sodium chloride 0 9 % bolus 1,000 mL (0 mL Intravenous Stopped 7/23/20 1455)   ceftriaxone (ROCEPHIN) 1 g/50 mL in dextrose IVPB (0 mg Intravenous Stopped 7/23/20 1758)       Diagnostic Studies  Results Reviewed     Procedure Component Value Units Date/Time    Rapid drug screen, urine [[id number]] Collected:  07/23/20 1513    Lab Status:   In process Specimen:  Urine, Clean Catch Updated:  07/23/20 2009    Blood culture #1 [[id number]] Collected:  07/23/20 1309    Lab Status:  Preliminary result Specimen:  Blood from Arm, Left Updated: 07/23/20 1701     Blood Culture Received in Microbiology Lab  Culture in Progress  Blood culture #2 [[id number]] Collected:  07/23/20 1309    Lab Status:  Preliminary result Specimen:  Blood from Hand, Left Updated:  07/23/20 1701     Blood Culture Received in Microbiology Lab  Culture in Progress      Urine Microscopic [[id number]]  (Abnormal) Collected:  07/23/20 1513    Lab Status:  Final result Specimen:  Urine, Clean Catch Updated:  07/23/20 1525     RBC, UA 20-30 /hpf      WBC, UA 4-10 /hpf      Epithelial Cells Occasional /hpf      Bacteria, UA Innumerable /hpf      AMORPH URATES Moderate /hpf     POCT Blood Gas (CG8+) [[id number]]  (Abnormal) Collected:  07/23/20 1512    Lab Status:  Final result Specimen:  Arterial Updated:  07/23/20 1517     pH, Art i-STAT 7 397     pCO2, Art i-STAT 41 5 mm HG      pO2, ART i-STAT 73 0 mm HG      BE, i-STAT 1 mmol/L      HCO3, Art i-STAT 25 5 mmol/L      CO2, i-STAT 27 mmol/L      O2 Sat, i-STAT 94 %      SODIUM, I-STAT 137 mmol/l      Potassium, i-STAT 4 9 mmol/L      Calcium, Ionized i-STAT 1 23 mmol/L      Hct, i-STAT 31 %      Hgb, i-STAT 10 5 g/dl      Glucose, i-STAT 96 mg/dl      Specimen Type ARTERIAL    Urine Macroscopic, POC [[id number]]  (Abnormal) Collected:  07/23/20 1513    Lab Status:  Final result Specimen:  Urine Updated:  07/23/20 1458     Color, UA Yellow     Clarity, UA Clear     pH, UA 6 0     Leukocytes, UA Negative     Nitrite, UA Negative     Protein, UA >=300 mg/dl      Glucose,  (1/4%) mg/dl      Ketones, UA Negative mg/dl      Urobilinogen, UA 0 2 E U /dl      Bilirubin, UA Negative     Blood, UA Moderate     Specific Gravity, UA 1 025    Narrative:       CLINITEK RESULT    Blood gas, arterial [[id number]]     Lab Status:  No result Specimen:  Blood, Arterial     TSH [[id number]]  (Normal) Collected:  07/23/20 1309    Lab Status:  Final result Specimen:  Blood from Arm, Left Updated:  07/23/20 1358     TSH 3RD GENERATON 2 424 uIU/mL Narrative:       Patients undergoing fluorescein dye angiography may retain small amounts of fluorescein in the body for 48-72 hours post procedure  Samples containing fluorescein can produce falsely depressed TSH values  If the patient had this procedure,a specimen should be resubmitted post fluorescein clearance  Bilirubin, direct [[id number]]  (Normal) Collected:  07/23/20 1309    Lab Status:  Final result Specimen:  Blood from Arm, Left Updated:  07/23/20 1358     Bilirubin, Direct 8 61 mg/dL     Salicylate level [[id number]]  (Abnormal) Collected:  07/23/20 1309    Lab Status:  Final result Specimen:  Blood from Arm, Left Updated:  29/31/26 4188     Salicylate Lvl <3 mg/dL     Acetaminophen level-If concentration is detectable, please discuss with medical  on call   [[id number]]  (Abnormal) Collected:  07/23/20 1309    Lab Status:  Final result Specimen:  Blood from Arm, Left Updated:  07/23/20 1358     Acetaminophen Level <2 ug/mL     Comprehensive metabolic panel [[id number]]  (Abnormal) Collected:  07/23/20 1309    Lab Status:  Final result Specimen:  Blood from Arm, Left Updated:  07/23/20 1350     Sodium 137 mmol/L      Potassium 5 3 mmol/L      Chloride 98 mmol/L      CO2 30 mmol/L      ANION GAP 9 mmol/L      BUN 53 mg/dL      Creatinine 7 44 mg/dL      Glucose 111 mg/dL      Calcium 9 4 mg/dL      AST 18 U/L      ALT 33 U/L      Alkaline Phosphatase 145 U/L      Total Protein 7 8 g/dL      Albumin 3 7 g/dL      Total Bilirubin 0 45 mg/dL      eGFR 6 ml/min/1 73sq m     Narrative:       National Kidney Disease Foundation guidelines for Chronic Kidney Disease (CKD):     Stage 1 with normal or high GFR (GFR > 90 mL/min/1 73 square meters)    Stage 2 Mild CKD (GFR = 60-89 mL/min/1 73 square meters)    Stage 3A Moderate CKD (GFR = 45-59 mL/min/1 73 square meters)    Stage 3B Moderate CKD (GFR = 30-44 mL/min/1 73 square meters)    Stage 4 Severe CKD (GFR = 15-29 mL/min/1 73 square meters)   Stage 5 End Stage CKD (GFR <15 mL/min/1 73 square meters)  Note: GFR calculation is accurate only with a steady state creatinine    Troponin I [[id number]]  (Normal) Collected:  07/23/20 1308    Lab Status:  Final result Specimen:  Blood from Arm, Left Updated:  07/23/20 1342     Troponin I <0 02 ng/mL     Lactic acid [[id number]]  (Normal) Collected:  07/23/20 1308    Lab Status:  Final result Specimen:  Blood from Arm, Left Updated:  07/23/20 1341     LACTIC ACID 1 2 mmol/L     Narrative:       Result may be elevated if tourniquet was used during collection      Fingerstick Glucose (POCT) [[id number]]  (Normal) Collected:  07/23/20 1312    Lab Status:  Final result Updated:  07/23/20 1340     POC Glucose 111 mg/dl     Ethanol [[id number]]  (Normal) Collected:  07/23/20 1309    Lab Status:  Final result Specimen:  Blood from Arm, Left Updated:  07/23/20 1338     Ethanol Lvl <3 mg/dL     Ammonia [[id number]]  (Normal) Collected:  07/23/20 1308    Lab Status:  Final result Specimen:  Blood from Arm, Left Updated:  07/23/20 1335     Ammonia 22 umol/L     Protime-INR [[id number]]  (Normal) Collected:  07/23/20 1309    Lab Status:  Final result Specimen:  Blood from Arm, Left Updated:  07/23/20 1333     Protime 12 5 seconds      INR 0 99    APTT [[id number]]  (Normal) Collected:  07/23/20 1309    Lab Status:  Final result Specimen:  Blood from Arm, Left Updated:  07/23/20 1333     PTT 30 seconds     CBC and differential [[id number]]  (Abnormal) Collected:  07/23/20 1308    Lab Status:  Final result Specimen:  Blood from Arm, Left Updated:  07/23/20 1326     WBC 5 35 Thousand/uL      RBC 3 56 Million/uL      Hemoglobin 11 5 g/dL      Hematocrit 35 4 %      MCV 99 fL      MCH 32 3 pg      MCHC 32 5 g/dL      RDW 17 2 %      MPV 10 1 fL      Platelets 980 Thousands/uL      nRBC 0 /100 WBCs      Neutrophils Relative 51 %      Immat GRANS % 1 %      Lymphocytes Relative 34 %      Monocytes Relative 9 %      Eosinophils Relative 3 % Basophils Relative 2 %      Neutrophils Absolute 2 77 Thousands/µL      Immature Grans Absolute 0 03 Thousand/uL      Lymphocytes Absolute 1 79 Thousands/µL      Monocytes Absolute 0 49 Thousand/µL      Eosinophils Absolute 0 18 Thousand/µL      Basophils Absolute 0 09 Thousands/µL                  CT head without contrast   Final Result by Wilman Daniel MD (07/23 1351)      No acute intracranial hemorrhage seen                     Workstation performed: [id number]         XR chest 1 view portable   Final Result by Byron Bolton MD (07/23 9970)      No acute cardiopulmonary disease  Workstation performed: [id number]                    Procedures  ECG 12 Lead Documentation Only  Date/Time: 7/23/2020 8:21 PM  Performed by: Amos Escobedo PA-C  Authorized by: Dolores Stout PA-C     Comments:      Normal sinus rhythm at 90  Normal axis  No acute ST-T changes  No significant change noted from previous EKG  ED Course  ED Course as of Jul 23 2024   Thu Jul 23, 2020   1327 Previous CVA was in 2017 per record review  1 Spoke with neurology  He feels that the patient's symptoms are more consistent with toxic or metabolic encephalopathy  He recommends admission and stopping the Trazodone, percocet and Neurontin and see how she does  Pending ABG  MDM  Number of Diagnoses or Management Options  Altered mental status: new and requires workup  At risk for polypharmacy: new and requires workup  ESRD (end stage renal disease) on dialysis [facility]): new and requires workup  Hypertension: new and requires workup  Diagnosis management comments: Patient presents via EMS for evaluation of altered mental status  Differential includes but is not limited to encephalopathy versus CVA versus sepsis versus drug toxicity  Labs, imaging and EKG ordered  Case was discussed with Dr Madison Felipe who agreed to evaluate the patient      Labs reviewed with no significant findings  Patient does have moderate bacteria in urine  Imaging shows no acute findings  EKG shows no ischemic changes  Patient was re-evaluated multiple times  Her mental status not significantly improved  Case was discussed with Neurology who advised that this sounds more consistent with encephalopathy versus polypharmacy toxicity rather than a stroke  They advised holding the patient's Neurontin and trazodone to see if it improves her mental status  Of note, the patient is on dialysis and gets dialysis on Tuesday, Thursday and Saturday  She had dialysis Tuesday but did not yet have it today  Case was discussed with EDDIE who agrees to accept the patient under the service of Dr Bard Genao      1 g of Rocephin ordered for patient's urinary tract infection  Patient is stable for admission  Amount and/or Complexity of Data Reviewed  Clinical lab tests: ordered and reviewed  Tests in the radiology section of CPT®: ordered and reviewed  Decide to obtain previous medical records or to obtain history from someone other than the patient: yes  Obtain history from someone other than the patient: yes  Review and summarize past medical records: yes  Discuss the patient with other providers: yes    Risk of Complications, Morbidity, and/or Mortality  Presenting problems: high  Diagnostic procedures: moderate  Management options: moderate    Patient Progress  Patient progress: stable        Disposition  Final diagnoses:    Altered mental status   Hypertension   ESRD (end stage renal disease) on dialysis ([facility] [address] )   At risk for polypharmacy     Time reflects when diagnosis was documented in both MDM as applicable and the Disposition within this note     Time User Action Codes Description Comment    7/23/2020  4:10 PM Delphina Shown Add [R41 82] Altered mental status     7/23/2020  4:10 PM Dolores Slater Add [I10] Hypertension     7/23/2020  4:10 PM [facility] [employer] Add [N18 6, Z99 2] ESRD (end stage renal disease) [facility] )     7/23/2020  4:11 PM Willie Shown Add [Z91 89] At risk for polypharmacy     7/23/2020  5:34 PM Nicky Hernandez Add [F33 2] MDD (major depressive disorder), recurrent severe, without psychosis ([facility] )     7/23/2020  5:34 PM Nicky Hernandez Add [F41 1] ANNETTE (generalized anxiety disorder)       ED Disposition     ED Disposition Condition Date/Time Comment    Admit Stable u Jul 23, 2020  4:10 PM Case was discussed with EDDIE and the patient's admission status was agreed to be Admission Status: inpatient status to the service of Dr Bard Genao   Follow-up Information    None         Current Discharge Medication List      CONTINUE these medications which have NOT CHANGED    Details   albuterol (PROVENTIL HFA,VENTOLIN HFA) 90 mcg/act inhaler Inhale 2 puffs every 6 (six) hours as needed for wheezing    Comments: Substitution to a formulary equivalent within the same pharmaceutical class is authorized        amitriptyline (ELAVIL) 50 mg tablet Take 50 mg by mouth daily at bedtime      baclofen 10 mg tablet Take 10 mg by mouth Three times daily as needed      gabapentin (NEURONTIN) 100 mg capsule Take 2 capsules (200 mg total) by mouth every 12 (twelve) hours At 9am and 9pm  Qty: 120 capsule, Refills: 0    Associated Diagnoses: Diabetic neuropathy (HCC)      insulin glargine (LANTUS) 100 units/mL subcutaneous injection Inject 20 Units under the skin daily at bedtime  Qty: 10 mL, Refills: 0    Associated Diagnoses: Type 2 diabetes mellitus with hyperglycemia, with long-term current use of insulin (HCC)      labetalol (NORMODYNE) 300 mg tablet Take 1 tablet (300 mg total) by mouth every 12 (twelve) hours At 9am and 9pm  Qty: 60 tablet, Refills: 0    Associated Diagnoses: Hypertensive urgency      losartan (COZAAR) 50 mg tablet Take 50 mg by mouth daily      NIFEdipine ER (ADALAT CC) 30 MG 24 hr tablet Take 1 tablet (30 mg total) by mouth daily At 9am  Qty: 30 tablet, Refills: 0    Associated Diagnoses: Hypertensive urgency      oxyCODONE-acetaminophen (PERCOCET) 5-325 mg per tablet Take 1 tablet by mouth every 4 (four) hours as needed for moderate pain      traZODone (DESYREL) 100 mg tablet Take 100 mg by mouth daily at bedtime      acetaminophen (TYLENOL) 500 MG chewable tablet Chew 1 tablet every 6 (six) hours as needed for mild pain  Qty: 30 tablet, Refills: 0      atorvastatin (LIPITOR) 80 mg tablet Take 1 tablet (80 mg total) by mouth every evening  Qty: 30 tablet, Refills: 0    Associated Diagnoses: Hyperlipidemia, unspecified hyperlipidemia type      fexofenadine (ALLEGRA) 180 MG tablet Take 180 mg by mouth daily      lidocaine (LIDODERM) 5 % Apply 1 patch topically daily Remove & Discard patch within 12 hours or as directed by MD  Qty: 30 patch, Refills: 0    Comments: Or can use OTC if need  Associated Diagnoses: Pain and numbness of right upper extremity      melatonin 3 mg Take 1 tablet (3 mg total) by mouth daily at bedtime Over the counter  Refills: 0    Associated Diagnoses: Pain and numbness of right upper extremity      menthol-methyl salicylate (BENGAY) 99-04 % cream Apply topically 4 (four) times a day as needed (shoulder pain) Over the counter  Refills: 0    Associated Diagnoses: Pain and numbness of right upper extremity      pantoprazole (PROTONIX) 40 mg tablet Take 1 tablet (40 mg total) by mouth daily in the early morning  Qty: 30 tablet, Refills: 0    Associated Diagnoses: LUQ pain      sevelamer (RENAGEL) 800 mg tablet Take 2 tablets (1,600 mg total) by mouth 3 (three) times a day with meals for 15 days  Qty: 90 tablet, Refills: 0    Associated Diagnoses: Stage 3 chronic kidney disease (HCC)           No discharge procedures on file      PDMP Review     None          ED Provider  Electronically Signed by           Amos Escobedo PA-C  07/23/20 2024

## 2020-07-23 NOTE — ASSESSMENT & PLAN NOTE
· Patient gets dialysis TuThSat  She missed today's session   I do not believe that that directly is contributing to her mental status however I believe that dialysis would aid in removing some potential toxicity from her Baclofen   · Nephrology consult for dialysis management   · Continue current regimen

## 2020-07-24 ENCOUNTER — APPOINTMENT (INPATIENT)
Dept: DIALYSIS | Facility: HOSPITAL | Age: 55
DRG: 091 | End: 2020-07-24
Payer: MEDICARE

## 2020-07-24 ENCOUNTER — APPOINTMENT (INPATIENT)
Dept: MRI IMAGING | Facility: HOSPITAL | Age: 55
DRG: 091 | End: 2020-07-24
Payer: MEDICARE

## 2020-07-24 LAB
ANION GAP SERPL CALCULATED.3IONS-SCNC: 16 MMOL/L (ref 4–13)
ATRIAL RATE: 94 BPM
BASOPHILS # BLD AUTO: 0.07 THOUSANDS/ΜL (ref 0–0.1)
BASOPHILS NFR BLD AUTO: 1 % (ref 0–1)
BUN SERPL-MCNC: 59 MG/DL (ref 5–25)
CALCIUM SERPL-MCNC: 9.5 MG/DL (ref 8.3–10.1)
CHLORIDE SERPL-SCNC: 100 MMOL/L (ref 100–108)
CO2 SERPL-SCNC: 21 MMOL/L (ref 21–32)
CREAT SERPL-MCNC: 7.56 MG/DL (ref 0.6–1.3)
EOSINOPHIL # BLD AUTO: 0.19 THOUSAND/ΜL (ref 0–0.61)
EOSINOPHIL NFR BLD AUTO: 3 % (ref 0–6)
ERYTHROCYTE [DISTWIDTH] IN BLOOD BY AUTOMATED COUNT: 17.7 % (ref 11.6–15.1)
GFR SERPL CREATININE-BSD FRML MDRD: 5 ML/MIN/1.73SQ M
GLUCOSE SERPL-MCNC: 100 MG/DL (ref 65–140)
GLUCOSE SERPL-MCNC: 105 MG/DL (ref 65–140)
GLUCOSE SERPL-MCNC: 107 MG/DL (ref 65–140)
GLUCOSE SERPL-MCNC: 118 MG/DL (ref 65–140)
GLUCOSE SERPL-MCNC: 145 MG/DL (ref 65–140)
GLUCOSE SERPL-MCNC: 94 MG/DL (ref 65–140)
HCT VFR BLD AUTO: 34 % (ref 34.8–46.1)
HGB BLD-MCNC: 10.9 G/DL (ref 11.5–15.4)
IMM GRANULOCYTES # BLD AUTO: 0.03 THOUSAND/UL (ref 0–0.2)
IMM GRANULOCYTES NFR BLD AUTO: 0 % (ref 0–2)
LYMPHOCYTES # BLD AUTO: 2.01 THOUSANDS/ΜL (ref 0.6–4.47)
LYMPHOCYTES NFR BLD AUTO: 30 % (ref 14–44)
MAGNESIUM SERPL-MCNC: 2.7 MG/DL (ref 1.6–2.6)
MCH RBC QN AUTO: 32.5 PG (ref 26.8–34.3)
MCHC RBC AUTO-ENTMCNC: 32.1 G/DL (ref 31.4–37.4)
MCV RBC AUTO: 102 FL (ref 82–98)
MONOCYTES # BLD AUTO: 0.51 THOUSAND/ΜL (ref 0.17–1.22)
MONOCYTES NFR BLD AUTO: 8 % (ref 4–12)
NEUTROPHILS # BLD AUTO: 3.95 THOUSANDS/ΜL (ref 1.85–7.62)
NEUTS SEG NFR BLD AUTO: 58 % (ref 43–75)
NRBC BLD AUTO-RTO: 0 /100 WBCS
P AXIS: 64 DEGREES
PHOSPHATE SERPL-MCNC: 5.9 MG/DL (ref 2.7–4.5)
PLATELET # BLD AUTO: 265 THOUSANDS/UL (ref 149–390)
PMV BLD AUTO: 9.9 FL (ref 8.9–12.7)
POTASSIUM SERPL-SCNC: 6 MMOL/L (ref 3.5–5.3)
PR INTERVAL: 172 MS
QRS AXIS: 13 DEGREES
QRSD INTERVAL: 82 MS
QT INTERVAL: 352 MS
QTC INTERVAL: 431 MS
RBC # BLD AUTO: 3.35 MILLION/UL (ref 3.81–5.12)
SODIUM SERPL-SCNC: 137 MMOL/L (ref 136–145)
T WAVE AXIS: 40 DEGREES
VENTRICULAR RATE: 90 BPM
WBC # BLD AUTO: 6.76 THOUSAND/UL (ref 4.31–10.16)

## 2020-07-24 PROCEDURE — 99223 1ST HOSP IP/OBS HIGH 75: CPT | Performed by: INTERNAL MEDICINE

## 2020-07-24 PROCEDURE — 99223 1ST HOSP IP/OBS HIGH 75: CPT | Performed by: PHYSICIAN ASSISTANT

## 2020-07-24 PROCEDURE — 82948 REAGENT STRIP/BLOOD GLUCOSE: CPT

## 2020-07-24 PROCEDURE — 99232 SBSQ HOSP IP/OBS MODERATE 35: CPT | Performed by: PHYSICIAN ASSISTANT

## 2020-07-24 PROCEDURE — 99222 1ST HOSP IP/OBS MODERATE 55: CPT | Performed by: PSYCHIATRY & NEUROLOGY

## 2020-07-24 PROCEDURE — 85025 COMPLETE CBC W/AUTO DIFF WBC: CPT | Performed by: PHYSICIAN ASSISTANT

## 2020-07-24 PROCEDURE — 90935 HEMODIALYSIS ONE EVALUATION: CPT | Performed by: INTERNAL MEDICINE

## 2020-07-24 PROCEDURE — 83735 ASSAY OF MAGNESIUM: CPT | Performed by: PHYSICIAN ASSISTANT

## 2020-07-24 PROCEDURE — 70551 MRI BRAIN STEM W/O DYE: CPT

## 2020-07-24 PROCEDURE — 80048 BASIC METABOLIC PNL TOTAL CA: CPT | Performed by: PHYSICIAN ASSISTANT

## 2020-07-24 PROCEDURE — 5A1D70Z PERFORMANCE OF URINARY FILTRATION, INTERMITTENT, LESS THAN 6 HOURS PER DAY: ICD-10-PCS | Performed by: INTERNAL MEDICINE

## 2020-07-24 PROCEDURE — 84100 ASSAY OF PHOSPHORUS: CPT | Performed by: PHYSICIAN ASSISTANT

## 2020-07-24 PROCEDURE — 93010 ELECTROCARDIOGRAM REPORT: CPT | Performed by: INTERNAL MEDICINE

## 2020-07-24 RX ORDER — INSULIN GLARGINE 100 [IU]/ML
10 INJECTION, SOLUTION SUBCUTANEOUS
Status: DISCONTINUED | OUTPATIENT
Start: 2020-07-24 | End: 2020-07-27 | Stop reason: HOSPADM

## 2020-07-24 RX ADMIN — INSULIN GLARGINE 10 UNITS: 100 INJECTION, SOLUTION SUBCUTANEOUS at 21:56

## 2020-07-24 NOTE — CONSULTS
[address] [place] [address] 63 y o  female MRN: [id number]  Unit/Bed#: S -01 Encounter: [id number]    ASSESSMENT and PLAN:  1  ESRD on HD [facility] [place], [place]):   · Missed treatment on day of admission 07/23/2020  · Plan on hemodialysis treatment today and tomorrow to return to usual schedule  2  Access: Av fistula right upper arm  3  Change in mental status:  · History of major depressive disorder, anxiety which is likely contributing  · Recent use of Baclofen  · Urinalysis, 20-30 RBCs, 4-10 WBCs, innumerable bacteria, moderate blood, greater than 3+ protein, nitrate negative  · Blood culture negative  · Monitor with hemodialysis which will clear Baclofen  · No change in mental status since patient was seen in the ED yesterday  4  Hypertension:    · Blood pressure elevated  Somewhat volume related due to missed treatment  · Monitor with volume removal on treatment today and tomorrow  · Continue labetalol, losartan, nifedipine  5  Anemia:   · Hemoglobin at goal  · On Epogen 6000 units with each hemodialysis treatment  · Will plan to continue Epogen starting tomorrow with usual maintenance treatment day unless some contraindication emerges with current workup  · Also on Venofer 100 mg weekly  · On Ceftriaxone  · No source of infection identified  · Will hold on Venofer at this time  6  Mineral and bone disease:   · Per outpatient records phosphorus and PTH within goal   Current phosphorus elevated, 5 9 which is above goal     · Continue binder/ sevelamer when patient able to take oral nourishment  7  Hyperkalemia:  Specimen hemolyzed  2 K bath on treatment today  SUMMARY OF RECOMMENDATIONS:  Hemodialysis today due to missed treatment yesterday  Dialysis tomorrow    HISTORY OF PRESENT ILLNESS:  Requesting Physician: Kathy George MD  Reason for Consult: ESRD on HD    Danny Macdonald is a 63 y o  female who was admitted to S LT after presenting with change in mental status  Patient was initially seen in the emergency room to evaluate need for urgent hemodialysis  No evidence of volume overload and labs were stable  The complete consult was dictated on the day following admission  Patient was again physically evaluated  No significant change in mental status since admission  She continues to began having mild tremors with focal and tactile stimulation  She does not open her eyes or have spontaneous motor movements  She began coughing several times  No vocalization other than moaning  According to chart review the patient was seen by her family members behaving normally the night prior to admission  The morning of admission patient was reported to be sleeping and family members did not discover until later in the afternoon that she would not wake up  CT scan was unrevealing  She was recently started on baclofen which was stopped the day before admission  She has a PMH of ESRD on hemodialysis since November 2019, major depressive disorder, essential hypertension, diabetes mellitus type 2, anxiety, CVA  A renal consultation is requested today for assistance in the management of ESRD  Jacob Spear is a known ESRD patient who undergoes maintenance hemodialysis at [facility] on Tuesday, Thursday, Saturday      PAST MEDICAL HISTORY:  Past Medical History:   Diagnosis Date    Asthma     Depression     Diabetes mellitus ([address] )     Hyperlipidemia     Hypertension     Renal disorder     daylsis T,Th, Saturday    Stroke [facility])        PAST SURGICAL HISTORY:  Past Surgical History:   Procedure Laterality Date    AV FISTULA PLACEMENT      2/13/2020 and 4/3/2020    CHOLECYSTECTOMY      HYSTERECTOMY         ALLERGIES:  Allergies   Allergen Reactions    Lisinopril Swelling and Cough       SOCIAL HISTORY:  Social History     Substance and Sexual Activity   Alcohol Use Not Currently     Social History     Substance and Sexual Activity   Drug Use Not Currently     Social History     Tobacco Use   Smoking Status Never Smoker   Smokeless Tobacco Never Used       FAMILY HISTORY:  History reviewed  No pertinent family history  MEDICATIONS:    Current Facility-Administered Medications:     acetaminophen (TYLENOL) tablet 650 mg, 650 mg, Oral, Q6H PRN, Tom Murillo PA-C    albuterol (PROVENTIL HFA,VENTOLIN HFA) inhaler 2 puff, 2 puff, Inhalation, Q6H PRN, Tom Ventura PA-C    atorvastatin (LIPITOR) tablet 80 mg, 80 mg, Oral, QPM, Tom Murillo PA-C    insulin glargine (LANTUS) subcutaneous injection 20 Units 0 2 mL, 20 Units, Subcutaneous, HS, Tom Murillo PA-C    insulin lispro (HumaLOG) 100 units/mL subcutaneous injection 1-5 Units, 1-5 Units, Subcutaneous, TID AC **AND** Fingerstick Glucose (POCT), , , TID AC, Tom Murillo PA-C    insulin lispro (HumaLOG) 100 units/mL subcutaneous injection 1-5 Units, 1-5 Units, Subcutaneous, HS, Tom Murillo PA-C    labetalol (NORMODYNE) tablet 300 mg, 300 mg, Oral, Q12H BRINA, Tom Murillo PA-C    Labetalol HCl (NORMODYNE) injection 10 mg, 10 mg, Intravenous, Q6H PRN, Tom Murillo PA-C, 10 mg at 07/23/20 1939    loratadine (CLARITIN) tablet 10 mg, 10 mg, Oral, Daily, Tom Murillo PA-C    losartan (COZAAR) tablet 50 mg, 50 mg, Oral, Daily, Tom Murillo PA-C    melatonin tablet 3 mg, 3 mg, Oral, HS, Tom Murillo PA-C    NIFEdipine (PROCARDIA XL) 24 hr tablet 30 mg, 30 mg, Oral, Daily, Tom Murillo PA-C    ondansetron (ZOFRAN) injection 4 mg, 4 mg, Intravenous, Q6H PRN, Vijay Garcia PA-C    REVIEW OF SYSTEMS:  Unable to perform review systems due to mental status  A 12 point review systems was attempted  Please see HPI for further documentation      PHYSICAL EXAM:  Current Weight: Weight - Scale: 78 1 kg (172 lb 2 9 oz)  First Weight: Weight - Scale: 75 8 kg (167 lb 1 7 oz)  Vitals:    07/23/20 1838 07/23/20 2159 07/23/20 2359 07/24/20 0700   BP: (!) 178/70 (!) 182/94 150/78 (!) 173/85   BP Location: Left arm Left arm Left arm Left arm   Pulse:  84  94   Resp:  18  18   Temp:  97 5 °F (36 4 °C)  98 5 °F (36 9 °C)   TempSrc:  Temporal  Axillary   SpO2:  95%  94%   Weight:           Intake/Output Summary (Last 24 hours) at 7/24/2020 0735  Last data filed at 7/24/2020 0700  Gross per 24 hour   Intake 1050 ml   Output 0 ml   Net 1050 ml     Physical Exam   Constitutional: No distress  HENT:   Head: Normocephalic and atraumatic  Mouth/Throat: Oropharynx is clear and moist    Eyes: Conjunctivae and EOM are normal  No scleral icterus  Neck: Neck supple  No JVD present  Cardiovascular: Normal rate and regular rhythm  Exam reveals no gallop and no friction rub  No murmur heard  Pulmonary/Chest: Effort normal and breath sounds normal  No stridor  No respiratory distress  She has no wheezes  She has no rales  Abdominal: Soft  Bowel sounds are normal  She exhibits no distension and no mass  There is no tenderness  There is no rebound and no guarding  Musculoskeletal: She exhibits no edema or deformity  Neurological: She is unresponsive  Patient originally seen in the emergency room on 07/23 and evaluated  She was not opening eyes or responding to voice  Upon physical exam she had diffuse tremors  She did not follow commands  The day following admission 7/24 patient again seen and evaluated  Please see HPI  Patient evaluated approximately 14 hours after seen in the emergency room on 07/24  No change in mental status  Please see HPI   Skin: Skin is warm and dry  No rash noted  She is not diaphoretic  No erythema  No pallor         Invasive Devices:      Lab Results:   Results from last 7 days   Lab Units 07/24/20  0613 07/24/20  0557 07/23/20  1512 07/23/20  1309 07/23/20  1308   WBC Thousand/uL 6 76  --   --   --  5 35   HEMOGLOBIN g/dL 10 9*  --   --   --  11 5   I STAT HEMOGLOBIN g/dl  --   --  10 5*  --   --    HEMATOCRIT % 34 0*  --   --   --  35 4   HEMATOCRIT, ISTAT %  --   --  31*  --   --    PLATELETS Thousands/uL 265  --   --   --  274   POTASSIUM mmol/L  --  6 0*  --  5 3  --    CHLORIDE mmol/L  --  100  --  98*  --    CO2 mmol/L  --  21  --  30  --    CO2, I-STAT mmol/L  --   --  27  --   --    BUN mg/dL  --  59*  --  53*  --    CREATININE mg/dL  --  7 56*  --  7 44*  --    CALCIUM mg/dL  --  9 5  --  9 4  --    MAGNESIUM mg/dL  --  2 7*  --   --   --    PHOSPHORUS mg/dL  --  5 9*  --   --   --    ALK PHOS U/L  --   --   --  145*  --    ALT U/L  --   --   --  33  --    AST U/L  --   --   --  18  --    GLUCOSE, ISTAT mg/dl  --   --  96  --   --      Lab Results   Component Value Date    CALCIUM 9 5 07/24/2020    PHOS 5 9 (H) 07/24/2020     Other Studies:

## 2020-07-24 NOTE — ASSESSMENT & PLAN NOTE
Lab Results   Component Value Date    HGBA1C 8 7 (H) 02/05/2020       Recent Labs     07/23/20  2108 07/24/20  0606 07/24/20  0719 07/24/20  1025   POCGLU 93 107 94 145*       Blood Sugar Average: Last 72 hrs:  (P) 107   · Normoglycemic on admission   · Continue home insulin - Lantus 20 U QHS  · SSI algorithm 1 with meals and bedtime   · QID accuchecks

## 2020-07-24 NOTE — ASSESSMENT & PLAN NOTE
· Present on admission, suspect mixed with severe depression  I suspect this is due to patient's Baclofen use  She was recently started on this for twitching  Her daughter states that she experienced hallucinations with this so her daughter advised to stop it  Reports last dose was yesterday  Missed dialysis today and Baclofen is renally cleared  Patient appears altered, however will move arm so that it does not drop on her face  Does shake her head no and will squeeze eyes shut  Unclear how much is drug induced vs psych  Overall, laboratory studies unremarkable  Doubt infectious process, as her UA looks similar to prior  Doubt stroke  · Hold sedating medications   · Baclofen, Elavil, Trazodone  · Status post 1 L IVF in ER  · Hold further due to issues with volume in the past   · Monitor temps  · Hold further antibiotics   · Nephro and Psych consults   · Monitor CBC, BMP, Lytes

## 2020-07-24 NOTE — PLAN OF CARE
Problem: Prexisting or High Potential for Compromised Skin Integrity  Goal: Skin integrity is maintained or improved  Description  INTERVENTIONS:  - Identify patients at risk for skin breakdown  - Assess and monitor skin integrity  - Assess and monitor nutrition and hydration status  - Monitor labs   - Assess for incontinence   - Turn and reposition patient  - Assist with mobility/ambulation  - Relieve pressure over bony prominences  - Avoid friction and shearing  - Provide appropriate hygiene as needed including keeping skin clean and dry  - Evaluate need for skin moisturizer/barrier cream  - Collaborate with interdisciplinary team   - Patient/family teaching  - Consider wound care consult   Outcome: Progressing     Problem: Potential for Falls  Goal: Patient will remain free of falls  Description  INTERVENTIONS:  - Assess patient frequently for physical needs  -  Identify cognitive and physical deficits and behaviors that affect risk of falls    -  [place] fall precautions as indicated by assessment   - Educate patient/family on patient safety including physical limitations  - Instruct patient to call for assistance with activity based on assessment  - Modify environment to reduce risk of injury  - Consider OT/PT consult to assist with strengthening/mobility  Outcome: Progressing     Problem: METABOLIC, FLUID AND ELECTROLYTES - ADULT  Goal: Electrolytes maintained within normal limits  Description  INTERVENTIONS:  - Monitor labs and assess patient for signs and symptoms of electrolyte imbalances  - Administer electrolyte replacement as ordered  - Monitor response to electrolyte replacements, including repeat lab results as appropriate  - Instruct patient on fluid and nutrition as appropriate  Outcome: Progressing  Goal: Fluid balance maintained  Description  INTERVENTIONS:  - Monitor labs   - Monitor I/O and WT  - Instruct patient on fluid and nutrition as appropriate  - Assess for signs & symptoms of volume excess or deficit  Outcome: Progressing

## 2020-07-24 NOTE — ASSESSMENT & PLAN NOTE
· Patient gets dialysis TuThSat  She missed today's session   I do not believe that that directly is contributing to her mental status however I believe that dialysis would aid in removing some potential toxicity from her Baclofen   · Nephrology consult for dialysis management-- plan for session today and tomorrow   · Continue current regimen
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
## 2020-07-24 NOTE — OCCUPATIONAL THERAPY NOTE
Occupational Therapy Cancel Note        Patient Name: David Ibrahim  LJKAJ'L Date: 7/24/2020    OT orders received and chart review completed  Pt currently unavailable due to HD   Will continue to follow as appropriate and schedule allows    Marilu De La Fuente OT

## 2020-07-24 NOTE — ASSESSMENT & PLAN NOTE
· Prior hx of CVA Oct 2017 w/  L sided sx; received tPA; MRI was negative for acute event, and overall felt sx were actually related to migraine  · Family reports that she does have continued L sided deficits  · Unable to assess formal neurologic exam, there seems to be limited participation on patient's half   CT head normal   · Continue statin

## 2020-07-24 NOTE — PROGRESS NOTES
Progress Note - Garima Head 1965, 63 y o  female MRN: [id number]    Unit/Bed#: S -01 Encounter: [id number]    Primary Care Provider: Garima Head MD   Date and time admitted to hospital: 7/23/2020 12:51 PM    * Toxic metabolic encephalopathy  Assessment & Plan  · Present on admission, suspect mixed with severe depression  I suspect this is due to patient's Baclofen use  She was recently started on this for twitching  Her daughter states that she experienced hallucinations with this so her daughter advised to stop it  Reports last dose was yesterday  Missed dialysis today and Baclofen is renally cleared  Patient appears altered, however will move arm so that it does not drop on her face  Does shake her head no and will squeeze eyes shut  Unclear how much is drug induced vs psych  Overall, laboratory studies unremarkable  Doubt infectious process, as her UA looks similar to prior  Doubt stroke  · Hold sedating medications   · Baclofen, Elavil, Trazodone  · Status post 1 L IVF in ER  · Hold further due to issues with volume in the past   · Monitor temps  · Hold further antibiotics   · Nephro and Psych consults   · Monitor CBC, BMP, Lytes  ESRD on hemodialysis [facility])  Assessment & Plan  · Patient gets dialysis TuThSat  She missed today's session  I do not believe that that directly is contributing to her mental status however I believe that dialysis would aid in removing some potential toxicity from her Baclofen   · Nephrology consult for dialysis management-- plan for session today and tomorrow   · Continue current regimen     ANNETTE (generalized anxiety disorder)  Assessment & Plan  · Noted history  Patient was noted to have "twitching" and was started on Baclofen   ?Manifestation of anxiety  · Psych consult   · Hold off potentially sedating medications at this time     MDD (major depressive disorder), recurrent severe, without psychosis ([facility] [address] )  Assessment & Plan  · Patient's daughter states that patient disclosed to her sister that she was very depressed  She has had issues with this in the past  Patient is tearing on my examination, but will not speak  She is withdrawn to the point of appearing altered, but will shake her head no  However, I do suspect there is an element of toxic encephalopathy due to Baclofen usage  · Hold sedating medications   · Psychiatry consult     H/O: CVA (cerebrovascular accident)  Assessment & Plan  · No focal neurologic symptoms, however exam limited due to patient cooperation  CT head normal   · Continue statin     Essential hypertension  Assessment & Plan  · BP acceptable  · Continue home regimen     Type 2 diabetes mellitus with hyperglycemia, with long-term current use of insulin [facility])  Assessment & Plan  Lab Results   Component Value Date    HGBA1C 8 7 (H) 02/05/2020       Recent Labs     07/23/20  2108 07/24/20  0606 07/24/20  0719 07/24/20  1025   POCGLU 93 107 94 145*       Blood Sugar Average: Last 72 hrs:  (P) 107   · Normoglycemic on admission   · Continue home insulin - Lantus 20 U QHS  · SSI algorithm 1 with meals and bedtime   · QID accuchecks        VTE Pharmacologic Prophylaxis:   Pharmacologic: Pharmacologic VTE Prophylaxis contraindicated due to low risk  Mechanical VTE Prophylaxis in Place: Yes    Patient Centered Rounds: I have performed bedside rounds with nursing staff today  Discussions with Specialists or Other Care Team Provider: ASHLEY, RN     Education and Discussions with Family / Patient: patient; unable to reach daughter Tito Sharma via phone  Left VM and phone number to call back     Time Spent for Care: 30 minutes  More than 50% of total time spent on counseling and coordination of care as described above      Current Length of Stay: 1 day(s)    Current Patient Status: Inpatient   Certification Statement: The patient will continue to require additional inpatient hospital stay due to ongoing tx of acute encephalopathy    Discharge Plan: d/c pending clinical improvement     Code Status: Level 1 - Full Code      Subjective:   Patient is minimally responsive  Attempted to reach out to daughter today via phone but unable to discuss  Per RN, not significantly changed compared to admission  Objective:     Vitals:   Temp (24hrs), Av 2 °F (36 8 °C), Min:97 5 °F (36 4 °C), Max:99 1 °F (37 3 °C)    Temp:  [97 5 °F (36 4 °C)-99 1 °F (37 3 °C)] 99 1 °F (37 3 °C)  HR:  [50-94] 90  Resp:  [18-20] 18  BP: ()/() 148/70  SpO2:  [94 %-98 %] 94 %  Body mass index is 31 49 kg/m²  Input and Output Summary (last 24 hours): Intake/Output Summary (Last 24 hours) at 2020 1214  Last data filed at 2020 1025  Gross per 24 hour   Intake 1750 ml   Output 0 ml   Net 1750 ml       Physical Exam:     Physical Exam   Constitutional: No distress  Patient does make groaning sounds with sternal rub   HENT:   Head: Normocephalic and atraumatic  Eyes: Pupils are equal, round, and reactive to light  Patient does not open eyes to verbal commands   Neck: No JVD present  Cardiovascular: Normal rate and regular rhythm  Exam reveals no gallop and no friction rub  No murmur heard  Pulmonary/Chest: Effort normal  No stridor  No respiratory distress  She has no wheezes  Abdominal: Soft  Bowel sounds are normal  She exhibits no distension  There is no tenderness  There is no guarding  Neurological:   Patient does not respond to verbal commands  With sternal rub, patient does become visably uncomfortable   Skin: Skin is dry  She is not diaphoretic         Additional Data:     Labs:    Results from last 7 days   Lab Units 20  0613   WBC Thousand/uL 6 76   HEMOGLOBIN g/dL 10 9*   HEMATOCRIT % 34 0*   PLATELETS Thousands/uL 265   NEUTROS PCT % 58   LYMPHS PCT % 30   MONOS PCT % 8   EOS PCT % 3     Results from last 7 days   Lab Units 20  0557  20  1309   SODIUM mmol/L 137  --  137   POTASSIUM mmol/L 6 0*  --  5 3   CHLORIDE mmol/L 100  --  98*   CO2 mmol/L 21  --  30   CO2, I-STAT   --    < >  --    BUN mg/dL 59*  --  53*   CREATININE mg/dL 7 56*  --  7 44*   ANION GAP mmol/L 16*  --  9   CALCIUM mg/dL 9 5  --  9 4   ALBUMIN g/dL  --   --  3 7   TOTAL BILIRUBIN mg/dL  --   --  0 45   ALK PHOS U/L  --   --  145*   ALT U/L  --   --  33   AST U/L  --   --  18   GLUCOSE RANDOM mg/dL 100  --  111    < > = values in this interval not displayed  Results from last 7 days   Lab Units 07/23/20  1309   INR  0 99     Results from last 7 days   Lab Units 07/24/20  1025 07/24/20  0719 07/24/20  0606 07/23/20  2108 07/23/20  1926 07/23/20  1312   POC GLUCOSE mg/dl 145* 94 107 93 92 111         Results from last 7 days   Lab Units 07/23/20  1308   LACTIC ACID mmol/L 1 2           * I Have Reviewed All Lab Data Listed Above  * Additional Pertinent Lab Tests Reviewed: [address] 65 Admission Reviewed    Imaging:    Imaging Reports Reviewed Today Include: CT head  Imaging Personally Reviewed by Myself Includes:  CT head    Recent Cultures (last 7 days):     Results from last 7 days   Lab Units 07/23/20  1309   BLOOD CULTURE  Received in Microbiology Lab  Culture in Progress  Received in Microbiology Lab  Culture in Progress         Last 24 Hours Medication List:     Current Facility-Administered Medications:  acetaminophen 650 mg Oral Q6H PRN Tom Frazier PA-C   albuterol 2 puff Inhalation Q6H PRN Tom Frazier PA-C   atorvastatin 80 mg Oral QPM Tom Murillo PA-C   insulin glargine 20 Units Subcutaneous HS Tom Murillo PA-C   insulin lispro 1-5 Units Subcutaneous TID AC Tom Murillo PA-C   insulin lispro 1-5 Units Subcutaneous HS Tom Murillo PA-C   labetalol 300 mg Oral Q12H [address] Tomame Murillo PA-C   Labetalol HCl 10 mg Intravenous Q6H PRN Tom Frazier PA-C   loratadine 10 mg Oral Daily Tom Murillo PA-C   losartan 50 mg Oral Daily Tom Murillo PA-C   melatonin 3 mg Oral HS Tom Murillo PA-C   NIFEdipine ER 30 mg Oral Daily Tom Murillo PA-C   ondansetron 4 mg Intravenous Q6H PRN Kay Gusman PA-C        Today, Patient Was Seen By: Riya Mae PA-C    ** Please Note: Dictation voice to text software may have been used in the creation of this document   **

## 2020-07-24 NOTE — PLAN OF CARE
Pre treatment weight 74 5 kg and post treatment weight 73 9 kg using bed scale  Total volume removed 2500 ml  Report given to [facility] RN  Current hemodialysis plan of care is to remove 3500 ml of fluid over a 3 1/2 hour treatment using a right upper arm AVF  Monitor vital signs every 15 minutes while on treatment for patient safety  Utilize a 2 K+ bath for pre treatment serum potassium of 6 0 to maintain electrolyte balance        Problem: METABOLIC, FLUID AND ELECTROLYTES - ADULT  Goal: Electrolytes maintained within normal limits  Description  INTERVENTIONS:  - Monitor labs and assess patient for signs and symptoms of electrolyte imbalances  - Administer electrolyte replacement as ordered  - Monitor response to electrolyte replacements, including repeat lab results as appropriate  - Instruct patient on fluid and nutrition as appropriate  Outcome: Progressing  Goal: Fluid balance maintained  Description  INTERVENTIONS:  - Monitor labs   - Monitor I/O and WT  - Instruct patient on fluid and nutrition as appropriate  - Assess for signs & symptoms of volume excess or deficit  Outcome: Progressing

## 2020-07-24 NOTE — QUICK NOTE
Was able to speak the patient's daughter Fatuma Scott on the phone  She stated that yesterday, prior to the patient coming into the hospital, patient was complaining that her known left-sided deficits appeared worse than normal, and she complained that the right side felt off    Daughter states that her mother was having difficulty describing what she was feeling on the right side  Discussed with daughter that we are still working up the cause of her change in mental status  I asked daughter if patient had been complaining of urinary tract infection symptoms, and she denied this  Given that patient was complaining of some right-sided deficits, and it did appear that right side of her face did not move as much when patient would grimace to painful stimuli, will check MRI of the brain  Reportedly, patient had history of stroke in 2017, but MRI was negative for acute infarct and was likely felt that she had change in mental status secondary to complex migraine  We will consult Neurology here  Nephrology and psychiatry are following along    Psychiatry had additional blood tests including ABG, folate, B12, vitamin-D

## 2020-07-24 NOTE — CONSULTS
Consultation - Neurology   Thelma Patch 63 y o  female MRN: [id number]  Unit/Bed#: S -01 Encounter: [id number]    Assessment/Plan   Assessment:  79-year-old female with anxiety, depression, chronic headaches, left-sided weakness with prior diagnosis of possible conversion, presents with change in mental status  Acute encephalopathy:  -possibly toxic metabolic in etiology, question secondary to uremia and/or polypharmacy  Patient noted to have intermittent mild myoclonus during exam   -low suspicion for central infection  No meningismus and patient is afebrile  Patient received 1 dose of Rocephin   -potentially sedating medications have been held, including trazodone, gabapentin, and baclofen  -cannot rule out severe depression as contributor to encephalopathy  -CT brain demonstrated no acute changes   -creatinine 7 56, BUN 59, potassium 6 0  Left-sided weakness:  -chronic, and began in 2017  Patient received tPA on 10/03/2017 but was found to have no acute ischemia on subsequent MRI brain   -weakness persisted, and it was suggested that weakness may be secondary to conversion disorder   -she was placed on aspirin following that admission, but was intolerant of it due to gastric ulcer  She was not started on Plavix as suspicion for cerebrovascular ischemic event was low   -patient had apparent worsening of left-sided weakness in September 2019, which was felt to be due to symptomatic hypoglycemia with low suspicion for new cerebrovascular ischemic event  Chronic headaches:  -patient noted to have chronic daily headaches as per Neurology note in September 2018    -previously on topiramate  Unclear if she has recently been taking  Depression:  -likely PTSD as per Psychiatry   -patient's home medication included Elavil 50 mg HS, which was stopped as per Psychiatry as patient has history of overdose  Plan:  1  MRI brain and EEG pending    2  Correction of infectious/metabolic disturbances as per primary service  3  Appreciate Psychiatry consult  Discussed with Dr Luis Maldonado  Discussed with nursing  History of Present Illness     Reason for Consult / Principal Problem:  Acute encephalopathy, increased left-sided weakness  Hx and PE limited by: limited history and exam  HPI: Pamela Ruff is a 63 y o  right handed female with left-sided weakness possibly due to conversion disorder, DM2, dyslipidemia, hypertension, anxiety, depression, migraines, chronic headache, neuropathy, left ICA aneurysm, presents with altered mental status  Patient presented to the ED on 07/23/2020 after her family tried to wake her in the morning, but found her very difficult to rouse  In the ED, she did not follow commands and was described as whimpering  Her family reported that she has been more depressed lately  Blood pressure on presentation was 116/110  It was recommended that her baclofen, trazodone and gabapentin be held to see if her mental status improved  She was also due for dialysis  However, she remained encephalopathic and per daughter complained of ill-defined sensory change on the right side of her face, as well as increased left-sided weakness, prompting formal evaluation by Neurology  Patient was evaluated for stroke in 2018 and did receive tPA  Her MRI brain post tPA was negative for acute ischemia, but patient continued to have significant left-sided deficits (at times, described as effort dependent)  It was felt that the left-sided weakness was possibly due to conversion disorder  She was intolerant of aspirin due to reported gastric ulcer  However, Plavix was not initiated by Neurology as outpatient as suspicion for stroke or TIA was very low  She has been following with Neurosurgery for her ICA aneurysm  CT brain on presentation demonstrated no acute changes      Per discussion with nursing, patient has been unresponsive since admission, occasionally noted to be twitching in the upper and lower extremities bilaterally  Did grimace with suprapubic pressure per nursing  Inpatient consult to Neurology  Consult performed by: Doreen Man PA-C  Consult ordered by: Allie Starr PA-C          Review of Systems   Unable to perform ROS: Patient nonverbal       Historical Information   Past Medical History:   Diagnosis Date    Asthma     Depression     Diabetes mellitus ([address] )     Hyperlipidemia     Hypertension     Renal disorder     daylsis T,Th, Saturday    Stroke [facility])      Past Surgical History:   Procedure Laterality Date    AV FISTULA PLACEMENT      2/13/2020 and 4/3/2020    CHOLECYSTECTOMY      HYSTERECTOMY       Social History   Social History     Substance and Sexual Activity   Alcohol Use Not Currently     Social History     Substance and Sexual Activity   Drug Use Not Currently     E-Cigarette/Vaping    E-Cigarette Use Never User      E-Cigarette/Vaping Substances     Social History     Tobacco Use   Smoking Status Never Smoker   Smokeless Tobacco Never Used     Family History: History reviewed  No pertinent family history  Review of previous medical records was completed  Meds/Allergies   PTA meds:   Prior to Admission Medications   Prescriptions Last Dose Informant Patient Reported? Taking?    NIFEdipine ER (ADALAT CC) 30 MG 24 hr tablet   No Yes   Sig: Take 1 tablet (30 mg total) by mouth daily At 9am   acetaminophen (TYLENOL) 500 MG chewable tablet   No No   Sig: Chew 1 tablet every 6 (six) hours as needed for mild pain   albuterol (PROVENTIL HFA,VENTOLIN HFA) 90 mcg/act inhaler   Yes Yes   Sig: Inhale 2 puffs every 6 (six) hours as needed for wheezing   amitriptyline (ELAVIL) 50 mg tablet   Yes Yes   Sig: Take 50 mg by mouth daily at bedtime   atorvastatin (LIPITOR) 80 mg tablet   No No   Sig: Take 1 tablet (80 mg total) by mouth every evening   baclofen 10 mg tablet   Yes Yes   Sig: Take 10 mg by mouth Three times daily as needed   fexofenadine (ALLEGRA) 180 MG tablet   Yes No   Sig: Take 180 mg by mouth daily   gabapentin (NEURONTIN) 100 mg capsule   No Yes   Sig: Take 2 capsules (200 mg total) by mouth every 12 (twelve) hours At 9am and 9pm   insulin glargine (LANTUS) 100 units/mL subcutaneous injection   No Yes   Sig: Inject 20 Units under the skin daily at bedtime   labetalol (NORMODYNE) 300 mg tablet   No Yes   Sig: Take 1 tablet (300 mg total) by mouth every 12 (twelve) hours At 9am and 9pm   Patient taking differently: Take 300 mg by mouth daily At 9am and 9pm   lidocaine (LIDODERM) 5 %   No No   Sig: Apply 1 patch topically daily Remove & Discard patch within 12 hours or as directed by MD   losartan (COZAAR) 50 mg tablet   Yes Yes   Sig: Take 50 mg by mouth daily   melatonin 3 mg   No No   Sig: Take 1 tablet (3 mg total) by mouth daily at bedtime Over the counter   menthol-methyl salicylate (BENGAY) 39-82 % cream   No No   Sig: Apply topically 4 (four) times a day as needed (shoulder pain) Over the counter   oxyCODONE-acetaminophen (PERCOCET) 5-325 mg per tablet   Yes Yes   Sig: Take 1 tablet by mouth every 4 (four) hours as needed for moderate pain   pantoprazole (PROTONIX) 40 mg tablet   No No   Sig: Take 1 tablet (40 mg total) by mouth daily in the early morning   sevelamer (RENAGEL) 800 mg tablet   No No   Sig: Take 2 tablets (1,600 mg total) by mouth 3 (three) times a day with meals for 15 days   traZODone (DESYREL) 100 mg tablet   Yes Yes   Sig: Take 100 mg by mouth daily at bedtime      Facility-Administered Medications: None       Allergies   Allergen Reactions    Lisinopril Swelling and Cough       Objective   Vitals:Blood pressure 148/70, pulse 90, temperature 99 1 °F (37 3 °C), temperature source Oral, resp  rate 18, weight 78 1 kg (172 lb 2 9 oz), SpO2 94 %, not currently breastfeeding  ,Body mass index is 31 49 kg/m²      Intake/Output Summary (Last 24 hours) at 7/24/2020 1407  Last data filed at 7/24/2020 1401  Gross per 24 hour   Intake 1750 ml   Output 0 ml   Net 1750 ml       Invasive Devices: Invasive Devices     Peripheral Intravenous Line            Peripheral IV 07/23/20 Left Hand 1 day    Peripheral IV 07/23/20 Right Antecubital 1 day          Line            Hemodialysis AV Fistula Right Upper arm -- days          Hemodialysis Catheter            HD Permanent Double Catheter -- days                Physical Exam   General:  Patient is well-developed, obese BMI 31 49, and in no acute distress  HEENT:  Head normocephalic  Eyes anicteric  Neck:  Supple  Cardiovascular:  With regular rhythm  Lungs:  Normal effort  Nonlabored breathing  Abdomen:  Soft, nontender, with bowel sounds present  Extremities:  With no significant edema  Neurologic Exam limited as pt not able to actively participate  Mental Status:  Patient was sleeping upon approach  Did not alert to sternal rub  Did, however, say ow when Babinski reflexes performed  Some resistance to gravity when upper extremities held in the air (would not let extremity hit herself)  Gait deferred  Cranial Nerves:   II:  Blink to threat bilaterally  Pupils equal, round, reactive to light with normal accomodation  Could not visualize optic fundi  III,IV,VI:  Would force eyes downward when eyelids opened passively  Oculocephalics intact  VII:  Resting right facial asymmetry noted, which has been noted in prior neurologic exams  Coordination:  Could not assess  Tone was normal throughout the 4 extremities  Mild diffuse myoclonus noted  Did not withdraw to noxious stim, except when Babinski performed  Patient then withdrew lower extremities and said ow  Muscle stretch reflexes:  1+ bilateral upper extremities, 2 right knee, 2+ left knee, trace right ankle, 1 left ankle  Toe responses were downgoing bilaterally      Lab Results:   CBC:   Results from last 7 days   Lab Units 07/24/20  0613 07/23/20  1512 07/23/20  1308   WBC Thousand/uL 6 76  --  5 35   RBC Million/uL 3 35*  --  3 56*   HEMOGLOBIN g/dL 10 9*  --  11 5   I STAT HEMOGLOBIN g/dl  --  10 5*  --    HEMATOCRIT % 34 0*  --  35 4   HEMATOCRIT, ISTAT %  --  31*  --    MCV fL 102*  --  99*   PLATELETS Thousands/uL 265  --  274   , BMP/CMP:   Results from last 7 days   Lab Units 07/24/20  0557 07/23/20  1512 07/23/20  1309   SODIUM mmol/L 137  --  137   POTASSIUM mmol/L 6 0*  --  5 3   CHLORIDE mmol/L 100  --  98*   CO2 mmol/L 21  --  30   CO2, I-STAT mmol/L  --  27  --    BUN mg/dL 59*  --  53*   CREATININE mg/dL 7 56*  --  7 44*   GLUCOSE, ISTAT mg/dl  --  96  --    CALCIUM mg/dL 9 5  --  9 4   AST U/L  --   --  18   ALT U/L  --   --  33   ALK PHOS U/L  --   --  145*   EGFR ml/min/1 73sq m 5  --  6   , HgBA1C:   , TSH:   Results from last 7 days   Lab Units 07/23/20  1309   TSH 3RD GENERATON uIU/mL 2 424   , Lipid Profile:     Imaging Studies: I have personally reviewed pertinent reports  and I have personally reviewed pertinent films in PACS CT brain, MRI brain  EKG, Pathology, and Other Studies: I have personally reviewed pertinent reports      VTE Prophylaxis: Sequential compression device Nano Rocha)     Code Status: Level 1 - Full Code  Advance Directive and Living Will:      Power of :    POLST:

## 2020-07-25 ENCOUNTER — APPOINTMENT (INPATIENT)
Dept: DIALYSIS | Facility: HOSPITAL | Age: 55
DRG: 091 | End: 2020-07-25
Payer: MEDICARE

## 2020-07-25 LAB
ALBUMIN SERPL BCP-MCNC: 3.5 G/DL (ref 3.5–5)
ALP SERPL-CCNC: 123 U/L (ref 46–116)
ALT SERPL W P-5'-P-CCNC: 20 U/L (ref 12–78)
ANION GAP SERPL CALCULATED.3IONS-SCNC: 12 MMOL/L (ref 4–13)
AST SERPL W P-5'-P-CCNC: 20 U/L (ref 5–45)
BILIRUB SERPL-MCNC: 0.66 MG/DL (ref 0.2–1)
BUN SERPL-MCNC: 48 MG/DL (ref 5–25)
CALCIUM SERPL-MCNC: 9.8 MG/DL (ref 8.3–10.1)
CHLORIDE SERPL-SCNC: 98 MMOL/L (ref 100–108)
CO2 SERPL-SCNC: 26 MMOL/L (ref 21–32)
CREAT SERPL-MCNC: 6.68 MG/DL (ref 0.6–1.3)
ERYTHROCYTE [DISTWIDTH] IN BLOOD BY AUTOMATED COUNT: 17.3 % (ref 11.6–15.1)
FOLATE SERPL-MCNC: 11.5 NG/ML (ref 3.1–17.5)
GFR SERPL CREATININE-BSD FRML MDRD: 6 ML/MIN/1.73SQ M
GLUCOSE SERPL-MCNC: 100 MG/DL (ref 65–140)
GLUCOSE SERPL-MCNC: 114 MG/DL (ref 65–140)
GLUCOSE SERPL-MCNC: 119 MG/DL (ref 65–140)
GLUCOSE SERPL-MCNC: 129 MG/DL (ref 65–140)
GLUCOSE SERPL-MCNC: 130 MG/DL (ref 65–140)
GLUCOSE SERPL-MCNC: 140 MG/DL (ref 65–140)
HCT VFR BLD AUTO: 35.8 % (ref 34.8–46.1)
HGB BLD-MCNC: 11.6 G/DL (ref 11.5–15.4)
MAGNESIUM SERPL-MCNC: 2.6 MG/DL (ref 1.6–2.6)
MCH RBC QN AUTO: 33.1 PG (ref 26.8–34.3)
MCHC RBC AUTO-ENTMCNC: 32.4 G/DL (ref 31.4–37.4)
MCV RBC AUTO: 102 FL (ref 82–98)
PHOSPHATE SERPL-MCNC: 6.9 MG/DL (ref 2.7–4.5)
PLATELET # BLD AUTO: 236 THOUSANDS/UL (ref 149–390)
PMV BLD AUTO: 9.9 FL (ref 8.9–12.7)
POTASSIUM SERPL-SCNC: 4.3 MMOL/L (ref 3.5–5.3)
PROT SERPL-MCNC: 8.2 G/DL (ref 6.4–8.2)
RBC # BLD AUTO: 3.5 MILLION/UL (ref 3.81–5.12)
SODIUM SERPL-SCNC: 136 MMOL/L (ref 136–145)
VIT B12 SERPL-MCNC: 794 PG/ML (ref 100–900)
WBC # BLD AUTO: 8.7 THOUSAND/UL (ref 4.31–10.16)

## 2020-07-25 PROCEDURE — 82746 ASSAY OF FOLIC ACID SERUM: CPT | Performed by: INTERNAL MEDICINE

## 2020-07-25 PROCEDURE — 99232 SBSQ HOSP IP/OBS MODERATE 35: CPT | Performed by: PSYCHIATRY & NEUROLOGY

## 2020-07-25 PROCEDURE — 99233 SBSQ HOSP IP/OBS HIGH 50: CPT | Performed by: INTERNAL MEDICINE

## 2020-07-25 PROCEDURE — 92610 EVALUATE SWALLOWING FUNCTION: CPT

## 2020-07-25 PROCEDURE — 82948 REAGENT STRIP/BLOOD GLUCOSE: CPT

## 2020-07-25 PROCEDURE — 80053 COMPREHEN METABOLIC PANEL: CPT | Performed by: PHYSICIAN ASSISTANT

## 2020-07-25 PROCEDURE — 83735 ASSAY OF MAGNESIUM: CPT | Performed by: PHYSICIAN ASSISTANT

## 2020-07-25 PROCEDURE — 85027 COMPLETE CBC AUTOMATED: CPT | Performed by: PHYSICIAN ASSISTANT

## 2020-07-25 PROCEDURE — 99232 SBSQ HOSP IP/OBS MODERATE 35: CPT | Performed by: PHYSICIAN ASSISTANT

## 2020-07-25 PROCEDURE — 82607 VITAMIN B-12: CPT | Performed by: INTERNAL MEDICINE

## 2020-07-25 PROCEDURE — 84100 ASSAY OF PHOSPHORUS: CPT | Performed by: PHYSICIAN ASSISTANT

## 2020-07-25 RX ORDER — LOPERAMIDE HYDROCHLORIDE 2 MG/1
2 CAPSULE ORAL 3 TIMES DAILY PRN
Status: DISCONTINUED | OUTPATIENT
Start: 2020-07-25 | End: 2020-07-27 | Stop reason: HOSPADM

## 2020-07-25 RX ORDER — SEVELAMER HYDROCHLORIDE 800 MG/1
1600 TABLET, FILM COATED ORAL
Status: DISCONTINUED | OUTPATIENT
Start: 2020-07-25 | End: 2020-07-27 | Stop reason: HOSPADM

## 2020-07-25 RX ADMIN — NIFEDIPINE 30 MG: 30 TABLET, EXTENDED RELEASE ORAL at 10:26

## 2020-07-25 RX ADMIN — INSULIN GLARGINE 10 UNITS: 100 INJECTION, SOLUTION SUBCUTANEOUS at 21:11

## 2020-07-25 RX ADMIN — SEVELAMER HYDROCHLORIDE 1600 MG: 800 TABLET, FILM COATED PARENTERAL at 18:48

## 2020-07-25 RX ADMIN — LABETALOL HYDROCHLORIDE 300 MG: 100 TABLET, FILM COATED ORAL at 10:26

## 2020-07-25 RX ADMIN — MELATONIN 3 MG: at 21:11

## 2020-07-25 RX ADMIN — ATORVASTATIN CALCIUM 80 MG: 40 TABLET, FILM COATED ORAL at 18:48

## 2020-07-25 RX ADMIN — LOPERAMIDE HYDROCHLORIDE 2 MG: 2 CAPSULE ORAL at 18:48

## 2020-07-25 RX ADMIN — LABETALOL 20 MG/4 ML (5 MG/ML) INTRAVENOUS SYRINGE 10 MG: at 00:18

## 2020-07-25 RX ADMIN — LOSARTAN POTASSIUM 50 MG: 50 TABLET, FILM COATED ORAL at 10:26

## 2020-07-25 RX ADMIN — LORATADINE 10 MG: 10 TABLET ORAL at 10:26

## 2020-07-25 RX ADMIN — LABETALOL HYDROCHLORIDE 300 MG: 100 TABLET, FILM COATED ORAL at 21:11

## 2020-07-25 RX ADMIN — SEVELAMER HYDROCHLORIDE 1600 MG: 800 TABLET, FILM COATED PARENTERAL at 14:03

## 2020-07-25 NOTE — ASSESSMENT & PLAN NOTE
· Patient's daughter states that patient disclosed to her sister that she was very depressed  She has had issues with this in the past  Discussed with patient today as she is more alert-- she does admit to SI, but no plan   She feels that a lot of her increase in symptoms is related to the fact that her daughter is newly  and they are trying to transition the patient to living with a different daughter, which has been difficult  · Hold sedating medications   · Psychiatry consult

## 2020-07-25 NOTE — ASSESSMENT & PLAN NOTE
She has improved a suspect she is back at her baseline today  She is awake alert and oriented and conversant spontaneously  The myoclonus or twitching that the patient reports she had yesterday has resolved  I suspect the baclofen was the instigating agent and as noted below her dialysis has assist the clearance    There is further note of asterixis or so my clonus on today's exam

## 2020-07-25 NOTE — PROGRESS NOTES
NEPHROLOGY PROGRESS NOTE   Zechariah Irving 63 y o  female MRN: [id number]  Unit/Bed#: S -01 Encounter: [id number]    ASSESSMENT & PLAN:  63 y  o female with ESRD on hemodialysis Tuesday Thursday Saturday at [address] presented with change in mental status  Also findings of mild tremor  He was recently started on baclofen which was stopped the day before admission  ESRD on HD, [address] [facility]  -outpatient schedule is TTS  -missed hemodialysis treatment 7/23 and so hd HD on 7/24   -Access right upper extremity AV fistula  -plan for hemodialysis treatment today to place her back on TTS schedule  She is more awake alert        Primary hypertension with ESRD:   - blood pressure was initially elevated but dropped during hemodialysis treatment   -monitor blood pressure with ultrafiltration on hemodialysis treatment today  Continue blood pressure medication with holding parameters  Continue p r n  Labetalol     Anemia due to ESRD:  Hemoglobin currently at goal  -on LOREN 6000 units with hemodialysis treatment   -due to concern for stroke/CVA LOREN was held previously  MRI brain from 07/24 to not suggest any acute ischemia, suggestive of small fluid and bilateral sphenoid sinuses  -was planning to restart LOREN 6000 units but due to hemoglobin 11 6, would hold off on LOREN today   -also on venofer weekly as outpatient      CKD/MBD  -phosphorus and PTH at goal per outpatient labs  -Now phosphorus 5 9  -restarted Sevelamer 2 tab tid as pt now awake and alert      Hyperkalemia: dialysis with 2 K bath, repeat hemodialysis treatment today  Follow-up labs     Altered mental status:  Do not think this is uremia  CT head was negative for any acute pathology  MRI brain was negative   Most likely altered mental status was from high dose of baclofen in dialysis patient   Baclofen is usually contraindicated in dialysis patient but if any needed a very low dose is recommended       she took 2 doses of baclofen which likely caused altered mental status  79% of Baclofen is removed with hemodialysis treatment in 4 hours treatment  Most likely Baclofen was removed from hemodialysis treatment yesterday and patient is more awake and alert today  Recommend avoiding Baclofen in future      Discussed with primary team AP  SUBJECTIVE:  No new complaints , awake and responsive  She mentions she took two doses of new meds ? Baclofen 10 mg before she became unresponsive     OBJECTIVE:  Current Weight: Weight - Scale: 78 1 kg (172 lb 2 9 oz)  Vitals:    07/25/20 0751   BP: 168/84   Pulse: 91   Resp: 16   Temp:    SpO2: 93%       Intake/Output Summary (Last 24 hours) at 7/25/2020 0757  Last data filed at 7/25/2020 0101  Gross per 24 hour   Intake 1000 ml   Output 2750 ml   Net -1750 ml       Physical Exam  General:  Ill looking, awake  Eyes: Conjunctivae pink,  Sclera anicteric  ENT: lips and mucous membranes moist  Neck: supple   Chest: Clear to Auscultation both lungs,  no crackles, ronchus or wheezing  CVS: S1 & S2 present, normal rate, regular rhythm, no murmur    Abdomen: soft, non-tender, non-distended, Bowel sounds normoactive  Extremities: no edema of  legs  Skin: no rash  Neuro: awake, alert, oriented x 3 , has facial asymmetry - per patient it is old     Medications:    Current Facility-Administered Medications:     acetaminophen (TYLENOL) tablet 650 mg, 650 mg, Oral, Q6H PRN, Tom Murillo PA-C    albuterol (PROVENTIL HFA,VENTOLIN HFA) inhaler 2 puff, 2 puff, Inhalation, Q6H PRN, Tom Hunt PA-C    atorvastatin (LIPITOR) tablet 80 mg, 80 mg, Oral, QPM, Tom Murillo PA-C    insulin glargine (LANTUS) subcutaneous injection 10 Units 0 1 mL, 10 Units, Subcutaneous, HS, Denise Barney PA-C, 10 Units at 07/24/20 2156    insulin lispro (HumaLOG) 100 units/mL subcutaneous injection 1-5 Units, 1-5 Units, Subcutaneous, Q6H **AND** Fingerstick Glucose (POCT), , , Q6H, Irma De Leon MD    labetalol (Deboraha Pavy) tablet 300 mg, 300 mg, Oral, Q12H [facility], Tom Murillo PA-C    Labetalol HCl (NORMODYNE) injection 10 mg, 10 mg, Intravenous, Q6H PRN, Tom Murillo PA-C, 10 mg at 07/25/20 0018    loratadine (CLARITIN) tablet 10 mg, 10 mg, Oral, Daily, Tom Murillo PA-C    losartan (COZAAR) tablet 50 mg, 50 mg, Oral, Daily, Tom Murillo PA-C    melatonin tablet 3 mg, 3 mg, Oral, HS, Tom Murillo PA-C    NIFEdipine (PROCARDIA XL) 24 hr tablet 30 mg, 30 mg, Oral, Daily, Tom Murillo PA-C    ondansetron (ZOFRAN) injection 4 mg, 4 mg, Intravenous, Q6H PRN, Matt Cisneros PA-C    Invasive Devices:        Lab Results:   Results from last 7 days   Lab Units 07/25/20  0747 07/24/20  0613 07/24/20  0557 07/23/20  1512 07/23/20  1309 07/23/20  1308   WBC Thousand/uL 8 70 6 76  --   --   --  5 35   HEMOGLOBIN g/dL 11 6 10 9*  --   --   --  11 5   I STAT HEMOGLOBIN g/dl  --   --   --  10 5*  --   --    HEMATOCRIT % 35 8 34 0*  --   --   --  35 4   HEMATOCRIT, ISTAT %  --   --   --  31*  --   --    PLATELETS Thousands/uL 236 265  --   --   --  274   POTASSIUM mmol/L  --   --  6 0*  --  5 3  --    CHLORIDE mmol/L  --   --  100  --  98*  --    CO2 mmol/L  --   --  21  --  30  --    CO2, I-STAT mmol/L  --   --   --  27  --   --    BUN mg/dL  --   --  59*  --  53*  --    CREATININE mg/dL  --   --  7 56*  --  7 44*  --    CALCIUM mg/dL  --   --  9 5  --  9 4  --    MAGNESIUM mg/dL  --   --  2 7*  --   --   --    PHOSPHORUS mg/dL  --   --  5 9*  --   --   --    ALK PHOS U/L  --   --   --   --  145*  --    ALT U/L  --   --   --   --  33  --    AST U/L  --   --   --   --  18  --    GLUCOSE, ISTAT mg/dl  --   --   --  96  --   --        Previous work up:         Portions of the record may have been created with voice recognition software  Occasional wrong word or "sound a like" substitutions may have occurred due to the inherent limitations of voice recognition software   Read the chart carefully and recognize, using context, where substitutions have occurred  If you have any questions, please contact the dictating provider

## 2020-07-25 NOTE — ASSESSMENT & PLAN NOTE
· Patient gets dialysis TuThSat  She missed today's session   I do not believe that that directly is contributing to her mental status however I believe that dialysis would aid in removing some potential toxicity from her Baclofen   · Nephrology consult for dialysis management-- plan for session today, and then resume normal TuThSat schedule  · Continue current regimen

## 2020-07-25 NOTE — PHYSICAL THERAPY NOTE
PHYSICAL THERAPY NOTE          Patient Name: Crispin Gutierrez  MLRFL'V Date: 7/25/2020     Attempted to see Meri for PT eval, but dialysis set-up in room  Will hold eval at this time and reattempt      Lala Reaves, PT, DPT, [facility]

## 2020-07-25 NOTE — PLAN OF CARE
Pt to have 3hr 45 min HD treatment today  HD treatment goal originally was to be 2 liters net  Tx initiated without problems  Right upper arm fistula worked well  Pt's bp stable until mid treatment   SBP dropped into high 80's to low 90's and target was lowered  Pt was symptomatic with sweating and c/o feeling poorly as she had just used bedpan having diarrhea also  Ran pt  even during remainder of tx  Dr Elder Lisa notified of same  Pt's venous chamber of system clotted after 3 hrs and pt blood still able to be returned slowly and tx ended early  Net removal was 0 5 liters  Report given to Melinda Sheldon, primary RN  Pre weight: 73 4kg (bed scale)  Post weight: 72 9kg (bed scale)      Problem: METABOLIC, FLUID AND ELECTROLYTES - ADULT  Goal: Electrolytes maintained within normal limits  Description  INTERVENTIONS:  - Monitor labs and assess patient for signs and symptoms of electrolyte imbalances  - Administer electrolyte replacement as ordered  - Monitor response to electrolyte replacements, including repeat lab results as appropriate  - Instruct patient on fluid and nutrition as appropriate  Outcome: Progressing     Problem: METABOLIC, FLUID AND ELECTROLYTES - ADULT  Goal: Fluid balance maintained  Description  INTERVENTIONS:  - Monitor labs   - Monitor I/O and WT  - Instruct patient on fluid and nutrition as appropriate  - Assess for signs & symptoms of volume excess or deficit  Outcome: Progressing

## 2020-07-25 NOTE — ASSESSMENT & PLAN NOTE
· Noted history  Patient was noted to have "twitching" and was started on Baclofen   ?Manifestation of anxiety  · No twitching noted on examination today  · Psych consult   · Hold off potentially sedating medications at this time

## 2020-07-25 NOTE — PLAN OF CARE
Problem: SLP ADULT - SWALLOWING, IMPAIRED  Goal: Advance to least restrictive diet without signs or symptoms of aspiration for planned discharge setting  See evaluation for individualized goals  Description  Patient will:    1  Safely swallow regular diet and thin liquids without overt signs of aspiration or dysphagia  1 day       Outcome: Adequate for Discharge

## 2020-07-25 NOTE — SPEECH THERAPY NOTE
Speech Language Pathology  Acute Speech Bedside Swallow Evaluation    Patient Name: Karmen Dawson  MCDWUEMILIANO Date: 7/25/2020     Recommend:  Continue regular diet and thin/ all liquids  Medications with fluids  Standard aspiration and reflux precautions  Spoke with patient and RNs re: ST findings and recommendations  There are no further skilled ST needs at this time  Problem List  Principal Problem:    Toxic metabolic encephalopathy  Active Problems:    Type 2 diabetes mellitus with hyperglycemia, with long-term current use of insulin ([facility])    Essential hypertension    H/O: CVA versus Bell's palsy on the left  MDD (major depressive disorder), recurrent severe, without psychosis ([facility] )    ANNETTE (generalized anxiety disorder)    ESRD on hemodialysis [facility])    Past Medical History  Past Medical History:   Diagnosis Date    Asthma     Depression     Diabetes mellitus ([facility] )     Hyperlipidemia     Hypertension     Renal disorder     daylsis T,Th, Saturday    Stroke [facility])      Past Surgical History  Past Surgical History:   Procedure Laterality Date    AV FISTULA PLACEMENT      2/13/2020 and 4/3/2020    CHOLECYSTECTOMY      HYSTERECTOMY          07/25/20 1621   Patient Information   Current Medical 63year-old female admitted secondary to altered mental status  Past Medical History ESRD on HD, CVA, and as outlined above   Social History Patient resides at home with family   Swallow Information   Current Diet Regular; Thin liquids   Baseline Diet Regular; Thin liquids   Baseline Assessment   Behavior/ Cognition Alert; Cooperative;  Interactive   Speech/ Language Status WFL, speaks Turkish and English   Patient Positioning Upright in bed, on HD   Swallow Mechanism Exam   Labial Symmetry WFL   Labial Strength WFL   Labial ROM WFL   Lingual Strength WFL   Lingual ROM WFL   Velum WFL   Mandible WFL   Dentition Adequate   Volitional Cough Strong   Consistencies Assessed and Performance   Materials Administered Puree/Level 1;Mechanical Soft/Level 2;Soft/Level 3;Regular/Solid; Thin liquid   Oral Stage WFL   Oral Stage Comment Normal oral prep, bolus formation, and A-P transport   Pharyngeal Stage WFL   Pharyngeal Stage Comment There was no evidence of aspiration following all swallows of all textures  Swallow Mechanics WFL   Esophageal Concerns No s/s reported   Summary   Swallow Summary Oral and pharyngeal swallowing skills are normal for regular solids and thin/ all liquids  Recommendations   Risk for Aspiration None   Recommendations Continue oral diet   Diet Solid Recommendation Regular consistency   Diet Liquid Recommendation Thin liquids   Recommended Form of Medications Whole with thin liquids   General Precautions Upright as possible for all oral intakes;  Remain upright for 45 minutes after meals   CompensatorySwallowing Strategies External pacing   Results Reviewed with RNs and patient   Treatment Recommendations   Duration of treatment N/A   Follow up treatments   None DC ST 7/25   Dysphagia Goals   N/A   Speech Therapy Prognosis   Prognosis Good   Prognosis Considerations Co-Morbidities

## 2020-07-25 NOTE — ASSESSMENT & PLAN NOTE
· Prior hx of CVA Oct 2017 w/  L sided sx; received tPA; MRI was negative for acute event, and overall felt sx were actually related to migraine  · Family reports that she does have continued L sided deficits, but did mention that patient did have some R sided complaints on day of admission  · MRI brain 7/24: No MR evidence of acute ischemia  Small amount of fluid in the bilateral sphenoid sinuses  · Unable to assess formal neurologic exam, there seems to be limited participation on patient's half   CT head normal   · Continue statin

## 2020-07-25 NOTE — ASSESSMENT & PLAN NOTE
Lab Results   Component Value Date    HGBA1C 8 7 (H) 02/05/2020       Recent Labs     07/24/20  1609 07/24/20  2104 07/24/20  2359 07/25/20  0635   POCGLU 105 118 119 100       Blood Sugar Average: Last 72 hrs:  (P) 108 4   · Normoglycemic on admission   · Home regimen of insulin was decreased by 1/2 given NPO status-- anticipate return to full home dose today as patient is alert enough to tolerate PO   · SSI algorithm 1 with meals and bedtime   · QID accuchecks

## 2020-07-25 NOTE — ASSESSMENT & PLAN NOTE
· Present on admission, suspect mixed with severe depression  I suspect this is due to patient's Baclofen use  She was recently started on this for twitching  Her daughter states that she experienced hallucinations with this so her daughter advised to stop it  Reports last dose was 7/22  Missed dialysis 7/23 and Baclofen is renally cleared  Patient significantly altered 7/23-7/24-- minimally responsive to painful stimuli, not communicating  · Patient this AM is more alert, able to hold a conversation, and overall reports that she does not remember the last few days since taking baclofen   · Hold sedating medications   · Baclofen, Elavil, Trazodone  · DO NOT resume baclofen or elavil on d/c   · Status post 1 L IVF in ER  · Monitor temps  · Hold further antibiotics   · Nephro and Psych and neuro consults   · D/W Nephro: given significant improvement post HD, suspect this is related to baclofen dosing   · D/W Psych 7/24: unable to formally assess patient as she did not participate in examination  Do agree with holding elavil in setting of prior suicide attempts  Will re-evaluate the patient when she is more alert   · D/W Neuro 7/24: plan for MRI, EEG given diffuse myoclonus on 7/24  · Monitor CBC, BMP, Lytes

## 2020-07-25 NOTE — ASSESSMENT & PLAN NOTE
This patient's history of hypertension appears to be poorly controlled  I suspect this is likely due to her poor understanding of the role of her hypertension and her renal disease  Continuing to emphasize compliance certainly with her medications and a home blood pressure monitoring program would certainly be appropriate

## 2020-07-25 NOTE — PROGRESS NOTES
Neurology Progress Note - [facility] 1965, 63 y o  female   MRN: [id number] Unit/Bed#: S -01 Encounter: [id number]        * Toxic metabolic encephalopathy  Assessment & Plan  She has improved a suspect she is back at her baseline today  She is awake alert and oriented and conversant spontaneously  The myoclonus or twitching that the patient reports she had yesterday has resolved  I suspect the baclofen was the instigating agent and as noted below her dialysis has assist the clearance  There is further note of asterixis or so my clonus on today's exam     H/O: CVA versus Bell's palsy on the left  Assessment & Plan  Despite this patient's reported history of a stroke with left-sided symptoms both her MRI and her CT scan have no evidence of a right parietal or frontal infarct  Rather her exam at least on today's visit actually has more evidence for an old remote, Bell's palsy on the left as she clearly has facial relaxation in zone 1 characteristic of a Bell's palsy and not of a CVA  She does have on her exam today and that noted by others as well are some non organic are nonfunctional symptoms possibly consistent with a learned behavior and or a conversion disorder  Nevertheless we will keep her on her high dose statin regimen  ESRD on hemodialysis [facility])  Assessment & Plan  Patient gets dialysis TuThSat  Her dialysis will aid in removing her Baclofen   ? Continue current regimen     Essential hypertension  Assessment & Plan  This patient's history of hypertension appears to be poorly controlled  I suspect this is likely due to her poor understanding of the role of her hypertension and her renal disease  Continuing to emphasize compliance certainly with her medications and a home blood pressure monitoring program would certainly be appropriate  Subjective/Objective     Subjective:  I am fine today am so much better I feel normal again      ROS:  The patient reports the without prompting that her of twitching from yesterday has resolved  She reports that she feels well  She reports that she slept well last night  She denies any complaints of pain on today's query  The staff nurse reports no adverse events  Current Facility-Administered Medications:  acetaminophen 650 mg Oral Q6H PRN   albuterol 2 puff Inhalation Q6H PRN   atorvastatin 80 mg Oral QPM   insulin glargine 10 Units Subcutaneous HS   insulin lispro 1-5 Units Subcutaneous Q6H   labetalol 300 mg Oral Q12H BRINA   Labetalol HCl 10 mg Intravenous Q6H PRN   loratadine 10 mg Oral Daily   losartan 50 mg Oral Daily   melatonin 3 mg Oral HS   NIFEdipine ER 30 mg Oral Daily   ondansetron 4 mg Intravenous Q6H PRN   sevelamer 1,600 mg Oral TID With Meals       acetaminophen    albuterol    Labetalol HCl    ondansetron    Vitals: Blood pressure 168/84, pulse 91, temperature 98 7 °F (37 1 °C), temperature source Oral, resp  rate 16, height 5' 2" (1 575 m), weight 78 1 kg (172 lb 2 9 oz), SpO2 93 %, not currently breastfeeding  ,Body mass index is 31 49 kg/m²  Physical Exam:     Joe Victor Manuel seen in:  Sitting at the edge of the bed  There is no family present  General appearance: alert,   Neck, Lungs, Heart, & abdomen: WNL  Extremities: atraumatic, no cyanosis or edema    Neurologic:   Mental status: Alert, oriented, thought content appropriate, speech is without dysarthria there is no aphasia  CN: exam EOM's I, Gaze conjugate  Non acutely lateralizing sensory & motor exam, (PP not tested on face)  She is noted to have a division 1 left side brow weakness compared to the right which has good elevation  There is also a upper lid weakness and a mild weakness of the left nasolabial fold  Reminder CNVIII-XII normal    Motor: full power age appropriate x 4 limbs  Sensory: grossly intact  X 4 limbs, PP not tested  Cerebellar: no ataxia or past pointing w pronation from a modified Romberg position      Gait: Fluid smooth, no LOB w cadance or directional change  DTR's:  +1 in her uppers +to her patellas bilaterally  Plantars: downgoing bilaterally      Lab Results:   I have personally reviewed pertinent reports  , CBC:   Results from last 7 days   Lab Units 07/25/20  0747 07/24/20  0613 07/23/20  1512 07/23/20  1308   WBC Thousand/uL 8 70 6 76  --  5 35   RBC Million/uL 3 50* 3 35*  --  3 56*   HEMOGLOBIN g/dL 11 6 10 9*  --  11 5   I STAT HEMOGLOBIN g/dl  --   --  10 5*  --    HEMATOCRIT % 35 8 34 0*  --  35 4   HEMATOCRIT, ISTAT %  --   --  31*  --    MCV fL 102* 102*  --  99*   PLATELETS Thousands/uL 236 265  --  274   , BMP/CMP:   Results from last 7 days   Lab Units 07/25/20  0747 07/24/20  0557 07/23/20  1309   SODIUM mmol/L 136 137 137   POTASSIUM mmol/L 4 3 6 0* 5 3   CHLORIDE mmol/L 98* 100 98*   CO2 mmol/L 26 21 30   CO2, I-STAT mmol/L  --   --   --    BUN mg/dL 48* 59* 53*   CREATININE mg/dL 6 68* 7 56* 7 44*   GLUCOSE, ISTAT mg/dl  --   --   --    CALCIUM mg/dL 9 8 9 5 9 4   AST U/L 20  --  18   ALT U/L 20  --  33   ALK PHOS U/L 123*  --  145*   EGFR ml/min/1 73sq m 6 5 6        Imaging Studies: I have personally reviewed pertinent films in PACS and Review of both her CT scan and her MRI notes no acute pathology  Additionally there was very little small vessel disease noted on her MRI FLAIR sequences particularly  Neither film demonstrated any evidence, or encephalomalacia suggesting a previous infarct on my review  EEG, Echo, Pathology, and Other Studies: She had an echocardiogram done in April of 2020 and she was noted to have a 65% ejection fraction with mild concentric hypertrophy  Left atrium size was within normal limits

## 2020-07-25 NOTE — ASSESSMENT & PLAN NOTE
Despite this patient's reported history of a stroke with left-sided symptoms both her MRI and her CT scan have no evidence of a right parietal or frontal infarct  Rather her exam at least on today's visit actually has more evidence for an old remote, Bell's palsy on the left as she clearly has facial relaxation in zone 1 characteristic of a Bell's palsy and not of a CVA  She does have on her exam today and that noted by others as well are some non organic are nonfunctional symptoms possibly consistent with a learned behavior and or a conversion disorder  Nevertheless we will keep her on her high dose statin regimen

## 2020-07-25 NOTE — PLAN OF CARE
Problem: Prexisting or High Potential for Compromised Skin Integrity  Goal: Skin integrity is maintained or improved  Description  INTERVENTIONS:  - Identify patients at risk for skin breakdown  - Assess and monitor skin integrity  - Assess and monitor nutrition and hydration status  - Monitor labs   - Assess for incontinence   - Turn and reposition patient  - Assist with mobility/ambulation  - Relieve pressure over bony prominences  - Avoid friction and shearing  - Provide appropriate hygiene as needed including keeping skin clean and dry  - Evaluate need for skin moisturizer/barrier cream  - Collaborate with interdisciplinary team   - Patient/family teaching  - Consider wound care consult   Outcome: Progressing     Problem: Potential for Falls  Goal: Patient will remain free of falls  Description  INTERVENTIONS:  - Assess patient frequently for physical needs  -  Identify cognitive and physical deficits and behaviors that affect risk of falls    -  [place] fall precautions as indicated by assessment   - Educate patient/family on patient safety including physical limitations  - Instruct patient to call for assistance with activity based on assessment  - Modify environment to reduce risk of injury  - Consider OT/PT consult to assist with strengthening/mobility  Outcome: Progressing     Problem: METABOLIC, FLUID AND ELECTROLYTES - ADULT  Goal: Electrolytes maintained within normal limits  Description  INTERVENTIONS:  - Monitor labs and assess patient for signs and symptoms of electrolyte imbalances  - Administer electrolyte replacement as ordered  - Monitor response to electrolyte replacements, including repeat lab results as appropriate  - Instruct patient on fluid and nutrition as appropriate  Outcome: Progressing  Goal: Fluid balance maintained  Description  INTERVENTIONS:  - Monitor labs   - Monitor I/O and WT  - Instruct patient on fluid and nutrition as appropriate  - Assess for signs & symptoms of volume excess or deficit  Outcome: Progressing

## 2020-07-25 NOTE — PROGRESS NOTES
Progress Note - Chrissie Siemens 1965, 63 y o  female MRN: [id number]    Unit/Bed#: S -01 Encounter: [id number]    Primary Care Provider: Chrissie Siemens, MD   Date and time admitted to hospital: 7/23/2020 12:51 PM    * Toxic metabolic encephalopathy  Assessment & Plan  · Present on admission, suspect mixed with severe depression  I suspect this is due to patient's Baclofen use  She was recently started on this for twitching  Her daughter states that she experienced hallucinations with this so her daughter advised to stop it  Reports last dose was 7/22  Missed dialysis 7/23 and Baclofen is renally cleared  Patient significantly altered 7/23-7/24-- minimally responsive to painful stimuli, not communicating  · Patient this AM is more alert, able to hold a conversation, and overall reports that she does not remember the last few days since taking baclofen   · Hold sedating medications   · Baclofen, Elavil, Trazodone  · DO NOT resume baclofen or elavil on d/c   · Status post 1 L IVF in ER  · Monitor temps  · Hold further antibiotics   · Nephro and Psych and neuro consults   · D/W Nephro: given significant improvement post HD, suspect this is related to baclofen dosing   · D/W Psych 7/24: unable to formally assess patient as she did not participate in examination  Do agree with holding elavil in setting of prior suicide attempts  Will re-evaluate the patient when she is more alert   · D/W Neuro 7/24: plan for MRI, EEG given diffuse myoclonus on 7/24  · Monitor CBC, BMP, Lytes  ESRD on hemodialysis [facility])  Assessment & Plan  · Patient gets dialysis TuThSat  She missed today's session   I do not believe that that directly is contributing to her mental status however I believe that dialysis would aid in removing some potential toxicity from her Baclofen   · Nephrology consult for dialysis management-- plan for session today, and then resume normal TuThSat schedule  · Continue current regimen     ANNETTE (generalized anxiety disorder)  Assessment & Plan  · Noted history  Patient was noted to have "twitching" and was started on Baclofen  ?Manifestation of anxiety  · No twitching noted on examination today  · Psych consult   · Hold off potentially sedating medications at this time     MDD (major depressive disorder), recurrent severe, without psychosis ([facility] [address] )  Assessment & Plan  · Patient's daughter states that patient disclosed to her sister that she was very depressed  She has had issues with this in the past  Discussed with patient today as she is more alert-- she does admit to SI, but no plan  She feels that a lot of her increase in symptoms is related to the fact that her daughter is newly  and they are trying to transition the patient to living with a different daughter, which has been difficult  · Hold sedating medications   · Psychiatry consult    H/O: CVA (cerebrovascular accident)  Assessment & Plan  · Prior hx of CVA Oct 2017 w/  L sided sx; received tPA; MRI was negative for acute event, and overall felt sx were actually related to migraine  · Family reports that she does have continued L sided deficits, but did mention that patient did have some R sided complaints on day of admission  · MRI brain 7/24: No MR evidence of acute ischemia  Small amount of fluid in the bilateral sphenoid sinuses  · Unable to assess formal neurologic exam, there seems to be limited participation on patient's half   CT head normal   · Continue statin     Essential hypertension  Assessment & Plan  · BP acceptable  · Continue home regimen     Type 2 diabetes mellitus with hyperglycemia, with long-term current use of insulin [facility])  Assessment & Plan  Lab Results   Component Value Date    HGBA1C 8 7 (H) 02/05/2020       Recent Labs     07/24/20  1609 07/24/20  2104 07/24/20  2359 07/25/20  0635   POCGLU 105 118 119 100       Blood Sugar Average: Last 72 hrs:  (P) 108 4   · Normoglycemic on admission   · Home regimen of insulin was decreased by 1/2 given NPO status-- anticipate return to full home dose today as patient is alert enough to tolerate PO   · SSI algorithm 1 with meals and bedtime   · QID accuchecks        VTE Pharmacologic Prophylaxis:   Pharmacologic: Heparin  Mechanical VTE Prophylaxis in Place: No    Patient Centered Rounds: I have performed bedside rounds with nursing staff today  Discussions with Specialists or Other Care Team Provider: ASHLEY, RN, Renal    Education and Discussions with Family / Patient: patient; daughter Dilip Ahr contacted via phone    Time Spent for Care: 30 minutes  More than 50% of total time spent on counseling and coordination of care as described above  Current Length of Stay: 2 day(s)    Current Patient Status: Inpatient   Certification Statement: The patient will continue to require additional inpatient hospital stay due to ongoing tx of AMS    Discharge Plan: d/c to home vs  IP [facility] pending repeat psych evaluation    Code Status: Level 1 - Full Code      Subjective:   Patient is far more alert this AM  She is tearful, stating that she doesn't remember anything after taking the baclofen  She does admit to thoughts of harming herself  She states that her daughter recently , and is starting her life with her  and Justin Engel is having a difficult time with the transition    Objective:     Vitals:   Temp (24hrs), Av °F (37 2 °C), Min:98 7 °F (37 1 °C), Max:99 1 °F (37 3 °C)    Temp:  [98 7 °F (37 1 °C)-99 1 °F (37 3 °C)] 98 7 °F (37 1 °C)  HR:  [89-95] 91  Resp:  [16-18] 16  BP: (133-188)/(66-94) 168/84  SpO2:  [93 %-97 %] 93 %  Body mass index is 31 49 kg/m²  Input and Output Summary (last 24 hours): Intake/Output Summary (Last 24 hours) at 2020 1103  Last data filed at 2020 0101  Gross per 24 hour   Intake 300 ml   Output 2750 ml   Net -2450 ml       Physical Exam:     Physical Exam   Constitutional: She is oriented to person, place, and time  No distress     HENT: Head: Normocephalic and atraumatic  Eyes: Pupils are equal, round, and reactive to light  EOM are normal    Neck: No JVD present  Cardiovascular: Normal rate and regular rhythm  Exam reveals no gallop and no friction rub  No murmur heard  Pulmonary/Chest: Effort normal and breath sounds normal  No stridor  No respiratory distress  She has no wheezes  Abdominal: Soft  Bowel sounds are normal  She exhibits no distension  There is no tenderness  There is no guarding  Musculoskeletal: Normal range of motion  She exhibits no edema  Neurological: She is alert and oriented to person, place, and time  She displays normal reflexes  No cranial nerve deficit  She exhibits normal muscle tone (no myoclonus noted today)  Coordination normal    Skin: Skin is warm and dry  She is not diaphoretic     Psychiatric:   Intermittently tearful; + SI        Additional Data:     Labs:    Results from last 7 days   Lab Units 07/25/20  0747 07/24/20  0613   WBC Thousand/uL 8 70 6 76   HEMOGLOBIN g/dL 11 6 10 9*   HEMATOCRIT % 35 8 34 0*   PLATELETS Thousands/uL 236 265   NEUTROS PCT %  --  58   LYMPHS PCT %  --  30   MONOS PCT %  --  8   EOS PCT %  --  3     Results from last 7 days   Lab Units 07/25/20  0747   SODIUM mmol/L 136   POTASSIUM mmol/L 4 3   CHLORIDE mmol/L 98*   CO2 mmol/L 26   BUN mg/dL 48*   CREATININE mg/dL 6 68*   ANION GAP mmol/L 12   CALCIUM mg/dL 9 8   ALBUMIN g/dL 3 5   TOTAL BILIRUBIN mg/dL 0 66   ALK PHOS U/L 123*   ALT U/L 20   AST U/L 20   GLUCOSE RANDOM mg/dL 114     Results from last 7 days   Lab Units 07/23/20  1309   INR  0 99     Results from last 7 days   Lab Units 07/25/20  0635 07/24/20  2359 07/24/20  2104 07/24/20  1609 07/24/20  1025 07/24/20  0719 07/24/20  0606 07/23/20  2108 07/23/20  1926 07/23/20  1312   POC GLUCOSE mg/dl 100 119 118 105 145* 94 107 93 92 111         Results from last 7 days   Lab Units 07/23/20  1308   LACTIC ACID mmol/L 1 2           * I Have Reviewed All Lab Data Listed Above  * Additional Pertinent Lab Tests Reviewed: Daniele 65 Admission Reviewed    Imaging:    Imaging Reports Reviewed Today Include: MRI brain  Imaging Personally Reviewed by Myself Includes:  MRI brain     Recent Cultures (last 7 days):     Results from last 7 days   Lab Units 07/23/20  1309   BLOOD CULTURE  No Growth at 24 hrs  No Growth at 24 hrs  Last 24 Hours Medication List:     Current Facility-Administered Medications:  acetaminophen 650 mg Oral Q6H PRN Tom Stoddard PA-C   albuterol 2 puff Inhalation Q6H PRN Tomame Stoddard PA-C   atorvastatin 80 mg Oral QPM Tom Murillo PA-C   insulin glargine 10 Units Subcutaneous HS Denise Barney, PO   insulin lispro 1-5 Units Subcutaneous Q6H Irma De Leon MD   labetalol 300 mg Oral Q12H [facility] Tom Murillo PA-C   Labetalol HCl 10 mg Intravenous Q6H PRN Tom Stoddard PA-C   loratadine 10 mg Oral Daily Tom Murillo, PO   losartan 50 mg Oral Daily Tom Murillo, PO   melatonin 3 mg Oral HS Tom Murillo PA-C   NIFEdipine ER 30 mg Oral Daily Tom Murillo PA-C   ondansetron 4 mg Intravenous Q6H PRN Tom Stoddard PA-C   sevelamer 1,600 mg Oral TID With Zackary Garza MD        Today, Patient Was Seen By: Olesya Griffiths PA-C    ** Please Note: Dictation voice to text software may have been used in the creation of this document   **

## 2020-07-25 NOTE — ASSESSMENT & PLAN NOTE
Patient gets dialysis TuThSat  Her dialysis will aid in removing her Baclofen   ?  Continue current regimen

## 2020-07-26 LAB
ANION GAP SERPL CALCULATED.3IONS-SCNC: 10 MMOL/L (ref 4–13)
BUN SERPL-MCNC: 36 MG/DL (ref 5–25)
CALCIUM SERPL-MCNC: 9.3 MG/DL (ref 8.3–10.1)
CHLORIDE SERPL-SCNC: 94 MMOL/L (ref 100–108)
CHOLEST SERPL-MCNC: 261 MG/DL (ref 50–200)
CO2 SERPL-SCNC: 28 MMOL/L (ref 21–32)
CREAT SERPL-MCNC: 5.47 MG/DL (ref 0.6–1.3)
ERYTHROCYTE [DISTWIDTH] IN BLOOD BY AUTOMATED COUNT: 17.2 % (ref 11.6–15.1)
GFR SERPL CREATININE-BSD FRML MDRD: 8 ML/MIN/1.73SQ M
GLUCOSE SERPL-MCNC: 115 MG/DL (ref 65–140)
GLUCOSE SERPL-MCNC: 120 MG/DL (ref 65–140)
GLUCOSE SERPL-MCNC: 128 MG/DL (ref 65–140)
GLUCOSE SERPL-MCNC: 143 MG/DL (ref 65–140)
GLUCOSE SERPL-MCNC: 165 MG/DL (ref 65–140)
GLUCOSE SERPL-MCNC: 96 MG/DL (ref 65–140)
HCT VFR BLD AUTO: 33.7 % (ref 34.8–46.1)
HDLC SERPL-MCNC: 41 MG/DL
HGB BLD-MCNC: 11.1 G/DL (ref 11.5–15.4)
LDLC SERPL CALC-MCNC: 161 MG/DL (ref 0–100)
MAGNESIUM SERPL-MCNC: 2.2 MG/DL (ref 1.6–2.6)
MCH RBC QN AUTO: 32.5 PG (ref 26.8–34.3)
MCHC RBC AUTO-ENTMCNC: 32.9 G/DL (ref 31.4–37.4)
MCV RBC AUTO: 99 FL (ref 82–98)
PHOSPHATE SERPL-MCNC: 6.8 MG/DL (ref 2.7–4.5)
PLATELET # BLD AUTO: 200 THOUSANDS/UL (ref 149–390)
PMV BLD AUTO: 10 FL (ref 8.9–12.7)
POTASSIUM SERPL-SCNC: 3.9 MMOL/L (ref 3.5–5.3)
RBC # BLD AUTO: 3.42 MILLION/UL (ref 3.81–5.12)
SODIUM SERPL-SCNC: 132 MMOL/L (ref 136–145)
TRIGL SERPL-MCNC: 293 MG/DL
WBC # BLD AUTO: 6.95 THOUSAND/UL (ref 4.31–10.16)

## 2020-07-26 PROCEDURE — 80061 LIPID PANEL: CPT | Performed by: NURSE PRACTITIONER

## 2020-07-26 PROCEDURE — 99232 SBSQ HOSP IP/OBS MODERATE 35: CPT | Performed by: INTERNAL MEDICINE

## 2020-07-26 PROCEDURE — 83735 ASSAY OF MAGNESIUM: CPT | Performed by: PHYSICIAN ASSISTANT

## 2020-07-26 PROCEDURE — 82948 REAGENT STRIP/BLOOD GLUCOSE: CPT

## 2020-07-26 PROCEDURE — 80048 BASIC METABOLIC PNL TOTAL CA: CPT | Performed by: PHYSICIAN ASSISTANT

## 2020-07-26 PROCEDURE — 85027 COMPLETE CBC AUTOMATED: CPT | Performed by: PHYSICIAN ASSISTANT

## 2020-07-26 PROCEDURE — 99232 SBSQ HOSP IP/OBS MODERATE 35: CPT | Performed by: PHYSICIAN ASSISTANT

## 2020-07-26 PROCEDURE — 84100 ASSAY OF PHOSPHORUS: CPT | Performed by: PHYSICIAN ASSISTANT

## 2020-07-26 RX ADMIN — SEVELAMER HYDROCHLORIDE 1600 MG: 800 TABLET, FILM COATED PARENTERAL at 08:54

## 2020-07-26 RX ADMIN — NIFEDIPINE 30 MG: 30 TABLET, EXTENDED RELEASE ORAL at 08:54

## 2020-07-26 RX ADMIN — SEVELAMER HYDROCHLORIDE 1600 MG: 800 TABLET, FILM COATED PARENTERAL at 14:03

## 2020-07-26 RX ADMIN — SEVELAMER HYDROCHLORIDE 1600 MG: 800 TABLET, FILM COATED PARENTERAL at 18:17

## 2020-07-26 RX ADMIN — LABETALOL HYDROCHLORIDE 300 MG: 100 TABLET, FILM COATED ORAL at 08:54

## 2020-07-26 RX ADMIN — ACETAMINOPHEN 650 MG: 325 TABLET, FILM COATED ORAL at 18:17

## 2020-07-26 RX ADMIN — LOSARTAN POTASSIUM 50 MG: 50 TABLET, FILM COATED ORAL at 08:54

## 2020-07-26 RX ADMIN — LORATADINE 10 MG: 10 TABLET ORAL at 08:54

## 2020-07-26 RX ADMIN — ATORVASTATIN CALCIUM 80 MG: 40 TABLET, FILM COATED ORAL at 18:18

## 2020-07-26 RX ADMIN — MELATONIN 3 MG: at 21:04

## 2020-07-26 RX ADMIN — INSULIN LISPRO 1 UNITS: 100 INJECTION, SOLUTION INTRAVENOUS; SUBCUTANEOUS at 13:29

## 2020-07-26 RX ADMIN — LABETALOL HYDROCHLORIDE 300 MG: 100 TABLET, FILM COATED ORAL at 21:02

## 2020-07-26 NOTE — ASSESSMENT & PLAN NOTE
· Patient gets dialysis TuThSat  She missed today's session   I do not believe that that directly is contributing to her mental status however I believe that dialysis would aid in removing some potential toxicity from her Baclofen   · Nephrology consult for dialysis management-- stable for d/c to home from their standpoint-- resume normal TuThSat schedule

## 2020-07-26 NOTE — PLAN OF CARE
Problem: Prexisting or High Potential for Compromised Skin Integrity  Goal: Skin integrity is maintained or improved  Description  INTERVENTIONS:  - Identify patients at risk for skin breakdown  - Assess and monitor skin integrity  - Assess and monitor nutrition and hydration status  - Monitor labs   - Assess for incontinence   - Turn and reposition patient  - Assist with mobility/ambulation  - Relieve pressure over bony prominences  - Avoid friction and shearing  - Provide appropriate hygiene as needed including keeping skin clean and dry  - Evaluate need for skin moisturizer/barrier cream  - Collaborate with interdisciplinary team   - Patient/family teaching  - Consider wound care consult   Outcome: Progressing     Problem: Potential for Falls  Goal: Patient will remain free of falls  Description  INTERVENTIONS:  - Assess patient frequently for physical needs  -  Identify cognitive and physical deficits and behaviors that affect risk of falls    -  [place] fall precautions as indicated by assessment   - Educate patient/family on patient safety including physical limitations  - Instruct patient to call for assistance with activity based on assessment  - Modify environment to reduce risk of injury  - Consider OT/PT consult to assist with strengthening/mobility  Outcome: Progressing     Problem: METABOLIC, FLUID AND ELECTROLYTES - ADULT  Goal: Electrolytes maintained within normal limits  Description  INTERVENTIONS:  - Monitor labs and assess patient for signs and symptoms of electrolyte imbalances  - Administer electrolyte replacement as ordered  - Monitor response to electrolyte replacements, including repeat lab results as appropriate  - Instruct patient on fluid and nutrition as appropriate  Outcome: Progressing  Goal: Fluid balance maintained  Description  INTERVENTIONS:  - Monitor labs   - Monitor I/O and WT  - Instruct patient on fluid and nutrition as appropriate  - Assess for signs & symptoms of volume excess or deficit  Outcome: Progressing  Goal: Glucose maintained within target range  Description  INTERVENTIONS:  - Monitor Blood Glucose as ordered  - Assess for signs and symptoms of hyperglycemia and hypoglycemia  - Administer ordered medications to maintain glucose within target range  - Assess nutritional intake and initiate nutrition service referral as needed  Outcome: Progressing

## 2020-07-26 NOTE — ASSESSMENT & PLAN NOTE
Lab Results   Component Value Date    HGBA1C 8 7 (H) 02/05/2020       Recent Labs     07/25/20  1223 07/25/20  1626 07/25/20  2046 07/26/20  0835   POCGLU 129 130 140 120       Blood Sugar Average: Last 72 hrs:  (P) 114 5   · Normoglycemic on admission   · Home regimen of insulin was decreased by 1/2 given NPO status-- given good control of BG will continue with 10 U Lantus  · SSI algorithm 1 with meals and bedtime   · QID accuchecks

## 2020-07-26 NOTE — PROGRESS NOTES
NEPHROLOGY PROGRESS NOTE   Marj Reyes 63 y o  female MRN: [id number]  Unit/Bed#: S -01 Encounter: [id number]    ASSESSMENT & PLAN:  63 y  o female with ESRD on hemodialysis Tuesday Thursday Saturday at [address] presented with change in mental status  Also findings of mild tremor  He was recently started on baclofen which was stopped the day before admission  ESRD on HD, [address] [facility]  -outpatient schedule is TTS  -missed hemodialysis treatment 7/23 and so hd HD on 7/24   -Access right upper extremity AV fistula  -last hemodialysis treatment on 07/25, post dialysis weight was 72 9 kg  Due to drop in blood pressure net fluid removal was only 0 5 L  Hold outpatient target weight is 72 7 kg  Clinically appears euvolemic, next hemodialysis treatment on Tuesday     Primary hypertension with ESRD:   - blood pressure was initially elevated but now acceptable and occasionally on lower side   -continue current medication     Anemia due to ESRD:  Hemoglobin currently at goal  -on LOREN 6000 units with hemodialysis treatment   -due to concern for stroke/CVA LOREN was held previously  MRI brain from 07/24 to not suggest any acute ischemia, suggestive of small fluid and bilateral sphenoid sinuses  -was planning to restart LOREN 6000 units but due to hemoglobin >11 0, would hold off on LOREN for now, can restart if hb drops   -also on venofer weekly as outpatient      CKD/MBD  -phosphorus and PTH at goal per outpatient labs  -phosphorus elevated  -restarted Sevelamer 2 tab tid yesterday       Hyperkalemia:  Resolved with hemodialysis treatment     Altered mental status:  Do not think this is uremia  CT head was negative for any acute pathology  MRI brain was negative   Most likely altered mental status was from high dose of baclofen in dialysis patient   Baclofen is usually contraindicated in dialysis patient but if any needed a very low dose is recommended       she took 2 doses of baclofen which likely caused altered mental status  79% of Baclofen is removed with hemodialysis treatment in 4 hours treatment  Most likely Baclofen was removed from hemodialysis treatment and patient is more awake and alert today  Recommend avoiding Baclofen in future  Complaining of diarrhea:  Workup and management per primary team     Discussed with primary team AP and with Dr Ran Lew  Patient is stable from Nephrology side, next hemodialysis treatment on Tuesday  SUBJECTIVE:  C/o diarrhea last night which improved with Imodium    OBJECTIVE:  Current Weight: Weight - Scale: 78 1 kg (172 lb 2 9 oz)  Vitals:    07/26/20 0700   BP: 131/70   Pulse: 78   Resp: 18   Temp: 98 4 °F (36 9 °C)   SpO2: 98%       Intake/Output Summary (Last 24 hours) at 7/26/2020 0839  Last data filed at 7/25/2020 1830  Gross per 24 hour   Intake 890 ml   Output    Net 890 ml       Physical Exam  General:  Ill looking, awake  Eyes: Conjunctivae pink,  Sclera anicteric  ENT: lips and mucous membranes moist  Neck: supple   Chest: Clear to Auscultation both lungs,  no crackles, ronchus or wheezing  CVS: S1 & S2 present, normal rate, regular rhythm, no murmur    Abdomen: soft, non-tender, non-distended, Bowel sounds normoactive  Extremities: no edema of  legs  Skin: no rash  Neuro: awake, alert, oriented x 3 , has facial asymmetry - per patient it is old     Medications:    Current Facility-Administered Medications:     acetaminophen (TYLENOL) tablet 650 mg, 650 mg, Oral, Q6H PRN, Tom Murillo PA-C    albuterol (PROVENTIL HFA,VENTOLIN HFA) inhaler 2 puff, 2 puff, Inhalation, Q6H PRN, Tom Jesus PA-C    atorvastatin (LIPITOR) tablet 80 mg, 80 mg, Oral, QPM, Tom Murillo PA-C, 80 mg at 07/25/20 1848    insulin glargine (LANTUS) subcutaneous injection 10 Units 0 1 mL, 10 Units, Subcutaneous, , Denise Barney PA-C, 10 Units at 07/25/20 2111    insulin lispro (HumaLOG) 100 units/mL subcutaneous injection 1-5 Units, 1-5 Units, Subcutaneous, 4x Daily (AC & HS) **AND** Fingerstick Glucose (POCT), , , 4x Daily AC and at bedtime, Denise Barney PA-C    labetalol (NORMODYNE) tablet 300 mg, 300 mg, Oral, Q12H [facility] & half-way, Tom Murillo PA-C, 300 mg at 07/25/20 2111    Labetalol HCl (NORMODYNE) injection 10 mg, 10 mg, Intravenous, Q6H PRN, Penelope Stone PA-C, 10 mg at 07/25/20 0018    loperamide (IMODIUM) capsule 2 mg, 2 mg, Oral, TID PRN, Denise Barney PA-C, 2 mg at 07/25/20 1848    loratadine (CLARITIN) tablet 10 mg, 10 mg, Oral, Daily, Tom Murillo PA-C, 10 mg at 07/25/20 1026    losartan (COZAAR) tablet 50 mg, 50 mg, Oral, Daily, Tom Murillo PA-C, 50 mg at 07/25/20 1026    melatonin tablet 3 mg, 3 mg, Oral, HS, Tom Murillo PA-C, 3 mg at 07/25/20 2111    NIFEdipine (PROCARDIA XL) 24 hr tablet 30 mg, 30 mg, Oral, Daily, Tom Murillo PA-C, 30 mg at 07/25/20 1026    ondansetron (ZOFRAN) injection 4 mg, 4 mg, Intravenous, Q6H PRN, Tmo Murillo PA-C    sevelamer (RENAGEL) tablet 1,600 mg, 1,600 mg, Oral, TID With Meals, Minetta Romberg, MD, 1,600 mg at 07/25/20 1848    Invasive Devices:        Lab Results:   Results from last 7 days   Lab Units 07/26/20  0631 07/25/20  0747 07/24/20  0613 07/24/20  0557 07/23/20  1512 07/23/20  1309   WBC Thousand/uL 6 95 8 70 6 76  --   --   --    HEMOGLOBIN g/dL 11 1* 11 6 10 9*  --   --   --    I STAT HEMOGLOBIN g/dl  --   --   --   --  10 5*  --    HEMATOCRIT % 33 7* 35 8 34 0*  --   --   --    HEMATOCRIT, ISTAT %  --   --   --   --  31*  --    PLATELETS Thousands/uL 200 236 265  --   --   --    POTASSIUM mmol/L 3 9 4 3  --  6 0*  --  5 3   CHLORIDE mmol/L 94* 98*  --  100  --  98*   CO2 mmol/L 28 26  --  21  --  30   CO2, I-STAT mmol/L  --   --   --   --  27  --    BUN mg/dL 36* 48*  --  59*  --  53*   CREATININE mg/dL 5 47* 6 68*  --  7 56*  --  7 44*   CALCIUM mg/dL 9 3 9 8  --  9 5  --  9 4   MAGNESIUM mg/dL 2 2 2 6  --  2 7*  --   --    PHOSPHORUS mg/dL 6 8* 6 9*  --  5 9*  --   --    ALK PHOS U/L  --  123*  --   --   --  145*   ALT U/L  --  20  --   --   --  33   AST U/L  --  20  --   --   --  18   GLUCOSE, ISTAT mg/dl  --   --   --   --  96  --        Previous work up:         Portions of the record may have been created with voice recognition software  Occasional wrong word or "sound a like" substitutions may have occurred due to the inherent limitations of voice recognition software  Read the chart carefully and recognize, using context, where substitutions have occurred  If you have any questions, please contact the dictating provider

## 2020-07-26 NOTE — SOCIAL WORK
CM met with Pt with an introduction and explanation of role  Pt reported residing with her daughter Susan Levin in a 2nd floor apt with the use of a cane, roller walker and a flight of steps to enter  Pt reported being independent with ADLs, denied any hx of VNA, SNF, mental health or drug/alcohol placements  Pt denied having a living will, reported the use of [address] on [address] and has Zechariah Irving as a PCP  Pt received HD at [facility] on T/Th/Sat and is driven by Susan Levin  CM reviewed d/c planning process including the following: identifying help at home, patient preference for d/c planning needs, Discharge Lounge, [facility] Meds to Bed program, availability of treatment team to discuss questions or concerns patient and/or family may have regarding understanding medications and recognizing signs and symptoms once discharged  CM also encouraged patient to follow up with all recommended appointments after discharge  Patient advised of importance for patient and family to participate in managing patients medical well being

## 2020-07-26 NOTE — PROGRESS NOTES
Progress Note - Sarah Condon 1965, 63 y o  female MRN: [id number]    Unit/Bed#: S -01 Encounter: [id number]    Primary Care Provider: Sarah Condon MD   Date and time admitted to hospital: 7/23/2020 12:51 PM    * Toxic metabolic encephalopathy  Assessment & Plan  · Present on admission, suspect mixed with severe depression  I suspect this is due to patient's Baclofen use  She was recently started on this for twitching  Her daughter states that she experienced hallucinations with this so her daughter advised to stop it  Reports last dose was 7/22  Missed dialysis 7/23 and Baclofen is renally cleared  Patient significantly altered 7/23-7/24-- minimally responsive to painful stimuli, not communicating  · Patient this AM is more alert, able to hold a conversation, and overall reports that she does not remember the last few days since taking baclofen   · Hold sedating medications   · Baclofen, Elavil, Trazodone  · DO NOT resume baclofen or elavil on d/c   · Status post 1 L IVF in ER  · Monitor temps  · Hold further antibiotics   · Nephro and Psych and neuro consults   · D/W Nephro: given significant improvement post HD, suspect this is related to baclofen dosing   · D/W Psych 7/24: unable to formally assess patient as she did not participate in examination  Do agree with holding elavil in setting of prior suicide attempts  Will re-evaluate the patient when she is more alert   · D/W Neuro 7/24: plan for MRI, EEG given diffuse myoclonus on 7/24  · Monitor CBC, BMP, Lytes  ESRD on hemodialysis [facility])  Assessment & Plan  · Patient gets dialysis TuThSat  She missed today's session   I do not believe that that directly is contributing to her mental status however I believe that dialysis would aid in removing some potential toxicity from her Baclofen   · Nephrology consult for dialysis management-- stable for d/c to home from their standpoint-- resume normal TuThSat schedule    ANNETTE (generalized anxiety disorder)  Assessment & Plan  · Noted history  Patient was noted to have "twitching" and was started on Baclofen  ?Manifestation of anxiety  · No twitching noted on examination today  · Psych consult   · Hold off potentially sedating medications at this time     MDD (major depressive disorder), recurrent severe, without psychosis ([facility] [address] )  Assessment & Plan  · Patient's daughter states that patient disclosed to her sister that she was very depressed  She has had issues with this in the past  Discussed with patient today as she is more alert-- she does admit to SI, but no plan  She feels that a lot of her increase in symptoms is related to the fact that her daughter is newly  and they are trying to transition the patient to living with a different daughter, which has been difficult  · Hold sedating medications   · Psychiatry consult    H/O: CVA versus Bell's palsy on the left  Assessment & Plan  · Prior hx of CVA Oct 2017 w/  L sided sx; received tPA; MRI was negative for acute event, and overall felt sx were actually related to migraine  · Family reports that she does have continued L sided deficits, but did mention that patient did have some R sided complaints on day of admission  · MRI brain 7/24: No MR evidence of acute ischemia  Small amount of fluid in the bilateral sphenoid sinuses  · Unable to assess formal neurologic exam, there seems to be limited participation on patient's half   CT head normal   · Continue statin     Essential hypertension  Assessment & Plan  · BP acceptable  · Continue home regimen     Type 2 diabetes mellitus with hyperglycemia, with long-term current use of insulin [facility])  Assessment & Plan  Lab Results   Component Value Date    HGBA1C 8 7 (H) 02/05/2020       Recent Labs     07/25/20  1223 07/25/20  1626 07/25/20  2046 07/26/20  0835   POCGLU 129 130 140 120       Blood Sugar Average: Last 72 hrs:  (P) 114 5   · Normoglycemic on admission   · Home regimen of insulin was decreased by  given NPO status-- given good control of BG will continue with 10 U Lantus  · SSI algorithm 1 with meals and bedtime   · QID accuchecks        VTE Pharmacologic Prophylaxis:   Pharmacologic: Pharmacologic VTE Prophylaxis contraindicated due to loow risk  Mechanical VTE Prophylaxis in Place: No    Patient Centered Rounds: I have performed bedside rounds with nursing staff today  Discussions with Specialists or Other Care Team Provider: CM, RN     Education and Discussions with Family / Patient: patient; daughter Esdras Lock via phone    Time Spent for Care: 30 minutes  More than 50% of total time spent on counseling and coordination of care as described above  Current Length of Stay: 3 day(s)    Current Patient Status: Inpatient   Certification Statement: The patient will continue to require additional inpatient hospital stay due to ongoing safe dispo planning    Discharge Plan: d/c pending repeat psych eval     Code Status: Level 1 - Full Code      Subjective:   Patient feels ok today  Still with some diarrhea  RN reports dizziness last night, but has resolve d     Objective:     Vitals:   Temp (24hrs), Av 2 °F (36 8 °C), Min:98 °F (36 7 °C), Max:98 4 °F (36 9 °C)    Temp:  [98 °F (36 7 °C)-98 4 °F (36 9 °C)] 98 4 °F (36 9 °C)  HR:  [] 78  Resp:  [18-22] 18  BP: ()/(59-78) 131/70  SpO2:  [94 %-98 %] 98 %  Body mass index is 31 49 kg/m²  Input and Output Summary (last 24 hours): Intake/Output Summary (Last 24 hours) at 2020 1058  Last data filed at 2020 1830  Gross per 24 hour   Intake 890 ml   Output    Net 890 ml       Physical Exam:     Physical Exam   Constitutional: She is oriented to person, place, and time  No distress  HENT:   Head: Normocephalic and atraumatic  Eyes: Pupils are equal, round, and reactive to light  EOM are normal    Neck: No JVD present  Cardiovascular: Normal rate and regular rhythm  Exam reveals no gallop and no friction rub     No murmur heard   Pulmonary/Chest: Effort normal and breath sounds normal  No stridor  No respiratory distress  She has no wheezes  Abdominal: Soft  Bowel sounds are normal  She exhibits no distension  There is no tenderness  There is no guarding  Musculoskeletal: Normal range of motion  She exhibits no edema  Neurological: She is alert and oriented to person, place, and time  She displays normal reflexes  No cranial nerve deficit  Coordination normal    Skin: Skin is warm and dry  She is not diaphoretic  Psychiatric: She has a normal mood and affect  Additional Data:     Labs:    Results from last 7 days   Lab Units 07/26/20  0631  07/24/20  0613   WBC Thousand/uL 6 95   < > 6 76   HEMOGLOBIN g/dL 11 1*   < > 10 9*   HEMATOCRIT % 33 7*   < > 34 0*   PLATELETS Thousands/uL 200   < > 265   NEUTROS PCT %  --   --  58   LYMPHS PCT %  --   --  30   MONOS PCT %  --   --  8   EOS PCT %  --   --  3    < > = values in this interval not displayed  Results from last 7 days   Lab Units 07/26/20  0631 07/25/20  0747   SODIUM mmol/L 132* 136   POTASSIUM mmol/L 3 9 4 3   CHLORIDE mmol/L 94* 98*   CO2 mmol/L 28 26   BUN mg/dL 36* 48*   CREATININE mg/dL 5 47* 6 68*   ANION GAP mmol/L 10 12   CALCIUM mg/dL 9 3 9 8   ALBUMIN g/dL  --  3 5   TOTAL BILIRUBIN mg/dL  --  0 66   ALK PHOS U/L  --  123*   ALT U/L  --  20   AST U/L  --  20   GLUCOSE RANDOM mg/dL 96 114     Results from last 7 days   Lab Units 07/23/20  1309   INR  0 99     Results from last 7 days   Lab Units 07/26/20  0835 07/25/20  2046 07/25/20  1626 07/25/20  1223 07/25/20  0635 07/24/20  2359 07/24/20  2104 07/24/20  1609 07/24/20  1025 07/24/20  0719 07/24/20  0606 07/23/20  2108   POC GLUCOSE mg/dl 120 140 130 129 100 119 118 105 145* 94 107 93         Results from last 7 days   Lab Units 07/23/20  1308   LACTIC ACID mmol/L 1 2           * I Have Reviewed All Lab Data Listed Above  * Additional Pertinent Lab Tests Reviewed:  Daniele Andrade Admission Reviewed    Imaging:    Imaging Reports Reviewed Today Include: MRI brain  Imaging Personally Reviewed by Myself Includes:  MRI brain     Recent Cultures (last 7 days):     Results from last 7 days   Lab Units 07/23/20  1309   BLOOD CULTURE  No Growth at 48 hrs  No Growth at 48 hrs  Last 24 Hours Medication List:     Current Facility-Administered Medications:  acetaminophen 650 mg Oral Q6H PRN Tom Hammonds PA-C   albuterol 2 puff Inhalation Q6H PRN Tmo Hammonds PA-C   atorvastatin 80 mg Oral QPM Tom Murillo PA-C   insulin glargine 10 Units Subcutaneous HS Denise Barney PA-C   insulin lispro 1-5 Units Subcutaneous 4x Daily (AC & HS) Denise Barney PA-C   labetalol 300 mg Oral Q12H [address] Tom Murillo PA-C   Labetalol HCl 10 mg Intravenous Q6H PRN Tom Hammonds PA-C   loperamide 2 mg Oral TID PRN Denise Barney PA-C   loratadine 10 mg Oral Daily Tom Murillo PA-C   losartan 50 mg Oral Daily Tom Murillo, PO   melatonin 3 mg Oral HS Tom Murillo PA-C   NIFEdipine ER 30 mg Oral Daily Tomame Murillo PA-C   ondansetron 4 mg Intravenous Q6H PRN Tom Hammonds PA-C   sevelamer 1,600 mg Oral TID With José Antonio Carreon MD        Today, Patient Was Seen By: Quintin Wetzel PA-C    ** Please Note: Dictation voice to text software may have been used in the creation of this document   **

## 2020-07-27 VITALS
DIASTOLIC BLOOD PRESSURE: 75 MMHG | TEMPERATURE: 97.8 F | BODY MASS INDEX: 31.68 KG/M2 | WEIGHT: 172.18 LBS | OXYGEN SATURATION: 98 % | SYSTOLIC BLOOD PRESSURE: 117 MMHG | RESPIRATION RATE: 18 BRPM | HEIGHT: 62 IN | HEART RATE: 74 BPM

## 2020-07-27 LAB
1,25(OH)2D3 SERPL-MCNC: 18.7 PG/ML (ref 19.9–79.3)
ALBUMIN SERPL BCP-MCNC: 3.4 G/DL (ref 3.5–5)
ALP SERPL-CCNC: 124 U/L (ref 46–116)
ALT SERPL W P-5'-P-CCNC: 19 U/L (ref 12–78)
ANION GAP SERPL CALCULATED.3IONS-SCNC: 14 MMOL/L (ref 4–13)
AST SERPL W P-5'-P-CCNC: 20 U/L (ref 5–45)
BILIRUB SERPL-MCNC: 0.55 MG/DL (ref 0.2–1)
BUN SERPL-MCNC: 63 MG/DL (ref 5–25)
CALCIUM SERPL-MCNC: 9 MG/DL (ref 8.3–10.1)
CHLORIDE SERPL-SCNC: 94 MMOL/L (ref 100–108)
CO2 SERPL-SCNC: 25 MMOL/L (ref 21–32)
CREAT SERPL-MCNC: 8.04 MG/DL (ref 0.6–1.3)
ERYTHROCYTE [DISTWIDTH] IN BLOOD BY AUTOMATED COUNT: 16.7 % (ref 11.6–15.1)
GFR SERPL CREATININE-BSD FRML MDRD: 5 ML/MIN/1.73SQ M
GLUCOSE SERPL-MCNC: 121 MG/DL (ref 65–140)
GLUCOSE SERPL-MCNC: 127 MG/DL (ref 65–140)
GLUCOSE SERPL-MCNC: 205 MG/DL (ref 65–140)
HCT VFR BLD AUTO: 32.4 % (ref 34.8–46.1)
HGB BLD-MCNC: 10.6 G/DL (ref 11.5–15.4)
MAGNESIUM SERPL-MCNC: 2.6 MG/DL (ref 1.6–2.6)
MCH RBC QN AUTO: 32.5 PG (ref 26.8–34.3)
MCHC RBC AUTO-ENTMCNC: 32.7 G/DL (ref 31.4–37.4)
MCV RBC AUTO: 99 FL (ref 82–98)
PHOSPHATE SERPL-MCNC: 8.6 MG/DL (ref 2.7–4.5)
PLATELET # BLD AUTO: 211 THOUSANDS/UL (ref 149–390)
PMV BLD AUTO: 10 FL (ref 8.9–12.7)
POTASSIUM SERPL-SCNC: 4.2 MMOL/L (ref 3.5–5.3)
PROT SERPL-MCNC: 7.6 G/DL (ref 6.4–8.2)
RBC # BLD AUTO: 3.26 MILLION/UL (ref 3.81–5.12)
SODIUM SERPL-SCNC: 133 MMOL/L (ref 136–145)
WBC # BLD AUTO: 5.74 THOUSAND/UL (ref 4.31–10.16)

## 2020-07-27 PROCEDURE — 84100 ASSAY OF PHOSPHORUS: CPT | Performed by: PHYSICIAN ASSISTANT

## 2020-07-27 PROCEDURE — 80053 COMPREHEN METABOLIC PANEL: CPT | Performed by: PHYSICIAN ASSISTANT

## 2020-07-27 PROCEDURE — 82948 REAGENT STRIP/BLOOD GLUCOSE: CPT

## 2020-07-27 PROCEDURE — 99239 HOSP IP/OBS DSCHRG MGMT >30: CPT | Performed by: PHYSICIAN ASSISTANT

## 2020-07-27 PROCEDURE — 83735 ASSAY OF MAGNESIUM: CPT | Performed by: PHYSICIAN ASSISTANT

## 2020-07-27 PROCEDURE — 97163 PT EVAL HIGH COMPLEX 45 MIN: CPT

## 2020-07-27 PROCEDURE — 99232 SBSQ HOSP IP/OBS MODERATE 35: CPT | Performed by: INTERNAL MEDICINE

## 2020-07-27 PROCEDURE — 97116 GAIT TRAINING THERAPY: CPT

## 2020-07-27 PROCEDURE — 97167 OT EVAL HIGH COMPLEX 60 MIN: CPT

## 2020-07-27 PROCEDURE — 97110 THERAPEUTIC EXERCISES: CPT

## 2020-07-27 PROCEDURE — 85027 COMPLETE CBC AUTOMATED: CPT | Performed by: PHYSICIAN ASSISTANT

## 2020-07-27 PROCEDURE — 99232 SBSQ HOSP IP/OBS MODERATE 35: CPT | Performed by: PSYCHIATRY & NEUROLOGY

## 2020-07-27 RX ADMIN — NIFEDIPINE 30 MG: 30 TABLET, EXTENDED RELEASE ORAL at 09:08

## 2020-07-27 RX ADMIN — SEVELAMER HYDROCHLORIDE 1600 MG: 800 TABLET, FILM COATED PARENTERAL at 11:51

## 2020-07-27 RX ADMIN — ACETAMINOPHEN 650 MG: 325 TABLET, FILM COATED ORAL at 09:07

## 2020-07-27 RX ADMIN — LABETALOL HYDROCHLORIDE 300 MG: 100 TABLET, FILM COATED ORAL at 09:08

## 2020-07-27 RX ADMIN — LORATADINE 10 MG: 10 TABLET ORAL at 09:08

## 2020-07-27 RX ADMIN — LOSARTAN POTASSIUM 50 MG: 50 TABLET, FILM COATED ORAL at 09:08

## 2020-07-27 RX ADMIN — INSULIN LISPRO 1 UNITS: 100 INJECTION, SOLUTION INTRAVENOUS; SUBCUTANEOUS at 11:51

## 2020-07-27 RX ADMIN — SEVELAMER HYDROCHLORIDE 1600 MG: 800 TABLET, FILM COATED PARENTERAL at 09:08

## 2020-07-27 NOTE — PLAN OF CARE
Problem: OCCUPATIONAL THERAPY ADULT  Goal: Performs self-care activities at highest level of function for planned discharge setting  See evaluation for individualized goals  Description  Treatment Interventions: ADL retraining, Functional transfer training, UE strengthening/ROM, Endurance training, Patient/family training, Equipment evaluation/education, Compensatory technique education, Energy conservation, Activityengagement, Cognitive reorientation          See flowsheet documentation for full assessment, interventions and recommendations  Note:   Limitation: Decreased ADL status, Decreased UE strength, Decreased Safe judgement during ADL, Decreased cognition, Decreased endurance, Decreased self-care trans, Decreased high-level ADLs  Prognosis: Good  Assessment: Patient is a 63 y o  female admitted to [address] [place] [address] on 7/23/2020 due to Toxic metabolic encephalopathy  Comorbidities affecting pt's physical performance at time of assessment include DM II, HTN, hx of CVA vs [place] palsy on L, MDD, CAD, chronic respiratory failure  Patient has active OT orders and activity orders for Ambulate  PTA pt living with daughter in apartment, pt (I) with ADLs and requires (A) with IADLs, pt uses SPC at baseline  Personal factors affecting pt at time of IE include:steps to enter environment, limited home support, difficulty performing ADLS, difficulty performing IADLS , limited insight into deficits, flat affect and decreased initiation and engagement   At the time of evaluation patient currently requires (S) for UB ADLs, (S) for LB ADLs and (S) for functional mobility  The following deficits affected patient's occupational performance weakness, decreased functional strength, decreased functional balance, decreased activity tolerance, decreased safety awareness, impaired interpersonal skills and decreased coping skills  Patient would benefit from skilled OT services while in the hospital to address above deficits  Occupational performance areas to be addressed include ADL retraining, functional transfer training, endurance training, patient/family training, equipment evaluation/education, compensatory technique education, activity engagement and activity tolerance in order to maximize patient's level of function  From OT standpoint recommend home with family support upon D/C  OT continue to follow pt 3-5x/week to address the following goals       OT Discharge Recommendation: Return to previous environment with social support  OT - OK to Discharge: (when medically cleared)

## 2020-07-27 NOTE — PHYSICAL THERAPY NOTE
PHYSICAL THERAPY EVALUATION NOTE    Patient Name: Clyde Peguero  [id number] Date: 7/27/2020 07/27/20 0834   Pain Assessment   Pain Assessment Tool 0-10   Pain Score 6   Pain Location/Orientation Orientation: Left; Location: Head   Restrictions/Precautions   Other Precautions Chair Alarm; Bed Alarm;Limb alert; Fall Risk   General   Chart Reviewed Yes   Family/Caregiver Present No   Cognition   Arousal/Participation Alert; Cooperative   Attention Attends with cues to redirect   Orientation Level Oriented to person;Oriented to place;Oriented to time; Other (Comment)  (pt was identified w/ full name, birth date)   Following Commands Follows one step commands with increased time or repetition   Subjective   Subjective pt agreed to participate in PT intervention  Transfers   Sit to Stand 5  Supervision   Additional items Increased time required   Stand to Sit 5  Supervision   Additional items Increased time required   Additional Comments Comfortable Gait Speed (w/ roller walker): 0 46 m/s   Ambulation/Elevation   Gait pattern Decreased L stance; Inconsistent nadiya; Excessively slow   Gait Assistance 6  Modified independent   Additional items Verbal cues  (for walker placement)   Assistive Device Rolling walker   Distance 150, 80 feet w/ seated rest break x 1 minute  (additional not possible due to fatigue)   Balance   Static Sitting Good   Dynamic Sitting Fair   Static Standing Fair   Ambulatory Fair  (w/ roller walker )   Activity Tolerance   Activity Tolerance Patient limited by fatigue;Patient limited by pain   Nurse Made Aware spoke to Justine Nixon OT, [facility]   Equipment Use   Comments seated heel/toe raises 20 each  long arc quads and seated hip flexion 10 each  Assessment   Prognosis Fair   Problem List Decreased strength;Decreased range of motion;Decreased endurance; Impaired balance;Decreased mobility; Decreased coordination;Decreased safety awareness; Impaired sensation; Impaired tone;Pain   Assessment Therapist introduced roller walker use w/ mobility to address impairments noted during evaluation  Pt was noted to have improvement in mobility status w/ use of walker w/ increased ambulation distance and decreased level of assistance  Pt continues to require assist and verbal cues w/ mobility for proper technique/safety  Pt was initiated w/ participation in exercise program to address physical and mobility deficits noted during eval  Pt had fair understanding of exercise technique after initial introduction and demonstration  Occasional rest breaks were needed due to fatigue  Handout was provided to expedite understanding of technique  Pt is at risk for falling  continued inpatient PT tx is indicated to reduce fall risk factors  Goals   Patient Goals go home   STG Expiration Date 08/06/20   Short Term Goal #1 pt will: Increase bilateral LE strength 1/2 grade to facilitate independent mobility, Perform all bed mobility tasks independently to decrease fall risk factors, Perform all transfers independently to improve independence, Ambulate 300 ft  with least restrictive assistive device independently w/o LOB to expedite safe return home w/ family support, Navigate 20 stairs independently with unilateral handrail to facilitate return to previous living environment, Increase all balance 1 grade to decrease risk for falls, Complete exercise program independently to increase strength and endurance, Tolerate 3 hr OOB to faciliate upright tolerance, Improve gait speed to 0 56 m/s to reduce fall risk and increase independence and Improve Barthel Index score to 90 or greater to facilitate independence   PT Treatment Day 1   Plan   Treatment/Interventions Functional transfer training;LE strengthening/ROM; Elevations; Therapeutic exercise; Endurance training;Patient/family training;Equipment eval/education; Bed mobility;Gait training   Progress Progressing toward goals   PT Frequency 5x/wk   Recommendation   PT Discharge Recommendation Return to previous environment with social support;Home with skilled therapy; Other (Comment)  (home PT)   Equipment Recommended Other (Comment)  (roller walker)     Comfortable Gait Speed (w/ roller walker): 0 46 m/s  Gait Speed Interpretation:  Gain of 0 1 m/s is a predictor of well-being in those w/ abnormal walking speed compared to age-patched peers    Household ambulator: <0 4 m/s  Limited community ambulator: 0 4-0 8 m/s  [employer] ambulator: 0 8-1 2 m/s  Able to safely cross streets: >1 2 m/s    Skilled inpatient PT recommended while in hospital to progress pt toward treatment goals      Marianela Blake, PT

## 2020-07-27 NOTE — ASSESSMENT & PLAN NOTE
· Prior hx of CVA Oct 2017 w/  L sided sx; received tPA; MRI was negative for acute event, and overall felt sx were actually related to migraine  · MRI brain 7/24: No MR evidence of acute ischemia  Small amount of fluid in the bilateral sphenoid sinuses    · Continue statin

## 2020-07-27 NOTE — ASSESSMENT & PLAN NOTE
· Patient's daughter states that patient disclosed to her sister that she was very depressed   She has had issues with this in the past    · Continue outpatient psych follow up

## 2020-07-27 NOTE — PLAN OF CARE
Problem: PHYSICAL THERAPY ADULT  Goal: Performs mobility at highest level of function for planned discharge setting  See evaluation for individualized goals  Description  Treatment/Interventions: Functional transfer training, LE strengthening/ROM, Elevations, Therapeutic exercise, Endurance training, Patient/family training, Equipment eval/education, Bed mobility, Gait training          See flowsheet documentation for full assessment, interventions and recommendations  Outcome: Progressing  Note:   Prognosis: Fair  Problem List: Decreased strength, Decreased range of motion, Decreased endurance, Impaired balance, Decreased mobility, Decreased coordination, Decreased safety awareness, Impaired sensation, Impaired tone, Pain  Assessment: Pt presents with altered mental status  Dx: toxic metabolic encephalopathy, ESRD, ANNETTE, MDD, essential HTN, and DM w/ hyperglycemia  order placed for PT eval and tx, w/ activity order of ambulate patient  pt presents w/ comorbidities of asthma, DM, hyperlipidemia, HTN, and CVA and personal factors of mobilizing w/ assistive device, stair(s) to enter home, preferred language not [place] (language barrier) and depression  pt presents w/ pain, weakness, decreased ROM, decreased endurance, impaired balance, gait deviations, altered sensation, impaired coordination, impaired tone, decreased safety awareness and fall risk  these impairments are evident in findings from physical examination (weakness, decreased ROM, altered sensation, impaired coordination and impaired tone), mobility assessment (need for standby assist w/ all phases of mobility when usually mobilizing independently, tolerance to only 120 feet of ambulation and need for cueing for mobility technique), and Barthel Index: 70/100 and Comfortable Gait Speed: 0 36 m/s (less than 1 0 m/s indicates need for intervention to address falls risk)  pt needed input for task focus and mobility technique/safety   pt is at risk for falls due to physical and safety awareness deficits  pt's clinical presentation is unstable/unpredictable (evident in need for standby assist w/ all phases of mobility when usually mobilizing independently, tolerance to only 120 feet of ambulation, pain impacting overall mobility status and need for input for mobility technique/safety)  pt needs inpatient PT tx to improve mobility deficits  discharge recommendation is for home PT to reduce fall risk and maximize level of functional independence  PT Discharge Recommendation: Return to previous environment with social support, Home with skilled therapy, Other (Comment)(home PT)          See flowsheet documentation for full assessment

## 2020-07-27 NOTE — PLAN OF CARE
Problem: PHYSICAL THERAPY ADULT  Goal: Performs mobility at highest level of function for planned discharge setting  See evaluation for individualized goals  Description  Treatment/Interventions: Functional transfer training, LE strengthening/ROM, Elevations, Therapeutic exercise, Endurance training, Patient/family training, Equipment eval/education, Bed mobility, Gait training          See flowsheet documentation for full assessment, interventions and recommendations  7/27/2020 0856 by Oralia Henley PT  Outcome: Progressing  Note:   Prognosis: Fair  Problem List: Decreased strength, Decreased range of motion, Decreased endurance, Impaired balance, Decreased mobility, Decreased coordination, Decreased safety awareness, Impaired sensation, Impaired tone, Pain  Assessment: Therapist introduced roller walker use w/ mobility to address impairments noted during evaluation  Pt was noted to have improvement in mobility status w/ use of walker w/ increased ambulation distance and decreased level of assistance  Pt continues to require assist and verbal cues w/ mobility for proper technique/safety  Pt was initiated w/ participation in exercise program to address physical and mobility deficits noted during eval  Pt had fair understanding of exercise technique after initial introduction and demonstration  Occasional rest breaks were needed due to fatigue  Handout was provided to expedite understanding of technique  Pt is at risk for falling  continued inpatient PT tx is indicated to reduce fall risk factors  PT Discharge Recommendation: Return to previous environment with social support, Home with skilled therapy, Other (Comment)(home PT)          See flowsheet documentation for full assessment       7/27/2020 0850 by Oralia Henley PT  Outcome: Progressing  Note:   Prognosis: Fair  Problem List: Decreased strength, Decreased range of motion, Decreased endurance, Impaired balance, Decreased mobility, Decreased coordination, Decreased safety awareness, Impaired sensation, Impaired tone, Pain  Assessment: Pt presents with altered mental status  Dx: toxic metabolic encephalopathy, ESRD, ANNETTE, MDD, essential HTN, and DM w/ hyperglycemia  order placed for PT eval and tx, w/ activity order of ambulate patient  pt presents w/ comorbidities of asthma, DM, hyperlipidemia, HTN, and CVA and personal factors of mobilizing w/ assistive device, stair(s) to enter home, preferred language not [place] (language barrier) and depression  pt presents w/ pain, weakness, decreased ROM, decreased endurance, impaired balance, gait deviations, altered sensation, impaired coordination, impaired tone, decreased safety awareness and fall risk  these impairments are evident in findings from physical examination (weakness, decreased ROM, altered sensation, impaired coordination and impaired tone), mobility assessment (need for standby assist w/ all phases of mobility when usually mobilizing independently, tolerance to only 120 feet of ambulation and need for cueing for mobility technique), and Barthel Index: 70/100 and Comfortable Gait Speed: 0 36 m/s (less than 1 0 m/s indicates need for intervention to address falls risk)  pt needed input for task focus and mobility technique/safety  pt is at risk for falls due to physical and safety awareness deficits  pt's clinical presentation is unstable/unpredictable (evident in need for standby assist w/ all phases of mobility when usually mobilizing independently, tolerance to only 120 feet of ambulation, pain impacting overall mobility status and need for input for mobility technique/safety)  pt needs inpatient PT tx to improve mobility deficits  discharge recommendation is for home PT to reduce fall risk and maximize level of functional independence           PT Discharge Recommendation: Return to previous environment with social support, Home with skilled therapy, Other (Comment)(home PT) See flowsheet documentation for full assessment
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
## 2020-07-27 NOTE — ASSESSMENT & PLAN NOTE
· Present on admission- I suspect this is due to patient's Baclofen use  She was recently started on this for twitching  Her daughter states that she experienced hallucinations with this so her daughter was advised to stop it  Reports last dose was 7/22  Missed dialysis 7/23 and Baclofen is renally cleared  Patient significantly altered 7/23-7/24-- minimally responsive to painful stimuli, not communicating--Patient appears to be back to baseline low  · DO NOT resume baclofen or elavil on d/c   · Status post 1 L IVF in ER  · Monitor temps-Hold further antibiotics   · Nephro and Psych and neuro consults appreciated  · D/W Nephro today: given significant improvement post HD, suspect this is related to baclofen dosing   · D/W Psych today: resume trazadone only   · D/W Neuro today: MRI brain normal; EEG can be cancelled

## 2020-07-27 NOTE — SOCIAL WORK
Patient is medically stable for discharge home today  IMM reviewed with patient  CM met with patient at bedside and reviewed that a post acute care recommendation was made by your care team for [facility]  Discussed [place] [facility] with patient  List of agencies given to patient via in person  patient aware the list is custom filtered for them by preferred and that [facility] post acute providers are designated  Patient is agreeable to a referral to [facility] however wants to make sure that she wont' have any kind of a co-payment  CM sent a referral in [facility] to [facility]  Patient was accepted and CM notified that patient's visits would be covered at 100%  [facility] added to AVS     Patient also verbalized to nursing the need to find a new PCP  [employer] card was provided and this information was also placed on her AVS     Patient's daughter will provide transport home this afternoon  CM reviewed the availability of treatment team to discuss questions or concerns patient and/or family may have regarding  understanding medications and recognizing signs and symptoms once discharged  CM also encouraged patient to follow up with all recommended appointments after discharge  Patient advised of importance for patient and family to participate in managing patient's medical well being

## 2020-07-27 NOTE — ASSESSMENT & PLAN NOTE
· Patient gets dialysis TuThSat  She missed Saturday's session   I do not believe that that directly is contributing to her mental status however I believe that dialysis would aid in removing some potential toxicity from her Baclofen   · Nephrology consult for dialysis management-- stable for d/c to home from their standpoint-- resume normal TuThSat schedule  · With chronic phos elevation, defer to outpatient nephro per our team  · Continue sevelamer

## 2020-07-27 NOTE — PROGRESS NOTES
NEPHROLOGY PROGRESS NOTE   Pearl Sagastume 63 y o  female MRN: [id number]  Unit/Bed#: S -01 Encounter: [id number]  Reason for Consult: ESRD    ASSESSMENT/PLAN:  1  ESRD on HD TTS @ [address]- next HD Tuesday  2  Access- right arm AVF  3  Anemia- on LOREN with dialysis outpatient   - hgb currently at goal and this can be restarted as an outpatient as needed  4  Hypertension- BP acceptable  5  BMD- continue sevelamer with meals  6  Altered Mental Status- likely secondary to baclofen    Disposition:  Okay for discharge from a renal standpoint  SUBJECTIVE:  Patient feeling well overall  Denies SOB       OBJECTIVE:  Current Weight: Weight - Scale: 78 1 kg (172 lb 2 9 oz)  Vitals:    07/26/20 1500 07/26/20 2102 07/26/20 2226 07/27/20 0700   BP: 130/67 126/64 112/64 117/75   BP Location: Left arm  Left arm Left arm   Pulse: 76 75 72 74   Resp: 18  18 18   Temp: 97 8 °F (36 6 °C)  97 7 °F (36 5 °C) 97 8 °F (36 6 °C)   TempSrc: Oral  Oral Oral   SpO2: 93%  94% 98%   Weight:       Height:           Intake/Output Summary (Last 24 hours) at 7/27/2020 7478  Last data filed at 7/26/2020 2301  Gross per 24 hour   Intake 240 ml   Output 280 ml   Net -40 ml     General: NAD  Skin: no rash  Eyes: anicteric  ENMT: mm moist  Neck: no masses  Respiratory: CTAB  Cardiac: RRR  Extremities: no edema  GI: soft nt nd  Neuro: alert awake  Psych: mood and affect appropriate    Medications:    Current Facility-Administered Medications:     acetaminophen (TYLENOL) tablet 650 mg, 650 mg, Oral, Q6H PRN, Tom Murillo PA-C, 650 mg at 07/27/20 0907    albuterol (PROVENTIL HFA,VENTOLIN HFA) inhaler 2 puff, 2 puff, Inhalation, Q6H PRN, Eda Cox PA-C    atorvastatin (LIPITOR) tablet 80 mg, 80 mg, Oral, QPM, Tom Murillo PA-C, 80 mg at 07/26/20 1818    insulin glargine (LANTUS) subcutaneous injection 10 Units 0 1 mL, 10 Units, Subcutaneous, HS, Denise Barney PA-C, 10 Units at 07/25/20 2111    insulin lispro (HumaLOG) 100 units/mL subcutaneous injection 1-5 Units, 1-5 Units, Subcutaneous, 4x Daily (AC & HS), 1 Units at 07/26/20 1329 **AND** Fingerstick Glucose (POCT), , , 4x Daily AC and at bedtime, Denise Barney PA-C    labetalol (NORMODYNE) tablet 300 mg, 300 mg, Oral, Q12H [address], Tom Murillo PA-C, 300 mg at 07/27/20 0908    Labetalol HCl (NORMODYNE) injection 10 mg, 10 mg, Intravenous, Q6H PRN, Liz Cronin PA-C, 10 mg at 07/25/20 0018    loperamide (IMODIUM) capsule 2 mg, 2 mg, Oral, TID PRN, Denise Barney PA-C, 2 mg at 07/25/20 1848    loratadine (CLARITIN) tablet 10 mg, 10 mg, Oral, Daily, Tom Murillo PA-C, 10 mg at 07/27/20 0908    losartan (COZAAR) tablet 50 mg, 50 mg, Oral, Daily, Tom Murillo PA-C, 50 mg at 07/27/20 0908    melatonin tablet 3 mg, 3 mg, Oral, HS, Tom Murillo PA-C, 3 mg at 07/26/20 2104    NIFEdipine (PROCARDIA XL) 24 hr tablet 30 mg, 30 mg, Oral, Daily, Tom Murillo PA-C, 30 mg at 07/27/20 0908    ondansetron (ZOFRAN) injection 4 mg, 4 mg, Intravenous, Q6H PRN, Liz Cronin PA-C    sevelamer (RENAGEL) tablet 1,600 mg, 1,600 mg, Oral, TID With Meals, eLfty Solano MD, 1,600 mg at 07/27/20 0908    Laboratory Results:  Results from last 7 days   Lab Units 07/27/20  0507 07/26/20  0631 07/25/20  0747 07/24/20  0613 07/24/20  0557 07/23/20  1512 07/23/20  1309 07/23/20  1308   WBC Thousand/uL 5 74 6 95 8 70 6 76  --   --   --  5 35   HEMOGLOBIN g/dL 10 6* 11 1* 11 6 10 9*  --   --   --  11 5   I STAT HEMOGLOBIN g/dl  --   --   --   --   --  10 5*  --   --    HEMATOCRIT % 32 4* 33 7* 35 8 34 0*  --   --   --  35 4   HEMATOCRIT, ISTAT %  --   --   --   --   --  31*  --   --    PLATELETS Thousands/uL 211 200 236 265  --   --   --  274   POTASSIUM mmol/L 4 2 3 9 4 3  --  6 0*  --  5 3  --    CHLORIDE mmol/L 94* 94* 98*  --  100  --  98*  --    CO2 mmol/L 25 28 26  --  21  --  30  --    CO2, I-STAT mmol/L  --   --   --   --   --  27  --   --    BUN mg/dL 63* 36* 48*  --  59*  -- 53*  --    CREATININE mg/dL 8 04* 5 47* 6 68*  --  7 56*  --  7 44*  --    CALCIUM mg/dL 9 0 9 3 9 8  --  9 5  --  9 4  --    MAGNESIUM mg/dL 2 6 2 2 2 6  --  2 7*  --   --   --    PHOSPHORUS mg/dL 8 6* 6 8* 6 9*  --  5 9*  --   --   --    GLUCOSE, ISTAT mg/dl  --   --   --   --   --  96  --   --

## 2020-07-27 NOTE — OCCUPATIONAL THERAPY NOTE
Occupational Therapy Evaluation     Patient Name: Ebony HSU Date: 7/27/2020  Problem List  Principal Problem:    Toxic metabolic encephalopathy  Active Problems:    Type 2 diabetes mellitus with hyperglycemia, with long-term current use of insulin ([facility] )    Essential hypertension    H/O: CVA versus Bell's palsy on the left  MDD (major depressive disorder), recurrent severe, without psychosis ([facility] )    ANNETTE (generalized anxiety disorder)    ESRD on hemodialysis ([facility] )    Chronic respiratory failure with hypoxia ([facility] )    Past Medical History  Past Medical History:   Diagnosis Date    Asthma     Depression     Diabetes mellitus ([facility] )     Hyperlipidemia     Hypertension     Renal disorder     daylsis T,Th, Saturday    Stroke [facility])      Past Surgical History  Past Surgical History:   Procedure Laterality Date    AV FISTULA PLACEMENT      2/13/2020 and 4/3/2020    CHOLECYSTECTOMY      HYSTERECTOMY             07/27/20 0842   Note Type   Note type Eval/Treat   Restrictions/Precautions   Weight Bearing Precautions Per Order No   Other Precautions Chair Alarm;Limb alert; Fall Risk   Pain Assessment   Pain Assessment Tool 0-10   Pain Score 6   Pain Location/Orientation Location: Head   Home Living   Type of Home Apartment   Home Layout One level;Performs ADLs on one level; Able to live on main level with bedroom/bathroom  (full flight to enter)   Bathroom Shower/Tub Tub/shower unit   [address] [place] [address] Dr kiser   Bathroom Accessibility Accessible   Home Equipment Walker;Cane   Additional Comments uses SPC at baseline   Prior Function   Level of [place] Independent with ADLs and functional mobility; Needs assistance with IADLs   Lives With Daughter   Receives Help From Family   ADL Assistance Independent   IADLs Needs assistance   Falls in the last 6 months 0   Vocational Retired   Comments daughter drives   Lifestyle   Autonomy PTA pt living with daughter in apartment, pt (I) with ADLs and requires (A) with IADLs, pt uses SPC at baseline    Reciprocal Relationships supportive daughter   [address] being with family   ADL   Where Assessed Standing at sink: Pt with fatigue throughout, requiring rest breaks and reminders for pursed lip breathing; O2 on RA while sitting 89%, O2 during functional mobility 96%   Grooming Assistance 5  Supervision/Setup   UB Bathing Assistance 5  Supervision/Setup   LB Bathing Assistance 5  Supervision/Setup   UB Dressing Assistance 5  Supervision/Setup   LB Dressing Assistance 5  Supervision/Setup   Toileting Assistance  Unable to assess   Bed Mobility   Additional Comments OOB and in chait upon arrival   Transfers   Sit to Stand 5  Supervision   Additional items Increased time required   Stand to Sit 5  Supervision   Additional items Increased time required   Functional Mobility   Functional Mobility 5  Supervision   Additional Comments functional distance to bathroom   Additional items [facility]   Balance   Static Sitting Good   Dynamic Sitting Fair   Static Standing Fair   Dynamic Standing Shanae Cartwright [id number]   Activity Tolerance   Activity Tolerance Patient limited by fatigue;Patient limited by pain   Medical Staff Made Aware PT RJ   Nurse Made Aware Okay to see per BERENICE Tate   RUBARBARA Assessment   RUE Assessment WFL  (4-/5)   LUE Assessment   LUE Assessment X  (3/5; 3-/5 shldr)   Hand Function   Gross Motor Coordination Functional   Fine Motor Coordination Functional   Sensation   Light Touch No apparent deficits   Vision-Basic Assessment   Current Vision Wears glasses all the time   Cognition   Overall Cognitive Status Impaired   Arousal/Participation Alert; Cooperative   Attention Attends with cues to redirect   Orientation Level Oriented to person;Oriented to place;Oriented to time;Disoriented to situation   Memory Unable to assess   Following Commands Follows one step commands with increased time or repetition   Comments Pt with delayed repsonses to questions and decreased safety awareness   Assessment   Limitation Decreased ADL status; Decreased UE strength;Decreased Safe judgement during ADL;Decreased cognition;Decreased endurance;Decreased self-care trans;Decreased high-level ADLs   Prognosis Good   Assessment Patient is a 63 y o  female admitted to [address] [place] [address] on 7/23/2020 due to Toxic metabolic encephalopathy  Comorbidities affecting pt's physical performance at time of assessment include DM II, HTN, hx of CVA vs [place] palsy on L, MDD, CAD, chronic respiratory failure  Patient has active OT orders and activity orders for Ambulate  PTA pt living with daughter in apartment, pt (I) with ADLs and requires (A) with IADLs, pt uses SPC at baseline  Personal factors affecting pt at time of IE include:steps to enter environment, limited home support, difficulty performing ADLS, difficulty performing IADLS , limited insight into deficits, flat affect and decreased initiation and engagement   At the time of evaluation patient currently requires (S) for UB ADLs, (S) for LB ADLs and (S) for functional mobility  The following deficits affected patient's occupational performance weakness, decreased functional strength, decreased functional balance, decreased activity tolerance, decreased safety awareness, impaired interpersonal skills and decreased coping skills  Patient would benefit from skilled OT services while in the hospital to address above deficits  Occupational performance areas to be addressed include ADL retraining, functional transfer training, endurance training, patient/family training, equipment evaluation/education, compensatory technique education, activity engagement and activity tolerance in order to maximize patient's level of function  From OT standpoint recommend home with family support upon D/C  OT continue to follow pt 3-5x/week to address the following goals     Goals   Patient Goals to go home   LTG Time Frame 10-14 Long Term Goal see goals listed below   Plan   Treatment Interventions ADL retraining;Functional transfer training;UE strengthening/ROM; Endurance training;Patient/family training;Equipment evaluation/education; Compensatory technique education; Energy conservation; Activityengagement;Cognitive reorientation   Goal Expiration Date 08/10/20   OT Treatment Day 0   OT Frequency 3-5x/wk   Recommendation   OT Discharge Recommendation Return to previous environment with social support   OT - OK to Discharge   (when medically cleared)   Barthel Index   Feeding 10   Bathing 0   Grooming Score 5   Dressing Score 5   Bladder Score 10   Bowels Score 10   Toilet Use Score 5   Transfers (Bed/Chair) Score 10   Mobility (Level Surface) Score 10   Stairs Score 5   Barthel Index Score 70     Goals    1) Patient will complete UB ADLs w/ mod I using AE and AD as needed    2) Patient will complete LB ADLs w/ mod I using AE and AD as needed    3) Patient will complete toileting w/ mod I w/ G hygiene/thoroughness    4) Patient will complete bed mobility with Mod I without use of bed rails    5) Patient will tolerate therapeutic activities for greater than 15 min, in order to increase tolerance for functional activities       6) Patient will perform functional transfers with Mod I to/from all surfaces using DME as needed    7) Patient will complete functional mobility during ADL/IADL/leisure tasks with Mod I     8) Patient will follow multistep instructions with no cuing to increase cognitive function and independence with tasks     9) Patient will demonstrate 100% carryover of energy conservation techniques t/o functional I/ADL/leisure tasks w/o cues s/p skilled education to increase endurance during functional tasks    10) Patient will increase BUE strength by 1/2MM grade via AROM/AAROM/PROM exercises to increase independence in ADLs and transfers    11) Patient will verbalize 3 potential fall hazards and identify appropriate compensatory techniques to decrease fall risk in home environment     12) Patient will participate in simulated IADL management task to increase independence to Mod I w/ G safety and endurance      Pt will benefit from continued OT services in order to maximize independence with ADL performance, functional mobility with SPC, and improve overall endurance/strength required to complete functional tasks in preparation for discharge      At the end of the session, all needs met and pt seated in bedside chair, chair alarm activated and call bell within reach    [facility], OTR/L

## 2020-07-27 NOTE — DISCHARGE SUMMARY
Discharge- Tyrel Fitzgerald 1965, 63 y o  female MRN: [id number]    Unit/Bed#: S -01 Encounter: [id number]    Primary Care Provider: Tyrel Fitzgerald MD   Date and time admitted to hospital: 7/23/2020 12:51 PM    * Toxic metabolic encephalopathy  Assessment & Plan  · Present on admission- I suspect this is due to patient's Baclofen use  She was recently started on this for twitching  Her daughter states that she experienced hallucinations with this so her daughter was advised to stop it  Reports last dose was 7/22  Missed dialysis 7/23 and Baclofen is renally cleared  Patient significantly altered 7/23-7/24-- minimally responsive to painful stimuli, not communicating--Patient appears to be back to baseline low  · DO NOT resume baclofen or elavil on d/c   · Status post 1 L IVF in ER  · Monitor temps-Hold further antibiotics   · Nephro and Psych and neuro consults appreciated  · D/W Nephro today: given significant improvement post HD, suspect this is related to baclofen dosing   · D/W Psych today: resume trazadone only   · D/W Neuro today: MRI brain normal; EEG can be cancelled    ESRD on hemodialysis [facility])  Assessment & Plan  · Patient gets dialysis TuThSat  She missed Saturday's session   I do not believe that that directly is contributing to her mental status however I believe that dialysis would aid in removing some potential toxicity from her Baclofen   · Nephrology consult for dialysis management-- stable for d/c to home from their standpoint-- resume normal TuThSat schedule  · With chronic phos elevation, defer to outpatient nephro per our team  · Continue sevelamer    Type 2 diabetes mellitus with hyperglycemia, with long-term current use of insulin [facility])  Assessment & Plan  Lab Results   Component Value Date    HGBA1C 8 7 (H) 02/05/2020       Recent Labs     07/26/20  1524 07/26/20  1933 07/26/20  2110 07/27/20  0719   POCGLU 128 143* 115 121       Blood Sugar Average: Last 72 hrs:  (P) 924 2347 · Normoglycemic on admission   · Overdue for A1c, but can be ordered by PCP  · Continue home regimen     MDD (major depressive disorder), recurrent severe, without psychosis ([facility] )  Assessment & Plan  · Patient's daughter states that patient disclosed to her sister that she was very depressed  She has had issues with this in the past    · Continue outpatient psych follow up    H/O: CVA versus Bell's palsy on the left  Assessment & Plan  · Prior hx of CVA Oct 2017 w/  L sided sx; received tPA; MRI was negative for acute event, and overall felt sx were actually related to migraine  · MRI brain 7/24: No MR evidence of acute ischemia  Small amount of fluid in the bilateral sphenoid sinuses  · Continue statin     Chronic respiratory failure with hypoxia (HCC)  Assessment & Plan  · On O2 prior to arrival    Essential hypertension  Assessment & Plan  · BP acceptable  · Continue home regimen     Discharging Physician / Practitioner: Sumaya Han PA-C  PCP: Perri Ordaz MD  Admission Date:   Admission Orders (From admission, onward)     Ordered        07/23/20 1612  Inpatient Admission (expected length of stay for this patient Order details is greater than two midnights)  Once                   Discharge Date: 07/27/20    Resolved Problems  Date Reviewed: 7/27/2020    None          Consultations During Hospital Stay:  · Psychiatry  · Neurology  · renal    Procedures Performed:   · Head Ct  · MRI brain  · CXR    Significant Findings / Test Results:   · As above    Incidental Findings:   · none    Test Results Pending at Discharge (will require follow up):   · none     Outpatient Tests Requested:  · Phos  · S1Q    Complications:      Reason for Admission: altered mental status    Hospital Course:     Perri Ordaz is a 63 y o  female patient who originally presented to the hospital on 7/23/2020 due to altered mental status described as lethargy and confusion    Patient had just recently been started on baclofen and this was felt to be the reason  In addition she skipped a dialysis session which would have aided in metabolizing the baclofen  Upon admission to the hospital her trazodone, baclofen, and Elavil were held  Her sedation and confusion improved significantly  She was seen by Neurology and an MRI of the brain was ordered which was unremarkable  She was seen by psychiatry who agreed that baclofen and Elavil should be discontinued but trazodone can be resumed  It was also noted that patient has been noting increased depression lately  Lastly, she was seen by Nephrology to assist in management of her dialysis  She remains on her Tuesday Thursday Saturday schedule  She was seen by physical and occupational therapy and recommended for home PT if she agrees    Please see above list of diagnoses and related plan for additional information  Condition at Discharge: stable     Discharge Day Visit / Exam:     Subjective:  Patient converses in [address]  Offers no new complaints or concerns and reports she is feeling much better  No significant tremor or confusion noted  No events noted by nursing staff  Patient was seen working with physical therapy during my evaluation  Vitals: Blood Pressure: 117/75 (07/27/20 0700)  Pulse: 74 (07/27/20 0700)  Temperature: 97 8 °F (36 6 °C) (07/27/20 0700)  Temp Source: Oral (07/27/20 0700)  Respirations: 18 (07/27/20 0700)  Height: 5' 2" (157 5 cm)(Per previous charting ) (07/25/20 1430)  Weight - Scale: 78 1 kg (172 lb 2 9 oz) (07/23/20 1812)  SpO2: 98 % (07/27/20 0700)  Exam:   Physical Exam   Constitutional: She appears well-developed and well-nourished  No distress  HENT:   Head: Normocephalic and atraumatic  Eyes: Conjunctivae are normal  Right eye exhibits no discharge  Left eye exhibits no discharge  No scleral icterus  Cardiovascular: Normal rate, regular rhythm and normal heart sounds  Exam reveals no friction rub  No murmur heard    Pulmonary/Chest: Effort normal and breath sounds normal  No stridor  No respiratory distress  She has no wheezes  She has no rales  Abdominal: Soft  Bowel sounds are normal  She exhibits no distension  There is no tenderness  There is no guarding  Musculoskeletal: She exhibits no edema  Neurological: She is alert  Awake alert interactive pleasant and cooperative  No evidence of confusion or tremor noted  Patient seen ambulating with physical therapy without any obvious deficits   Skin: Skin is warm and dry  No rash noted  She is not diaphoretic  No erythema  No pallor  Psychiatric: She has a normal mood and affect  Her behavior is normal    Vitals reviewed  Discussion with Family:  Called patient's daughter and left a voicemail; she called back at 11:00 a m  Discharge instructions/Information to patient and family:   See after visit summary for information provided to patient and family  Provisions for Follow-Up Care:  See after visit summary for information related to follow-up care and any pertinent home health orders  Disposition: home with PT    Planned Readmission: none     Discharge Statement:  I spent 40 minutes discharging the patient  This time was spent on the day of discharge  I had direct contact with the patient on the day of discharge  Greater than 50% of the total time was spent examining patient, answering all patient questions, arranging and discussing plan of care with patient as well as directly providing post-discharge instructions  Additional time then spent on discharge activities  Case discussed with case management, physical therapy, Nephrology, Neurology, Psychiatry    Discharge Medications:  See after visit summary for reconciled discharge medications provided to patient and family        ** Please Note: This note has been constructed using a voice recognition system **

## 2020-07-27 NOTE — ASSESSMENT & PLAN NOTE
Lab Results   Component Value Date    HGBA1C 8 7 (H) 02/05/2020       Recent Labs     07/26/20  1524 07/26/20  1933 07/26/20  2110 07/27/20  0719   POCGLU 128 143* 115 121       Blood Sugar Average: Last 72 hrs:  (P) 235 4822   · Normoglycemic on admission   · Overdue for A1c, but can be ordered by PCP  · Continue home regimen

## 2020-07-27 NOTE — PHYSICAL THERAPY NOTE
PHYSICAL THERAPY EVALUATION NOTE    Patient Name: Crispin Gutierrez  JMRPN'M Date: 7/27/2020  AGE:   63 y o  Mrn:   [id number]  ADMIT DX:  Altered mental status [R41 82]  Hypertension [I10]  ANNETTE (generalized anxiety disorder) [F41 1]  ESRD (end stage renal disease) on dialysis ([facility] ) [N18 6, Z99 2]  MDD (major depressive disorder), recurrent severe, without psychosis ([facility] ) [F33 2]  Stroke-like symptoms [R29 90]  At risk for polypharmacy [Z91 89]    Past Medical History:   Diagnosis Date    Asthma     Depression     Diabetes mellitus ([facility] )     Hyperlipidemia     Hypertension     Renal disorder     daylsis T,Th, Saturday    Stroke [facility])      Length Of Stay: 4  PHYSICAL THERAPY EVALUATION :   07/27/20 0800   Pain Assessment   Pain Assessment Tool 0-10   Pain Score 6   Pain Location/Orientation Orientation: Left; Location: Head   Home Living   Type of [facility] One level; Other (Comment)  (1 flight JONAH)   Additional Comments lives w/ daughter  ambulates w/ cane  owns roller walker  independent w/ ADLs  daughter drives pt to dialysis  Prior Function   Comments pt seen sitting in chair  agreed to PT eval  c/o headache and feeling tired  pt wants to go home  Restrictions/Precautions   Other Precautions Chair Alarm; Bed Alarm;Limb alert; Fall Risk   General   Additional Pertinent History room air resting pulse ox 95% and 80 BPM, active 95% and 86 BPM    Family/Caregiver Present No   Cognition   Arousal/Participation Alert   Orientation Level Oriented to person;Oriented to place;Oriented to time; Other (Comment)  (pt was identified w/ full name, birth date)   Following Commands Follows one step commands with increased time or repetition   RUE Assessment   RUE Assessment WFL  (4-/5)   LUE Assessment   LUE Assessment X  (3/5, shoulder 3-/5)   RLE Assessment   RLE Assessment WFL  (4-/5)   LLE Assessment   LLE Assessment WFL  (3/5)   Coordination   Movements are Fluid and Coordinated 0   Coordination and Movement Description impaired coordination all extremities (worse on left)  intermittent tremors L UE and L LE  Sensation X  (light touch impaired left hand and L LE below knee)   Transfers   Sit to Stand 5  Supervision   Additional items Increased time required   Stand to Sit 5  Supervision   Additional items Increased time required   Additional Comments Comfortable Gait Speed: 0 36 m/s   Ambulation/Elevation   Gait pattern Decreased L stance; Inconsistent nadiya; Short stride; Foward flexed; Excessively slow   Gait Assistance 5  Supervision   Additional items Verbal cues  (for cane position, full step length)   Assistive Device SPC   Distance 50, 120, 40 feet w/ rest breaks x 1 to 2 minutes each  (additional not possible due to fatibue)   Stair Management Assistance 5  Supervision   Additional items Verbal cues; Increased time required  (for railing use)   Stair Management Technique One rail R;Step to pattern   Number of Stairs 5   Balance   Static Sitting Good   Dynamic Sitting Fair   Static Standing Fair -   Ambulatory Fair -  (w/ cane)   Activity Tolerance   Activity Tolerance Patient limited by fatigue;Patient limited by pain   Nurse Made Aware spoke to [facility] NOE TRACY OT   Assessment   Prognosis Fair   Problem List Decreased strength;Decreased range of motion;Decreased endurance; Impaired balance;Decreased mobility; Decreased coordination;Decreased safety awareness; Impaired sensation; Impaired tone;Pain   Assessment Pt presents with altered mental status  Dx: toxic metabolic encephalopathy, ESRD, ANNETTE, MDD, essential HTN, and DM w/ hyperglycemia  order placed for PT eval and tx, w/ activity order of ambulate patient   pt presents w/ comorbidities of asthma, DM, hyperlipidemia, HTN, and CVA and personal factors of mobilizing w/ assistive device, stair(s) to enter home, preferred language not [place] (language barrier) and depression  pt presents w/ pain, weakness, decreased ROM, decreased endurance, impaired balance, gait deviations, altered sensation, impaired coordination, impaired tone, decreased safety awareness and fall risk  these impairments are evident in findings from physical examination (weakness, decreased ROM, altered sensation, impaired coordination and impaired tone), mobility assessment (need for standby assist w/ all phases of mobility when usually mobilizing independently, tolerance to only 120 feet of ambulation and need for cueing for mobility technique), and Barthel Index: 70/100 and Comfortable Gait Speed: 0 36 m/s (less than 1 0 m/s indicates need for intervention to address falls risk)  pt needed input for task focus and mobility technique/safety  pt is at risk for falls due to physical and safety awareness deficits  pt's clinical presentation is unstable/unpredictable (evident in need for standby assist w/ all phases of mobility when usually mobilizing independently, tolerance to only 120 feet of ambulation, pain impacting overall mobility status and need for input for mobility technique/safety)  pt needs inpatient PT tx to improve mobility deficits  discharge recommendation is for home PT to reduce fall risk and maximize level of functional independence  Goals   Patient Goals go home   STG Expiration Date 08/06/20   Short Term Goal #1 pt will:  Increase bilateral LE strength 1/2 grade to facilitate independent mobility, Perform all bed mobility tasks independently to decrease fall risk factors, Perform all transfers independently to improve independence, Ambulate 300 ft  with least restrictive assistive device independently w/o LOB to expedite safe return home w/ family support, Navigate 20 stairs independently with unilateral handrail to facilitate return to previous living environment, Increase all balance 1 grade to decrease risk for falls, Complete exercise program independently to increase strength and endurance, Tolerate 3 hr OOB to faciliate upright tolerance, Improve gait speed to 0 56 m/s to reduce fall risk and increase independence and Improve Barthel Index score to 90 or greater to facilitate independence   Plan   Treatment/Interventions Functional transfer training;LE strengthening/ROM; Elevations; Therapeutic exercise; Endurance training;Patient/family training;Equipment eval/education; Bed mobility;Gait training   PT Frequency 5x/wk   Recommendation   PT Discharge Recommendation Return to previous environment with social support;Home with skilled therapy; Other (Comment)  (home PT)   Barthel Index   Feeding 10   Bathing 0   Grooming Score 5   Dressing Score 5   Bladder Score 10   Bowels Score 10   Toilet Use Score 5   Transfers (Bed/Chair) Score 10   Mobility (Level Surface) Score 10   Stairs Score 5   Barthel Index Score 70     Comfortable Gait Speed: 0 36 m/s  Gait Speed Interpretation:  Gain of 0 1 m/s is a predictor of well-being in those w/ abnormal walking speed compared to age-patched peers    Household ambulator: <0 4 m/s  Limited community ambulator: 0 4-0 8 m/s  [employer] ambulator: 0 8-1 2 m/s  Able to safely cross streets: >1 2 m/s    Skilled PT recommended while in hospital and upon DC to progress pt toward treatment goals       Mary Lou Flores, PT

## 2020-07-27 NOTE — PLAN OF CARE
Problem: Prexisting or High Potential for Compromised Skin Integrity  Goal: Skin integrity is maintained or improved  Description  INTERVENTIONS:  - Identify patients at risk for skin breakdown  - Assess and monitor skin integrity  - Assess and monitor nutrition and hydration status  - Monitor labs   - Assess for incontinence   - Turn and reposition patient  - Assist with mobility/ambulation  - Relieve pressure over bony prominences  - Avoid friction and shearing  - Provide appropriate hygiene as needed including keeping skin clean and dry  - Evaluate need for skin moisturizer/barrier cream  - Collaborate with interdisciplinary team   - Patient/family teaching  - Consider wound care consult   Outcome: Progressing     Problem: Potential for Falls  Goal: Patient will remain free of falls  Description  INTERVENTIONS:  - Assess patient frequently for physical needs  -  Identify cognitive and physical deficits and behaviors that affect risk of falls    -  [place] fall precautions as indicated by assessment   - Educate patient/family on patient safety including physical limitations  - Instruct patient to call for assistance with activity based on assessment  - Modify environment to reduce risk of injury  - Consider OT/PT consult to assist with strengthening/mobility  Outcome: Progressing     Problem: METABOLIC, FLUID AND ELECTROLYTES - ADULT  Goal: Electrolytes maintained within normal limits  Description  INTERVENTIONS:  - Monitor labs and assess patient for signs and symptoms of electrolyte imbalances  - Administer electrolyte replacement as ordered  - Monitor response to electrolyte replacements, including repeat lab results as appropriate  - Instruct patient on fluid and nutrition as appropriate  Outcome: Progressing  Goal: Fluid balance maintained  Description  INTERVENTIONS:  - Monitor labs   - Monitor I/O and WT  - Instruct patient on fluid and nutrition as appropriate  - Assess for signs & symptoms of volume excess or deficit  Outcome: Progressing  Goal: Glucose maintained within target range  Description  INTERVENTIONS:  - Monitor Blood Glucose as ordered  - Assess for signs and symptoms of hyperglycemia and hypoglycemia  - Administer ordered medications to maintain glucose within target range  - Assess nutritional intake and initiate nutrition service referral as needed  Outcome: Progressing     Problem: PAIN - ADULT  Goal: Verbalizes/displays adequate comfort level or baseline comfort level  Description  Interventions:  - Encourage patient to monitor pain and request assistance  - Assess pain using appropriate pain scale  - Administer analgesics based on type and severity of pain and evaluate response  - Implement non-pharmacological measures as appropriate and evaluate response  - Consider cultural and social influences on pain and pain management  - Notify physician/advanced practitioner if interventions unsuccessful or patient reports new pain  Outcome: Progressing     Problem: INFECTION - ADULT  Goal: Absence or prevention of progression during hospitalization  Description  INTERVENTIONS:  - Assess and monitor for signs and symptoms of infection  - Monitor lab/diagnostic results  - Monitor all insertion sites, i e  indwelling lines, tubes, and drains  - Monitor endotracheal if appropriate and nasal secretions for changes in amount and color  - [place] appropriate cooling/warming therapies per order  - Administer medications as ordered  - Instruct and encourage patient and family to use good hand hygiene technique  - Identify and instruct in appropriate isolation precautions for identified infection/condition  Outcome: Progressing     Problem: SAFETY ADULT  Goal: Patient will remain free of falls  Description  INTERVENTIONS:  - Assess patient frequently for physical needs  -  Identify cognitive and physical deficits and behaviors that affect risk of falls    -  [place] fall precautions as indicated by assessment   - Educate patient/family on patient safety including physical limitations  - Instruct patient to call for assistance with activity based on assessment  - Modify environment to reduce risk of injury  - Consider OT/PT consult to assist with strengthening/mobility  Outcome: Progressing  Goal: Maintain or return to baseline ADL function  Description  INTERVENTIONS:  -  Assess patient's ability to carry out ADLs; assess patient's baseline for ADL function and identify physical deficits which impact ability to perform ADLs (bathing, care of mouth/teeth, toileting, grooming, dressing, etc )  - Assess/evaluate cause of self-care deficits   - Assess range of motion  - Assess patient's mobility; develop plan if impaired  - Assess patient's need for assistive devices and provide as appropriate  - Encourage maximum independence but intervene and supervise when necessary  - Involve family in performance of ADLs  - Assess for home care needs following discharge   - Consider OT consult to assist with ADL evaluation and planning for discharge  - Provide patient education as appropriate  Outcome: Progressing  Goal: Maintain or return mobility status to optimal level  Description  INTERVENTIONS:  - Assess patient's baseline mobility status (ambulation, transfers, stairs, etc )    - Identify cognitive and physical deficits and behaviors that affect mobility  - Identify mobility aids required to assist with transfers and/or ambulation (gait belt, sit-to-stand, lift, walker, cane, etc )  - [place] fall precautions as indicated by assessment  - Record patient progress and toleration of activity level on Mobility SBAR; progress patient to next Phase/Stage  - Instruct patient to call for assistance with activity based on assessment  - Consider rehabilitation consult to assist with strengthening/weightbearing, etc   Outcome: Progressing     Problem: DISCHARGE PLANNING  Goal: Discharge to home or other facility with appropriate resources  Description  INTERVENTIONS:  - Identify barriers to discharge w/patient and caregiver  - Arrange for needed discharge resources and transportation as appropriate  - Identify discharge learning needs (meds, wound care, etc )  - Arrange for interpretive services to assist at discharge as needed  - Refer to Case Management Department for coordinating discharge planning if the patient needs post-hospital services based on physician/advanced practitioner order or complex needs related to functional status, cognitive ability, or social support system  Outcome: Progressing     Problem: Knowledge Deficit  Goal: Patient/family/caregiver demonstrates understanding of disease process, treatment plan, medications, and discharge instructions  Description  Complete learning assessment and assess knowledge base    Interventions:  - Provide teaching at level of understanding  - Provide teaching via preferred learning methods  Outcome: Progressing

## 2020-07-28 LAB
BACTERIA BLD CULT: NORMAL
BACTERIA BLD CULT: NORMAL

## 2020-11-02 ENCOUNTER — HOSPITAL ENCOUNTER (EMERGENCY)
Facility: HOSPITAL | Age: 55
Discharge: HOME | End: 2020-11-02
Attending: EMERGENCY MEDICINE
Payer: MEDICARE

## 2020-11-02 VITALS
RESPIRATION RATE: 18 BRPM | DIASTOLIC BLOOD PRESSURE: 91 MMHG | TEMPERATURE: 98.3 F | OXYGEN SATURATION: 99 % | HEART RATE: 86 BPM | SYSTOLIC BLOOD PRESSURE: 192 MMHG | BODY MASS INDEX: 29.26 KG/M2 | WEIGHT: 160 LBS

## 2020-11-02 DIAGNOSIS — I10 CHRONIC HYPERTENSION: Primary | ICD-10-CM

## 2020-11-02 DIAGNOSIS — G43.809 OTHER MIGRAINE WITHOUT STATUS MIGRAINOSUS, NOT INTRACTABLE: ICD-10-CM

## 2020-11-02 PROCEDURE — 99283 EMERGENCY DEPT VISIT LOW MDM: CPT

## 2020-11-02 PROCEDURE — 99285 EMERGENCY DEPT VISIT HI MDM: CPT | Performed by: EMERGENCY MEDICINE

## 2020-11-02 PROCEDURE — 96365 THER/PROPH/DIAG IV INF INIT: CPT

## 2020-11-02 PROCEDURE — 96375 TX/PRO/DX INJ NEW DRUG ADDON: CPT

## 2020-11-02 RX ORDER — DIPHENHYDRAMINE HYDROCHLORIDE 50 MG/ML
25 INJECTION INTRAMUSCULAR; INTRAVENOUS ONCE
Status: COMPLETED | OUTPATIENT
Start: 2020-11-02 | End: 2020-11-02

## 2020-11-02 RX ORDER — METOCLOPRAMIDE HYDROCHLORIDE 5 MG/ML
10 INJECTION INTRAMUSCULAR; INTRAVENOUS ONCE
Status: COMPLETED | OUTPATIENT
Start: 2020-11-02 | End: 2020-11-02

## 2020-11-02 RX ORDER — MAGNESIUM SULFATE HEPTAHYDRATE 40 MG/ML
2 INJECTION, SOLUTION INTRAVENOUS ONCE
Status: COMPLETED | OUTPATIENT
Start: 2020-11-02 | End: 2020-11-02

## 2020-11-02 RX ORDER — LABETALOL 20 MG/4 ML (5 MG/ML) INTRAVENOUS SYRINGE
10 ONCE
Status: COMPLETED | OUTPATIENT
Start: 2020-11-02 | End: 2020-11-02

## 2020-11-02 RX ORDER — HYDRALAZINE HYDROCHLORIDE 20 MG/ML
10 INJECTION INTRAMUSCULAR; INTRAVENOUS ONCE
Status: COMPLETED | OUTPATIENT
Start: 2020-11-02 | End: 2020-11-02

## 2020-11-02 RX ORDER — DEXAMETHASONE SODIUM PHOSPHATE 4 MG/ML
10 INJECTION, SOLUTION INTRA-ARTICULAR; INTRALESIONAL; INTRAMUSCULAR; INTRAVENOUS; SOFT TISSUE ONCE
Status: COMPLETED | OUTPATIENT
Start: 2020-11-02 | End: 2020-11-02

## 2020-11-02 RX ADMIN — METOCLOPRAMIDE HYDROCHLORIDE 10 MG: 5 INJECTION INTRAMUSCULAR; INTRAVENOUS at 19:12

## 2020-11-02 RX ADMIN — MAGNESIUM SULFATE IN WATER 2 G: 40 INJECTION, SOLUTION INTRAVENOUS at 19:17

## 2020-11-02 RX ADMIN — DIPHENHYDRAMINE HYDROCHLORIDE 25 MG: 50 INJECTION, SOLUTION INTRAMUSCULAR; INTRAVENOUS at 19:05

## 2020-11-02 RX ADMIN — DEXAMETHASONE SODIUM PHOSPHATE 10 MG: 4 INJECTION, SOLUTION INTRAMUSCULAR; INTRAVENOUS at 19:05

## 2020-11-02 RX ADMIN — LABETALOL 20 MG/4 ML (5 MG/ML) INTRAVENOUS SYRINGE 10 MG: at 20:15

## 2020-11-02 RX ADMIN — HYDRALAZINE HYDROCHLORIDE 10 MG: 20 INJECTION INTRAMUSCULAR; INTRAVENOUS at 19:29

## 2021-05-23 PROBLEM — E11.3491 SEVERE NONPROLIFERATIVE DIABETIC RETINOPATHY: Noted: 2021-05-23

## 2021-05-23 PROBLEM — E11.3412 DIABETIC MACULAR EDEMA: Noted: 2021-05-23

## 2021-05-23 PROBLEM — H25.13 NS CATARACT: Noted: 2021-05-23

## 2021-05-23 PROBLEM — E11.3412 SEVERE NONPROLIFERATIVE DIABETIC RETINOPATHY: Noted: 2021-05-23

## 2021-05-23 PROBLEM — H25.13 NUCLEAR SCLEROSIS: Noted: 2021-05-23

## 2021-05-24 ENCOUNTER — PREPPED CHART (OUTPATIENT)
Dept: URBAN - METROPOLITAN AREA CLINIC 66 | Facility: CLINIC | Age: 56
End: 2021-05-24

## 2021-10-01 NOTE — PHYSICAL THERAPY NOTE
PT TREATMENT     10/09/17 1043   Pain Assessment   Pain Assessment No/denies pain   Restrictions/Precautions   Other Precautions Fall Risk;Bed Alarm; Chair Alarm   General   Chart Reviewed Yes   Family/Caregiver Present No   Subjective   Subjective "OK"  At times, during amb with verbal cuing, pt gets anxious and states "the leg feels uncomfortable "   Bed Mobility   Supine to Sit 5  Supervision   Transfers   Sit to Stand 4  Minimal assistance   Additional items Verbal cues   Stand to Sit 4  Minimal assistance   Additional items Verbal cues   Ambulation/Elevation   Gait pattern Decreased L stance; Inconsistent nadiya;L Knee Blake   Gait Assistance 4  Minimal assist   Additional items Verbal cues   Assistive Device Rolling walker;SPC   Distance 40 feet;40 feet;with each device   Balance   Static Sitting Good   Static Standing Fair -  (with AD)   Dynamic Standing Fair -  (with AD)   Ambulatory Fair -  (with AD)   Activity Tolerance   Activity Tolerance Patient limited by fatigue  (Pt demonstrates anxiety during ambulation)   Assessment   Assessment Pt is showing steady improvement with amb with both the RW and SPC  Pt prefers the RW, "it makes me comfortable " With both devices, pt is noted with left knee buckling with stepping RLE;at times, pt has difficulty following cues to maintain left knee straight with stepping of RLE and becomes anxious  Pt will cont to benefit from skilled PT services  Plan   Treatment/Interventions ADL retraining;Functional transfer training;LE strengthening/ROM; Therapeutic exercise; Endurance training;Patient/family training;Bed mobility;Gait training   Recommendation   Recommendation (Acute Rehab recommended) AGE (acute gastroenteritis)

## 2022-01-05 ENCOUNTER — OFFICE (OUTPATIENT)
Dept: URBAN - METROPOLITAN AREA CLINIC 34 | Facility: CLINIC | Age: 57
End: 2022-01-05
Payer: MEDICAID

## 2022-01-05 VITALS
WEIGHT: 186 LBS | SYSTOLIC BLOOD PRESSURE: 149 MMHG | HEART RATE: 75 BPM | TEMPERATURE: 97.9 F | HEIGHT: 64 IN | DIASTOLIC BLOOD PRESSURE: 80 MMHG

## 2022-01-05 DIAGNOSIS — I67.89 OTHER CEREBROVASCULAR DISEASE: ICD-10-CM

## 2022-01-05 DIAGNOSIS — Z76.82 AWAITING ORGAN TRANSPLANT STATUS: ICD-10-CM

## 2022-01-05 DIAGNOSIS — E11.9 TYPE 2 DIABETES MELLITUS WITHOUT COMPLICATIONS: ICD-10-CM

## 2022-01-05 DIAGNOSIS — K62.5 HEMORRHAGE OF ANUS AND RECTUM: ICD-10-CM

## 2022-01-05 DIAGNOSIS — N18.9 CHRONIC KIDNEY DISEASE, UNSPECIFIED: ICD-10-CM

## 2022-01-05 PROCEDURE — 99204 OFFICE O/P NEW MOD 45 MIN: CPT | Performed by: PHYSICIAN ASSISTANT

## 2022-01-05 NOTE — SERVICEHPINOTES
NESTOR VALENZUELA   is a   56   year old white    female who is being seen in consultation at the request of   SHALONDA BROUSSARD   for ov prior to colonoscopy due to kidney transplant pre-req. She mentions last colonoscopy about 6 years ago that was ordered due to BRBPR and results were "normal" in [place] (no records to review). She states recurrent BRBPR seen on wipe that began about 4 months ago. Events of intermittent BRBPR seen on wipe and in toilet bowl water about 4-5x/week. She has BMs 2-3x/day, BSS type 4-5 with no straining need to achieve a BM. No fm h/o CRC or colonic polyps. br
brShe has CKD secondary to DMT2 on dialysis Tues/Wed. /Sat. and notes right upper extremity fistula over site of Iv access. Pam nephrologist is Dr. Erick Cuevas MD - [place]
br

## 2022-02-14 ENCOUNTER — AMBULATORY SURGICAL CENTER (OUTPATIENT)
Dept: URBAN - METROPOLITAN AREA SURGERY 23 | Facility: SURGERY | Age: 57
End: 2022-02-14
Payer: MEDICAID

## 2022-02-14 DIAGNOSIS — D12.2 BENIGN NEOPLASM OF ASCENDING COLON: ICD-10-CM

## 2022-02-14 DIAGNOSIS — Z12.11 ENCOUNTER FOR SCREENING FOR MALIGNANT NEOPLASM OF COLON: ICD-10-CM

## 2022-02-14 PROCEDURE — 45380 COLONOSCOPY AND BIOPSY: CPT | Mod: PT | Performed by: INTERNAL MEDICINE

## 2022-02-16 NOTE — RESPIRATORY THERAPY NOTE
RT Protocol Note  Mirta Berrios 53 y o  female MRN: [id number]  Unit/Bed#: S -18 Encounter: [id number]    Assessment    Principal Problem:    Pneumonia  Active Problems:    Type 2 diabetes mellitus with hyperglycemia, with long-term current use of insulin (HCC)    Hypertension    Hyperlipidemia    Diarrhea    Anemia of chronic renal failure    SIRS (systemic inflammatory response syndrome) ([facility])    ESRD on hemodialysis ([facility] 75 )      Home Pulmonary Medications:  none       Past Medical History:   Diagnosis Date    Asthma     Depression     Diabetes mellitus ([facility] )     Hyperlipidemia     Hypertension     Renal disorder     daylsis T,Th, Saturday    Stroke [facility])      Social History     Socioeconomic History    Marital status:      Spouse name: None    Number of children: None    Years of education: None    Highest education level: None   Occupational History    None   Social Needs    Financial resource strain: None    Food insecurity:     Worry: None     Inability: None    Transportation needs:     Medical: None     Non-medical: None   Tobacco Use    Smoking status: Never Smoker    Smokeless tobacco: Never Used   Substance and Sexual Activity    Alcohol use: No    Drug use: No    Sexual activity: None   Lifestyle    Physical activity:     Days per week: None     Minutes per session: None    Stress: None   Relationships    Social connections:     Talks on phone: None     Gets together: None     Attends [employer] service: None     Active member of club or organization: None     Attends meetings of clubs or organizations: None     Relationship status: None    Intimate partner violence:     Fear of current or ex partner: None     Emotionally abused: None     Physically abused: None     Forced sexual activity: None   Other Topics Concern    None   Social History Narrative    None       Subjective         Objective    Physical Exam:   Assessment Type: (P) Assess only  General Appearance: (P) Awake  Respiratory Pattern: (P) Normal  Chest Assessment: (P) Chest expansion symmetrical  Bilateral Breath Sounds: (P) Coarse, Diminished    Vitals:  Blood pressure 138/90, pulse 81, temperature (!) 97 4 °F (36 3 °C), temperature source Oral, resp  rate 14, height 5' 2" (1 575 m), SpO2 93 %, not currently breastfeeding  Imaging and other studies: I have personally reviewed pertinent reports  Plan    Respiratory Plan: (P) No distress/Pulmonary history        Resp Comments: (P) Pt assessed per protocol  Pt has past pulm hx of asthma  Pt does not take pulm meds at home  Pt does not appear to be in distress at this time  will order prn albuterol mdi  Will continue to monitor  Mauc Instructions: By selecting yes to the question below the MAUC number will be added into the note.  This will be calculated automatically based on the diagnosis chosen, the size entered, the body zone selected (H,M,L) and the specific indications you chose. You will also have the option to override the Mohs AUC if you disagree with the automatically calculated number and this option is found in the Case Summary tab.

## 2023-02-26 NOTE — PROGRESS NOTES
Patient asleep in bed  No visible signs of distress noted at this time  Bed low and locked; call bell within reach  Will continue to monitor   Tatiana Warner RN .  LABS:                         10.9   7.93  )-----------( 349      ( 26 Feb 2023 15:58 )             35.2     02-26    139  |  108  |  15  ----------------------------<  107<H>  4.7   |  20  |  0.8    Ca    8.9      26 Feb 2023 15:58  Mg     1.7     02-26    TPro  6.5  /  Alb  4.0  /  TBili  0.3  /  DBili  x   /  AST  23  /  ALT  35  /  AlkPhos  135<H>  02-26        Serum Pro-Brain Natriuretic Peptide: 2288 pg/mL (02-26 @ 15:58)        RADIOLOGY, EKG & ADDITIONAL TESTS: Reviewed.

## 2023-11-20 ASSESSMENT — VISUAL ACUITY
OD_SC: 20/200
OS_SC: 20/200

## 2023-11-20 ASSESSMENT — TONOMETRY
OD_IOP_MMHG: 22
OS_IOP_MMHG: 21

## 2023-11-27 ENCOUNTER — FOLLOW UP (OUTPATIENT)
Dept: URBAN - METROPOLITAN AREA CLINIC 66 | Facility: CLINIC | Age: 58
End: 2023-11-27

## 2023-11-27 DIAGNOSIS — E11.3412: ICD-10-CM

## 2023-11-27 DIAGNOSIS — E11.3491: ICD-10-CM

## 2023-11-27 DIAGNOSIS — H43.812: ICD-10-CM

## 2023-11-27 PROCEDURE — 92134 CPTRZ OPH DX IMG PST SGM RTA: CPT | Mod: NC

## 2023-11-27 PROCEDURE — 92014 COMPRE OPH EXAM EST PT 1/>: CPT

## 2023-11-27 PROCEDURE — 92202 OPSCPY EXTND ON/MAC DRAW: CPT | Mod: NC

## 2023-11-27 PROCEDURE — 92250 FUNDUS PHOTOGRAPHY W/I&R: CPT

## 2023-11-27 ASSESSMENT — VISUAL ACUITY
OS_CC: 20/50-2
OD_CC: 20/60-1
OD_PH: 20/50-2
OS_PH: 20/40-1

## 2023-11-27 ASSESSMENT — TONOMETRY
OD_IOP_MMHG: 18
OS_IOP_MMHG: 19